# Patient Record
Sex: MALE | Race: BLACK OR AFRICAN AMERICAN | NOT HISPANIC OR LATINO | Employment: FULL TIME | URBAN - METROPOLITAN AREA
[De-identification: names, ages, dates, MRNs, and addresses within clinical notes are randomized per-mention and may not be internally consistent; named-entity substitution may affect disease eponyms.]

---

## 2019-07-27 ENCOUNTER — HOSPITAL ENCOUNTER (EMERGENCY)
Facility: HOSPITAL | Age: 44
Discharge: HOME | End: 2019-07-28
Attending: FAMILY MEDICINE
Payer: COMMERCIAL

## 2019-07-27 DIAGNOSIS — R11.2 NON-INTRACTABLE VOMITING WITH NAUSEA, UNSPECIFIED VOMITING TYPE: Primary | ICD-10-CM

## 2019-07-27 LAB
BASOPHILS # BLD AUTO: 0.12 K/UL (ref 0–0.2)
BASOPHILS NFR BLD: 1.3 % (ref 0–1.9)
DIFFERENTIAL METHOD: ABNORMAL
EOSINOPHIL # BLD AUTO: 0.2 K/UL (ref 0–0.5)
EOSINOPHIL NFR BLD: 2 % (ref 0–8)
ERYTHROCYTE [DISTWIDTH] IN BLOOD BY AUTOMATED COUNT: 12 % (ref 11.5–14.5)
HCT VFR BLD AUTO: 41.3 % (ref 40–54)
HGB BLD-MCNC: 13.5 G/DL (ref 14–18)
IMM GRANULOCYTES # BLD AUTO: 0.02 K/UL (ref 0–0.04)
IMM GRANULOCYTES NFR BLD AUTO: 0.2 % (ref 0–0.5)
LYMPHOCYTES # BLD AUTO: 3.7 K/UL (ref 1–4.8)
LYMPHOCYTES NFR BLD: 39 % (ref 18–48)
MCH RBC QN AUTO: 29.7 PG (ref 27–31)
MCHC RBC AUTO-ENTMCNC: 32.7 G/DL (ref 32–36)
MCV RBC AUTO: 91 FL (ref 82–98)
MONOCYTES # BLD AUTO: 0.9 K/UL (ref 0.3–1)
MONOCYTES NFR BLD: 9.3 % (ref 4–15)
NEUTROPHILS # BLD AUTO: 4.5 K/UL (ref 1.8–7.7)
NEUTROPHILS NFR BLD: 48.2 % (ref 38–73)
NRBC BLD-RTO: 0 /100 WBC
PLATELET # BLD AUTO: 338 K/UL (ref 150–350)
PMV BLD AUTO: 10.3 FL (ref 9.2–12.9)
RBC # BLD AUTO: 4.54 M/UL (ref 4.6–6.2)
WBC # BLD AUTO: 9.42 K/UL (ref 3.9–12.7)

## 2019-07-27 PROCEDURE — 83690 ASSAY OF LIPASE: CPT

## 2019-07-27 PROCEDURE — 85025 COMPLETE CBC W/AUTO DIFF WBC: CPT

## 2019-07-27 PROCEDURE — 83735 ASSAY OF MAGNESIUM: CPT

## 2019-07-27 PROCEDURE — 96374 THER/PROPH/DIAG INJ IV PUSH: CPT

## 2019-07-27 PROCEDURE — 96375 TX/PRO/DX INJ NEW DRUG ADDON: CPT

## 2019-07-27 PROCEDURE — 99284 EMERGENCY DEPT VISIT MOD MDM: CPT | Mod: 25

## 2019-07-27 PROCEDURE — 96361 HYDRATE IV INFUSION ADD-ON: CPT

## 2019-07-27 PROCEDURE — 63600175 PHARM REV CODE 636 W HCPCS: Performed by: FAMILY MEDICINE

## 2019-07-27 PROCEDURE — 80053 COMPREHEN METABOLIC PANEL: CPT

## 2019-07-27 RX ORDER — MORPHINE SULFATE 4 MG/ML
4 INJECTION, SOLUTION INTRAMUSCULAR; INTRAVENOUS
Status: COMPLETED | OUTPATIENT
Start: 2019-07-27 | End: 2019-07-27

## 2019-07-27 RX ORDER — ONDANSETRON 2 MG/ML
4 INJECTION INTRAMUSCULAR; INTRAVENOUS
Status: COMPLETED | OUTPATIENT
Start: 2019-07-27 | End: 2019-07-27

## 2019-07-27 RX ADMIN — ONDANSETRON 4 MG: 2 INJECTION INTRAMUSCULAR; INTRAVENOUS at 11:07

## 2019-07-27 RX ADMIN — MORPHINE SULFATE 4 MG: 4 INJECTION INTRAVENOUS at 11:07

## 2019-07-27 RX ADMIN — SODIUM CHLORIDE 1000 ML: 0.9 INJECTION, SOLUTION INTRAVENOUS at 11:07

## 2019-07-28 VITALS
HEART RATE: 70 BPM | SYSTOLIC BLOOD PRESSURE: 105 MMHG | BODY MASS INDEX: 31.71 KG/M2 | HEIGHT: 75 IN | WEIGHT: 255 LBS | OXYGEN SATURATION: 100 % | TEMPERATURE: 99 F | RESPIRATION RATE: 16 BRPM | DIASTOLIC BLOOD PRESSURE: 73 MMHG

## 2019-07-28 LAB
ALBUMIN SERPL BCP-MCNC: 3.8 G/DL (ref 3.5–5.2)
ALP SERPL-CCNC: 53 U/L (ref 55–135)
ALT SERPL W/O P-5'-P-CCNC: 19 U/L (ref 10–44)
ANION GAP SERPL CALC-SCNC: 8 MMOL/L (ref 8–16)
AST SERPL-CCNC: 20 U/L (ref 10–40)
BILIRUB SERPL-MCNC: 1 MG/DL (ref 0.1–1)
BUN SERPL-MCNC: 14 MG/DL (ref 6–20)
CALCIUM SERPL-MCNC: 8.4 MG/DL (ref 8.7–10.5)
CHLORIDE SERPL-SCNC: 99 MMOL/L (ref 95–110)
CO2 SERPL-SCNC: 28 MMOL/L (ref 23–29)
CREAT SERPL-MCNC: 1.2 MG/DL (ref 0.5–1.4)
EST. GFR  (AFRICAN AMERICAN): >60 ML/MIN/1.73 M^2
EST. GFR  (NON AFRICAN AMERICAN): >60 ML/MIN/1.73 M^2
GLUCOSE SERPL-MCNC: 94 MG/DL (ref 70–110)
LIPASE SERPL-CCNC: 45 U/L (ref 4–60)
MAGNESIUM SERPL-MCNC: 1.8 MG/DL (ref 1.6–2.6)
POTASSIUM SERPL-SCNC: 3.6 MMOL/L (ref 3.5–5.1)
PROT SERPL-MCNC: 7 G/DL (ref 6–8.4)
SODIUM SERPL-SCNC: 135 MMOL/L (ref 136–145)

## 2019-07-28 PROCEDURE — 96376 TX/PRO/DX INJ SAME DRUG ADON: CPT

## 2019-07-28 PROCEDURE — 63600175 PHARM REV CODE 636 W HCPCS: Performed by: FAMILY MEDICINE

## 2019-07-28 RX ORDER — ONDANSETRON 4 MG/1
4 TABLET, ORALLY DISINTEGRATING ORAL EVERY 8 HOURS PRN
Qty: 12 TABLET | Refills: 0 | Status: SHIPPED | OUTPATIENT
Start: 2019-07-28 | End: 2019-09-04

## 2019-07-28 RX ORDER — ONDANSETRON 2 MG/ML
4 INJECTION INTRAMUSCULAR; INTRAVENOUS
Status: COMPLETED | OUTPATIENT
Start: 2019-07-28 | End: 2019-07-28

## 2019-07-28 RX ADMIN — ONDANSETRON 4 MG: 2 INJECTION INTRAMUSCULAR; INTRAVENOUS at 12:07

## 2019-07-28 NOTE — ED PROVIDER NOTES
Encounter Date: 7/27/2019       History     Chief Complaint   Patient presents with    Abdominal Pain     x 2 days    Vomiting     Patient presents to the ED with nausea, vomiting, abdominal cramping for the past 48 hr.  Patient unable to tolerate any oral intake for the past 12 hr.  Denies any diarrhea.  Denies fever or chills.  Symptoms consistent with condition currently present in the community.        Review of patient's allergies indicates:  No Known Allergies  History reviewed. No pertinent past medical history.  Past Surgical History:   Procedure Laterality Date    BACK SURGERY      EYE SURGERY       History reviewed. No pertinent family history.  Social History     Tobacco Use    Smoking status: Current Every Day Smoker     Packs/day: 0.50     Types: Cigarettes   Substance Use Topics    Alcohol use: Never     Frequency: Never    Drug use: Not on file     Review of Systems   Constitutional: Negative for chills and fever.   Gastrointestinal: Positive for abdominal pain, nausea and vomiting.   All other systems reviewed and are negative.      Physical Exam     Initial Vitals [07/27/19 2211]   BP Pulse Resp Temp SpO2   128/81 85 20 98.5 °F (36.9 °C) 97 %      MAP       --         Physical Exam    Nursing note and vitals reviewed.  Constitutional: He appears well-developed and well-nourished. He is not diaphoretic. No distress.   HENT:   Head: Normocephalic and atraumatic.   Eyes: Pupils are equal, round, and reactive to light.   Neck: Normal range of motion. Neck supple.   Cardiovascular: Normal rate, regular rhythm, normal heart sounds and intact distal pulses. Exam reveals no friction rub.    No murmur heard.  Pulmonary/Chest: Breath sounds normal. No respiratory distress. He has no wheezes. He has no rhonchi. He has no rales. He exhibits no tenderness.   Abdominal: Soft. Bowel sounds are normal. He exhibits no distension. There is tenderness. There is no rebound and no guarding.   Patient mildly  tender over mid abdomen, no rebound or guarding noted. No mass   Musculoskeletal: Normal range of motion. He exhibits no edema.   Neurological: He is alert and oriented to person, place, and time. He has normal strength.   Skin: Skin is warm and dry. Capillary refill takes less than 2 seconds.   Psychiatric: He has a normal mood and affect. His behavior is normal. Judgment and thought content normal.         ED Course   Procedures  Labs Reviewed   CBC W/ AUTO DIFFERENTIAL - Abnormal; Notable for the following components:       Result Value    RBC 4.54 (*)     Hemoglobin 13.5 (*)     All other components within normal limits   COMPREHENSIVE METABOLIC PANEL - Abnormal; Notable for the following components:    Sodium 135 (*)     Calcium 8.4 (*)     Alkaline Phosphatase 53 (*)     All other components within normal limits   LIPASE   MAGNESIUM          Imaging Results    None          Medical Decision Making:   Differential Diagnosis:   Gastroenteritis  Pancreatitis    ED Management:  Patient much improved after IV fluids and medication.  Able to tolerate oral intake prior to discharge.                      Clinical Impression:       ICD-10-CM ICD-9-CM   1. Non-intractable vomiting with nausea, unspecified vomiting type R11.2 787.01                                Anjelica Palm MD  07/28/19 0522

## 2019-07-28 NOTE — ED NOTES
Pt c/o  upepr abd pain and n/v x2-3days. Pt reports vomiting 3 times today. Pt states that he has been able to tolerate fluids. Pt denies diarrhea, fever and dysuria. Pt states that he drinks approx 8 energy drinks per day.

## 2019-07-28 NOTE — ED NOTES
Pt states that his pain has increased after admin of pain medication per md order. Dr. Palm notified. V/u. No new orders received

## 2019-09-04 ENCOUNTER — HOSPITAL ENCOUNTER (EMERGENCY)
Facility: HOSPITAL | Age: 44
Discharge: HOME | End: 2019-09-04
Attending: FAMILY MEDICINE
Payer: COMMERCIAL

## 2019-09-04 VITALS
HEART RATE: 70 BPM | BODY MASS INDEX: 27.85 KG/M2 | RESPIRATION RATE: 20 BRPM | SYSTOLIC BLOOD PRESSURE: 110 MMHG | DIASTOLIC BLOOD PRESSURE: 82 MMHG | OXYGEN SATURATION: 97 % | HEIGHT: 75 IN | TEMPERATURE: 99 F | WEIGHT: 224 LBS

## 2019-09-04 DIAGNOSIS — R10.13 EPIGASTRIC PAIN: Primary | ICD-10-CM

## 2019-09-04 DIAGNOSIS — K29.70 GASTRITIS, PRESENCE OF BLEEDING UNSPECIFIED, UNSPECIFIED CHRONICITY, UNSPECIFIED GASTRITIS TYPE: ICD-10-CM

## 2019-09-04 DIAGNOSIS — R52 PAIN: ICD-10-CM

## 2019-09-04 LAB
ALBUMIN SERPL BCP-MCNC: 4.3 G/DL (ref 3.5–5.2)
ALP SERPL-CCNC: 57 U/L (ref 55–135)
ALT SERPL W/O P-5'-P-CCNC: 18 U/L (ref 10–44)
ANION GAP SERPL CALC-SCNC: 10 MMOL/L (ref 8–16)
AST SERPL-CCNC: 21 U/L (ref 10–40)
BASOPHILS # BLD AUTO: 0.07 K/UL (ref 0–0.2)
BASOPHILS NFR BLD: 0.7 % (ref 0–1.9)
BILIRUB SERPL-MCNC: 1.4 MG/DL (ref 0.1–1)
BUN SERPL-MCNC: 12 MG/DL (ref 6–20)
CALCIUM SERPL-MCNC: 9.1 MG/DL (ref 8.7–10.5)
CHLORIDE SERPL-SCNC: 98 MMOL/L (ref 95–110)
CO2 SERPL-SCNC: 28 MMOL/L (ref 23–29)
CREAT SERPL-MCNC: 1.1 MG/DL (ref 0.5–1.4)
DIFFERENTIAL METHOD: ABNORMAL
EOSINOPHIL # BLD AUTO: 0.1 K/UL (ref 0–0.5)
EOSINOPHIL NFR BLD: 1.4 % (ref 0–8)
ERYTHROCYTE [DISTWIDTH] IN BLOOD BY AUTOMATED COUNT: 12.1 % (ref 11.5–14.5)
EST. GFR  (AFRICAN AMERICAN): >60 ML/MIN/1.73 M^2
EST. GFR  (NON AFRICAN AMERICAN): >60 ML/MIN/1.73 M^2
GLUCOSE SERPL-MCNC: 94 MG/DL (ref 70–110)
HCT VFR BLD AUTO: 43.9 % (ref 40–54)
HGB BLD-MCNC: 14.2 G/DL (ref 14–18)
IMM GRANULOCYTES # BLD AUTO: 0.03 K/UL (ref 0–0.04)
IMM GRANULOCYTES NFR BLD AUTO: 0.3 % (ref 0–0.5)
LIPASE SERPL-CCNC: 35 U/L (ref 4–60)
LYMPHOCYTES # BLD AUTO: 3.1 K/UL (ref 1–4.8)
LYMPHOCYTES NFR BLD: 31.3 % (ref 18–48)
MCH RBC QN AUTO: 29.5 PG (ref 27–31)
MCHC RBC AUTO-ENTMCNC: 32.3 G/DL (ref 32–36)
MCV RBC AUTO: 91 FL (ref 82–98)
MONOCYTES # BLD AUTO: 0.9 K/UL (ref 0.3–1)
MONOCYTES NFR BLD: 8.8 % (ref 4–15)
NEUTROPHILS # BLD AUTO: 5.6 K/UL (ref 1.8–7.7)
NEUTROPHILS NFR BLD: 57.5 % (ref 38–73)
NRBC BLD-RTO: 0 /100 WBC
PLATELET # BLD AUTO: 386 K/UL (ref 150–350)
PMV BLD AUTO: 10.3 FL (ref 9.2–12.9)
POTASSIUM SERPL-SCNC: 4 MMOL/L (ref 3.5–5.1)
PROT SERPL-MCNC: 8 G/DL (ref 6–8.4)
RBC # BLD AUTO: 4.82 M/UL (ref 4.6–6.2)
SODIUM SERPL-SCNC: 136 MMOL/L (ref 136–145)
WBC # BLD AUTO: 9.74 K/UL (ref 3.9–12.7)

## 2019-09-04 PROCEDURE — 83690 ASSAY OF LIPASE: CPT

## 2019-09-04 PROCEDURE — 74019 XR ABDOMEN FLAT AND ERECT: ICD-10-PCS | Mod: 26,,, | Performed by: RADIOLOGY

## 2019-09-04 PROCEDURE — 74019 RADEX ABDOMEN 2 VIEWS: CPT | Mod: TC,FY

## 2019-09-04 PROCEDURE — 25000003 PHARM REV CODE 250: Performed by: FAMILY MEDICINE

## 2019-09-04 PROCEDURE — 36415 COLL VENOUS BLD VENIPUNCTURE: CPT

## 2019-09-04 PROCEDURE — 99284 EMERGENCY DEPT VISIT MOD MDM: CPT | Mod: 25

## 2019-09-04 PROCEDURE — 74019 RADEX ABDOMEN 2 VIEWS: CPT | Mod: 26,,, | Performed by: RADIOLOGY

## 2019-09-04 PROCEDURE — 80053 COMPREHEN METABOLIC PANEL: CPT

## 2019-09-04 PROCEDURE — 85025 COMPLETE CBC W/AUTO DIFF WBC: CPT

## 2019-09-04 RX ADMIN — LIDOCAINE HYDROCHLORIDE: 20 SOLUTION ORAL; TOPICAL at 02:09

## 2019-09-04 NOTE — ED PROVIDER NOTES
Encounter Date: 9/4/2019       History     Chief Complaint   Patient presents with    Abdominal Pain     Abdominal pain, N&V x3 days.    Nausea    Vomiting     43-year-old male presents complaining crampy of abdominal pain for the past 3 days is so should with nausea vomiting and some diarrhea it started after a [employer] hamburger no history fever or chills no sore throat or sneezing there are no family members with similar symptoms        Review of patient's allergies indicates:  No Known Allergies  History reviewed. No pertinent past medical history.  Past Surgical History:   Procedure Laterality Date    BACK SURGERY      EYE SURGERY       History reviewed. No pertinent family history.  Social History     Tobacco Use    Smoking status: Current Every Day Smoker     Packs/day: 0.50     Types: Cigarettes   Substance Use Topics    Alcohol use: Never     Frequency: Never    Drug use: Never     Review of Systems   Constitutional: Negative for fever.   HENT: Negative for sore throat.    Respiratory: Negative for shortness of breath.    Cardiovascular: Negative for chest pain.   Gastrointestinal: Negative for nausea.   Genitourinary: Negative for dysuria.   Musculoskeletal: Negative for back pain.   Skin: Negative for rash.   Neurological: Negative for weakness.   Hematological: Does not bruise/bleed easily.       Physical Exam     Initial Vitals [09/04/19 1207]   BP Pulse Resp Temp SpO2   129/82 71 20 98.6 °F (37 °C) 97 %      MAP       --         Physical Exam    Nursing note and vitals reviewed.  Constitutional: He appears well-developed and well-nourished. He is not diaphoretic. No distress.   HENT:   Head: Normocephalic and atraumatic.   Right Ear: External ear normal.   Left Ear: External ear normal.   Nose: Nose normal.   Mouth/Throat: Oropharynx is clear and moist. No oropharyngeal exudate.   Eyes: EOM are normal.   Neck: Normal range of motion. Neck supple. No tracheal deviation present.    Cardiovascular: Normal rate and regular rhythm.   No murmur heard.  Pulmonary/Chest: Breath sounds normal. No stridor. No respiratory distress. He has no rales.   Abdominal: Soft. He exhibits no distension and no mass. There is no tenderness. There is no rebound.   Musculoskeletal: Normal range of motion. He exhibits no edema.   Lymphadenopathy:     He has no cervical adenopathy.   Neurological: He is alert and oriented to person, place, and time. He has normal strength.   Skin: Skin is warm and dry. Capillary refill takes less than 2 seconds. No pallor.   Psychiatric: He has a normal mood and affect.         ED Course   Procedures  Labs Reviewed   CBC W/ AUTO DIFFERENTIAL - Abnormal; Notable for the following components:       Result Value    Platelets 386 (*)     All other components within normal limits   COMPREHENSIVE METABOLIC PANEL - Abnormal; Notable for the following components:    Total Bilirubin 1.4 (*)     All other components within normal limits   LIPASE          Imaging Results          X-Ray Abdomen Flat And Erect (Final result)  Result time 09/04/19 13:16:49    Final result by Edilberto Garcia Jr., MD (09/04/19 13:16:49)                 Impression:      Four calcifications in a line in the right pelvis most likely represent phleboliths but distal ureteral stones are not ruled out on this study.  Otherwise negative plain x-ray of the abdomen.      Electronically signed by: Edilberto Garcia MD  Date:    09/04/2019  Time:    13:16             Narrative:    EXAMINATION:  XR ABDOMEN FLAT AND ERECT    CLINICAL HISTORY:  Pain, unspecified    TECHNIQUE:  Flat and erect AP views of the abdomen were performed.    COMPARISON:  None    FINDINGS:  The bowel gas pattern is within normal limits.  No free air is seen.  The properitoneal fat planes are sharp.  No masses identified.  There are 4 small calcifications identified in the right bony pelvis most likely representing phleboliths but because of their  position distal right ureteral stone is not ruled out.                                                      Clinical Impression:       ICD-10-CM ICD-9-CM   1. Epigastric pain R10.13 789.06   2. Pain R52 780.96   3. Gastritis, presence of bleeding unspecified, unspecified chronicity, unspecified gastritis type K29.70 535.50                                Denis Daniel MD  09/04/19 1546       Denis Daniel MD  09/04/19 155

## 2019-09-06 ENCOUNTER — HOSPITAL ENCOUNTER (EMERGENCY)
Facility: HOSPITAL | Age: 44
Discharge: HOME | End: 2019-09-06
Attending: FAMILY MEDICINE
Payer: COMMERCIAL

## 2019-09-06 VITALS
SYSTOLIC BLOOD PRESSURE: 106 MMHG | OXYGEN SATURATION: 99 % | RESPIRATION RATE: 24 BRPM | HEART RATE: 54 BPM | TEMPERATURE: 98 F | WEIGHT: 245 LBS | HEIGHT: 75 IN | BODY MASS INDEX: 30.46 KG/M2 | DIASTOLIC BLOOD PRESSURE: 76 MMHG

## 2019-09-06 DIAGNOSIS — R07.9 CHEST PAIN: ICD-10-CM

## 2019-09-06 DIAGNOSIS — K80.50 BILIARY COLIC: Primary | ICD-10-CM

## 2019-09-06 LAB — TROPONIN I SERPL DL<=0.01 NG/ML-MCNC: <0.01 NG/ML (ref 0.02–0.5)

## 2019-09-06 PROCEDURE — 76705 ECHO EXAM OF ABDOMEN: CPT | Mod: TC

## 2019-09-06 PROCEDURE — 76705 US ABDOMEN LIMITED: ICD-10-PCS | Mod: 26,,, | Performed by: RADIOLOGY

## 2019-09-06 PROCEDURE — 25000003 PHARM REV CODE 250: Performed by: FAMILY MEDICINE

## 2019-09-06 PROCEDURE — 96374 THER/PROPH/DIAG INJ IV PUSH: CPT

## 2019-09-06 PROCEDURE — 63600175 PHARM REV CODE 636 W HCPCS: Performed by: FAMILY MEDICINE

## 2019-09-06 PROCEDURE — 99285 EMERGENCY DEPT VISIT HI MDM: CPT | Mod: 25

## 2019-09-06 PROCEDURE — 93010 EKG 12-LEAD: ICD-10-PCS | Mod: ,,, | Performed by: INTERNAL MEDICINE

## 2019-09-06 PROCEDURE — 76705 ECHO EXAM OF ABDOMEN: CPT | Mod: 26,,, | Performed by: RADIOLOGY

## 2019-09-06 PROCEDURE — S0028 INJECTION, FAMOTIDINE, 20 MG: HCPCS | Performed by: FAMILY MEDICINE

## 2019-09-06 PROCEDURE — 84484 ASSAY OF TROPONIN QUANT: CPT

## 2019-09-06 PROCEDURE — 96375 TX/PRO/DX INJ NEW DRUG ADDON: CPT

## 2019-09-06 PROCEDURE — 93005 ELECTROCARDIOGRAM TRACING: CPT

## 2019-09-06 PROCEDURE — 93010 ELECTROCARDIOGRAM REPORT: CPT | Mod: ,,, | Performed by: INTERNAL MEDICINE

## 2019-09-06 RX ORDER — FAMOTIDINE 10 MG/ML
40 INJECTION INTRAVENOUS
Status: COMPLETED | OUTPATIENT
Start: 2019-09-06 | End: 2019-09-06

## 2019-09-06 RX ORDER — MORPHINE SULFATE 4 MG/ML
4 INJECTION, SOLUTION INTRAMUSCULAR; INTRAVENOUS
Status: COMPLETED | OUTPATIENT
Start: 2019-09-06 | End: 2019-09-06

## 2019-09-06 RX ORDER — ONDANSETRON 4 MG/1
4 TABLET, ORALLY DISINTEGRATING ORAL
Status: COMPLETED | OUTPATIENT
Start: 2019-09-06 | End: 2019-09-06

## 2019-09-06 RX ADMIN — MORPHINE SULFATE 4 MG: 4 INJECTION INTRAVENOUS at 07:09

## 2019-09-06 RX ADMIN — FAMOTIDINE 40 MG: 10 INJECTION INTRAVENOUS at 07:09

## 2019-09-06 RX ADMIN — LIDOCAINE HYDROCHLORIDE: 20 SOLUTION ORAL; TOPICAL at 06:09

## 2019-09-06 RX ADMIN — ONDANSETRON 4 MG: 4 TABLET, ORALLY DISINTEGRATING ORAL at 05:09

## 2019-09-06 NOTE — ED PROVIDER NOTES
Encounter Date: 9/6/2019       History     Chief Complaint   Patient presents with    Shortness of Breath     Pt states he awoke at 0200 with chest pain, epigastric pain and shortness of breath. Pt seen on 9/4/19 and diagnosed with gastritis. States pain tonight is the same as on visit from 9/4. Denies any radiation of pain, denies diaphoresis. Pt states he has had nausea as well, vomited once this am around 0340. Also has continued to have diarrhea several times daily since last visit.         Review of patient's allergies indicates:  No Known Allergies  No past medical history on file.  Past Surgical History:   Procedure Laterality Date    BACK SURGERY      EYE SURGERY       No family history on file.  Social History     Tobacco Use    Smoking status: Current Every Day Smoker     Packs/day: 0.50     Types: Cigarettes   Substance Use Topics    Alcohol use: Never     Frequency: Never    Drug use: Never     Review of Systems   Constitutional: Positive for chills. Negative for fever.   Respiratory: Positive for shortness of breath.    Cardiovascular: Positive for chest pain. Negative for palpitations.   Gastrointestinal: Positive for abdominal pain, diarrhea, nausea and vomiting.   All other systems reviewed and are negative.      Physical Exam     Initial Vitals [09/06/19 0540]   BP Pulse Resp Temp SpO2   112/69 97 (!) 24 98.2 °F (36.8 °C) 100 %      MAP       --         Physical Exam    Nursing note and vitals reviewed.  Constitutional: He appears well-developed and well-nourished. He is not diaphoretic. No distress.   HENT:   Head: Normocephalic and atraumatic.   Eyes: Pupils are equal, round, and reactive to light.   Neck: Normal range of motion. Neck supple.   Cardiovascular: Normal rate, regular rhythm, normal heart sounds and intact distal pulses. Exam reveals no gallop and no friction rub.    No murmur heard.  Pulmonary/Chest: Breath sounds normal. No respiratory distress. He has no wheezes. He has no  rhonchi. He has no rales. He exhibits no tenderness.   Abdominal: Soft. Bowel sounds are normal. He exhibits no distension. There is tenderness. There is no rebound and no guarding.   TTP over epigastric region. No rebound or guarding.    Musculoskeletal: Normal range of motion. He exhibits no edema.   Neurological: He is alert and oriented to person, place, and time. He has normal strength.   Skin: Skin is warm and dry. Capillary refill takes less than 2 seconds.   Psychiatric: He has a normal mood and affect. His behavior is normal. Judgment and thought content normal.         ED Course   Procedures  Labs Reviewed   TROPONIN I     EKG Readings: (Independently Interpreted)   Rhythm: Normal Sinus Rhythm. Heart Rate: 79. Ectopy: No Ectopy. Conduction: Normal. ST Segments: Normal ST Segments. T Waves: Normal. Clinical Impression: Normal Sinus Rhythm       Imaging Results    None                               Clinical Impression:       ICD-10-CM ICD-9-CM   1. Biliary colic K80.50 574.20   2. Chest pain R07.9 786.50                                Denis Daniel MD  09/06/19 0986

## 2019-09-06 NOTE — ED NOTES
Pt d/c instructions reviewed and he verbalized understanding.  Pt ambulatory at d/c and escorted to registration.

## 2019-09-06 NOTE — ED NOTES
Assumed care of Pt.  Pt resting quietly in bed with family at bedside. Pt c/o 10/10 epigastric pain that radiates up the middle of his chest. Denies nausea or vomiting at this time.

## 2019-10-19 ENCOUNTER — HOSPITAL ENCOUNTER (EMERGENCY)
Facility: HOSPITAL | Age: 44
Discharge: HOME | End: 2019-10-19
Attending: EMERGENCY MEDICINE
Payer: COMMERCIAL

## 2019-10-19 VITALS
OXYGEN SATURATION: 96 % | RESPIRATION RATE: 16 BRPM | HEART RATE: 68 BPM | TEMPERATURE: 98 F | BODY MASS INDEX: 31.58 KG/M2 | WEIGHT: 254 LBS | DIASTOLIC BLOOD PRESSURE: 77 MMHG | HEIGHT: 75 IN | SYSTOLIC BLOOD PRESSURE: 133 MMHG
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
## 2019-10-19 DIAGNOSIS — S63.501A WRIST SPRAIN, RIGHT, INITIAL ENCOUNTER: ICD-10-CM

## 2019-10-19 DIAGNOSIS — S43.402A SPRAIN OF LEFT SHOULDER, UNSPECIFIED SHOULDER SPRAIN TYPE, INITIAL ENCOUNTER: ICD-10-CM

## 2019-10-19 DIAGNOSIS — S63.502A WRIST SPRAIN, LEFT, INITIAL ENCOUNTER: Primary | ICD-10-CM

## 2019-10-19 PROCEDURE — 96372 THER/PROPH/DIAG INJ SC/IM: CPT | Mod: 59

## 2019-10-19 PROCEDURE — 99284 EMERGENCY DEPT VISIT MOD MDM: CPT | Mod: 25

## 2019-10-19 PROCEDURE — 29125 APPL SHORT ARM SPLINT STATIC: CPT | Mod: LT

## 2019-10-19 PROCEDURE — 63600175 PHARM REV CODE 636 W HCPCS: Performed by: EMERGENCY MEDICINE

## 2019-10-19 RX ORDER — METHOCARBAMOL 500 MG/1
1000 TABLET, FILM COATED ORAL 3 TIMES DAILY
Qty: 30 TABLET | Refills: 0 | Status: SHIPPED | OUTPATIENT
Start: 2019-10-19 | End: 2019-10-24

## 2019-10-19 RX ORDER — NAPROXEN 500 MG/1
500 TABLET ORAL 2 TIMES DAILY WITH MEALS
Qty: 30 TABLET | Refills: 0 | Status: SHIPPED | OUTPATIENT
Start: 2019-10-19 | End: 2019-11-03

## 2019-10-19 RX ORDER — KETOROLAC TROMETHAMINE 30 MG/ML
30 INJECTION, SOLUTION INTRAMUSCULAR; INTRAVENOUS
Status: COMPLETED | OUTPATIENT
Start: 2019-10-19 | End: 2019-10-19

## 2019-10-19 RX ADMIN — KETOROLAC TROMETHAMINE 30 MG: 30 INJECTION, SOLUTION INTRAMUSCULAR at 07:10
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
## 2020-04-06 ENCOUNTER — HOSPITAL ENCOUNTER (EMERGENCY)
Facility: HOSPITAL | Age: 45
Discharge: HOME | End: 2020-04-06
Attending: EMERGENCY MEDICINE
Payer: COMMERCIAL

## 2020-04-06 VITALS
HEIGHT: 75 IN | DIASTOLIC BLOOD PRESSURE: 91 MMHG | TEMPERATURE: 98 F | SYSTOLIC BLOOD PRESSURE: 136 MMHG | BODY MASS INDEX: 32.95 KG/M2 | WEIGHT: 265 LBS | OXYGEN SATURATION: 97 % | HEART RATE: 82 BPM | RESPIRATION RATE: 20 BRPM

## 2020-04-06 DIAGNOSIS — M54.50 CHRONIC BILATERAL LOW BACK PAIN, UNSPECIFIED WHETHER SCIATICA PRESENT: ICD-10-CM

## 2020-04-06 DIAGNOSIS — G89.29 CHRONIC BILATERAL LOW BACK PAIN, UNSPECIFIED WHETHER SCIATICA PRESENT: ICD-10-CM

## 2020-04-06 DIAGNOSIS — M54.9 BACK PAIN, UNSPECIFIED BACK LOCATION, UNSPECIFIED BACK PAIN LATERALITY, UNSPECIFIED CHRONICITY: Primary | ICD-10-CM

## 2020-04-06 PROCEDURE — 99284 EMERGENCY DEPT VISIT MOD MDM: CPT

## 2020-04-06 NOTE — ED PROVIDER NOTES
Encounter Date: 4/6/2020       History     Chief Complaint   Patient presents with    Back Pain     with numbness to both legs off and on for 7 months, gait steady, moves all extremities well     44 year old male presenting to er with complaint of lower pack worsening for past 7 months states now worsening this past week with anterior thigh numbness and tingling and weakness.  Denies any urinary or fecal incontinence states can walk with no issues pain comes with laying down.  States by his ortho had MRI cervical spine  March 2020 which showed severe stenosis C4-C6, denies any numbness or tingling in upper extremities but has left shoulder pain.  Pt states hx of back pain prior as well and Surgery years ago prior to accident in Oct 2019.          Review of patient's allergies indicates:  No Known Allergies  History reviewed. No pertinent past medical history.  Past Surgical History:   Procedure Laterality Date    BACK SURGERY      EYE SURGERY       History reviewed. No pertinent family history.  Social History     Tobacco Use    Smoking status: Current Every Day Smoker     Packs/day: 0.50     Types: Cigarettes    Smokeless tobacco: Never Used   Substance Use Topics    Alcohol use: Never     Frequency: Never    Drug use: Never     Review of Systems   Musculoskeletal: Positive for arthralgias and back pain. Negative for gait problem, joint swelling, myalgias and neck pain.   Skin: Negative.    Neurological: Positive for numbness. Negative for tremors, seizures, syncope, facial asymmetry, speech difficulty, weakness, light-headedness and headaches.   All other systems reviewed and are negative.      Physical Exam     Initial Vitals [04/06/20 1518]   BP Pulse Resp Temp SpO2   (!) 145/80 83 16 97.8 °F (36.6 °C) 97 %      MAP       --         Physical Exam    Vitals reviewed.  Constitutional: He appears well-developed and well-nourished.   HENT:   Head: Normocephalic and atraumatic.   Right Ear: External ear  normal.   Left Ear: External ear normal.   Mouth/Throat: Oropharynx is clear and moist.   Eyes: Conjunctivae and EOM are normal.   Neck: Normal range of motion. Neck supple.   Cardiovascular: Normal rate and regular rhythm.   Pulmonary/Chest: Breath sounds normal. No respiratory distress. He has no wheezes.   Abdominal: Soft. Bowel sounds are normal.   Musculoskeletal: Normal range of motion. He exhibits no tenderness.   Straight leg raise negative,    Neurological: He is alert and oriented to person, place, and time. He has normal strength. No cranial nerve deficit or sensory deficit.   Patellar tendon reflex 1+   Skin: Skin is warm.   Psychiatric: He has a normal mood and affect. Thought content normal.         ED Course   Procedures  Labs Reviewed - No data to display       Imaging Results    None          Medical Decision Making:   ED Management:  Patient sent by to ER by Dr. Cook for further evaluation of lumbar back pain myelopathy.  Discussed case with ER attedniMRI noncontrast ordered patient.  MRI tech came to  patient for evaluation states does not want to have MRI currently as taking long and has children in the car and would like to go home and states will return tomorrow in morning for further evaluation.  Patient denies having severe pain currently denies any weakness negative leg raise ambulating without difficulty.  Patient advised by me to call Dr. Cook to set up MRI as out patient as well.  Patient advised may return to the ER for another visit.   Patient advised to return to the ER for any worsening symptoms numbness tingling or concerning symptoms.                Attending Attestation:     Physician Attestation Statement for NP/PA:   I discussed this assessment and plan of this patient with the NP/PA, but I did not personally examine the patient. The face to face encounter was performed by the NP/PA.                                Clinical Impression:       ICD-10-CM ICD-9-CM   1. Back  pain, unspecified back location, unspecified back pain laterality, unspecified chronicity M54.9 724.5   2. Chronic bilateral low back pain, unspecified whether sciatica present M54.5 724.2    G89.29 338.29         Disposition:   Disposition: Discharged  Condition: Stable                        Diana Maradiaga PA-C  04/06/20 1701       David Girard MD  04/09/20 0682

## 2020-04-06 NOTE — ED TRIAGE NOTES
"" my back and numbness in my leg" symptoms since " October of 19" reports symptoms since oct 2019, pt states " I have two disks in my back that are messed up or something" reports numbness to bilat legs, rates pain 10/10  "

## 2020-04-06 NOTE — DISCHARGE INSTRUCTIONS
Patient advised return to the ER for any worsening symptoms patient advised return, follow-up with your orthopedic for further evaluation and order MRI as outpatient.  Return to the ER for any numbness tingling weakness urinary incontinence.

## 2020-05-21 ENCOUNTER — HOSPITAL ENCOUNTER (EMERGENCY)
Facility: HOSPITAL | Age: 45
Discharge: HOME | End: 2020-05-22
Attending: FAMILY MEDICINE
Payer: COMMERCIAL

## 2020-05-21 DIAGNOSIS — R73.9 HYPERGLYCEMIA: Primary | ICD-10-CM

## 2020-05-21 DIAGNOSIS — E86.0 DEHYDRATION: ICD-10-CM

## 2020-05-21 LAB
ALBUMIN SERPL BCP-MCNC: 4 G/DL (ref 3.5–5.2)
ALP SERPL-CCNC: 88 U/L (ref 55–135)
ALT SERPL W/O P-5'-P-CCNC: 35 U/L (ref 10–44)
ANION GAP SERPL CALC-SCNC: 11 MMOL/L (ref 8–16)
AST SERPL-CCNC: 25 U/L (ref 10–40)
BASOPHILS # BLD AUTO: 0.15 K/UL (ref 0–0.2)
BASOPHILS NFR BLD: 1.1 % (ref 0–1.9)
BILIRUB SERPL-MCNC: 0.8 MG/DL (ref 0.1–1)
BUN SERPL-MCNC: 13 MG/DL (ref 6–20)
CALCIUM SERPL-MCNC: 9 MG/DL (ref 8.7–10.5)
CHLORIDE SERPL-SCNC: 92 MMOL/L (ref 95–110)
CO2 SERPL-SCNC: 25 MMOL/L (ref 23–29)
CREAT SERPL-MCNC: 1.3 MG/DL (ref 0.5–1.4)
DIFFERENTIAL METHOD: ABNORMAL
EOSINOPHIL # BLD AUTO: 0.2 K/UL (ref 0–0.5)
EOSINOPHIL NFR BLD: 1.2 % (ref 0–8)
ERYTHROCYTE [DISTWIDTH] IN BLOOD BY AUTOMATED COUNT: 11.9 % (ref 11.5–14.5)
EST. GFR  (AFRICAN AMERICAN): >60 ML/MIN/1.73 M^2
EST. GFR  (NON AFRICAN AMERICAN): >60 ML/MIN/1.73 M^2
GLUCOSE SERPL-MCNC: 536 MG/DL (ref 70–110)
HCT VFR BLD AUTO: 40.7 % (ref 40–54)
HGB BLD-MCNC: 13.9 G/DL (ref 14–18)
IMM GRANULOCYTES # BLD AUTO: 0.05 K/UL (ref 0–0.04)
IMM GRANULOCYTES NFR BLD AUTO: 0.4 % (ref 0–0.5)
LYMPHOCYTES # BLD AUTO: 4.5 K/UL (ref 1–4.8)
LYMPHOCYTES NFR BLD: 34 % (ref 18–48)
MCH RBC QN AUTO: 30.2 PG (ref 27–31)
MCHC RBC AUTO-ENTMCNC: 34.2 G/DL (ref 32–36)
MCV RBC AUTO: 89 FL (ref 82–98)
MONOCYTES # BLD AUTO: 1 K/UL (ref 0.3–1)
MONOCYTES NFR BLD: 7.2 % (ref 4–15)
NEUTROPHILS # BLD AUTO: 7.4 K/UL (ref 1.8–7.7)
NEUTROPHILS NFR BLD: 56.1 % (ref 38–73)
NRBC BLD-RTO: 0 /100 WBC
PLATELET # BLD AUTO: 371 K/UL (ref 150–350)
PMV BLD AUTO: 11.4 FL (ref 9.2–12.9)
POTASSIUM SERPL-SCNC: 3.5 MMOL/L (ref 3.5–5.1)
PROT SERPL-MCNC: 7.9 G/DL (ref 6–8.4)
RBC # BLD AUTO: 4.6 M/UL (ref 4.6–6.2)
SARS-COV-2 RDRP RESP QL NAA+PROBE: NEGATIVE
SODIUM SERPL-SCNC: 128 MMOL/L (ref 136–145)
WBC # BLD AUTO: 13.25 K/UL (ref 3.9–12.7)

## 2020-05-21 PROCEDURE — 63600175 PHARM REV CODE 636 W HCPCS: Performed by: FAMILY MEDICINE

## 2020-05-21 PROCEDURE — 82962 GLUCOSE BLOOD TEST: CPT

## 2020-05-21 PROCEDURE — 25000003 PHARM REV CODE 250: Performed by: FAMILY MEDICINE

## 2020-05-21 PROCEDURE — U0002 COVID-19 LAB TEST NON-CDC: HCPCS

## 2020-05-21 PROCEDURE — 80053 COMPREHEN METABOLIC PANEL: CPT

## 2020-05-21 PROCEDURE — 96374 THER/PROPH/DIAG INJ IV PUSH: CPT

## 2020-05-21 PROCEDURE — 99284 EMERGENCY DEPT VISIT MOD MDM: CPT | Mod: 25

## 2020-05-21 PROCEDURE — 85025 COMPLETE CBC W/AUTO DIFF WBC: CPT

## 2020-05-21 PROCEDURE — 96361 HYDRATE IV INFUSION ADD-ON: CPT

## 2020-05-21 RX ADMIN — SODIUM CHLORIDE 1000 ML: 0.9 INJECTION, SOLUTION INTRAVENOUS at 11:05

## 2020-05-21 RX ADMIN — INSULIN HUMAN 15 UNITS: 100 INJECTION, SOLUTION PARENTERAL at 11:05

## 2020-05-22 VITALS
RESPIRATION RATE: 18 BRPM | SYSTOLIC BLOOD PRESSURE: 134 MMHG | OXYGEN SATURATION: 96 % | WEIGHT: 285 LBS | HEIGHT: 75 IN | DIASTOLIC BLOOD PRESSURE: 83 MMHG | TEMPERATURE: 99 F | BODY MASS INDEX: 35.43 KG/M2 | HEART RATE: 93 BPM

## 2020-05-22 VITALS
RESPIRATION RATE: 18 BRPM | HEART RATE: 100 BPM | DIASTOLIC BLOOD PRESSURE: 75 MMHG | TEMPERATURE: 98 F | SYSTOLIC BLOOD PRESSURE: 130 MMHG | HEIGHT: 75 IN | WEIGHT: 265 LBS | BODY MASS INDEX: 32.95 KG/M2 | OXYGEN SATURATION: 95 %

## 2020-05-22 DIAGNOSIS — R73.9 HYPERGLYCEMIA: Primary | ICD-10-CM

## 2020-05-22 LAB
POCT GLUCOSE: 100 MG/DL (ref 70–110)
POCT GLUCOSE: 136 MG/DL (ref 70–110)
POCT GLUCOSE: 157 MG/DL (ref 70–110)
POCT GLUCOSE: 270 MG/DL (ref 70–110)

## 2020-05-22 PROCEDURE — 25000003 PHARM REV CODE 250: Performed by: FAMILY MEDICINE

## 2020-05-22 PROCEDURE — 82962 GLUCOSE BLOOD TEST: CPT

## 2020-05-22 PROCEDURE — 99283 EMERGENCY DEPT VISIT LOW MDM: CPT

## 2020-05-22 PROCEDURE — S5010 5% DEXTROSE AND 0.45% SALINE: HCPCS | Performed by: FAMILY MEDICINE

## 2020-05-22 RX ORDER — DEXTROSE MONOHYDRATE AND SODIUM CHLORIDE 5; .9 G/100ML; G/100ML
1000 INJECTION, SOLUTION INTRAVENOUS
Status: DISCONTINUED | OUTPATIENT
Start: 2020-05-22 | End: 2020-05-22

## 2020-05-22 RX ORDER — DEXTROSE MONOHYDRATE AND SODIUM CHLORIDE 5; .45 G/100ML; G/100ML
1000 INJECTION, SOLUTION INTRAVENOUS
Status: DISCONTINUED | OUTPATIENT
Start: 2020-05-22 | End: 2020-05-22

## 2020-05-22 RX ORDER — METFORMIN HYDROCHLORIDE 500 MG/1
500 TABLET ORAL 2 TIMES DAILY WITH MEALS
Qty: 60 TABLET | Refills: 0 | Status: SHIPPED | OUTPATIENT
Start: 2020-05-22 | End: 2020-06-02

## 2020-05-22 RX ORDER — DEXTROSE MONOHYDRATE AND SODIUM CHLORIDE 5; .45 G/100ML; G/100ML
1000 INJECTION, SOLUTION INTRAVENOUS
Status: COMPLETED | OUTPATIENT
Start: 2020-05-22 | End: 2020-05-22

## 2020-05-22 RX ADMIN — DEXTROSE AND SODIUM CHLORIDE 1000 ML: 5; .45 INJECTION, SOLUTION INTRAVENOUS at 12:05

## 2020-05-22 NOTE — DISCHARGE INSTRUCTIONS
Take all medications as prescribed.  Follow-up with your primary care provider as discussed.  Please remember that you had a visit to the emergency room today and this does not substitute as primary care services for ongoing management because emergency services is a snap shot in time.  Should you have any worsening condition that requires emergency services do not hesitate to return to the ER.    COVID-19 TESTING  IF YOU DESIRE TO BE TESTED, CALL TO SCHEDULE AN APPOINTMENT:  [facility] [phone]  [address] [place] [address], [place] [address]  \[facility]\"" Outpatient Rehab Services  Hours 8am-5pm Monday Through Friday
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
## 2020-05-22 NOTE — ED PROVIDER NOTES
Encounter Date: 5/22/2020       History     Chief Complaint   Patient presents with    Hyperglycemia     states that his sugar is high     45yo BM w/ c/o elevated blood glucose; was seen late yesterday evening in [facility] ER for same w/ dc early this AM, was not dc home w/ diabetic meds, awaiting apt w/ Family Medicine    Denies abdominal pain, nausea/vomiting/diarrhea    Past medical/surgical history, allergies & current medications reviewed with patient    Known SARS-CoV2 exposure:  No      The history is provided by the patient. No  was used.     Review of patient's allergies indicates:  No Known Allergies  History reviewed. No pertinent past medical history.  Past Surgical History:   Procedure Laterality Date    BACK SURGERY      EYE SURGERY       History reviewed. No pertinent family history.  Social History     Tobacco Use    Smoking status: Current Every Day Smoker     Packs/day: 0.50     Years: 23.00     Pack years: 11.50     Types: Cigarettes    Smokeless tobacco: Never Used   Substance Use Topics    Alcohol use: Yes     Alcohol/week: 48.0 standard drinks     Types: 48 Cans of beer per week     Frequency: Never    Drug use: Never     Review of Systems   Constitutional: Negative for fever.   HENT: Negative for sore throat.    Respiratory: Negative for shortness of breath.    Cardiovascular: Negative for chest pain.   Gastrointestinal: Negative for nausea.   Genitourinary: Negative for dysuria.   Musculoskeletal: Negative for back pain.   Skin: Negative for rash.   Neurological: Negative for weakness.   Hematological: Does not bruise/bleed easily.   All other systems reviewed and are negative.      Physical Exam     Initial Vitals [05/22/20 1641]   BP Pulse Resp Temp SpO2   134/83 93 18 98.5 °F (36.9 °C) 96 %      MAP       --         Physical Exam    Nursing note and vitals reviewed.  Constitutional: He appears well-developed. He does not appear ill. No distress.   AF, VSS   HENT:    Head: Normocephalic.   Right Ear: External ear normal.   Left Ear: External ear normal.   Nose: Nose normal.   Eyes: Lids are normal.   Neck: Neck supple.   Cardiovascular: Normal rate.   Pulmonary/Chest: Effort normal. No respiratory distress.   Abdominal: He exhibits no distension.   Neurological: He is alert.   Skin: No rash noted.   Psychiatric: He has a normal mood and affect.         ED Course   Procedures  Labs Reviewed   POCT GLUCOSE - Abnormal; Notable for the following components:       Result Value    POCT Glucose 270 (*)     All other components within normal limits          Imaging Results    None          Medical Decision Making:   ED Management:  Accu-check 270    Findings and plan of care discussed with patient:  Hyperglycemia; will Rx metformin, care instructions given -- further instructions given to follow-up with Family Medicine when scheduled    All questions answered, strict return precautions given, patient verbalized understanding to all instructions, pleasant visit -- vital signs stable, patient is in no distress at discharge    Disclaimer:  This note was prepared with [employer] voice recognition transcription software. Garbled syntax, mangled pronouns, and other bizarre constructions may be attributed to that software system.                     ED Course as of May 22 1650   Fri May 22, 2020   1639 C/o elevated blood glucose -- seen late yesterday evening with dc early this AM from the ER, was not Rx diabetic meds    I have performed the rapid medical exam (RME) portion of the medical screening exam (MSE) & have determined that this patient requires further evaluation and/or testing to determine if an emergent medical condition exists     [DH]      ED Course User Index  [DH] Edilberto Frazier NP                Clinical Impression:       ICD-10-CM ICD-9-CM   1. Hyperglycemia R73.9 790.29             ED Disposition Condition    Discharge Stable        ED Prescriptions      Medication Sig Dispense Start Date End Date Auth. Provider    metFORMIN (GLUCOPHAGE) 500 MG tablet Take 1 tablet (500 mg total) by mouth 2 (two) times daily with meals. 60 tablet 5/22/2020 6/21/2020 Edilberto Frazier NP        Follow-up Information     Follow up With Specialties Details Why Contact Info    Family Medicine  Go to  When scheduled                                      Edilberto Frazier NP  05/22/20 7893       Edilberto Frazier NP  05/22/20 9705

## 2020-05-22 NOTE — ED PROVIDER NOTES
Encounter Date: 5/21/2020       History     Chief Complaint   Patient presents with    Loss of Consciousness    Hyperglycemia     44-year-old male presents pain feeling weak having an elevated blood sugar and feeling faint he denies any chest pain no nausea vomiting states that his sugar at home was over 700 has been taking his medications as prescribed but is currently drinking a sugary soft drink        Review of patient's allergies indicates:  No Known Allergies  History reviewed. No pertinent past medical history.  Past Surgical History:   Procedure Laterality Date    BACK SURGERY      EYE SURGERY       History reviewed. No pertinent family history.  Social History     Tobacco Use    Smoking status: Current Every Day Smoker     Packs/day: 0.50     Years: 23.00     Pack years: 11.50     Types: Cigarettes    Smokeless tobacco: Never Used   Substance Use Topics    Alcohol use: Yes     Alcohol/week: 48.0 standard drinks     Types: 48 Cans of beer per week     Frequency: Never    Drug use: Never     Review of Systems   Constitutional: Negative for fever.   HENT: Negative for sore throat.    Respiratory: Negative for shortness of breath.    Cardiovascular: Negative for chest pain.   Gastrointestinal: Negative for nausea.   Genitourinary: Negative for dysuria.   Musculoskeletal: Negative for back pain.   Skin: Negative for rash.   Neurological: Negative for weakness.   Hematological: Does not bruise/bleed easily.       Physical Exam     Initial Vitals [05/21/20 2249]   BP Pulse Resp Temp SpO2   124/76 104 18 97.8 °F (36.6 °C) 96 %      MAP       --         Physical Exam    Nursing note and vitals reviewed.  Constitutional: He appears well-developed and well-nourished. He is not diaphoretic. No distress.   HENT:   Head: Normocephalic and atraumatic.   Right Ear: External ear normal.   Left Ear: External ear normal.   Nose: Nose normal.   Mouth/Throat: Oropharynx is clear and moist. No oropharyngeal exudate.    Eyes: EOM are normal.   Neck: Normal range of motion. Neck supple. No tracheal deviation present.   Cardiovascular: Normal rate and regular rhythm.   No murmur heard.  Pulmonary/Chest: Breath sounds normal. No stridor. No respiratory distress. He has no rales.   Abdominal: Soft. He exhibits no distension and no mass. There is no tenderness. There is no rebound.   Musculoskeletal: Normal range of motion. He exhibits no edema.   Lymphadenopathy:     He has no cervical adenopathy.   Neurological: He is alert and oriented to person, place, and time. He has normal strength.   Skin: Skin is warm and dry. Capillary refill takes less than 2 seconds. No pallor.   Psychiatric: He has a normal mood and affect.         ED Course   Procedures  Labs Reviewed   CBC W/ AUTO DIFFERENTIAL - Abnormal; Notable for the following components:       Result Value    WBC 13.25 (*)     Hemoglobin 13.9 (*)     Platelets 371 (*)     Immature Grans (Abs) 0.05 (*)     All other components within normal limits   COMPREHENSIVE METABOLIC PANEL - Abnormal; Notable for the following components:    Sodium 128 (*)     Chloride 92 (*)     Glucose 536 (*)     All other components within normal limits    Narrative:     Glucose critical result(s) called and verbal readback obtained from   Dave Hermosillo RN.  by TH3 05/21/2020 23:50   POCT GLUCOSE - Abnormal; Notable for the following components:    POCT Glucose 136 (*)     All other components within normal limits   POCT GLUCOSE - Abnormal; Notable for the following components:    POCT Glucose 157 (*)     All other components within normal limits   SARS-COV-2 RNA AMPLIFICATION, QUAL    Narrative:     What symptom criteria does the patient meet?->Headache  What symptom criteria does the patient meet?->Sore throat   POCT GLUCOSE   POCT GLUCOSE MONITORING CONTINUOUS          Imaging Results    None                                          Clinical Impression:       ICD-10-CM ICD-9-CM   1. Hyperglycemia R73.9  790.29   2. Dehydration E86.0 276.51             ED Disposition Condition    Discharge Stable        ED Prescriptions     None        Follow-up Information    None                                    Denis Daniel MD  05/22/20 0131

## 2020-06-02 ENCOUNTER — OFFICE VISIT (OUTPATIENT)
Dept: FAMILY MEDICINE | Facility: CLINIC | Age: 45
End: 2020-06-02
Payer: COMMERCIAL

## 2020-06-02 VITALS
HEART RATE: 80 BPM | BODY MASS INDEX: 31.58 KG/M2 | OXYGEN SATURATION: 96 % | SYSTOLIC BLOOD PRESSURE: 122 MMHG | WEIGHT: 254 LBS | TEMPERATURE: 98 F | HEIGHT: 75 IN | RESPIRATION RATE: 20 BRPM | DIASTOLIC BLOOD PRESSURE: 73 MMHG

## 2020-06-02 DIAGNOSIS — Z23 NEED FOR TD VACCINE: ICD-10-CM

## 2020-06-02 DIAGNOSIS — E11.9 TYPE 2 DIABETES MELLITUS WITHOUT COMPLICATION, WITHOUT LONG-TERM CURRENT USE OF INSULIN: ICD-10-CM

## 2020-06-02 DIAGNOSIS — Z76.89 ENCOUNTER TO ESTABLISH CARE: Primary | ICD-10-CM

## 2020-06-02 DIAGNOSIS — Z23 NEED FOR VACCINATION AGAINST STREPTOCOCCUS PNEUMONIAE: ICD-10-CM

## 2020-06-02 DIAGNOSIS — Z13.220 ENCOUNTER FOR LIPID SCREENING FOR CARDIOVASCULAR DISEASE: ICD-10-CM

## 2020-06-02 DIAGNOSIS — Z13.6 ENCOUNTER FOR LIPID SCREENING FOR CARDIOVASCULAR DISEASE: ICD-10-CM

## 2020-06-02 PROCEDURE — 90732 PNEUMOCOCCAL POLYSACCHARIDE VACCINE 23-VALENT =>2YO SQ IM: ICD-10-PCS | Mod: S$GLB,,, | Performed by: NURSE PRACTITIONER

## 2020-06-02 PROCEDURE — 90472 IMMUNIZATION ADMIN EACH ADD: CPT | Mod: S$GLB,,, | Performed by: NURSE PRACTITIONER

## 2020-06-02 PROCEDURE — 90714 TD VACCINE GREATER THAN OR EQUAL TO 7YO WITH PRESERVATIVE IM: ICD-10-PCS | Mod: S$GLB,,, | Performed by: NURSE PRACTITIONER

## 2020-06-02 PROCEDURE — 90472 PNEUMOCOCCAL POLYSACCHARIDE VACCINE 23-VALENT =>2YO SQ IM: ICD-10-PCS | Mod: S$GLB,,, | Performed by: NURSE PRACTITIONER

## 2020-06-02 PROCEDURE — 99204 PR OFFICE/OUTPT VISIT, NEW, LEVL IV, 45-59 MIN: ICD-10-PCS | Mod: 25,S$GLB,, | Performed by: NURSE PRACTITIONER

## 2020-06-02 PROCEDURE — 90471 IMMUNIZATION ADMIN: CPT | Mod: S$GLB,,, | Performed by: NURSE PRACTITIONER

## 2020-06-02 PROCEDURE — 90732 PPSV23 VACC 2 YRS+ SUBQ/IM: CPT | Mod: S$GLB,,, | Performed by: NURSE PRACTITIONER

## 2020-06-02 PROCEDURE — 90471 TD VACCINE GREATER THAN OR EQUAL TO 7YO WITH PRESERVATIVE IM: ICD-10-PCS | Mod: S$GLB,,, | Performed by: NURSE PRACTITIONER

## 2020-06-02 PROCEDURE — 3008F PR BODY MASS INDEX (BMI) DOCUMENTED: ICD-10-PCS | Mod: CPTII,S$GLB,, | Performed by: NURSE PRACTITIONER

## 2020-06-02 PROCEDURE — 99204 OFFICE O/P NEW MOD 45 MIN: CPT | Mod: 25,S$GLB,, | Performed by: NURSE PRACTITIONER

## 2020-06-02 PROCEDURE — 3008F BODY MASS INDEX DOCD: CPT | Mod: CPTII,S$GLB,, | Performed by: NURSE PRACTITIONER

## 2020-06-02 PROCEDURE — 90714 TD VACC NO PRESV 7 YRS+ IM: CPT | Mod: S$GLB,,, | Performed by: NURSE PRACTITIONER

## 2020-06-02 RX ORDER — CYCLOBENZAPRINE HCL 10 MG
10 TABLET ORAL 3 TIMES DAILY PRN
COMMUNITY
Start: 2020-04-09 | End: 2023-05-24

## 2020-06-02 RX ORDER — LISINOPRIL 2.5 MG/1
2.5 TABLET ORAL DAILY
Qty: 90 TABLET | Refills: 3 | Status: ON HOLD | OUTPATIENT
Start: 2020-06-02 | End: 2023-05-28

## 2020-06-02 RX ORDER — ATORVASTATIN CALCIUM 10 MG/1
10 TABLET, FILM COATED ORAL DAILY
Qty: 90 TABLET | Refills: 3 | Status: ON HOLD | OUTPATIENT
Start: 2020-06-02 | End: 2023-05-28

## 2020-06-02 RX ORDER — METFORMIN HYDROCHLORIDE 1000 MG/1
1000 TABLET ORAL 2 TIMES DAILY WITH MEALS
Qty: 180 TABLET | Refills: 1 | Status: SHIPPED | OUTPATIENT
Start: 2020-06-02 | End: 2021-01-06 | Stop reason: SDUPTHER

## 2020-06-02 NOTE — PROGRESS NOTES
Subjective:       Patient ID: Julio Caldwell is a 44 y.o. male.    Chief Complaint: Diabetes    The patient is a 44-year-old male  new to this provider that presents as an ER follow-up recently diagnosed as a diabetic the glucose noted of 500 in the ER.  He admits to polyphagia, polydipsia and polyuria.  He was started on metformin 500 mg b.i.d. and was referred to a PCP.  He is due for fasting blood work and health maintenance discussed in addition to the recommendations of an ACE-inhibitor and a statin for all diabetics.  He denies neuropathy.    Past Medical History:   Diagnosis Date    Diabetes mellitus, type 2 05/2020       Past Surgical History:   Procedure Laterality Date    EYE SURGERY Right     LUMBAR DISC SURGERY  2005        Social History     Socioeconomic History    Marital status: Single     Spouse name: Not on file    Number of children: 3    Years of education: Not on file    Highest education level: Associate degree: academic program   Occupational History    Occupation: [employer]-   Social Needs    Financial resource strain: Not on file    Food insecurity:     Worry: Not on file     Inability: Not on file    Transportation needs:     Medical: Not on file     Non-medical: Not on file   Tobacco Use    Smoking status: Current Every Day Smoker     Packs/day: 0.50     Years: 23.00     Pack years: 11.50     Types: Cigarettes    Smokeless tobacco: Never Used   Substance and Sexual Activity    Alcohol use: Not Currently     Alcohol/week: 48.0 standard drinks     Types: 48 Cans of beer per week     Frequency: Never    Drug use: Never    Sexual activity: Yes   Lifestyle    Physical activity:     Days per week: Not on file     Minutes per session: Not on file    Stress: Not on file   Relationships    Social connections:     Talks on phone: Not on file     Gets together: Not on file     Attends Mandae[employer] service: Not on file     Active member of club or organization: Not on  file     Attends meetings of clubs or organizations: Not on file     Relationship status: Not on file   Other Topics Concern    Not on file   Social History Narrative    Not on file       Family History   Problem Relation Age of Onset    Diabetes Mother     Diabetes Maternal Grandfather     No Known Problems Father        Review of patient's allergies indicates:  No Known Allergies       Current Outpatient Medications:     atorvastatin (LIPITOR) 10 MG tablet, Take 1 tablet (10 mg total) by mouth once daily., Disp: 90 tablet, Rfl: 3    cyclobenzaprine (FLEXERIL) 10 MG tablet, Take 10 mg by mouth 3 (three) times daily as needed., Disp: , Rfl:     lisinopriL (PRINIVIL,ZESTRIL) 2.5 MG tablet, Take 1 tablet (2.5 mg total) by mouth once daily., Disp: 90 tablet, Rfl: 3    metFORMIN (GLUCOPHAGE) 1000 MG tablet, Take 1 tablet (1,000 mg total) by mouth 2 (two) times daily with meals., Disp: 180 tablet, Rfl: 1    HPI  Review of Systems   Constitutional: Negative.    HENT: Negative.    Eyes: Negative.    Respiratory: Negative.    Cardiovascular: Negative.    Gastrointestinal: Negative.    Endocrine: Positive for polydipsia, polyphagia and polyuria.   Genitourinary: Negative.    Musculoskeletal: Negative.    Skin: Negative.    Allergic/Immunologic: Negative.    Neurological: Negative.    Hematological: Negative.    Psychiatric/Behavioral: Negative.        Objective:      Physical Exam   Constitutional: He is oriented to person, place, and time. He appears well-developed and well-nourished.   HENT:   Head: Normocephalic and atraumatic.   Mouth/Throat: Oropharynx is clear and moist.   Eyes: Pupils are equal, round, and reactive to light. Conjunctivae are normal. No scleral icterus.   Neck: Normal range of motion.   Cardiovascular: Normal rate, regular rhythm and normal heart sounds.   No murmur heard.  Pulmonary/Chest: Effort normal. No respiratory distress.   Abdominal: Soft. Bowel sounds are normal. He exhibits no  distension.   Musculoskeletal: Normal range of motion. He exhibits no edema.   Neurological: He is alert and oriented to person, place, and time. No sensory deficit. He exhibits normal muscle tone.   Skin: Skin is warm. Capillary refill takes less than 2 seconds. No rash noted.   Psychiatric: He has a normal mood and affect. His behavior is normal. Judgment and thought content normal.   Nursing note and vitals reviewed.      Assessment:       1. Encounter to establish care    2. Type 2 diabetes mellitus without complication, without long-term current use of insulin    3. Encounter for lipid screening for cardiovascular disease    4. Need for vaccination against Streptococcus pneumoniae    5. Need for Td vaccine        Plan:       1.  Increase metformin to 1000 mg twice a day  2.  Start lisinopril and Lipitor  3.  Fasting labs   4.  Tetanus and pneumonia vaccine given today  5.  Large amounts of diabetic information was given to the patient  6.  Return to the clinic in 1 month to discuss blood work and a1c    Encounter to establish care    Type 2 diabetes mellitus without complication, without long-term current use of insulin  -     metFORMIN (GLUCOPHAGE) 1000 MG tablet; Take 1 tablet (1,000 mg total) by mouth 2 (two) times daily with meals.  Dispense: 180 tablet; Refill: 1  -     lisinopriL (PRINIVIL,ZESTRIL) 2.5 MG tablet; Take 1 tablet (2.5 mg total) by mouth once daily.  Dispense: 90 tablet; Refill: 3  -     atorvastatin (LIPITOR) 10 MG tablet; Take 1 tablet (10 mg total) by mouth once daily.  Dispense: 90 tablet; Refill: 3  -     HIV 1/2 Ag/Ab (4th Gen); Future; Expected date: 06/02/2020  -     Hemoglobin A1C; Future; Expected date: 06/02/2020  -     Lipid Panel; Future; Expected date: 06/02/2020  -     TSH; Future; Expected date: 06/02/2020  -     T4, free; Future; Expected date: 06/02/2020  -     Vitamin D; Future; Expected date: 06/02/2020    Encounter for lipid screening for cardiovascular disease  -      atorvastatin (LIPITOR) 10 MG tablet; Take 1 tablet (10 mg total) by mouth once daily.  Dispense: 90 tablet; Refill: 3    Need for vaccination against Streptococcus pneumoniae  -     (In Office Administered) Pneumococcal Polysaccharide Vaccine (23 Valent) (SQ/IM)    Need for Td vaccine  -     (In Office Administered) Td Vaccine        Risks, benefits, and side effects were discussed with the patient. All questions were answered to the fullest satisfaction of the patient, and pt verbalized understanding and agreement to treatment plan. Pt was to call with any new or worsening symptoms, or present to the ER.

## 2020-10-02 ENCOUNTER — HOSPITAL ENCOUNTER (EMERGENCY)
Facility: HOSPITAL | Age: 45
Discharge: HOME | End: 2020-10-02
Payer: COMMERCIAL

## 2020-10-02 VITALS
BODY MASS INDEX: 30.93 KG/M2 | DIASTOLIC BLOOD PRESSURE: 92 MMHG | HEART RATE: 80 BPM | OXYGEN SATURATION: 99 % | HEIGHT: 76 IN | RESPIRATION RATE: 20 BRPM | SYSTOLIC BLOOD PRESSURE: 127 MMHG | WEIGHT: 254 LBS | TEMPERATURE: 99 F

## 2020-10-02 DIAGNOSIS — M25.521 BILATERAL ELBOW JOINT PAIN: ICD-10-CM
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
## 2020-10-02 DIAGNOSIS — S50.319A ABRASION OF ELBOW, UNSPECIFIED LATERALITY, INITIAL ENCOUNTER: Primary | ICD-10-CM

## 2020-10-02 DIAGNOSIS — M25.562 ACUTE PAIN OF LEFT KNEE: ICD-10-CM

## 2020-10-02 DIAGNOSIS — M25.522 BILATERAL ELBOW JOINT PAIN: ICD-10-CM

## 2020-10-02 PROCEDURE — 99284 EMERGENCY DEPT VISIT MOD MDM: CPT | Mod: 25

## 2020-10-02 PROCEDURE — 73562 XR KNEE 3 VIEW LEFT: ICD-10-PCS | Mod: 26,LT,, | Performed by: RADIOLOGY

## 2020-10-02 PROCEDURE — 73080 XR ELBOW COMPLETE 3 VIEW BILATERAL: ICD-10-PCS | Mod: 26,50,, | Performed by: RADIOLOGY

## 2020-10-02 PROCEDURE — 70450 CT HEAD WITHOUT CONTRAST: ICD-10-PCS | Mod: 26,,, | Performed by: RADIOLOGY

## 2020-10-02 PROCEDURE — 70450 CT HEAD/BRAIN W/O DYE: CPT | Mod: TC

## 2020-10-02 PROCEDURE — 73562 X-RAY EXAM OF KNEE 3: CPT | Mod: TC,FY,LT

## 2020-10-02 PROCEDURE — 70450 CT HEAD/BRAIN W/O DYE: CPT | Mod: 26,,, | Performed by: RADIOLOGY

## 2020-10-02 PROCEDURE — 73080 X-RAY EXAM OF ELBOW: CPT | Mod: 26,50,, | Performed by: RADIOLOGY

## 2020-10-02 PROCEDURE — 73562 X-RAY EXAM OF KNEE 3: CPT | Mod: 26,LT,, | Performed by: RADIOLOGY

## 2020-10-02 PROCEDURE — 73080 X-RAY EXAM OF ELBOW: CPT | Mod: TC,50,FY

## 2020-10-02 RX ORDER — IBUPROFEN 600 MG/1
600 TABLET ORAL 3 TIMES DAILY
Qty: 21 TABLET | Refills: 0 | Status: SHIPPED | OUTPATIENT
Start: 2020-10-02 | End: 2020-10-09
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
## 2021-01-06 DIAGNOSIS — E11.9 TYPE 2 DIABETES MELLITUS WITHOUT COMPLICATION, WITHOUT LONG-TERM CURRENT USE OF INSULIN: ICD-10-CM

## 2021-01-06 DIAGNOSIS — Z11.59 NEED FOR HEPATITIS C SCREENING TEST: Primary | ICD-10-CM

## 2021-01-08 ENCOUNTER — TELEPHONE (OUTPATIENT)
Dept: FAMILY MEDICINE | Facility: CLINIC | Age: 46
End: 2021-01-08

## 2021-01-08 RX ORDER — METFORMIN HYDROCHLORIDE 1000 MG/1
1000 TABLET ORAL 2 TIMES DAILY WITH MEALS
Qty: 60 TABLET | Refills: 0 | Status: SHIPPED | OUTPATIENT
Start: 2021-01-08 | End: 2021-09-14 | Stop reason: SDUPTHER

## 2021-03-15 ENCOUNTER — HOSPITAL ENCOUNTER (EMERGENCY)
Facility: HOSPITAL | Age: 46
Discharge: HOME | End: 2021-03-15
Attending: EMERGENCY MEDICINE
Payer: COMMERCIAL

## 2021-03-15 VITALS
SYSTOLIC BLOOD PRESSURE: 110 MMHG | HEART RATE: 80 BPM | DIASTOLIC BLOOD PRESSURE: 84 MMHG | OXYGEN SATURATION: 98 % | BODY MASS INDEX: 29.84 KG/M2 | RESPIRATION RATE: 14 BRPM | HEIGHT: 75 IN | WEIGHT: 240 LBS | TEMPERATURE: 99 F

## 2021-03-15 DIAGNOSIS — E86.0 DEHYDRATION: ICD-10-CM

## 2021-03-15 DIAGNOSIS — K29.70 GASTRITIS WITHOUT BLEEDING, UNSPECIFIED CHRONICITY, UNSPECIFIED GASTRITIS TYPE: ICD-10-CM

## 2021-03-15 DIAGNOSIS — R11.2 NON-INTRACTABLE VOMITING WITH NAUSEA, UNSPECIFIED VOMITING TYPE: Primary | ICD-10-CM

## 2021-03-15 DIAGNOSIS — R10.84 GENERALIZED ABDOMINAL PAIN: ICD-10-CM

## 2021-03-15 DIAGNOSIS — K59.00 CONSTIPATION, UNSPECIFIED CONSTIPATION TYPE: ICD-10-CM

## 2021-03-15 LAB
ALBUMIN SERPL BCP-MCNC: 4.2 G/DL (ref 3.5–5.2)
ALP SERPL-CCNC: 65 U/L (ref 55–135)
ALT SERPL W/O P-5'-P-CCNC: 33 U/L (ref 10–44)
ANION GAP SERPL CALC-SCNC: 14 MMOL/L (ref 8–16)
AST SERPL-CCNC: 49 U/L (ref 10–40)
BACTERIA #/AREA URNS HPF: NORMAL /HPF
BASOPHILS # BLD AUTO: 0.06 K/UL (ref 0–0.2)
BASOPHILS NFR BLD: 0.8 % (ref 0–1.9)
BILIRUB SERPL-MCNC: 2.6 MG/DL (ref 0.1–1)
BILIRUB UR QL STRIP: ABNORMAL
BUN SERPL-MCNC: 16 MG/DL (ref 6–20)
CALCIUM SERPL-MCNC: 9.6 MG/DL (ref 8.7–10.5)
CHLORIDE SERPL-SCNC: 101 MMOL/L (ref 95–110)
CLARITY UR: CLEAR
CO2 SERPL-SCNC: 21 MMOL/L (ref 23–29)
COLOR UR: YELLOW
CREAT SERPL-MCNC: 1.5 MG/DL (ref 0.5–1.4)
DIFFERENTIAL METHOD: ABNORMAL
EOSINOPHIL # BLD AUTO: 0 K/UL (ref 0–0.5)
EOSINOPHIL NFR BLD: 0.3 % (ref 0–8)
ERYTHROCYTE [DISTWIDTH] IN BLOOD BY AUTOMATED COUNT: 12.4 % (ref 11.5–14.5)
EST. GFR  (AFRICAN AMERICAN): >60 ML/MIN/1.73 M^2
EST. GFR  (NON AFRICAN AMERICAN): 56.2 ML/MIN/1.73 M^2
GLUCOSE SERPL-MCNC: 103 MG/DL (ref 70–110)
GLUCOSE UR QL STRIP: NEGATIVE
HCT VFR BLD AUTO: 46.6 % (ref 40–54)
HGB BLD-MCNC: 15.3 G/DL (ref 14–18)
HGB UR QL STRIP: NEGATIVE
HYALINE CASTS #/AREA URNS LPF: NORMAL /LPF
IMM GRANULOCYTES # BLD AUTO: 0.02 K/UL (ref 0–0.04)
IMM GRANULOCYTES NFR BLD AUTO: 0.3 % (ref 0–0.5)
KETONES UR QL STRIP: ABNORMAL
LEUKOCYTE ESTERASE UR QL STRIP: NEGATIVE
LIPASE SERPL-CCNC: 23 U/L (ref 4–60)
LYMPHOCYTES # BLD AUTO: 2 K/UL (ref 1–4.8)
LYMPHOCYTES NFR BLD: 26 % (ref 18–48)
MCH RBC QN AUTO: 28.5 PG (ref 27–31)
MCHC RBC AUTO-ENTMCNC: 32.8 G/DL (ref 32–36)
MCV RBC AUTO: 87 FL (ref 82–98)
MICROSCOPIC COMMENT: NORMAL
MONOCYTES # BLD AUTO: 0.8 K/UL (ref 0.3–1)
MONOCYTES NFR BLD: 9.9 % (ref 4–15)
NEUTROPHILS # BLD AUTO: 4.9 K/UL (ref 1.8–7.7)
NEUTROPHILS NFR BLD: 62.7 % (ref 38–73)
NITRITE UR QL STRIP: NEGATIVE
NRBC BLD-RTO: 0 /100 WBC
PH UR STRIP: 7 [PH] (ref 5–8)
PLATELET # BLD AUTO: 405 K/UL (ref 150–350)
PMV BLD AUTO: 10 FL (ref 9.2–12.9)
POCT GLUCOSE: 115 MG/DL (ref 70–110)
POTASSIUM SERPL-SCNC: 4.1 MMOL/L (ref 3.5–5.1)
PROT SERPL-MCNC: 7.7 G/DL (ref 6–8.4)
PROT UR QL STRIP: ABNORMAL
RBC # BLD AUTO: 5.37 M/UL (ref 4.6–6.2)
RBC #/AREA URNS HPF: 2 /HPF (ref 0–4)
SARS-COV-2 RDRP RESP QL NAA+PROBE: NEGATIVE
SODIUM SERPL-SCNC: 136 MMOL/L (ref 136–145)
SP GR UR STRIP: 1.02 (ref 1–1.03)
SQUAMOUS #/AREA URNS HPF: 2 /HPF
URN SPEC COLLECT METH UR: ABNORMAL
UROBILINOGEN UR STRIP-ACNC: 4 EU/DL
WBC # BLD AUTO: 7.85 K/UL (ref 3.9–12.7)
WBC #/AREA URNS HPF: 1 /HPF (ref 0–5)

## 2021-03-15 PROCEDURE — 83690 ASSAY OF LIPASE: CPT | Performed by: PHYSICIAN ASSISTANT

## 2021-03-15 PROCEDURE — 63600175 PHARM REV CODE 636 W HCPCS: Performed by: PHYSICIAN ASSISTANT

## 2021-03-15 PROCEDURE — 81000 URINALYSIS NONAUTO W/SCOPE: CPT | Performed by: PHYSICIAN ASSISTANT

## 2021-03-15 PROCEDURE — 99285 EMERGENCY DEPT VISIT HI MDM: CPT | Mod: 25

## 2021-03-15 PROCEDURE — 96361 HYDRATE IV INFUSION ADD-ON: CPT

## 2021-03-15 PROCEDURE — 74177 CT ABD & PELVIS W/CONTRAST: CPT | Mod: 26,,, | Performed by: RADIOLOGY

## 2021-03-15 PROCEDURE — 25000003 PHARM REV CODE 250: Performed by: PHYSICIAN ASSISTANT

## 2021-03-15 PROCEDURE — 85025 COMPLETE CBC W/AUTO DIFF WBC: CPT | Performed by: PHYSICIAN ASSISTANT

## 2021-03-15 PROCEDURE — 25500020 PHARM REV CODE 255: Performed by: EMERGENCY MEDICINE

## 2021-03-15 PROCEDURE — 36415 COLL VENOUS BLD VENIPUNCTURE: CPT | Performed by: PHYSICIAN ASSISTANT

## 2021-03-15 PROCEDURE — 74177 CT ABDOMEN PELVIS WITH CONTRAST: ICD-10-PCS | Mod: 26,,, | Performed by: RADIOLOGY

## 2021-03-15 PROCEDURE — 80053 COMPREHEN METABOLIC PANEL: CPT | Performed by: PHYSICIAN ASSISTANT

## 2021-03-15 PROCEDURE — 96374 THER/PROPH/DIAG INJ IV PUSH: CPT | Mod: 59

## 2021-03-15 PROCEDURE — 82962 GLUCOSE BLOOD TEST: CPT

## 2021-03-15 PROCEDURE — U0002 COVID-19 LAB TEST NON-CDC: HCPCS | Performed by: PHYSICIAN ASSISTANT

## 2021-03-15 PROCEDURE — 74177 CT ABD & PELVIS W/CONTRAST: CPT | Mod: TC

## 2021-03-15 PROCEDURE — 96375 TX/PRO/DX INJ NEW DRUG ADDON: CPT

## 2021-03-15 RX ORDER — FAMOTIDINE 10 MG/ML
20 INJECTION INTRAVENOUS
Status: COMPLETED | OUTPATIENT
Start: 2021-03-15 | End: 2021-03-15

## 2021-03-15 RX ORDER — POLYETHYLENE GLYCOL 3350 17 G/17G
17 POWDER, FOR SOLUTION ORAL DAILY
Qty: 5 EACH | Refills: 0 | Status: SHIPPED | OUTPATIENT
Start: 2021-03-15 | End: 2021-03-20

## 2021-03-15 RX ORDER — ONDANSETRON 4 MG/1
4 TABLET, ORALLY DISINTEGRATING ORAL EVERY 6 HOURS PRN
Qty: 8 TABLET | Refills: 0 | Status: SHIPPED | OUTPATIENT
Start: 2021-03-15 | End: 2021-03-17

## 2021-03-15 RX ORDER — ONDANSETRON 2 MG/ML
4 INJECTION INTRAMUSCULAR; INTRAVENOUS
Status: COMPLETED | OUTPATIENT
Start: 2021-03-15 | End: 2021-03-15

## 2021-03-15 RX ORDER — MORPHINE SULFATE 4 MG/ML
4 INJECTION, SOLUTION INTRAMUSCULAR; INTRAVENOUS
Status: COMPLETED | OUTPATIENT
Start: 2021-03-15 | End: 2021-03-15

## 2021-03-15 RX ADMIN — SODIUM CHLORIDE 1000 ML: 0.9 INJECTION, SOLUTION INTRAVENOUS at 02:03

## 2021-03-15 RX ADMIN — FAMOTIDINE 20 MG: 10 INJECTION INTRAVENOUS at 02:03

## 2021-03-15 RX ADMIN — SODIUM CHLORIDE 1000 ML: 0.9 INJECTION, SOLUTION INTRAVENOUS at 03:03

## 2021-03-15 RX ADMIN — ONDANSETRON HYDROCHLORIDE 4 MG: 2 SOLUTION INTRAMUSCULAR; INTRAVENOUS at 02:03

## 2021-03-15 RX ADMIN — MORPHINE SULFATE 4 MG: 4 INJECTION INTRAVENOUS at 02:03

## 2021-03-15 RX ADMIN — IOHEXOL 75 ML: 350 INJECTION, SOLUTION INTRAVENOUS at 04:03

## 2021-05-05 ENCOUNTER — HOSPITAL ENCOUNTER (EMERGENCY)
Facility: HOSPITAL | Age: 46
Discharge: HOME | End: 2021-05-05
Attending: EMERGENCY MEDICINE

## 2021-05-05 VITALS
HEIGHT: 75 IN | SYSTOLIC BLOOD PRESSURE: 130 MMHG | RESPIRATION RATE: 18 BRPM | OXYGEN SATURATION: 98 % | BODY MASS INDEX: 29.84 KG/M2 | DIASTOLIC BLOOD PRESSURE: 96 MMHG | HEART RATE: 79 BPM | WEIGHT: 240 LBS | TEMPERATURE: 97 F

## 2021-05-05 DIAGNOSIS — Z48.00 ABSCESS PACKING REMOVAL: ICD-10-CM

## 2021-05-05 DIAGNOSIS — Z51.89 WOUND CHECK, ABSCESS: Primary | ICD-10-CM

## 2021-05-05 DIAGNOSIS — Z09 ENCOUNTER FOR RECHECK OF ABSCESS FOLLOWING INCISION AND DRAINAGE: ICD-10-CM

## 2021-05-05 PROCEDURE — 99283 EMERGENCY DEPT VISIT LOW MDM: CPT

## 2021-05-05 RX ORDER — SULFAMETHOXAZOLE AND TRIMETHOPRIM 800; 160 MG/1; MG/1
1 TABLET ORAL 2 TIMES DAILY
Qty: 14 TABLET | Refills: 0 | Status: SHIPPED | OUTPATIENT
Start: 2021-05-05 | End: 2021-05-12

## 2021-05-05 RX ORDER — DOXYCYCLINE HYCLATE 100 MG
100 TABLET ORAL 2 TIMES DAILY
COMMUNITY
End: 2023-05-24

## 2021-05-05 RX ORDER — SULFAMETHOXAZOLE AND TRIMETHOPRIM 400; 80 MG/1; MG/1
1 TABLET ORAL
Status: DISCONTINUED | OUTPATIENT
Start: 2021-05-05 | End: 2021-05-05

## 2021-05-05 RX ORDER — SULFAMETHOXAZOLE AND TRIMETHOPRIM 200; 40 MG/5ML; MG/5ML
10 SUSPENSION ORAL
Status: DISCONTINUED | OUTPATIENT
Start: 2021-05-05 | End: 2021-05-05 | Stop reason: HOSPADM

## 2021-05-05 RX ORDER — METRONIDAZOLE 500 MG/1
500 TABLET ORAL
COMMUNITY
End: 2023-05-24 | Stop reason: SDUPTHER

## 2021-05-05 RX ORDER — CIPROFLOXACIN 250 MG/1
250 TABLET, FILM COATED ORAL
COMMUNITY
End: 2023-05-24

## 2021-07-05 ENCOUNTER — HOSPITAL ENCOUNTER (EMERGENCY)
Facility: HOSPITAL | Age: 46
Discharge: HOME | End: 2021-07-06
Attending: EMERGENCY MEDICINE

## 2021-07-05 DIAGNOSIS — R10.10 UPPER ABDOMINAL PAIN: ICD-10-CM

## 2021-07-05 DIAGNOSIS — R11.2 NON-INTRACTABLE VOMITING WITH NAUSEA, UNSPECIFIED VOMITING TYPE: ICD-10-CM

## 2021-07-05 DIAGNOSIS — R07.9 CHEST PAIN: ICD-10-CM

## 2021-07-05 DIAGNOSIS — R07.89 ATYPICAL CHEST PAIN: Primary | ICD-10-CM

## 2021-07-05 DIAGNOSIS — R51.9 NONINTRACTABLE HEADACHE, UNSPECIFIED CHRONICITY PATTERN, UNSPECIFIED HEADACHE TYPE: ICD-10-CM

## 2021-07-05 PROCEDURE — 99284 EMERGENCY DEPT VISIT MOD MDM: CPT | Mod: 25

## 2021-07-06 VITALS
HEART RATE: 59 BPM | SYSTOLIC BLOOD PRESSURE: 121 MMHG | DIASTOLIC BLOOD PRESSURE: 78 MMHG | TEMPERATURE: 98 F | WEIGHT: 250 LBS | RESPIRATION RATE: 15 BRPM | HEIGHT: 75 IN | OXYGEN SATURATION: 96 % | BODY MASS INDEX: 31.08 KG/M2

## 2021-07-06 LAB
ALBUMIN SERPL BCP-MCNC: 3.8 G/DL (ref 3.5–5.2)
ALP SERPL-CCNC: 68 U/L (ref 55–135)
ALT SERPL W/O P-5'-P-CCNC: 20 U/L (ref 10–44)
AMPHET+METHAMPHET UR QL: NEGATIVE
ANION GAP SERPL CALC-SCNC: 13 MMOL/L (ref 8–16)
AST SERPL-CCNC: 23 U/L (ref 10–40)
BARBITURATES UR QL SCN>200 NG/ML: NEGATIVE
BASOPHILS # BLD AUTO: 0.1 K/UL (ref 0–0.2)
BASOPHILS NFR BLD: 0.9 % (ref 0–1.9)
BENZODIAZ UR QL SCN>200 NG/ML: NEGATIVE
BILIRUB SERPL-MCNC: 1.1 MG/DL (ref 0.1–1)
BILIRUB UR QL STRIP: NEGATIVE
BNP SERPL-MCNC: 26 PG/ML (ref 0–99)
BUN SERPL-MCNC: 7 MG/DL (ref 6–20)
BZE UR QL SCN: NEGATIVE
CALCIUM SERPL-MCNC: 9.9 MG/DL (ref 8.7–10.5)
CANNABINOIDS UR QL SCN: ABNORMAL
CHLORIDE SERPL-SCNC: 105 MMOL/L (ref 95–110)
CLARITY UR: CLEAR
CO2 SERPL-SCNC: 25 MMOL/L (ref 23–29)
COLOR UR: YELLOW
CREAT SERPL-MCNC: 1.1 MG/DL (ref 0.5–1.4)
CREAT UR-MCNC: 171.1 MG/DL (ref 23–375)
DIFFERENTIAL METHOD: ABNORMAL
EOSINOPHIL # BLD AUTO: 0.2 K/UL (ref 0–0.5)
EOSINOPHIL NFR BLD: 1.8 % (ref 0–8)
ERYTHROCYTE [DISTWIDTH] IN BLOOD BY AUTOMATED COUNT: 13.5 % (ref 11.5–14.5)
EST. GFR  (AFRICAN AMERICAN): >60 ML/MIN/1.73 M^2
EST. GFR  (NON AFRICAN AMERICAN): >60 ML/MIN/1.73 M^2
GLUCOSE SERPL-MCNC: 105 MG/DL (ref 70–110)
GLUCOSE UR QL STRIP: NEGATIVE
HCT VFR BLD AUTO: 35 % (ref 40–54)
HGB BLD-MCNC: 11.5 G/DL (ref 14–18)
HGB UR QL STRIP: NEGATIVE
IMM GRANULOCYTES # BLD AUTO: 0.03 K/UL (ref 0–0.04)
IMM GRANULOCYTES NFR BLD AUTO: 0.3 % (ref 0–0.5)
INR PPP: 1 (ref 0.8–1.2)
KETONES UR QL STRIP: NEGATIVE
LEUKOCYTE ESTERASE UR QL STRIP: NEGATIVE
LYMPHOCYTES # BLD AUTO: 3.2 K/UL (ref 1–4.8)
LYMPHOCYTES NFR BLD: 29.6 % (ref 18–48)
MCH RBC QN AUTO: 28 PG (ref 27–31)
MCHC RBC AUTO-ENTMCNC: 32.9 G/DL (ref 32–36)
MCV RBC AUTO: 85 FL (ref 82–98)
METHADONE UR QL SCN>300 NG/ML: NEGATIVE
MONOCYTES # BLD AUTO: 0.8 K/UL (ref 0.3–1)
MONOCYTES NFR BLD: 7.5 % (ref 4–15)
NEUTROPHILS # BLD AUTO: 6.5 K/UL (ref 1.8–7.7)
NEUTROPHILS NFR BLD: 59.9 % (ref 38–73)
NITRITE UR QL STRIP: NEGATIVE
NRBC BLD-RTO: 0 /100 WBC
OPIATES UR QL SCN: NEGATIVE
PCP UR QL SCN>25 NG/ML: NEGATIVE
PH UR STRIP: 7 [PH] (ref 5–8)
PLATELET # BLD AUTO: 398 K/UL (ref 150–450)
PMV BLD AUTO: 10.1 FL (ref 9.2–12.9)
POTASSIUM SERPL-SCNC: 3.2 MMOL/L (ref 3.5–5.1)
PROT SERPL-MCNC: 7.3 G/DL (ref 6–8.4)
PROT UR QL STRIP: NEGATIVE
PROTHROMBIN TIME: 10.9 SEC (ref 9–12.5)
RBC # BLD AUTO: 4.11 M/UL (ref 4.6–6.2)
SODIUM SERPL-SCNC: 143 MMOL/L (ref 136–145)
SP GR UR STRIP: 1.02 (ref 1–1.03)
TOXICOLOGY INFORMATION: ABNORMAL
TROPONIN I SERPL DL<=0.01 NG/ML-MCNC: 0.01 NG/ML (ref 0–0.03)
URN SPEC COLLECT METH UR: NORMAL
UROBILINOGEN UR STRIP-ACNC: 1 EU/DL
WBC # BLD AUTO: 10.84 K/UL (ref 3.9–12.7)

## 2021-07-06 PROCEDURE — 84484 ASSAY OF TROPONIN QUANT: CPT | Performed by: EMERGENCY MEDICINE

## 2021-07-06 PROCEDURE — 80307 DRUG TEST PRSMV CHEM ANLYZR: CPT | Performed by: EMERGENCY MEDICINE

## 2021-07-06 PROCEDURE — 94761 N-INVAS EAR/PLS OXIMETRY MLT: CPT

## 2021-07-06 PROCEDURE — 81003 URINALYSIS AUTO W/O SCOPE: CPT | Mod: 59 | Performed by: EMERGENCY MEDICINE

## 2021-07-06 PROCEDURE — 85610 PROTHROMBIN TIME: CPT | Performed by: EMERGENCY MEDICINE

## 2021-07-06 PROCEDURE — 80053 COMPREHEN METABOLIC PANEL: CPT | Performed by: EMERGENCY MEDICINE

## 2021-07-06 PROCEDURE — 85025 COMPLETE CBC W/AUTO DIFF WBC: CPT | Performed by: EMERGENCY MEDICINE

## 2021-07-06 PROCEDURE — 93005 ELECTROCARDIOGRAM TRACING: CPT

## 2021-07-06 PROCEDURE — 83880 ASSAY OF NATRIURETIC PEPTIDE: CPT | Performed by: EMERGENCY MEDICINE

## 2021-07-06 RX ORDER — POTASSIUM CHLORIDE 750 MG/1
10 TABLET, EXTENDED RELEASE ORAL
Status: CANCELLED | OUTPATIENT
Start: 2021-07-06 | End: 2021-07-06

## 2021-07-06 RX ORDER — ONDANSETRON 4 MG/1
4 TABLET, ORALLY DISINTEGRATING ORAL EVERY 6 HOURS PRN
Qty: 12 TABLET | Refills: 0 | Status: ON HOLD | OUTPATIENT
Start: 2021-07-06 | End: 2023-05-28

## 2021-07-06 RX ORDER — BUTALBITAL, ACETAMINOPHEN AND CAFFEINE 50; 325; 40 MG/1; MG/1; MG/1
1 TABLET ORAL EVERY 4 HOURS PRN
Qty: 10 TABLET | Refills: 0 | Status: SHIPPED | OUTPATIENT
Start: 2021-07-06 | End: 2021-08-05

## 2021-09-14 ENCOUNTER — HOSPITAL ENCOUNTER (EMERGENCY)
Facility: HOSPITAL | Age: 46
Discharge: HOME | End: 2021-09-14
Attending: FAMILY MEDICINE

## 2021-09-14 VITALS
RESPIRATION RATE: 18 BRPM | DIASTOLIC BLOOD PRESSURE: 70 MMHG | OXYGEN SATURATION: 98 % | HEART RATE: 68 BPM | SYSTOLIC BLOOD PRESSURE: 100 MMHG | TEMPERATURE: 98 F

## 2021-09-14 DIAGNOSIS — R07.89 CHEST WALL PAIN: Primary | ICD-10-CM

## 2021-09-14 DIAGNOSIS — E11.9 TYPE 2 DIABETES MELLITUS WITHOUT COMPLICATION, WITHOUT LONG-TERM CURRENT USE OF INSULIN: ICD-10-CM

## 2021-09-14 DIAGNOSIS — R06.02 SHORTNESS OF BREATH: ICD-10-CM

## 2021-09-14 DIAGNOSIS — R07.9 CHEST PAIN: ICD-10-CM

## 2021-09-14 LAB
ALBUMIN SERPL BCP-MCNC: 3.3 G/DL (ref 3.5–5.2)
ALP SERPL-CCNC: 64 U/L (ref 55–135)
ALT SERPL W/O P-5'-P-CCNC: 14 U/L (ref 10–44)
ANION GAP SERPL CALC-SCNC: 12 MMOL/L (ref 8–16)
AST SERPL-CCNC: 15 U/L (ref 10–40)
BASOPHILS # BLD AUTO: 0.05 K/UL (ref 0–0.2)
BASOPHILS NFR BLD: 0.4 % (ref 0–1.9)
BILIRUB SERPL-MCNC: 1.8 MG/DL (ref 0.1–1)
BUN SERPL-MCNC: 10 MG/DL (ref 6–20)
CALCIUM SERPL-MCNC: 9.4 MG/DL (ref 8.7–10.5)
CHLORIDE SERPL-SCNC: 100 MMOL/L (ref 95–110)
CK MB SERPL-MCNC: 1.5 NG/ML (ref 0.1–6.5)
CK MB SERPL-RTO: 0.4 % (ref 0–5)
CK SERPL-CCNC: 369 U/L (ref 20–200)
CK SERPL-CCNC: 369 U/L (ref 20–200)
CO2 SERPL-SCNC: 26 MMOL/L (ref 23–29)
CREAT SERPL-MCNC: 1.2 MG/DL (ref 0.5–1.4)
DIFFERENTIAL METHOD: ABNORMAL
EOSINOPHIL # BLD AUTO: 0.1 K/UL (ref 0–0.5)
EOSINOPHIL NFR BLD: 0.4 % (ref 0–8)
ERYTHROCYTE [DISTWIDTH] IN BLOOD BY AUTOMATED COUNT: 14.9 % (ref 11.5–14.5)
EST. GFR  (AFRICAN AMERICAN): >60 ML/MIN/1.73 M^2
EST. GFR  (NON AFRICAN AMERICAN): >60 ML/MIN/1.73 M^2
ESTIMATED AVG GLUCOSE: 91 MG/DL (ref 68–131)
GLUCOSE SERPL-MCNC: 130 MG/DL (ref 70–110)
HBA1C MFR BLD: 4.8 % (ref 4–5.6)
HCT VFR BLD AUTO: 32.5 % (ref 40–54)
HGB BLD-MCNC: 10.6 G/DL (ref 14–18)
IMM GRANULOCYTES # BLD AUTO: 0.02 K/UL (ref 0–0.04)
IMM GRANULOCYTES NFR BLD AUTO: 0.2 % (ref 0–0.5)
LYMPHOCYTES # BLD AUTO: 2.1 K/UL (ref 1–4.8)
LYMPHOCYTES NFR BLD: 16.7 % (ref 18–48)
MAGNESIUM SERPL-MCNC: 1.8 MG/DL (ref 1.6–2.6)
MCH RBC QN AUTO: 26.6 PG (ref 27–31)
MCHC RBC AUTO-ENTMCNC: 32.6 G/DL (ref 32–36)
MCV RBC AUTO: 82 FL (ref 82–98)
MONOCYTES # BLD AUTO: 1.4 K/UL (ref 0.3–1)
MONOCYTES NFR BLD: 10.7 % (ref 4–15)
NEUTROPHILS # BLD AUTO: 9.1 K/UL (ref 1.8–7.7)
NEUTROPHILS NFR BLD: 71.6 % (ref 38–73)
NRBC BLD-RTO: 0 /100 WBC
PLATELET # BLD AUTO: 385 K/UL (ref 150–450)
PMV BLD AUTO: 10.7 FL (ref 9.2–12.9)
POTASSIUM SERPL-SCNC: 3.4 MMOL/L (ref 3.5–5.1)
PROT SERPL-MCNC: 7.5 G/DL (ref 6–8.4)
RBC # BLD AUTO: 3.99 M/UL (ref 4.6–6.2)
SODIUM SERPL-SCNC: 138 MMOL/L (ref 136–145)
TROPONIN I SERPL DL<=0.01 NG/ML-MCNC: <0.006 NG/ML (ref 0–0.03)
WBC # BLD AUTO: 12.73 K/UL (ref 3.9–12.7)

## 2021-09-14 PROCEDURE — 82553 CREATINE MB FRACTION: CPT | Performed by: FAMILY MEDICINE

## 2021-09-14 PROCEDURE — 63600175 PHARM REV CODE 636 W HCPCS: Performed by: FAMILY MEDICINE

## 2021-09-14 PROCEDURE — 25000003 PHARM REV CODE 250: Performed by: FAMILY MEDICINE

## 2021-09-14 PROCEDURE — 96374 THER/PROPH/DIAG INJ IV PUSH: CPT

## 2021-09-14 PROCEDURE — 84484 ASSAY OF TROPONIN QUANT: CPT | Performed by: FAMILY MEDICINE

## 2021-09-14 PROCEDURE — 71045 X-RAY EXAM CHEST 1 VIEW: CPT | Mod: 26,,, | Performed by: RADIOLOGY

## 2021-09-14 PROCEDURE — 80053 COMPREHEN METABOLIC PANEL: CPT | Performed by: FAMILY MEDICINE

## 2021-09-14 PROCEDURE — 71045 XR CHEST AP PORTABLE: ICD-10-PCS | Mod: 26,,, | Performed by: RADIOLOGY

## 2021-09-14 PROCEDURE — 83036 HEMOGLOBIN GLYCOSYLATED A1C: CPT | Performed by: FAMILY MEDICINE

## 2021-09-14 PROCEDURE — 96375 TX/PRO/DX INJ NEW DRUG ADDON: CPT

## 2021-09-14 PROCEDURE — 83735 ASSAY OF MAGNESIUM: CPT | Performed by: FAMILY MEDICINE

## 2021-09-14 PROCEDURE — 71045 X-RAY EXAM CHEST 1 VIEW: CPT | Mod: TC,FY

## 2021-09-14 PROCEDURE — 93005 ELECTROCARDIOGRAM TRACING: CPT

## 2021-09-14 PROCEDURE — 99285 EMERGENCY DEPT VISIT HI MDM: CPT | Mod: 25

## 2021-09-14 PROCEDURE — 85025 COMPLETE CBC W/AUTO DIFF WBC: CPT | Performed by: FAMILY MEDICINE

## 2021-09-14 RX ORDER — POTASSIUM CHLORIDE 20 MEQ/1
40 TABLET, EXTENDED RELEASE ORAL
Status: COMPLETED | OUTPATIENT
Start: 2021-09-14 | End: 2021-09-14

## 2021-09-14 RX ORDER — KETOROLAC TROMETHAMINE 10 MG/1
10 TABLET, FILM COATED ORAL EVERY 6 HOURS PRN
Qty: 30 TABLET | Refills: 0 | Status: ON HOLD | OUTPATIENT
Start: 2021-09-14 | End: 2023-05-28

## 2021-09-14 RX ORDER — POTASSIUM CHLORIDE 20 MEQ/1
40 TABLET, EXTENDED RELEASE ORAL
Status: DISCONTINUED | OUTPATIENT
Start: 2021-09-14 | End: 2021-09-14 | Stop reason: HOSPADM

## 2021-09-14 RX ORDER — HYDROMORPHONE HYDROCHLORIDE 2 MG/ML
1 INJECTION, SOLUTION INTRAMUSCULAR; INTRAVENOUS; SUBCUTANEOUS
Status: COMPLETED | OUTPATIENT
Start: 2021-09-14 | End: 2021-09-14

## 2021-09-14 RX ORDER — DEXAMETHASONE SODIUM PHOSPHATE 10 MG/ML
10 INJECTION INTRAMUSCULAR; INTRAVENOUS
Status: COMPLETED | OUTPATIENT
Start: 2021-09-14 | End: 2021-09-14

## 2021-09-14 RX ORDER — KETOROLAC TROMETHAMINE 30 MG/ML
15 INJECTION, SOLUTION INTRAMUSCULAR; INTRAVENOUS
Status: COMPLETED | OUTPATIENT
Start: 2021-09-14 | End: 2021-09-14

## 2021-09-14 RX ORDER — HYDROCODONE BITARTRATE AND ACETAMINOPHEN 10; 325 MG/1; MG/1
1 TABLET ORAL EVERY 6 HOURS PRN
Qty: 16 TABLET | Refills: 0 | Status: SHIPPED | OUTPATIENT
Start: 2021-09-14 | End: 2023-05-24

## 2021-09-14 RX ORDER — METFORMIN HYDROCHLORIDE 1000 MG/1
1000 TABLET ORAL 2 TIMES DAILY WITH MEALS
Qty: 60 TABLET | Refills: 3 | Status: ON HOLD | OUTPATIENT
Start: 2021-09-14 | End: 2023-05-28

## 2021-09-14 RX ADMIN — HYDROMORPHONE HYDROCHLORIDE 1 MG: 2 INJECTION, SOLUTION INTRAMUSCULAR; INTRAVENOUS; SUBCUTANEOUS at 05:09

## 2021-09-14 RX ADMIN — DEXAMETHASONE SODIUM PHOSPHATE 10 MG: 10 INJECTION INTRAMUSCULAR; INTRAVENOUS at 05:09

## 2021-09-14 RX ADMIN — KETOROLAC TROMETHAMINE 15 MG: 30 INJECTION, SOLUTION INTRAMUSCULAR; INTRAVENOUS at 04:09

## 2021-09-14 RX ADMIN — POTASSIUM CHLORIDE 40 MEQ: 1500 TABLET, EXTENDED RELEASE ORAL at 05:09

## 2021-09-17 ENCOUNTER — HOSPITAL ENCOUNTER (OUTPATIENT)
Facility: HOSPITAL | Age: 46
Discharge: LEFT BEFORE DISCHARGE | End: 2021-09-18
Attending: FAMILY MEDICINE | Admitting: STUDENT IN AN ORGANIZED HEALTH CARE EDUCATION/TRAINING PROGRAM

## 2021-09-17 DIAGNOSIS — R52 PAIN: ICD-10-CM

## 2021-09-17 DIAGNOSIS — J18.9 PNEUMONIA OF RIGHT LOWER LOBE DUE TO INFECTIOUS ORGANISM: Primary | ICD-10-CM

## 2021-09-17 DIAGNOSIS — R07.9 CHEST PAIN: ICD-10-CM

## 2021-09-17 PROBLEM — J96.02 ACUTE RESPIRATORY FAILURE WITH HYPOXIA AND HYPERCARBIA: Status: RESOLVED | Noted: 2021-09-17 | Resolved: 2021-09-17

## 2021-09-17 PROBLEM — J96.01 ACUTE RESPIRATORY FAILURE WITH HYPOXIA AND HYPERCARBIA: Status: RESOLVED | Noted: 2021-09-17 | Resolved: 2021-09-17

## 2021-09-17 PROBLEM — J96.02 ACUTE RESPIRATORY FAILURE WITH HYPOXIA AND HYPERCARBIA: Status: ACTIVE | Noted: 2021-09-17

## 2021-09-17 PROBLEM — J96.01 ACUTE RESPIRATORY FAILURE WITH HYPOXIA AND HYPERCARBIA: Status: ACTIVE | Noted: 2021-09-17

## 2021-09-17 LAB
ALBUMIN SERPL BCP-MCNC: 3 G/DL (ref 3.5–5.2)
ALP SERPL-CCNC: 86 U/L (ref 55–135)
ALT SERPL W/O P-5'-P-CCNC: 27 U/L (ref 10–44)
ANION GAP SERPL CALC-SCNC: 15 MMOL/L (ref 8–16)
ANISOCYTOSIS BLD QL SMEAR: SLIGHT
AST SERPL-CCNC: 25 U/L (ref 10–40)
BASOPHILS NFR BLD: 0 % (ref 0–1.9)
BILIRUB SERPL-MCNC: 2.5 MG/DL (ref 0.1–1)
BUN SERPL-MCNC: 12 MG/DL (ref 6–20)
CALCIUM SERPL-MCNC: 9.9 MG/DL (ref 8.7–10.5)
CHLORIDE SERPL-SCNC: 97 MMOL/L (ref 95–110)
CO2 SERPL-SCNC: 24 MMOL/L (ref 23–29)
CREAT SERPL-MCNC: 0.9 MG/DL (ref 0.5–1.4)
DIFFERENTIAL METHOD: ABNORMAL
EOSINOPHIL NFR BLD: 0 % (ref 0–8)
ERYTHROCYTE [DISTWIDTH] IN BLOOD BY AUTOMATED COUNT: 15.1 % (ref 11.5–14.5)
EST. GFR  (AFRICAN AMERICAN): >60 ML/MIN/1.73 M^2
EST. GFR  (NON AFRICAN AMERICAN): >60 ML/MIN/1.73 M^2
GLUCOSE SERPL-MCNC: 118 MG/DL (ref 70–110)
HCT VFR BLD AUTO: 34.2 % (ref 40–54)
HGB BLD-MCNC: 11.2 G/DL (ref 14–18)
IMM GRANULOCYTES # BLD AUTO: ABNORMAL K/UL (ref 0–0.04)
IMM GRANULOCYTES NFR BLD AUTO: ABNORMAL % (ref 0–0.5)
LACTATE SERPL-SCNC: 0.7 MMOL/L (ref 0.5–2.2)
LYMPHOCYTES NFR BLD: 13 % (ref 18–48)
MCH RBC QN AUTO: 25.6 PG (ref 27–31)
MCHC RBC AUTO-ENTMCNC: 32.7 G/DL (ref 32–36)
MCV RBC AUTO: 78 FL (ref 82–98)
MONOCYTES NFR BLD: 8 % (ref 4–15)
NEUTROPHILS NFR BLD: 79 % (ref 38–73)
NRBC BLD-RTO: 0 /100 WBC
PLATELET # BLD AUTO: 531 K/UL (ref 150–450)
PMV BLD AUTO: 10.3 FL (ref 9.2–12.9)
POCT GLUCOSE: 116 MG/DL (ref 70–110)
POIKILOCYTOSIS BLD QL SMEAR: SLIGHT
POTASSIUM SERPL-SCNC: 3.8 MMOL/L (ref 3.5–5.1)
PROT SERPL-MCNC: 8.4 G/DL (ref 6–8.4)
RBC # BLD AUTO: 4.38 M/UL (ref 4.6–6.2)
SARS-COV-2 RDRP RESP QL NAA+PROBE: NEGATIVE
SODIUM SERPL-SCNC: 136 MMOL/L (ref 136–145)
TROPONIN I SERPL DL<=0.01 NG/ML-MCNC: <0.006 NG/ML (ref 0–0.03)
WBC # BLD AUTO: 24.67 K/UL (ref 3.9–12.7)

## 2021-09-17 PROCEDURE — 63600175 PHARM REV CODE 636 W HCPCS: Performed by: STUDENT IN AN ORGANIZED HEALTH CARE EDUCATION/TRAINING PROGRAM

## 2021-09-17 PROCEDURE — 84145 PROCALCITONIN (PCT): CPT | Performed by: STUDENT IN AN ORGANIZED HEALTH CARE EDUCATION/TRAINING PROGRAM

## 2021-09-17 PROCEDURE — 25000003 PHARM REV CODE 250: Performed by: FAMILY MEDICINE

## 2021-09-17 PROCEDURE — 85007 BL SMEAR W/DIFF WBC COUNT: CPT | Mod: NCS | Performed by: FAMILY MEDICINE

## 2021-09-17 PROCEDURE — 99285 EMERGENCY DEPT VISIT HI MDM: CPT | Mod: 25

## 2021-09-17 PROCEDURE — G0378 HOSPITAL OBSERVATION PER HR: HCPCS

## 2021-09-17 PROCEDURE — 96365 THER/PROPH/DIAG IV INF INIT: CPT

## 2021-09-17 PROCEDURE — 93005 ELECTROCARDIOGRAM TRACING: CPT

## 2021-09-17 PROCEDURE — 36415 COLL VENOUS BLD VENIPUNCTURE: CPT | Performed by: STUDENT IN AN ORGANIZED HEALTH CARE EDUCATION/TRAINING PROGRAM

## 2021-09-17 PROCEDURE — 71045 X-RAY EXAM CHEST 1 VIEW: CPT | Mod: 26,,, | Performed by: RADIOLOGY

## 2021-09-17 PROCEDURE — 96361 HYDRATE IV INFUSION ADD-ON: CPT

## 2021-09-17 PROCEDURE — 85027 COMPLETE CBC AUTOMATED: CPT | Performed by: FAMILY MEDICINE

## 2021-09-17 PROCEDURE — 96375 TX/PRO/DX INJ NEW DRUG ADDON: CPT

## 2021-09-17 PROCEDURE — 63600175 PHARM REV CODE 636 W HCPCS: Performed by: FAMILY MEDICINE

## 2021-09-17 PROCEDURE — 96372 THER/PROPH/DIAG INJ SC/IM: CPT

## 2021-09-17 PROCEDURE — 71045 X-RAY EXAM CHEST 1 VIEW: CPT | Mod: TC,FY

## 2021-09-17 PROCEDURE — 27000221 HC OXYGEN, UP TO 24 HOURS

## 2021-09-17 PROCEDURE — 25000003 PHARM REV CODE 250: Performed by: STUDENT IN AN ORGANIZED HEALTH CARE EDUCATION/TRAINING PROGRAM

## 2021-09-17 PROCEDURE — U0002 COVID-19 LAB TEST NON-CDC: HCPCS | Performed by: FAMILY MEDICINE

## 2021-09-17 PROCEDURE — 71045 XR CHEST AP PORTABLE: ICD-10-PCS | Mod: 26,,, | Performed by: RADIOLOGY

## 2021-09-17 PROCEDURE — 94640 AIRWAY INHALATION TREATMENT: CPT

## 2021-09-17 PROCEDURE — 84484 ASSAY OF TROPONIN QUANT: CPT | Performed by: FAMILY MEDICINE

## 2021-09-17 PROCEDURE — 80053 COMPREHEN METABOLIC PANEL: CPT | Performed by: FAMILY MEDICINE

## 2021-09-17 PROCEDURE — 83605 ASSAY OF LACTIC ACID: CPT | Performed by: STUDENT IN AN ORGANIZED HEALTH CARE EDUCATION/TRAINING PROGRAM

## 2021-09-17 RX ORDER — POLYETHYLENE GLYCOL 3350 17 G/17G
17 POWDER, FOR SOLUTION ORAL DAILY
Status: DISCONTINUED | OUTPATIENT
Start: 2021-09-18 | End: 2021-09-18 | Stop reason: HOSPADM

## 2021-09-17 RX ORDER — ENOXAPARIN SODIUM 100 MG/ML
40 INJECTION SUBCUTANEOUS EVERY 24 HOURS
Status: DISCONTINUED | OUTPATIENT
Start: 2021-09-17 | End: 2021-09-18 | Stop reason: HOSPADM

## 2021-09-17 RX ORDER — CYCLOBENZAPRINE HCL 5 MG
10 TABLET ORAL 3 TIMES DAILY PRN
Status: DISCONTINUED | OUTPATIENT
Start: 2021-09-17 | End: 2021-09-18

## 2021-09-17 RX ORDER — SODIUM CHLORIDE 0.9 % (FLUSH) 0.9 %
10 SYRINGE (ML) INJECTION EVERY 12 HOURS PRN
Status: DISCONTINUED | OUTPATIENT
Start: 2021-09-17 | End: 2021-09-18 | Stop reason: HOSPADM

## 2021-09-17 RX ORDER — AMOXICILLIN 250 MG
1 CAPSULE ORAL 2 TIMES DAILY
Status: DISCONTINUED | OUTPATIENT
Start: 2021-09-17 | End: 2021-09-18 | Stop reason: HOSPADM

## 2021-09-17 RX ORDER — TALC
6 POWDER (GRAM) TOPICAL NIGHTLY PRN
Status: DISCONTINUED | OUTPATIENT
Start: 2021-09-17 | End: 2021-09-18 | Stop reason: HOSPADM

## 2021-09-17 RX ORDER — GLUCAGON 1 MG
1 KIT INJECTION
Status: DISCONTINUED | OUTPATIENT
Start: 2021-09-17 | End: 2021-09-18 | Stop reason: HOSPADM

## 2021-09-17 RX ORDER — INSULIN ASPART 100 [IU]/ML
0-5 INJECTION, SOLUTION INTRAVENOUS; SUBCUTANEOUS
Status: DISCONTINUED | OUTPATIENT
Start: 2021-09-17 | End: 2021-09-18 | Stop reason: HOSPADM

## 2021-09-17 RX ORDER — HYDROCODONE BITARTRATE AND ACETAMINOPHEN 5; 325 MG/1; MG/1
1 TABLET ORAL EVERY 6 HOURS PRN
Status: DISCONTINUED | OUTPATIENT
Start: 2021-09-17 | End: 2021-09-18 | Stop reason: HOSPADM

## 2021-09-17 RX ORDER — KETOROLAC TROMETHAMINE 30 MG/ML
30 INJECTION, SOLUTION INTRAMUSCULAR; INTRAVENOUS
Status: COMPLETED | OUTPATIENT
Start: 2021-09-17 | End: 2021-09-17

## 2021-09-17 RX ORDER — ATORVASTATIN CALCIUM 10 MG/1
10 TABLET, FILM COATED ORAL DAILY
Status: DISCONTINUED | OUTPATIENT
Start: 2021-09-18 | End: 2021-09-18 | Stop reason: HOSPADM

## 2021-09-17 RX ORDER — ACETAMINOPHEN 325 MG/1
650 TABLET ORAL EVERY 4 HOURS PRN
Status: DISCONTINUED | OUTPATIENT
Start: 2021-09-17 | End: 2021-09-18 | Stop reason: HOSPADM

## 2021-09-17 RX ORDER — LISINOPRIL 2.5 MG/1
2.5 TABLET ORAL DAILY
Status: DISCONTINUED | OUTPATIENT
Start: 2021-09-18 | End: 2021-09-18 | Stop reason: HOSPADM

## 2021-09-17 RX ORDER — SODIUM CHLORIDE 9 MG/ML
INJECTION, SOLUTION INTRAVENOUS CONTINUOUS
Status: DISCONTINUED | OUTPATIENT
Start: 2021-09-17 | End: 2021-09-18 | Stop reason: HOSPADM

## 2021-09-17 RX ORDER — IPRATROPIUM BROMIDE AND ALBUTEROL SULFATE 2.5; .5 MG/3ML; MG/3ML
3 SOLUTION RESPIRATORY (INHALATION) EVERY 8 HOURS
Status: DISCONTINUED | OUTPATIENT
Start: 2021-09-18 | End: 2021-09-18 | Stop reason: HOSPADM

## 2021-09-17 RX ORDER — MORPHINE SULFATE 4 MG/ML
4 INJECTION, SOLUTION INTRAMUSCULAR; INTRAVENOUS
Status: COMPLETED | OUTPATIENT
Start: 2021-09-17 | End: 2021-09-17

## 2021-09-17 RX ORDER — ONDANSETRON 2 MG/ML
4 INJECTION INTRAMUSCULAR; INTRAVENOUS EVERY 8 HOURS PRN
Status: DISCONTINUED | OUTPATIENT
Start: 2021-09-17 | End: 2021-09-18 | Stop reason: HOSPADM

## 2021-09-17 RX ADMIN — MORPHINE SULFATE 4 MG: 4 INJECTION INTRAVENOUS at 04:09

## 2021-09-17 RX ADMIN — SODIUM CHLORIDE 500 ML: 0.9 INJECTION, SOLUTION INTRAVENOUS at 03:09

## 2021-09-17 RX ADMIN — CEFTRIAXONE 1 G: 1 INJECTION, SOLUTION INTRAVENOUS at 04:09

## 2021-09-17 RX ADMIN — AZITHROMYCIN DIHYDRATE 500 MG: 500 INJECTION, POWDER, LYOPHILIZED, FOR SOLUTION INTRAVENOUS at 06:09

## 2021-09-17 RX ADMIN — KETOROLAC TROMETHAMINE 30 MG: 30 INJECTION, SOLUTION INTRAMUSCULAR; INTRAVENOUS at 03:09

## 2021-09-17 RX ADMIN — DOCUSATE SODIUM AND SENNOSIDES 1 TABLET: 8.6; 5 TABLET, FILM COATED ORAL at 08:09

## 2021-09-17 RX ADMIN — HYDROCODONE BITARTRATE AND ACETAMINOPHEN 1 TABLET: 5; 325 TABLET ORAL at 09:09

## 2021-09-17 RX ADMIN — SODIUM CHLORIDE: 0.9 INJECTION, SOLUTION INTRAVENOUS at 07:09

## 2021-09-17 RX ADMIN — ENOXAPARIN SODIUM 40 MG: 40 INJECTION SUBCUTANEOUS at 07:09

## 2021-09-17 NOTE — ED PROVIDER NOTES
Encounter Date: 4/6/2020       History     Chief Complaint   Patient presents with    Back Pain     with numbness to both legs off and on for 7 months, gait steady, moves all extremities well     HPI  Review of patient's allergies indicates:  No Known Allergies  Past Medical History:   Diagnosis Date    Diabetes mellitus, type 2 05/2020    Hypertension      Past Surgical History:   Procedure Laterality Date    EYE SURGERY Right     LUMBAR DISC SURGERY  2005     Family History   Problem Relation Age of Onset    Diabetes Mother     Diabetes Maternal Grandfather     No Known Problems Father      Social History     Tobacco Use    Smoking status: Current Every Day Smoker     Packs/day: 0.50     Years: 23.00     Pack years: 11.50     Types: Cigarettes    Smokeless tobacco: Never Used   Substance Use Topics    Alcohol use: Not Currently     Alcohol/week: 48.0 standard drinks     Types: 48 Cans of beer per week    Drug use: Never     Review of Systems    Physical Exam     Initial Vitals [04/06/20 1518]   BP Pulse Resp Temp SpO2   (!) 145/80 83 16 97.8 °F (36.6 °C) 97 %      MAP       --         Physical Exam    ED Course   Procedures  Labs Reviewed - No data to display  EKG Readings: (Independently Interpreted)   Initial Reading: No STEMI. Rhythm: Normal Sinus Rhythm. Heart Rate: 86. Ectopy: No Ectopy. Conduction: Normal. ST Segments: Normal ST Segments. T Waves: Normal.       Imaging Results    None          Medications - No data to display                    Clinical Impression:   Final diagnoses:  [M54.9] Back pain, unspecified back location, unspecified back pain laterality, unspecified chronicity (Primary)  [M54.5, G89.29] Chronic bilateral low back pain, unspecified whether sciatica present          ED Disposition Condition    Discharge Stable        ED Prescriptions     None        Follow-up Information     Follow up With Specialties Details Why Contact Info    Jerzy Cook MD Orthopedic Surgery   [id number]  [place] [address]  [phone]             Denis Daniel MD  09/17/21 2339

## 2021-09-18 VITALS
SYSTOLIC BLOOD PRESSURE: 130 MMHG | DIASTOLIC BLOOD PRESSURE: 83 MMHG | RESPIRATION RATE: 18 BRPM | HEART RATE: 86 BPM | HEIGHT: 72 IN | OXYGEN SATURATION: 97 % | BODY MASS INDEX: 29.05 KG/M2 | TEMPERATURE: 99 F | WEIGHT: 214.5 LBS

## 2021-09-18 LAB
ANION GAP SERPL CALC-SCNC: 11 MMOL/L (ref 8–16)
BASOPHILS # BLD AUTO: 0.08 K/UL (ref 0–0.2)
BASOPHILS NFR BLD: 0.4 % (ref 0–1.9)
BUN SERPL-MCNC: 12 MG/DL (ref 6–20)
CALCIUM SERPL-MCNC: 9.4 MG/DL (ref 8.7–10.5)
CHLORIDE SERPL-SCNC: 99 MMOL/L (ref 95–110)
CO2 SERPL-SCNC: 25 MMOL/L (ref 23–29)
CREAT SERPL-MCNC: 0.8 MG/DL (ref 0.5–1.4)
CRP SERPL-MCNC: 280 MG/L (ref 0–8.2)
D DIMER PPP IA.FEU-MCNC: 8.94 MG/L FEU
DIFFERENTIAL METHOD: ABNORMAL
EOSINOPHIL # BLD AUTO: 0 K/UL (ref 0–0.5)
EOSINOPHIL NFR BLD: 0.2 % (ref 0–8)
ERYTHROCYTE [DISTWIDTH] IN BLOOD BY AUTOMATED COUNT: 15.1 % (ref 11.5–14.5)
EST. GFR  (AFRICAN AMERICAN): >60 ML/MIN/1.73 M^2
EST. GFR  (NON AFRICAN AMERICAN): >60 ML/MIN/1.73 M^2
FERRITIN SERPL-MCNC: 260 NG/ML (ref 20–300)
GLUCOSE SERPL-MCNC: 106 MG/DL (ref 70–110)
HCT VFR BLD AUTO: 30.9 % (ref 40–54)
HGB BLD-MCNC: 10.1 G/DL (ref 14–18)
IMM GRANULOCYTES # BLD AUTO: 0.1 K/UL (ref 0–0.04)
IMM GRANULOCYTES NFR BLD AUTO: 0.5 % (ref 0–0.5)
LYMPHOCYTES # BLD AUTO: 2.3 K/UL (ref 1–4.8)
LYMPHOCYTES NFR BLD: 11.3 % (ref 18–48)
MAGNESIUM SERPL-MCNC: 1.8 MG/DL (ref 1.6–2.6)
MCH RBC QN AUTO: 25.8 PG (ref 27–31)
MCHC RBC AUTO-ENTMCNC: 32.7 G/DL (ref 32–36)
MCV RBC AUTO: 79 FL (ref 82–98)
MONOCYTES # BLD AUTO: 2 K/UL (ref 0.3–1)
MONOCYTES NFR BLD: 9.8 % (ref 4–15)
NEUTROPHILS # BLD AUTO: 16 K/UL (ref 1.8–7.7)
NEUTROPHILS NFR BLD: 77.8 % (ref 38–73)
NRBC BLD-RTO: 0 /100 WBC
PHOSPHATE SERPL-MCNC: 3 MG/DL (ref 2.7–4.5)
PLATELET # BLD AUTO: 533 K/UL (ref 150–450)
PMV BLD AUTO: 10.4 FL (ref 9.2–12.9)
POCT GLUCOSE: 102 MG/DL (ref 70–110)
POCT GLUCOSE: 111 MG/DL (ref 70–110)
POTASSIUM SERPL-SCNC: 3.8 MMOL/L (ref 3.5–5.1)
PROCALCITONIN SERPL IA-MCNC: 0.13 NG/ML
RBC # BLD AUTO: 3.92 M/UL (ref 4.6–6.2)
SODIUM SERPL-SCNC: 135 MMOL/L (ref 136–145)
TROPONIN I SERPL DL<=0.01 NG/ML-MCNC: 0.01 NG/ML (ref 0–0.03)
TROPONIN I SERPL DL<=0.01 NG/ML-MCNC: <0.006 NG/ML (ref 0–0.03)
WBC # BLD AUTO: 20.53 K/UL (ref 3.9–12.7)

## 2021-09-18 PROCEDURE — 36415 COLL VENOUS BLD VENIPUNCTURE: CPT | Performed by: STUDENT IN AN ORGANIZED HEALTH CARE EDUCATION/TRAINING PROGRAM

## 2021-09-18 PROCEDURE — G0378 HOSPITAL OBSERVATION PER HR: HCPCS

## 2021-09-18 PROCEDURE — 94640 AIRWAY INHALATION TREATMENT: CPT

## 2021-09-18 PROCEDURE — 25000242 PHARM REV CODE 250 ALT 637 W/ HCPCS: Performed by: STUDENT IN AN ORGANIZED HEALTH CARE EDUCATION/TRAINING PROGRAM

## 2021-09-18 PROCEDURE — 86140 C-REACTIVE PROTEIN: CPT | Performed by: STUDENT IN AN ORGANIZED HEALTH CARE EDUCATION/TRAINING PROGRAM

## 2021-09-18 PROCEDURE — 84484 ASSAY OF TROPONIN QUANT: CPT | Mod: 91 | Performed by: HOSPITALIST

## 2021-09-18 PROCEDURE — 94761 N-INVAS EAR/PLS OXIMETRY MLT: CPT

## 2021-09-18 PROCEDURE — 85025 COMPLETE CBC W/AUTO DIFF WBC: CPT | Performed by: STUDENT IN AN ORGANIZED HEALTH CARE EDUCATION/TRAINING PROGRAM

## 2021-09-18 PROCEDURE — 83735 ASSAY OF MAGNESIUM: CPT | Performed by: STUDENT IN AN ORGANIZED HEALTH CARE EDUCATION/TRAINING PROGRAM

## 2021-09-18 PROCEDURE — 84100 ASSAY OF PHOSPHORUS: CPT | Performed by: STUDENT IN AN ORGANIZED HEALTH CARE EDUCATION/TRAINING PROGRAM

## 2021-09-18 PROCEDURE — 85379 FIBRIN DEGRADATION QUANT: CPT | Performed by: STUDENT IN AN ORGANIZED HEALTH CARE EDUCATION/TRAINING PROGRAM

## 2021-09-18 PROCEDURE — S4991 NICOTINE PATCH NONLEGEND: HCPCS | Performed by: HOSPITALIST

## 2021-09-18 PROCEDURE — 25000003 PHARM REV CODE 250: Performed by: HOSPITALIST

## 2021-09-18 PROCEDURE — 82728 ASSAY OF FERRITIN: CPT | Performed by: STUDENT IN AN ORGANIZED HEALTH CARE EDUCATION/TRAINING PROGRAM

## 2021-09-18 PROCEDURE — 93005 ELECTROCARDIOGRAM TRACING: CPT

## 2021-09-18 PROCEDURE — 25000003 PHARM REV CODE 250: Performed by: STUDENT IN AN ORGANIZED HEALTH CARE EDUCATION/TRAINING PROGRAM

## 2021-09-18 PROCEDURE — 80048 BASIC METABOLIC PNL TOTAL CA: CPT | Performed by: STUDENT IN AN ORGANIZED HEALTH CARE EDUCATION/TRAINING PROGRAM

## 2021-09-18 PROCEDURE — 27000221 HC OXYGEN, UP TO 24 HOURS

## 2021-09-18 PROCEDURE — 96361 HYDRATE IV INFUSION ADD-ON: CPT

## 2021-09-18 RX ORDER — HYDROXYZINE PAMOATE 25 MG/1
25 CAPSULE ORAL NIGHTLY PRN
Status: DISCONTINUED | OUTPATIENT
Start: 2021-09-18 | End: 2021-09-18 | Stop reason: HOSPADM

## 2021-09-18 RX ORDER — MORPHINE SULFATE 4 MG/ML
2 INJECTION, SOLUTION INTRAMUSCULAR; INTRAVENOUS EVERY 4 HOURS PRN
Status: DISCONTINUED | OUTPATIENT
Start: 2021-09-18 | End: 2021-09-18 | Stop reason: HOSPADM

## 2021-09-18 RX ORDER — METHOCARBAMOL 500 MG/1
500 TABLET, FILM COATED ORAL 4 TIMES DAILY
Status: DISCONTINUED | OUTPATIENT
Start: 2021-09-18 | End: 2021-09-18 | Stop reason: HOSPADM

## 2021-09-18 RX ORDER — QUETIAPINE FUMARATE 25 MG/1
50 TABLET, FILM COATED ORAL DAILY
Status: DISCONTINUED | OUTPATIENT
Start: 2021-09-18 | End: 2021-09-18 | Stop reason: HOSPADM

## 2021-09-18 RX ORDER — IBUPROFEN 400 MG/1
400 TABLET ORAL EVERY 6 HOURS PRN
Status: DISCONTINUED | OUTPATIENT
Start: 2021-09-18 | End: 2021-09-18 | Stop reason: HOSPADM

## 2021-09-18 RX ORDER — DEXAMETHASONE SODIUM PHOSPHATE 4 MG/ML
6 INJECTION, SOLUTION INTRA-ARTICULAR; INTRALESIONAL; INTRAMUSCULAR; INTRAVENOUS; SOFT TISSUE DAILY
Status: DISCONTINUED | OUTPATIENT
Start: 2021-09-18 | End: 2021-09-18 | Stop reason: HOSPADM

## 2021-09-18 RX ORDER — IBUPROFEN 200 MG
1 TABLET ORAL DAILY
Status: DISCONTINUED | OUTPATIENT
Start: 2021-09-18 | End: 2021-09-18 | Stop reason: HOSPADM

## 2021-09-18 RX ADMIN — SODIUM CHLORIDE: 0.9 INJECTION, SOLUTION INTRAVENOUS at 05:09

## 2021-09-18 RX ADMIN — CYCLOBENZAPRINE HYDROCHLORIDE 10 MG: 5 TABLET, FILM COATED ORAL at 12:09

## 2021-09-18 RX ADMIN — IPRATROPIUM BROMIDE AND ALBUTEROL SULFATE 3 ML: .5; 3 SOLUTION RESPIRATORY (INHALATION) at 07:09

## 2021-09-18 RX ADMIN — Medication 1 PATCH: at 09:09

## 2022-01-11 ENCOUNTER — PATIENT MESSAGE (OUTPATIENT)
Dept: ADMINISTRATIVE | Facility: HOSPITAL | Age: 47
End: 2022-01-11

## 2022-03-09 DIAGNOSIS — Z11.59 NEED FOR HEPATITIS C SCREENING TEST: ICD-10-CM

## 2022-05-31 ENCOUNTER — PATIENT MESSAGE (OUTPATIENT)
Dept: ADMINISTRATIVE | Facility: HOSPITAL | Age: 47
End: 2022-05-31

## 2023-03-01 ENCOUNTER — HOSPITAL ENCOUNTER (EMERGENCY)
Facility: HOSPITAL | Age: 48
Discharge: HOME | End: 2023-03-01
Attending: EMERGENCY MEDICINE

## 2023-03-01 VITALS
RESPIRATION RATE: 18 BRPM | OXYGEN SATURATION: 100 % | BODY MASS INDEX: 30.46 KG/M2 | WEIGHT: 245 LBS | SYSTOLIC BLOOD PRESSURE: 120 MMHG | DIASTOLIC BLOOD PRESSURE: 72 MMHG | TEMPERATURE: 98 F | HEART RATE: 75 BPM | HEIGHT: 75 IN

## 2023-03-01 DIAGNOSIS — R11.2 NAUSEA AND VOMITING, UNSPECIFIED VOMITING TYPE: Primary | ICD-10-CM

## 2023-03-01 LAB
ALBUMIN SERPL BCP-MCNC: 3.4 G/DL (ref 3.5–5.2)
ALP SERPL-CCNC: 74 U/L (ref 55–135)
ALT SERPL W/O P-5'-P-CCNC: 14 U/L (ref 10–44)
ANION GAP SERPL CALC-SCNC: 11 MMOL/L (ref 8–16)
AST SERPL-CCNC: 18 U/L (ref 10–40)
BASOPHILS # BLD AUTO: 0.13 K/UL (ref 0–0.2)
BASOPHILS NFR BLD: 1.2 % (ref 0–1.9)
BILIRUB SERPL-MCNC: 1.1 MG/DL (ref 0.1–1)
BUN SERPL-MCNC: 11 MG/DL (ref 6–20)
CALCIUM SERPL-MCNC: 9 MG/DL (ref 8.7–10.5)
CHLORIDE SERPL-SCNC: 102 MMOL/L (ref 95–110)
CO2 SERPL-SCNC: 26 MMOL/L (ref 23–29)
CREAT SERPL-MCNC: 1.1 MG/DL (ref 0.5–1.4)
DIFFERENTIAL METHOD: ABNORMAL
EOSINOPHIL # BLD AUTO: 0.2 K/UL (ref 0–0.5)
EOSINOPHIL NFR BLD: 2.2 % (ref 0–8)
ERYTHROCYTE [DISTWIDTH] IN BLOOD BY AUTOMATED COUNT: 17.6 % (ref 11.5–14.5)
EST. GFR  (NO RACE VARIABLE): >60 ML/MIN/1.73 M^2
GLUCOSE SERPL-MCNC: 119 MG/DL (ref 70–110)
HCT VFR BLD AUTO: 31.5 % (ref 40–54)
HGB BLD-MCNC: 10.4 G/DL (ref 14–18)
IMM GRANULOCYTES # BLD AUTO: 0.03 K/UL (ref 0–0.04)
IMM GRANULOCYTES NFR BLD AUTO: 0.3 % (ref 0–0.5)
INFLUENZA A, MOLECULAR: NEGATIVE
INFLUENZA B, MOLECULAR: NEGATIVE
LYMPHOCYTES # BLD AUTO: 2.4 K/UL (ref 1–4.8)
LYMPHOCYTES NFR BLD: 22.1 % (ref 18–48)
MAGNESIUM SERPL-MCNC: 1.8 MG/DL (ref 1.6–2.6)
MCH RBC QN AUTO: 23.6 PG (ref 27–31)
MCHC RBC AUTO-ENTMCNC: 33 G/DL (ref 32–36)
MCV RBC AUTO: 72 FL (ref 82–98)
MONOCYTES # BLD AUTO: 1 K/UL (ref 0.3–1)
MONOCYTES NFR BLD: 8.8 % (ref 4–15)
NEUTROPHILS # BLD AUTO: 7.2 K/UL (ref 1.8–7.7)
NEUTROPHILS NFR BLD: 65.4 % (ref 38–73)
NRBC BLD-RTO: 0 /100 WBC
PLATELET # BLD AUTO: 574 K/UL (ref 150–450)
PMV BLD AUTO: 9.3 FL (ref 9.2–12.9)
POTASSIUM SERPL-SCNC: 3.5 MMOL/L (ref 3.5–5.1)
PROT SERPL-MCNC: 7.5 G/DL (ref 6–8.4)
RBC # BLD AUTO: 4.4 M/UL (ref 4.6–6.2)
SARS-COV-2 RDRP RESP QL NAA+PROBE: NEGATIVE
SODIUM SERPL-SCNC: 139 MMOL/L (ref 136–145)
SPECIMEN SOURCE: NORMAL
WBC # BLD AUTO: 10.95 K/UL (ref 3.9–12.7)

## 2023-03-01 PROCEDURE — 96374 THER/PROPH/DIAG INJ IV PUSH: CPT

## 2023-03-01 PROCEDURE — 83735 ASSAY OF MAGNESIUM: CPT | Performed by: EMERGENCY MEDICINE

## 2023-03-01 PROCEDURE — 36415 COLL VENOUS BLD VENIPUNCTURE: CPT | Performed by: EMERGENCY MEDICINE

## 2023-03-01 PROCEDURE — 25000003 PHARM REV CODE 250: Performed by: EMERGENCY MEDICINE

## 2023-03-01 PROCEDURE — 63600175 PHARM REV CODE 636 W HCPCS: Performed by: EMERGENCY MEDICINE

## 2023-03-01 PROCEDURE — 80053 COMPREHEN METABOLIC PANEL: CPT | Performed by: EMERGENCY MEDICINE

## 2023-03-01 PROCEDURE — 87502 INFLUENZA DNA AMP PROBE: CPT | Performed by: EMERGENCY MEDICINE

## 2023-03-01 PROCEDURE — U0002 COVID-19 LAB TEST NON-CDC: HCPCS | Performed by: EMERGENCY MEDICINE

## 2023-03-01 PROCEDURE — 99284 EMERGENCY DEPT VISIT MOD MDM: CPT | Mod: 25

## 2023-03-01 PROCEDURE — 96361 HYDRATE IV INFUSION ADD-ON: CPT

## 2023-03-01 PROCEDURE — 85025 COMPLETE CBC W/AUTO DIFF WBC: CPT | Performed by: EMERGENCY MEDICINE

## 2023-03-01 RX ORDER — ONDANSETRON 2 MG/ML
4 INJECTION INTRAMUSCULAR; INTRAVENOUS
Status: COMPLETED | OUTPATIENT
Start: 2023-03-01 | End: 2023-03-01

## 2023-03-01 RX ORDER — ONDANSETRON 4 MG/1
4 TABLET, ORALLY DISINTEGRATING ORAL EVERY 6 HOURS PRN
Qty: 12 TABLET | Refills: 0 | Status: ON HOLD | OUTPATIENT
Start: 2023-03-01 | End: 2023-05-28

## 2023-03-01 RX ADMIN — SODIUM CHLORIDE 1000 ML: 9 INJECTION, SOLUTION INTRAVENOUS at 01:03

## 2023-03-01 RX ADMIN — ONDANSETRON 4 MG: 2 INJECTION INTRAMUSCULAR; INTRAVENOUS at 01:03

## 2023-03-01 NOTE — DISCHARGE INSTRUCTIONS
Take Zofran for nausea.  Continue using over-the-counter medications as we discussed.  Follow-up with family practice when scheduled.  You should receive a phone call within the next 5-7 days.  Return here as needed or if worse in any way.

## 2023-03-01 NOTE — ED PROVIDER NOTES
Encounter Date: 3/1/2023       History     Chief Complaint   Patient presents with    Abdominal Pain     Pt. C/o diffuse abd. Pain and vomiting x 3 days. Denies diarrhea. Last episode of vomiting approx. 20 min. PTA.     47-year-old male, here from home via private vehicle complaining of a 3 day history of nausea, vomiting, and generalized abdominal soreness.  Denies loose stools or constipation.  No dysuria.  No fever.  No sick contacts, no suspicious food intake.  Nothing he does makes his symptoms any better or worse.  He has been taking Tums at home without significant relief.  No dark or bloody vomitus, no dark or bloody stool.    Review of patient's allergies indicates:  No Known Allergies  Past Medical History:   Diagnosis Date    Diabetes mellitus, type 2 05/2020    Hypertension      Past Surgical History:   Procedure Laterality Date    EYE SURGERY Right     LUMBAR DISC SURGERY  2005     Family History   Problem Relation Age of Onset    Diabetes Mother     Diabetes Maternal Grandfather     No Known Problems Father      Social History     Tobacco Use    Smoking status: Every Day     Packs/day: 0.50     Years: 23.00     Pack years: 11.50     Types: Cigarettes    Smokeless tobacco: Never   Substance Use Topics    Alcohol use: Yes     Alcohol/week: 48.0 standard drinks     Types: 48 Cans of beer per week    Drug use: Yes     Types: Marijuana     Review of Systems   Constitutional: Negative.    HENT: Negative.     Eyes: Negative.    Respiratory: Negative.     Cardiovascular: Negative.    Gastrointestinal:  Positive for abdominal pain, nausea and vomiting. Negative for diarrhea.   Endocrine: Negative.    Genitourinary: Negative.    Musculoskeletal: Negative.    Skin: Negative.    Allergic/Immunologic: Negative.    Hematological: Negative.    Psychiatric/Behavioral: Negative.       Physical Exam     Initial Vitals [03/01/23 0102]   BP Pulse Resp Temp SpO2   123/76 77 18 98.2 °F (36.8 °C) 98 %      MAP       --          Physical Exam    Nursing note and vitals reviewed.  Constitutional: He appears well-developed and well-nourished. No distress.   HENT:   Head: Normocephalic and atraumatic.   Nose: Nose normal.   Mouth/Throat: Oropharynx is clear and moist. No oropharyngeal exudate.   Eyes: Conjunctivae and EOM are normal. No scleral icterus.   Right cornea opaque   Neck: Neck supple. No JVD present.   Normal range of motion.  Cardiovascular:  Normal rate, regular rhythm, normal heart sounds and intact distal pulses.           No murmur heard.  Pulmonary/Chest: Breath sounds normal. No stridor. No respiratory distress. He has no wheezes. He has no rhonchi. He has no rales.   Abdominal: Abdomen is soft. Bowel sounds are normal. He exhibits no distension. There is no abdominal tenderness.   Musculoskeletal:         General: No tenderness or edema. Normal range of motion.      Cervical back: Normal range of motion and neck supple.     Neurological: He is alert and oriented to person, place, and time. He has normal strength. No cranial nerve deficit or sensory deficit. GCS score is 15. GCS eye subscore is 4. GCS verbal subscore is 5. GCS motor subscore is 6.   Skin: Skin is warm and dry. Capillary refill takes less than 2 seconds. No rash noted. No erythema.   Psychiatric: He has a normal mood and affect. His behavior is normal.       ED Course   Procedures  Labs Reviewed   CBC W/ AUTO DIFFERENTIAL - Abnormal; Notable for the following components:       Result Value    RBC 4.40 (*)     Hemoglobin 10.4 (*)     Hematocrit 31.5 (*)     MCV 72 (*)     MCH 23.6 (*)     RDW 17.6 (*)     Platelets 574 (*)     All other components within normal limits   COMPREHENSIVE METABOLIC PANEL - Abnormal; Notable for the following components:    Glucose 119 (*)     Albumin 3.4 (*)     Total Bilirubin 1.1 (*)     All other components within normal limits   INFLUENZA A & B BY MOLECULAR   MAGNESIUM   SARS-COV-2 RNA AMPLIFICATION, QUAL    Narrative:      Is the patient symptomatic?->Yes          Imaging Results    None          Medications   ondansetron injection 4 mg (4 mg Intravenous Given 3/1/23 0155)   sodium chloride 0.9% bolus 1,000 mL 1,000 mL (1,000 mLs Intravenous New Bag 3/1/23 0155)     Medical Decision Making:   Differential Diagnosis:   Viral illness, gastritis, gastroenteritis, ulcer, bowel obstruction, colitis, pancreatitis, etc.  ED Management:  Physical exam is unremarkable.  Abdomen is only very mildly tender, and the tenderness is generalized.  No tenderness over the gallbladder, pancreas, or stomach.  Suspicion for gastric ulcer as low.  Patient is likely suffering from a viral illness.  He is feeling better after being given Zofran and fluids here.  Will discharge home with Zofran, patient will continue with over-the-counter antacid medications, and he will follow-up with family practice.  Referral has been given.                        Clinical Impression:   Final diagnoses:  [R11.2] Nausea and vomiting, unspecified vomiting type (Primary)               Anthony Burgess MD  03/01/23 8119

## 2023-03-01 NOTE — ED NOTES
Pt here for diffuse abdominal pain with associated N/V/D for three days.  NAD noted.  Pt reported last episode of vomiting approximately 1hr ago now. Will continue to monitor.

## 2023-05-23 ENCOUNTER — HOSPITAL ENCOUNTER (OUTPATIENT)
Facility: HOSPITAL | Age: 48
Discharge: HOME | End: 2023-05-24
Attending: EMERGENCY MEDICINE | Admitting: INTERNAL MEDICINE
Payer: MEDICAID

## 2023-05-23 DIAGNOSIS — K50.00 TERMINAL ILEITIS WITHOUT COMPLICATION: ICD-10-CM

## 2023-05-23 DIAGNOSIS — K37 APPENDICITIS: ICD-10-CM

## 2023-05-23 DIAGNOSIS — R07.9 CHEST PAIN: ICD-10-CM

## 2023-05-23 DIAGNOSIS — R00.1 BRADYCARDIA: ICD-10-CM

## 2023-05-23 DIAGNOSIS — R10.31 RIGHT LOWER QUADRANT PAIN: Primary | ICD-10-CM

## 2023-05-23 LAB
ALBUMIN SERPL BCP-MCNC: 3.6 G/DL (ref 3.5–5.2)
ALP SERPL-CCNC: 72 U/L (ref 55–135)
ALT SERPL W/O P-5'-P-CCNC: 16 U/L (ref 10–44)
ANION GAP SERPL CALC-SCNC: 7 MMOL/L (ref 8–16)
AST SERPL-CCNC: 23 U/L (ref 10–40)
BASOPHILS # BLD AUTO: 0.13 K/UL (ref 0–0.2)
BASOPHILS NFR BLD: 1.2 % (ref 0–1.9)
BILIRUB SERPL-MCNC: 0.6 MG/DL (ref 0.1–1)
BILIRUB UR QL STRIP: NEGATIVE
BUN SERPL-MCNC: 10 MG/DL (ref 6–20)
CALCIUM SERPL-MCNC: 9.5 MG/DL (ref 8.7–10.5)
CHLORIDE SERPL-SCNC: 105 MMOL/L (ref 95–110)
CLARITY UR: CLEAR
CO2 SERPL-SCNC: 27 MMOL/L (ref 23–29)
COLOR UR: YELLOW
CREAT SERPL-MCNC: 1.1 MG/DL (ref 0.5–1.4)
DIFFERENTIAL METHOD: ABNORMAL
EOSINOPHIL # BLD AUTO: 0.3 K/UL (ref 0–0.5)
EOSINOPHIL NFR BLD: 2.5 % (ref 0–8)
ERYTHROCYTE [DISTWIDTH] IN BLOOD BY AUTOMATED COUNT: 17.8 % (ref 11.5–14.5)
EST. GFR  (NO RACE VARIABLE): >60 ML/MIN/1.73 M^2
GLUCOSE SERPL-MCNC: 89 MG/DL (ref 70–110)
GLUCOSE UR QL STRIP: NEGATIVE
HCT VFR BLD AUTO: 26.3 % (ref 40–54)
HGB BLD-MCNC: 8.2 G/DL (ref 14–18)
HGB UR QL STRIP: NEGATIVE
IMM GRANULOCYTES # BLD AUTO: 0.03 K/UL (ref 0–0.04)
IMM GRANULOCYTES NFR BLD AUTO: 0.3 % (ref 0–0.5)
KETONES UR QL STRIP: NEGATIVE
LEUKOCYTE ESTERASE UR QL STRIP: NEGATIVE
LIPASE SERPL-CCNC: 30 U/L (ref 4–60)
LYMPHOCYTES # BLD AUTO: 3.2 K/UL (ref 1–4.8)
LYMPHOCYTES NFR BLD: 29.3 % (ref 18–48)
MCH RBC QN AUTO: 21.4 PG (ref 27–31)
MCHC RBC AUTO-ENTMCNC: 31.2 G/DL (ref 32–36)
MCV RBC AUTO: 69 FL (ref 82–98)
MONOCYTES # BLD AUTO: 0.8 K/UL (ref 0.3–1)
MONOCYTES NFR BLD: 7.6 % (ref 4–15)
NEUTROPHILS # BLD AUTO: 6.4 K/UL (ref 1.8–7.7)
NEUTROPHILS NFR BLD: 59.1 % (ref 38–73)
NITRITE UR QL STRIP: NEGATIVE
NRBC BLD-RTO: 0 /100 WBC
PH UR STRIP: 6 [PH] (ref 5–8)
PLATELET # BLD AUTO: 699 K/UL (ref 150–450)
PMV BLD AUTO: 9 FL (ref 9.2–12.9)
POTASSIUM SERPL-SCNC: 3.6 MMOL/L (ref 3.5–5.1)
PROT SERPL-MCNC: 7.6 G/DL (ref 6–8.4)
PROT UR QL STRIP: NEGATIVE
RBC # BLD AUTO: 3.84 M/UL (ref 4.6–6.2)
SODIUM SERPL-SCNC: 139 MMOL/L (ref 136–145)
SP GR UR STRIP: 1.02 (ref 1–1.03)
URN SPEC COLLECT METH UR: NORMAL
UROBILINOGEN UR STRIP-ACNC: 1 EU/DL
WBC # BLD AUTO: 10.8 K/UL (ref 3.9–12.7)

## 2023-05-23 PROCEDURE — 74176 CT ABDOMEN PELVIS WITHOUT CONTRAST: ICD-10-PCS | Mod: 26,,, | Performed by: RADIOLOGY

## 2023-05-23 PROCEDURE — 25000003 PHARM REV CODE 250: Performed by: EMERGENCY MEDICINE

## 2023-05-23 PROCEDURE — 87389 HIV-1 AG W/HIV-1&-2 AB AG IA: CPT | Performed by: EMERGENCY MEDICINE

## 2023-05-23 PROCEDURE — 85025 COMPLETE CBC W/AUTO DIFF WBC: CPT | Performed by: EMERGENCY MEDICINE

## 2023-05-23 PROCEDURE — 96375 TX/PRO/DX INJ NEW DRUG ADDON: CPT

## 2023-05-23 PROCEDURE — 63600175 PHARM REV CODE 636 W HCPCS: Performed by: EMERGENCY MEDICINE

## 2023-05-23 PROCEDURE — 81003 URINALYSIS AUTO W/O SCOPE: CPT | Performed by: EMERGENCY MEDICINE

## 2023-05-23 PROCEDURE — 83690 ASSAY OF LIPASE: CPT | Performed by: EMERGENCY MEDICINE

## 2023-05-23 PROCEDURE — G0378 HOSPITAL OBSERVATION PER HR: HCPCS

## 2023-05-23 PROCEDURE — 86803 HEPATITIS C AB TEST: CPT | Performed by: EMERGENCY MEDICINE

## 2023-05-23 PROCEDURE — 74176 CT ABD & PELVIS W/O CONTRAST: CPT | Mod: TC

## 2023-05-23 PROCEDURE — 99285 EMERGENCY DEPT VISIT HI MDM: CPT | Mod: 25

## 2023-05-23 PROCEDURE — 80053 COMPREHEN METABOLIC PANEL: CPT | Performed by: EMERGENCY MEDICINE

## 2023-05-23 PROCEDURE — 74176 CT ABD & PELVIS W/O CONTRAST: CPT | Mod: 26,,, | Performed by: RADIOLOGY

## 2023-05-23 PROCEDURE — 96376 TX/PRO/DX INJ SAME DRUG ADON: CPT

## 2023-05-23 PROCEDURE — 96361 HYDRATE IV INFUSION ADD-ON: CPT

## 2023-05-23 PROCEDURE — 96365 THER/PROPH/DIAG IV INF INIT: CPT

## 2023-05-23 RX ORDER — HYDROMORPHONE HYDROCHLORIDE 1 MG/ML
1 INJECTION, SOLUTION INTRAMUSCULAR; INTRAVENOUS; SUBCUTANEOUS
Status: COMPLETED | OUTPATIENT
Start: 2023-05-23 | End: 2023-05-23

## 2023-05-23 RX ORDER — PIPERACILLIN SODIUM, TAZOBACTAM SODIUM 3; .375 G/15ML; G/15ML
INJECTION, POWDER, LYOPHILIZED, FOR SOLUTION INTRAVENOUS
Status: DISPENSED
Start: 2023-05-23 | End: 2023-05-24

## 2023-05-23 RX ORDER — KETOROLAC TROMETHAMINE 30 MG/ML
30 INJECTION, SOLUTION INTRAMUSCULAR; INTRAVENOUS
Status: COMPLETED | OUTPATIENT
Start: 2023-05-23 | End: 2023-05-23

## 2023-05-23 RX ORDER — HYDROMORPHONE HYDROCHLORIDE 1 MG/ML
INJECTION, SOLUTION INTRAMUSCULAR; INTRAVENOUS; SUBCUTANEOUS
Status: DISPENSED
Start: 2023-05-23 | End: 2023-05-24

## 2023-05-23 RX ORDER — ONDANSETRON 2 MG/ML
INJECTION INTRAMUSCULAR; INTRAVENOUS
Status: DISPENSED
Start: 2023-05-23 | End: 2023-05-24

## 2023-05-23 RX ORDER — HYDROCODONE BITARTRATE AND ACETAMINOPHEN 10; 325 MG/1; MG/1
1 TABLET ORAL
Status: COMPLETED | OUTPATIENT
Start: 2023-05-23 | End: 2023-05-23

## 2023-05-23 RX ORDER — HYDROCODONE BITARTRATE AND ACETAMINOPHEN 10; 325 MG/1; MG/1
TABLET ORAL
Status: DISPENSED
Start: 2023-05-23 | End: 2023-05-24

## 2023-05-23 RX ORDER — ONDANSETRON 2 MG/ML
4 INJECTION INTRAMUSCULAR; INTRAVENOUS
Status: COMPLETED | OUTPATIENT
Start: 2023-05-23 | End: 2023-05-23

## 2023-05-23 RX ORDER — KETOROLAC TROMETHAMINE 30 MG/ML
INJECTION, SOLUTION INTRAMUSCULAR; INTRAVENOUS
Status: DISPENSED
Start: 2023-05-23 | End: 2023-05-24

## 2023-05-23 RX ADMIN — ONDANSETRON 4 MG: 2 INJECTION INTRAMUSCULAR; INTRAVENOUS at 09:05

## 2023-05-23 RX ADMIN — HYDROCODONE BITARTRATE AND ACETAMINOPHEN 1 TABLET: 10; 325 TABLET ORAL at 08:05

## 2023-05-23 RX ADMIN — KETOROLAC TROMETHAMINE 30 MG: 30 INJECTION, SOLUTION INTRAMUSCULAR; INTRAVENOUS at 08:05

## 2023-05-23 RX ADMIN — HYDROMORPHONE HYDROCHLORIDE 1 MG: 1 INJECTION, SOLUTION INTRAMUSCULAR; INTRAVENOUS; SUBCUTANEOUS at 09:05

## 2023-05-23 RX ADMIN — ONDANSETRON 4 MG: 2 INJECTION INTRAMUSCULAR; INTRAVENOUS at 08:05

## 2023-05-23 RX ADMIN — SODIUM CHLORIDE 1000 ML: 9 INJECTION, SOLUTION INTRAVENOUS at 08:05

## 2023-05-23 RX ADMIN — PIPERACILLIN AND TAZOBACTAM 3.38 G: 3; .375 INJECTION, POWDER, LYOPHILIZED, FOR SOLUTION INTRAVENOUS; PARENTERAL at 09:05

## 2023-05-23 NOTE — Clinical Note
Diagnosis: Appendicitis [340141]   Future Attending Provider: JENS LOPEZ [[id number]]   Admitting Provider:: JENS LOPEZ [[id number]]

## 2023-05-24 VITALS
HEART RATE: 48 BPM | OXYGEN SATURATION: 97 % | RESPIRATION RATE: 14 BRPM | HEIGHT: 75 IN | WEIGHT: 265 LBS | DIASTOLIC BLOOD PRESSURE: 81 MMHG | TEMPERATURE: 97 F | BODY MASS INDEX: 32.95 KG/M2 | SYSTOLIC BLOOD PRESSURE: 116 MMHG

## 2023-05-24 DIAGNOSIS — E11.9 TYPE 2 DIABETES MELLITUS WITHOUT COMPLICATION: ICD-10-CM

## 2023-05-24 PROBLEM — K35.80 ACUTE APPENDICITIS: Status: ACTIVE | Noted: 2023-05-24

## 2023-05-24 PROBLEM — K50.00 TERMINAL ILEITIS WITHOUT COMPLICATION: Status: ACTIVE | Noted: 2023-05-24

## 2023-05-24 PROBLEM — I10 ESSENTIAL HYPERTENSION: Status: ACTIVE | Noted: 2023-05-24

## 2023-05-24 PROBLEM — J18.9 PNEUMONIA: Status: RESOLVED | Noted: 2021-09-17 | Resolved: 2023-05-24

## 2023-05-24 PROBLEM — K37 APPENDICITIS: Status: ACTIVE | Noted: 2023-05-24

## 2023-05-24 PROBLEM — D50.9 MICROCYTIC ANEMIA: Status: ACTIVE | Noted: 2023-05-24

## 2023-05-24 LAB
ABO + RH BLD: NORMAL
ANION GAP SERPL CALC-SCNC: 6 MMOL/L (ref 8–16)
APTT PPP: 30 SEC (ref 21–32)
BASOPHILS # BLD AUTO: 0.13 K/UL (ref 0–0.2)
BASOPHILS NFR BLD: 1.3 % (ref 0–1.9)
BLD GP AB SCN CELLS X3 SERPL QL: NORMAL
BUN SERPL-MCNC: 10 MG/DL (ref 6–20)
CALCIUM SERPL-MCNC: 8.4 MG/DL (ref 8.7–10.5)
CHLORIDE SERPL-SCNC: 106 MMOL/L (ref 95–110)
CO2 SERPL-SCNC: 26 MMOL/L (ref 23–29)
CREAT SERPL-MCNC: 1 MG/DL (ref 0.5–1.4)
DIFFERENTIAL METHOD: ABNORMAL
EOSINOPHIL # BLD AUTO: 0.4 K/UL (ref 0–0.5)
EOSINOPHIL NFR BLD: 3.9 % (ref 0–8)
ERYTHROCYTE [DISTWIDTH] IN BLOOD BY AUTOMATED COUNT: 17.7 % (ref 11.5–14.5)
EST. GFR  (NO RACE VARIABLE): >60 ML/MIN/1.73 M^2
FOLATE SERPL-MCNC: 4.1 NG/ML (ref 4–24)
GLUCOSE SERPL-MCNC: 86 MG/DL (ref 70–110)
HCT VFR BLD AUTO: 24.5 % (ref 40–54)
HCV AB SERPL QL IA: NORMAL
HGB BLD-MCNC: 7.5 G/DL (ref 14–18)
HIV 1+2 AB+HIV1 P24 AG SERPL QL IA: NORMAL
IMM GRANULOCYTES # BLD AUTO: 0.01 K/UL (ref 0–0.04)
IMM GRANULOCYTES NFR BLD AUTO: 0.1 % (ref 0–0.5)
INR PPP: 1.1 (ref 0.8–1.2)
IRON SERPL-MCNC: 16 UG/DL (ref 45–160)
LYMPHOCYTES # BLD AUTO: 4.2 K/UL (ref 1–4.8)
LYMPHOCYTES NFR BLD: 41 % (ref 18–48)
MAGNESIUM SERPL-MCNC: 1.7 MG/DL (ref 1.6–2.6)
MCH RBC QN AUTO: 21 PG (ref 27–31)
MCHC RBC AUTO-ENTMCNC: 30.6 G/DL (ref 32–36)
MCV RBC AUTO: 69 FL (ref 82–98)
MONOCYTES # BLD AUTO: 0.9 K/UL (ref 0.3–1)
MONOCYTES NFR BLD: 9.2 % (ref 4–15)
NEUTROPHILS # BLD AUTO: 4.5 K/UL (ref 1.8–7.7)
NEUTROPHILS NFR BLD: 44.5 % (ref 38–73)
NRBC BLD-RTO: 0 /100 WBC
PHOSPHATE SERPL-MCNC: 3.7 MG/DL (ref 2.7–4.5)
PLATELET # BLD AUTO: 644 K/UL (ref 150–450)
PMV BLD AUTO: 8.9 FL (ref 9.2–12.9)
POTASSIUM SERPL-SCNC: 3.5 MMOL/L (ref 3.5–5.1)
PROTHROMBIN TIME: 11.4 SEC (ref 9–12.5)
RBC # BLD AUTO: 3.57 M/UL (ref 4.6–6.2)
SATURATED IRON: 5 % (ref 20–50)
SODIUM SERPL-SCNC: 138 MMOL/L (ref 136–145)
SPECIMEN OUTDATE: NORMAL
TOTAL IRON BINDING CAPACITY: 309 UG/DL (ref 250–450)
TRANSFERRIN SERPL-MCNC: 209 MG/DL (ref 200–375)
VIT B12 SERPL-MCNC: 1045 PG/ML (ref 210–950)
WBC # BLD AUTO: 10.15 K/UL (ref 3.9–12.7)

## 2023-05-24 PROCEDURE — 83735 ASSAY OF MAGNESIUM: CPT | Performed by: INTERNAL MEDICINE

## 2023-05-24 PROCEDURE — 25500020 PHARM REV CODE 255: Performed by: STUDENT IN AN ORGANIZED HEALTH CARE EDUCATION/TRAINING PROGRAM

## 2023-05-24 PROCEDURE — 85610 PROTHROMBIN TIME: CPT | Performed by: INTERNAL MEDICINE

## 2023-05-24 PROCEDURE — 85730 THROMBOPLASTIN TIME PARTIAL: CPT | Performed by: INTERNAL MEDICINE

## 2023-05-24 PROCEDURE — G0378 HOSPITAL OBSERVATION PER HR: HCPCS

## 2023-05-24 PROCEDURE — 99223 1ST HOSP IP/OBS HIGH 75: CPT | Mod: 95,,, | Performed by: INTERNAL MEDICINE

## 2023-05-24 PROCEDURE — 93005 ELECTROCARDIOGRAM TRACING: CPT

## 2023-05-24 PROCEDURE — 99223 PR INITIAL HOSPITAL CARE,LEVL III: ICD-10-PCS | Mod: 95,,, | Performed by: INTERNAL MEDICINE

## 2023-05-24 PROCEDURE — 36415 COLL VENOUS BLD VENIPUNCTURE: CPT | Performed by: EMERGENCY MEDICINE

## 2023-05-24 PROCEDURE — A9698 NON-RAD CONTRAST MATERIALNOC: HCPCS | Performed by: STUDENT IN AN ORGANIZED HEALTH CARE EDUCATION/TRAINING PROGRAM

## 2023-05-24 PROCEDURE — 63600175 PHARM REV CODE 636 W HCPCS: Performed by: STUDENT IN AN ORGANIZED HEALTH CARE EDUCATION/TRAINING PROGRAM

## 2023-05-24 PROCEDURE — 82746 ASSAY OF FOLIC ACID SERUM: CPT | Performed by: INTERNAL MEDICINE

## 2023-05-24 PROCEDURE — 93010 ELECTROCARDIOGRAM REPORT: CPT | Mod: ,,, | Performed by: INTERNAL MEDICINE

## 2023-05-24 PROCEDURE — 63600175 PHARM REV CODE 636 W HCPCS: Performed by: INTERNAL MEDICINE

## 2023-05-24 PROCEDURE — 86900 BLOOD TYPING SEROLOGIC ABO: CPT | Performed by: INTERNAL MEDICINE

## 2023-05-24 PROCEDURE — 96365 THER/PROPH/DIAG IV INF INIT: CPT | Mod: 59

## 2023-05-24 PROCEDURE — 84466 ASSAY OF TRANSFERRIN: CPT | Performed by: INTERNAL MEDICINE

## 2023-05-24 PROCEDURE — 25000003 PHARM REV CODE 250: Performed by: INTERNAL MEDICINE

## 2023-05-24 PROCEDURE — 99204 PR OFFICE/OUTPT VISIT, NEW, LEVL IV, 45-59 MIN: ICD-10-PCS | Mod: ,,, | Performed by: STUDENT IN AN ORGANIZED HEALTH CARE EDUCATION/TRAINING PROGRAM

## 2023-05-24 PROCEDURE — 82607 VITAMIN B-12: CPT | Performed by: INTERNAL MEDICINE

## 2023-05-24 PROCEDURE — 96367 TX/PROPH/DG ADDL SEQ IV INF: CPT

## 2023-05-24 PROCEDURE — 93010 EKG 12-LEAD: ICD-10-PCS | Mod: ,,, | Performed by: INTERNAL MEDICINE

## 2023-05-24 PROCEDURE — 85025 COMPLETE CBC W/AUTO DIFF WBC: CPT | Performed by: INTERNAL MEDICINE

## 2023-05-24 PROCEDURE — 36415 COLL VENOUS BLD VENIPUNCTURE: CPT | Performed by: INTERNAL MEDICINE

## 2023-05-24 PROCEDURE — 80048 BASIC METABOLIC PNL TOTAL CA: CPT | Performed by: INTERNAL MEDICINE

## 2023-05-24 PROCEDURE — 84100 ASSAY OF PHOSPHORUS: CPT | Performed by: INTERNAL MEDICINE

## 2023-05-24 PROCEDURE — 63600175 PHARM REV CODE 636 W HCPCS: Performed by: EMERGENCY MEDICINE

## 2023-05-24 PROCEDURE — 96376 TX/PRO/DX INJ SAME DRUG ADON: CPT

## 2023-05-24 PROCEDURE — 99204 OFFICE O/P NEW MOD 45 MIN: CPT | Mod: ,,, | Performed by: STUDENT IN AN ORGANIZED HEALTH CARE EDUCATION/TRAINING PROGRAM

## 2023-05-24 PROCEDURE — 96361 HYDRATE IV INFUSION ADD-ON: CPT

## 2023-05-24 PROCEDURE — 96366 THER/PROPH/DIAG IV INF ADDON: CPT

## 2023-05-24 PROCEDURE — 25000003 PHARM REV CODE 250: Performed by: STUDENT IN AN ORGANIZED HEALTH CARE EDUCATION/TRAINING PROGRAM

## 2023-05-24 RX ORDER — LISINOPRIL 2.5 MG/1
2.5 TABLET ORAL DAILY
Status: DISCONTINUED | OUTPATIENT
Start: 2023-05-24 | End: 2023-05-24

## 2023-05-24 RX ORDER — PROCHLORPERAZINE EDISYLATE 5 MG/ML
5 INJECTION INTRAMUSCULAR; INTRAVENOUS EVERY 6 HOURS PRN
Status: DISCONTINUED | OUTPATIENT
Start: 2023-05-24 | End: 2023-05-24 | Stop reason: HOSPADM

## 2023-05-24 RX ORDER — METRONIDAZOLE 500 MG/1
500 TABLET ORAL EVERY 8 HOURS
Qty: 30 TABLET | Refills: 0 | Status: ON HOLD | OUTPATIENT
Start: 2023-05-24 | End: 2023-05-28

## 2023-05-24 RX ORDER — HYDROMORPHONE HYDROCHLORIDE 1 MG/ML
1 INJECTION, SOLUTION INTRAMUSCULAR; INTRAVENOUS; SUBCUTANEOUS
Status: COMPLETED | OUTPATIENT
Start: 2023-05-24 | End: 2023-05-24

## 2023-05-24 RX ORDER — CIPROFLOXACIN 500 MG/1
500 TABLET ORAL EVERY 12 HOURS
Qty: 20 TABLET | Refills: 0 | Status: ON HOLD | OUTPATIENT
Start: 2023-05-24 | End: 2023-05-28

## 2023-05-24 RX ORDER — NALOXONE HCL 0.4 MG/ML
0.02 VIAL (ML) INJECTION
Status: DISCONTINUED | OUTPATIENT
Start: 2023-05-24 | End: 2023-05-24 | Stop reason: HOSPADM

## 2023-05-24 RX ORDER — OXYCODONE AND ACETAMINOPHEN 10; 325 MG/1; MG/1
1 TABLET ORAL EVERY 6 HOURS PRN
Qty: 20 TABLET | Refills: 0 | Status: ON HOLD | OUTPATIENT
Start: 2023-05-24 | End: 2023-05-28

## 2023-05-24 RX ORDER — SIMETHICONE 80 MG
1 TABLET,CHEWABLE ORAL 4 TIMES DAILY PRN
Status: DISCONTINUED | OUTPATIENT
Start: 2023-05-24 | End: 2023-05-24 | Stop reason: HOSPADM

## 2023-05-24 RX ORDER — SODIUM CHLORIDE, SODIUM LACTATE, POTASSIUM CHLORIDE, CALCIUM CHLORIDE 600; 310; 30; 20 MG/100ML; MG/100ML; MG/100ML; MG/100ML
INJECTION, SOLUTION INTRAVENOUS CONTINUOUS
Status: DISCONTINUED | OUTPATIENT
Start: 2023-05-24 | End: 2023-05-24 | Stop reason: HOSPADM

## 2023-05-24 RX ORDER — IPRATROPIUM BROMIDE AND ALBUTEROL SULFATE 2.5; .5 MG/3ML; MG/3ML
3 SOLUTION RESPIRATORY (INHALATION) EVERY 4 HOURS PRN
Status: DISCONTINUED | OUTPATIENT
Start: 2023-05-24 | End: 2023-05-24 | Stop reason: HOSPADM

## 2023-05-24 RX ORDER — ATORVASTATIN CALCIUM 10 MG/1
10 TABLET, FILM COATED ORAL NIGHTLY
Status: DISCONTINUED | OUTPATIENT
Start: 2023-05-25 | End: 2023-05-24 | Stop reason: HOSPADM

## 2023-05-24 RX ORDER — HYDROMORPHONE HYDROCHLORIDE 1 MG/ML
0.5 INJECTION, SOLUTION INTRAMUSCULAR; INTRAVENOUS; SUBCUTANEOUS EVERY 4 HOURS PRN
Status: DISCONTINUED | OUTPATIENT
Start: 2023-05-24 | End: 2023-05-24 | Stop reason: HOSPADM

## 2023-05-24 RX ORDER — SODIUM CHLORIDE AND POTASSIUM CHLORIDE 150; 900 MG/100ML; MG/100ML
INJECTION, SOLUTION INTRAVENOUS CONTINUOUS
Status: DISCONTINUED | OUTPATIENT
Start: 2023-05-24 | End: 2023-05-24

## 2023-05-24 RX ORDER — ONDANSETRON 2 MG/ML
4 INJECTION INTRAMUSCULAR; INTRAVENOUS EVERY 8 HOURS PRN
Status: DISCONTINUED | OUTPATIENT
Start: 2023-05-24 | End: 2023-05-24 | Stop reason: HOSPADM

## 2023-05-24 RX ORDER — HEPARIN SODIUM 5000 [USP'U]/ML
5000 INJECTION, SOLUTION INTRAVENOUS; SUBCUTANEOUS EVERY 8 HOURS
Status: DISCONTINUED | OUTPATIENT
Start: 2023-05-24 | End: 2023-05-24 | Stop reason: HOSPADM

## 2023-05-24 RX ORDER — HYDROMORPHONE HYDROCHLORIDE 1 MG/ML
1 INJECTION, SOLUTION INTRAMUSCULAR; INTRAVENOUS; SUBCUTANEOUS EVERY 4 HOURS PRN
Status: DISCONTINUED | OUTPATIENT
Start: 2023-05-24 | End: 2023-05-24 | Stop reason: HOSPADM

## 2023-05-24 RX ORDER — MAG HYDROX/ALUMINUM HYD/SIMETH 200-200-20
30 SUSPENSION, ORAL (FINAL DOSE FORM) ORAL 4 TIMES DAILY PRN
Status: DISCONTINUED | OUTPATIENT
Start: 2023-05-24 | End: 2023-05-24 | Stop reason: HOSPADM

## 2023-05-24 RX ORDER — ACETAMINOPHEN 325 MG/1
650 TABLET ORAL EVERY 4 HOURS PRN
Status: DISCONTINUED | OUTPATIENT
Start: 2023-05-24 | End: 2023-05-24 | Stop reason: HOSPADM

## 2023-05-24 RX ORDER — PREDNISONE 10 MG/1
TABLET ORAL
Qty: 70 TABLET | Refills: 0 | Status: ON HOLD | OUTPATIENT
Start: 2023-05-24 | End: 2023-05-28

## 2023-05-24 RX ORDER — POLYETHYLENE GLYCOL 3350 17 G/17G
17 POWDER, FOR SOLUTION ORAL DAILY
Status: DISCONTINUED | OUTPATIENT
Start: 2023-05-24 | End: 2023-05-24 | Stop reason: HOSPADM

## 2023-05-24 RX ORDER — AMOXICILLIN 250 MG
1 CAPSULE ORAL 2 TIMES DAILY PRN
Status: DISCONTINUED | OUTPATIENT
Start: 2023-05-24 | End: 2023-05-24 | Stop reason: HOSPADM

## 2023-05-24 RX ORDER — TALC
6 POWDER (GRAM) TOPICAL NIGHTLY PRN
Status: DISCONTINUED | OUTPATIENT
Start: 2023-05-24 | End: 2023-05-24 | Stop reason: HOSPADM

## 2023-05-24 RX ORDER — SODIUM CHLORIDE 0.9 % (FLUSH) 0.9 %
10 SYRINGE (ML) INJECTION EVERY 12 HOURS PRN
Status: DISCONTINUED | OUTPATIENT
Start: 2023-05-24 | End: 2023-05-24 | Stop reason: HOSPADM

## 2023-05-24 RX ADMIN — HYDROMORPHONE HYDROCHLORIDE 1 MG: 1 INJECTION, SOLUTION INTRAMUSCULAR; INTRAVENOUS; SUBCUTANEOUS at 12:05

## 2023-05-24 RX ADMIN — IOHEXOL 100 ML: 350 INJECTION, SOLUTION INTRAVENOUS at 09:05

## 2023-05-24 RX ADMIN — SODIUM CHLORIDE AND POTASSIUM CHLORIDE: .9; .15 SOLUTION INTRAVENOUS at 04:05

## 2023-05-24 RX ADMIN — Medication 900 ML: at 09:05

## 2023-05-24 RX ADMIN — SODIUM CHLORIDE, POTASSIUM CHLORIDE, SODIUM LACTATE AND CALCIUM CHLORIDE: 600; 310; 30; 20 INJECTION, SOLUTION INTRAVENOUS at 09:05

## 2023-05-24 RX ADMIN — HYDROMORPHONE HYDROCHLORIDE 1 MG: 1 INJECTION, SOLUTION INTRAMUSCULAR; INTRAVENOUS; SUBCUTANEOUS at 08:05

## 2023-05-24 RX ADMIN — HYDROMORPHONE HYDROCHLORIDE 0.5 MG: 1 INJECTION, SOLUTION INTRAMUSCULAR; INTRAVENOUS; SUBCUTANEOUS at 11:05

## 2023-05-24 RX ADMIN — ERTAPENEM SODIUM 1 G: 1 INJECTION, POWDER, LYOPHILIZED, FOR SOLUTION INTRAMUSCULAR; INTRAVENOUS at 02:05

## 2023-05-24 RX ADMIN — PIPERACILLIN AND TAZOBACTAM 4.5 G: 4; .5 INJECTION, POWDER, LYOPHILIZED, FOR SOLUTION INTRAVENOUS; PARENTERAL at 06:05

## 2023-05-24 RX ADMIN — HYDROMORPHONE HYDROCHLORIDE 1 MG: 1 INJECTION, SOLUTION INTRAMUSCULAR; INTRAVENOUS; SUBCUTANEOUS at 03:05

## 2023-05-24 NOTE — H&P
"[facility]  History & Physical    Patient Name: Julio Caldwell  MRN: [id number]  Admission Date: 5/23/2023  Attending Physician: Miguel Hong MD   Primary Care Provider: Amira Knutson NP         Patient information was obtained from patient, past medical records and ER records.       Subjective:     Principal Problem:Acute appendicitis    Chief Complaint:   Chief Complaint   Patient presents with    Abdominal Pain     RLQ and epigastric abdominal pain x 4-5 months.  +constipation.        HPI:   Mr. Caldwell is a 46yo man with a past medical history of DM2 and HTN, though he states he has not been taking any of his medications.    He states that about 2 days ago he began to develop some RLQ stabbing abdominal pain, "like a burst of pain with needles" to his RLQ.  This was not associated with fever, chills, diarrhea or N/V.  He has had some constipation, last BM being 5 days ago.   He has not seen anyone for the pain thus far.  He went to work yesterday and his boss noted him to be uncomfortable and asked about his problems. He realized something was not right and send him home with instructions to go to the ED.    In the ED his VS were /64   Pulse 63   Temp 98.5 °F (36.9 °C) (Oral)   Resp 16   Ht 6' 3" (1.905 m)   Wt 120.2 kg (265 lb)   SpO2 100%   BMI 33.12 kg/m².  Labs showed WBC 11, Hg 8.2, , Cr 1.1, UA negative.    CT abd and pelvis without contrast showed 1. No evidence of obstructive uropathy.  2. Right lower quadrant inflammatory changes are concerning for acute appendicitis.     In the ED he was treated with:  Medications   piperacillin-tazobactam (ZOSYN) 3.375 gram injection (has no administration in time range)   sodium chloride 0.9% bolus 1,000 mL 1,000 mL (0 mLs Intravenous Stopped 5/23/23 2127)   ketorolac injection 30 mg (30 mg Intravenous Given 5/23/23 2002)   ondansetron injection 4 mg (4 mg Intravenous Given 5/23/23 2002) "   HYDROcodone-acetaminophen  mg per tablet 1 tablet (1 tablet Oral Given 5/23/23 2002)   piperacillin-tazobactam (ZOSYN) 3.375 g in dextrose 5 % in water (D5W) 5 % 50 mL IVPB (MB+) (0 g Intravenous Stopped 5/23/23 2220)   HYDROmorphone injection 1 mg (1 mg Intravenous Given 5/23/23 2127)   ondansetron injection 4 mg (4 mg Intravenous Given 5/23/23 2127)   HYDROmorphone injection 1 mg (1 mg Intravenous Given 5/24/23 0006)               Past Medical History:   Diagnosis Date    Diabetes mellitus, type 2 05/2020    Hypertension        Past Surgical History:   Procedure Laterality Date    EYE SURGERY Right     LUMBAR DISC SURGERY  2005       Review of patient's allergies indicates:  No Known Allergies    No current facility-administered medications on file prior to encounter.     Current Outpatient Medications on File Prior to Encounter   Medication Sig    atorvastatin (LIPITOR) 10 MG tablet Take 1 tablet (10 mg total) by mouth once daily.    ciprofloxacin HCl (CIPRO) 250 MG tablet Take 250 mg by mouth every 12 (twelve) hours.    cyclobenzaprine (FLEXERIL) 10 MG tablet Take 10 mg by mouth 3 (three) times daily as needed.    doxycycline (VIBRA-TABS) 100 MG tablet Take 100 mg by mouth 2 (two) times daily.    HYDROcodone-acetaminophen (NORCO)  mg per tablet Take 1 tablet by mouth every 6 (six) hours as needed for Pain (breathrough pain).    ketorolac (TORADOL) 10 mg tablet Take 1 tablet (10 mg total) by mouth every 6 (six) hours as needed for Pain.    lisinopriL (PRINIVIL,ZESTRIL) 2.5 MG tablet Take 1 tablet (2.5 mg total) by mouth once daily.    metFORMIN (GLUCOPHAGE) 1000 MG tablet Take 1 tablet (1,000 mg total) by mouth 2 (two) times daily with meals.    metroNIDAZOLE (FLAGYL) 500 MG tablet Take 500 mg by mouth every 8 (eight) hours.    ondansetron (ZOFRAN-ODT) 4 MG TbDL Take 1 tablet (4 mg total) by mouth every 6 (six) hours as needed (Nausea).    ondansetron (ZOFRAN-ODT) 4 MG TbDL Take 1  tablet (4 mg total) by mouth every 6 (six) hours as needed.     Family History       Problem Relation (Age of Onset)    Diabetes Mother, Maternal Grandfather    No Known Problems Father          Tobacco Use    Smoking status: Every Day     Packs/day: 0.50     Years: 23.00     Pack years: 11.50     Types: Cigarettes    Smokeless tobacco: Never   Substance and Sexual Activity    Alcohol use: Yes     Alcohol/week: 48.0 standard drinks     Types: 48 Cans of beer per week    Drug use: Yes     Types: Marijuana    Sexual activity: Yes     Review of Systems   Constitutional:  Positive for appetite change and diaphoresis. Negative for chills, fatigue and fever.   Respiratory:  Negative for shortness of breath.    Cardiovascular:  Negative for chest pain.   Gastrointestinal:  Positive for abdominal distention, abdominal pain and constipation. Negative for diarrhea, nausea and vomiting.   Genitourinary:  Negative for dysuria.   Musculoskeletal:  Negative for myalgias.   Neurological:  Negative for headaches.   Hematological:  Does not bruise/bleed easily.   Objective:     Vital Signs (Most Recent):  Temp: 98.5 °F (36.9 °C) (05/23/23 1938)  Pulse: 63 (05/23/23 1938)  Resp: 16 (05/24/23 0006)  BP: 130/64 (05/23/23 2126)  SpO2: 100 % (05/23/23 2126) Vital Signs (24h Range):  Temp:  [98.5 °F (36.9 °C)] 98.5 °F (36.9 °C)  Pulse:  [63] 63  Resp:  [16-20] 16  SpO2:  [100 %] 100 %  BP: (128-130)/(64-85) 130/64     Weight: 120.2 kg (265 lb)  Body mass index is 33.12 kg/m².     Physical Exam  Constitutional:       General: He is awake. He is not in acute distress.     Appearance: He is not ill-appearing, toxic-appearing or diaphoretic.   HENT:      Head: Normocephalic and atraumatic.   Pulmonary:      Effort: No tachypnea or bradypnea.   Abdominal:      General: Abdomen is protuberant.      Comments: Patient lifted up his gown to press on his belly on camera.  He noted continued RLQ pain to self-palpation   Genitourinary:      Comments: No rodriguez in place  Neurological:      General: No focal deficit present.      Mental Status: He is alert and oriented to person, place, and time.      GCS: GCS eye subscore is 4. GCS verbal subscore is 5. GCS motor subscore is 6.   Psychiatric:         Attention and Perception: Attention and perception normal.         Mood and Affect: Mood and affect normal.         Cognition and Memory: Cognition and memory normal.              Significant Labs: All pertinent labs within the past 24 hours have been reviewed.  Recent Results (from the past 24 hour(s))   Complete Blood Count (CBC)    Collection Time: 05/23/23  8:00 PM   Result Value Ref Range    WBC 10.80 3.90 - 12.70 K/uL    RBC 3.84 (L) 4.60 - 6.20 M/uL    Hemoglobin 8.2 (L) 14.0 - 18.0 g/dL    Hematocrit 26.3 (L) 40.0 - 54.0 %    MCV 69 (L) 82 - 98 fL    MCH 21.4 (L) 27.0 - 31.0 pg    MCHC 31.2 (L) 32.0 - 36.0 g/dL    RDW 17.8 (H) 11.5 - 14.5 %    Platelets 699 (H) 150 - 450 K/uL    MPV 9.0 (L) 9.2 - 12.9 fL    Immature Granulocytes 0.3 0.0 - 0.5 %    Gran # (ANC) 6.4 1.8 - 7.7 K/uL    Immature Grans (Abs) 0.03 0.00 - 0.04 K/uL    Lymph # 3.2 1.0 - 4.8 K/uL    Mono # 0.8 0.3 - 1.0 K/uL    Eos # 0.3 0.0 - 0.5 K/uL    Baso # 0.13 0.00 - 0.20 K/uL    nRBC 0 0 /100 WBC    Gran % 59.1 38.0 - 73.0 %    Lymph % 29.3 18.0 - 48.0 %    Mono % 7.6 4.0 - 15.0 %    Eosinophil % 2.5 0.0 - 8.0 %    Basophil % 1.2 0.0 - 1.9 %    Differential Method Automated    Comprehensive Metabolic Panel (CMP)    Collection Time: 05/23/23  8:00 PM   Result Value Ref Range    Sodium 139 136 - 145 mmol/L    Potassium 3.6 3.5 - 5.1 mmol/L    Chloride 105 95 - 110 mmol/L    CO2 27 23 - 29 mmol/L    Glucose 89 70 - 110 mg/dL    BUN 10 6 - 20 mg/dL    Creatinine 1.1 0.5 - 1.4 mg/dL    Calcium 9.5 8.7 - 10.5 mg/dL    Total Protein 7.6 6.0 - 8.4 g/dL    Albumin 3.6 3.5 - 5.2 g/dL    Total Bilirubin 0.6 0.1 - 1.0 mg/dL    Alkaline Phosphatase 72 55 - 135 U/L    AST 23 10 - 40 U/L    ALT 16 10  - 44 U/L    Anion Gap 7 (L) 8 - 16 mmol/L    eGFR >60.0 >60 mL/min/1.73 m^2   Lipase    Collection Time: 05/23/23  8:00 PM   Result Value Ref Range    Lipase 30 4 - 60 U/L   Urinalysis, Reflex to Urine Culture Urine, Clean Catch    Collection Time: 05/23/23 10:40 PM    Specimen: Urine, Clean Catch   Result Value Ref Range    Specimen UA Urine, Clean Catch     Color, UA Yellow Yellow, Straw, Veena    Appearance, UA Clear Clear    pH, UA 6.0 5.0 - 8.0    Specific Gravity, UA 1.025 1.005 - 1.030    Protein, UA Negative Negative    Glucose, UA Negative Negative    Ketones, UA Negative Negative    Bilirubin (UA) Negative Negative    Occult Blood UA Negative Negative    Nitrite, UA Negative Negative    Urobilinogen, UA 1.0 Negative EU/dL    Leukocytes, UA Negative Negative   Type & Screen    Collection Time: 05/24/23  1:30 AM   Result Value Ref Range    Group & Rh B POS     Indirect Leoncio NEG     Specimen Outdate 05/27/2023 23:59          Significant Imaging: I have reviewed all pertinent imaging results/findings within the past 24 hours.    Assessment/Plan:     * Acute appendicitis  Dr. Marquez with Gen Surg was notified by ED Dr. Padron  CT abdomen showed right lower quadrant inflammatory changes are concerning for acute appendicitis.   NPO with IV fluids  Pain control  Zosyn 4.5g iv q8 hours      Type 2 diabetes mellitus without complication, without long-term current use of insulin  NPO for surgery  Started low dose SSI protocol  He is not on home meds (not taking)      Essential hypertension  Restart home meds:    lisinopriL tablet 2.5 mg, 2.5 mg, Oral, Daily     Microcytic anemia  Ordered B12, folate, Fe studies  If Fe deficiency, outpatient follow up with GI        VTE Risk Mitigation (From admission, onward)         Ordered     heparin (porcine) injection 5,000 Units  Every 8 hours         05/24/23 0119     IP VTE HIGH RISK PATIENT  Once         05/24/23 0119     Place sequential compression device  Until  discontinued         05/24/23 0119     Place DINA hose  Until discontinued         05/24/23 0119                     The attending portion of this evaluation, treatment, and documentation was performed per SEBASTIAN Meyer MD via Telemedicine AudioVisual using the secure [employer] software platform with 2 way audio/video. The provider was located off-site and the patient is located in the hospital. The aforementioned video software was utilized to document the relevant history and physical exam    On 05/24/2023, patient should be placed in hospital observation services under my care.        SEBASTIAN Meyer MD  Department of Hospital Medicine   [place] - Emergency Dept

## 2023-05-24 NOTE — ED PROVIDER NOTES
Encounter Date: 5/23/2023       History     Chief Complaint   Patient presents with    Abdominal Pain     RLQ and epigastric abdominal pain x 4-5 months.  +constipation.     Pt here with RLQ pain the past 2days. Also with epigastric pain off and on for 4-5mos and hurting now. No back pain. No vomiting or diarrhea. He does report constipation and poor appetite. No dysuria or hematuria. No testicular pain. RLQ pain agg by movement. Pain 9/10. Pt appears 1/10.    The history is provided by the patient.   Review of patient's allergies indicates:  No Known Allergies  Past Medical History:   Diagnosis Date    Diabetes mellitus, type 2 05/2020    Hypertension      Past Surgical History:   Procedure Laterality Date    EYE SURGERY Right     LUMBAR DISC SURGERY  2005     Family History   Problem Relation Age of Onset    Diabetes Mother     Diabetes Maternal Grandfather     No Known Problems Father      Social History     Tobacco Use    Smoking status: Every Day     Packs/day: 0.50     Years: 23.00     Pack years: 11.50     Types: Cigarettes    Smokeless tobacco: Never   Substance Use Topics    Alcohol use: Yes     Alcohol/week: 48.0 standard drinks     Types: 48 Cans of beer per week    Drug use: Yes     Types: Marijuana     Review of Systems   Constitutional:  Positive for appetite change.   Gastrointestinal:  Positive for abdominal pain and constipation. Negative for anal bleeding and blood in stool.        No melena   All other systems reviewed and are negative.    Physical Exam     Initial Vitals   BP Pulse Resp Temp SpO2   05/23/23 1941 05/23/23 1938 05/23/23 1938 05/23/23 1938 05/23/23 1938   128/85 63 20 98.5 °F (36.9 °C) 100 %      MAP       --                Physical Exam    Nursing note and vitals reviewed.  Constitutional: He appears well-developed and well-nourished. He is not diaphoretic. No distress.   HENT:   Mouth/Throat: Oropharynx is clear and moist.   Eyes: No scleral icterus.   Neck: Neck supple.   Normal  range of motion.  Cardiovascular:  Normal rate, regular rhythm, normal heart sounds and intact distal pulses.           Pulmonary/Chest: Breath sounds normal. He exhibits no tenderness.   Abdominal: Abdomen is soft. Bowel sounds are normal. He exhibits no distension and no mass. There is abdominal tenderness.   Mod ttp RLQ and epigastric area. Nonsurgical. No CVAT There is guarding. There is no rebound.   Musculoskeletal:         General: No tenderness or edema. Normal range of motion.      Cervical back: Normal range of motion and neck supple.     Neurological: He is alert and oriented to person, place, and time. GCS score is 15. GCS eye subscore is 4. GCS verbal subscore is 5. GCS motor subscore is 6.   Skin: Skin is warm and dry. Capillary refill takes less than 2 seconds. No rash noted. No erythema. No pallor.   Psychiatric: He has a normal mood and affect.       ED Course   Procedures  Labs Reviewed   CBC W/ AUTO DIFFERENTIAL - Abnormal; Notable for the following components:       Result Value    RBC 3.84 (*)     Hemoglobin 8.2 (*)     Hematocrit 26.3 (*)     MCV 69 (*)     MCH 21.4 (*)     MCHC 31.2 (*)     RDW 17.8 (*)     Platelets 699 (*)     MPV 9.0 (*)     All other components within normal limits    Narrative:     Release to patient->Immediate   COMPREHENSIVE METABOLIC PANEL - Abnormal; Notable for the following components:    Anion Gap 7 (*)     All other components within normal limits    Narrative:     Release to patient->Immediate   LIPASE    Narrative:     Release to patient->Immediate   HIV 1 / 2 ANTIBODY   HEPATITIS C ANTIBODY   URINALYSIS, REFLEX TO URINE CULTURE          Imaging Results              CT Abdomen Pelvis  Without Contrast (In process)                      Medications   piperacillin-tazobactam (ZOSYN) 3.375 g in dextrose 5 % in water (D5W) 5 % 50 mL IVPB (MB+) (has no administration in time range)   HYDROmorphone injection 1 mg (has no administration in time range)   ondansetron  injection 4 mg (has no administration in time range)   sodium chloride 0.9% bolus 1,000 mL 1,000 mL (1,000 mLs Intravenous New Bag 5/23/23 2001)   ketorolac injection 30 mg (30 mg Intravenous Given 5/23/23 2002)   ondansetron injection 4 mg (4 mg Intravenous Given 5/23/23 2002)   HYDROcodone-acetaminophen  mg per tablet 1 tablet (1 tablet Oral Given 5/23/23 2002)     Medical Decision Making:   Differential Diagnosis:   Appendicitis, pancreatitis, UTI, renal colic  Clinical Tests:   Lab Tests: Ordered and Reviewed  Radiological Study: Ordered and Reviewed  ED Management:  Pt presented with acute RLQ pain and chronic epigastric pain. Pt with ttp at [place] with guarding. Concerning for appendicitis. CBC, CMP, lipase and UA unremarkable. CT abd showed acute appendicitis without perf. D/w Dr Marquez and she will take to OR tomorrow.                         Clinical Impression:   Final diagnoses:  [K37] Appendicitis        ED Disposition Condition    Observation Stable                Edilberto Padron Jr., MD  05/23/23 4489

## 2023-05-24 NOTE — PLAN OF CARE
[facility] Emergency Dept  Initial Discharge Assessment       Primary Care Provider: Amira Knutson NP    Admission Diagnosis: Appendicitis [K37]    Admission Date: 5/23/2023  Expected Discharge Date:   Assessment completed with patient who was alert and oriented. Patient's sister, Jan Caldwell [phone] was also at bedside. He does not use equipment at home, does not have any specialty doctors and attends no outside therapies. Patient does not have home health services. His sister, Jan Caldwell [phone] is available to assist upon discharge and any care at home. Patient is able to drive self to errands and appointments. Patient's PCP is Aviva Roth NP and his pharmacy of choice is [facility] in [place]. Patient denies any additional needs at this time. Case management will continue to follow.  Transition of Care Barriers: Unisured    Payor: /     Extended Emergency Contact Information  Primary Emergency Contact: PauletteJan  Mobile Phone: [phone]  Relation: Sister  Preferred language: English   needed? No    Discharge Plan A: Home with family  Discharge Plan B: Home with family      [facility] - [place], [place] - [address]  [address]  [place] [address]  Phone: [phone] Fax: [phone]      Initial Assessment (most recent)       Adult Discharge Assessment - 05/24/23 1230          Discharge Assessment    Assessment Type Discharge Planning Assessment     Confirmed/corrected address, phone number and insurance Yes     Source of Information patient;family   sisterJan [phone]    Does patient/caregiver understand observation status Yes     Communicated GABRIEL with patient/caregiver Date not available/Unable to determine     Reason For Admission appendicitis     People in Home sibling(s)   Jan Caldwell [phone]    Do you expect to return to your current living situation? Yes     Do you have help at home or someone to help you manage your care at home? Yes     Who are  your caregiver(s) and their phone number(s)? [employer] [phone]     Prior to hospitilization cognitive status: Alert/Oriented     Current cognitive status: Alert/Oriented     Equipment Currently Used at Home none     Readmission within 30 days? No     Patient currently being followed by outpatient case management? No     Do you currently have service(s) that help you manage your care at home? No     Do you have any problems affording any of your prescribed medications? No     Who is going to help you get home at discharge? Machinima [phone]     How do you get to doctors appointments? car, drives self     Are you on dialysis? No     Do you take coumadin? No     Discharge Plan A Home with family     Discharge Plan B Home with family     DME Needed Upon Discharge  none     Discharge Plan discussed with: Sibling;Patient     Name(s) and Number(s) Jan Keys [phone]     Transition of Care Barriers Unisured        Physical Activity    On average, how many days per week do you engage in moderate to strenuous exercise (like a brisk walk)? 5 days     On average, how many minutes do you engage in exercise at this level? 120 min        Financial Resource Strain    How hard is it for you to pay for the very basics like food, housing, medical care, and heating? Not hard at all        Housing Stability    In the last 12 months, was there a time when you were not able to pay the mortgage or rent on time? No     In the last 12 months, was there a time when you did not have a steady place to sleep or slept in a shelter (including now)? No        Transportation Needs    In the past 12 months, has lack of transportation kept you from medical appointments or from getting medications? No     In the past 12 months, has lack of transportation kept you from meetings, work, or from getting things needed for daily living? No        Food Insecurity    Within the past 12 months, you worried that your food would run out before you got  the money to buy more. Never true     Within the past 12 months, the food you bought just didn't last and you didn't have money to get more. Never true        Stress    Do you feel stress - tense, restless, nervous, or anxious, or unable to sleep at night because your mind is troubled all the time - these days? Not at all        Social Connections    In a typical week, how many times do you talk on the phone with family, friends, or neighbors? More than three times a week     How often do you get together with friends or relatives? More than three times a week     How often do you attend Episcopal or Anabaptism services? 1 to 4 times per year     Do you belong to any clubs or organizations such as Episcopal groups, unions, fraternal or athletic groups, or school groups? Yes     How often do you attend meetings of the clubs or organizations you belong to? More than 4 times per year     Are you , , , , never , or living with a partner?         Alcohol Use    Q1: How often do you have a drink containing alcohol? 2-3 times a week     Q2: How many drinks containing alcohol do you have on a typical day when you are drinking? 3 or 4     Q3: How often do you have six or more drinks on one occasion? Never        OTHER    Name(s) of People in Home Jan Oneonta [phone]

## 2023-05-24 NOTE — PLAN OF CARE
[facility] - Emergency Dept  Discharge Final Note    Primary Care Provider: Amira Knutson NP    Expected Discharge Date: 5/24/2023  Patient being discharged home. His sister, Jan Caldwell [phone] is available to help at discharge and with care at home. Patient has  PCP follow up appointment with Aviva Roth NP on Wednesday, June 7 at 1:00 pm and gastroenterology follow up with Lon Rodriguez NP on Thursday, June 1 at 11:00 am. No additional needs at this time.  Final Discharge Note (most recent)       Final Note - 05/24/23 1503          Final Note    Assessment Type Final Discharge Note     Anticipated Discharge Disposition Home or Self Care     What phone number can be called within the next 1-3 days to see how you are doing after discharge? [phone]     Hospital Resources/Appts/Education Provided Appointments scheduled and added to AVS        Post-Acute Status    Discharge Delays None known at this time                     Important Message from Medicare             Contact Info       Amira Knutson NP   Specialty: Family Medicine   Relationship: PCP - General    [address] [place] [address]  [address]ND FLOOR  [facility] [place] [address]   Phone: [phone]       Next Steps: Go on 6/7/2023    Instructions: follow up on Wednesday, June 7 at 1:00 pm    Clarita Marquez MD   Specialty: General Surgery    [address] [place] [address]   Phone: [phone]       Next Steps: Follow up in 2 week(s)    Lon Rodriguez NP- Gastroenterology    [address]  [facility], [place]   [phone]       Next Steps: Go on 6/1/2023    Instructions: follow up on Thursday, June 1 at 11:00am

## 2023-05-24 NOTE — ED NOTES
Patient returned from CT via wheelchair accompanied by HARSH Jones.    Pt transferred to hospital bed; SR up x 2, call light within reach. Pt/fam updated on POC. Verb understanding. Pt denies c/o at this time. No wants, needs verb. Eating at this time. Verb understanding of NPO status after MN. NAD. Will CTM.

## 2023-05-24 NOTE — HPI
"Mr. Caldwell is a 46yo man with a past medical history of DM2 and HTN, though he states he has not been taking any of his medications.    He states that about 2 days ago he began to develop some RLQ stabbing abdominal pain, "like a burst of pain with needles" to his RLQ.  This was not associated with fever, chills, diarrhea or N/V.  He has had some constipation, last BM being 5 days ago.   He has not seen anyone for the pain thus far.  He went to work yesterday and his boss noted him to be uncomfortable and asked about his problems. He realized something was not right and send him home with instructions to go to the ED.    In the ED his VS were /64   Pulse 63   Temp 98.5 °F (36.9 °C) (Oral)   Resp 16   Ht 6' 3" (1.905 m)   Wt 120.2 kg (265 lb)   SpO2 100%   BMI 33.12 kg/m².  Labs showed WBC 11, Hg 8.2, , Cr 1.1, UA negative.    CT abd and pelvis without contrast showed 1. No evidence of obstructive uropathy.  2. Right lower quadrant inflammatory changes are concerning for acute appendicitis.     In the ED he was treated with:  Medications   piperacillin-tazobactam (ZOSYN) 3.375 gram injection (has no administration in time range)   sodium chloride 0.9% bolus 1,000 mL 1,000 mL (0 mLs Intravenous Stopped 5/23/23 2127)   ketorolac injection 30 mg (30 mg Intravenous Given 5/23/23 2002)   ondansetron injection 4 mg (4 mg Intravenous Given 5/23/23 2002)   HYDROcodone-acetaminophen  mg per tablet 1 tablet (1 tablet Oral Given 5/23/23 2002)   piperacillin-tazobactam (ZOSYN) 3.375 g in dextrose 5 % in water (D5W) 5 % 50 mL IVPB (MB+) (0 g Intravenous Stopped 5/23/23 2220)   HYDROmorphone injection 1 mg (1 mg Intravenous Given 5/23/23 2127)   ondansetron injection 4 mg (4 mg Intravenous Given 5/23/23 2127)   HYDROmorphone injection 1 mg (1 mg Intravenous Given 5/24/23 0006)           "

## 2023-05-24 NOTE — CONSULTS
[facility] Emergency Dept  General Surgery  Consult Note    Patient Name: Julio Caldwell  MRN: [id number]  Admission Date: 5/23/2023  Attending Physician: Miguel Hong MD   Consult Physician: Clarita Marquez MD  Primary Care Provider: Amira Knutson NP    Patient information was obtained from patient and ER records.     Subjective:     Reason for consultation: acute appendicitis    History of Present Illness:  Julio Caldwell is a 47 y.o. male with a history of diabetes and hypertension presents with concern for acute appendicitis.  Patient presented to the emergency department overnight with complaints of right lower quadrant abdominal pain that began 2 days ago.  Pain had been worsening since that time.  There was no associated nausea, vomiting, or diarrhea.  On arrival to emergency department vital signs are within normal limits.  He had no leukocytosis but he was noted to be anemic with a hemoglobin of 8.  CT scan showed some inflammation in the right lower quadrant concerning for possible acute appendicitis.  He was admitted to the hospitalist service and started on antibiotics.  Surgery consulted for further recommendations.  This morning we got a CT scan with contrast which revealed continued inflammation in the right lower quadrant specifically of the cecum in the terminal ileum.  The appendix is not well visualized.  Patient denies any prior history of inflammatory bowel disease.    Review of patient's allergies indicates:  No Known Allergies    Past Medical History:   Diagnosis Date    Diabetes mellitus, type 2 05/2020    Hypertension      Past Surgical History:   Procedure Laterality Date    EYE SURGERY Right     LUMBAR DISC SURGERY  2005     Family History       Problem Relation (Age of Onset)    Diabetes Mother, Maternal Grandfather    No Known Problems Father          Tobacco Use    Smoking status: Every Day     Packs/day: 0.50     Years: 23.00     Pack years: 11.50     Types:  Cigarettes    Smokeless tobacco: Never   Substance and Sexual Activity    Alcohol use: Yes     Alcohol/week: 48.0 standard drinks     Types: 48 Cans of beer per week    Drug use: Yes     Types: Marijuana    Sexual activity: Yes     Review of Systems   Constitutional:  Negative for appetite change, chills and fever.   HENT:  Negative for congestion, dental problem and drooling.    Eyes:  Negative for photophobia, discharge and itching.   Respiratory:  Negative for apnea and chest tightness.    Cardiovascular:  Negative for chest pain, palpitations and leg swelling.   Gastrointestinal:  Positive for abdominal pain. Negative for abdominal distention.   Endocrine: Negative for cold intolerance and heat intolerance.   Genitourinary:  Negative for difficulty urinating and dysuria.   Musculoskeletal:  Negative for arthralgias and back pain.   Skin:  Negative for color change and pallor.   Neurological:  Negative for dizziness, facial asymmetry and headaches.   Hematological:  Negative for adenopathy. Does not bruise/bleed easily.   Psychiatric/Behavioral:  Negative for agitation, behavioral problems and confusion.    Objective:     Vital Signs (Most Recent):  Temp: 97.4 °F (36.3 °C) (05/24/23 0748)  Pulse: (!) 45 (05/24/23 0748)  Resp: 14 (05/24/23 1147)  BP: 125/64 (05/24/23 0748)  SpO2: 100 % (05/23/23 2126) Vital Signs (24h Range):  Temp:  [97.4 °F (36.3 °C)-98.5 °F (36.9 °C)] 97.4 °F (36.3 °C)  Pulse:  [45-63] 45  Resp:  [14-20] 14  SpO2:  [100 %] 100 %  BP: (125-130)/(64-85) 125/64     Weight: 120.2 kg (265 lb)  Body mass index is 33.12 kg/m².    Physical Exam  Constitutional:       Appearance: He is well-developed.   HENT:      Head: Normocephalic and atraumatic.   Eyes:      Pupils: Pupils are equal, round, and reactive to light.   Cardiovascular:      Rate and Rhythm: Normal rate and regular rhythm.   Pulmonary:      Effort: Pulmonary effort is normal.      Breath sounds: Normal breath sounds.   Abdominal:       General: Bowel sounds are normal. There is no distension.      Palpations: Abdomen is soft.      Tenderness: There is abdominal tenderness in the right lower quadrant. There is no guarding or rebound.   Musculoskeletal:         General: Normal range of motion.      Cervical back: Normal range of motion and neck supple.   Skin:     General: Skin is warm.      Findings: No erythema.   Neurological:      Mental Status: He is alert and oriented to person, place, and time.   Psychiatric:         Behavior: Behavior normal.       Significant Labs:  CBC:   Recent Labs   Lab 05/24/23 0446   WBC 10.15   RBC 3.57*   HGB 7.5*   HCT 24.5*   *   MCV 69*   MCH 21.0*   MCHC 30.6*     BMP:   Recent Labs   Lab 05/24/23 0446   GLU 86      K 3.5      CO2 26   BUN 10   CREATININE 1.0   CALCIUM 8.4*   MG 1.7     CMP:   Recent Labs   Lab 05/23/23 2000 05/24/23 0446   GLU 89 86   CALCIUM 9.5 8.4*   ALBUMIN 3.6  --    PROT 7.6  --     138   K 3.6 3.5   CO2 27 26    106   BUN 10 10   CREATININE 1.1 1.0   ALKPHOS 72  --    ALT 16  --    AST 23  --    BILITOT 0.6  --      LFTs:   Recent Labs   Lab 05/23/23 2000   ALT 16   AST 23   ALKPHOS 72   BILITOT 0.6   PROT 7.6   ALBUMIN 3.6     Coagulation:   Recent Labs   Lab 05/24/23 0446   LABPROT 11.4   INR 1.1   APTT 30.0     Specimen (24h ago, onward)      None          Recent Labs   Lab 05/23/23  2240   COLORU Yellow   SPECGRAV 1.025   PHUR 6.0   PROTEINUA Negative   NITRITE Negative   LEUKOCYTESUR Negative   UROBILINOGEN 1.0       Significant Diagnostics:  CT: I have reviewed all pertinent results/findings within the past 24 hours and my personal findings are:  Thickening of the cecum and terminal ileum which could be sign of inflammatory bowel disease, the appendix is not able to be visualized but there are no signs of abscess or perforation    Assessment:   Julio Caldwell is a 47 y.o. male who presents with right lower quadrant abdominal pain, concern for  appendicitis versus inflammatory bowel disease.    Active Diagnoses:    Diagnosis Date Noted POA    PRINCIPAL PROBLEM:  Acute appendicitis [K35.80] 05/24/2023 Yes    Essential hypertension [I10] 05/24/2023 Yes    Microcytic anemia [D50.9] 05/24/2023 Yes    Type 2 diabetes mellitus without complication, without long-term current use of insulin [E11.9] 05/01/2020 Yes      Problems Resolved During this Admission:     VTE Risk Mitigation (From admission, onward)           Ordered     heparin (porcine) injection 5,000 Units  Every 8 hours         05/24/23 0119     IP VTE HIGH RISK PATIENT  Once         05/24/23 0119     Place sequential compression device  Until discontinued         05/24/23 0119     Place DINA hose  Until discontinued         05/24/23 0119                    Medical Decision Making/Plan:  Overall picture of the cecal and terminal ileum thickening, significant anemia, and lack of leukocytosis or fever leans more toward inflammatory bowel disease picture than acute appendicitis.  Crohn's typically presents as terminal ileitis.  Recommend treating as inflammatory bowel disease flare with antibiotics and steroids.  It is also reasonable to treat acute appendicitis without evidence of fecalith or perforation with antibiotics, so either way antibiotics would be an appropriate treatment.  Surgery to continue to follow closely with you.  Depending on clinical course would recommend follow up with GI upon discharge for workup of IBD.    Clarita Marquez MD  General Surgery  [facility] - Emergency Dept

## 2023-05-24 NOTE — NURSING
New orders received for discharge, patient is agreeable with discharge. PIV removed, catheter tip intact. Dressing applied. Discharge teaching done at bedside, verbalized understanding. Presciptions e-scripted to pharmacy of choice. Medications reviewed, appointments given. Will d/c home with all belongings per w/c.

## 2023-05-24 NOTE — ASSESSMENT & PLAN NOTE
Dr. Marquez with Gen Surg was notified by ED Dr. Padron  CT abdomen showed right lower quadrant inflammatory changes are concerning for acute appendicitis.   NPO with IV fluids  Pain control  Zosyn 4.5g iv q8 hours

## 2023-05-24 NOTE — SUBJECTIVE & OBJECTIVE
Past Medical History:   Diagnosis Date    Diabetes mellitus, type 2 05/2020    Hypertension        Past Surgical History:   Procedure Laterality Date    EYE SURGERY Right     LUMBAR DISC SURGERY  2005       Review of patient's allergies indicates:  No Known Allergies    No current facility-administered medications on file prior to encounter.     Current Outpatient Medications on File Prior to Encounter   Medication Sig    atorvastatin (LIPITOR) 10 MG tablet Take 1 tablet (10 mg total) by mouth once daily.    ciprofloxacin HCl (CIPRO) 250 MG tablet Take 250 mg by mouth every 12 (twelve) hours.    cyclobenzaprine (FLEXERIL) 10 MG tablet Take 10 mg by mouth 3 (three) times daily as needed.    doxycycline (VIBRA-TABS) 100 MG tablet Take 100 mg by mouth 2 (two) times daily.    HYDROcodone-acetaminophen (NORCO)  mg per tablet Take 1 tablet by mouth every 6 (six) hours as needed for Pain (breathrough pain).    ketorolac (TORADOL) 10 mg tablet Take 1 tablet (10 mg total) by mouth every 6 (six) hours as needed for Pain.    lisinopriL (PRINIVIL,ZESTRIL) 2.5 MG tablet Take 1 tablet (2.5 mg total) by mouth once daily.    metFORMIN (GLUCOPHAGE) 1000 MG tablet Take 1 tablet (1,000 mg total) by mouth 2 (two) times daily with meals.    metroNIDAZOLE (FLAGYL) 500 MG tablet Take 500 mg by mouth every 8 (eight) hours.    ondansetron (ZOFRAN-ODT) 4 MG TbDL Take 1 tablet (4 mg total) by mouth every 6 (six) hours as needed (Nausea).    ondansetron (ZOFRAN-ODT) 4 MG TbDL Take 1 tablet (4 mg total) by mouth every 6 (six) hours as needed.     Family History       Problem Relation (Age of Onset)    Diabetes Mother, Maternal Grandfather    No Known Problems Father          Tobacco Use    Smoking status: Every Day     Packs/day: 0.50     Years: 23.00     Pack years: 11.50     Types: Cigarettes    Smokeless tobacco: Never   Substance and Sexual Activity    Alcohol use: Yes     Alcohol/week: 48.0 standard drinks     Types: 48 Cans of beer  per week    Drug use: Yes     Types: Marijuana    Sexual activity: Yes     Review of Systems   Constitutional:  Positive for appetite change and diaphoresis. Negative for chills, fatigue and fever.   Respiratory:  Negative for shortness of breath.    Cardiovascular:  Negative for chest pain.   Gastrointestinal:  Positive for abdominal distention, abdominal pain and constipation. Negative for diarrhea, nausea and vomiting.   Genitourinary:  Negative for dysuria.   Musculoskeletal:  Negative for myalgias.   Neurological:  Negative for headaches.   Hematological:  Does not bruise/bleed easily.   Objective:     Vital Signs (Most Recent):  Temp: 98.5 °F (36.9 °C) (05/23/23 1938)  Pulse: 63 (05/23/23 1938)  Resp: 16 (05/24/23 0006)  BP: 130/64 (05/23/23 2126)  SpO2: 100 % (05/23/23 2126) Vital Signs (24h Range):  Temp:  [98.5 °F (36.9 °C)] 98.5 °F (36.9 °C)  Pulse:  [63] 63  Resp:  [16-20] 16  SpO2:  [100 %] 100 %  BP: (128-130)/(64-85) 130/64     Weight: 120.2 kg (265 lb)  Body mass index is 33.12 kg/m².     Physical Exam  Constitutional:       General: He is awake. He is not in acute distress.     Appearance: He is not ill-appearing, toxic-appearing or diaphoretic.   HENT:      Head: Normocephalic and atraumatic.   Pulmonary:      Effort: No tachypnea or bradypnea.   Abdominal:      General: Abdomen is protuberant.      Comments: Patient lifted up his gown to press on his belly on camera.  He noted continued RLQ pain to self-palpation   Genitourinary:     Comments: No rodriguez in place  Neurological:      General: No focal deficit present.      Mental Status: He is alert and oriented to person, place, and time.      GCS: GCS eye subscore is 4. GCS verbal subscore is 5. GCS motor subscore is 6.   Psychiatric:         Attention and Perception: Attention and perception normal.         Mood and Affect: Mood and affect normal.         Cognition and Memory: Cognition and memory normal.              Significant Labs: All  pertinent labs within the past 24 hours have been reviewed.  Recent Results (from the past 24 hour(s))   Complete Blood Count (CBC)    Collection Time: 05/23/23  8:00 PM   Result Value Ref Range    WBC 10.80 3.90 - 12.70 K/uL    RBC 3.84 (L) 4.60 - 6.20 M/uL    Hemoglobin 8.2 (L) 14.0 - 18.0 g/dL    Hematocrit 26.3 (L) 40.0 - 54.0 %    MCV 69 (L) 82 - 98 fL    MCH 21.4 (L) 27.0 - 31.0 pg    MCHC 31.2 (L) 32.0 - 36.0 g/dL    RDW 17.8 (H) 11.5 - 14.5 %    Platelets 699 (H) 150 - 450 K/uL    MPV 9.0 (L) 9.2 - 12.9 fL    Immature Granulocytes 0.3 0.0 - 0.5 %    Gran # (ANC) 6.4 1.8 - 7.7 K/uL    Immature Grans (Abs) 0.03 0.00 - 0.04 K/uL    Lymph # 3.2 1.0 - 4.8 K/uL    Mono # 0.8 0.3 - 1.0 K/uL    Eos # 0.3 0.0 - 0.5 K/uL    Baso # 0.13 0.00 - 0.20 K/uL    nRBC 0 0 /100 WBC    Gran % 59.1 38.0 - 73.0 %    Lymph % 29.3 18.0 - 48.0 %    Mono % 7.6 4.0 - 15.0 %    Eosinophil % 2.5 0.0 - 8.0 %    Basophil % 1.2 0.0 - 1.9 %    Differential Method Automated    Comprehensive Metabolic Panel (CMP)    Collection Time: 05/23/23  8:00 PM   Result Value Ref Range    Sodium 139 136 - 145 mmol/L    Potassium 3.6 3.5 - 5.1 mmol/L    Chloride 105 95 - 110 mmol/L    CO2 27 23 - 29 mmol/L    Glucose 89 70 - 110 mg/dL    BUN 10 6 - 20 mg/dL    Creatinine 1.1 0.5 - 1.4 mg/dL    Calcium 9.5 8.7 - 10.5 mg/dL    Total Protein 7.6 6.0 - 8.4 g/dL    Albumin 3.6 3.5 - 5.2 g/dL    Total Bilirubin 0.6 0.1 - 1.0 mg/dL    Alkaline Phosphatase 72 55 - 135 U/L    AST 23 10 - 40 U/L    ALT 16 10 - 44 U/L    Anion Gap 7 (L) 8 - 16 mmol/L    eGFR >60.0 >60 mL/min/1.73 m^2   Lipase    Collection Time: 05/23/23  8:00 PM   Result Value Ref Range    Lipase 30 4 - 60 U/L   Urinalysis, Reflex to Urine Culture Urine, Clean Catch    Collection Time: 05/23/23 10:40 PM    Specimen: Urine, Clean Catch   Result Value Ref Range    Specimen UA Urine, Clean Catch     Color, UA Yellow Yellow, Straw, Veena    Appearance, UA Clear Clear    pH, UA 6.0 5.0 - 8.0     Specific Gravity, UA 1.025 1.005 - 1.030    Protein, UA Negative Negative    Glucose, UA Negative Negative    Ketones, UA Negative Negative    Bilirubin (UA) Negative Negative    Occult Blood UA Negative Negative    Nitrite, UA Negative Negative    Urobilinogen, UA 1.0 Negative EU/dL    Leukocytes, UA Negative Negative   Type & Screen    Collection Time: 05/24/23  1:30 AM   Result Value Ref Range    Group & Rh B POS     Indirect Leoncio NEG     Specimen Outdate 05/27/2023 23:59          Significant Imaging: I have reviewed all pertinent imaging results/findings within the past 24 hours.

## 2023-05-25 ENCOUNTER — HOSPITAL ENCOUNTER (EMERGENCY)
Facility: HOSPITAL | Age: 48
Discharge: HOME | End: 2023-05-25
Attending: EMERGENCY MEDICINE
Payer: MEDICAID

## 2023-05-25 VITALS
HEART RATE: 57 BPM | TEMPERATURE: 98 F | WEIGHT: 265 LBS | RESPIRATION RATE: 20 BRPM | BODY MASS INDEX: 32.95 KG/M2 | DIASTOLIC BLOOD PRESSURE: 82 MMHG | OXYGEN SATURATION: 99 % | SYSTOLIC BLOOD PRESSURE: 132 MMHG | HEIGHT: 75 IN

## 2023-05-25 DIAGNOSIS — R10.31 RIGHT LOWER QUADRANT ABDOMINAL PAIN: Primary | ICD-10-CM

## 2023-05-25 DIAGNOSIS — K50.00 TERMINAL ILEITIS WITHOUT COMPLICATION: ICD-10-CM

## 2023-05-25 LAB
ALBUMIN SERPL BCP-MCNC: 3.5 G/DL (ref 3.5–5.2)
ALP SERPL-CCNC: 69 U/L (ref 55–135)
ALT SERPL W/O P-5'-P-CCNC: 16 U/L (ref 10–44)
ANION GAP SERPL CALC-SCNC: 9 MMOL/L (ref 8–16)
AST SERPL-CCNC: 21 U/L (ref 10–40)
BASOPHILS # BLD AUTO: 0.1 K/UL (ref 0–0.2)
BASOPHILS NFR BLD: 1.1 % (ref 0–1.9)
BILIRUB SERPL-MCNC: 0.5 MG/DL (ref 0.1–1)
BUN SERPL-MCNC: 10 MG/DL (ref 6–20)
CALCIUM SERPL-MCNC: 8.9 MG/DL (ref 8.7–10.5)
CHLORIDE SERPL-SCNC: 103 MMOL/L (ref 95–110)
CO2 SERPL-SCNC: 27 MMOL/L (ref 23–29)
CREAT SERPL-MCNC: 1.1 MG/DL (ref 0.5–1.4)
DIFFERENTIAL METHOD: ABNORMAL
EOSINOPHIL # BLD AUTO: 0.3 K/UL (ref 0–0.5)
EOSINOPHIL NFR BLD: 3 % (ref 0–8)
ERYTHROCYTE [DISTWIDTH] IN BLOOD BY AUTOMATED COUNT: 17.8 % (ref 11.5–14.5)
EST. GFR  (NO RACE VARIABLE): >60 ML/MIN/1.73 M^2
GLUCOSE SERPL-MCNC: 85 MG/DL (ref 70–110)
HCT VFR BLD AUTO: 27.1 % (ref 40–54)
HGB BLD-MCNC: 8.5 G/DL (ref 14–18)
IMM GRANULOCYTES # BLD AUTO: 0.02 K/UL (ref 0–0.04)
IMM GRANULOCYTES NFR BLD AUTO: 0.2 % (ref 0–0.5)
LYMPHOCYTES # BLD AUTO: 2.9 K/UL (ref 1–4.8)
LYMPHOCYTES NFR BLD: 30.9 % (ref 18–48)
MCH RBC QN AUTO: 21.1 PG (ref 27–31)
MCHC RBC AUTO-ENTMCNC: 31.4 G/DL (ref 32–36)
MCV RBC AUTO: 67 FL (ref 82–98)
MONOCYTES # BLD AUTO: 0.7 K/UL (ref 0.3–1)
MONOCYTES NFR BLD: 7.7 % (ref 4–15)
NEUTROPHILS # BLD AUTO: 5.4 K/UL (ref 1.8–7.7)
NEUTROPHILS NFR BLD: 57.1 % (ref 38–73)
NRBC BLD-RTO: 0 /100 WBC
PLATELET # BLD AUTO: 687 K/UL (ref 150–450)
PMV BLD AUTO: 9 FL (ref 9.2–12.9)
POTASSIUM SERPL-SCNC: 3.5 MMOL/L (ref 3.5–5.1)
PROT SERPL-MCNC: 7.5 G/DL (ref 6–8.4)
RBC # BLD AUTO: 4.03 M/UL (ref 4.6–6.2)
SODIUM SERPL-SCNC: 139 MMOL/L (ref 136–145)
WBC # BLD AUTO: 9.43 K/UL (ref 3.9–12.7)

## 2023-05-25 PROCEDURE — 63600175 PHARM REV CODE 636 W HCPCS: Performed by: EMERGENCY MEDICINE

## 2023-05-25 PROCEDURE — 96376 TX/PRO/DX INJ SAME DRUG ADON: CPT

## 2023-05-25 PROCEDURE — 74177 CT ABDOMEN PELVIS WITH CONTRAST: ICD-10-PCS | Mod: 26,,, | Performed by: RADIOLOGY

## 2023-05-25 PROCEDURE — 74177 CT ABD & PELVIS W/CONTRAST: CPT | Mod: 26,,, | Performed by: RADIOLOGY

## 2023-05-25 PROCEDURE — 80053 COMPREHEN METABOLIC PANEL: CPT | Performed by: EMERGENCY MEDICINE

## 2023-05-25 PROCEDURE — 96374 THER/PROPH/DIAG INJ IV PUSH: CPT

## 2023-05-25 PROCEDURE — 99285 EMERGENCY DEPT VISIT HI MDM: CPT | Mod: 25

## 2023-05-25 PROCEDURE — 96375 TX/PRO/DX INJ NEW DRUG ADDON: CPT

## 2023-05-25 PROCEDURE — 74177 CT ABD & PELVIS W/CONTRAST: CPT | Mod: TC

## 2023-05-25 PROCEDURE — 85025 COMPLETE CBC W/AUTO DIFF WBC: CPT | Performed by: EMERGENCY MEDICINE

## 2023-05-25 PROCEDURE — 25500020 PHARM REV CODE 255: Performed by: EMERGENCY MEDICINE

## 2023-05-25 RX ORDER — METHYLPREDNISOLONE SOD SUCC 125 MG
125 VIAL (EA) INJECTION
Status: COMPLETED | OUTPATIENT
Start: 2023-05-25 | End: 2023-05-25

## 2023-05-25 RX ORDER — KETOROLAC TROMETHAMINE 30 MG/ML
30 INJECTION, SOLUTION INTRAMUSCULAR; INTRAVENOUS
Status: COMPLETED | OUTPATIENT
Start: 2023-05-25 | End: 2023-05-25

## 2023-05-25 RX ORDER — HYDROMORPHONE HYDROCHLORIDE 1 MG/ML
1 INJECTION, SOLUTION INTRAMUSCULAR; INTRAVENOUS; SUBCUTANEOUS
Status: COMPLETED | OUTPATIENT
Start: 2023-05-25 | End: 2023-05-25

## 2023-05-25 RX ORDER — ONDANSETRON 2 MG/ML
4 INJECTION INTRAMUSCULAR; INTRAVENOUS
Status: COMPLETED | OUTPATIENT
Start: 2023-05-25 | End: 2023-05-25

## 2023-05-25 RX ADMIN — HYDROMORPHONE HYDROCHLORIDE 1 MG: 1 INJECTION, SOLUTION INTRAMUSCULAR; INTRAVENOUS; SUBCUTANEOUS at 09:05

## 2023-05-25 RX ADMIN — ONDANSETRON 4 MG: 2 INJECTION INTRAMUSCULAR; INTRAVENOUS at 07:05

## 2023-05-25 RX ADMIN — IOHEXOL 100 ML: 350 INJECTION, SOLUTION INTRAVENOUS at 09:05

## 2023-05-25 RX ADMIN — HYDROMORPHONE HYDROCHLORIDE 1 MG: 1 INJECTION, SOLUTION INTRAMUSCULAR; INTRAVENOUS; SUBCUTANEOUS at 07:05

## 2023-05-25 RX ADMIN — METHYLPREDNISOLONE SODIUM SUCCINATE 125 MG: 125 INJECTION, POWDER, FOR SOLUTION INTRAMUSCULAR; INTRAVENOUS at 10:05

## 2023-05-25 RX ADMIN — KETOROLAC TROMETHAMINE 30 MG: 30 INJECTION, SOLUTION INTRAMUSCULAR; INTRAVENOUS at 07:05

## 2023-05-25 NOTE — ED PROVIDER NOTES
Encounter Date: 5/25/2023       History     Chief Complaint   Patient presents with    Abdominal Pain     Rlq abdominal pain x week. Pt was discharged from our facility on weds.      Pt here again with RLQ pain. I saw him for same yesterday and CT showed possible appendicitis. He was admitted and seen by Dr Marquez who felt that this was more likely IBD and set him up to see GI on 6/1. Pt here today bc pain has gotten severe. No fever or vomiting.     The history is provided by the patient.   Review of patient's allergies indicates:  No Known Allergies  Past Medical History:   Diagnosis Date    Diabetes mellitus, type 2 05/2020    Hypertension      Past Surgical History:   Procedure Laterality Date    EYE SURGERY Right     LUMBAR DISC SURGERY  2005     Family History   Problem Relation Age of Onset    Diabetes Mother     Diabetes Maternal Grandfather     No Known Problems Father      Social History     Tobacco Use    Smoking status: Every Day     Packs/day: 0.50     Years: 23.00     Pack years: 11.50     Types: Cigarettes    Smokeless tobacco: Never   Substance Use Topics    Alcohol use: Yes     Alcohol/week: 48.0 standard drinks     Types: 48 Cans of beer per week    Drug use: Yes     Types: Marijuana     Review of Systems   Gastrointestinal:  Positive for abdominal pain and nausea.   Genitourinary:  Negative for testicular pain.   All other systems reviewed and are negative.    Physical Exam     Initial Vitals [05/25/23 1835]   BP Pulse Resp Temp SpO2   135/82 73 18 98.1 °F (36.7 °C) 100 %      MAP       --         Physical Exam    Nursing note and vitals reviewed.  Constitutional: He appears well-developed and well-nourished. He is not diaphoretic. No distress.   HENT:   Mouth/Throat: Oropharynx is clear and moist.   Eyes: No scleral icterus.   Cardiovascular:  Normal rate, regular rhythm, normal heart sounds and intact distal pulses.           Pulmonary/Chest: Breath sounds normal.   Abdominal: Abdomen is soft.  Bowel sounds are normal. He exhibits no distension and no mass. There is abdominal tenderness. There is rebound and guarding.   Musculoskeletal:         General: No tenderness or edema. Normal range of motion.     Neurological: He is alert and oriented to person, place, and time. GCS score is 15. GCS eye subscore is 4. GCS verbal subscore is 5. GCS motor subscore is 6.   Skin: Skin is warm and dry. Capillary refill takes less than 2 seconds. No rash noted. No erythema. No pallor.   Psychiatric: He has a normal mood and affect.       ED Course   Procedures  Labs Reviewed   CBC W/ AUTO DIFFERENTIAL - Abnormal; Notable for the following components:       Result Value    RBC 4.03 (*)     Hemoglobin 8.5 (*)     Hematocrit 27.1 (*)     MCV 67 (*)     MCH 21.1 (*)     MCHC 31.4 (*)     RDW 17.8 (*)     Platelets 687 (*)     MPV 9.0 (*)     All other components within normal limits   COMPREHENSIVE METABOLIC PANEL          Imaging Results              CT Abdomen Pelvis With Contrast (In process)                      Medications   HYDROmorphone injection 1 mg (1 mg Intravenous Given 5/25/23 1913)   ondansetron injection 4 mg (4 mg Intravenous Given 5/25/23 1911)   ketorolac injection 30 mg (30 mg Intravenous Given 5/25/23 1912)   iohexoL (OMNIPAQUE 350) injection 100 mL (100 mLs Intravenous Given 5/25/23 2109)   HYDROmorphone injection 1 mg (1 mg Intravenous Given 5/25/23 2158)   methylPREDNISolone sodium succinate injection 125 mg (125 mg Intravenous Given 5/25/23 2225)     Medical Decision Making:   Differential Diagnosis:   IBD, appendicitis, peritonitis  Clinical Tests:   Lab Tests: Ordered and Reviewed  Radiological Study: Ordered and Reviewed  ED Management:  Pt presented again with RLQ pain not relieved with the percocet he was given yesterday. Pt was admitted here by me yesterday for appendicitis. Pt evaluated by Dr Marquez and she believed that it was IBD and he was given appt with GI on 6/1. Pain acutely worsened  today so he came in. CBC without leukocytosis. CMP normal. CT unchanged from yesterday. Appendix not visualized. Pt has prednisone Rx and percocet 10mg already. [facility].            ED Course as of 05/25/23 2240   Thu May 25, 2023   2207 CT abd:  IMPRESSION:  Since most recent prior study and also since May 23 stable inflammatory changes right lower  quadrant centered upon the ileocecal junction with wall thickening of the terminal ileum and adjacent portion of the cecum as discussed. The appendix is not identified. Back in March of 2021 the appendix was long and present along the right portion of the pelvis extending into the lower pelvis and certainly at this location is not visible on the current exam. Some prominent presumably reactive lymph nodes. Previously discussed differential could be expanded to include a mass/malignancy and Crohn's disease. Oral contrast has arrived within the colon and since the appendix is not filling of course acute appendicitis with surrounding phlegmon which can be seen with contained perforation is certainly possible as well.   [DC]      ED Course User Index  [DC] Edilberto Padron Jr., MD                 Clinical Impression:   Final diagnoses:  [R10.31] Right lower quadrant abdominal pain (Primary)  [K50.00] Terminal ileitis without complication        ED Disposition Condition    Discharge Stable          ED Prescriptions    None       Follow-up Information       Follow up With Specialties Details Why Contact Info    GI clinic   as scheduled              Edilberto Padron Jr., MD  05/25/23 2240

## 2023-05-27 ENCOUNTER — HOSPITAL ENCOUNTER (INPATIENT)
Facility: HOSPITAL | Age: 48
LOS: 5 days | Discharge: HOME | DRG: 330 | End: 2023-06-01
Attending: EMERGENCY MEDICINE | Admitting: INTERNAL MEDICINE
Payer: MEDICAID

## 2023-05-27 DIAGNOSIS — R93.3 ABNORMAL FINDING ON GI TRACT IMAGING: ICD-10-CM

## 2023-05-27 DIAGNOSIS — D50.9 MICROCYTIC ANEMIA: ICD-10-CM

## 2023-05-27 DIAGNOSIS — R10.31 RLQ ABDOMINAL PAIN: ICD-10-CM

## 2023-05-27 DIAGNOSIS — D72.829 LEUKOCYTOSIS, UNSPECIFIED TYPE: ICD-10-CM

## 2023-05-27 DIAGNOSIS — R07.9 CHEST PAIN: ICD-10-CM

## 2023-05-27 DIAGNOSIS — K63.89 COLONIC MASS: Primary | ICD-10-CM

## 2023-05-27 LAB
ALBUMIN SERPL BCP-MCNC: 3.6 G/DL (ref 3.5–5.2)
ALP SERPL-CCNC: 61 U/L (ref 55–135)
ALT SERPL W/O P-5'-P-CCNC: 20 U/L (ref 10–44)
ANION GAP SERPL CALC-SCNC: 10 MMOL/L (ref 8–16)
AST SERPL-CCNC: 21 U/L (ref 10–40)
BASOPHILS # BLD AUTO: 0.05 K/UL (ref 0–0.2)
BASOPHILS NFR BLD: 0.3 % (ref 0–1.9)
BILIRUB SERPL-MCNC: 0.7 MG/DL (ref 0.1–1)
BUN SERPL-MCNC: 14 MG/DL (ref 6–20)
CALCIUM SERPL-MCNC: 9.3 MG/DL (ref 8.7–10.5)
CHLORIDE SERPL-SCNC: 105 MMOL/L (ref 95–110)
CO2 SERPL-SCNC: 25 MMOL/L (ref 23–29)
CREAT SERPL-MCNC: 1 MG/DL (ref 0.5–1.4)
DIFFERENTIAL METHOD: ABNORMAL
EOSINOPHIL # BLD AUTO: 0 K/UL (ref 0–0.5)
EOSINOPHIL NFR BLD: 0.1 % (ref 0–8)
ERYTHROCYTE [DISTWIDTH] IN BLOOD BY AUTOMATED COUNT: 17.9 % (ref 11.5–14.5)
EST. GFR  (NO RACE VARIABLE): >60 ML/MIN/1.73 M^2
GLUCOSE SERPL-MCNC: 119 MG/DL (ref 70–110)
HCT VFR BLD AUTO: 26.8 % (ref 40–54)
HGB BLD-MCNC: 8.2 G/DL (ref 14–18)
IMM GRANULOCYTES # BLD AUTO: 0.09 K/UL (ref 0–0.04)
IMM GRANULOCYTES NFR BLD AUTO: 0.5 % (ref 0–0.5)
LIPASE SERPL-CCNC: 29 U/L (ref 4–60)
LYMPHOCYTES # BLD AUTO: 3.7 K/UL (ref 1–4.8)
LYMPHOCYTES NFR BLD: 18.8 % (ref 18–48)
MCH RBC QN AUTO: 20.8 PG (ref 27–31)
MCHC RBC AUTO-ENTMCNC: 30.6 G/DL (ref 32–36)
MCV RBC AUTO: 68 FL (ref 82–98)
MONOCYTES # BLD AUTO: 1.2 K/UL (ref 0.3–1)
MONOCYTES NFR BLD: 6 % (ref 4–15)
NEUTROPHILS # BLD AUTO: 14.8 K/UL (ref 1.8–7.7)
NEUTROPHILS NFR BLD: 74.3 % (ref 38–73)
NRBC BLD-RTO: 0 /100 WBC
PLATELET # BLD AUTO: 681 K/UL (ref 150–450)
PMV BLD AUTO: 9.3 FL (ref 9.2–12.9)
POTASSIUM SERPL-SCNC: 3.9 MMOL/L (ref 3.5–5.1)
PROT SERPL-MCNC: 7.5 G/DL (ref 6–8.4)
RBC # BLD AUTO: 3.94 M/UL (ref 4.6–6.2)
SODIUM SERPL-SCNC: 140 MMOL/L (ref 136–145)
WBC # BLD AUTO: 19.84 K/UL (ref 3.9–12.7)

## 2023-05-27 PROCEDURE — 36415 COLL VENOUS BLD VENIPUNCTURE: CPT | Performed by: EMERGENCY MEDICINE

## 2023-05-27 PROCEDURE — 83690 ASSAY OF LIPASE: CPT | Performed by: EMERGENCY MEDICINE

## 2023-05-27 PROCEDURE — 12000002 HC ACUTE/MED SURGE SEMI-PRIVATE ROOM

## 2023-05-27 PROCEDURE — 80053 COMPREHEN METABOLIC PANEL: CPT | Performed by: EMERGENCY MEDICINE

## 2023-05-27 PROCEDURE — 85025 COMPLETE CBC W/AUTO DIFF WBC: CPT | Performed by: EMERGENCY MEDICINE

## 2023-05-27 PROCEDURE — 99285 EMERGENCY DEPT VISIT HI MDM: CPT

## 2023-05-28 PROBLEM — R10.31 RLQ ABDOMINAL PAIN: Status: ACTIVE | Noted: 2023-05-28

## 2023-05-28 PROBLEM — D75.839 THROMBOCYTOSIS: Status: ACTIVE | Noted: 2023-05-28

## 2023-05-28 LAB
ALBUMIN SERPL BCP-MCNC: 3.4 G/DL (ref 3.5–5.2)
ALP SERPL-CCNC: 63 U/L (ref 55–135)
ALT SERPL W/O P-5'-P-CCNC: 20 U/L (ref 10–44)
ANION GAP SERPL CALC-SCNC: 8 MMOL/L (ref 8–16)
AST SERPL-CCNC: 19 U/L (ref 10–40)
BACTERIA #/AREA URNS HPF: NORMAL /HPF
BASOPHILS # BLD AUTO: 0.09 K/UL (ref 0–0.2)
BASOPHILS NFR BLD: 0.6 % (ref 0–1.9)
BILIRUB SERPL-MCNC: 0.7 MG/DL (ref 0.1–1)
BILIRUB UR QL STRIP: NEGATIVE
BUN SERPL-MCNC: 12 MG/DL (ref 6–20)
CALCIUM SERPL-MCNC: 9.2 MG/DL (ref 8.7–10.5)
CAOX CRY URNS QL MICRO: NORMAL
CHLORIDE SERPL-SCNC: 103 MMOL/L (ref 95–110)
CLARITY UR: CLEAR
CO2 SERPL-SCNC: 29 MMOL/L (ref 23–29)
COLOR UR: YELLOW
CREAT SERPL-MCNC: 1 MG/DL (ref 0.5–1.4)
DIFFERENTIAL METHOD: ABNORMAL
EOSINOPHIL # BLD AUTO: 0 K/UL (ref 0–0.5)
EOSINOPHIL NFR BLD: 0.2 % (ref 0–8)
ERYTHROCYTE [DISTWIDTH] IN BLOOD BY AUTOMATED COUNT: 17.9 % (ref 11.5–14.5)
EST. GFR  (NO RACE VARIABLE): >60 ML/MIN/1.73 M^2
FERRITIN SERPL-MCNC: 5 NG/ML (ref 20–300)
GLUCOSE SERPL-MCNC: 107 MG/DL (ref 70–110)
GLUCOSE UR QL STRIP: NEGATIVE
HCT VFR BLD AUTO: 25.9 % (ref 40–54)
HGB BLD-MCNC: 8 G/DL (ref 14–18)
HGB UR QL STRIP: NEGATIVE
HYALINE CASTS #/AREA URNS LPF: 0 /LPF
IMM GRANULOCYTES # BLD AUTO: 0.04 K/UL (ref 0–0.04)
IMM GRANULOCYTES NFR BLD AUTO: 0.2 % (ref 0–0.5)
KETONES UR QL STRIP: NEGATIVE
LEUKOCYTE ESTERASE UR QL STRIP: ABNORMAL
LYMPHOCYTES # BLD AUTO: 4.5 K/UL (ref 1–4.8)
LYMPHOCYTES NFR BLD: 27.8 % (ref 18–48)
MCH RBC QN AUTO: 21.2 PG (ref 27–31)
MCHC RBC AUTO-ENTMCNC: 30.9 G/DL (ref 32–36)
MCV RBC AUTO: 69 FL (ref 82–98)
MICROSCOPIC COMMENT: NORMAL
MONOCYTES # BLD AUTO: 1 K/UL (ref 0.3–1)
MONOCYTES NFR BLD: 6.4 % (ref 4–15)
NEUTROPHILS # BLD AUTO: 10.5 K/UL (ref 1.8–7.7)
NEUTROPHILS NFR BLD: 64.8 % (ref 38–73)
NITRITE UR QL STRIP: NEGATIVE
NRBC BLD-RTO: 0 /100 WBC
PH UR STRIP: 7 [PH] (ref 5–8)
PLATELET # BLD AUTO: 687 K/UL (ref 150–450)
PMV BLD AUTO: 9.4 FL (ref 9.2–12.9)
POTASSIUM SERPL-SCNC: 3.5 MMOL/L (ref 3.5–5.1)
PROT SERPL-MCNC: 7.2 G/DL (ref 6–8.4)
PROT UR QL STRIP: ABNORMAL
RBC # BLD AUTO: 3.77 M/UL (ref 4.6–6.2)
RBC #/AREA URNS HPF: 0 /HPF (ref 0–4)
SODIUM SERPL-SCNC: 140 MMOL/L (ref 136–145)
SP GR UR STRIP: 1.02 (ref 1–1.03)
SQUAMOUS #/AREA URNS HPF: 1 /HPF
URN SPEC COLLECT METH UR: ABNORMAL
UROBILINOGEN UR STRIP-ACNC: NEGATIVE EU/DL
WBC # BLD AUTO: 16.14 K/UL (ref 3.9–12.7)
WBC #/AREA URNS HPF: 1 /HPF (ref 0–5)

## 2023-05-28 PROCEDURE — 25000003 PHARM REV CODE 250: Performed by: NURSE PRACTITIONER

## 2023-05-28 PROCEDURE — 25000003 PHARM REV CODE 250: Performed by: EMERGENCY MEDICINE

## 2023-05-28 PROCEDURE — 63600175 PHARM REV CODE 636 W HCPCS: Performed by: EMERGENCY MEDICINE

## 2023-05-28 PROCEDURE — 87427 SHIGA-LIKE TOXIN AG IA: CPT | Performed by: NURSE PRACTITIONER

## 2023-05-28 PROCEDURE — G0378 HOSPITAL OBSERVATION PER HR: HCPCS

## 2023-05-28 PROCEDURE — 96376 TX/PRO/DX INJ SAME DRUG ADON: CPT

## 2023-05-28 PROCEDURE — 25000003 PHARM REV CODE 250: Performed by: INTERNAL MEDICINE

## 2023-05-28 PROCEDURE — 25000003 PHARM REV CODE 250: Performed by: STUDENT IN AN ORGANIZED HEALTH CARE EDUCATION/TRAINING PROGRAM

## 2023-05-28 PROCEDURE — 96365 THER/PROPH/DIAG IV INF INIT: CPT

## 2023-05-28 PROCEDURE — 82728 ASSAY OF FERRITIN: CPT | Performed by: INTERNAL MEDICINE

## 2023-05-28 PROCEDURE — S4991 NICOTINE PATCH NONLEGEND: HCPCS | Performed by: STUDENT IN AN ORGANIZED HEALTH CARE EDUCATION/TRAINING PROGRAM

## 2023-05-28 PROCEDURE — 99223 1ST HOSP IP/OBS HIGH 75: CPT | Mod: ,,, | Performed by: INTERNAL MEDICINE

## 2023-05-28 PROCEDURE — 12000002 HC ACUTE/MED SURGE SEMI-PRIVATE ROOM

## 2023-05-28 PROCEDURE — 87045 FECES CULTURE AEROBIC BACT: CPT | Performed by: NURSE PRACTITIONER

## 2023-05-28 PROCEDURE — 87449 NOS EACH ORGANISM AG IA: CPT | Performed by: NURSE PRACTITIONER

## 2023-05-28 PROCEDURE — 94760 N-INVAS EAR/PLS OXIMETRY 1: CPT

## 2023-05-28 PROCEDURE — 99223 PR INITIAL HOSPITAL CARE,LEVL III: ICD-10-PCS | Mod: 57,,, | Performed by: SURGERY

## 2023-05-28 PROCEDURE — S4991 NICOTINE PATCH NONLEGEND: HCPCS | Performed by: SURGERY

## 2023-05-28 PROCEDURE — 96361 HYDRATE IV INFUSION ADD-ON: CPT

## 2023-05-28 PROCEDURE — 99223 1ST HOSP IP/OBS HIGH 75: CPT | Mod: 57,,, | Performed by: SURGERY

## 2023-05-28 PROCEDURE — 63600175 PHARM REV CODE 636 W HCPCS: Performed by: NURSE PRACTITIONER

## 2023-05-28 PROCEDURE — 81000 URINALYSIS NONAUTO W/SCOPE: CPT | Performed by: EMERGENCY MEDICINE

## 2023-05-28 PROCEDURE — 80053 COMPREHEN METABOLIC PANEL: CPT | Performed by: NURSE PRACTITIONER

## 2023-05-28 PROCEDURE — 87046 STOOL CULTR AEROBIC BACT EA: CPT | Performed by: NURSE PRACTITIONER

## 2023-05-28 PROCEDURE — 85025 COMPLETE CBC W/AUTO DIFF WBC: CPT | Performed by: NURSE PRACTITIONER

## 2023-05-28 PROCEDURE — 25000003 PHARM REV CODE 250: Performed by: SURGERY

## 2023-05-28 PROCEDURE — 99223 PR INITIAL HOSPITAL CARE,LEVL III: ICD-10-PCS | Mod: ,,, | Performed by: INTERNAL MEDICINE

## 2023-05-28 PROCEDURE — 96366 THER/PROPH/DIAG IV INF ADDON: CPT

## 2023-05-28 PROCEDURE — 36415 COLL VENOUS BLD VENIPUNCTURE: CPT | Performed by: NURSE PRACTITIONER

## 2023-05-28 PROCEDURE — 63600175 PHARM REV CODE 636 W HCPCS: Performed by: SURGERY

## 2023-05-28 PROCEDURE — 63600175 PHARM REV CODE 636 W HCPCS: Performed by: STUDENT IN AN ORGANIZED HEALTH CARE EDUCATION/TRAINING PROGRAM

## 2023-05-28 PROCEDURE — 96375 TX/PRO/DX INJ NEW DRUG ADDON: CPT

## 2023-05-28 PROCEDURE — 36415 COLL VENOUS BLD VENIPUNCTURE: CPT | Performed by: INTERNAL MEDICINE

## 2023-05-28 PROCEDURE — 94761 N-INVAS EAR/PLS OXIMETRY MLT: CPT

## 2023-05-28 RX ORDER — NALOXONE HCL 0.4 MG/ML
0.02 VIAL (ML) INJECTION
Status: DISCONTINUED | OUTPATIENT
Start: 2023-05-28 | End: 2023-06-01 | Stop reason: HOSPADM

## 2023-05-28 RX ORDER — MAG HYDROX/ALUMINUM HYD/SIMETH 200-200-20
30 SUSPENSION, ORAL (FINAL DOSE FORM) ORAL 4 TIMES DAILY PRN
Status: DISCONTINUED | OUTPATIENT
Start: 2023-05-28 | End: 2023-06-01 | Stop reason: HOSPADM

## 2023-05-28 RX ORDER — DEXTROSE 40 %
15 GEL (GRAM) ORAL
Status: DISCONTINUED | OUTPATIENT
Start: 2023-05-28 | End: 2023-06-01 | Stop reason: HOSPADM

## 2023-05-28 RX ORDER — TALC
6 POWDER (GRAM) TOPICAL NIGHTLY PRN
Status: DISCONTINUED | OUTPATIENT
Start: 2023-05-28 | End: 2023-06-01 | Stop reason: HOSPADM

## 2023-05-28 RX ORDER — GLUCAGON 1 MG
1 KIT INJECTION
Status: DISCONTINUED | OUTPATIENT
Start: 2023-05-28 | End: 2023-06-01 | Stop reason: HOSPADM

## 2023-05-28 RX ORDER — SODIUM CHLORIDE 9 MG/ML
INJECTION, SOLUTION INTRAVENOUS CONTINUOUS
Status: DISCONTINUED | OUTPATIENT
Start: 2023-05-28 | End: 2023-05-31

## 2023-05-28 RX ORDER — POLYETHYLENE GLYCOL 3350, SODIUM CHLORIDE, SODIUM BICARBONATE, POTASSIUM CHLORIDE 420; 11.2; 5.72; 1.48 G/4L; G/4L; G/4L; G/4L
4000 POWDER, FOR SOLUTION ORAL ONCE
Status: COMPLETED | OUTPATIENT
Start: 2023-05-28 | End: 2023-05-28

## 2023-05-28 RX ORDER — ONDANSETRON 2 MG/ML
4 INJECTION INTRAMUSCULAR; INTRAVENOUS EVERY 6 HOURS PRN
Status: DISCONTINUED | OUTPATIENT
Start: 2023-05-28 | End: 2023-06-01 | Stop reason: HOSPADM

## 2023-05-28 RX ORDER — DEXTROSE 40 %
30 GEL (GRAM) ORAL
Status: DISCONTINUED | OUTPATIENT
Start: 2023-05-28 | End: 2023-06-01 | Stop reason: HOSPADM

## 2023-05-28 RX ORDER — MORPHINE SULFATE 10 MG/ML
10 INJECTION INTRAMUSCULAR; INTRAVENOUS; SUBCUTANEOUS
Status: COMPLETED | OUTPATIENT
Start: 2023-05-28 | End: 2023-05-28

## 2023-05-28 RX ORDER — MORPHINE SULFATE 2 MG/ML
6 INJECTION, SOLUTION INTRAMUSCULAR; INTRAVENOUS EVERY 4 HOURS PRN
Status: DISCONTINUED | OUTPATIENT
Start: 2023-05-28 | End: 2023-05-30

## 2023-05-28 RX ORDER — ACETAMINOPHEN 325 MG/1
650 TABLET ORAL EVERY 8 HOURS PRN
Status: DISCONTINUED | OUTPATIENT
Start: 2023-05-28 | End: 2023-06-01 | Stop reason: HOSPADM

## 2023-05-28 RX ORDER — MORPHINE SULFATE 2 MG/ML
2 INJECTION, SOLUTION INTRAMUSCULAR; INTRAVENOUS ONCE
Status: COMPLETED | OUTPATIENT
Start: 2023-05-28 | End: 2023-05-28

## 2023-05-28 RX ORDER — SODIUM CHLORIDE 0.9 % (FLUSH) 0.9 %
10 SYRINGE (ML) INJECTION EVERY 8 HOURS PRN
Status: DISCONTINUED | OUTPATIENT
Start: 2023-05-28 | End: 2023-06-01 | Stop reason: HOSPADM

## 2023-05-28 RX ORDER — SODIUM CHLORIDE 9 MG/ML
INJECTION, SOLUTION INTRAVENOUS ONCE
Status: COMPLETED | OUTPATIENT
Start: 2023-05-28 | End: 2023-05-28

## 2023-05-28 RX ORDER — PROCHLORPERAZINE EDISYLATE 5 MG/ML
5 INJECTION INTRAMUSCULAR; INTRAVENOUS EVERY 6 HOURS PRN
Status: DISCONTINUED | OUTPATIENT
Start: 2023-05-28 | End: 2023-06-01 | Stop reason: HOSPADM

## 2023-05-28 RX ORDER — SIMETHICONE 80 MG
1 TABLET,CHEWABLE ORAL 4 TIMES DAILY PRN
Status: DISCONTINUED | OUTPATIENT
Start: 2023-05-28 | End: 2023-06-01 | Stop reason: HOSPADM

## 2023-05-28 RX ORDER — MORPHINE SULFATE 4 MG/ML
4 INJECTION, SOLUTION INTRAMUSCULAR; INTRAVENOUS EVERY 4 HOURS PRN
Status: DISCONTINUED | OUTPATIENT
Start: 2023-05-28 | End: 2023-05-30

## 2023-05-28 RX ORDER — IBUPROFEN 200 MG
1 TABLET ORAL DAILY
Status: DISCONTINUED | OUTPATIENT
Start: 2023-05-28 | End: 2023-06-01 | Stop reason: HOSPADM

## 2023-05-28 RX ADMIN — NICOTINE 1 PATCH: 14 PATCH TRANSDERMAL at 06:05

## 2023-05-28 RX ADMIN — MORPHINE SULFATE 6 MG: 2 INJECTION, SOLUTION INTRAMUSCULAR; INTRAVENOUS at 09:05

## 2023-05-28 RX ADMIN — MORPHINE SULFATE 6 MG: 2 INJECTION, SOLUTION INTRAMUSCULAR; INTRAVENOUS at 07:05

## 2023-05-28 RX ADMIN — PIPERACILLIN AND TAZOBACTAM 4.5 G: 4; .5 INJECTION, POWDER, LYOPHILIZED, FOR SOLUTION INTRAVENOUS; PARENTERAL at 01:05

## 2023-05-28 RX ADMIN — PIPERACILLIN AND TAZOBACTAM 4.5 G: 4; .5 INJECTION, POWDER, LYOPHILIZED, FOR SOLUTION INTRAVENOUS; PARENTERAL at 02:05

## 2023-05-28 RX ADMIN — SODIUM CHLORIDE: 0.9 INJECTION, SOLUTION INTRAVENOUS at 04:05

## 2023-05-28 RX ADMIN — SODIUM CHLORIDE: 9 INJECTION, SOLUTION INTRAVENOUS at 04:05

## 2023-05-28 RX ADMIN — MORPHINE SULFATE 4 MG: 4 INJECTION INTRAVENOUS at 04:05

## 2023-05-28 RX ADMIN — MORPHINE SULFATE 10 MG: 10 INJECTION INTRAVENOUS at 01:05

## 2023-05-28 RX ADMIN — MORPHINE SULFATE 2 MG: 2 INJECTION, SOLUTION INTRAMUSCULAR; INTRAVENOUS at 05:05

## 2023-05-28 RX ADMIN — POLYETHYLENE GLYCOL 3350, SODIUM CHLORIDE, SODIUM BICARBONATE, POTASSIUM CHLORIDE 4000 ML: 420; 11.2; 5.72; 1.48 POWDER, FOR SOLUTION ORAL at 06:05

## 2023-05-28 RX ADMIN — ONDANSETRON 4 MG: 2 INJECTION INTRAMUSCULAR; INTRAVENOUS at 09:05

## 2023-05-28 RX ADMIN — MORPHINE SULFATE 6 MG: 2 INJECTION, SOLUTION INTRAMUSCULAR; INTRAVENOUS at 02:05

## 2023-05-28 RX ADMIN — PIPERACILLIN AND TAZOBACTAM 4.5 G: 4; .5 INJECTION, POWDER, LYOPHILIZED, FOR SOLUTION INTRAVENOUS; PARENTERAL at 09:05

## 2023-05-28 NOTE — PLAN OF CARE
[facility]  Initial Discharge Assessment       Primary Care Provider:  No provider    Admission Diagnosis: RLQ abdominal pain [R10.31]  Chest pain [R07.9]  Leukocytosis, unspecified type [D72.829]    Admission Date: 5/27/2023  Expected Discharge Date:  to be determined    Met with pt at bedside to complete discharge assessment, verified pharmacy and information on facesheet.  Pt has no PCP.  No HH, DME or dialysis. Jan Roa will drive pt home.  Pt is uninsured and needs identified at this time.    Transition of Care Barriers: Unisured    Payor: /  NONE    Extended Emergency Contact Information  Primary Emergency Contact: PauletteJan  Mobile Phone: [phone]  Relation: Sister  Preferred language: English   needed? No    Discharge Plan A: Home  Discharge Plan B: Home      [id number] - [place], [place] - [address]  [address]  [place] [address]  Phone: [phone] Fax: [phone]      Initial Assessment (most recent)       Adult Discharge Assessment - 05/28/23 0851          Discharge Assessment    Assessment Type Discharge Planning Assessment     Confirmed/corrected address, phone number and insurance Yes     Confirmed Demographics Correct on Facesheet     Source of Information patient     Communicated GABRIEL with patient/caregiver No     People in Home sibling(s)     Do you expect to return to your current living situation? Yes     Do you have help at home or someone to help you manage your care at home? Yes     Who are your caregiver(s) and their phone number(s)? Jan roa     Prior to hospitilization cognitive status: Alert/Oriented     Current cognitive status: Alert/Oriented     Home Accessibility wheelchair accessible     Home Layout Able to live on 1st floor     Equipment Currently Used at Home none     Readmission within 30 days? No     Patient currently being followed by outpatient case management? No     Do you currently have service(s) that help you  manage your care at home? No     Do you take prescription medications? No     Do you have prescription coverage? No     Do you have any problems affording any of your prescribed medications? No     Who is going to help you get home at discharge? sister, Jan     How do you get to doctors appointments? car, drives self     Are you on dialysis? No     Do you take coumadin? No     Discharge Plan A Home     Discharge Plan B Home     DME Needed Upon Discharge  none     Discharge Plan discussed with: Patient     Transition of Care Barriers Unisured        Physical Activity    On average, how many days per week do you engage in moderate to strenuous exercise (like a brisk walk)? 5 days     On average, how many minutes do you engage in exercise at this level? 90 min        Financial Resource Strain    How hard is it for you to pay for the very basics like food, housing, medical care, and heating? Not hard at all        Housing Stability    In the last 12 months, was there a time when you were not able to pay the mortgage or rent on time? No     In the last 12 months, was there a time when you did not have a steady place to sleep or slept in a shelter (including now)? No        Transportation Needs    In the past 12 months, has lack of transportation kept you from medical appointments or from getting medications? No     In the past 12 months, has lack of transportation kept you from meetings, work, or from getting things needed for daily living? No        Food Insecurity    Within the past 12 months, you worried that your food would run out before you got the money to buy more. Never true     Within the past 12 months, the food you bought just didn't last and you didn't have money to get more. Never true        Social Connections    In a typical week, how many times do you talk on the phone with family, friends, or neighbors? Never     How often do you get together with friends or relatives? More than three times a week      How often do you attend Restorationism or Zoroastrian services? Never     Do you belong to any clubs or organizations such as Restorationism groups, unions, fraternal or athletic groups, or school groups? No     How often do you attend meetings of the clubs or organizations you belong to? Never     Are you , , , , never , or living with a partner?         Alcohol Use    Q1: How often do you have a drink containing alcohol? Never     Q2: How many drinks containing alcohol do you have on a typical day when you are drinking? Patient does not drink     Q3: How often do you have six or more drinks on one occasion? Never

## 2023-05-28 NOTE — SUBJECTIVE & OBJECTIVE
Past Medical History:   Diagnosis Date    Diabetes mellitus, type 2 05/2020    Hypertension        Past Surgical History:   Procedure Laterality Date    EYE SURGERY Right     LUMBAR DISC SURGERY  2005       Review of patient's allergies indicates:  No Known Allergies    No current facility-administered medications on file prior to encounter.     Current Outpatient Medications on File Prior to Encounter   Medication Sig    atorvastatin (LIPITOR) 10 MG tablet Take 1 tablet (10 mg total) by mouth once daily.    ciprofloxacin HCl (CIPRO) 500 MG tablet Take 1 tablet (500 mg total) by mouth every 12 (twelve) hours. for 10 days    ketorolac (TORADOL) 10 mg tablet Take 1 tablet (10 mg total) by mouth every 6 (six) hours as needed for Pain.    lisinopriL (PRINIVIL,ZESTRIL) 2.5 MG tablet Take 1 tablet (2.5 mg total) by mouth once daily.    metFORMIN (GLUCOPHAGE) 1000 MG tablet Take 1 tablet (1,000 mg total) by mouth 2 (two) times daily with meals.    metroNIDAZOLE (FLAGYL) 500 MG tablet Take 1 tablet (500 mg total) by mouth every 8 (eight) hours. for 10 days    ondansetron (ZOFRAN-ODT) 4 MG TbDL Take 1 tablet (4 mg total) by mouth every 6 (six) hours as needed (Nausea).    ondansetron (ZOFRAN-ODT) 4 MG TbDL Take 1 tablet (4 mg total) by mouth every 6 (six) hours as needed.    oxyCODONE-acetaminophen (PERCOCET)  mg per tablet Take 1 tablet by mouth every 6 (six) hours as needed for Pain.    predniSONE (DELTASONE) 10 MG tablet Take 4 tablets (40 mg total) by mouth once daily for 7 days, THEN 3 tablets (30 mg total) once daily for 7 days, THEN 2 tablets (20 mg total) once daily for 7 days, THEN 1 tablet (10 mg total) once daily for 7 days.     Family History       Problem Relation (Age of Onset)    Diabetes Mother, Maternal Grandfather    No Known Problems Father          Tobacco Use    Smoking status: Every Day     Packs/day: 0.50     Years: 23.00     Pack years: 11.50     Types: Cigarettes    Smokeless tobacco: Never    Substance and Sexual Activity    Alcohol use: Yes     Alcohol/week: 48.0 standard drinks     Types: 48 Cans of beer per week    Drug use: Yes     Types: Marijuana    Sexual activity: Yes     Review of Systems   Constitutional:  Negative for activity change, appetite change, chills and fever.   HENT:  Negative for congestion, sore throat and trouble swallowing.    Eyes:  Negative for photophobia and visual disturbance.   Respiratory:  Negative for cough, chest tightness and shortness of breath.    Cardiovascular:  Negative for chest pain, palpitations and leg swelling.   Gastrointestinal:  Positive for abdominal pain, diarrhea, nausea and vomiting.   Genitourinary:  Negative for dysuria, flank pain and hematuria.   Musculoskeletal:  Negative for back pain.   Neurological:  Negative for dizziness, weakness and headaches.   Psychiatric/Behavioral:  Negative for confusion.    Objective:     Vital Signs (Most Recent):  Temp: 97.5 °F (36.4 °C) (05/27/23 2250)  Pulse: (!) 57 (05/28/23 0252)  Resp: 20 (05/28/23 0108)  BP: 113/71 (05/28/23 0230)  SpO2: 99 % (05/28/23 0252) Vital Signs (24h Range):  Temp:  [97.5 °F (36.4 °C)] 97.5 °F (36.4 °C)  Pulse:  [50-60] 57  Resp:  [18-20] 20  SpO2:  [97 %-99 %] 99 %  BP: (113-152)/(66-74) 113/71     Weight: 120.2 kg (265 lb)  Body mass index is 33.12 kg/m².     Physical Exam  Vitals reviewed.   Constitutional:       Appearance: Normal appearance. He is normal weight.   HENT:      Head: Normocephalic.      Mouth/Throat:      Mouth: Mucous membranes are dry.      Pharynx: Oropharynx is clear.   Eyes:      Pupils: Pupils are equal, round, and reactive to light.   Cardiovascular:      Rate and Rhythm: Normal rate and regular rhythm.      Pulses: Normal pulses.      Heart sounds: Normal heart sounds.   Pulmonary:      Effort: Pulmonary effort is normal.      Breath sounds: Normal breath sounds.   Abdominal:      General: Bowel sounds are normal.      Palpations: Abdomen is soft.       Tenderness: There is abdominal tenderness in the right upper quadrant, right lower quadrant and epigastric area.   Musculoskeletal:         General: Normal range of motion.      Cervical back: Normal range of motion.   Skin:     General: Skin is warm and dry.   Neurological:      Mental Status: He is alert and oriented to person, place, and time. Mental status is at baseline.   Psychiatric:         Mood and Affect: Mood normal.         Speech: Speech normal.         Behavior: Behavior normal.            CRANIAL NERVES     CN III, IV, VI   Pupils are equal, round, and reactive to light.     Significant Labs: All pertinent labs within the past 24 hours have been reviewed.  CBC:   Recent Labs   Lab 05/27/23  2309   WBC 19.84*   HGB 8.2*   HCT 26.8*   *     CMP:   Recent Labs   Lab 05/27/23  2309      K 3.9      CO2 25   *   BUN 14   CREATININE 1.0   CALCIUM 9.3   PROT 7.5   ALBUMIN 3.6   BILITOT 0.7   ALKPHOS 61   AST 21   ALT 20   ANIONGAP 10       Significant Imaging: I have reviewed all pertinent imaging results/findings within the past 24 hours.

## 2023-05-28 NOTE — ASSESSMENT & PLAN NOTE
Patient's FSGs are controlled with diet  Last A1c reviewed-   Lab Results   Component Value Date    HGBA1C 4.8 09/14/2021

## 2023-05-28 NOTE — H&P
[facility]  [facility]  History & Physical    Patient Name: Ruslan Caldwell  MRN: [id number]  Patient Class: OP- Observation  Admission Date: 5/27/2023  Attending Physician: Edil Steele MD   Primary Care Provider: Amira Knutson NP         Patient information was obtained from patient, past medical records and ER records.     Subjective:     Principal Problem:RLQ abdominal pain    Chief Complaint:   Chief Complaint   Patient presents with    Abdominal Pain     Pt c/o abdominal pain Rt sided x2 week         HPI: Ruslan Caldwell is a 47 year old male with a past medical history of hypertension and DM type 2 who presents to the ED complaining of right-sided abdominal pain for the past 4-5 months.  He reports associated nausea and diarrhea.  Patient states he was seen at [facility] ED 3 times this week  (5/23, 5/24, & 5/25) with same symptoms and still has no relief.  Per chart review, patient was referred to GI outpatient and has an appointment on 06/01/2023.  Patient had 3 CT abdomen during these visits with most recent showing decreased inflammatory fat stranding in the right lower quadrant with persistent marked wall thickening in the cecum and terminal ileum, centered at the level of the ileocecal valve with findings concerning for a neoplasm centered at the ileocecal junction, although acute appendicitis, Crohn's disease are differential possibilities.  ED workup revealed elevated WBC 19.8, hemoglobin 8.2 hematocrit 26.8 and thrombocytosis 681.  Patient was started on IV Zosyn and received IV pain medication.  ED provider spoke to Dr. Michael, General surgery, agreed consult and patient was referred to Hospital Medicine.  Patient seen in the ED with sister at bedside.  Patient states pain is currently controlled with IV morphine and he denies current nausea, vomiting and recent diarrhea.  Patient states he had 1 episode of hematemesis on Wednesday and denies  blood in stool.  Patient denies chest pain, shortness of breath, fever and chills.  Patient will be admitted to Hospital Medicine for further evaluation management.          Past Medical History:   Diagnosis Date    Diabetes mellitus, type 2 05/2020    Hypertension        Past Surgical History:   Procedure Laterality Date    EYE SURGERY Right     LUMBAR DISC SURGERY  2005       Review of patient's allergies indicates:  No Known Allergies    No current facility-administered medications on file prior to encounter.     Current Outpatient Medications on File Prior to Encounter   Medication Sig    atorvastatin (LIPITOR) 10 MG tablet Take 1 tablet (10 mg total) by mouth once daily.    ciprofloxacin HCl (CIPRO) 500 MG tablet Take 1 tablet (500 mg total) by mouth every 12 (twelve) hours. for 10 days    ketorolac (TORADOL) 10 mg tablet Take 1 tablet (10 mg total) by mouth every 6 (six) hours as needed for Pain.    lisinopriL (PRINIVIL,ZESTRIL) 2.5 MG tablet Take 1 tablet (2.5 mg total) by mouth once daily.    metFORMIN (GLUCOPHAGE) 1000 MG tablet Take 1 tablet (1,000 mg total) by mouth 2 (two) times daily with meals.    metroNIDAZOLE (FLAGYL) 500 MG tablet Take 1 tablet (500 mg total) by mouth every 8 (eight) hours. for 10 days    ondansetron (ZOFRAN-ODT) 4 MG TbDL Take 1 tablet (4 mg total) by mouth every 6 (six) hours as needed (Nausea).    ondansetron (ZOFRAN-ODT) 4 MG TbDL Take 1 tablet (4 mg total) by mouth every 6 (six) hours as needed.    oxyCODONE-acetaminophen (PERCOCET)  mg per tablet Take 1 tablet by mouth every 6 (six) hours as needed for Pain.    predniSONE (DELTASONE) 10 MG tablet Take 4 tablets (40 mg total) by mouth once daily for 7 days, THEN 3 tablets (30 mg total) once daily for 7 days, THEN 2 tablets (20 mg total) once daily for 7 days, THEN 1 tablet (10 mg total) once daily for 7 days.     Family History       Problem Relation (Age of Onset)    Diabetes Mother, Maternal Grandfather     No Known Problems Father          Tobacco Use    Smoking status: Every Day     Packs/day: 0.50     Years: 23.00     Pack years: 11.50     Types: Cigarettes    Smokeless tobacco: Never   Substance and Sexual Activity    Alcohol use: Yes     Alcohol/week: 48.0 standard drinks     Types: 48 Cans of beer per week    Drug use: Yes     Types: Marijuana    Sexual activity: Yes     Review of Systems   Constitutional:  Negative for activity change, appetite change, chills and fever.   HENT:  Negative for congestion, sore throat and trouble swallowing.    Eyes:  Negative for photophobia and visual disturbance.   Respiratory:  Negative for cough, chest tightness and shortness of breath.    Cardiovascular:  Negative for chest pain, palpitations and leg swelling.   Gastrointestinal:  Positive for abdominal pain, diarrhea, nausea and vomiting.   Genitourinary:  Negative for dysuria, flank pain and hematuria.   Musculoskeletal:  Negative for back pain.   Neurological:  Negative for dizziness, weakness and headaches.   Psychiatric/Behavioral:  Negative for confusion.    Objective:     Vital Signs (Most Recent):  Temp: 97.5 °F (36.4 °C) (05/27/23 2250)  Pulse: (!) 57 (05/28/23 0252)  Resp: 20 (05/28/23 0108)  BP: 113/71 (05/28/23 0230)  SpO2: 99 % (05/28/23 0252) Vital Signs (24h Range):  Temp:  [97.5 °F (36.4 °C)] 97.5 °F (36.4 °C)  Pulse:  [50-60] 57  Resp:  [18-20] 20  SpO2:  [97 %-99 %] 99 %  BP: (113-152)/(66-74) 113/71     Weight: 120.2 kg (265 lb)  Body mass index is 33.12 kg/m².     Physical Exam  Vitals reviewed.   Constitutional:       Appearance: Normal appearance. He is normal weight.   HENT:      Head: Normocephalic.      Mouth/Throat:      Mouth: Mucous membranes are dry.      Pharynx: Oropharynx is clear.   Eyes:      Pupils: Pupils are equal, round, and reactive to light.   Cardiovascular:      Rate and Rhythm: Normal rate and regular rhythm.      Pulses: Normal pulses.      Heart sounds: Normal heart sounds.    Pulmonary:      Effort: Pulmonary effort is normal.      Breath sounds: Normal breath sounds.   Abdominal:      General: Bowel sounds are normal.      Palpations: Abdomen is soft.      Tenderness: There is abdominal tenderness in the right upper quadrant, right lower quadrant and epigastric area.   Musculoskeletal:         General: Normal range of motion.      Cervical back: Normal range of motion.   Skin:     General: Skin is warm and dry.   Neurological:      Mental Status: He is alert and oriented to person, place, and time. Mental status is at baseline.   Psychiatric:         Mood and Affect: Mood normal.         Speech: Speech normal.         Behavior: Behavior normal.            CRANIAL NERVES     CN III, IV, VI   Pupils are equal, round, and reactive to light.     Significant Labs: All pertinent labs within the past 24 hours have been reviewed.  CBC:   Recent Labs   Lab 05/27/23  2309   WBC 19.84*   HGB 8.2*   HCT 26.8*   *     CMP:   Recent Labs   Lab 05/27/23  2309      K 3.9      CO2 25   *   BUN 14   CREATININE 1.0   CALCIUM 9.3   PROT 7.5   ALBUMIN 3.6   BILITOT 0.7   ALKPHOS 61   AST 21   ALT 20   ANIONGAP 10       Significant Imaging: I have reviewed all pertinent imaging results/findings within the past 24 hours.    Assessment/Plan:     * RLQ abdominal pain  -General Surgery consult, recommendations appreciated  -GI consult, recommendations appreciated  -IV Zosyn  -IV PRN pain medication antiemetic  -IV NS      Thrombocytosis  History noted      -Trend platelets      Microcytic anemia  Patient's anemia is currently controlled. Has not received any PRBCs to date..  Etiology due to microcytic anemia  Current CBC reviewed-   Lab Results   Component Value Date    HGB 8.2 (L) 05/27/2023    HCT 26.8 (L) 05/27/2023     Monitor serial CBC and transfuse if patient becomes hemodynamically unstable, symptomatic or H/H drops below 7/21.         Essential hypertension  Chronic,  controlled with diet.  Latest blood pressure and vitals reviewed-     Temp:  [97.5 °F (36.4 °C)]   Pulse:  [50-60]   Resp:  [18-20]   BP: (113-152)/(66-74)   SpO2:  [97 %-99 %] .   Home meds for hypertension were reviewed and noted below.   Hypertension Medications             lisinopriL (PRINIVIL,ZESTRIL) 2.5 MG tablet Take 1 tablet (2.5 mg total) by mouth once daily.          While in the hospital, will manage blood pressure as follows; Continue home antihypertensive regimen    Will utilize p.r.n. blood pressure medication only if patient's blood pressure greater than 180/110 and he develops symptoms such as worsening chest pain or shortness of breath.        Type 2 diabetes mellitus without complication, without long-term current use of insulin  Patient's FSGs are controlled with diet  Last A1c reviewed-   Lab Results   Component Value Date    HGBA1C 4.8 09/14/2021         VTE Risk Mitigation (From admission, onward)         Ordered     Reason for No Pharmacological VTE Prophylaxis  Once        Question:  Reasons:  Answer:  Risk of Bleeding    05/28/23 0239     IP VTE HIGH RISK PATIENT  Once         05/28/23 0239     Place sequential compression device  Until discontinued         05/28/23 0239                 .      Cindy Abad NP  Department of Hospital Medicine  [facility] - Emergency Dept

## 2023-05-28 NOTE — ASSESSMENT & PLAN NOTE
-General Surgery consult, recommendations appreciated  -GI consult, recommendations appreciated  -IV Zosyn  -IV PRN pain medication antiemetic  -IV NS

## 2023-05-28 NOTE — CONSULTS
CC: abdominal pain    HPI 47 y.o. male with hypertension and DMII who is here with complaints of ongoing right-sided abdominal pain for the past 4-5 months associated with nausea and watery diarrhea. He has been seen at [facility] ED 3 times this week  (5/23, 5/24, & 5/25) with these symptoms. He has had 3 CT abdomens during these visits with inflammatory stranding, persistent wall thickening in the cecum and terminal ileum, near level of the ileocecal valve with findings concerning for a neoplasm centered at the ileocecal junction.    States he continues to have right lower quadrant abdominal pain. No blood reported in stool. Never had colonoscopy. Has had multiple family members with colon cancer in the family. H/H on admission 8.2/26.8. MCV 68. Plt 681. Last normal H/H noted in 2021.     Past Medical History:   Diagnosis Date    Diabetes mellitus, type 2 05/2020    Hypertension      Past Surgical History:   Procedure Laterality Date    EYE SURGERY Right     LUMBAR DISC SURGERY  2005     Social History  Social History     Tobacco Use    Smoking status: Every Day     Packs/day: 0.50     Years: 23.00     Pack years: 11.50     Types: Cigarettes    Smokeless tobacco: Never   Substance Use Topics    Alcohol use: Yes     Alcohol/week: 48.0 standard drinks     Types: 48 Cans of beer per week    Drug use: Yes     Types: Marijuana     Family History   Problem Relation Age of Onset    Diabetes Mother     Diabetes Maternal Grandfather     No Known Problems Father      Review of Systems  General ROS: negative for chills, fever or weight loss  Psychological ROS: negative for hallucination, depression or suicidal ideation  Ophthalmic ROS: negative for blurry vision, photophobia or eye pain  ENT ROS: negative for epistaxis, sore throat or rhinorrhea  Respiratory ROS: no cough, shortness of breath, or wheezing  Cardiovascular ROS: no chest pain or dyspnea on exertion  Gastrointestinal ROS:+ abdominal pain, +change in bowel  "habits, no bloody stools  Genito-Urinary ROS: no dysuria, trouble voiding, or hematuria  Musculoskeletal ROS: negative for gait disturbance or muscular weakness  Neurological ROS: no syncope or seizures; no ataxia  Dermatological ROS: negative for pruritis, rash and jaundice    Physical Examination  BP (!) 168/84 (BP Location: Right arm, Patient Position: Sitting)   Pulse (!) 58   Temp 97.6 °F (36.4 °C) (Axillary)   Resp 18   Ht 6' 3" (1.905 m)   Wt 102.3 kg (225 lb 8 oz)   SpO2 100%   BMI 28.19 kg/m²   General appearance: alert, cooperative, no distress  HENT: Normocephalic, atraumatic, neck symmetrical, no nasal discharge   Eyes: conjunctivae/corneas clear, PERRL, EOM's intact  Lungs: clear to auscultation bilaterally, symmetric chest wall expansion bilaterally  Heart: regular rate and rhythm without rub; no displacement of the PMI   Abdomen: soft, tender to palpation in RLQ, bowel sounds normoactive; no organomegaly  Extremities: extremities symmetric; no clubbing, cyanosis, or edema  Integument: Skin color, texture, turgor normal; no rashes; hair distrubution normal  Neurologic: Alert and oriented X 3, did not test strength  Psychiatric: no pressured speech; normal affect; no evidence of impaired cognition     Labs:  Lab Results   Component Value Date    WBC 16.14 (H) 05/28/2023    HGB 8.0 (L) 05/28/2023    HCT 25.9 (L) 05/28/2023    MCV 69 (L) 05/28/2023     (H) 05/28/2023       CMP  Sodium   Date Value Ref Range Status   05/28/2023 140 136 - 145 mmol/L Final     Potassium   Date Value Ref Range Status   05/28/2023 3.5 3.5 - 5.1 mmol/L Final     Chloride   Date Value Ref Range Status   05/28/2023 103 95 - 110 mmol/L Final     CO2   Date Value Ref Range Status   05/28/2023 29 23 - 29 mmol/L Final     Glucose   Date Value Ref Range Status   05/28/2023 107 70 - 110 mg/dL Final     BUN   Date Value Ref Range Status   05/28/2023 12 6 - 20 mg/dL Final     Creatinine   Date Value Ref Range Status "   05/28/2023 1.0 0.5 - 1.4 mg/dL Final     Calcium   Date Value Ref Range Status   05/28/2023 9.2 8.7 - 10.5 mg/dL Final     Total Protein   Date Value Ref Range Status   05/28/2023 7.2 6.0 - 8.4 g/dL Final     Albumin   Date Value Ref Range Status   05/28/2023 3.4 (L) 3.5 - 5.2 g/dL Final     Total Bilirubin   Date Value Ref Range Status   05/28/2023 0.7 0.1 - 1.0 mg/dL Final     Comment:     For infants and newborns, interpretation of results should be based  on gestational age, weight and in agreement with clinical  observations.    Premature Infant recommended reference ranges:  Up to 24 hours.............<8.0 mg/dL  Up to 48 hours............<12.0 mg/dL  3-5 days..................<15.0 mg/dL  6-29 days.................<15.0 mg/dL       Alkaline Phosphatase   Date Value Ref Range Status   05/28/2023 63 55 - 135 U/L Final     AST   Date Value Ref Range Status   05/28/2023 19 10 - 40 U/L Final     ALT   Date Value Ref Range Status   05/28/2023 20 10 - 44 U/L Final     Anion Gap   Date Value Ref Range Status   05/28/2023 8 8 - 16 mmol/L Final     eGFR if    Date Value Ref Range Status   09/18/2021 >60.0 >60 mL/min/1.73 m^2 Final     eGFR if non    Date Value Ref Range Status   09/18/2021 >60.0 >60 mL/min/1.73 m^2 Final     Comment:     Calculation used to obtain the estimated glomerular filtration  rate (eGFR) is the CKD-EPI equation.          Imaging:  CT A/P:  Impression:     Decreased inflammatory fat stranding in the right lower quadrant with persistent marked wall thickening in the cecum and terminal ileum, centered at the level of the ileocecal valve.  On the current study findings are most concerning for a neoplasm centered at the ileocecal junction, although acute appendicitis, Crohn's disease are differential possibilities.     Independently reviewed    Assessment:   RLQ abdominal pain  Abnormal imaging   Microcytic anemia, suspected to be from CHAD      Plan:   -Agree with  antibiotics  -Plan colonoscopy tomorrow  -Clear liquid diet  -Check ferritin  -Recommendations to follow endoscopy    Billy Uriostegui MD  [facility]  [address] [place] [address]  Office: [phone]  Fax: [phone]

## 2023-05-28 NOTE — NURSING
Notified Cindy Abad NP of patient and situation, ISBAR provided, explained to provider that patient just received 4mg of morphine at 0408 which is ordered q4hrs; however, patient has c/o 10/10 RLQ abdominal pain still.  Provider placed order. See orders/MAR.

## 2023-05-28 NOTE — SUBJECTIVE & OBJECTIVE
No current facility-administered medications on file prior to encounter.     Current Outpatient Medications on File Prior to Encounter   Medication Sig    [DISCONTINUED] atorvastatin (LIPITOR) 10 MG tablet Take 1 tablet (10 mg total) by mouth once daily.    [DISCONTINUED] ciprofloxacin HCl (CIPRO) 500 MG tablet Take 1 tablet (500 mg total) by mouth every 12 (twelve) hours. for 10 days    [DISCONTINUED] ketorolac (TORADOL) 10 mg tablet Take 1 tablet (10 mg total) by mouth every 6 (six) hours as needed for Pain.    [DISCONTINUED] lisinopriL (PRINIVIL,ZESTRIL) 2.5 MG tablet Take 1 tablet (2.5 mg total) by mouth once daily.    [DISCONTINUED] metFORMIN (GLUCOPHAGE) 1000 MG tablet Take 1 tablet (1,000 mg total) by mouth 2 (two) times daily with meals.    [DISCONTINUED] metroNIDAZOLE (FLAGYL) 500 MG tablet Take 1 tablet (500 mg total) by mouth every 8 (eight) hours. for 10 days    [DISCONTINUED] ondansetron (ZOFRAN-ODT) 4 MG TbDL Take 1 tablet (4 mg total) by mouth every 6 (six) hours as needed (Nausea).    [DISCONTINUED] ondansetron (ZOFRAN-ODT) 4 MG TbDL Take 1 tablet (4 mg total) by mouth every 6 (six) hours as needed.    [DISCONTINUED] oxyCODONE-acetaminophen (PERCOCET)  mg per tablet Take 1 tablet by mouth every 6 (six) hours as needed for Pain.    [DISCONTINUED] predniSONE (DELTASONE) 10 MG tablet Take 4 tablets (40 mg total) by mouth once daily for 7 days, THEN 3 tablets (30 mg total) once daily for 7 days, THEN 2 tablets (20 mg total) once daily for 7 days, THEN 1 tablet (10 mg total) once daily for 7 days.       Review of patient's allergies indicates:  No Known Allergies    Past Medical History:   Diagnosis Date    Diabetes mellitus, type 2 05/2020    Hypertension      Past Surgical History:   Procedure Laterality Date    EYE SURGERY Right     LUMBAR DISC SURGERY  2005     Family History       Problem Relation (Age of Onset)    Diabetes Mother, Maternal Grandfather    No Known Problems Father           Tobacco Use    Smoking status: Every Day     Packs/day: 0.50     Years: 23.00     Pack years: 11.50     Types: Cigarettes    Smokeless tobacco: Never   Substance and Sexual Activity    Alcohol use: Yes     Alcohol/week: 48.0 standard drinks     Types: 48 Cans of beer per week    Drug use: Yes     Types: Marijuana    Sexual activity: Yes     Review of Systems   Constitutional:  Negative for activity change, appetite change, chills and fever.   HENT:  Negative for congestion, sore throat and trouble swallowing.    Eyes:  Negative for photophobia and visual disturbance.   Respiratory:  Negative for cough, chest tightness and shortness of breath.    Cardiovascular:  Negative for chest pain, palpitations and leg swelling.   Gastrointestinal:  Positive for abdominal pain, diarrhea, nausea and vomiting.   Genitourinary:  Negative for dysuria, flank pain and hematuria.   Musculoskeletal:  Negative for back pain.   Neurological:  Negative for dizziness, weakness and headaches.   Psychiatric/Behavioral:  Negative for confusion.    Objective:     Vital Signs (Most Recent):  Temp: 97.2 °F (36.2 °C) (05/28/23 0817)  Pulse: (!) 49 (05/28/23 0817)  Resp: (!) 25 (05/28/23 0921)  BP: 127/77 (05/28/23 0817)  SpO2: 99 % (05/28/23 0817) Vital Signs (24h Range):  Temp:  [97.2 °F (36.2 °C)-97.7 °F (36.5 °C)] 97.2 °F (36.2 °C)  Pulse:  [47-60] 49  Resp:  [16-25] 25  SpO2:  [97 %-100 %] 99 %  BP: (113-152)/(66-85) 127/77     Weight: 102.3 kg (225 lb 8 oz)  Body mass index is 28.19 kg/m².     Physical Exam  Vitals reviewed.   Constitutional:       Appearance: Normal appearance. He is normal weight.   HENT:      Head: Normocephalic.      Mouth/Throat:      Mouth: Mucous membranes are dry.      Pharynx: Oropharynx is clear.   Eyes:      Pupils: Pupils are equal, round, and reactive to light.   Cardiovascular:      Rate and Rhythm: Normal rate and regular rhythm.      Pulses: Normal pulses.      Heart sounds: Normal heart sounds.    Pulmonary:      Effort: Pulmonary effort is normal.      Breath sounds: Normal breath sounds.   Abdominal:      General: Abdomen is flat. Bowel sounds are normal. There is no distension.      Palpations: Abdomen is soft. There is no mass.      Tenderness: There is abdominal tenderness in the right upper quadrant, right lower quadrant and epigastric area. There is guarding (localized to right lower quadrant). There is no rebound.      Hernia: No hernia is present.   Musculoskeletal:         General: Normal range of motion.      Cervical back: Normal range of motion.   Skin:     General: Skin is warm and dry.   Neurological:      Mental Status: He is alert and oriented to person, place, and time. Mental status is at baseline.   Psychiatric:         Mood and Affect: Mood normal.         Speech: Speech normal.         Behavior: Behavior normal.          I have reviewed all pertinent lab results within the past 24 hours.  CBC:   Recent Labs   Lab 05/28/23  0335   WBC 16.14*   RBC 3.77*   HGB 8.0*   HCT 25.9*   *   MCV 69*   MCH 21.2*   MCHC 30.9*     BMP:   Recent Labs   Lab 05/24/23  0446 05/25/23  1903 05/28/23  0334   GLU 86   < > 107      < > 140   K 3.5   < > 3.5      < > 103   CO2 26   < > 29   BUN 10   < > 12   CREATININE 1.0   < > 1.0   CALCIUM 8.4*   < > 9.2   MG 1.7  --   --     < > = values in this interval not displayed.       Significant Diagnostics:  I have reviewed all pertinent imaging results/findings within the past 24 hours.  CT: I have reviewed all pertinent results/findings within the past 24 hours and my personal findings are:  mainly cecal and small bowel wall thickening questionable surrounding inflammation

## 2023-05-28 NOTE — ED PROVIDER NOTES
Encounter Date: 5/27/2023    SCRIBE #1 NOTE: Cade SWEET, am scribing for, and in the presence of,  Juan Doshi MD.     History     Chief Complaint   Patient presents with    Abdominal Pain     Pt c/o abdominal pain Rt sided x2 week      Time seen by provider: 12:08 AM on 05/28/2023    Ruslan Caldwell is a 47 y.o. male who presents to the ED with right sided abdominal pain. The patient reports being seen at [facility] in [place] a few days ago and had 2 CTs done to rule out appendicitis. History obtained from independent historian: The patient's sister reports that the patient was only treated for pain and was then advised to come here to the ER if the pain persisted. She notes that the patient has an upcoming appointment with his gastroenterologist on 6/1/23. The patient also endorses some diarrhea and hematemesis. The patient denies fever or any other symptoms at this time. PMHx of HTN and DM type II. No pertinent PSHx.    The history is provided by the patient and a relative.   Review of patient's allergies indicates:  No Known Allergies  Past Medical History:   Diagnosis Date    Diabetes mellitus, type 2 05/2020    Hypertension      Past Surgical History:   Procedure Laterality Date    EYE SURGERY Right     LUMBAR DISC SURGERY  2005     Family History   Problem Relation Age of Onset    Diabetes Mother     Diabetes Maternal Grandfather     No Known Problems Father      Social History     Tobacco Use    Smoking status: Every Day     Packs/day: 0.50     Years: 23.00     Pack years: 11.50     Types: Cigarettes    Smokeless tobacco: Never   Substance Use Topics    Alcohol use: Yes     Alcohol/week: 48.0 standard drinks     Types: 48 Cans of beer per week    Drug use: Yes     Types: Marijuana     Review of Systems   Constitutional:  Negative for fever.   HENT:  Negative for sore throat.    Respiratory:  Negative for shortness of breath.    Cardiovascular:  Negative for chest pain.   Gastrointestinal:   Positive for abdominal pain, diarrhea, nausea and vomiting.   Genitourinary:  Negative for dysuria.   Musculoskeletal:  Negative for back pain.   Skin:  Negative for rash.   Neurological:  Negative for weakness.   Hematological:  Does not bruise/bleed easily.     Physical Exam     Initial Vitals [05/27/23 2250]   BP Pulse Resp Temp SpO2   137/66 (!) 57 18 97.5 °F (36.4 °C) 98 %      MAP       --         Physical Exam    Nursing note and vitals reviewed.  Constitutional: Vital signs are normal. He appears well-developed and well-nourished.  Non-toxic appearance. No distress.   HENT:   Head: Normocephalic and atraumatic.   Eyes: EOM are normal. Pupils are equal, round, and reactive to light.   Neck: Neck supple. No JVD present.   Normal range of motion.  Cardiovascular:  Normal rate, regular rhythm, normal heart sounds and intact distal pulses.     Exam reveals no gallop and no friction rub.       No murmur heard.  Pulmonary/Chest: Breath sounds normal. He has no wheezes. He has no rhonchi. He has no rales.   Abdominal: Abdomen is soft. Bowel sounds are normal. There is abdominal tenderness in the right upper quadrant and right lower quadrant. There is guarding. There is no rebound.   Musculoskeletal:         General: Normal range of motion.      Cervical back: Normal range of motion and neck supple. No rigidity.     Neurological: He is alert and oriented to person, place, and time. He has normal strength and normal reflexes. No cranial nerve deficit or sensory deficit. He exhibits normal muscle tone. Coordination normal. GCS eye subscore is 4. GCS verbal subscore is 5. GCS motor subscore is 6.   Skin: Skin is warm and dry.   Psychiatric: He has a normal mood and affect. His speech is normal and behavior is normal. He is not actively hallucinating.       ED Course   Procedures  Labs Reviewed   CBC W/ AUTO DIFFERENTIAL - Abnormal; Notable for the following components:       Result Value    WBC 19.84 (*)     RBC 3.94  (*)     Hemoglobin 8.2 (*)     Hematocrit 26.8 (*)     MCV 68 (*)     MCH 20.8 (*)     MCHC 30.6 (*)     RDW 17.9 (*)     Platelets 681 (*)     Gran # (ANC) 14.8 (*)     Immature Grans (Abs) 0.09 (*)     Mono # 1.2 (*)     Gran % 74.3 (*)     All other components within normal limits   COMPREHENSIVE METABOLIC PANEL - Abnormal; Notable for the following components:    Glucose 119 (*)     All other components within normal limits   URINALYSIS, REFLEX TO URINE CULTURE - Abnormal; Notable for the following components:    Protein, UA 1+ (*)     Leukocytes, UA Trace (*)     All other components within normal limits    Narrative:     Specimen Source->Urine   LIPASE   URINALYSIS MICROSCOPIC    Narrative:     Specimen Source->Urine   COMPREHENSIVE METABOLIC PANEL   CBC W/ AUTO DIFFERENTIAL          Imaging Results    None          Medications   sodium chloride 0.9% flush 10 mL (has no administration in time range)   melatonin tablet 6 mg (has no administration in time range)   ondansetron injection 4 mg (has no administration in time range)   prochlorperazine injection Soln 5 mg (has no administration in time range)   acetaminophen tablet 650 mg (has no administration in time range)   simethicone chewable tablet 80 mg (has no administration in time range)   aluminum-magnesium hydroxide-simethicone 200-200-20 mg/5 mL suspension 30 mL (has no administration in time range)   naloxone 0.4 mg/mL injection 0.02 mg (has no administration in time range)   glucagon (human recombinant) injection 1 mg (has no administration in time range)   dextrose 10% bolus 125 mL 125 mL (has no administration in time range)   dextrose 10% bolus 250 mL 250 mL (has no administration in time range)   dextrose 40 % gel 15,000 mg (has no administration in time range)   dextrose 40 % gel 30,000 mg (has no administration in time range)   0.9%  NaCl infusion (has no administration in time range)   morphine injection 4 mg (has no administration in time  range)   morphine injection 6 mg (has no administration in time range)   piperacillin-tazobactam (ZOSYN) 4.5 g in dextrose 5 % in water (D5W) 5 % 100 mL IVPB (MB+) (0 g Intravenous Stopped 5/28/23 0233)   morphine injection 10 mg (10 mg Intravenous Given 5/28/23 0108)     Medical Decision Making:   History:   Old Medical Records: I decided to obtain old medical records.  Initial Assessment:   Ruslan Caldwell is a 47 y.o. male who presents to the ED with right sided abdominal pain. The patient reports being seen at [facility] in [place] a few days ago and had 2 CTs done to rule out appendicitis. History obtained from independent historian: The patient's sister reports that the patient was only treated for pain and was then advised to come here to the ER if the pain persisted. She notes that the patient has an upcoming appointment with his gastroenterologist on 6/1/23. The patient also endorses some diarrhea and hematemesis.  Differential Diagnosis:   Inflammatory bowel disease, colitis, acute appendicitis  Clinical Tests:   Lab Tests: Ordered and Reviewed       <> Summary of Lab: WBC 19 K, hemoglobin 8.2, hematocrit 26.8, total bili 0.7, creatinine 1.0  ED Management:  Discussed with Dr. Michael who agrees with admission and general surgery/GI consultation as well as IV antibiotics to sort out what is causing his right lower quadrant pain.  Consider another CT abdomen pelvis however he has 3 CTs over the past week and do not feel another 1 is warranted at this time.  Discussed Hospital Medicine who agrees to admit the patient.        Scribe Attestation:   Scribe #1: I performed the above scribed service and the documentation accurately describes the services I performed. I attest to the accuracy of the note.                 I, Tico Dodd, personally performed the services described in this documentation. All medical record entries made by the scribe were at my direction and in my presence.  I have  reviewed the chart and agree that the record reflects my personal performance and is accurate and complete. Juan Doshi MD.  Clinical Impression:   Final diagnoses:  [R10.31] RLQ abdominal pain (Primary)  [D72.829] Leukocytosis, unspecified type        ED Disposition Condition    Observation Stable                Juan Doshi MD  05/28/23 0346       Juan Doshi MD  05/28/23 0346

## 2023-05-28 NOTE — ASSESSMENT & PLAN NOTE
Multiple etiologies.  Would like to get GI involved as there is concern for IBD given the location of the inflammation.  Would consider colonoscopy.  This does not fit picture of appendicitis although perforated appendicitis would have some of these findings.  Right sided colitis may also be possible from ischemia but previous CT with iv contrast did not show this.  For now cover with antibiotics and follow up gi recs    Spoke with er about case

## 2023-05-28 NOTE — ASSESSMENT & PLAN NOTE
Patient's anemia is currently controlled. Has not received any PRBCs to date..  Etiology due to microcytic anemia  Current CBC reviewed-   Lab Results   Component Value Date    HGB 8.2 (L) 05/27/2023    HCT 26.8 (L) 05/27/2023     Monitor serial CBC and transfuse if patient becomes hemodynamically unstable, symptomatic or H/H drops below 7/21.

## 2023-05-28 NOTE — PLAN OF CARE
Problem: Adult Inpatient Plan of Care  Goal: Plan of Care Review  Outcome: Ongoing, Progressing  Goal: Patient-Specific Goal (Individualized)  Outcome: Ongoing, Progressing  Goal: Absence of Hospital-Acquired Illness or Injury  Outcome: Ongoing, Progressing  Goal: Optimal Comfort and Wellbeing  Outcome: Ongoing, Progressing  Goal: Readiness for Transition of Care  Outcome: Ongoing, Progressing     Problem: Infection  Goal: Absence of Infection Signs and Symptoms  Outcome: Ongoing, Progressing     Problem: Diabetes Comorbidity  Goal: Blood Glucose Level Within Targeted Range  Outcome: Ongoing, Progressing     Problem: Fluid Imbalance (Pneumonia)  Goal: Fluid Balance  Outcome: Ongoing, Progressing     Problem: Infection (Pneumonia)  Goal: Resolution of Infection Signs and Symptoms  Outcome: Ongoing, Progressing     Problem: Respiratory Compromise (Pneumonia)  Goal: Effective Oxygenation and Ventilation  Outcome: Ongoing, Progressing     Problem: Pain Acute  Goal: Acceptable Pain Control and Functional Ability  Outcome: Ongoing, Progressing

## 2023-05-28 NOTE — ED NOTES
Pt stable for transport to the floor. NADN. AAOx4. Significant other remains with the Pt at this time. Will continue to monitor.

## 2023-05-28 NOTE — HPI
Ruslan Caldwell is a 47 year old male with a past medical history of hypertension and DM type 2 who presents to the ED complaining of right-sided abdominal pain for the past 4-5 months.  He reports associated nausea and diarrhea.  Patient states he was seen at [facility] ED 3 times this week  (5/23, 5/24, & 5/25) with same symptoms and still has no relief.  Per chart review, patient was referred to GI outpatient and has an appointment on 06/01/2023.  Patient had 3 CT abdomen during these visits with most recent showing decreased inflammatory fat stranding in the right lower quadrant with persistent marked wall thickening in the cecum and terminal ileum, centered at the level of the ileocecal valve with findings concerning for a neoplasm centered at the ileocecal junction, although acute appendicitis, Crohn's disease are differential possibilities.  ED workup revealed elevated WBC 19.8, hemoglobin 8.2 hematocrit 26.8 and thrombocytosis 681.  Patient was started on IV Zosyn and received IV pain medication.  ED provider spoke to Dr. Michael, General surgery, agreed consult and patient was referred to Hospital Medicine.  Patient seen in the ED with sister at bedside.  Patient states pain is currently controlled with IV morphine and he denies current nausea, vomiting and recent diarrhea.  Patient states he had 1 episode of hematemesis on Wednesday and denies blood in stool.  Patient denies chest pain, shortness of breath, fever and chills.  Patient will be admitted to Hospital Medicine for further evaluation management.

## 2023-05-28 NOTE — ASSESSMENT & PLAN NOTE
Chronic, controlled with diet.  Latest blood pressure and vitals reviewed-     Temp:  [97.5 °F (36.4 °C)]   Pulse:  [50-60]   Resp:  [18-20]   BP: (113-152)/(66-74)   SpO2:  [97 %-99 %] .   Home meds for hypertension were reviewed and noted below.   Hypertension Medications             lisinopriL (PRINIVIL,ZESTRIL) 2.5 MG tablet Take 1 tablet (2.5 mg total) by mouth once daily.          While in the hospital, will manage blood pressure as follows; Continue home antihypertensive regimen    Will utilize p.r.n. blood pressure medication only if patient's blood pressure greater than 180/110 and he develops symptoms such as worsening chest pain or shortness of breath.

## 2023-05-28 NOTE — HPI
48 y/o male with on going right lower quadrant pain.   Has been present at least a week maybe longer.  Sought help at [facility] er and treated for ibd flair using steroids.  He has no hx of bloody stools or ibd in the past.  He says the pain got worse and started having diarrhea so he came back to ER.  Pain mainly in right lower quadrant, no radiation, better with laying still.

## 2023-05-28 NOTE — NURSING
Nurses Note -- 4 Eyes      5/28/2023   5:48 AM      Skin assessed during: Admit      [x] No Altered Skin Integrity Present    [x]Prevention Measures Documented      [] Yes- Altered Skin Integrity Present or Discovered   [] LDA Added if Not in Epic (Describe Wound)   [] New Altered Skin Integrity was Present on Admit and Documented in LDA   [] Wound Image Taken    Wound Care Consulted? No    Attending Nurse:  Karen Mi RN     Second RN/Staff Member:  Elizabeth Ellington RN

## 2023-05-29 ENCOUNTER — ANESTHESIA (OUTPATIENT)
Dept: ENDOSCOPY | Facility: HOSPITAL | Age: 48
DRG: 330 | End: 2023-05-29
Payer: MEDICAID

## 2023-05-29 ENCOUNTER — ANESTHESIA EVENT (OUTPATIENT)
Dept: ENDOSCOPY | Facility: HOSPITAL | Age: 48
DRG: 330 | End: 2023-05-29
Payer: MEDICAID

## 2023-05-29 ENCOUNTER — ANESTHESIA (OUTPATIENT)
Dept: SURGERY | Facility: HOSPITAL | Age: 48
DRG: 330 | End: 2023-05-29
Payer: MEDICAID

## 2023-05-29 ENCOUNTER — PATIENT MESSAGE (OUTPATIENT)
Dept: ADMINISTRATIVE | Facility: HOSPITAL | Age: 48
End: 2023-05-29
Payer: MEDICAID

## 2023-05-29 ENCOUNTER — ANESTHESIA EVENT (OUTPATIENT)
Dept: SURGERY | Facility: HOSPITAL | Age: 48
DRG: 330 | End: 2023-05-29
Payer: MEDICAID

## 2023-05-29 PROBLEM — K35.80 ACUTE APPENDICITIS: Status: RESOLVED | Noted: 2023-05-24 | Resolved: 2023-05-29

## 2023-05-29 PROBLEM — K50.00 TERMINAL ILEITIS WITHOUT COMPLICATION: Status: RESOLVED | Noted: 2023-05-24 | Resolved: 2023-05-29

## 2023-05-29 PROBLEM — I10 ESSENTIAL HYPERTENSION: Chronic | Status: ACTIVE | Noted: 2023-05-24

## 2023-05-29 PROBLEM — J18.9 PNEUMONIA OF RIGHT LOWER LOBE DUE TO INFECTIOUS ORGANISM: Status: RESOLVED | Noted: 2021-09-17 | Resolved: 2023-05-29

## 2023-05-29 PROBLEM — E11.9 TYPE 2 DIABETES MELLITUS WITHOUT COMPLICATION, WITHOUT LONG-TERM CURRENT USE OF INSULIN: Chronic | Status: ACTIVE | Noted: 2020-05-01

## 2023-05-29 PROBLEM — D75.839 THROMBOCYTOSIS: Chronic | Status: ACTIVE | Noted: 2023-05-28

## 2023-05-29 PROBLEM — K63.89 COLONIC MASS: Status: ACTIVE | Noted: 2023-05-28

## 2023-05-29 PROBLEM — R10.31 RLQ ABDOMINAL PAIN: Status: ACTIVE | Noted: 2023-05-29

## 2023-05-29 PROBLEM — D50.9 MICROCYTIC ANEMIA: Chronic | Status: ACTIVE | Noted: 2023-05-24

## 2023-05-29 LAB
ALBUMIN SERPL BCP-MCNC: 3.5 G/DL (ref 3.5–5.2)
ALP SERPL-CCNC: 66 U/L (ref 55–135)
ALT SERPL W/O P-5'-P-CCNC: 20 U/L (ref 10–44)
ANION GAP SERPL CALC-SCNC: 9 MMOL/L (ref 8–16)
AST SERPL-CCNC: 18 U/L (ref 10–40)
BASOPHILS # BLD AUTO: 0.2 K/UL (ref 0–0.2)
BASOPHILS NFR BLD: 1.6 % (ref 0–1.9)
BILIRUB SERPL-MCNC: 0.8 MG/DL (ref 0.1–1)
BUN SERPL-MCNC: 10 MG/DL (ref 6–20)
CALCIUM SERPL-MCNC: 9.4 MG/DL (ref 8.7–10.5)
CEA SERPL-MCNC: 6 NG/ML (ref 0–5)
CHLORIDE SERPL-SCNC: 102 MMOL/L (ref 95–110)
CO2 SERPL-SCNC: 29 MMOL/L (ref 23–29)
CREAT SERPL-MCNC: 1.1 MG/DL (ref 0.5–1.4)
DIFFERENTIAL METHOD: ABNORMAL
EOSINOPHIL # BLD AUTO: 0.2 K/UL (ref 0–0.5)
EOSINOPHIL NFR BLD: 1.6 % (ref 0–8)
ERYTHROCYTE [DISTWIDTH] IN BLOOD BY AUTOMATED COUNT: 18.1 % (ref 11.5–14.5)
EST. GFR  (NO RACE VARIABLE): >60 ML/MIN/1.73 M^2
GLUCOSE SERPL-MCNC: 89 MG/DL (ref 70–110)
HCT VFR BLD AUTO: 29.8 % (ref 40–54)
HGB BLD-MCNC: 9 G/DL (ref 14–18)
IMM GRANULOCYTES # BLD AUTO: 0.03 K/UL (ref 0–0.04)
IMM GRANULOCYTES NFR BLD AUTO: 0.2 % (ref 0–0.5)
LYMPHOCYTES # BLD AUTO: 5.7 K/UL (ref 1–4.8)
LYMPHOCYTES NFR BLD: 44.5 % (ref 18–48)
MCH RBC QN AUTO: 20.7 PG (ref 27–31)
MCHC RBC AUTO-ENTMCNC: 30.2 G/DL (ref 32–36)
MCV RBC AUTO: 69 FL (ref 82–98)
MONOCYTES # BLD AUTO: 0.8 K/UL (ref 0.3–1)
MONOCYTES NFR BLD: 6.2 % (ref 4–15)
NEUTROPHILS # BLD AUTO: 5.8 K/UL (ref 1.8–7.7)
NEUTROPHILS NFR BLD: 45.9 % (ref 38–73)
NRBC BLD-RTO: 0 /100 WBC
PLATELET # BLD AUTO: 754 K/UL (ref 150–450)
PMV BLD AUTO: 9.1 FL (ref 9.2–12.9)
POTASSIUM SERPL-SCNC: 3.7 MMOL/L (ref 3.5–5.1)
PROT SERPL-MCNC: 7.5 G/DL (ref 6–8.4)
RBC # BLD AUTO: 4.34 M/UL (ref 4.6–6.2)
SODIUM SERPL-SCNC: 140 MMOL/L (ref 136–145)
WBC # BLD AUTO: 12.7 K/UL (ref 3.9–12.7)

## 2023-05-29 PROCEDURE — 25000003 PHARM REV CODE 250: Performed by: NURSE ANESTHETIST, CERTIFIED REGISTERED

## 2023-05-29 PROCEDURE — 37000009 HC ANESTHESIA EA ADD 15 MINS: Performed by: SURGERY

## 2023-05-29 PROCEDURE — 25000003 PHARM REV CODE 250: Performed by: INTERNAL MEDICINE

## 2023-05-29 PROCEDURE — 99900035 HC TECH TIME PER 15 MIN (STAT)

## 2023-05-29 PROCEDURE — 25000003 PHARM REV CODE 250: Performed by: STUDENT IN AN ORGANIZED HEALTH CARE EDUCATION/TRAINING PROGRAM

## 2023-05-29 PROCEDURE — 94799 UNLISTED PULMONARY SVC/PX: CPT

## 2023-05-29 PROCEDURE — 25000003 PHARM REV CODE 250: Performed by: SURGERY

## 2023-05-29 PROCEDURE — 63600175 PHARM REV CODE 636 W HCPCS: Performed by: STUDENT IN AN ORGANIZED HEALTH CARE EDUCATION/TRAINING PROGRAM

## 2023-05-29 PROCEDURE — D9220A PRA ANESTHESIA: Mod: ANES,,, | Performed by: ANESTHESIOLOGY

## 2023-05-29 PROCEDURE — 36415 COLL VENOUS BLD VENIPUNCTURE: CPT | Performed by: NURSE PRACTITIONER

## 2023-05-29 PROCEDURE — D9220A PRA ANESTHESIA: ICD-10-PCS | Mod: CRNA,,, | Performed by: NURSE ANESTHETIST, CERTIFIED REGISTERED

## 2023-05-29 PROCEDURE — 63600175 PHARM REV CODE 636 W HCPCS: Performed by: SURGERY

## 2023-05-29 PROCEDURE — 88342 IMHCHEM/IMCYTCHM 1ST ANTB: CPT | Mod: 26,,, | Performed by: PATHOLOGY

## 2023-05-29 PROCEDURE — 25000003 PHARM REV CODE 250: Performed by: ANESTHESIOLOGY

## 2023-05-29 PROCEDURE — 45385 COLONOSCOPY W/LESION REMOVAL: CPT | Performed by: INTERNAL MEDICINE

## 2023-05-29 PROCEDURE — 45380 COLONOSCOPY AND BIOPSY: CPT | Mod: 59 | Performed by: INTERNAL MEDICINE

## 2023-05-29 PROCEDURE — 45381 COLONOSCOPY SUBMUCOUS NJX: CPT | Mod: 22,,, | Performed by: INTERNAL MEDICINE

## 2023-05-29 PROCEDURE — 44140 PARTIAL REMOVAL OF COLON: CPT | Mod: ,,, | Performed by: SURGERY

## 2023-05-29 PROCEDURE — 82378 CARCINOEMBRYONIC ANTIGEN: CPT | Performed by: INTERNAL MEDICINE

## 2023-05-29 PROCEDURE — D9220A PRA ANESTHESIA: ICD-10-PCS | Mod: ANES,,, | Performed by: ANESTHESIOLOGY

## 2023-05-29 PROCEDURE — 12000002 HC ACUTE/MED SURGE SEMI-PRIVATE ROOM

## 2023-05-29 PROCEDURE — 45381 PR COLONOSCPY,FLEX,W/DIR SUBMUC INJECT: ICD-10-PCS | Mod: 22,,, | Performed by: INTERNAL MEDICINE

## 2023-05-29 PROCEDURE — 63600175 PHARM REV CODE 636 W HCPCS: Performed by: NURSE PRACTITIONER

## 2023-05-29 PROCEDURE — 37000008 HC ANESTHESIA 1ST 15 MINUTES: Performed by: SURGERY

## 2023-05-29 PROCEDURE — 71000033 HC RECOVERY, INTIAL HOUR: Performed by: SURGERY

## 2023-05-29 PROCEDURE — 94761 N-INVAS EAR/PLS OXIMETRY MLT: CPT

## 2023-05-29 PROCEDURE — 88305 TISSUE EXAM BY PATHOLOGIST: ICD-10-PCS | Mod: 26,,, | Performed by: PATHOLOGY

## 2023-05-29 PROCEDURE — 96361 HYDRATE IV INFUSION ADD-ON: CPT

## 2023-05-29 PROCEDURE — 27201028 HC NEEDLE, SCLERO: Performed by: INTERNAL MEDICINE

## 2023-05-29 PROCEDURE — 36000709 HC OR TIME LEV III EA ADD 15 MIN: Performed by: SURGERY

## 2023-05-29 PROCEDURE — 88309 TISSUE EXAM BY PATHOLOGIST: CPT | Performed by: PATHOLOGY

## 2023-05-29 PROCEDURE — 27201089 HC SNARE, DISP (ANY): Performed by: INTERNAL MEDICINE

## 2023-05-29 PROCEDURE — 36415 COLL VENOUS BLD VENIPUNCTURE: CPT | Performed by: INTERNAL MEDICINE

## 2023-05-29 PROCEDURE — 63600175 PHARM REV CODE 636 W HCPCS: Performed by: ANESTHESIOLOGY

## 2023-05-29 PROCEDURE — 88309 PR  SURG PATH,LEVEL VI: ICD-10-PCS | Mod: 26,,, | Performed by: PATHOLOGY

## 2023-05-29 PROCEDURE — 99900104 DSU ONLY-NO CHARGE-EA ADD'L HR (STAT): Performed by: SURGERY

## 2023-05-29 PROCEDURE — 45380 PR COLONOSCOPY,BIOPSY: ICD-10-PCS | Mod: 22,59,, | Performed by: INTERNAL MEDICINE

## 2023-05-29 PROCEDURE — 71000039 HC RECOVERY, EACH ADD'L HOUR: Performed by: SURGERY

## 2023-05-29 PROCEDURE — 63600175 PHARM REV CODE 636 W HCPCS: Performed by: NURSE ANESTHETIST, CERTIFIED REGISTERED

## 2023-05-29 PROCEDURE — 45380 COLONOSCOPY AND BIOPSY: CPT | Mod: 22,59,, | Performed by: INTERNAL MEDICINE

## 2023-05-29 PROCEDURE — 96366 THER/PROPH/DIAG IV INF ADDON: CPT

## 2023-05-29 PROCEDURE — 96376 TX/PRO/DX INJ SAME DRUG ADON: CPT

## 2023-05-29 PROCEDURE — 88305 TISSUE EXAM BY PATHOLOGIST: CPT | Performed by: PATHOLOGY

## 2023-05-29 PROCEDURE — 88309 TISSUE EXAM BY PATHOLOGIST: CPT | Mod: 26,,, | Performed by: PATHOLOGY

## 2023-05-29 PROCEDURE — 88305 TISSUE EXAM BY PATHOLOGIST: CPT | Mod: 26,,, | Performed by: PATHOLOGY

## 2023-05-29 PROCEDURE — 37000009 HC ANESTHESIA EA ADD 15 MINS: Performed by: INTERNAL MEDICINE

## 2023-05-29 PROCEDURE — 45381 COLONOSCOPY SUBMUCOUS NJX: CPT | Mod: 59 | Performed by: INTERNAL MEDICINE

## 2023-05-29 PROCEDURE — 88342 CHG IMMUNOCYTOCHEMISTRY: ICD-10-PCS | Mod: 26,,, | Performed by: PATHOLOGY

## 2023-05-29 PROCEDURE — 88341 PR IHC OR ICC EACH ADD'L SINGLE ANTIBODY  STAINPR: ICD-10-PCS | Mod: 26,,, | Performed by: PATHOLOGY

## 2023-05-29 PROCEDURE — 45385 PR COLONOSCOPY,REMV LESN,SNARE: ICD-10-PCS | Mod: 22,,, | Performed by: INTERNAL MEDICINE

## 2023-05-29 PROCEDURE — 25000003 PHARM REV CODE 250: Performed by: NURSE PRACTITIONER

## 2023-05-29 PROCEDURE — 85025 COMPLETE CBC W/AUTO DIFF WBC: CPT | Performed by: NURSE PRACTITIONER

## 2023-05-29 PROCEDURE — 88342 IMHCHEM/IMCYTCHM 1ST ANTB: CPT | Performed by: PATHOLOGY

## 2023-05-29 PROCEDURE — 27201423 OPTIME MED/SURG SUP & DEVICES STERILE SUPPLY: Performed by: SURGERY

## 2023-05-29 PROCEDURE — 45385 COLONOSCOPY W/LESION REMOVAL: CPT | Mod: 22,,, | Performed by: INTERNAL MEDICINE

## 2023-05-29 PROCEDURE — 88341 IMHCHEM/IMCYTCHM EA ADD ANTB: CPT | Mod: 59 | Performed by: PATHOLOGY

## 2023-05-29 PROCEDURE — 27201012 HC FORCEPS, HOT/COLD, DISP: Performed by: INTERNAL MEDICINE

## 2023-05-29 PROCEDURE — D9220A PRA ANESTHESIA: Mod: CRNA,,, | Performed by: NURSE ANESTHETIST, CERTIFIED REGISTERED

## 2023-05-29 PROCEDURE — 36000708 HC OR TIME LEV III 1ST 15 MIN: Performed by: SURGERY

## 2023-05-29 PROCEDURE — 88341 IMHCHEM/IMCYTCHM EA ADD ANTB: CPT | Mod: 26,,, | Performed by: PATHOLOGY

## 2023-05-29 PROCEDURE — 44140 PR PART REMOVAL COLON W ANASTOMOSIS: ICD-10-PCS | Mod: ,,, | Performed by: SURGERY

## 2023-05-29 PROCEDURE — 80053 COMPREHEN METABOLIC PANEL: CPT | Performed by: NURSE PRACTITIONER

## 2023-05-29 PROCEDURE — 99900103 DSU ONLY-NO CHARGE-INITIAL HR (STAT): Performed by: SURGERY

## 2023-05-29 PROCEDURE — 37000008 HC ANESTHESIA 1ST 15 MINUTES: Performed by: INTERNAL MEDICINE

## 2023-05-29 RX ORDER — FENTANYL CITRATE 50 UG/ML
INJECTION, SOLUTION INTRAMUSCULAR; INTRAVENOUS
Status: DISCONTINUED | OUTPATIENT
Start: 2023-05-29 | End: 2023-05-29

## 2023-05-29 RX ORDER — ONDANSETRON HYDROCHLORIDE 2 MG/ML
INJECTION, SOLUTION INTRAMUSCULAR; INTRAVENOUS
Status: DISCONTINUED | OUTPATIENT
Start: 2023-05-29 | End: 2023-05-29

## 2023-05-29 RX ORDER — LIDOCAINE HYDROCHLORIDE 20 MG/ML
INJECTION INTRAVENOUS
Status: DISCONTINUED | OUTPATIENT
Start: 2023-05-29 | End: 2023-05-29

## 2023-05-29 RX ORDER — FENTANYL CITRATE 50 UG/ML
25 INJECTION, SOLUTION INTRAMUSCULAR; INTRAVENOUS EVERY 5 MIN PRN
Status: COMPLETED | OUTPATIENT
Start: 2023-05-29 | End: 2023-05-29

## 2023-05-29 RX ORDER — PROCHLORPERAZINE EDISYLATE 5 MG/ML
5 INJECTION INTRAMUSCULAR; INTRAVENOUS EVERY 30 MIN PRN
Status: DISCONTINUED | OUTPATIENT
Start: 2023-05-29 | End: 2023-05-29

## 2023-05-29 RX ORDER — PROPOFOL 10 MG/ML
VIAL (ML) INTRAVENOUS
Status: DISCONTINUED | OUTPATIENT
Start: 2023-05-29 | End: 2023-05-29

## 2023-05-29 RX ORDER — HYDROMORPHONE HYDROCHLORIDE 2 MG/ML
0.2 INJECTION, SOLUTION INTRAMUSCULAR; INTRAVENOUS; SUBCUTANEOUS EVERY 5 MIN PRN
Status: DISCONTINUED | OUTPATIENT
Start: 2023-05-29 | End: 2023-05-29

## 2023-05-29 RX ORDER — CEFAZOLIN SODIUM 2 G/50ML
2 SOLUTION INTRAVENOUS ONCE
Status: COMPLETED | OUTPATIENT
Start: 2023-05-29 | End: 2023-05-29

## 2023-05-29 RX ORDER — DEXAMETHASONE SODIUM PHOSPHATE 4 MG/ML
INJECTION, SOLUTION INTRA-ARTICULAR; INTRALESIONAL; INTRAMUSCULAR; INTRAVENOUS; SOFT TISSUE
Status: DISCONTINUED | OUTPATIENT
Start: 2023-05-29 | End: 2023-05-29

## 2023-05-29 RX ORDER — OXYCODONE HYDROCHLORIDE 5 MG/1
5 TABLET ORAL
Status: DISCONTINUED | OUTPATIENT
Start: 2023-05-29 | End: 2023-05-29

## 2023-05-29 RX ORDER — MEPERIDINE HYDROCHLORIDE 50 MG/ML
12.5 INJECTION INTRAMUSCULAR; INTRAVENOUS; SUBCUTANEOUS EVERY 10 MIN PRN
Status: DISCONTINUED | OUTPATIENT
Start: 2023-05-29 | End: 2023-05-29

## 2023-05-29 RX ORDER — ONDANSETRON 2 MG/ML
4 INJECTION INTRAMUSCULAR; INTRAVENOUS DAILY PRN
Status: DISCONTINUED | OUTPATIENT
Start: 2023-05-29 | End: 2023-05-29

## 2023-05-29 RX ORDER — MIDAZOLAM HYDROCHLORIDE 1 MG/ML
INJECTION INTRAMUSCULAR; INTRAVENOUS
Status: DISCONTINUED | OUTPATIENT
Start: 2023-05-29 | End: 2023-05-29

## 2023-05-29 RX ORDER — DEXMEDETOMIDINE HYDROCHLORIDE 100 UG/ML
INJECTION, SOLUTION INTRAVENOUS
Status: DISCONTINUED | OUTPATIENT
Start: 2023-05-29 | End: 2023-05-29

## 2023-05-29 RX ORDER — HYDROMORPHONE HYDROCHLORIDE 1 MG/ML
1 INJECTION, SOLUTION INTRAMUSCULAR; INTRAVENOUS; SUBCUTANEOUS ONCE
Status: COMPLETED | OUTPATIENT
Start: 2023-05-29 | End: 2023-05-29

## 2023-05-29 RX ORDER — SODIUM CHLORIDE 9 MG/ML
INJECTION, SOLUTION INTRAVENOUS CONTINUOUS
Status: DISCONTINUED | OUTPATIENT
Start: 2023-05-29 | End: 2023-05-29

## 2023-05-29 RX ORDER — ROCURONIUM BROMIDE 10 MG/ML
INJECTION, SOLUTION INTRAVENOUS
Status: DISCONTINUED | OUTPATIENT
Start: 2023-05-29 | End: 2023-05-29

## 2023-05-29 RX ORDER — ACETAMINOPHEN 10 MG/ML
INJECTION, SOLUTION INTRAVENOUS
Status: DISCONTINUED | OUTPATIENT
Start: 2023-05-29 | End: 2023-05-29

## 2023-05-29 RX ORDER — HYDROMORPHONE HYDROCHLORIDE 2 MG/ML
2 INJECTION, SOLUTION INTRAMUSCULAR; INTRAVENOUS; SUBCUTANEOUS ONCE
Status: COMPLETED | OUTPATIENT
Start: 2023-05-29 | End: 2023-05-29

## 2023-05-29 RX ADMIN — DEXMEDETOMIDINE HYDROCHLORIDE 4 MCG: 100 INJECTION, SOLUTION INTRAVENOUS at 01:05

## 2023-05-29 RX ADMIN — DEXMEDETOMIDINE HYDROCHLORIDE 5 MCG: 100 INJECTION, SOLUTION INTRAVENOUS at 04:05

## 2023-05-29 RX ADMIN — PIPERACILLIN AND TAZOBACTAM 4.5 G: 4; .5 INJECTION, POWDER, LYOPHILIZED, FOR SOLUTION INTRAVENOUS; PARENTERAL at 08:05

## 2023-05-29 RX ADMIN — MIDAZOLAM HYDROCHLORIDE 2 MG: 1 INJECTION, SOLUTION INTRAMUSCULAR; INTRAVENOUS at 01:05

## 2023-05-29 RX ADMIN — MORPHINE SULFATE 4 MG: 4 INJECTION INTRAVENOUS at 07:05

## 2023-05-29 RX ADMIN — HYDROMORPHONE HYDROCHLORIDE 1 MG: 1 INJECTION, SOLUTION INTRAMUSCULAR; INTRAVENOUS; SUBCUTANEOUS at 11:05

## 2023-05-29 RX ADMIN — PROPOFOL 50 MG: 10 INJECTION, EMULSION INTRAVENOUS at 10:05

## 2023-05-29 RX ADMIN — SIMETHICONE 80 MG: 80 TABLET, CHEWABLE ORAL at 09:05

## 2023-05-29 RX ADMIN — MORPHINE SULFATE 6 MG: 2 INJECTION, SOLUTION INTRAMUSCULAR; INTRAVENOUS at 08:05

## 2023-05-29 RX ADMIN — FENTANYL CITRATE 50 MCG: 0.05 INJECTION, SOLUTION INTRAMUSCULAR; INTRAVENOUS at 03:05

## 2023-05-29 RX ADMIN — OXYCODONE HYDROCHLORIDE 5 MG: 5 TABLET ORAL at 05:05

## 2023-05-29 RX ADMIN — FENTANYL CITRATE 25 MCG: 50 INJECTION, SOLUTION INTRAMUSCULAR; INTRAVENOUS at 05:05

## 2023-05-29 RX ADMIN — SODIUM CHLORIDE: 9 INJECTION, SOLUTION INTRAVENOUS at 09:05

## 2023-05-29 RX ADMIN — ROCURONIUM BROMIDE 50 MG: 10 INJECTION, SOLUTION INTRAVENOUS at 01:05

## 2023-05-29 RX ADMIN — FENTANYL CITRATE 100 MCG: 0.05 INJECTION, SOLUTION INTRAMUSCULAR; INTRAVENOUS at 01:05

## 2023-05-29 RX ADMIN — GLYCOPYRROLATE 0.2 MG: 0.2 INJECTION, SOLUTION INTRAMUSCULAR; INTRAVITREAL at 10:05

## 2023-05-29 RX ADMIN — PROPOFOL 150 MG: 10 INJECTION, EMULSION INTRAVENOUS at 10:05

## 2023-05-29 RX ADMIN — SODIUM CHLORIDE, SODIUM GLUCONATE, SODIUM ACETATE, POTASSIUM CHLORIDE AND MAGNESIUM CHLORIDE: 526; 502; 368; 37; 30 INJECTION, SOLUTION INTRAVENOUS at 12:05

## 2023-05-29 RX ADMIN — DEXMEDETOMIDINE HYDROCHLORIDE 12 MCG: 100 INJECTION, SOLUTION INTRAVENOUS at 01:05

## 2023-05-29 RX ADMIN — HYDROMORPHONE HYDROCHLORIDE 2 MG: 2 INJECTION INTRAMUSCULAR; INTRAVENOUS; SUBCUTANEOUS at 04:05

## 2023-05-29 RX ADMIN — PROPOFOL 200 MG: 10 INJECTION, EMULSION INTRAVENOUS at 01:05

## 2023-05-29 RX ADMIN — DEXAMETHASONE SODIUM PHOSPHATE 8 MG: 4 INJECTION, SOLUTION INTRA-ARTICULAR; INTRALESIONAL; INTRAMUSCULAR; INTRAVENOUS; SOFT TISSUE at 01:05

## 2023-05-29 RX ADMIN — SODIUM CHLORIDE: 9 INJECTION, SOLUTION INTRAVENOUS at 06:05

## 2023-05-29 RX ADMIN — FENTANYL CITRATE 25 MCG: 50 INJECTION, SOLUTION INTRAMUSCULAR; INTRAVENOUS at 04:05

## 2023-05-29 RX ADMIN — SODIUM CHLORIDE: 9 INJECTION, SOLUTION INTRAVENOUS at 04:05

## 2023-05-29 RX ADMIN — PIPERACILLIN AND TAZOBACTAM 4.5 G: 4; .5 INJECTION, POWDER, LYOPHILIZED, FOR SOLUTION INTRAVENOUS; PARENTERAL at 05:05

## 2023-05-29 RX ADMIN — CEFAZOLIN SODIUM 2 G: 2 SOLUTION INTRAVENOUS at 01:05

## 2023-05-29 RX ADMIN — LIDOCAINE HYDROCHLORIDE 50 MG: 20 INJECTION, SOLUTION INTRAVENOUS at 10:05

## 2023-05-29 RX ADMIN — SUGAMMADEX 200 MG: 100 INJECTION, SOLUTION INTRAVENOUS at 03:05

## 2023-05-29 RX ADMIN — GLYCOPYRROLATE 0.1 MG: 0.2 INJECTION, SOLUTION INTRAMUSCULAR; INTRAVITREAL at 01:05

## 2023-05-29 RX ADMIN — SODIUM CHLORIDE, SODIUM GLUCONATE, SODIUM ACETATE, POTASSIUM CHLORIDE AND MAGNESIUM CHLORIDE: 526; 502; 368; 37; 30 INJECTION, SOLUTION INTRAVENOUS at 02:05

## 2023-05-29 RX ADMIN — MORPHINE SULFATE 6 MG: 2 INJECTION, SOLUTION INTRAMUSCULAR; INTRAVENOUS at 01:05

## 2023-05-29 RX ADMIN — ONDANSETRON 4 MG: 2 INJECTION INTRAMUSCULAR; INTRAVENOUS at 01:05

## 2023-05-29 RX ADMIN — ACETAMINOPHEN 1000 MG: 10 INJECTION, SOLUTION INTRAVENOUS at 01:05

## 2023-05-29 RX ADMIN — LIDOCAINE HYDROCHLORIDE 100 MG: 20 INJECTION, SOLUTION INTRAVENOUS at 01:05

## 2023-05-29 NOTE — TRANSFER OF CARE
"Anesthesia Transfer of Care Note    Patient: Ruslan Caldwell    Procedure(s) Performed: Procedure(s) (LRB):  COLECTOMY, PARTIAL (Right)    Patient location: PACU    Anesthesia Type: general    Transport from OR: Transported from OR on 2-3 L/min O2 by NC with adequate spontaneous ventilation    Post pain: adequate analgesia    Post assessment: no apparent anesthetic complications and tolerated procedure well    Post vital signs: stable    Level of consciousness: sedated    Nausea/Vomiting: no nausea/vomiting    Complications: none    Transfer of care protocol was followed      Last vitals:   Visit Vitals  BP (!) 142/85 (BP Location: Left arm, Patient Position: Lying)   Pulse (!) 48   Temp 36.6 °C (97.8 °F) (Oral)   Resp 16   Ht 6' 3" (1.905 m)   Wt 102.1 kg (225 lb)   SpO2 99%   BMI 28.12 kg/m²     "

## 2023-05-29 NOTE — PLAN OF CARE
1550  Woke up  in pain and on all 4 called for help and anesthesia and dr choi at bedside catarino apodaca rn at bedside dr araya giving precedex at bedside

## 2023-05-29 NOTE — ASSESSMENT & PLAN NOTE
CT reviewed  Colonoscopy reviewed  - IVF  - IV zosyn  - PRN pain meds and antiemetics  - GI and gen surg following: plan for colonic mass resection

## 2023-05-29 NOTE — ASSESSMENT & PLAN NOTE
Patient's anemia is currently controlled. Has not received any PRBCs to date..  Etiology likely due to colonic mass  Current CBC reviewed-   Lab Results   Component Value Date    HGB 9.0 (L) 05/29/2023    HCT 29.8 (L) 05/29/2023     Monitor serial CBC and transfuse if patient becomes hemodynamically unstable, symptomatic or H/H drops below 7/21.

## 2023-05-29 NOTE — PLAN OF CARE
Pt prepped for surgery. Consents at bedside. Sister set up with text alerts. Pt brought from endo with no belongings.

## 2023-05-29 NOTE — PLAN OF CARE
Released from Pacu when criteria met pain controlled skin w+d No nausea No emesis dsg intact  2 areas of shadowing noted on it and circled in pacu sleepy disoriented at times encouraged deep breaths  instr on IS too sleepy at this time  awakens at intervals followed commands and voided freely in urinal isntr not to get oob bed alarm on for safety bed low and locked awakens grabs back in pain then falls fast asleep again Pt has all belongings   with family in room 201

## 2023-05-29 NOTE — TRANSFER OF CARE
"Anesthesia Transfer of Care Note    Patient: Ruslan Caldwell    Procedure(s) Performed: Procedure(s) (LRB):  COLONOSCOPY (N/A)    Patient location: GI    Anesthesia Type: general    Transport from OR: Transported from OR on room air with adequate spontaneous ventilation    Post pain: adequate analgesia    Post assessment: no apparent anesthetic complications    Post vital signs: stable    Level of consciousness: awake    Nausea/Vomiting: no nausea/vomiting    Complications: none    Transfer of care protocol was followed      Last vitals:   Visit Vitals  /82 (BP Location: Right arm, Patient Position: Lying)   Pulse (!) 56   Temp 36.8 °C (98.2 °F) (Skin)   Resp 16   Ht 6' 3" (1.905 m)   Wt 102.1 kg (225 lb)   SpO2 100%   BMI 28.12 kg/m²     "

## 2023-05-29 NOTE — ASSESSMENT & PLAN NOTE
Patient's FSGs are controlled with diet  Last A1c reviewed-   Lab Results   Component Value Date    HGBA1C 4.8 09/14/2021   - monitor glucose on AM labs

## 2023-05-29 NOTE — PROVATION PATIENT INSTRUCTIONS
Discharge Summary/Instructions after an Endoscopic Procedure  Patient Name: Ruslan Caldwell  Patient MRN: [id number]  Patient YOB: 1975  Monday, May 29, 2023  Sam Clements MD  Dear patient,  As a result of recent federal legislation (The Federal Cures Act), you may   receive lab or pathology results from your procedure in your [facility]   account before your physician is able to contact you. Your physician or   their representative will relay the results to you with their   recommendations at their soonest availability.  Thank you,  RESTRICTIONS:  During your procedure today, you received medications for sedation.  These   medications may affect your judgment, balance and coordination.  Therefore,   for 24 hours, you have the following restrictions:   - DO NOT drive a car, operate machinery, make legal/financial decisions,   sign important papers or drink alcohol.    ACTIVITY:  Today: no heavy lifting, straining or running due to procedural   sedation/anesthesia.  The following day: return to full activity including work.  DIET:  Eat and drink normally unless instructed otherwise.     TREATMENT FOR COMMON SIDE EFFECTS:  - Mild abdominal pain, nausea, belching, bloating or excessive gas:  rest,   eat lightly and use a heating pad.  - Sore Throat: treat with throat lozenges and/or gargle with warm salt   water.  - Because air was used during the procedure, expelling large amounts of air   from your rectum or belching is normal.  - If a bowel prep was taken, you may not have a bowel movement for 1-3 days.    This is normal.  SYMPTOMS TO WATCH FOR AND REPORT TO YOUR PHYSICIAN:  1. Abdominal pain or bloating, other than gas cramps.  2. Chest pain.  3. Back pain.  4. Signs of infection such as: chills or fever occurring within 24 hours   after the procedure.  5. Rectal bleeding, which would show as bright red, maroon, or black stools.   (A tablespoon of blood from the rectum is not serious, especially if    hemorrhoids are present.)  6. Vomiting.  7. Weakness or dizziness.  GO DIRECTLY TO THE NEAREST EMERGENCY ROOM IF YOU HAVE ANY OF THE FOLLOWING:      Difficulty breathing              Chills and/or fever over 101 F   Persistent vomiting and/or vomiting blood   Severe abdominal pain   Severe chest pain   Black, tarry stools   Bleeding- more than one tablespoon   Any other symptom or condition that you feel may need urgent attention  Your doctor recommends these additional instructions:  If any biopsies were taken, your doctors clinic will contact you in 1 to 2   weeks with any results.  - Return patient to [facility] for ongoing care.   - Clear liquid diet.   - Continue present medications.   - Await pathology results.   - Repeat colonoscopy (date not yet determined) for surveillance.   - Refer to a surgeon today.  For questions, problems or results please call your physician - Sam Clements MD at Work:  [phone].  [facility], EMERGENCY ROOM PHONE NUMBER: [phone]  IF A COMPLICATION OR EMERGENCY SITUATION ARISES AND YOU ARE UNABLE TO REACH   YOUR PHYSICIAN - GO DIRECTLY TO THE EMERGENCY ROOM.  Sam Clements MD  5/29/2023 10:52:24 AM  This report has been verified and signed electronically.  Dear patient,  As a result of recent federal legislation (The Federal Cures Act), you may   receive lab or pathology results from your procedure in your [facility]   account before your physician is able to contact you. Your physician or   their representative will relay the results to you with their   recommendations at their soonest availability.  Thank you,  PROVATION

## 2023-05-29 NOTE — SUBJECTIVE & OBJECTIVE
Interval History: Patient seen and examined. MARIA ELENA. Colonoscopy today showed large colonic mass so general surgery plan resection.      Objective:     Vital Signs (Most Recent):  Temp: 97.8 °F (36.6 °C) (05/29/23 1220)  Pulse: (!) 48 (05/29/23 1220)  Resp: 16 (05/29/23 1220)  BP: (!) 142/85 (05/29/23 1220)  SpO2: 99 % (05/29/23 1220) Vital Signs (24h Range):  Temp:  [97.5 °F (36.4 °C)-98.2 °F (36.8 °C)] 97.8 °F (36.6 °C)  Pulse:  [48-63] 48  Resp:  [16-20] 16  SpO2:  [95 %-100 %] 99 %  BP: (100-142)/(55-85) 142/85     Weight: 102.1 kg (225 lb)  Body mass index is 28.12 kg/m².    Intake/Output Summary (Last 24 hours) at 5/29/2023 1454  Last data filed at 5/29/2023 1119  Gross per 24 hour   Intake 5227.24 ml   Output --   Net 5227.24 ml         Physical Exam  Vitals reviewed.   Constitutional:       Appearance: Normal appearance. He is normal weight.   Cardiovascular:      Rate and Rhythm: Normal rate and regular rhythm.   Pulmonary:      Effort: Pulmonary effort is normal. No respiratory distress.   Abdominal:      General: Bowel sounds are normal.      Palpations: Abdomen is soft.      Tenderness: There is abdominal tenderness in the right upper quadrant, right lower quadrant and epigastric area.   Musculoskeletal:         General: Normal range of motion.   Skin:     General: Skin is warm and dry.   Neurological:      General: No focal deficit present.      Mental Status: He is alert and oriented to person, place, and time. Mental status is at baseline.   Psychiatric:         Speech: Speech normal.         Behavior: Behavior normal.           Significant Labs: All pertinent labs within the past 24 hours have been reviewed.    Significant Imaging: I have reviewed all pertinent imaging results/findings within the past 24 hours.

## 2023-05-29 NOTE — PROGRESS NOTES
[facility] Hospital Medicine  Progress Note    Patient Name: Ruslan Caldwell  MRN: [id number]  Patient Class: IP- Inpatient   Admission Date: 5/27/2023  Length of Stay: 0 days  Attending Physician: Edil Steele MD  Primary Care Provider: No primary care provider on file.        Subjective:     Principal Problem:Colonic mass        HPI:  Ruslan Caldwell is a 47 year old male with a past medical history of hypertension and DM type 2 who presents to the ED complaining of right-sided abdominal pain for the past 4-5 months.  He reports associated nausea and diarrhea.  Patient states he was seen at [facility] ED 3 times this week  (5/23, 5/24, & 5/25) with same symptoms and still has no relief.  Per chart review, patient was referred to GI outpatient and has an appointment on 06/01/2023.  Patient had 3 CT abdomen during these visits with most recent showing decreased inflammatory fat stranding in the right lower quadrant with persistent marked wall thickening in the cecum and terminal ileum, centered at the level of the ileocecal valve with findings concerning for a neoplasm centered at the ileocecal junction, although acute appendicitis, Crohn's disease are differential possibilities.  ED workup revealed elevated WBC 19.8, hemoglobin 8.2 hematocrit 26.8 and thrombocytosis 681.  Patient was started on IV Zosyn and received IV pain medication.  ED provider spoke to Dr. Michael, General surgery, agreed consult and patient was referred to Hospital Medicine.  Patient seen in the ED with sister at bedside.  Patient states pain is currently controlled with IV morphine and he denies current nausea, vomiting and recent diarrhea.  Patient states he had 1 episode of hematemesis on Wednesday and denies blood in stool.  Patient denies chest pain, shortness of breath, fever and chills.  Patient will be admitted to Hospital Medicine for further evaluation management.          Overview/Hospital  Course:  Admitted with now chronic RLQ abd pain (4-5mo duration) that has worsened found to have inflammatory fat stranding in the RLQ with persistent marked wall thickening in the cecum and terminal ileum, centered at the level of the ileocecal valve on CT imaging with unclear significance. Concern for possible neoplasm vs appendicitis vs other. Noted leukocytosis, anemia, and thrombocytosis. Given IVF, IV abx, and pain meds. Consults to GI and gen surg to determine best approach for diagnostic purposes. GI performed colonoscopy and discovered large colonic mass at area of concern. General surgery planned for resection of the mass.      Interval History: Patient seen and examined. MARIA ELENA. Colonoscopy today showed large colonic mass so general surgery plan resection.      Objective:     Vital Signs (Most Recent):  Temp: 97.8 °F (36.6 °C) (05/29/23 1220)  Pulse: (!) 48 (05/29/23 1220)  Resp: 16 (05/29/23 1220)  BP: (!) 142/85 (05/29/23 1220)  SpO2: 99 % (05/29/23 1220) Vital Signs (24h Range):  Temp:  [97.5 °F (36.4 °C)-98.2 °F (36.8 °C)] 97.8 °F (36.6 °C)  Pulse:  [48-63] 48  Resp:  [16-20] 16  SpO2:  [95 %-100 %] 99 %  BP: (100-142)/(55-85) 142/85     Weight: 102.1 kg (225 lb)  Body mass index is 28.12 kg/m².    Intake/Output Summary (Last 24 hours) at 5/29/2023 1454  Last data filed at 5/29/2023 1119  Gross per 24 hour   Intake 5227.24 ml   Output --   Net 5227.24 ml         Physical Exam  Vitals reviewed.   Constitutional:       Appearance: Normal appearance. He is normal weight.   Cardiovascular:      Rate and Rhythm: Normal rate and regular rhythm.   Pulmonary:      Effort: Pulmonary effort is normal. No respiratory distress.   Abdominal:      General: Bowel sounds are normal.      Palpations: Abdomen is soft.      Tenderness: There is abdominal tenderness in the right upper quadrant, right lower quadrant and epigastric area.   Musculoskeletal:         General: Normal range of motion.   Skin:     General: Skin  is warm and dry.   Neurological:      General: No focal deficit present.      Mental Status: He is alert and oriented to person, place, and time. Mental status is at baseline.   Psychiatric:         Speech: Speech normal.         Behavior: Behavior normal.           Significant Labs: All pertinent labs within the past 24 hours have been reviewed.    Significant Imaging: I have reviewed all pertinent imaging results/findings within the past 24 hours.      Assessment/Plan:      * Colonic mass  CT reviewed  Colonoscopy reviewed  - IVF  - IV zosyn  - PRN pain meds and antiemetics  - GI and gen surg following: plan for colonic mass resection    Thrombocytosis  History noted  -Trend platelets on cbc    Microcytic anemia  Patient's anemia is currently controlled. Has not received any PRBCs to date..  Etiology likely due to colonic mass  Current CBC reviewed-   Lab Results   Component Value Date    HGB 9.0 (L) 05/29/2023    HCT 29.8 (L) 05/29/2023     Monitor serial CBC and transfuse if patient becomes hemodynamically unstable, symptomatic or H/H drops below 7/21.     Essential hypertension  Chronic, controlled with diet.  Latest blood pressure and vitals reviewed-   Temp:  [97.5 °F (36.4 °C)-98.2 °F (36.8 °C)]   Pulse:  [48-63]   Resp:  [16-20]   BP: (100-142)/(55-85)   SpO2:  [95 %-100 %] .   Home meds for hypertension were reviewed and noted below.   Hypertension Medications             lisinopriL (PRINIVIL,ZESTRIL) 2.5 MG tablet Take 1 tablet (2.5 mg total) by mouth once daily.      While in the hospital, will manage blood pressure as follows; Continue home antihypertensive regimen    Will utilize p.r.n. blood pressure medication only if patient's blood pressure greater than 180/110 and he develops symptoms such as worsening chest pain or shortness of breath.    Type 2 diabetes mellitus without complication, without long-term current use of insulin  Patient's FSGs are controlled with diet  Last A1c reviewed-   Lab  Results   Component Value Date    HGBA1C 4.8 09/14/2021   - monitor glucose on AM labs      VTE Risk Mitigation (From admission, onward)         Ordered     Reason for No Pharmacological VTE Prophylaxis  Once        Question:  Reasons:  Answer:  Risk of Bleeding    05/28/23 0239     IP VTE HIGH RISK PATIENT  Once         05/28/23 0239     Place sequential compression device  Until discontinued         05/28/23 0239                Discharge Planning   GABRIEL: 5/31/2023 -6/2    Code Status: Full Code   Is the patient medically ready for discharge?:     Reason for patient still in hospital (select all that apply): Patient trending condition, Treatment and Consult recommendations  Discharge Plan A: Home                  Edil Steele MD  Department of Hospital Medicine   [facility]

## 2023-05-29 NOTE — HOSPITAL COURSE
Admitted with worsening chronic RLQ abd pain (4-5mo duration) that has worsened found to have inflammatory fat stranding in the RLQ with persistent marked wall thickening in the cecum and terminal ileum, centered at the level of the ileocecal valve on CT imaging with unclear significance. Concern for possible neoplasm vs appendicitis vs other. Noted leukocytosis, anemia, and thrombocytosis. Given IVF, IV abx, and pain meds. Consults to GI and gen surg to determine best approach for diagnostic purposes. GI performed colonoscopy and discovered large colonic mass at area of concern. General surgery performed right colectomy. Pain controlled. Abx discontinued. Diet advanced. Appropriate for discharge. To f/u with PCP and colonoscopy. Pathology of mass pending at time of discharge. By time of discharge the patient was tolerating a regular diet with resolving admission symptoms. Patient seen and examined on day of discharge.    Physical exam on day of discharge:  Constitutional:       Appearance: Normal appearance. He is normal weight.   Cardiovascular:      Rate and Rhythm: Normal rate and regular rhythm.   Pulmonary:      Effort: Pulmonary effort is normal. No respiratory distress.   Abdominal:      General: Bowel sounds are normal. There is no distension.      Palpations: Abdomen is soft.      Tenderness: There is generalized abdominal tenderness (mild, worse in RLQ). There is no guarding.      Comments: Lower midline dressing serosanguinous dried drainage   Musculoskeletal:         General: Normal range of motion.   Skin:     General: Skin is warm and dry.   Neurological:      General: No focal deficit present.      Mental Status: He is alert and oriented to person, place, and time. Mental status is at baseline.   Psychiatric:         Speech: Speech normal.         Behavior: Behavior normal.

## 2023-05-29 NOTE — PROGRESS NOTES
Colonoscopy done.  Large proximal colon mass noted.  No evidence of metastatic disease on CT.  CEA ordered.  Surgery following.

## 2023-05-29 NOTE — ANESTHESIA PROCEDURE NOTES
Intubation    Date/Time: 5/29/2023 1:42 PM  Performed by: Dougie Bass CRNA  Authorized by: Leonardo Castaneda MD     Intubation:     Induction:  Intravenous    Intubated:  Postinduction    Mask Ventilation:  Easy mask    Attempts:  1    Attempted By:  CRNA    Method of Intubation:  Video laryngoscopy    Blade:  [place] 3    Laryngeal View Grade: Grade I - full view of cords      Difficult Airway Encountered?: No      Complications:  None    Airway Device:  Oral endotracheal tube    Airway Device Size:  8.0    Style/Cuff Inflation:  Cuffed    Inflation Amount (mL):  4    Tube secured:  22    Secured at:  The lips    Placement Verified By:  Capnometry    Complicating Factors:  None    Findings Post-Intubation:  BS equal bilateral

## 2023-05-29 NOTE — ASSESSMENT & PLAN NOTE
Chronic, controlled with diet.  Latest blood pressure and vitals reviewed-   Temp:  [97.5 °F (36.4 °C)-98.2 °F (36.8 °C)]   Pulse:  [48-63]   Resp:  [16-20]   BP: (100-142)/(55-85)   SpO2:  [95 %-100 %] .   Home meds for hypertension were reviewed and noted below.   Hypertension Medications             lisinopriL (PRINIVIL,ZESTRIL) 2.5 MG tablet Take 1 tablet (2.5 mg total) by mouth once daily.      While in the hospital, will manage blood pressure as follows; Continue home antihypertensive regimen    Will utilize p.r.n. blood pressure medication only if patient's blood pressure greater than 180/110 and he develops symptoms such as worsening chest pain or shortness of breath.   Surgery

## 2023-05-29 NOTE — CARE UPDATE
05/28/23 1935   Patient Assessment/Suction   Level of Consciousness (AVPU) alert   Respiratory Effort Unlabored   PRE-TX-O2   Device (Oxygen Therapy) room air   SpO2 96 %   Pulse Oximetry Type Intermittent   $ Pulse Oximetry - Multiple Charge Pulse Oximetry - Multiple   Pulse (!) 52   Resp 18

## 2023-05-29 NOTE — CARE UPDATE
05/29/23 1235   Patient Assessment/Suction   Level of Consciousness (AVPU) alert   Respiratory Effort Normal;Unlabored   Expansion/Accessory Muscles/Retractions no use of accessory muscles   Rhythm/Pattern, Respiratory no shortness of breath reported;depth regular;pattern regular;unlabored   Incentive Spirometer   $ Incentive Spirometer Charges preop instruction   Incentive Spirometer Predicted Level (mL) 2640   Administration (IS) instruction provided, initial   Number of Repetitions (IS) 10   Level Incentive Spirometer (mL) 3000   Patient Tolerance (IS) good

## 2023-05-29 NOTE — PLAN OF CARE
Dr araya at bedside instr ok to give 2 mg dilaudid ivp and she was also at bedside giving presedex

## 2023-05-29 NOTE — OP NOTE
Right colectomy   Procedure Note    Date of procedure:   05/29/2023    Indications: 48 y/o presents with on going abdominal pain found to colon mass in cecum.      Pre-operative Diagnosis: cecal mass    Post-operative Diagnosis: Same    Surgeon: Edin Michael MD    Assistants: Dr. Antoine Resident MD    Anesthesia: General endotracheal anesthesia    ASA Class: 3    Procedure Details   The patient was seen in the Holding Room. The risks, benefits, complications, treatment options, and expected outcomes were discussed with the patient. The possibilities of reaction to medication, pulmonary aspiration, perforation of viscus, bleeding, recurrent infection, the need for additional procedures, failure to diagnose a condition, and creating a complication requiring transfusion or operation were discussed with the patient. The patient concurred with the proposed plan, giving informed consent. The site of surgery properly noted/marked. The patient was taken to Operating Room 7 identified as Ruslan Mayur Caldwell and the procedure verified as Ex lap colectomy. A Time Out was held and the above information confirmed.    Full general anesthesia was induced with orotracheal intubation. The patient was prepped and draped in a supine position. Appropriate antibiotics were given intravenously. Arms were out.    An incision was made in the midline using scalpel to expose the linea alba which was transected using the cautery. Incision was extended superiorly and inferiorly to widely expose the peritoneal cavity.  Immediately we noted a very large mass in the cecum.  It appeared to involve the terminal ileum and cecum.  The procedure was started by freeing the right colon.  The white line of Toldt was freed using the cautery and the colon was rotated medially.  The ureter was visualized and kept in the posterior area out of the dissection.  The hepatic flexure was taken down using the LigaSure and the colon was  from the  greater curve of the stomach.  This allowed access into the lesser sac.  Once the hepatic flexure was taken down, the colon was rotated anteromedially to expose the duodenum.  Using blunt dissection the duodenum pancreas was dissected free from the colon.  There were obviously palpable lymph nodes along the ileocolic artery.  There was also a tattoo ink from the patient's colonoscopy present in location of the mass.  The ileocolic artery was identified isolated and then tied.  The same was done with the ileocolic vein.  This was tied in close proximity to the base of the vessel.  The right branch of the middle colic was then taken using LigaSure and the mesentery was taken all the way up to the distal ileum.  Terminal ileum was then transected with a blue load JAMES stapler as well as the colon at the transverse colon.  The tumor appeared to be abutting the pelvic sidewall on the right.  A portion of the pelvis I was taken carefully without damaging the ureter surrounding organs.  Once this was done the entire specimen which included the right colon and part of the transverse colon along with a portion of the ileum were passed off as specimen.  An ileocolonic anastomosis was then created using a blue load JAMES stapler.  A side-to-side stapled anastomosis was created in TA staple was used to close the enterotomies.  The mesentery was then closed using Vicryl sutures.  The entire abdomen was reinspected for hemostasis.  All bleeding was visualized and stopped.  The omentum was allowed to drape was peritoneal cavity and the organs returns to appropriate positioning.  The linea alba was approximated using PDS sutures and the skin was closed using 4-0 Monocryl dressings were placed patient tolerated procedure well      Instrument, sponge, and needle counts were correct prior to wound closure and at the conclusion of the case.     Findings:  cecal mass with lymph nodes    Estimated Blood Loss: 50.0 cc    Drains:  none    Total IV Fluids: see anesthesia     Specimens: colon and small bowel    Implants: none    Complications:  None; patient tolerated the procedure well.    Disposition: PACU - hemodynamically stable.    Condition: stable    Attending Attestation: I was present and scrubbed for the entire procedure.

## 2023-05-29 NOTE — PLAN OF CARE
Pt awake, alert.  Vital signs stable, denies nausea, states pain is , abd soft. No adverse effects of anesthesia noted. Will be transferring to surgery. Report given to Nurse Padilla on PCU unit.

## 2023-05-29 NOTE — ANESTHESIA PREPROCEDURE EVALUATION
05/29/2023  Ruslan Caldwell is a 47 y.o., male.      Pre-op Assessment    I have reviewed the NPO Status.   I have reviewed the Medications.     Review of Systems  Anesthesia Hx:  No problems with previous Anesthesia    Social:  Smoker, Former Smoker    Cardiovascular:   Exercise tolerance: good Hypertension ECG has been reviewed.    Pulmonary:  Pulmonary Normal    Hepatic/GI:   Bowel Prep. Obstructing mass right colon   Neurological:  Neurology Normal    Endocrine:   Diabetes, type 2        Physical Exam  General: Well nourished    Airway:  Mallampati: II   Mouth Opening: Normal  TM Distance: Normal  Tongue: Normal  Neck ROM: Normal ROM    Dental:  Intact    Chest/Lungs:  Clear to auscultation, Normal Respiratory Rate        Anesthesia Plan  Type of Anesthesia, risks & benefits discussed:    Anesthesia Type: Gen ETT  Intra-op Monitoring Plan: Standard ASA Monitors  Post Op Pain Control Plan: multimodal analgesia and IV/PO Opioids PRN  Induction:  IV  Airway Plan: Direct, Post-Induction  Informed Consent: Informed consent signed with the Patient and all parties understand the risks and agree with anesthesia plan.  All questions answered. Patient consented to blood products? Yes  ASA Score: 3    Ready For Surgery From Anesthesia Perspective.     .

## 2023-05-30 ENCOUNTER — TELEPHONE (OUTPATIENT)
Dept: BARIATRICS | Facility: CLINIC | Age: 48
End: 2023-05-30
Payer: MEDICAID

## 2023-05-30 LAB
ALBUMIN SERPL BCP-MCNC: 3.2 G/DL (ref 3.5–5.2)
ALP SERPL-CCNC: 60 U/L (ref 55–135)
ALT SERPL W/O P-5'-P-CCNC: 18 U/L (ref 10–44)
ANION GAP SERPL CALC-SCNC: 10 MMOL/L (ref 8–16)
AST SERPL-CCNC: 18 U/L (ref 10–40)
BASOPHILS # BLD AUTO: 0.02 K/UL (ref 0–0.2)
BASOPHILS NFR BLD: 0.1 % (ref 0–1.9)
BILIRUB SERPL-MCNC: 1 MG/DL (ref 0.1–1)
BUN SERPL-MCNC: 10 MG/DL (ref 6–20)
CALCIUM SERPL-MCNC: 9.1 MG/DL (ref 8.7–10.5)
CHLORIDE SERPL-SCNC: 100 MMOL/L (ref 95–110)
CO2 SERPL-SCNC: 26 MMOL/L (ref 23–29)
CREAT SERPL-MCNC: 1.1 MG/DL (ref 0.5–1.4)
DIFFERENTIAL METHOD: ABNORMAL
E COLI SXT1 STL QL IA: NEGATIVE
E COLI SXT2 STL QL IA: NEGATIVE
EOSINOPHIL # BLD AUTO: 0 K/UL (ref 0–0.5)
EOSINOPHIL NFR BLD: 0 % (ref 0–8)
ERYTHROCYTE [DISTWIDTH] IN BLOOD BY AUTOMATED COUNT: 17.4 % (ref 11.5–14.5)
EST. GFR  (NO RACE VARIABLE): >60 ML/MIN/1.73 M^2
GLUCOSE SERPL-MCNC: 119 MG/DL (ref 70–110)
HCT VFR BLD AUTO: 28.4 % (ref 40–54)
HGB BLD-MCNC: 8.9 G/DL (ref 14–18)
IMM GRANULOCYTES # BLD AUTO: 0.07 K/UL (ref 0–0.04)
IMM GRANULOCYTES NFR BLD AUTO: 0.3 % (ref 0–0.5)
LYMPHOCYTES # BLD AUTO: 1.6 K/UL (ref 1–4.8)
LYMPHOCYTES NFR BLD: 7.7 % (ref 18–48)
MCH RBC QN AUTO: 20.8 PG (ref 27–31)
MCHC RBC AUTO-ENTMCNC: 31.3 G/DL (ref 32–36)
MCV RBC AUTO: 67 FL (ref 82–98)
MONOCYTES # BLD AUTO: 1.3 K/UL (ref 0.3–1)
MONOCYTES NFR BLD: 6.6 % (ref 4–15)
NEUTROPHILS # BLD AUTO: 17.3 K/UL (ref 1.8–7.7)
NEUTROPHILS NFR BLD: 85.3 % (ref 38–73)
NRBC BLD-RTO: 0 /100 WBC
PLATELET # BLD AUTO: 704 K/UL (ref 150–450)
PMV BLD AUTO: 8.7 FL (ref 9.2–12.9)
POTASSIUM SERPL-SCNC: 3.7 MMOL/L (ref 3.5–5.1)
PROT SERPL-MCNC: 6.9 G/DL (ref 6–8.4)
RBC # BLD AUTO: 4.27 M/UL (ref 4.6–6.2)
SODIUM SERPL-SCNC: 136 MMOL/L (ref 136–145)
WBC # BLD AUTO: 20.34 K/UL (ref 3.9–12.7)

## 2023-05-30 PROCEDURE — 25000003 PHARM REV CODE 250: Performed by: SURGERY

## 2023-05-30 PROCEDURE — 80053 COMPREHEN METABOLIC PANEL: CPT | Performed by: NURSE PRACTITIONER

## 2023-05-30 PROCEDURE — 99406 BEHAV CHNG SMOKING 3-10 MIN: CPT

## 2023-05-30 PROCEDURE — 99232 SBSQ HOSP IP/OBS MODERATE 35: CPT | Mod: ,,, | Performed by: INTERNAL MEDICINE

## 2023-05-30 PROCEDURE — 99900035 HC TECH TIME PER 15 MIN (STAT)

## 2023-05-30 PROCEDURE — 63600175 PHARM REV CODE 636 W HCPCS: Performed by: NURSE PRACTITIONER

## 2023-05-30 PROCEDURE — S4991 NICOTINE PATCH NONLEGEND: HCPCS | Performed by: SURGERY

## 2023-05-30 PROCEDURE — 63600175 PHARM REV CODE 636 W HCPCS: Performed by: SURGERY

## 2023-05-30 PROCEDURE — 25000003 PHARM REV CODE 250: Performed by: STUDENT IN AN ORGANIZED HEALTH CARE EDUCATION/TRAINING PROGRAM

## 2023-05-30 PROCEDURE — 85025 COMPLETE CBC W/AUTO DIFF WBC: CPT | Performed by: NURSE PRACTITIONER

## 2023-05-30 PROCEDURE — 94761 N-INVAS EAR/PLS OXIMETRY MLT: CPT

## 2023-05-30 PROCEDURE — 12000002 HC ACUTE/MED SURGE SEMI-PRIVATE ROOM

## 2023-05-30 PROCEDURE — 99232 PR SUBSEQUENT HOSPITAL CARE,LEVL II: ICD-10-PCS | Mod: ,,, | Performed by: INTERNAL MEDICINE

## 2023-05-30 PROCEDURE — 36415 COLL VENOUS BLD VENIPUNCTURE: CPT | Performed by: NURSE PRACTITIONER

## 2023-05-30 PROCEDURE — 25000003 PHARM REV CODE 250: Performed by: NURSE PRACTITIONER

## 2023-05-30 PROCEDURE — 63600175 PHARM REV CODE 636 W HCPCS: Performed by: STUDENT IN AN ORGANIZED HEALTH CARE EDUCATION/TRAINING PROGRAM

## 2023-05-30 PROCEDURE — 94799 UNLISTED PULMONARY SVC/PX: CPT

## 2023-05-30 RX ORDER — OXYCODONE HYDROCHLORIDE 5 MG/1
5 TABLET ORAL EVERY 4 HOURS PRN
Status: DISCONTINUED | OUTPATIENT
Start: 2023-05-30 | End: 2023-05-30

## 2023-05-30 RX ORDER — OXYCODONE HYDROCHLORIDE 10 MG/1
10 TABLET ORAL EVERY 4 HOURS PRN
Status: DISCONTINUED | OUTPATIENT
Start: 2023-05-30 | End: 2023-05-30

## 2023-05-30 RX ORDER — GABAPENTIN 300 MG/1
300 CAPSULE ORAL 3 TIMES DAILY
Status: DISCONTINUED | OUTPATIENT
Start: 2023-05-30 | End: 2023-06-01 | Stop reason: HOSPADM

## 2023-05-30 RX ORDER — ACETAMINOPHEN 10 MG/ML
1000 INJECTION, SOLUTION INTRAVENOUS EVERY 8 HOURS
Status: COMPLETED | OUTPATIENT
Start: 2023-05-30 | End: 2023-05-31

## 2023-05-30 RX ORDER — KETOROLAC TROMETHAMINE 30 MG/ML
30 INJECTION, SOLUTION INTRAMUSCULAR; INTRAVENOUS EVERY 6 HOURS
Status: DISCONTINUED | OUTPATIENT
Start: 2023-05-30 | End: 2023-05-31

## 2023-05-30 RX ORDER — HYDROMORPHONE HYDROCHLORIDE 1 MG/ML
1 INJECTION, SOLUTION INTRAMUSCULAR; INTRAVENOUS; SUBCUTANEOUS EVERY 4 HOURS PRN
Status: DISCONTINUED | OUTPATIENT
Start: 2023-05-30 | End: 2023-05-31

## 2023-05-30 RX ORDER — HYDROMORPHONE HYDROCHLORIDE 1 MG/ML
0.5 INJECTION, SOLUTION INTRAMUSCULAR; INTRAVENOUS; SUBCUTANEOUS EVERY 6 HOURS PRN
Status: DISCONTINUED | OUTPATIENT
Start: 2023-05-30 | End: 2023-05-30

## 2023-05-30 RX ORDER — METHOCARBAMOL 500 MG/1
500 TABLET, FILM COATED ORAL 4 TIMES DAILY
Status: DISCONTINUED | OUTPATIENT
Start: 2023-05-30 | End: 2023-06-01 | Stop reason: HOSPADM

## 2023-05-30 RX ADMIN — OXYCODONE HYDROCHLORIDE 10 MG: 10 TABLET ORAL at 09:05

## 2023-05-30 RX ADMIN — SIMETHICONE 80 MG: 80 TABLET, CHEWABLE ORAL at 08:05

## 2023-05-30 RX ADMIN — SODIUM CHLORIDE: 9 INJECTION, SOLUTION INTRAVENOUS at 03:05

## 2023-05-30 RX ADMIN — METHOCARBAMOL 500 MG: 500 TABLET ORAL at 05:05

## 2023-05-30 RX ADMIN — METHOCARBAMOL 500 MG: 500 TABLET ORAL at 01:05

## 2023-05-30 RX ADMIN — KETOROLAC TROMETHAMINE 30 MG: 30 INJECTION, SOLUTION INTRAMUSCULAR; INTRAVENOUS at 05:05

## 2023-05-30 RX ADMIN — NICOTINE 1 PATCH: 14 PATCH TRANSDERMAL at 09:05

## 2023-05-30 RX ADMIN — GABAPENTIN 300 MG: 300 CAPSULE ORAL at 02:05

## 2023-05-30 RX ADMIN — KETOROLAC TROMETHAMINE 30 MG: 30 INJECTION, SOLUTION INTRAMUSCULAR; INTRAVENOUS at 11:05

## 2023-05-30 RX ADMIN — GABAPENTIN 300 MG: 300 CAPSULE ORAL at 08:05

## 2023-05-30 RX ADMIN — MORPHINE SULFATE 6 MG: 2 INJECTION, SOLUTION INTRAMUSCULAR; INTRAVENOUS at 05:05

## 2023-05-30 RX ADMIN — METHOCARBAMOL 500 MG: 500 TABLET ORAL at 09:05

## 2023-05-30 RX ADMIN — PIPERACILLIN AND TAZOBACTAM 4.5 G: 4; .5 INJECTION, POWDER, LYOPHILIZED, FOR SOLUTION INTRAVENOUS; PARENTERAL at 05:05

## 2023-05-30 RX ADMIN — GABAPENTIN 300 MG: 300 CAPSULE ORAL at 09:05

## 2023-05-30 RX ADMIN — ACETAMINOPHEN 1000 MG: 10 INJECTION INTRAVENOUS at 02:05

## 2023-05-30 RX ADMIN — HYDROMORPHONE HYDROCHLORIDE 0.5 MG: 1 INJECTION, SOLUTION INTRAMUSCULAR; INTRAVENOUS; SUBCUTANEOUS at 11:05

## 2023-05-30 RX ADMIN — METHOCARBAMOL 500 MG: 500 TABLET ORAL at 08:05

## 2023-05-30 RX ADMIN — ACETAMINOPHEN 1000 MG: 10 INJECTION INTRAVENOUS at 08:05

## 2023-05-30 RX ADMIN — MORPHINE SULFATE 4 MG: 4 INJECTION INTRAVENOUS at 01:05

## 2023-05-30 NOTE — SUBJECTIVE & OBJECTIVE
Interval History: Patient seen and examined. MARIA ELENA. Got some sleep but had decent amount of pain. Reporting uncontrolled pain this morning before morning meds. Tolerated some juice.      Objective:     Vital Signs (Most Recent):  Temp: 96.9 °F (36.1 °C) (05/30/23 0723)  Pulse: 68 (05/30/23 0723)  Resp: 16 (05/30/23 0927)  BP: (!) 142/74 (05/30/23 0723)  SpO2: 97 % (05/30/23 0723) Vital Signs (24h Range):  Temp:  [96.9 °F (36.1 °C)-99 °F (37.2 °C)] 96.9 °F (36.1 °C)  Pulse:  [48-89] 68  Resp:  [12-18] 16  SpO2:  [92 %-100 %] 97 %  BP: (100-187)/(55-89) 142/74     Weight: 103.9 kg (229 lb 0.9 oz)  Body mass index is 28.63 kg/m².    Intake/Output Summary (Last 24 hours) at 5/30/2023 1029  Last data filed at 5/30/2023 0306  Gross per 24 hour   Intake 3525 ml   Output 1650 ml   Net 1875 ml         Physical Exam  Vitals reviewed.   Constitutional:       Appearance: Normal appearance. He is normal weight.   Cardiovascular:      Rate and Rhythm: Normal rate and regular rhythm.   Pulmonary:      Effort: Pulmonary effort is normal. No respiratory distress.   Abdominal:      General: Bowel sounds are normal. There is no distension.      Palpations: Abdomen is soft.      Tenderness: There is generalized abdominal tenderness (worse in RLQ). There is no guarding.      Comments: Lower midline dressing serosanguinous dried drainage   Musculoskeletal:         General: Normal range of motion.   Skin:     General: Skin is warm and dry.   Neurological:      General: No focal deficit present.      Mental Status: He is alert and oriented to person, place, and time. Mental status is at baseline.   Psychiatric:         Speech: Speech normal.         Behavior: Behavior normal.           Significant Labs: All pertinent labs within the past 24 hours have been reviewed.    Significant Imaging: I have reviewed all pertinent imaging results/findings within the past 24 hours.

## 2023-05-30 NOTE — TELEPHONE ENCOUNTER
----- Message from Rossi Stark RN sent at 5/30/2023  1:41 PM CDT -----  Hey! Can you please schedule a post op appt for this pt? Thanks!

## 2023-05-30 NOTE — ASSESSMENT & PLAN NOTE
CT reviewed  Colonoscopy reviewed  S/p R colectomy 5/29  - decrease IVF rate  - discontinue zosyn  - scheduled multimodal pain control  - PRN pain meds and antiemetics  - routine post-op care  - advance diet per gen surg  - GI and gen surg consulted  - f/u path

## 2023-05-30 NOTE — PROGRESS NOTES
[facility]  Progress Note    Subjective:     History of Present Illness:  48 y/o male with on going right lower quadrant pain.   Has been present at least a week maybe longer.  Sought help at [facility] er and treated for ibd flair using steroids.  He has no hx of bloody stools or ibd in the past.  He says the pain got worse and started having diarrhea so he came back to ER.  Pain mainly in right lower quadrant, no radiation, better with laying still.      Post-Op Info:  Procedure(s) (LRB):  COLECTOMY, PARTIAL (Right)   1 Day Post-Op     Interval History: Patient seen and examined this morning. POD1 s/p right extended hemicolectomy. Difficulties with pain control overnight. Optimized multi-modal regimen this morning. Vitals, labs within normal limits. Tolerating clears.     Medications:  Continuous Infusions:   sodium chloride 0.9% 75 mL/hr at 05/30/23 0306     Scheduled Meds:   acetaminophen  1,000 mg Intravenous Q8H    gabapentin  300 mg Oral TID    methocarbamoL  500 mg Oral QID    nicotine  1 patch Transdermal Daily    piperacillin-tazobactam (ZOSYN) IVPB  4.5 g Intravenous Q8H     PRN Meds:acetaminophen, aluminum-magnesium hydroxide-simethicone, dextrose 10%, dextrose 10%, dextrose, dextrose, glucagon (human recombinant), HYDROmorphone, melatonin, naloxone, ondansetron, oxyCODONE, oxyCODONE, prochlorperazine, simethicone, sodium chloride 0.9%     Review of patient's allergies indicates:  No Known Allergies  Objective:     Vital Signs (Most Recent):  Temp: 96.9 °F (36.1 °C) (05/30/23 0723)  Pulse: 68 (05/30/23 0723)  Resp: 16 (05/30/23 0723)  BP: (!) 142/74 (05/30/23 0723)  SpO2: 97 % (05/30/23 0723) Vital Signs (24h Range):  Temp:  [96.9 °F (36.1 °C)-99 °F (37.2 °C)] 96.9 °F (36.1 °C)  Pulse:  [48-89] 68  Resp:  [12-18] 16  SpO2:  [92 %-100 %] 97 %  BP: (100-187)/(55-89) 142/74     Weight: 103.9 kg (229 lb 0.9 oz)  Body mass index is 28.63 kg/m².    Intake/Output -  Last 3 Shifts         05/28 0700 05/29 0659 05/29 0700 05/30 0659 05/30 0700 05/31 0659    P.O. 3500 550     I.V. (mL/kg) 717.2 (7) 2975 (28.6)     IV Piggyback 364.5      Total Intake(mL/kg) 4581.7 (44.9) 3525 (33.9)     Urine (mL/kg/hr)  1650 (0.7)     Emesis/NG output  0     Stool  0     Total Output  1650     Net +4581.7 +1875            Urine Occurrence 3 x      Stool Occurrence 1 x               Physical Exam  Vitals and nursing note reviewed.   Constitutional:       General: He is not in acute distress.     Appearance: Normal appearance.   HENT:      Mouth/Throat:      Mouth: Mucous membranes are moist.   Cardiovascular:      Rate and Rhythm: Normal rate and regular rhythm.   Pulmonary:      Effort: Pulmonary effort is normal. No respiratory distress.   Abdominal:      Comments: Abdomen soft, non-distended  Diffusely tender to palpation throughout abdomen, although most prominent along the right abdomen, which is to be expected  Midline incision with island dressing in place; serosanguinous stain to inferior aspect of dressing   Neurological:      Mental Status: He is alert.        Significant Labs:  I have reviewed all pertinent lab results within the past 24 hours.  CBC:   Recent Labs   Lab 05/30/23  0315   WBC 20.34*   RBC 4.27*   HGB 8.9*   HCT 28.4*   *   MCV 67*   MCH 20.8*   MCHC 31.3*     CMP:   Recent Labs   Lab 05/30/23  0315   *   CALCIUM 9.1   ALBUMIN 3.2*   PROT 6.9      K 3.7   CO2 26      BUN 10   CREATININE 1.1   ALKPHOS 60   ALT 18   AST 18   BILITOT 1.0       Significant Diagnostics:  I have reviewed all pertinent imaging results/findings within the past 24 hours.    Assessment/Plan:     * Colonic mass  Ruslan Caldwell is a 47yoM with a cecal mass who underwent a right hemicolectomy on 5/29.     - patient seen and examined this morning  - vitals and labs reviewed-- largely unremarkable  - difficulties with pain control overnight   - optimized multi-modal regimen  with IV tylenol, PO robaxin and gabapentin, PO and IV narcotics  - advance diet to low residue  - cut maintenance fluids in half-- ok to discontinue if tolerating diet without difficulty  - encourage out of bed, ambulation  - awaiting return of bowel function  - please call with questions        Sunday Antoine MD  General Surgery  [facility]

## 2023-05-30 NOTE — ASSESSMENT & PLAN NOTE
Ruslan Caldwell is a 47yoM with a cecal mass who underwent a right hemicolectomy on 5/29.     1 Day Post-Op    Pain control  Diet as tolerated  Ambulat/IS/ DVT proph

## 2023-05-30 NOTE — SUBJECTIVE & OBJECTIVE
Interval History: Patient seen and examined this morning. POD1 s/p right extended hemicolectomy. Difficulties with pain control overnight. Optimized multi-modal regimen this morning. Vitals, labs within normal limits. Tolerating clears.     Medications:  Continuous Infusions:   sodium chloride 0.9% 75 mL/hr at 05/30/23 0306     Scheduled Meds:   acetaminophen  1,000 mg Intravenous Q8H    gabapentin  300 mg Oral TID    methocarbamoL  500 mg Oral QID    nicotine  1 patch Transdermal Daily    piperacillin-tazobactam (ZOSYN) IVPB  4.5 g Intravenous Q8H     PRN Meds:acetaminophen, aluminum-magnesium hydroxide-simethicone, dextrose 10%, dextrose 10%, dextrose, dextrose, glucagon (human recombinant), HYDROmorphone, melatonin, naloxone, ondansetron, oxyCODONE, oxyCODONE, prochlorperazine, simethicone, sodium chloride 0.9%     Review of patient's allergies indicates:  No Known Allergies  Objective:     Vital Signs (Most Recent):  Temp: 96.9 °F (36.1 °C) (05/30/23 0723)  Pulse: 68 (05/30/23 0723)  Resp: 16 (05/30/23 0723)  BP: (!) 142/74 (05/30/23 0723)  SpO2: 97 % (05/30/23 0723) Vital Signs (24h Range):  Temp:  [96.9 °F (36.1 °C)-99 °F (37.2 °C)] 96.9 °F (36.1 °C)  Pulse:  [48-89] 68  Resp:  [12-18] 16  SpO2:  [92 %-100 %] 97 %  BP: (100-187)/(55-89) 142/74     Weight: 103.9 kg (229 lb 0.9 oz)  Body mass index is 28.63 kg/m².    Intake/Output - Last 3 Shifts         05/28 0700 05/29 0659 05/29 0700 05/30 0659 05/30 0700 05/31 0659    P.O. 3500 550     I.V. (mL/kg) 717.2 (7) 2975 (28.6)     IV Piggyback 364.5      Total Intake(mL/kg) 4581.7 (44.9) 3525 (33.9)     Urine (mL/kg/hr)  1650 (0.7)     Emesis/NG output  0     Stool  0     Total Output  1650     Net +4581.7 +1875            Urine Occurrence 3 x      Stool Occurrence 1 x               Physical Exam  Vitals and nursing note reviewed.   Constitutional:       General: He is not in acute distress.     Appearance: Normal appearance.   HENT:      Mouth/Throat:       Mouth: Mucous membranes are moist.   Cardiovascular:      Rate and Rhythm: Normal rate and regular rhythm.   Pulmonary:      Effort: Pulmonary effort is normal. No respiratory distress.   Abdominal:      Comments: Abdomen soft, non-distended  Diffusely tender to palpation throughout abdomen, although most prominent along the right abdomen, which is to be expected  Midline incision with island dressing in place; serosanguinous stain to inferior aspect of dressing   Neurological:      Mental Status: He is alert.        Significant Labs:  I have reviewed all pertinent lab results within the past 24 hours.  CBC:   Recent Labs   Lab 05/30/23 0315   WBC 20.34*   RBC 4.27*   HGB 8.9*   HCT 28.4*   *   MCV 67*   MCH 20.8*   MCHC 31.3*     CMP:   Recent Labs   Lab 05/30/23 0315   *   CALCIUM 9.1   ALBUMIN 3.2*   PROT 6.9      K 3.7   CO2 26      BUN 10   CREATININE 1.1   ALKPHOS 60   ALT 18   AST 18   BILITOT 1.0       Significant Diagnostics:  I have reviewed all pertinent imaging results/findings within the past 24 hours.

## 2023-05-30 NOTE — ANESTHESIA POSTPROCEDURE EVALUATION
Anesthesia Post Evaluation    Patient: Ruslan Caldwell    Procedure(s) Performed: Procedure(s) (LRB):  COLONOSCOPY (N/A)    Final Anesthesia Type: general      Patient location during evaluation: PACU  Patient participation: Yes- Able to Participate  Level of consciousness: awake and alert and oriented  Post-procedure vital signs: reviewed and stable  Pain management: adequate  Airway patency: patent    PONV status at discharge: No PONV  Anesthetic complications: no      Cardiovascular status: blood pressure returned to baseline  Respiratory status: unassisted, spontaneous ventilation and room air  Hydration status: euvolemic  Follow-up not needed.          Vitals Value Taken Time   /73 05/30/23 1505   Temp 36 °C (96.8 °F) 05/30/23 1505   Pulse 69 05/30/23 1505   Resp 16 05/30/23 1505   SpO2 96 % 05/30/23 1505         Event Time   Out of Recovery 05/29/2023 12:14:20         Pain/Jose D Score: Pain Rating Prior to Med Admin: 9 (5/30/2023  2:01 PM)  Pain Rating Post Med Admin: 6 (5/30/2023  2:31 PM)  Jose D Score: 9 (5/29/2023  5:56 PM)

## 2023-05-30 NOTE — PROGRESS NOTES
[facility] Hospital Medicine  Progress Note    Patient Name: Ruslan Caldwell  MRN: [id number]  Patient Class: IP- Inpatient   Admission Date: 5/27/2023  Length of Stay: 1 days  Attending Physician: Edil Steele MD  Primary Care Provider: No primary care provider on file.        Subjective:     Principal Problem:Colonic mass        HPI:  Ruslan Caldwell is a 47 year old male with a past medical history of hypertension and DM type 2 who presents to the ED complaining of right-sided abdominal pain for the past 4-5 months.  He reports associated nausea and diarrhea.  Patient states he was seen at [facility] ED 3 times this week  (5/23, 5/24, & 5/25) with same symptoms and still has no relief.  Per chart review, patient was referred to GI outpatient and has an appointment on 06/01/2023.  Patient had 3 CT abdomen during these visits with most recent showing decreased inflammatory fat stranding in the right lower quadrant with persistent marked wall thickening in the cecum and terminal ileum, centered at the level of the ileocecal valve with findings concerning for a neoplasm centered at the ileocecal junction, although acute appendicitis, Crohn's disease are differential possibilities.  ED workup revealed elevated WBC 19.8, hemoglobin 8.2 hematocrit 26.8 and thrombocytosis 681.  Patient was started on IV Zosyn and received IV pain medication.  ED provider spoke to Dr. Michael, General surgery, agreed consult and patient was referred to Hospital Medicine.  Patient seen in the ED with sister at bedside.  Patient states pain is currently controlled with IV morphine and he denies current nausea, vomiting and recent diarrhea.  Patient states he had 1 episode of hematemesis on Wednesday and denies blood in stool.  Patient denies chest pain, shortness of breath, fever and chills.  Patient will be admitted to Hospital Medicine for further evaluation management.          Overview/Hospital  Course:  Admitted with now chronic RLQ abd pain (4-5mo duration) that has worsened found to have inflammatory fat stranding in the RLQ with persistent marked wall thickening in the cecum and terminal ileum, centered at the level of the ileocecal valve on CT imaging with unclear significance. Concern for possible neoplasm vs appendicitis vs other. Noted leukocytosis, anemia, and thrombocytosis. Given IVF, IV abx, and pain meds. Consults to GI and gen surg to determine best approach for diagnostic purposes. GI performed colonoscopy and discovered large colonic mass at area of concern. General surgery performed right colectomy. Post-op pain control was difficult. Abx discontinued. Diet advanced.      Interval History: Patient seen and examined. NAEON. Got some sleep but had decent amount of pain. Reporting uncontrolled pain this morning before morning meds. Tolerated some juice.      Objective:     Vital Signs (Most Recent):  Temp: 96.9 °F (36.1 °C) (05/30/23 0723)  Pulse: 68 (05/30/23 0723)  Resp: 16 (05/30/23 0927)  BP: (!) 142/74 (05/30/23 0723)  SpO2: 97 % (05/30/23 0723) Vital Signs (24h Range):  Temp:  [96.9 °F (36.1 °C)-99 °F (37.2 °C)] 96.9 °F (36.1 °C)  Pulse:  [48-89] 68  Resp:  [12-18] 16  SpO2:  [92 %-100 %] 97 %  BP: (100-187)/(55-89) 142/74     Weight: 103.9 kg (229 lb 0.9 oz)  Body mass index is 28.63 kg/m².    Intake/Output Summary (Last 24 hours) at 5/30/2023 1029  Last data filed at 5/30/2023 0306  Gross per 24 hour   Intake 3525 ml   Output 1650 ml   Net 1875 ml         Physical Exam  Vitals reviewed.   Constitutional:       Appearance: Normal appearance. He is normal weight.   Cardiovascular:      Rate and Rhythm: Normal rate and regular rhythm.   Pulmonary:      Effort: Pulmonary effort is normal. No respiratory distress.   Abdominal:      General: Bowel sounds are normal. There is no distension.      Palpations: Abdomen is soft.      Tenderness: There is generalized abdominal tenderness (worse in  RLQ). There is no guarding.      Comments: Lower midline dressing serosanguinous dried drainage   Musculoskeletal:         General: Normal range of motion.   Skin:     General: Skin is warm and dry.   Neurological:      General: No focal deficit present.      Mental Status: He is alert and oriented to person, place, and time. Mental status is at baseline.   Psychiatric:         Speech: Speech normal.         Behavior: Behavior normal.           Significant Labs: All pertinent labs within the past 24 hours have been reviewed.    Significant Imaging: I have reviewed all pertinent imaging results/findings within the past 24 hours.      Assessment/Plan:      * Colonic mass  CT reviewed  Colonoscopy reviewed  S/p R colectomy 5/29  - decrease IVF rate  - discontinue zosyn  - scheduled multimodal pain control  - PRN pain meds and antiemetics  - routine post-op care  - advance diet per gen surg  - GI and gen surg consulted  - f/u path    RLQ abdominal pain  - plan per 'colonic mass' section    Thrombocytosis  History noted  -Trend platelets on cbc    Microcytic anemia  Patient's anemia is currently controlled. Has not received any PRBCs to date..  Etiology likely due to colonic mass/cancer.  Current CBC reviewed-   Lab Results   Component Value Date    HGB 8.9 (L) 05/30/2023    HCT 28.4 (L) 05/30/2023     Monitor serial CBC and transfuse if patient becomes hemodynamically unstable, symptomatic or H/H drops below 7/21.     Essential hypertension  Chronic, controlled with diet.  Latest blood pressure and vitals reviewed-   Temp:  [96.9 °F (36.1 °C)-99 °F (37.2 °C)]   Pulse:  [48-89]   Resp:  [12-18]   BP: (100-187)/(55-89)   SpO2:  [92 %-100 %] .   Home meds for hypertension were reviewed and noted below.   Hypertension Medications             lisinopriL (PRINIVIL,ZESTRIL) 2.5 MG tablet Take 1 tablet (2.5 mg total) by mouth once daily.      While in the hospital, will manage blood pressure as follows; Continue home  antihypertensive regimen    Will utilize p.r.n. blood pressure medication only if patient's blood pressure greater than 180/110 and he develops symptoms such as worsening chest pain or shortness of breath.    Type 2 diabetes mellitus without complication, without long-term current use of insulin  Patient's FSGs are controlled with diet  Last A1c reviewed-   Lab Results   Component Value Date    HGBA1C 4.8 09/14/2021   - monitor glucose on AM labs      VTE Risk Mitigation (From admission, onward)         Ordered     Reason for No Pharmacological VTE Prophylaxis  Once        Question:  Reasons:  Answer:  Risk of Bleeding    05/28/23 0239     IP VTE HIGH RISK PATIENT  Once         05/28/23 0239     Place sequential compression device  Until discontinued         05/28/23 0239                Discharge Planning   GABRIEL: 5/31/2023 -6/2    Code Status: Full Code   Is the patient medically ready for discharge?:     Reason for patient still in hospital (select all that apply): Patient trending condition and Consult recommendations  Discharge Plan A: Home                  Edil Steele MD  Department of Hospital Medicine   [facility]

## 2023-05-30 NOTE — RESPIRATORY THERAPY
Patient refused am and pm IS.  02 saturation 97% on on room air.  Patient refused IS both times on AM shift.  Explained to patient the importance of deep breathing after surgery.

## 2023-05-30 NOTE — PROGRESS NOTES
"[facility] Note    CC: Colon mass    HPI 47 y.o. male with hypertension and DMII who is here with complaints of ongoing right-sided abdominal pain for the past 4-5 months associated with nausea and watery diarrhea. He has been seen at [facility] ED 3 times this week  (5/23, 5/24, & 5/25) with these symptoms. He has had 3 CT abdomens during these visits with inflammatory stranding, persistent wall thickening in the cecum and terminal ileum, near level of the ileocecal valve with findings concerning for a neoplasm centered at the ileocecal junction.     States he continues to have right lower quadrant abdominal pain. No blood reported in stool. Never had colonoscopy. Has had multiple family members with colon cancer in the family. H/H on admission 8.2/26.8. MCV 68. Plt 681. Last normal H/H noted in 2021.     INTERVAL HISTORY:  Patient found to have large colon mass on colonoscopy and was subsequently taken to OR by Dr. Michael.  He is now POD 1.  He had some significant postoperative pain yesterday but is feeling better today.  No new complaints.    Past Medical History:   Diagnosis Date    Diabetes mellitus, type 2 05/2020    Patient denies    Hypertension          Review of Systems  General ROS: negative for - chills, fever or weight loss  Cardiovascular ROS: no chest pain or dyspnea on exertion  Gastrointestinal ROS: as above    Physical Examination  /73 (BP Location: Left arm, Patient Position: Lying)   Pulse 69   Temp 96.8 °F (36 °C)   Resp 16   Ht 6' 3" (1.905 m)   Wt 103.9 kg (229 lb 0.9 oz)   SpO2 96%   BMI 28.63 kg/m²   General appearance: alert, cooperative, no distress  HENT: Normocephalic, atraumatic, neck symmetrical, no nasal discharge, sclera anicteric   Lungs: clear to auscultation bilaterally, symmetric chest wall expansion bilaterally  Heart: regular rate and rhythm without rub; no displacement of the PMI   Abdomen: soft, Mild TTP diffusely since receiving pain " meds.  Extremities: extremities symmetric; no clubbing, cyanosis, or edema  Neurologic: Alert and oriented X 3, no sensory or motor neurologic deficits      Labs:  Lab Results   Component Value Date    WBC 20.34 (H) 05/30/2023    HGB 8.9 (L) 05/30/2023    HCT 28.4 (L) 05/30/2023    MCV 67 (L) 05/30/2023     (H) 05/30/2023             Assessment:   Colon mass  Abdominal pain    Plan:  Diet per surgery  Continue current care  Follow up with GI for surveillance colonoscopy in one year.  Will sign off for now.  Call for questions.    Sam Clements MD  [facility]  [address], [address]  [place] [address]  Office: [phone]  Fax: [phone]

## 2023-05-30 NOTE — CARE UPDATE
05/30/23 0949   Tobacco Cessation Intervention   Do you use any type of tobacco product? Yes   Are you interested in quitting use of tobacco products? Not interested   Are you interested in Nicotine Replacement for symptom relief? Yes   $ Smoking Cessation Charges Smoking Cessation - Intermediate (CTTS)     Not interested in smoking cessation at this, Nicotine patch in use

## 2023-05-30 NOTE — ASSESSMENT & PLAN NOTE
Patient's anemia is currently controlled. Has not received any PRBCs to date..  Etiology likely due to colonic mass/cancer.  Current CBC reviewed-   Lab Results   Component Value Date    HGB 8.9 (L) 05/30/2023    HCT 28.4 (L) 05/30/2023     Monitor serial CBC and transfuse if patient becomes hemodynamically unstable, symptomatic or H/H drops below 7/21.

## 2023-05-30 NOTE — PLAN OF CARE
AAO x 4, VSS, PRN pain and gas medication given, midline dressing with old drainage, no new drainage noted, resting between care, will monitor.     Problem: Adult Inpatient Plan of Care  Goal: Plan of Care Review  Outcome: Ongoing, Progressing     Problem: Adult Inpatient Plan of Care  Goal: Optimal Comfort and Wellbeing  Outcome: Ongoing, Progressing     Problem: Pain Acute  Goal: Acceptable Pain Control and Functional Ability  Outcome: Ongoing, Progressing    Problem: Infection  Goal: Absence of Infection Signs and Symptoms  Outcome: Ongoing, Progressing     Problem: Fall Injury Risk  Goal: Absence of Fall and Fall-Related Injury  Outcome: Ongoing, Progressing

## 2023-05-30 NOTE — SUBJECTIVE & OBJECTIVE
Interval History: doing well other than pain    Medications:  Continuous Infusions:   sodium chloride 0.9% 50 mL/hr at 05/30/23 0726     Scheduled Meds:   acetaminophen  1,000 mg Intravenous Q8H    gabapentin  300 mg Oral TID    ketorolac  30 mg Intravenous Q6H    methocarbamoL  500 mg Oral QID    nicotine  1 patch Transdermal Daily     PRN Meds:acetaminophen, aluminum-magnesium hydroxide-simethicone, dextrose 10%, dextrose 10%, dextrose, dextrose, glucagon (human recombinant), HYDROmorphone, melatonin, naloxone, ondansetron, oxyCODONE, prochlorperazine, simethicone, sodium chloride 0.9%     Review of patient's allergies indicates:  No Known Allergies  Objective:     Vital Signs (Most Recent):  Temp: 98.5 °F (36.9 °C) (05/30/23 1114)  Pulse: 67 (05/30/23 1114)  Resp: 18 (05/30/23 1151)  BP: (!) 140/73 (05/30/23 1114)  SpO2: 97 % (05/30/23 1315) Vital Signs (24h Range):  Temp:  [96.9 °F (36.1 °C)-99 °F (37.2 °C)] 98.5 °F (36.9 °C)  Pulse:  [60-89] 67  Resp:  [12-18] 18  SpO2:  [92 %-100 %] 97 %  BP: (120-187)/(68-89) 140/73     Weight: 103.9 kg (229 lb 0.9 oz)  Body mass index is 28.63 kg/m².    Intake/Output - Last 3 Shifts         05/28 0700 05/29 0659 05/29 0700 05/30 0659 05/30 0700 05/31 0659    P.O. 3500 550 320    I.V. (mL/kg) 717.2 (7) 2975 (28.6)     IV Piggyback 364.5      Total Intake(mL/kg) 4581.7 (44.9) 3525 (33.9) 320 (3.1)    Urine (mL/kg/hr)  1650 (0.7) 750 (0.9)    Emesis/NG output  0     Stool  0     Total Output  1650 750    Net +4581.7 +1875 -430           Urine Occurrence 3 x      Stool Occurrence 1 x               Physical Exam  Abdominal:      General: Abdomen is flat. There is no distension.      Palpations: Abdomen is soft.      Tenderness: There is abdominal tenderness.        Significant Labs:  I have reviewed all pertinent lab results within the past 24 hours.  CBC:   Recent Labs   Lab 05/30/23  0315   WBC 20.34*   RBC 4.27*   HGB 8.9*   HCT 28.4*   *   MCV 67*   MCH 20.8*   MCHC  31.3*     BMP:   Recent Labs   Lab 05/24/23  0446 05/25/23  1903 05/30/23  0315   GLU 86   < > 119*      < > 136   K 3.5   < > 3.7      < > 100   CO2 26   < > 26   BUN 10   < > 10   CREATININE 1.0   < > 1.1   CALCIUM 8.4*   < > 9.1   MG 1.7  --   --     < > = values in this interval not displayed.       Significant Diagnostics:  I have reviewed all pertinent imaging results/findings within the past 24 hours.

## 2023-05-30 NOTE — ASSESSMENT & PLAN NOTE
Chronic, controlled with diet.  Latest blood pressure and vitals reviewed-   Temp:  [96.9 °F (36.1 °C)-99 °F (37.2 °C)]   Pulse:  [48-89]   Resp:  [12-18]   BP: (100-187)/(55-89)   SpO2:  [92 %-100 %] .   Home meds for hypertension were reviewed and noted below.   Hypertension Medications             lisinopriL (PRINIVIL,ZESTRIL) 2.5 MG tablet Take 1 tablet (2.5 mg total) by mouth once daily.      While in the hospital, will manage blood pressure as follows; Continue home antihypertensive regimen    Will utilize p.r.n. blood pressure medication only if patient's blood pressure greater than 180/110 and he develops symptoms such as worsening chest pain or shortness of breath.

## 2023-05-30 NOTE — ASSESSMENT & PLAN NOTE
Ruslan Caldwell is a 47yoM with a cecal mass who underwent a right hemicolectomy on 5/29.     - patient seen and examined this morning  - vitals and labs reviewed-- largely unremarkable  - difficulties with pain control overnight   - optimized multi-modal regimen with IV tylenol, PO robaxin and gabapentin, PO and IV narcotics  - advance diet to low residue  - cut maintenance fluids in half-- ok to discontinue if tolerating diet without difficulty  - encourage out of bed, ambulation  - awaiting return of bowel function  - please call with questions

## 2023-05-30 NOTE — CARE UPDATE
05/29/23 8827   Patient Assessment/Suction   Level of Consciousness (AVPU) responds to voice   Respiratory Effort Unlabored   PRE-TX-O2   Device (Oxygen Therapy) room air   SpO2 95 %   Pulse Oximetry Type Intermittent   $ Pulse Oximetry - Multiple Charge Pulse Oximetry - Multiple   Pulse 61   Resp 18   Incentive Spirometer   $ Incentive Spirometer Charges unable to perform  (pt sleepy from surgery still)

## 2023-05-30 NOTE — PROGRESS NOTES
[facility] Surgery  Progress Note    Subjective:     History of Present Illness:  46 y/o male with on going right lower quadrant pain.   Has been present at least a week maybe longer.  Sought help at [facility] er and treated for ibd flair using steroids.  He has no hx of bloody stools or ibd in the past.  He says the pain got worse and started having diarrhea so he came back to ER.  Pain mainly in right lower quadrant, no radiation, better with laying still.      Post-Op Info:  Procedure(s) (LRB):  COLECTOMY, PARTIAL (Right)   1 Day Post-Op     Interval History: doing well other than pain    Medications:  Continuous Infusions:   sodium chloride 0.9% 50 mL/hr at 05/30/23 0726     Scheduled Meds:   acetaminophen  1,000 mg Intravenous Q8H    gabapentin  300 mg Oral TID    ketorolac  30 mg Intravenous Q6H    methocarbamoL  500 mg Oral QID    nicotine  1 patch Transdermal Daily     PRN Meds:acetaminophen, aluminum-magnesium hydroxide-simethicone, dextrose 10%, dextrose 10%, dextrose, dextrose, glucagon (human recombinant), HYDROmorphone, melatonin, naloxone, ondansetron, oxyCODONE, prochlorperazine, simethicone, sodium chloride 0.9%     Review of patient's allergies indicates:  No Known Allergies  Objective:     Vital Signs (Most Recent):  Temp: 98.5 °F (36.9 °C) (05/30/23 1114)  Pulse: 67 (05/30/23 1114)  Resp: 18 (05/30/23 1151)  BP: (!) 140/73 (05/30/23 1114)  SpO2: 97 % (05/30/23 1315) Vital Signs (24h Range):  Temp:  [96.9 °F (36.1 °C)-99 °F (37.2 °C)] 98.5 °F (36.9 °C)  Pulse:  [60-89] 67  Resp:  [12-18] 18  SpO2:  [92 %-100 %] 97 %  BP: (120-187)/(68-89) 140/73     Weight: 103.9 kg (229 lb 0.9 oz)  Body mass index is 28.63 kg/m².    Intake/Output - Last 3 Shifts         05/28 0700  05/29 0659 05/29 0700 05/30 0659 05/30 0700 05/31 0659    P.O. 3500 550 320    I.V. (mL/kg) 717.2 (7) 2975 (28.6)     IV Piggyback 364.5      Total Intake(mL/kg) 4581.7 (44.9) 3525 (33.9) 320  (3.1)    Urine (mL/kg/hr)  1650 (0.7) 750 (0.9)    Emesis/NG output  0     Stool  0     Total Output  1650 750    Net +4581.7 +1875 -430           Urine Occurrence 3 x      Stool Occurrence 1 x               Physical Exam  Abdominal:      General: Abdomen is flat. There is no distension.      Palpations: Abdomen is soft.      Tenderness: There is abdominal tenderness.        Significant Labs:  I have reviewed all pertinent lab results within the past 24 hours.  CBC:   Recent Labs   Lab 05/30/23  0315   WBC 20.34*   RBC 4.27*   HGB 8.9*   HCT 28.4*   *   MCV 67*   MCH 20.8*   MCHC 31.3*     BMP:   Recent Labs   Lab 05/24/23  0446 05/25/23  1903 05/30/23  0315   GLU 86   < > 119*      < > 136   K 3.5   < > 3.7      < > 100   CO2 26   < > 26   BUN 10   < > 10   CREATININE 1.0   < > 1.1   CALCIUM 8.4*   < > 9.1   MG 1.7  --   --     < > = values in this interval not displayed.       Significant Diagnostics:  I have reviewed all pertinent imaging results/findings within the past 24 hours.    Assessment/Plan:     * Colonic mass  Ruslan Caldwell is a 47yoM with a cecal mass who underwent a right hemicolectomy on 5/29.     1 Day Post-Op    Pain control  Diet as tolerated  Ambulat/IS/ DVT proph        Edin Michael MD  General Surgery  [facility]

## 2023-05-30 NOTE — ANESTHESIA PREPROCEDURE EVALUATION
05/30/2023  Ruslan Caldwell is a 47 y.o., male.      Pre-op Assessment          Review of Systems         Anesthesia Plan  Type of Anesthesia, risks & benefits discussed:    Anesthesia Type: Gen Natural Airway  Intra-op Monitoring Plan: Standard ASA Monitors  Induction:  IV  Informed Consent: Informed consent signed with the Patient and all parties understand the risks and agree with anesthesia plan.  All questions answered.   ASA Score: 3    Ready For Surgery From Anesthesia Perspective.     .

## 2023-05-31 LAB
ANION GAP SERPL CALC-SCNC: 7 MMOL/L (ref 8–16)
BACTERIA STL CULT: NORMAL
BUN SERPL-MCNC: 9 MG/DL (ref 6–20)
CALCIUM SERPL-MCNC: 9 MG/DL (ref 8.7–10.5)
CHLORIDE SERPL-SCNC: 103 MMOL/L (ref 95–110)
CO2 SERPL-SCNC: 29 MMOL/L (ref 23–29)
CREAT SERPL-MCNC: 0.8 MG/DL (ref 0.5–1.4)
ERYTHROCYTE [DISTWIDTH] IN BLOOD BY AUTOMATED COUNT: 17.6 % (ref 11.5–14.5)
EST. GFR  (NO RACE VARIABLE): >60 ML/MIN/1.73 M^2
GLUCOSE SERPL-MCNC: 113 MG/DL (ref 70–110)
HCT VFR BLD AUTO: 27.4 % (ref 40–54)
HGB BLD-MCNC: 8.5 G/DL (ref 14–18)
MAGNESIUM SERPL-MCNC: 1.9 MG/DL (ref 1.6–2.6)
MCH RBC QN AUTO: 20.7 PG (ref 27–31)
MCHC RBC AUTO-ENTMCNC: 31 G/DL (ref 32–36)
MCV RBC AUTO: 67 FL (ref 82–98)
PLATELET # BLD AUTO: 649 K/UL (ref 150–450)
PMV BLD AUTO: 9.1 FL (ref 9.2–12.9)
POTASSIUM SERPL-SCNC: 3.4 MMOL/L (ref 3.5–5.1)
RBC # BLD AUTO: 4.1 M/UL (ref 4.6–6.2)
SODIUM SERPL-SCNC: 139 MMOL/L (ref 136–145)
WBC # BLD AUTO: 15.87 K/UL (ref 3.9–12.7)

## 2023-05-31 PROCEDURE — 12000002 HC ACUTE/MED SURGE SEMI-PRIVATE ROOM

## 2023-05-31 PROCEDURE — 83735 ASSAY OF MAGNESIUM: CPT | Performed by: STUDENT IN AN ORGANIZED HEALTH CARE EDUCATION/TRAINING PROGRAM

## 2023-05-31 PROCEDURE — 25000003 PHARM REV CODE 250: Performed by: STUDENT IN AN ORGANIZED HEALTH CARE EDUCATION/TRAINING PROGRAM

## 2023-05-31 PROCEDURE — 63600175 PHARM REV CODE 636 W HCPCS: Performed by: SURGERY

## 2023-05-31 PROCEDURE — 94761 N-INVAS EAR/PLS OXIMETRY MLT: CPT

## 2023-05-31 PROCEDURE — 94799 UNLISTED PULMONARY SVC/PX: CPT

## 2023-05-31 PROCEDURE — 80048 BASIC METABOLIC PNL TOTAL CA: CPT | Performed by: STUDENT IN AN ORGANIZED HEALTH CARE EDUCATION/TRAINING PROGRAM

## 2023-05-31 PROCEDURE — 36415 COLL VENOUS BLD VENIPUNCTURE: CPT | Performed by: STUDENT IN AN ORGANIZED HEALTH CARE EDUCATION/TRAINING PROGRAM

## 2023-05-31 PROCEDURE — S4991 NICOTINE PATCH NONLEGEND: HCPCS | Performed by: SURGERY

## 2023-05-31 PROCEDURE — 25000003 PHARM REV CODE 250: Performed by: SURGERY

## 2023-05-31 PROCEDURE — 63600175 PHARM REV CODE 636 W HCPCS: Performed by: STUDENT IN AN ORGANIZED HEALTH CARE EDUCATION/TRAINING PROGRAM

## 2023-05-31 PROCEDURE — 85027 COMPLETE CBC AUTOMATED: CPT | Performed by: STUDENT IN AN ORGANIZED HEALTH CARE EDUCATION/TRAINING PROGRAM

## 2023-05-31 RX ORDER — OXYCODONE AND ACETAMINOPHEN 5; 325 MG/1; MG/1
1 TABLET ORAL EVERY 4 HOURS PRN
Status: DISCONTINUED | OUTPATIENT
Start: 2023-05-31 | End: 2023-06-01 | Stop reason: HOSPADM

## 2023-05-31 RX ORDER — HYDROMORPHONE HYDROCHLORIDE 1 MG/ML
1 INJECTION, SOLUTION INTRAMUSCULAR; INTRAVENOUS; SUBCUTANEOUS EVERY 4 HOURS PRN
Status: DISCONTINUED | OUTPATIENT
Start: 2023-05-31 | End: 2023-06-01 | Stop reason: HOSPADM

## 2023-05-31 RX ORDER — POTASSIUM CHLORIDE 20 MEQ/1
40 TABLET, EXTENDED RELEASE ORAL ONCE
Status: COMPLETED | OUTPATIENT
Start: 2023-05-31 | End: 2023-05-31

## 2023-05-31 RX ORDER — IBUPROFEN 400 MG/1
400 TABLET ORAL EVERY 6 HOURS
Status: DISCONTINUED | OUTPATIENT
Start: 2023-05-31 | End: 2023-06-01 | Stop reason: HOSPADM

## 2023-05-31 RX ADMIN — METHOCARBAMOL 500 MG: 500 TABLET ORAL at 01:05

## 2023-05-31 RX ADMIN — HYDROMORPHONE HYDROCHLORIDE 1 MG: 1 INJECTION, SOLUTION INTRAMUSCULAR; INTRAVENOUS; SUBCUTANEOUS at 09:05

## 2023-05-31 RX ADMIN — GABAPENTIN 300 MG: 300 CAPSULE ORAL at 02:05

## 2023-05-31 RX ADMIN — GABAPENTIN 300 MG: 300 CAPSULE ORAL at 08:05

## 2023-05-31 RX ADMIN — IBUPROFEN 400 MG: 400 TABLET ORAL at 11:05

## 2023-05-31 RX ADMIN — OXYCODONE HYDROCHLORIDE AND ACETAMINOPHEN 1 TABLET: 5; 325 TABLET ORAL at 02:05

## 2023-05-31 RX ADMIN — METHOCARBAMOL 500 MG: 500 TABLET ORAL at 08:05

## 2023-05-31 RX ADMIN — KETOROLAC TROMETHAMINE 30 MG: 30 INJECTION, SOLUTION INTRAMUSCULAR; INTRAVENOUS at 05:05

## 2023-05-31 RX ADMIN — METHOCARBAMOL 500 MG: 500 TABLET ORAL at 05:05

## 2023-05-31 RX ADMIN — ACETAMINOPHEN 1000 MG: 10 INJECTION INTRAVENOUS at 05:05

## 2023-05-31 RX ADMIN — IBUPROFEN 400 MG: 400 TABLET ORAL at 05:05

## 2023-05-31 RX ADMIN — OXYCODONE HYDROCHLORIDE AND ACETAMINOPHEN 1 TABLET: 5; 325 TABLET ORAL at 08:05

## 2023-05-31 RX ADMIN — HYDROMORPHONE HYDROCHLORIDE 1 MG: 1 INJECTION, SOLUTION INTRAMUSCULAR; INTRAVENOUS; SUBCUTANEOUS at 02:05

## 2023-05-31 RX ADMIN — NICOTINE 1 PATCH: 14 PATCH TRANSDERMAL at 08:05

## 2023-05-31 RX ADMIN — POTASSIUM CHLORIDE 40 MEQ: 1500 TABLET, EXTENDED RELEASE ORAL at 08:05

## 2023-05-31 NOTE — PLAN OF CARE
Problem: Adult Inpatient Plan of Care  Goal: Plan of Care Review  Outcome: Ongoing, Progressing     Problem: Infection  Goal: Absence of Infection Signs and Symptoms  Outcome: Ongoing, Progressing     Problem: Pain Acute  Goal: Acceptable Pain Control and Functional Ability  Outcome: Ongoing, Progressing     Problem: Fall Injury Risk  Goal: Absence of Fall and Fall-Related Injury  Outcome: Ongoing, Progressing       POC reviewed with patient and family at bedside. Patient verbalizes understanding. VSS, AAOX4. Safety maintained throughout shift. Pain controlled with PRN meds & scheduled meds as ordered. Frequent checks and hourly rounding. Call light within reach. Will continue to monitor.

## 2023-05-31 NOTE — PROGRESS NOTES
[facility] Surgery  Progress Note    Subjective:     History of Present Illness:  46 y/o male with on going right lower quadrant pain.   Has been present at least a week maybe longer.  Sought help at [facility] er and treated for ibd flair using steroids.  He has no hx of bloody stools or ibd in the past.  He says the pain got worse and started having diarrhea so he came back to ER.  Pain mainly in right lower quadrant, no radiation, better with laying still.      Post-Op Info:  Procedure(s) (LRB):  COLECTOMY, PARTIAL (Right)   2 Days Post-Op     Interval History: pain better controlled  No flatus yet  No n/v    Medications:  Continuous Infusions:  Scheduled Meds:   gabapentin  300 mg Oral TID    ibuprofen  400 mg Oral Q6H    methocarbamoL  500 mg Oral QID    nicotine  1 patch Transdermal Daily     PRN Meds:acetaminophen, aluminum-magnesium hydroxide-simethicone, dextrose 10%, dextrose 10%, dextrose, dextrose, glucagon (human recombinant), HYDROmorphone, melatonin, naloxone, ondansetron, oxyCODONE, oxyCODONE-acetaminophen, prochlorperazine, simethicone, sodium chloride 0.9%     Review of patient's allergies indicates:  No Known Allergies  Objective:     Vital Signs (Most Recent):  Temp: 98.5 °F (36.9 °C) (05/31/23 1242)  Pulse: 83 (05/31/23 1242)  Resp: 16 (05/31/23 1445)  BP: 115/68 (05/31/23 1242)  SpO2: 96 % (05/31/23 1242) Vital Signs (24h Range):  Temp:  [96.9 °F (36.1 °C)-98.5 °F (36.9 °C)] 98.5 °F (36.9 °C)  Pulse:  [70-83] 83  Resp:  [16-18] 16  SpO2:  [96 %-98 %] 96 %  BP: (115-139)/(68-77) 115/68     Weight: 102.9 kg (226 lb 13.7 oz)  Body mass index is 28.35 kg/m².    Intake/Output - Last 3 Shifts         05/29 0700 05/30 0659 05/30 0700 05/31 0659 05/31 0700 06/01 0659    P.O. 550 740     I.V. (mL/kg) 2975 (28.6)      IV Piggyback       Total Intake(mL/kg) 3525 (33.9) 740 (7.2)     Urine (mL/kg/hr) 1650 (0.7) 3950 (1.6)     Emesis/NG output 0      Stool 0      Total  Output 4102 5489     Net +0830 -2916                     Physical Exam  Abdominal:      General: Abdomen is flat. There is no distension.      Palpations: Abdomen is soft.      Tenderness: There is no abdominal tenderness.        Significant Labs:  I have reviewed all pertinent lab results within the past 24 hours.  CBC:   Recent Labs   Lab 05/31/23  0443   WBC 15.87*   RBC 4.10*   HGB 8.5*   HCT 27.4*   *   MCV 67*   MCH 20.7*   MCHC 31.0*     BMP:   Recent Labs   Lab 05/31/23  0443   *      K 3.4*      CO2 29   BUN 9   CREATININE 0.8   CALCIUM 9.0   MG 1.9       Significant Diagnostics:  I have reviewed all pertinent imaging results/findings within the past 24 hours.    Assessment/Plan:     * Colonic mass  Ruslan Caldwell is a 47yoM with a cecal mass who underwent a right hemicolectomy on 5/29.     2 Days Post-Op    Pain control  Diet as tolerated  Ambulat/IS/ DVT proph        Edin Michael MD  General Surgery  [facility]

## 2023-05-31 NOTE — SUBJECTIVE & OBJECTIVE
Interval History: Patient seen and examined. NAEON. Pain a little better. Tolerating some normal foods. Up in chair.       Objective:     Vital Signs (Most Recent):  Temp: 98.5 °F (36.9 °C) (05/31/23 0747)  Pulse: 80 (05/31/23 0747)  Resp: 16 (05/31/23 0747)  BP: 138/75 (05/31/23 0747)  SpO2: 97 % (05/31/23 0747) Vital Signs (24h Range):  Temp:  [96.8 °F (36 °C)-98.5 °F (36.9 °C)] 98.5 °F (36.9 °C)  Pulse:  [67-82] 80  Resp:  [16-18] 16  SpO2:  [96 %-98 %] 97 %  BP: (130-140)/(73-77) 138/75     Weight: 102.9 kg (226 lb 13.7 oz)  Body mass index is 28.35 kg/m².    Intake/Output Summary (Last 24 hours) at 5/31/2023 1050  Last data filed at 5/31/2023 0545  Gross per 24 hour   Intake 740 ml   Output 3250 ml   Net -2510 ml         Physical Exam  Vitals reviewed.   Constitutional:       Appearance: Normal appearance. He is normal weight.   Cardiovascular:      Rate and Rhythm: Normal rate and regular rhythm.   Pulmonary:      Effort: Pulmonary effort is normal. No respiratory distress.   Abdominal:      General: Bowel sounds are normal. There is no distension.      Palpations: Abdomen is soft.      Tenderness: There is generalized abdominal tenderness (worse in RLQ). There is no guarding.      Comments: Lower midline dressing serosanguinous dried drainage   Musculoskeletal:         General: Normal range of motion.   Skin:     General: Skin is warm and dry.   Neurological:      General: No focal deficit present.      Mental Status: He is alert and oriented to person, place, and time. Mental status is at baseline.   Psychiatric:         Speech: Speech normal.         Behavior: Behavior normal.           Significant Labs: All pertinent labs within the past 24 hours have been reviewed.    Significant Imaging: I have reviewed all pertinent imaging results/findings within the past 24 hours.

## 2023-05-31 NOTE — ASSESSMENT & PLAN NOTE
CT reviewed  Colonoscopy reviewed  S/p R colectomy 5/29  S/p zosyn  - discontinue IVF  - scheduled multimodal pain control & PRN pain meds and antiemetics  - try to focus on PO pain med options today  - routine post-op care  - tolerating diet  - Gen surg following. GI signed off.  - f/u path

## 2023-05-31 NOTE — ASSESSMENT & PLAN NOTE
Patient's anemia is currently controlled. Has not received any PRBCs to date..  Etiology likely due to colonic mass/cancer.  Current CBC reviewed-   Lab Results   Component Value Date    HGB 8.5 (L) 05/31/2023    HCT 27.4 (L) 05/31/2023     Monitor serial CBC and transfuse if patient becomes hemodynamically unstable, symptomatic or H/H drops below 7/21.

## 2023-05-31 NOTE — SUBJECTIVE & OBJECTIVE
Interval History: pain better controlled  No flatus yet  No n/v    Medications:  Continuous Infusions:  Scheduled Meds:   gabapentin  300 mg Oral TID    ibuprofen  400 mg Oral Q6H    methocarbamoL  500 mg Oral QID    nicotine  1 patch Transdermal Daily     PRN Meds:acetaminophen, aluminum-magnesium hydroxide-simethicone, dextrose 10%, dextrose 10%, dextrose, dextrose, glucagon (human recombinant), HYDROmorphone, melatonin, naloxone, ondansetron, oxyCODONE, oxyCODONE-acetaminophen, prochlorperazine, simethicone, sodium chloride 0.9%     Review of patient's allergies indicates:  No Known Allergies  Objective:     Vital Signs (Most Recent):  Temp: 98.5 °F (36.9 °C) (05/31/23 1242)  Pulse: 83 (05/31/23 1242)  Resp: 16 (05/31/23 1445)  BP: 115/68 (05/31/23 1242)  SpO2: 96 % (05/31/23 1242) Vital Signs (24h Range):  Temp:  [96.9 °F (36.1 °C)-98.5 °F (36.9 °C)] 98.5 °F (36.9 °C)  Pulse:  [70-83] 83  Resp:  [16-18] 16  SpO2:  [96 %-98 %] 96 %  BP: (115-139)/(68-77) 115/68     Weight: 102.9 kg (226 lb 13.7 oz)  Body mass index is 28.35 kg/m².    Intake/Output - Last 3 Shifts         05/29 0700 05/30 0659 05/30 0700 05/31 0659 05/31 0700  06/01 0659    P.O. 550 740     I.V. (mL/kg) 2975 (28.6)      IV Piggyback       Total Intake(mL/kg) 3525 (33.9) 740 (7.2)     Urine (mL/kg/hr) 1650 (0.7) 3950 (1.6)     Emesis/NG output 0      Stool 0      Total Output 1650 3950     Net +1875 -3210                     Physical Exam  Abdominal:      General: Abdomen is flat. There is no distension.      Palpations: Abdomen is soft.      Tenderness: There is no abdominal tenderness.        Significant Labs:  I have reviewed all pertinent lab results within the past 24 hours.  CBC:   Recent Labs   Lab 05/31/23 0443   WBC 15.87*   RBC 4.10*   HGB 8.5*   HCT 27.4*   *   MCV 67*   MCH 20.7*   MCHC 31.0*     BMP:   Recent Labs   Lab 05/31/23 0443   *      K 3.4*      CO2 29   BUN 9   CREATININE 0.8   CALCIUM 9.0   MG  1.9       Significant Diagnostics:  I have reviewed all pertinent imaging results/findings within the past 24 hours.   Head atraumatic, normal cephalic shape.

## 2023-05-31 NOTE — PROGRESS NOTES
[facility] Hospital Medicine  Progress Note    Patient Name: Ruslan Caldwell  MRN: [id number]  Patient Class: IP- Inpatient   Admission Date: 5/27/2023  Length of Stay: 2 days  Attending Physician: Edil Steele MD  Primary Care Provider: No primary care provider on file.        Subjective:     Principal Problem:Colonic mass        HPI:  Ruslan Caldwell is a 47 year old male with a past medical history of hypertension and DM type 2 who presents to the ED complaining of right-sided abdominal pain for the past 4-5 months.  He reports associated nausea and diarrhea.  Patient states he was seen at [facility] ED 3 times this week  (5/23, 5/24, & 5/25) with same symptoms and still has no relief.  Per chart review, patient was referred to GI outpatient and has an appointment on 06/01/2023.  Patient had 3 CT abdomen during these visits with most recent showing decreased inflammatory fat stranding in the right lower quadrant with persistent marked wall thickening in the cecum and terminal ileum, centered at the level of the ileocecal valve with findings concerning for a neoplasm centered at the ileocecal junction, although acute appendicitis, Crohn's disease are differential possibilities.  ED workup revealed elevated WBC 19.8, hemoglobin 8.2 hematocrit 26.8 and thrombocytosis 681.  Patient was started on IV Zosyn and received IV pain medication.  ED provider spoke to Dr. Michael, General surgery, agreed consult and patient was referred to Hospital Medicine.  Patient seen in the ED with sister at bedside.  Patient states pain is currently controlled with IV morphine and he denies current nausea, vomiting and recent diarrhea.  Patient states he had 1 episode of hematemesis on Wednesday and denies blood in stool.  Patient denies chest pain, shortness of breath, fever and chills.  Patient will be admitted to Hospital Medicine for further evaluation management.          Overview/Hospital  Course:  Admitted with now chronic RLQ abd pain (4-5mo duration) that has worsened found to have inflammatory fat stranding in the RLQ with persistent marked wall thickening in the cecum and terminal ileum, centered at the level of the ileocecal valve on CT imaging with unclear significance. Concern for possible neoplasm vs appendicitis vs other. Noted leukocytosis, anemia, and thrombocytosis. Given IVF, IV abx, and pain meds. Consults to GI and gen surg to determine best approach for diagnostic purposes. GI performed colonoscopy and discovered large colonic mass at area of concern. General surgery performed right colectomy. Pain controlled. Abx discontinued. Diet advanced.      Interval History: Patient seen and examined. NAEON. Pain a little better. Tolerating some normal foods. Up in chair.       Objective:     Vital Signs (Most Recent):  Temp: 98.5 °F (36.9 °C) (05/31/23 0747)  Pulse: 80 (05/31/23 0747)  Resp: 16 (05/31/23 0747)  BP: 138/75 (05/31/23 0747)  SpO2: 97 % (05/31/23 0747) Vital Signs (24h Range):  Temp:  [96.8 °F (36 °C)-98.5 °F (36.9 °C)] 98.5 °F (36.9 °C)  Pulse:  [67-82] 80  Resp:  [16-18] 16  SpO2:  [96 %-98 %] 97 %  BP: (130-140)/(73-77) 138/75     Weight: 102.9 kg (226 lb 13.7 oz)  Body mass index is 28.35 kg/m².    Intake/Output Summary (Last 24 hours) at 5/31/2023 1050  Last data filed at 5/31/2023 0545  Gross per 24 hour   Intake 740 ml   Output 3250 ml   Net -2510 ml         Physical Exam  Vitals reviewed.   Constitutional:       Appearance: Normal appearance. He is normal weight.   Cardiovascular:      Rate and Rhythm: Normal rate and regular rhythm.   Pulmonary:      Effort: Pulmonary effort is normal. No respiratory distress.   Abdominal:      General: Bowel sounds are normal. There is no distension.      Palpations: Abdomen is soft.      Tenderness: There is generalized abdominal tenderness (worse in RLQ). There is no guarding.      Comments: Lower midline dressing serosanguinous dried  drainage   Musculoskeletal:         General: Normal range of motion.   Skin:     General: Skin is warm and dry.   Neurological:      General: No focal deficit present.      Mental Status: He is alert and oriented to person, place, and time. Mental status is at baseline.   Psychiatric:         Speech: Speech normal.         Behavior: Behavior normal.           Significant Labs: All pertinent labs within the past 24 hours have been reviewed.    Significant Imaging: I have reviewed all pertinent imaging results/findings within the past 24 hours.      Assessment/Plan:      * Colonic mass  CT reviewed  Colonoscopy reviewed  S/p R colectomy 5/29  S/p zosyn  - discontinue IVF  - scheduled multimodal pain control & PRN pain meds and antiemetics  - try to focus on PO pain med options today  - routine post-op care  - tolerating diet  - Gen surg following. GI signed off.  - f/u path    RLQ abdominal pain  - plan per 'colonic mass' section    Thrombocytosis  History noted  -Trend platelets on cbc    Microcytic anemia  Patient's anemia is currently controlled. Has not received any PRBCs to date..  Etiology likely due to colonic mass/cancer.  Current CBC reviewed-   Lab Results   Component Value Date    HGB 8.5 (L) 05/31/2023    HCT 27.4 (L) 05/31/2023     Monitor serial CBC and transfuse if patient becomes hemodynamically unstable, symptomatic or H/H drops below 7/21.     Essential hypertension  Chronic, controlled with diet.  Latest blood pressure and vitals reviewed-   Temp:  [96.8 °F (36 °C)-98.5 °F (36.9 °C)]   Pulse:  [67-82]   Resp:  [16-18]   BP: (130-140)/(73-77)   SpO2:  [96 %-98 %] .   Home meds for hypertension were reviewed and noted below.   Hypertension Medications             lisinopriL (PRINIVIL,ZESTRIL) 2.5 MG tablet Take 1 tablet (2.5 mg total) by mouth once daily.      While in the hospital, will manage blood pressure as follows; Continue home antihypertensive regimen    Will utilize p.r.n. blood pressure  medication only if patient's blood pressure greater than 180/110 and he develops symptoms such as worsening chest pain or shortness of breath.    Type 2 diabetes mellitus without complication, without long-term current use of insulin  Patient's FSGs are controlled with diet  Last A1c reviewed-   Lab Results   Component Value Date    HGBA1C 4.8 09/14/2021   - monitor glucose on AM labs    VTE Risk Mitigation (From admission, onward)         Ordered     Reason for No Pharmacological VTE Prophylaxis  Once        Question:  Reasons:  Answer:  Risk of Bleeding    05/28/23 0239     IP VTE HIGH RISK PATIENT  Once         05/28/23 0239     Place sequential compression device  Until discontinued         05/28/23 0239                Discharge Planning   GABRIEL: 6/1/2023     Code Status: Full Code   Is the patient medically ready for discharge?:     Reason for patient still in hospital (select all that apply): Patient trending condition  Discharge Plan A: Home                  Edil Steele MD  Department of Hospital Medicine   [facility]

## 2023-05-31 NOTE — ASSESSMENT & PLAN NOTE
Ruslan Caldwell is a 47yoM with a cecal mass who underwent a right hemicolectomy on 5/29.     2 Days Post-Op    Pain control  Diet as tolerated  Ambulat/IS/ DVT proph

## 2023-05-31 NOTE — ASSESSMENT & PLAN NOTE
Chronic, controlled with diet.  Latest blood pressure and vitals reviewed-   Temp:  [96.8 °F (36 °C)-98.5 °F (36.9 °C)]   Pulse:  [67-82]   Resp:  [16-18]   BP: (130-140)/(73-77)   SpO2:  [96 %-98 %] .   Home meds for hypertension were reviewed and noted below.   Hypertension Medications             lisinopriL (PRINIVIL,ZESTRIL) 2.5 MG tablet Take 1 tablet (2.5 mg total) by mouth once daily.      While in the hospital, will manage blood pressure as follows; Continue home antihypertensive regimen    Will utilize p.r.n. blood pressure medication only if patient's blood pressure greater than 180/110 and he develops symptoms such as worsening chest pain or shortness of breath.

## 2023-05-31 NOTE — ANESTHESIA POSTPROCEDURE EVALUATION
Anesthesia Post Evaluation    Patient: Ruslan Caldwell    Procedure(s) Performed: Procedure(s) (LRB):  COLECTOMY, PARTIAL (Right)    Final Anesthesia Type: general      Patient location during evaluation: PACU  Patient participation: Yes- Able to Participate  Level of consciousness: awake and alert and oriented  Post-procedure vital signs: reviewed and stable  Pain management: adequate  Airway patency: patent    PONV status at discharge: No PONV  Anesthetic complications: no      Cardiovascular status: blood pressure returned to baseline  Respiratory status: unassisted, spontaneous ventilation and room air  Hydration status: euvolemic  Follow-up not needed.          Vitals Value Taken Time   /75 05/31/23 0747   Temp 36.9 °C (98.5 °F) 05/31/23 0747   Pulse 80 05/31/23 0747   Resp 16 05/31/23 0747   SpO2 97 % 05/31/23 0747         Event Time   Out of Recovery 05/29/2023 17:56:00         Pain/Jose D Score: Pain Rating Prior to Med Admin: 7 (5/31/2023 11:44 AM)  Pain Rating Post Med Admin: 4 (5/31/2023  6:12 AM)

## 2023-05-31 NOTE — CARE UPDATE
05/30/23 1902   Patient Assessment/Suction   Level of Consciousness (AVPU) alert   Respiratory Effort Unlabored   PRE-TX-O2   Device (Oxygen Therapy) room air   SpO2 96 %   Pulse Oximetry Type Intermittent   $ Pulse Oximetry - Multiple Charge Pulse Oximetry - Multiple   Pulse 76   Resp 17   Incentive Spirometer   $ Incentive Spirometer Charges done with encouragement;proper technique demonstrated   Administration (IS) proper technique demonstrated   Number of Repetitions (IS) 10   Level Incentive Spirometer (mL) 1500   Patient Tolerance (IS) no adverse signs/symptoms present;good

## 2023-06-01 VITALS
HEART RATE: 70 BPM | TEMPERATURE: 98 F | DIASTOLIC BLOOD PRESSURE: 68 MMHG | HEIGHT: 75 IN | OXYGEN SATURATION: 99 % | BODY MASS INDEX: 28.21 KG/M2 | WEIGHT: 226.88 LBS | RESPIRATION RATE: 16 BRPM | SYSTOLIC BLOOD PRESSURE: 130 MMHG

## 2023-06-01 LAB
ANION GAP SERPL CALC-SCNC: 7 MMOL/L (ref 8–16)
BUN SERPL-MCNC: 9 MG/DL (ref 6–20)
CALCIUM SERPL-MCNC: 9.4 MG/DL (ref 8.7–10.5)
CHLORIDE SERPL-SCNC: 103 MMOL/L (ref 95–110)
CO2 SERPL-SCNC: 28 MMOL/L (ref 23–29)
CREAT SERPL-MCNC: 0.8 MG/DL (ref 0.5–1.4)
ERYTHROCYTE [DISTWIDTH] IN BLOOD BY AUTOMATED COUNT: 17.4 % (ref 11.5–14.5)
EST. GFR  (NO RACE VARIABLE): >60 ML/MIN/1.73 M^2
FINAL PATHOLOGIC DIAGNOSIS: ABNORMAL
GLUCOSE SERPL-MCNC: 93 MG/DL (ref 70–110)
GROSS: ABNORMAL
HCT VFR BLD AUTO: 27.2 % (ref 40–54)
HGB BLD-MCNC: 8.4 G/DL (ref 14–18)
Lab: ABNORMAL
MAGNESIUM SERPL-MCNC: 1.7 MG/DL (ref 1.6–2.6)
MCH RBC QN AUTO: 20.6 PG (ref 27–31)
MCHC RBC AUTO-ENTMCNC: 30.9 G/DL (ref 32–36)
MCV RBC AUTO: 67 FL (ref 82–98)
MICROSCOPIC EXAM: ABNORMAL
PLATELET # BLD AUTO: 643 K/UL (ref 150–450)
PMV BLD AUTO: 9.6 FL (ref 9.2–12.9)
POTASSIUM SERPL-SCNC: 3.6 MMOL/L (ref 3.5–5.1)
RBC # BLD AUTO: 4.08 M/UL (ref 4.6–6.2)
SODIUM SERPL-SCNC: 138 MMOL/L (ref 136–145)
WBC # BLD AUTO: 16.53 K/UL (ref 3.9–12.7)

## 2023-06-01 PROCEDURE — 25000003 PHARM REV CODE 250: Performed by: SURGERY

## 2023-06-01 PROCEDURE — 94761 N-INVAS EAR/PLS OXIMETRY MLT: CPT

## 2023-06-01 PROCEDURE — 83735 ASSAY OF MAGNESIUM: CPT | Performed by: STUDENT IN AN ORGANIZED HEALTH CARE EDUCATION/TRAINING PROGRAM

## 2023-06-01 PROCEDURE — 25000003 PHARM REV CODE 250: Performed by: STUDENT IN AN ORGANIZED HEALTH CARE EDUCATION/TRAINING PROGRAM

## 2023-06-01 PROCEDURE — 94799 UNLISTED PULMONARY SVC/PX: CPT

## 2023-06-01 PROCEDURE — 85027 COMPLETE CBC AUTOMATED: CPT | Performed by: STUDENT IN AN ORGANIZED HEALTH CARE EDUCATION/TRAINING PROGRAM

## 2023-06-01 PROCEDURE — 36415 COLL VENOUS BLD VENIPUNCTURE: CPT | Performed by: STUDENT IN AN ORGANIZED HEALTH CARE EDUCATION/TRAINING PROGRAM

## 2023-06-01 PROCEDURE — 99900035 HC TECH TIME PER 15 MIN (STAT)

## 2023-06-01 PROCEDURE — 80048 BASIC METABOLIC PNL TOTAL CA: CPT | Performed by: STUDENT IN AN ORGANIZED HEALTH CARE EDUCATION/TRAINING PROGRAM

## 2023-06-01 PROCEDURE — S4991 NICOTINE PATCH NONLEGEND: HCPCS | Performed by: SURGERY

## 2023-06-01 RX ORDER — GABAPENTIN 300 MG/1
300 CAPSULE ORAL 2 TIMES DAILY
Qty: 28 CAPSULE | Refills: 0 | Status: ON HOLD | OUTPATIENT
Start: 2023-06-01 | End: 2023-06-24 | Stop reason: HOSPADM

## 2023-06-01 RX ORDER — OXYCODONE AND ACETAMINOPHEN 5; 325 MG/1; MG/1
1 TABLET ORAL EVERY 6 HOURS PRN
Qty: 12 TABLET | Refills: 0 | Status: ON HOLD | OUTPATIENT
Start: 2023-06-01 | End: 2023-06-09 | Stop reason: HOSPADM

## 2023-06-01 RX ADMIN — GABAPENTIN 300 MG: 300 CAPSULE ORAL at 09:06

## 2023-06-01 RX ADMIN — IBUPROFEN 400 MG: 400 TABLET ORAL at 05:06

## 2023-06-01 RX ADMIN — METHOCARBAMOL 500 MG: 500 TABLET ORAL at 09:06

## 2023-06-01 RX ADMIN — NICOTINE 1 PATCH: 14 PATCH TRANSDERMAL at 09:06

## 2023-06-01 RX ADMIN — IBUPROFEN 400 MG: 400 TABLET ORAL at 12:06

## 2023-06-01 RX ADMIN — OXYCODONE HYDROCHLORIDE 15 MG: 10 TABLET ORAL at 07:06

## 2023-06-01 NOTE — PLAN OF CARE
Problem: Adult Inpatient Plan of Care  Goal: Plan of Care Review  Outcome: Met  Goal: Patient-Specific Goal (Individualized)  Outcome: Met  Goal: Absence of Hospital-Acquired Illness or Injury  Outcome: Met  Goal: Optimal Comfort and Wellbeing  Outcome: Met  Goal: Readiness for Transition of Care  Outcome: Met     Problem: Infection  Goal: Absence of Infection Signs and Symptoms  Outcome: Met     Problem: Diabetes Comorbidity  Goal: Blood Glucose Level Within Targeted Range  Outcome: Met     Problem: Fluid Imbalance (Pneumonia)  Goal: Fluid Balance  Outcome: Met     Problem: Infection (Pneumonia)  Goal: Resolution of Infection Signs and Symptoms  Outcome: Met     Problem: Respiratory Compromise (Pneumonia)  Goal: Effective Oxygenation and Ventilation  Outcome: Met     Problem: Pain Acute  Goal: Acceptable Pain Control and Functional Ability  Outcome: Met     Problem: Fall Injury Risk  Goal: Absence of Fall and Fall-Related Injury  Outcome: Met   PT DISCHARGING HOME TODAY.

## 2023-06-01 NOTE — DISCHARGE SUMMARY
[facility] Medicine  Discharge Summary      Patient Name: Ruslan Caldwell  MRN: [id number]  SHELIA: [id number]  Patient Class: IP- Inpatient  Admission Date: 5/27/2023  Hospital Length of Stay: 3 days  Discharge Date and Time:  06/01/2023 12:31 PM  Attending Physician: Edil Steele MD   Discharging Provider: Edil Steele MD  Primary Care Provider: No primary care provider on file.    Primary Care Team: Networked reference to record PCT     HPI:   Ruslan Caldwell is a 47 year old male with a past medical history of hypertension and DM type 2 who presents to the ED complaining of right-sided abdominal pain for the past 4-5 months.  He reports associated nausea and diarrhea.  Patient states he was seen at [facility] ED 3 times this week  (5/23, 5/24, & 5/25) with same symptoms and still has no relief.  Per chart review, patient was referred to GI outpatient and has an appointment on 06/01/2023.  Patient had 3 CT abdomen during these visits with most recent showing decreased inflammatory fat stranding in the right lower quadrant with persistent marked wall thickening in the cecum and terminal ileum, centered at the level of the ileocecal valve with findings concerning for a neoplasm centered at the ileocecal junction, although acute appendicitis, Crohn's disease are differential possibilities.  ED workup revealed elevated WBC 19.8, hemoglobin 8.2 hematocrit 26.8 and thrombocytosis 681.  Patient was started on IV Zosyn and received IV pain medication.  ED provider spoke to Dr. Michael, General surgery, agreed consult and patient was referred to Hospital Medicine.  Patient seen in the ED with sister at bedside.  Patient states pain is currently controlled with IV morphine and he denies current nausea, vomiting and recent diarrhea.  Patient states he had 1 episode of hematemesis on Wednesday and denies blood in stool.  Patient denies chest pain, shortness of breath, fever and  chills.  Patient will be admitted to Hospital Medicine for further evaluation management.          Procedure(s) (LRB):  COLECTOMY, PARTIAL (Right)      Hospital Course:   Admitted with worsening chronic RLQ abd pain (4-5mo duration) that has worsened found to have inflammatory fat stranding in the RLQ with persistent marked wall thickening in the cecum and terminal ileum, centered at the level of the ileocecal valve on CT imaging with unclear significance. Concern for possible neoplasm vs appendicitis vs other. Noted leukocytosis, anemia, and thrombocytosis. Given IVF, IV abx, and pain meds. Consults to GI and gen surg to determine best approach for diagnostic purposes. GI performed colonoscopy and discovered large colonic mass at area of concern. General surgery performed right colectomy. Pain controlled. Abx discontinued. Diet advanced. Appropriate for discharge. To f/u with PCP and colonoscopy. Pathology of mass pending at time of discharge. By time of discharge the patient was tolerating a regular diet with resolving admission symptoms. Patient seen and examined on day of discharge.    Physical exam on day of discharge:  Constitutional:       Appearance: Normal appearance. He is normal weight.   Cardiovascular:      Rate and Rhythm: Normal rate and regular rhythm.   Pulmonary:      Effort: Pulmonary effort is normal. No respiratory distress.   Abdominal:      General: Bowel sounds are normal. There is no distension.      Palpations: Abdomen is soft.      Tenderness: There is generalized abdominal tenderness (mild, worse in RLQ). There is no guarding.      Comments: Lower midline dressing serosanguinous dried drainage   Musculoskeletal:         General: Normal range of motion.   Skin:     General: Skin is warm and dry.   Neurological:      General: No focal deficit present.      Mental Status: He is alert and oriented to person, place, and time. Mental status is at baseline.   Psychiatric:         Speech: Speech  normal.         Behavior: Behavior normal.          Goals of Care Treatment Preferences:  Code Status: Full Code      Consults:   Consults (From admission, onward)        Status Ordering Provider     Inpatient consult to General Surgery  Once        Provider:  Edin Michael MD    Completed YONI CHOE     Inpatient consult to Gastroenterology  Once        Provider:  Billy Uriostegui MD    Completed YONI CHOE          No new Assessment & Plan notes have been filed under this hospital service since the last note was generated.  Service: Hospital Medicine    Final Active Diagnoses:    Diagnosis Date Noted POA    PRINCIPAL PROBLEM:  Colonic mass [K63.89] 05/28/2023 Yes    RLQ abdominal pain [R10.31] 05/29/2023 Yes    Thrombocytosis [D75.839] 05/28/2023 Yes     Chronic    Essential hypertension [I10] 05/24/2023 Yes     Chronic    Microcytic anemia [D50.9] 05/24/2023 Yes     Chronic    Type 2 diabetes mellitus without complication, without long-term current use of insulin [E11.9] 05/01/2020 Yes     Chronic      Problems Resolved During this Admission:       Discharged Condition: good    Disposition: Home or Self Care    Follow Up:   Follow-up Information     Amira Knutson NP. Go on 6/7/2023.    Specialty: Family Medicine  Why: hospital follow up at 1:00 PM  Contact information:  [address] [address] [phone]             Edin Michael MD. Go on 6/8/2023.    Specialties: General Surgery, Bariatrics, Surgery  Why: post op appt at 9:15 AM  Contact information:  [address] [address]  [phone]                       Patient Instructions:      Diet Adult Regular     Lifting restrictions   Order Comments: No more than 20lb for 2 weeks or unless advised different at general surgery appointment follow up     Notify your health care provider if you experience any of the following:  temperature >100.4     Notify your health care  provider if you experience any of the following:  persistent nausea and vomiting or diarrhea     Notify your health care provider if you experience any of the following:  severe uncontrolled pain     Notify your health care provider if you experience any of the following:  redness, tenderness, or signs of infection (pain, swelling, redness, odor or green/yellow discharge around incision site)     Notify your health care provider if you experience any of the following:  difficulty breathing or increased cough     Notify your health care provider if you experience any of the following:  severe persistent headache     Notify your health care provider if you experience any of the following:  worsening rash     Notify your health care provider if you experience any of the following:  persistent dizziness, light-headedness, or visual disturbances     Notify your health care provider if you experience any of the following:  increased confusion or weakness     Reason for not Ordering Smoking Cessation Referral     Order Specific Question Answer Comments   Reason for not ordering: Patient refused      Reason for not Prescribing Nicotine Replacement     Order Specific Question Answer Comments   Reason for not Prescribing: Patient refused        Significant Diagnostic Studies:     Lab Results   Component Value Date    WBC 16.53 (H) 06/01/2023    HGB 8.4 (L) 06/01/2023    HCT 27.2 (L) 06/01/2023    MCV 67 (L) 06/01/2023     (H) 06/01/2023       BMP  Lab Results   Component Value Date     06/01/2023    K 3.6 06/01/2023     06/01/2023    CO2 28 06/01/2023    BUN 9 06/01/2023    CREATININE 0.8 06/01/2023    CALCIUM 9.4 06/01/2023    ANIONGAP 7 (L) 06/01/2023    EGFRNORACEVR >60 06/01/2023           CT ABDOMEN PELVIS WITH CONTRAST   CLINICAL HISTORY:   RLQ abdominal pain (Age >= 14y);   TECHNIQUE:   Low dose axial images, sagittal and coronal reformations were obtained from the lung bases to the pubic symphysis  following the IV administration of 100 mL of Omnipaque 350 . The patient had received oral contrast the preceding day for a CT of the abdomen and pelvis, and no additional oral contrast was administered for this study.   COMPARISON:   Previous studies of 05/23/2023 and 05/24/2023   FINDINGS:   Findings suggestive of rounded atelectasis again noted in right lower lobe abutting the fissure.   Periportal edema described on the previous study has resolved. There is again questionable trace fluid adjacent to the gallbladder versus gallbladder wall thickening.   The kidneys, adrenal glands, pancreas, spleen are normal.   The aorta is normal in caliber.   Urinary bladder unremarkable. Prostate mildly prominent.   There is contrast material throughout the length of the large bowel; there is no bowel obstruction. There is persistent marked wall thickening in the cecum and terminal ileum, with epicenter at the ileocecal valve. The fat stranding in the right lower quadrant has decreased. The appendix is again difficult to identify, but a structure which may correspond to a dilated, thick-walled appendix is noted on the coronal images, series 4, image 17. There is no abscess or perforation. There are mildly prominent mesenteric lymph nodes which may be reactive.   Impression:   Decreased inflammatory fat stranding in the right lower quadrant with persistent marked wall thickening in the cecum and terminal ileum, centered at the level of the ileocecal valve. On the current study findings are most concerning for a neoplasm centered at the ileocecal junction, although acute appendicitis, Crohn's disease are differential possibilities.           Pending Diagnostic Studies:     Procedure Component Value Units Date/Time    Specimen to Pathology, Surgery Gastrointestinal tract [[id number]] Collected: 05/29/23 1047    Order Status: Sent Lab Status: In process Updated: 05/29/23 1047    Specimen: Tissue     Specimen to Pathology, Surgery  General Surgery [[id number]] Collected: 05/29/23 1527    Order Status: Sent Lab Status: In process Updated: 05/30/23 0775    Specimen: Tissue          Medications:  Reconciled Home Medications:      Medication List      START taking these medications    gabapentin 300 MG capsule  Commonly known as: NEURONTIN  Take 1 capsule (300 mg total) by mouth 2 (two) times daily. for 14 days     oxyCODONE-acetaminophen 5-325 mg per tablet  Commonly known as: PERCOCET  Take 1 tablet by mouth every 6 (six) hours as needed for Pain.            Indwelling Lines/Drains at time of discharge:   Lines/Drains/Airways     None                 Time spent on the discharge of patient: 36 minutes         Edil Steele MD  Department of Hospital Medicine  [facility]

## 2023-06-01 NOTE — NURSING
Pt's midline surgical incision cleaned with wound  and new island dressing applied. Pictures taken for chart.Pt possibly D/C'ing home today.Pt denies pain at this time.

## 2023-06-01 NOTE — PLAN OF CARE
Pt clear for DC from CM standpoint. Discharging home.      06/01/23 1104   Final Note   Assessment Type Final Discharge Note   Anticipated Discharge Disposition Home

## 2023-06-01 NOTE — NURSING
Pt being discharged at this time. Family in room to accompany him home in pt's POV. All personal belongings in pt's possession.IV removed with catheter tip intact. No S/S of infection noted.Pt escorted from floor by hospital staff via wheelchair.

## 2023-06-01 NOTE — DISCHARGE SUMMARY
"[facility]  [facility] Medicine  Discharge Summary      Patient Name: Ruslan Caldwell  MRN: [id number]  SHELIA: [id number]  Patient Class: OP- Observation  Admission Date: 5/23/2023  Hospital Length of Stay: 0 days  Discharge Date and Time: 5/24/2023  3:34 PM  Attending Physician: No att. providers found   Discharging Provider: Miguel Hong MD  Primary Care Provider: No primary care provider on file.    Primary Care Team: Networked reference to record PCT     HPI:   Mr. Caldwell is a 48yo man with a past medical history of DM2 and HTN, though he states he has not been taking any of his medications.    He states that about 2 days ago he began to develop some RLQ stabbing abdominal pain, "like a burst of pain with needles" to his RLQ.  This was not associated with fever, chills, diarrhea or N/V.  He has had some constipation, last BM being 5 days ago.   He has not seen anyone for the pain thus far.  He went to work yesterday and his boss noted him to be uncomfortable and asked about his problems. He realized something was not right and send him home with instructions to go to the ED.    In the ED his VS were /64   Pulse 63   Temp 98.5 °F (36.9 °C) (Oral)   Resp 16   Ht 6' 3" (1.905 m)   Wt 120.2 kg (265 lb)   SpO2 100%   BMI 33.12 kg/m².  Labs showed WBC 11, Hg 8.2, , Cr 1.1, UA negative.    CT abd and pelvis without contrast showed 1. No evidence of obstructive uropathy.  2. Right lower quadrant inflammatory changes are concerning for acute appendicitis.     In the ED he was treated with:  Medications   piperacillin-tazobactam (ZOSYN) 3.375 gram injection (has no administration in time range)   sodium chloride 0.9% bolus 1,000 mL 1,000 mL (0 mLs Intravenous Stopped 5/23/23 2127)   ketorolac injection 30 mg (30 mg Intravenous Given 5/23/23 2002)   ondansetron injection 4 mg (4 mg Intravenous Given 5/23/23 2002)   HYDROcodone-acetaminophen  mg per tablet 1 tablet (1 tablet Oral " Given 5/23/23 2002)   piperacillin-tazobactam (ZOSYN) 3.375 g in dextrose 5 % in water (D5W) 5 % 50 mL IVPB (MB+) (0 g Intravenous Stopped 5/23/23 2220)   HYDROmorphone injection 1 mg (1 mg Intravenous Given 5/23/23 2127)   ondansetron injection 4 mg (4 mg Intravenous Given 5/23/23 2127)   HYDROmorphone injection 1 mg (1 mg Intravenous Given 5/24/23 0006)               * No surgery found *      Hospital Course:   Patient admitted due to concern for acute appendicitis. General surgery consulted. They did not feel that imaging was consistent with acute appendicitis. Recommended treating with IV abx for possible appendicitis and steroids for possible IBD flare. Patient advised that we should give IV abx and monitor for 24h. Patient requested to be discharged home and states he will come back if symptoms do not improve.. Patient was given 1 dose IV ertapenem and was discharged with PO abx and steroid taper. Referral placed to GI. Patient was provided discharge instructions and return precautions.       Goals of Care Treatment Preferences:  Code Status: Full Code      Consults:   Consults (From admission, onward)        Status Ordering Provider     Inpatient consult to General Surgery  Once        Provider:  (Not yet assigned)    Completed JENS LOPEZ          No new Assessment & Plan notes have been filed under this hospital service since the last note was generated.  Service: Hospital Medicine    Final Active Diagnoses:    Diagnosis Date Noted POA    Essential hypertension [I10] 05/24/2023 Yes     Chronic    Microcytic anemia [D50.9] 05/24/2023 Yes     Chronic    Type 2 diabetes mellitus without complication, without long-term current use of insulin [E11.9] 05/01/2020 Yes     Chronic      Problems Resolved During this Admission:    Diagnosis Date Noted Date Resolved POA    PRINCIPAL PROBLEM:  Acute appendicitis [K35.80] 05/24/2023 05/29/2023 Yes    Terminal ileitis without complication [K50.00] 05/24/2023  05/29/2023 Unknown       Discharged Condition: good    Disposition: Home or Self Care    Follow Up:   Follow-up Information     Amira Knutson NP. Go on 6/7/2023.    Specialty: Family Medicine  Why: follow up on Wednesday, June 7 at 1:00 pm  Contact information:  [address]   [address]  [facility] [place] [address]  [phone]             Clarita Marquez MD Follow up in 2 week(s).    Specialty: General Surgery  Contact information:  [address] [facility] [place] [address]  [phone]             Lon Rodriguez NP- Gastroenterology. Go on 6/1/2023.    Why: follow up on Thursday, June 1 at 11:00am  Contact information:  [address]  [facility], [place]   [phone]                     Patient Instructions:      Diet Adult Regular     Notify your health care provider if you experience any of the following:  temperature >100.4     Notify your health care provider if you experience any of the following:  persistent nausea and vomiting or diarrhea     Notify your health care provider if you experience any of the following:  severe uncontrolled pain     Activity as tolerated       Significant Diagnostic Studies: N/A    Pending Diagnostic Studies:     None         Medications:  Reconciled Home Medications:      Medication List      STOP taking these medications    ciprofloxacin HCl 250 MG tablet  Commonly known as: CIPRO     cyclobenzaprine 10 MG tablet  Commonly known as: FLEXERIL     doxycycline 100 MG tablet  Commonly known as: VIBRA-TABS     HYDROcodone-acetaminophen  mg per tablet  Commonly known as: NORCO     metroNIDAZOLE 500 MG tablet  Commonly known as: FLAGYL            Indwelling Lines/Drains at time of discharge:   Lines/Drains/Airways     None                 Time spent on the discharge of patient: 25 minutes         Miguel Hong MD  Department of Hospital Medicine  [facility]

## 2023-06-01 NOTE — PLAN OF CARE
Hospital follow up scheduled with PCP for 6/7/23 @ 1:00 PM, added to AVS.    Post op appt already scheduled with Dr. Michael for 6/8/23 @ 9:15 AM. Added to AVS.     06/01/23 1103   Post-Acute Status   Hospital Resources/Appts/Education Provided Appointments scheduled and added to AVS

## 2023-06-01 NOTE — HOSPITAL COURSE
Patient admitted due to concern for acute appendicitis. General surgery consulted. They did not feel that imaging was consistent with acute appendicitis. Recommended treating with IV abx for possible appendicitis and steroids for possible IBD flare. Patient advised that we should give IV abx and monitor for 24h. Patient requested to be discharged home and states he will come back if symptoms do not improve.. Patient was given 1 dose IV ertapenem and was discharged with PO abx and steroid taper. Referral placed to GI. Patient was provided discharge instructions and return precautions.

## 2023-06-02 ENCOUNTER — TELEPHONE (OUTPATIENT)
Dept: BARIATRICS | Facility: CLINIC | Age: 48
End: 2023-06-02
Payer: MEDICAID

## 2023-06-02 RX ORDER — HYDROCODONE BITARTRATE AND ACETAMINOPHEN 7.5; 325 MG/1; MG/1
2 TABLET ORAL EVERY 6 HOURS PRN
Qty: 20 TABLET | Refills: 0 | Status: SHIPPED | OUTPATIENT
Start: 2023-06-02 | End: 2023-06-06 | Stop reason: SDUPTHER

## 2023-06-02 NOTE — TELEPHONE ENCOUNTER
----- Message from Shanice Husain sent at 6/2/2023  3:57 PM CDT -----  Type: Needs Medical Advice  Who Called:  tawannna/ [facility]     Best Call Back Number: [phone]  Additional Information: requesting a call back , sts pt just got another pain rx from another doctor and wants to confirm that yall want them to filled rx HYDROcodone-acetaminophen (NORCO) 7.5-325 mg per tablet please advise thank you

## 2023-06-06 RX ORDER — OXYCODONE AND ACETAMINOPHEN 5; 325 MG/1; MG/1
1 TABLET ORAL EVERY 6 HOURS PRN
Qty: 12 TABLET | Refills: 0 | OUTPATIENT
Start: 2023-06-06

## 2023-06-08 ENCOUNTER — HOSPITAL ENCOUNTER (INPATIENT)
Facility: HOSPITAL | Age: 48
LOS: 2 days | Discharge: HOME | DRG: 948 | End: 2023-06-10
Attending: INTERNAL MEDICINE | Admitting: INTERNAL MEDICINE
Payer: MEDICAID

## 2023-06-08 ENCOUNTER — HOSPITAL ENCOUNTER (EMERGENCY)
Facility: HOSPITAL | Age: 48
Discharge: ANOTHER HOSPITAL | End: 2023-06-08
Attending: EMERGENCY MEDICINE
Payer: MEDICAID

## 2023-06-08 VITALS
TEMPERATURE: 98 F | BODY MASS INDEX: 27.98 KG/M2 | SYSTOLIC BLOOD PRESSURE: 133 MMHG | DIASTOLIC BLOOD PRESSURE: 74 MMHG | WEIGHT: 225 LBS | OXYGEN SATURATION: 99 % | HEIGHT: 75 IN | HEART RATE: 90 BPM | RESPIRATION RATE: 14 BRPM

## 2023-06-08 DIAGNOSIS — K56.7 ILEUS: Primary | ICD-10-CM

## 2023-06-08 DIAGNOSIS — R10.9 ABDOMINAL PAIN: ICD-10-CM

## 2023-06-08 DIAGNOSIS — R52 PAIN: ICD-10-CM

## 2023-06-08 DIAGNOSIS — R07.9 CHEST PAIN: ICD-10-CM

## 2023-06-08 DIAGNOSIS — K56.609 SMALL BOWEL OBSTRUCTION: Primary | ICD-10-CM

## 2023-06-08 PROBLEM — K52.9 GASTROENTERITIS: Status: ACTIVE | Noted: 2023-06-08

## 2023-06-08 LAB
ALBUMIN SERPL BCP-MCNC: 3.6 G/DL (ref 3.5–5.2)
ALP SERPL-CCNC: 68 U/L (ref 55–135)
ALT SERPL W/O P-5'-P-CCNC: 35 U/L (ref 10–44)
ANION GAP SERPL CALC-SCNC: 14 MMOL/L (ref 8–16)
AST SERPL-CCNC: 23 U/L (ref 10–40)
BASOPHILS # BLD AUTO: 0.15 K/UL (ref 0–0.2)
BASOPHILS NFR BLD: 1 % (ref 0–1.9)
BILIRUB SERPL-MCNC: 0.6 MG/DL (ref 0.1–1)
BUN SERPL-MCNC: 13 MG/DL (ref 6–20)
CALCIUM SERPL-MCNC: 10.5 MG/DL (ref 8.7–10.5)
CHLORIDE SERPL-SCNC: 97 MMOL/L (ref 95–110)
CO2 SERPL-SCNC: 27 MMOL/L (ref 23–29)
CREAT SERPL-MCNC: 1 MG/DL (ref 0.5–1.4)
DIFFERENTIAL METHOD: ABNORMAL
EOSINOPHIL # BLD AUTO: 0.2 K/UL (ref 0–0.5)
EOSINOPHIL NFR BLD: 1.6 % (ref 0–8)
ERYTHROCYTE [DISTWIDTH] IN BLOOD BY AUTOMATED COUNT: 17.9 % (ref 11.5–14.5)
EST. GFR  (NO RACE VARIABLE): >60 ML/MIN/1.73 M^2
ESTIMATED AVG GLUCOSE: 97 MG/DL (ref 68–131)
GLUCOSE SERPL-MCNC: 123 MG/DL (ref 70–110)
HBA1C MFR BLD: 5 % (ref 4–5.6)
HCT VFR BLD AUTO: 30.6 % (ref 40–54)
HGB BLD-MCNC: 9.5 G/DL (ref 14–18)
IMM GRANULOCYTES # BLD AUTO: 0.05 K/UL (ref 0–0.04)
IMM GRANULOCYTES NFR BLD AUTO: 0.3 % (ref 0–0.5)
LACTATE SERPL-SCNC: 1.3 MMOL/L (ref 0.5–2.2)
LACTATE SERPL-SCNC: 2.7 MMOL/L (ref 0.5–2.2)
LIPASE SERPL-CCNC: 28 U/L (ref 4–60)
LYMPHOCYTES # BLD AUTO: 2.1 K/UL (ref 1–4.8)
LYMPHOCYTES NFR BLD: 14.5 % (ref 18–48)
MCH RBC QN AUTO: 20.5 PG (ref 27–31)
MCHC RBC AUTO-ENTMCNC: 31 G/DL (ref 32–36)
MCV RBC AUTO: 66 FL (ref 82–98)
MONOCYTES # BLD AUTO: 0.8 K/UL (ref 0.3–1)
MONOCYTES NFR BLD: 5.8 % (ref 4–15)
NEUTROPHILS # BLD AUTO: 11.1 K/UL (ref 1.8–7.7)
NEUTROPHILS NFR BLD: 76.8 % (ref 38–73)
NRBC BLD-RTO: 0 /100 WBC
PLATELET # BLD AUTO: 749 K/UL (ref 150–450)
PMV BLD AUTO: 8.9 FL (ref 9.2–12.9)
POTASSIUM SERPL-SCNC: 3.7 MMOL/L (ref 3.5–5.1)
PROT SERPL-MCNC: 8.1 G/DL (ref 6–8.4)
RBC # BLD AUTO: 4.64 M/UL (ref 4.6–6.2)
SODIUM SERPL-SCNC: 138 MMOL/L (ref 136–145)
WBC # BLD AUTO: 14.45 K/UL (ref 3.9–12.7)

## 2023-06-08 PROCEDURE — 83605 ASSAY OF LACTIC ACID: CPT | Performed by: EMERGENCY MEDICINE

## 2023-06-08 PROCEDURE — 83605 ASSAY OF LACTIC ACID: CPT | Mod: 91

## 2023-06-08 PROCEDURE — 74018 RADEX ABDOMEN 1 VIEW: CPT | Mod: TC

## 2023-06-08 PROCEDURE — 74177 CT ABD & PELVIS W/CONTRAST: CPT | Mod: 26,,, | Performed by: RADIOLOGY

## 2023-06-08 PROCEDURE — 83036 HEMOGLOBIN GLYCOSYLATED A1C: CPT

## 2023-06-08 PROCEDURE — 96361 HYDRATE IV INFUSION ADD-ON: CPT

## 2023-06-08 PROCEDURE — G0378 HOSPITAL OBSERVATION PER HR: HCPCS

## 2023-06-08 PROCEDURE — 36415 COLL VENOUS BLD VENIPUNCTURE: CPT

## 2023-06-08 PROCEDURE — 25000003 PHARM REV CODE 250: Performed by: EMERGENCY MEDICINE

## 2023-06-08 PROCEDURE — 63600175 PHARM REV CODE 636 W HCPCS

## 2023-06-08 PROCEDURE — G0379 DIRECT REFER HOSPITAL OBSERV: HCPCS

## 2023-06-08 PROCEDURE — 96365 THER/PROPH/DIAG IV INF INIT: CPT

## 2023-06-08 PROCEDURE — 12000002 HC ACUTE/MED SURGE SEMI-PRIVATE ROOM

## 2023-06-08 PROCEDURE — 96375 TX/PRO/DX INJ NEW DRUG ADDON: CPT

## 2023-06-08 PROCEDURE — 80053 COMPREHEN METABOLIC PANEL: CPT | Performed by: EMERGENCY MEDICINE

## 2023-06-08 PROCEDURE — 74177 CT ABDOMEN PELVIS WITH CONTRAST: ICD-10-PCS | Mod: 26,,, | Performed by: RADIOLOGY

## 2023-06-08 PROCEDURE — 96376 TX/PRO/DX INJ SAME DRUG ADON: CPT

## 2023-06-08 PROCEDURE — S4991 NICOTINE PATCH NONLEGEND: HCPCS

## 2023-06-08 PROCEDURE — 25000003 PHARM REV CODE 250

## 2023-06-08 PROCEDURE — 83690 ASSAY OF LIPASE: CPT | Performed by: EMERGENCY MEDICINE

## 2023-06-08 PROCEDURE — 74018 XR ABDOMEN AP 1 VIEW: ICD-10-PCS | Mod: 26,,, | Performed by: RADIOLOGY

## 2023-06-08 PROCEDURE — 74018 RADEX ABDOMEN 1 VIEW: CPT | Mod: 26,,, | Performed by: RADIOLOGY

## 2023-06-08 PROCEDURE — 85025 COMPLETE CBC W/AUTO DIFF WBC: CPT | Performed by: EMERGENCY MEDICINE

## 2023-06-08 PROCEDURE — 99285 EMERGENCY DEPT VISIT HI MDM: CPT | Mod: 25

## 2023-06-08 PROCEDURE — 74177 CT ABD & PELVIS W/CONTRAST: CPT | Mod: TC

## 2023-06-08 PROCEDURE — 25500020 PHARM REV CODE 255: Performed by: EMERGENCY MEDICINE

## 2023-06-08 PROCEDURE — 63600175 PHARM REV CODE 636 W HCPCS: Performed by: EMERGENCY MEDICINE

## 2023-06-08 RX ORDER — KETOROLAC TROMETHAMINE 30 MG/ML
30 INJECTION, SOLUTION INTRAMUSCULAR; INTRAVENOUS
Status: COMPLETED | OUTPATIENT
Start: 2023-06-08 | End: 2023-06-08

## 2023-06-08 RX ORDER — HYDROMORPHONE HYDROCHLORIDE 1 MG/ML
1 INJECTION, SOLUTION INTRAMUSCULAR; INTRAVENOUS; SUBCUTANEOUS EVERY 4 HOURS PRN
Status: DISCONTINUED | OUTPATIENT
Start: 2023-06-08 | End: 2023-06-10

## 2023-06-08 RX ORDER — LANOLIN ALCOHOL/MO/W.PET/CERES
800 CREAM (GRAM) TOPICAL
Status: DISCONTINUED | OUTPATIENT
Start: 2023-06-08 | End: 2023-06-10 | Stop reason: HOSPADM

## 2023-06-08 RX ORDER — SODIUM CHLORIDE 9 MG/ML
1000 INJECTION, SOLUTION INTRAVENOUS
Status: COMPLETED | OUTPATIENT
Start: 2023-06-08 | End: 2023-06-08

## 2023-06-08 RX ORDER — HYDROMORPHONE HYDROCHLORIDE 1 MG/ML
1 INJECTION, SOLUTION INTRAMUSCULAR; INTRAVENOUS; SUBCUTANEOUS EVERY 6 HOURS PRN
Status: CANCELLED | OUTPATIENT
Start: 2023-06-08

## 2023-06-08 RX ORDER — SODIUM CHLORIDE 0.9 % (FLUSH) 0.9 %
10 SYRINGE (ML) INJECTION EVERY 12 HOURS PRN
Status: DISCONTINUED | OUTPATIENT
Start: 2023-06-08 | End: 2023-06-10 | Stop reason: HOSPADM

## 2023-06-08 RX ORDER — IBUPROFEN 200 MG
16 TABLET ORAL
Status: DISCONTINUED | OUTPATIENT
Start: 2023-06-08 | End: 2023-06-10 | Stop reason: HOSPADM

## 2023-06-08 RX ORDER — NALOXONE HCL 0.4 MG/ML
0.02 VIAL (ML) INJECTION
Status: DISCONTINUED | OUTPATIENT
Start: 2023-06-08 | End: 2023-06-10 | Stop reason: HOSPADM

## 2023-06-08 RX ORDER — GLUCAGON 1 MG
1 KIT INJECTION
Status: DISCONTINUED | OUTPATIENT
Start: 2023-06-08 | End: 2023-06-10 | Stop reason: HOSPADM

## 2023-06-08 RX ORDER — ONDANSETRON 2 MG/ML
4 INJECTION INTRAMUSCULAR; INTRAVENOUS
Status: COMPLETED | OUTPATIENT
Start: 2023-06-08 | End: 2023-06-08

## 2023-06-08 RX ORDER — IBUPROFEN 200 MG
24 TABLET ORAL
Status: DISCONTINUED | OUTPATIENT
Start: 2023-06-08 | End: 2023-06-10 | Stop reason: HOSPADM

## 2023-06-08 RX ORDER — ACETAMINOPHEN 325 MG/1
650 TABLET ORAL EVERY 8 HOURS PRN
Status: DISCONTINUED | OUTPATIENT
Start: 2023-06-08 | End: 2023-06-10 | Stop reason: HOSPADM

## 2023-06-08 RX ORDER — PROCHLORPERAZINE EDISYLATE 5 MG/ML
5 INJECTION INTRAMUSCULAR; INTRAVENOUS EVERY 6 HOURS PRN
Status: DISCONTINUED | OUTPATIENT
Start: 2023-06-08 | End: 2023-06-10 | Stop reason: HOSPADM

## 2023-06-08 RX ORDER — HYDROMORPHONE HYDROCHLORIDE 1 MG/ML
1 INJECTION, SOLUTION INTRAMUSCULAR; INTRAVENOUS; SUBCUTANEOUS EVERY 6 HOURS PRN
Status: DISCONTINUED | OUTPATIENT
Start: 2023-06-08 | End: 2023-06-08

## 2023-06-08 RX ORDER — SODIUM CHLORIDE 9 MG/ML
INJECTION, SOLUTION INTRAVENOUS CONTINUOUS
Status: DISCONTINUED | OUTPATIENT
Start: 2023-06-08 | End: 2023-06-10 | Stop reason: HOSPADM

## 2023-06-08 RX ORDER — HYDROCODONE BITARTRATE AND ACETAMINOPHEN 10; 325 MG/1; MG/1
1 TABLET ORAL EVERY 6 HOURS PRN
Status: DISCONTINUED | OUTPATIENT
Start: 2023-06-08 | End: 2023-06-10

## 2023-06-08 RX ORDER — GABAPENTIN 300 MG/1
300 CAPSULE ORAL 2 TIMES DAILY
Status: DISCONTINUED | OUTPATIENT
Start: 2023-06-08 | End: 2023-06-10 | Stop reason: HOSPADM

## 2023-06-08 RX ORDER — HYDROMORPHONE HYDROCHLORIDE 1 MG/ML
1 INJECTION, SOLUTION INTRAMUSCULAR; INTRAVENOUS; SUBCUTANEOUS
Status: COMPLETED | OUTPATIENT
Start: 2023-06-08 | End: 2023-06-08

## 2023-06-08 RX ORDER — INSULIN ASPART 100 [IU]/ML
0-5 INJECTION, SOLUTION INTRAVENOUS; SUBCUTANEOUS
Status: DISCONTINUED | OUTPATIENT
Start: 2023-06-08 | End: 2023-06-10 | Stop reason: HOSPADM

## 2023-06-08 RX ORDER — IBUPROFEN 200 MG
1 TABLET ORAL DAILY
Status: DISCONTINUED | OUTPATIENT
Start: 2023-06-08 | End: 2023-06-10 | Stop reason: HOSPADM

## 2023-06-08 RX ADMIN — SODIUM CHLORIDE 1000 ML: 9 INJECTION, SOLUTION INTRAVENOUS at 09:06

## 2023-06-08 RX ADMIN — PIPERACILLIN AND TAZOBACTAM 3.38 G: 3; .375 INJECTION, POWDER, LYOPHILIZED, FOR SOLUTION INTRAVENOUS; PARENTERAL at 07:06

## 2023-06-08 RX ADMIN — IOHEXOL 100 ML: 350 INJECTION, SOLUTION INTRAVENOUS at 07:06

## 2023-06-08 RX ADMIN — ONDANSETRON 4 MG: 2 INJECTION INTRAMUSCULAR; INTRAVENOUS at 06:06

## 2023-06-08 RX ADMIN — SODIUM CHLORIDE 1000 ML: 9 INJECTION, SOLUTION INTRAVENOUS at 07:06

## 2023-06-08 RX ADMIN — GABAPENTIN 300 MG: 300 CAPSULE ORAL at 08:06

## 2023-06-08 RX ADMIN — SODIUM CHLORIDE 1000 ML: 9 INJECTION, SOLUTION INTRAVENOUS at 06:06

## 2023-06-08 RX ADMIN — HYDROMORPHONE HYDROCHLORIDE 1 MG: 1 INJECTION, SOLUTION INTRAMUSCULAR; INTRAVENOUS; SUBCUTANEOUS at 09:06

## 2023-06-08 RX ADMIN — PIPERACILLIN AND TAZOBACTAM 4.5 G: 4; .5 INJECTION, POWDER, LYOPHILIZED, FOR SOLUTION INTRAVENOUS; PARENTERAL at 03:06

## 2023-06-08 RX ADMIN — SODIUM CHLORIDE: 9 INJECTION, SOLUTION INTRAVENOUS at 01:06

## 2023-06-08 RX ADMIN — HYDROMORPHONE HYDROCHLORIDE 1 MG: 1 INJECTION, SOLUTION INTRAMUSCULAR; INTRAVENOUS; SUBCUTANEOUS at 01:06

## 2023-06-08 RX ADMIN — PROCHLORPERAZINE EDISYLATE 5 MG: 5 INJECTION INTRAMUSCULAR; INTRAVENOUS at 08:06

## 2023-06-08 RX ADMIN — HYDROCODONE BITARTRATE AND ACETAMINOPHEN 1 TABLET: 10; 325 TABLET ORAL at 03:06

## 2023-06-08 RX ADMIN — PROCHLORPERAZINE EDISYLATE 5 MG: 5 INJECTION INTRAMUSCULAR; INTRAVENOUS at 01:06

## 2023-06-08 RX ADMIN — SODIUM CHLORIDE: 9 INJECTION, SOLUTION INTRAVENOUS at 10:06

## 2023-06-08 RX ADMIN — HYDROMORPHONE HYDROCHLORIDE 1 MG: 1 INJECTION, SOLUTION INTRAMUSCULAR; INTRAVENOUS; SUBCUTANEOUS at 07:06

## 2023-06-08 RX ADMIN — NICOTINE 1 PATCH: 14 PATCH TRANSDERMAL at 01:06

## 2023-06-08 RX ADMIN — KETOROLAC TROMETHAMINE 30 MG: 30 INJECTION, SOLUTION INTRAMUSCULAR; INTRAVENOUS at 07:06

## 2023-06-08 RX ADMIN — PIPERACILLIN AND TAZOBACTAM 4.5 G: 4; .5 INJECTION, POWDER, LYOPHILIZED, FOR SOLUTION INTRAVENOUS; PARENTERAL at 11:06

## 2023-06-08 RX ADMIN — HYDROMORPHONE HYDROCHLORIDE 1 MG: 1 INJECTION, SOLUTION INTRAMUSCULAR; INTRAVENOUS; SUBCUTANEOUS at 06:06

## 2023-06-08 RX ADMIN — HYDROCODONE BITARTRATE AND ACETAMINOPHEN 1 TABLET: 10; 325 TABLET ORAL at 10:06

## 2023-06-08 NOTE — ASSESSMENT & PLAN NOTE
Chronic, controlled.  Latest blood pressure and vitals reviewed-   Temp:  [98 °F (36.7 °C)]   Pulse:  [73-93]   Resp:  [14-22]   BP: (113-133)/(64-90)   SpO2:  [98 %-100 %] .   Home meds for hypertension were reviewed and noted below.       While in the hospital, will manage blood pressure as follows; Continue home antihypertensive regimen    Will utilize p.r.n. blood pressure medication only if patient's blood pressure greater than  180/110 and he develops symptoms such as worsening chest pain or shortness of breath.

## 2023-06-08 NOTE — PLAN OF CARE
Problem: Adult Inpatient Plan of Care  Goal: Plan of Care Review  Outcome: Ongoing, Progressing  Goal: Patient-Specific Goal (Individualized)  Outcome: Ongoing, Progressing  Goal: Absence of Hospital-Acquired Illness or Injury  Outcome: Ongoing, Progressing  Goal: Optimal Comfort and Wellbeing  Outcome: Ongoing, Progressing     Plan of care reviewed with pt and sister. Both voiced understanding. Pt AAOx4. Pt c/o of abdominal pain and nausea during shift. PRN medication given x2. No apparent distress noted. Pt continent x2. Fall precautions maintained. Bed in lowest position and locked. Call light within reach and advised to call for assistance. Slip resistant socks on at this time. Care continues

## 2023-06-08 NOTE — SUBJECTIVE & OBJECTIVE
Past Medical History:   Diagnosis Date    Diabetes mellitus, type 2 05/2020    Patient denies    Hypertension        Past Surgical History:   Procedure Laterality Date    COLONOSCOPY N/A 5/29/2023    Procedure: COLONOSCOPY;  Surgeon: Sam Solano MD;  Location: [facility];  Service: Endoscopy;  Laterality: N/A;    EYE SURGERY Right     LUMBAR DISC SURGERY  2005    SUBTOTAL COLECTOMY Right 5/29/2023    Procedure: COLECTOMY, PARTIAL;  Surgeon: Edin Michael MD;  Location: [facility] OR;  Service: General;  Laterality: Right;       Review of patient's allergies indicates:  No Known Allergies    Current Facility-Administered Medications on File Prior to Encounter   Medication    [COMPLETED] 0.9%  NaCl infusion    [COMPLETED] HYDROmorphone injection 1 mg    [COMPLETED] HYDROmorphone injection 1 mg    [COMPLETED] iohexoL (OMNIPAQUE 350) injection 100 mL    [COMPLETED] ketorolac injection 30 mg    [COMPLETED] ondansetron injection 4 mg    [COMPLETED] sodium chloride 0.9% bolus 1,000 mL 1,000 mL    [COMPLETED] sodium chloride 0.9% bolus 1,000 mL 1,000 mL    [DISCONTINUED] piperacillin-tazobactam (ZOSYN) 3.375 g in dextrose 5 % in water (D5W) 5 % 100 mL IVPB (MB+)     Current Outpatient Medications on File Prior to Encounter   Medication Sig    gabapentin (NEURONTIN) 300 MG capsule Take 1 capsule (300 mg total) by mouth 2 (two) times daily. for 14 days    HYDROcodone-acetaminophen (NORCO) 7.5-325 mg per tablet Take 2 tablets by mouth every 6 (six) hours as needed for Pain.    oxyCODONE-acetaminophen (PERCOCET) 5-325 mg per tablet Take 1 tablet by mouth every 6 (six) hours as needed for Pain.     Family History       Problem Relation (Age of Onset)    Diabetes Mother, Maternal Grandfather    No Known Problems Father          Tobacco Use    Smoking status: Every Day     Packs/day: 0.50     Years: 23.00     Pack years: 11.50     Types: Cigarettes    Smokeless tobacco: Never   Substance and Sexual Activity    Alcohol use: Not  Currently     Comment: Quit drinking 2 yrs ago    Drug use: Yes     Types: Marijuana     Comment: heavy marijuana use    Sexual activity: Yes     Review of Systems   Constitutional:  Negative for fatigue and fever.   Respiratory:  Negative for shortness of breath.    Cardiovascular:  Negative for chest pain and palpitations.   Gastrointestinal:  Positive for abdominal pain, nausea and vomiting.   Genitourinary:  Negative for difficulty urinating, dysuria and frequency.   Neurological:  Negative for dizziness and weakness.   Psychiatric/Behavioral:  Negative for behavioral problems and confusion.    All other systems reviewed and are negative.  Objective:     Vital Signs (Most Recent):  Temp: 98 °F (36.7 °C) (06/08/23 1227)  Pulse: 73 (06/08/23 1227)  Resp: 16 (06/08/23 1227)  BP: 133/67 (06/08/23 1227)  SpO2: 98 % (06/08/23 1227) Vital Signs (24h Range):  Temp:  [98 °F (36.7 °C)] 98 °F (36.7 °C)  Pulse:  [73-93] 73  Resp:  [14-22] 16  SpO2:  [98 %-100 %] 98 %  BP: (113-133)/(64-90) 133/67     Weight: 102 kg (224 lb 13.9 oz)  Body mass index is 28.11 kg/m².     Physical Exam  Vitals and nursing note reviewed.   Constitutional:       General: He is not in acute distress.  HENT:      Head: Normocephalic and atraumatic.   Cardiovascular:      Rate and Rhythm: Normal rate and regular rhythm.      Pulses: Normal pulses.      Heart sounds: Normal heart sounds. No murmur heard.    No gallop.   Pulmonary:      Effort: Pulmonary effort is normal. No respiratory distress.      Breath sounds: No wheezing.   Abdominal:      General: Abdomen is flat.      Palpations: Abdomen is soft.      Tenderness: There is abdominal tenderness.      Comments: Hypoactive bowel sounds, midline incision dressing CDI   Musculoskeletal:         General: No swelling or tenderness. Normal range of motion.   Skin:     General: Skin is warm.      Coloration: Skin is not jaundiced.      Findings: No bruising.   Neurological:      Mental Status: He is  alert.              Significant Labs: All pertinent labs within the past 24 hours have been reviewed.  CBC:   Recent Labs   Lab 06/08/23  0607   WBC 14.45*   HGB 9.5*   HCT 30.6*   *     CMP:   Recent Labs   Lab 06/08/23  0607      K 3.7   CL 97   CO2 27   *   BUN 13   CREATININE 1.0   CALCIUM 10.5   PROT 8.1   ALBUMIN 3.6   BILITOT 0.6   ALKPHOS 68   AST 23   ALT 35   ANIONGAP 14       Significant Imaging: I have reviewed all pertinent imaging results/findings within the past 24 hours.

## 2023-06-08 NOTE — H&P
[facility] Hospital Medicine  History & Physical    Patient Name: Ruslan Caldwell  MRN: [id number]  Patient Class: OP- Observation  Admission Date: 6/8/2023  Attending Physician: Silke Lundberg MD   Primary Care Provider: Amira Knutson NP         Patient information was obtained from patient, past medical records and ER records.     Subjective:     Principal Problem:Gastroenteritis    Chief Complaint: abdominal pain     HPI: Ruslan Caldwell is a 47 year old black male with a PMHx significant for hypertension, right eye blindness, diabetes and adenocarcinoma S/P right colectomy on 5/29 presenting from [place] ED for severe abdominal pain with associated N/V. He reports experiencing abdominal pain since his colectomy. He states he was only sent home with 12 pills of pain medication. He states last night his pain was excruciating prompting visit to ED. Wife at bedside states she was attempting to manage his pain with OTC tylenol. He states he had multiple episodes of N/V since last night. He reports his last bowel movement was last night. He denies any SOB, constipation, diarrhea, fever/chills, or urinary symptoms. He was started on prn pain medication and IV zosyn at [place]. Ct abdomen demonstrated fat attenuation mass suspicious for fat necrosis and fluid distended, nondilated loops of small bowel. General surgery consulted on admission and patient held NPO. Patient to be monitored by hospital medicine services for further treatment.             Past Medical History:   Diagnosis Date    Diabetes mellitus, type 2 05/2020    Patient denies    Hypertension        Past Surgical History:   Procedure Laterality Date    COLONOSCOPY N/A 5/29/2023    Procedure: COLONOSCOPY;  Surgeon: Sam Solano MD;  Location: [facility];  Service: Endoscopy;  Laterality: N/A;    EYE SURGERY Right     LUMBAR DISC SURGERY  2005    SUBTOTAL COLECTOMY Right 5/29/2023    Procedure: COLECTOMY, PARTIAL;   Surgeon: Edin Michael MD;  Location: [facility];  Service: General;  Laterality: Right;       Review of patient's allergies indicates:  No Known Allergies    Current Facility-Administered Medications on File Prior to Encounter   Medication    [COMPLETED] 0.9%  NaCl infusion    [COMPLETED] HYDROmorphone injection 1 mg    [COMPLETED] HYDROmorphone injection 1 mg    [COMPLETED] iohexoL (OMNIPAQUE 350) injection 100 mL    [COMPLETED] ketorolac injection 30 mg    [COMPLETED] ondansetron injection 4 mg    [COMPLETED] sodium chloride 0.9% bolus 1,000 mL 1,000 mL    [COMPLETED] sodium chloride 0.9% bolus 1,000 mL 1,000 mL    [DISCONTINUED] piperacillin-tazobactam (ZOSYN) 3.375 g in dextrose 5 % in water (D5W) 5 % 100 mL IVPB (MB+)     Current Outpatient Medications on File Prior to Encounter   Medication Sig    gabapentin (NEURONTIN) 300 MG capsule Take 1 capsule (300 mg total) by mouth 2 (two) times daily. for 14 days    HYDROcodone-acetaminophen (NORCO) 7.5-325 mg per tablet Take 2 tablets by mouth every 6 (six) hours as needed for Pain.    oxyCODONE-acetaminophen (PERCOCET) 5-325 mg per tablet Take 1 tablet by mouth every 6 (six) hours as needed for Pain.     Family History       Problem Relation (Age of Onset)    Diabetes Mother, Maternal Grandfather    No Known Problems Father          Tobacco Use    Smoking status: Every Day     Packs/day: 0.50     Years: 23.00     Pack years: 11.50     Types: Cigarettes    Smokeless tobacco: Never   Substance and Sexual Activity    Alcohol use: Not Currently     Comment: Quit drinking 2 yrs ago    Drug use: Yes     Types: Marijuana     Comment: heavy marijuana use    Sexual activity: Yes     Review of Systems   Constitutional:  Negative for fatigue and fever.   Respiratory:  Negative for shortness of breath.    Cardiovascular:  Negative for chest pain and palpitations.   Gastrointestinal:  Positive for abdominal pain, nausea and vomiting.   Genitourinary:   Negative for difficulty urinating, dysuria and frequency.   Neurological:  Negative for dizziness and weakness.   Psychiatric/Behavioral:  Negative for behavioral problems and confusion.    All other systems reviewed and are negative.  Objective:     Vital Signs (Most Recent):  Temp: 98 °F (36.7 °C) (06/08/23 1227)  Pulse: 73 (06/08/23 1227)  Resp: 16 (06/08/23 1227)  BP: 133/67 (06/08/23 1227)  SpO2: 98 % (06/08/23 1227) Vital Signs (24h Range):  Temp:  [98 °F (36.7 °C)] 98 °F (36.7 °C)  Pulse:  [73-93] 73  Resp:  [14-22] 16  SpO2:  [98 %-100 %] 98 %  BP: (113-133)/(64-90) 133/67     Weight: 102 kg (224 lb 13.9 oz)  Body mass index is 28.11 kg/m².     Physical Exam  Vitals and nursing note reviewed.   Constitutional:       General: He is not in acute distress.  HENT:      Head: Normocephalic and atraumatic.   Cardiovascular:      Rate and Rhythm: Normal rate and regular rhythm.      Pulses: Normal pulses.      Heart sounds: Normal heart sounds. No murmur heard.    No gallop.   Pulmonary:      Effort: Pulmonary effort is normal. No respiratory distress.      Breath sounds: No wheezing.   Abdominal:      General: Abdomen is flat.      Palpations: Abdomen is soft.      Tenderness: There is abdominal tenderness.      Comments: Hypoactive bowel sounds, midline incision dressing CDI   Musculoskeletal:         General: No swelling or tenderness. Normal range of motion.   Skin:     General: Skin is warm.      Coloration: Skin is not jaundiced.      Findings: No bruising.   Neurological:      Mental Status: He is alert.              Significant Labs: All pertinent labs within the past 24 hours have been reviewed.  CBC:   Recent Labs   Lab 06/08/23  0607   WBC 14.45*   HGB 9.5*   HCT 30.6*   *     CMP:   Recent Labs   Lab 06/08/23  0607      K 3.7   CL 97   CO2 27   *   BUN 13   CREATININE 1.0   CALCIUM 10.5   PROT 8.1   ALBUMIN 3.6   BILITOT 0.6   ALKPHOS 68   AST 23   ALT 35   ANIONGAP 14        Significant Imaging: I have reviewed all pertinent imaging results/findings within the past 24 hours.    Assessment/Plan:     * Gastroenteritis  Acute  S/P right colectomy on 5/29 w/ Dr. Michael  General surgery consulted  Held NPO  Prn pain medications  Prn antiemetics  CT abdomen showing fat attenuation mass suspicious for fat necrosis and fluid distended, nondilated loops of small bowel    Thrombocytosis  Acute  Likely reactive to surgery      Microcytic anemia  Patient's anemia is currently controlled. Has not received any PRBCs to date.. Etiology likely d/t etiology unknown   Current CBC reviewed-   Lab Results   Component Value Date    HGB 9.5 (L) 06/08/2023    HCT 30.6 (L) 06/08/2023     Monitor serial CBC and transfuse if patient becomes hemodynamically unstable, symptomatic or H/H drops below 7/21.         Essential hypertension  Chronic, controlled.  Latest blood pressure and vitals reviewed-   Temp:  [98 °F (36.7 °C)]   Pulse:  [73-93]   Resp:  [14-22]   BP: (113-133)/(64-90)   SpO2:  [98 %-100 %] .   Home meds for hypertension were reviewed and noted below.       While in the hospital, will manage blood pressure as follows; Continue home antihypertensive regimen    Will utilize p.r.n. blood pressure medication only if patient's blood pressure greater than  180/110 and he develops symptoms such as worsening chest pain or shortness of breath.        Type 2 diabetes mellitus without complication, without long-term current use of insulin  Patient's FSGs are controlled on current medication regimen.  Last A1c reviewed-   Lab Results   Component Value Date    HGBA1C 4.8 09/14/2021     Most recent fingerstick glucose reviewed- No results for input(s): POCTGLUCOSE in the last 24 hours.  Current correctional scale  Low  Maintain anti-hyperglycemic dose as follows-   Antihyperglycemics (From admission, onward)    None        Hold Oral hypoglycemics while patient is in the hospital.      VTE Risk Mitigation  (From admission, onward)         Ordered     IP VTE HIGH RISK PATIENT  Once         06/08/23 1255     Place sequential compression device  Until discontinued         06/08/23 1255                     On 06/08/2023, patient should be placed in hospital observation services under my care in collaboration with Dr. Lundberg.      Chris Pollard PA-C  Department of Hospital Medicine  [facility]

## 2023-06-08 NOTE — ED PROVIDER NOTES
Encounter Date: 6/8/2023       History     Chief Complaint   Patient presents with    Abdominal Pain     Pt. C/o abd. Pain with N/V since approx. 0230.      47-year-old male here with complaints of severe constant abdominal pain and vomiting that began around 2:00 a.m. today.  Patient had partial colectomy by Dr. Michael May 29th with final path diagnosis being invasive adenocarcinoma.    The history is provided by the patient and medical records.   Review of patient's allergies indicates:  No Known Allergies  Past Medical History:   Diagnosis Date    Diabetes mellitus, type 2 05/2020    Patient denies    Hypertension      Past Surgical History:   Procedure Laterality Date    COLONOSCOPY N/A 5/29/2023    Procedure: COLONOSCOPY;  Surgeon: Sam Solano MD;  Location: [facility] ENDO;  Service: Endoscopy;  Laterality: N/A;    EYE SURGERY Right     LUMBAR DISC SURGERY  2005    SUBTOTAL COLECTOMY Right 5/29/2023    Procedure: COLECTOMY, PARTIAL;  Surgeon: Edin Michael MD;  Location: [facility] OR;  Service: General;  Laterality: Right;     Family History   Problem Relation Age of Onset    Diabetes Mother     Diabetes Maternal Grandfather     No Known Problems Father      Social History     Tobacco Use    Smoking status: Every Day     Packs/day: 0.50     Years: 23.00     Pack years: 11.50     Types: Cigarettes    Smokeless tobacco: Never   Substance Use Topics    Alcohol use: Not Currently     Comment: Quit drinking 2 yrs ago    Drug use: Yes     Types: Marijuana     Comment: heavy marijuana use     Review of Systems   Gastrointestinal:  Positive for abdominal pain and vomiting.   All other systems reviewed and are negative.    Physical Exam     Initial Vitals [06/08/23 0557]   BP Pulse Resp Temp SpO2   121/82 93 20 98 °F (36.7 °C) 100 %      MAP       --         Physical Exam    Nursing note and vitals reviewed.  Constitutional: He appears well-developed and well-nourished.   Patient appears uncomfortable.   HENT:   Head:  Atraumatic.   Eyes: EOM are normal.   Right eye with chronic opacification and scarring.   Neck: Neck supple.   Cardiovascular:  Normal rate.           Pulmonary/Chest: Breath sounds normal. No respiratory distress.   Abdominal: Abdomen is soft. There is abdominal tenderness.   Diffuse tenderness.  Low midline vertical incision with bandage in place.   Musculoskeletal:         General: No edema.      Cervical back: Neck supple.     Neurological: He is oriented to person, place, and time.   Grossly afocal except for blindness right eye, GCS 15.   Skin: Skin is warm and dry.   Psychiatric: He has a normal mood and affect.       ED Course   Procedures  Labs Reviewed   CBC W/ AUTO DIFFERENTIAL - Abnormal; Notable for the following components:       Result Value    WBC 14.45 (*)     Hemoglobin 9.5 (*)     Hematocrit 30.6 (*)     MCV 66 (*)     MCH 20.5 (*)     MCHC 31.0 (*)     RDW 17.9 (*)     Platelets 749 (*)     MPV 8.9 (*)     Gran # (ANC) 11.1 (*)     Immature Grans (Abs) 0.05 (*)     Gran % 76.8 (*)     Lymph % 14.5 (*)     All other components within normal limits   COMPREHENSIVE METABOLIC PANEL - Abnormal; Notable for the following components:    Glucose 123 (*)     All other components within normal limits   LACTIC ACID, PLASMA - Abnormal; Notable for the following components:    Lactate (Lactic Acid) 2.7 (*)     All other components within normal limits   LIPASE          Imaging Results               CT Abdomen Pelvis With Contrast (Final result)  Result time 06/08/23 09:06:51      Final result by Cristian Cook MD (06/08/23 09:06:51)                   Impression:      1. Small hiatal hernia.  2. Status post recent partial proximal colectomy with fat attenuation mass adjacent to the anastomotic clips suspicious for fat necrosis.  3. Fluid distended but nondilated loops of small bowel.  This can be seen with gastroenteritis.  Close interval follow-up recommended to exclude the later development of a  postsurgical small-bowel obstruction.  4. Scattered ascites.  5. Prostatic hypertrophy.  This report was flagged in Epic as abnormal.      Electronically signed by: Cristian Cook  Date:    06/08/2023  Time:    09:06               Narrative:    EXAMINATION:  CT ABDOMEN PELVIS WITH CONTRAST    CLINICAL HISTORY:  Abdominal pain, post-op;Abdominal pain, acute, nonlocalized;    TECHNIQUE:  Low dose axial images, sagittal and coronal reformations were obtained from the lung bases to the pubic symphysis following the IV administration of 100 mL of Omnipaque 350 .  Oral contrast was not given.    COMPARISON:  CT 05/25/2023.    FINDINGS:  Suspected persistent discoid atelectasis versus parenchymal scarring at the periphery of the right lower lobe.  The left lung base is clear.  No pleural or pericardial effusions.  There is a small hiatal hernia.    The liver and spleen are normal in size and attenuation.  The gallbladder, pancreas and adrenal glands are unremarkable.    Kidneys are normal in size and enhance homogeneously.  No renal calculi.  No changes of hydronephrosis.  No perinephric inflammatory change.    Surgical clips within the right abdomen from recent partial proximal colectomy.  No CT evidence for bowel obstruction.  No free intraperitoneal air or localized fluid collection to suggest anastomotic leak.  There is a fat attenuation mass just posterior and lateral to the anastomotic clips measuring 4.5 x 4.2 x 5.8 cm.  This has the appearance of fat necrosis.  The loops of small bowel are fluid distended but nondilated.  Small amount of fluid within the right paracolic gutter.    The bladder is distended.  The prostate is enlarged.  There is dependent fluid within the pelvis.    No significant mesenteric or retroperitoneal lymphadenopathy.  Postsurgical change within the midline anterior abdominal wall.                                       X-Ray Abdomen AP 1 View (Final result)  Result time 06/08/23 08:43:55       Final result by Cristian Cook MD (06/08/23 08:43:55)                   Impression:      No plain film evidence for bowel obstruction.      Electronically signed by: Cristian Cook  Date:    06/08/2023  Time:    08:43               Narrative:    EXAMINATION:  XR ABDOMEN AP 1 VIEW    CLINICAL HISTORY:  Pain, unspecified    TECHNIQUE:  Supine views of the abdomen.    COMPARISON:  09/04/2019.    FINDINGS:  Scattered air and stool within the colon.  No plain film evidence for bowel obstruction.  No dilated loops of small bowel.    No significant abdominal calcifications.    Bony pelvis is intact.                                       Medications   sodium chloride 0.9% bolus 1,000 mL 1,000 mL (0 mLs Intravenous Stopped 6/8/23 0833)   ondansetron injection 4 mg (4 mg Intravenous Given 6/8/23 0608)   HYDROmorphone injection 1 mg (1 mg Intravenous Given 6/8/23 0608)   sodium chloride 0.9% bolus 1,000 mL 1,000 mL (0 mLs Intravenous Stopped 6/8/23 0905)   ketorolac injection 30 mg (30 mg Intravenous Given 6/8/23 0735)   iohexoL (OMNIPAQUE 350) injection 100 mL (100 mLs Intravenous Given 6/8/23 0717)   0.9%  NaCl infusion (1,000 mLs Intravenous New Bag 6/8/23 0922)   HYDROmorphone injection 1 mg (1 mg Intravenous Given 6/8/23 0943)     Medical Decision Making:   Differential Diagnosis:   Small-bowel obstruction, intra-abdominal abscess, constipation  ED Management:  47-year-old male with recent diagnosis of colon cancer status post partial colectomy May 29th here with diffuse abdominal pain and vomiting.  We will control pain, image, most likely admit for SBO.           ED Course as of 06/16/23 0541   Thu Jun 08, 2023   0816 Discussed with Dr. Michael who accepts transfer at [facility].  We will discuss with medicine. [RJ]   0833 Discussed with Dr. Tinajero the hospitalist. [RJ]   0850 We will hold on NG tube relative to radiology's read. [RJ]      ED Course User Index  [RJ] Cristian Suarez MD                 Clinical  Impression:   Final diagnoses:  [R52] Pain  [K56.609] Small bowel obstruction (Primary)        ED Disposition Condition    Transfer to Another Facility Stable                Cristian Suarez MD  06/08/23 0828       Cristian Suarez MD  06/08/23 0834       Cristian Suarez MD  06/16/23 0541

## 2023-06-08 NOTE — ASSESSMENT & PLAN NOTE
Patient's FSGs are controlled on current medication regimen.  Last A1c reviewed-   Lab Results   Component Value Date    HGBA1C 4.8 09/14/2021     Most recent fingerstick glucose reviewed- No results for input(s): POCTGLUCOSE in the last 24 hours.  Current correctional scale  Low  Maintain anti-hyperglycemic dose as follows-   Antihyperglycemics (From admission, onward)    None        Hold Oral hypoglycemics while patient is in the hospital.

## 2023-06-08 NOTE — ASSESSMENT & PLAN NOTE
Acute  S/P right colectomy on 5/29 w/ Dr. Michael  General surgery consulted  Held NPO  Prn pain medications  Prn antiemetics  CT abdomen showing fat attenuation mass suspicious for fat necrosis and fluid distended, nondilated loops of small bowel

## 2023-06-08 NOTE — ASSESSMENT & PLAN NOTE
Patient's anemia is currently controlled. Has not received any PRBCs to date.. Etiology likely d/t etiology unknown   Current CBC reviewed-   Lab Results   Component Value Date    HGB 9.5 (L) 06/08/2023    HCT 30.6 (L) 06/08/2023     Monitor serial CBC and transfuse if patient becomes hemodynamically unstable, symptomatic or H/H drops below 7/21.

## 2023-06-08 NOTE — HPI
Ruslan Caldwell is a 47 year old black male with a PMHx significant for hypertension, right eye blindness, diabetes and adenocarcinoma S/P right colectomy on 5/29 presenting from [place] ED for severe abdominal pain with associated N/V. He reports experiencing abdominal pain since his colectomy. He states he was only sent home with 12 pills of pain medication. He states last night his pain was excruciating prompting visit to ED. Wife at bedside states she was attempting to manage his pain with OTC tylenol. He states he had multiple episodes of N/V since last night. He reports his last bowel movement was last night. He denies any SOB, constipation, diarrhea, fever/chills, or urinary symptoms. He was started on prn pain medication and IV zosyn at [place]. Ct abdomen demonstrated fat attenuation mass suspicious for fat necrosis and fluid distended, nondilated loops of small bowel. General surgery consulted on admission and patient held NPO. Patient to be monitored by hospital medicine services for further treatment.

## 2023-06-08 NOTE — PROVIDER TRANSFER
(Physician in Lead of Transfers)  Outside Transfer Acceptance Note / Regional Referral Center    Upon patient arrival, please contact Hospital Medicine on call.    Referring facility: [facility]   Referring provider: IVAN JENNINGS  Accepting facility: [facility]  Accepting provider: SHIRLEY PULIDO  Admitting provider: PAULINA LINCOLN  Reason for transfer:  Need General Surgery  Transfer diagnosis: Abdominal pain  Transfer specialty requested: General Surgery  Transfer specialty notified: Yes  Transfer level: NUMBER 1-5: 2  Bed type requested: med-surg  Isolation status: No active isolations   Admission class or status:Obs      Narrative     47-year-old male with a history of hypertension, right eye blindness, and diabetes previously admitted to [facility] from May 27 through June 1 with several months of abdominal pain.  He was noted to have leukocytosis, anemia, thrombocytosis, and abnormal inflammatory stranding in the cecum/terminal ileum.  He was treated with IV antibiotics.  Colonoscopy showed large colonic mass in the area of concern.  General Surgery performed a right partial colectomy on May 29.  By the time of discharge, he was tolerating a regular diet with resolving admission symptoms.  He was subsequently discharged home.  Pathology of the mass was consistent with invasive adenocarcinoma, moderately differentiated.  He presented to the [facility] Emergency Department on June 8 with severe, constant abdominal pain and vomiting that began around 2:00 a.m..  ED spoke with General Surgery at [facility].  Requesting transfer to Hospital Medicine at [facility] for further treatment of abdominal pain.  ED provider felt patient did not have evidence of sepsis at this time.  CT of the abdomen and pelvis showed fat attenuation mass suspicious for fat necrosis and fluid distended, nondilated loops of small bowel. In the emergency department he received  boluses of normal saline along with pain and nausea medication.  Zosyn is ordered.  NG tube is ordered.    Sodium 138, potassium 3.7, chloride 97, CO2 27, BUN 13, creatinine 1, glucose 123, AST 23, ALT 35, lipase 28, lactic acid 2.7, white blood cells 14.45, hemoglobin 9.5, hematocrit 30.6, platelets 749    Abdominal x-ray had no evidence of bowel obstruction.    CT abdomen and pelvis noted small hiatal hernia.  Status post recent partial proximal colectomy with fat attenuation mass adjacent to the anastomotic clips suspicious for fat necrosis.  Fluid distended nondilated loops of small bowel.  Scattered ascites.  Prostatic hypertrophy.    Objective     Vitals: Temp: 98 °F (36.7 °C) (06/08/23 0557)  Pulse: 93 (06/08/23 0557)  Resp: 14 (06/08/23 0827)  BP: (!) 132/90 (06/08/23 0827)  SpO2: 99 % (06/08/23 0827)  Recent Labs: CBC:   Recent Labs   Lab 06/08/23  0607   WBC 14.45*   HGB 9.5*   HCT 30.6*   *     CMP:   Recent Labs   Lab 06/08/23  0607      K 3.7   CL 97   CO2 27   *   BUN 13   CREATININE 1.0   CALCIUM 10.5   PROT 8.1   ALBUMIN 3.6   BILITOT 0.6   ALKPHOS 68   AST 23   ALT 35   ANIONGAP 14     Lactic Acid:   Recent Labs   Lab 06/08/23  0607   LACTATE 2.7*     Lipase:   Recent Labs   Lab 06/08/23  0607   LIPASE 28         Instructions    Admit to Hospital Medicine  Consult General Surgery      MOMO Tinajero MD  Hospital Medicine Staff  Cell: [phone]

## 2023-06-09 ENCOUNTER — TELEPHONE (OUTPATIENT)
Dept: BARIATRICS | Facility: CLINIC | Age: 48
End: 2023-06-09
Payer: MEDICAID

## 2023-06-09 PROBLEM — E44.0 MODERATE MALNUTRITION: Status: ACTIVE | Noted: 2023-06-09

## 2023-06-09 PROBLEM — K56.7 ILEUS: Status: ACTIVE | Noted: 2023-06-09

## 2023-06-09 LAB
ALBUMIN SERPL BCP-MCNC: 3.1 G/DL (ref 3.5–5.2)
ALP SERPL-CCNC: 62 U/L (ref 55–135)
ALT SERPL W/O P-5'-P-CCNC: 20 U/L (ref 10–44)
ANION GAP SERPL CALC-SCNC: 11 MMOL/L (ref 8–16)
AST SERPL-CCNC: 14 U/L (ref 10–40)
BASOPHILS # BLD AUTO: 0.15 K/UL (ref 0–0.2)
BASOPHILS NFR BLD: 1 % (ref 0–1.9)
BILIRUB SERPL-MCNC: 0.6 MG/DL (ref 0.1–1)
BUN SERPL-MCNC: 10 MG/DL (ref 6–20)
CALCIUM SERPL-MCNC: 9.1 MG/DL (ref 8.7–10.5)
CHLORIDE SERPL-SCNC: 101 MMOL/L (ref 95–110)
CO2 SERPL-SCNC: 25 MMOL/L (ref 23–29)
CREAT SERPL-MCNC: 0.9 MG/DL (ref 0.5–1.4)
DIFFERENTIAL METHOD: ABNORMAL
EOSINOPHIL # BLD AUTO: 0.3 K/UL (ref 0–0.5)
EOSINOPHIL NFR BLD: 1.8 % (ref 0–8)
ERYTHROCYTE [DISTWIDTH] IN BLOOD BY AUTOMATED COUNT: 17.8 % (ref 11.5–14.5)
EST. GFR  (NO RACE VARIABLE): >60 ML/MIN/1.73 M^2
GLUCOSE SERPL-MCNC: 116 MG/DL (ref 70–110)
HCT VFR BLD AUTO: 26.7 % (ref 40–54)
HGB BLD-MCNC: 8.2 G/DL (ref 14–18)
IMM GRANULOCYTES # BLD AUTO: 0.06 K/UL (ref 0–0.04)
IMM GRANULOCYTES NFR BLD AUTO: 0.4 % (ref 0–0.5)
LYMPHOCYTES # BLD AUTO: 2.1 K/UL (ref 1–4.8)
LYMPHOCYTES NFR BLD: 14.1 % (ref 18–48)
MAGNESIUM SERPL-MCNC: 1.6 MG/DL (ref 1.6–2.6)
MCH RBC QN AUTO: 20.5 PG (ref 27–31)
MCHC RBC AUTO-ENTMCNC: 30.7 G/DL (ref 32–36)
MCV RBC AUTO: 67 FL (ref 82–98)
MONOCYTES # BLD AUTO: 0.6 K/UL (ref 0.3–1)
MONOCYTES NFR BLD: 4.2 % (ref 4–15)
NEUTROPHILS # BLD AUTO: 11.6 K/UL (ref 1.8–7.7)
NEUTROPHILS NFR BLD: 78.5 % (ref 38–73)
NRBC BLD-RTO: 0 /100 WBC
PLATELET # BLD AUTO: 649 K/UL (ref 150–450)
PMV BLD AUTO: 9.1 FL (ref 9.2–12.9)
POTASSIUM SERPL-SCNC: 3.8 MMOL/L (ref 3.5–5.1)
PROT SERPL-MCNC: 7 G/DL (ref 6–8.4)
RBC # BLD AUTO: 4 M/UL (ref 4.6–6.2)
SODIUM SERPL-SCNC: 137 MMOL/L (ref 136–145)
WBC # BLD AUTO: 14.8 K/UL (ref 3.9–12.7)

## 2023-06-09 PROCEDURE — 25000003 PHARM REV CODE 250

## 2023-06-09 PROCEDURE — 63600175 PHARM REV CODE 636 W HCPCS

## 2023-06-09 PROCEDURE — 85025 COMPLETE CBC W/AUTO DIFF WBC: CPT

## 2023-06-09 PROCEDURE — 12000002 HC ACUTE/MED SURGE SEMI-PRIVATE ROOM

## 2023-06-09 PROCEDURE — 36415 COLL VENOUS BLD VENIPUNCTURE: CPT

## 2023-06-09 PROCEDURE — S4991 NICOTINE PATCH NONLEGEND: HCPCS

## 2023-06-09 PROCEDURE — 63600175 PHARM REV CODE 636 W HCPCS: Performed by: NURSE PRACTITIONER

## 2023-06-09 PROCEDURE — 80053 COMPREHEN METABOLIC PANEL: CPT

## 2023-06-09 PROCEDURE — 83735 ASSAY OF MAGNESIUM: CPT

## 2023-06-09 RX ORDER — KETOROLAC TROMETHAMINE 30 MG/ML
15 INJECTION, SOLUTION INTRAMUSCULAR; INTRAVENOUS EVERY 6 HOURS
Status: DISCONTINUED | OUTPATIENT
Start: 2023-06-09 | End: 2023-06-10 | Stop reason: HOSPADM

## 2023-06-09 RX ORDER — ONDANSETRON 4 MG/1
4 TABLET, ORALLY DISINTEGRATING ORAL EVERY 6 HOURS
Status: DISCONTINUED | OUTPATIENT
Start: 2023-06-09 | End: 2023-06-09

## 2023-06-09 RX ORDER — ONDANSETRON 4 MG/1
4 TABLET, FILM COATED ORAL EVERY 6 HOURS
Qty: 16 TABLET | Refills: 0 | Status: ON HOLD | OUTPATIENT
Start: 2023-06-09 | End: 2023-06-24 | Stop reason: HOSPADM

## 2023-06-09 RX ORDER — HYDROMORPHONE HYDROCHLORIDE 1 MG/ML
1 INJECTION, SOLUTION INTRAMUSCULAR; INTRAVENOUS; SUBCUTANEOUS ONCE
Status: COMPLETED | OUTPATIENT
Start: 2023-06-09 | End: 2023-06-09

## 2023-06-09 RX ORDER — KETOROLAC TROMETHAMINE 30 MG/ML
15 INJECTION, SOLUTION INTRAMUSCULAR; INTRAVENOUS EVERY 6 HOURS
Status: DISCONTINUED | OUTPATIENT
Start: 2023-06-09 | End: 2023-06-09

## 2023-06-09 RX ORDER — ONDANSETRON 2 MG/ML
4 INJECTION INTRAMUSCULAR; INTRAVENOUS EVERY 6 HOURS
Status: DISCONTINUED | OUTPATIENT
Start: 2023-06-09 | End: 2023-06-10

## 2023-06-09 RX ORDER — HYDROCODONE BITARTRATE AND ACETAMINOPHEN 10; 325 MG/1; MG/1
1 TABLET ORAL EVERY 6 HOURS PRN
Qty: 16 TABLET | Refills: 0 | Status: ON HOLD | OUTPATIENT
Start: 2023-06-09 | End: 2023-06-24

## 2023-06-09 RX ADMIN — GABAPENTIN 300 MG: 300 CAPSULE ORAL at 08:06

## 2023-06-09 RX ADMIN — HYDROMORPHONE HYDROCHLORIDE 1 MG: 1 INJECTION, SOLUTION INTRAMUSCULAR; INTRAVENOUS; SUBCUTANEOUS at 11:06

## 2023-06-09 RX ADMIN — HYDROCODONE BITARTRATE AND ACETAMINOPHEN 1 TABLET: 10; 325 TABLET ORAL at 01:06

## 2023-06-09 RX ADMIN — HYDROCODONE BITARTRATE AND ACETAMINOPHEN 1 TABLET: 10; 325 TABLET ORAL at 07:06

## 2023-06-09 RX ADMIN — HYDROMORPHONE HYDROCHLORIDE 1 MG: 1 INJECTION, SOLUTION INTRAMUSCULAR; INTRAVENOUS; SUBCUTANEOUS at 12:06

## 2023-06-09 RX ADMIN — HYDROMORPHONE HYDROCHLORIDE 1 MG: 1 INJECTION, SOLUTION INTRAMUSCULAR; INTRAVENOUS; SUBCUTANEOUS at 10:06

## 2023-06-09 RX ADMIN — ONDANSETRON 4 MG: 2 INJECTION INTRAMUSCULAR; INTRAVENOUS at 06:06

## 2023-06-09 RX ADMIN — PIPERACILLIN AND TAZOBACTAM 4.5 G: 4; .5 INJECTION, POWDER, LYOPHILIZED, FOR SOLUTION INTRAVENOUS; PARENTERAL at 07:06

## 2023-06-09 RX ADMIN — KETOROLAC TROMETHAMINE 15 MG: 30 INJECTION, SOLUTION INTRAMUSCULAR; INTRAVENOUS at 02:06

## 2023-06-09 RX ADMIN — SODIUM CHLORIDE: 9 INJECTION, SOLUTION INTRAVENOUS at 04:06

## 2023-06-09 RX ADMIN — ONDANSETRON 4 MG: 2 INJECTION INTRAMUSCULAR; INTRAVENOUS at 01:06

## 2023-06-09 RX ADMIN — HYDROCODONE BITARTRATE AND ACETAMINOPHEN 1 TABLET: 10; 325 TABLET ORAL at 09:06

## 2023-06-09 RX ADMIN — HYDROMORPHONE HYDROCHLORIDE 1 MG: 1 INJECTION, SOLUTION INTRAMUSCULAR; INTRAVENOUS; SUBCUTANEOUS at 05:06

## 2023-06-09 RX ADMIN — NICOTINE 1 PATCH: 14 PATCH TRANSDERMAL at 08:06

## 2023-06-09 RX ADMIN — PROCHLORPERAZINE EDISYLATE 5 MG: 5 INJECTION INTRAMUSCULAR; INTRAVENOUS at 07:06

## 2023-06-09 RX ADMIN — HYDROMORPHONE HYDROCHLORIDE 1 MG: 1 INJECTION, SOLUTION INTRAMUSCULAR; INTRAVENOUS; SUBCUTANEOUS at 07:06

## 2023-06-09 RX ADMIN — HYDROMORPHONE HYDROCHLORIDE 1 MG: 1 INJECTION, SOLUTION INTRAMUSCULAR; INTRAVENOUS; SUBCUTANEOUS at 02:06

## 2023-06-09 RX ADMIN — HYDROMORPHONE HYDROCHLORIDE 1 MG: 1 INJECTION, SOLUTION INTRAMUSCULAR; INTRAVENOUS; SUBCUTANEOUS at 01:06

## 2023-06-09 NOTE — PLAN OF CARE
Recommendations   1) Advance PO diet to regular as pt tolerates  2) Weigh weekly   3) If advanced to full liquids and slow to advance add Boost plus BID  4) If ileus persits and unable to advance to full liquids in < 5-6 days consider PPN D 5 AA 4.25 @ 90 ml/hr + standard lipids   ( 91 g protein, 1234 kcal)    Goals: 1) PO Intakes > 50% of meals on full liquid diet at f/u  Nutrition Goal Status: new  Communication of RD Recs:  (POC, sticky note)

## 2023-06-09 NOTE — CONSULTS
Consult received  Patient sent from [place] for sbo and abdominal pain recent post op right colectomy for cancer  He has been struggling with pain since surgery, no sign of leak or abscess on CT, vitals stable.  Will plan to see tomorrow    Pain control for now

## 2023-06-09 NOTE — ASSESSMENT & PLAN NOTE
Patient's anemia is currently controlled. Has not received any PRBCs to date.. Etiology likely d/t etiology unknown   Current CBC reviewed-   Lab Results   Component Value Date    HGB 8.2 (L) 06/09/2023    HCT 26.7 (L) 06/09/2023     Monitor serial CBC and transfuse if patient becomes hemodynamically unstable, symptomatic or H/H drops below 7/21.

## 2023-06-09 NOTE — SUBJECTIVE & OBJECTIVE
Current Facility-Administered Medications on File Prior to Encounter   Medication    [COMPLETED] 0.9%  NaCl infusion    [COMPLETED] HYDROmorphone injection 1 mg    [COMPLETED] sodium chloride 0.9% bolus 1,000 mL 1,000 mL    [DISCONTINUED] piperacillin-tazobactam (ZOSYN) 3.375 g in dextrose 5 % in water (D5W) 5 % 100 mL IVPB (MB+)     Current Outpatient Medications on File Prior to Encounter   Medication Sig    gabapentin (NEURONTIN) 300 MG capsule Take 1 capsule (300 mg total) by mouth 2 (two) times daily. for 14 days    HYDROcodone-acetaminophen (NORCO) 7.5-325 mg per tablet Take 2 tablets by mouth every 6 (six) hours as needed for Pain.    oxyCODONE-acetaminophen (PERCOCET) 5-325 mg per tablet Take 1 tablet by mouth every 6 (six) hours as needed for Pain.       Review of patient's allergies indicates:  No Known Allergies    Past Medical History:   Diagnosis Date    Diabetes mellitus, type 2 05/2020    Patient denies    Hypertension      Past Surgical History:   Procedure Laterality Date    COLONOSCOPY N/A 5/29/2023    Procedure: COLONOSCOPY;  Surgeon: Sam Solano MD;  Location: [facility] ENDO;  Service: Endoscopy;  Laterality: N/A;    EYE SURGERY Right     LUMBAR DISC SURGERY  2005    SUBTOTAL COLECTOMY Right 5/29/2023    Procedure: COLECTOMY, PARTIAL;  Surgeon: Edin Michael MD;  Location: [facility] OR;  Service: General;  Laterality: Right;     Family History       Problem Relation (Age of Onset)    Diabetes Mother, Maternal Grandfather    No Known Problems Father          Tobacco Use    Smoking status: Every Day     Packs/day: 0.50     Years: 23.00     Pack years: 11.50     Types: Cigarettes    Smokeless tobacco: Never   Substance and Sexual Activity    Alcohol use: Not Currently     Comment: Quit drinking 2 yrs ago    Drug use: Yes     Types: Marijuana     Comment: heavy marijuana use    Sexual activity: Yes     Review of Systems   Constitutional:  Negative for fatigue and fever.   Respiratory:  Negative for  shortness of breath.    Cardiovascular:  Negative for chest pain and palpitations.   Gastrointestinal:  Positive for abdominal pain, nausea and vomiting.   Genitourinary:  Negative for difficulty urinating, dysuria and frequency.   Neurological:  Negative for dizziness and weakness.   Psychiatric/Behavioral:  Negative for behavioral problems and confusion.    All other systems reviewed and are negative.  Objective:     Vital Signs (Most Recent):  Temp: 98.2 °F (36.8 °C) (06/08/23 2327)  Pulse: 77 (06/08/23 2327)  Resp: (!) 6 (06/09/23 0749)  BP: 130/74 (06/08/23 2327)  SpO2: 97 % (06/08/23 2327) Vital Signs (24h Range):  Temp:  [98 °F (36.7 °C)-98.3 °F (36.8 °C)] 98.2 °F (36.8 °C)  Pulse:  [73-90] 77  Resp:  [6-20] 6  SpO2:  [97 %-99 %] 97 %  BP: (113-156)/(64-83) 130/74     Weight: 102 kg (224 lb 13.9 oz)  Body mass index is 28.11 kg/m².     Physical Exam  Vitals and nursing note reviewed.   Constitutional:       General: He is not in acute distress.  HENT:      Head: Normocephalic and atraumatic.   Cardiovascular:      Rate and Rhythm: Normal rate and regular rhythm.      Pulses: Normal pulses.      Heart sounds: Normal heart sounds. No murmur heard.    No gallop.   Pulmonary:      Effort: Pulmonary effort is normal. No respiratory distress.      Breath sounds: No wheezing.   Abdominal:      General: Abdomen is flat. There is no distension.      Palpations: Abdomen is soft.      Tenderness: There is no abdominal tenderness. There is no guarding.      Hernia: No hernia is present.      Comments: Wound healing   Musculoskeletal:         General: No swelling or tenderness. Normal range of motion.   Skin:     General: Skin is warm.      Coloration: Skin is not jaundiced.      Findings: No bruising.   Neurological:      Mental Status: He is alert.          I have reviewed all pertinent lab results within the past 24 hours.  CBC:   Recent Labs   Lab 06/09/23  0306   WBC 14.80*   RBC 4.00*   HGB 8.2*   HCT 26.7*   PLT  649*   MCV 67*   MCH 20.5*   MCHC 30.7*     BMP:   Recent Labs   Lab 06/09/23  0306   *      K 3.8      CO2 25   BUN 10   CREATININE 0.9   CALCIUM 9.1   MG 1.6       Significant Diagnostics:  I have reviewed all pertinent imaging results/findings within the past 24 hours.    CT with some fluid filled bowel and stomach, some inflammation in previous surgical bed, no obvious free air or abscess

## 2023-06-09 NOTE — ASSESSMENT & PLAN NOTE
Seems to have resolved already as no clinical signs of ileus today  Diet as tolerated  If able to tolerate diet can be dc'd

## 2023-06-09 NOTE — ASSESSMENT & PLAN NOTE
Chronic, controlled.  Latest blood pressure and vitals reviewed-   Temp:  [98.2 °F (36.8 °C)-98.3 °F (36.8 °C)]   Pulse:  [76-88]   Resp:  [6-20]   BP: (129-156)/(69-83)   SpO2:  [97 %-99 %] .   Home meds for hypertension were reviewed and noted below.       While in the hospital, will manage blood pressure as follows; Continue home antihypertensive regimen    Will utilize p.r.n. blood pressure medication only if patient's blood pressure greater than  180/110 and he develops symptoms such as worsening chest pain or shortness of breath.

## 2023-06-09 NOTE — HPI
48 y/o with recent right colectomy for cancer.  Re admitted for pain and vomiting. Having flatus currently at my interview.  No ngt.  No more n/v

## 2023-06-09 NOTE — ASSESSMENT & PLAN NOTE
Acute  S/P right colectomy on 5/29 w/ Dr. Michael  General surgery consulted  Clear liquids as toleraetd  Prn pain medications  Prn antiemetics  CT abdomen showing fat attenuation mass suspicious for fat necrosis and fluid distended, nondilated loops of small bowel

## 2023-06-09 NOTE — TELEPHONE ENCOUNTER
Called pt to schedule post-hospital appointment, his sister answered and reports that the pt is not being discharged today as planned. Scheduled an appointment for next week and will reschedule as needed based on discharge date. Pt's sister agreed to this plan.     ----- Message from Amira Uriarte sent at 6/9/2023  9:58 AM CDT -----  Contact: [place] w. [facility]  Type:  Sooner Appointment Request    Caller is requesting a sooner appointment.  Caller declined first available appointment listed below.  Caller will not accept being placed on the waitlist and is requesting a message be sent to doctor.    Name of Caller:  Ashanti w. [facility]  When is the first available appointment?  07/13  Symptoms:  Needs 2 wk Hosp F/U per Dr Alec Tristan Call Back Number:  [phone]  Additional Information:  Thank You

## 2023-06-09 NOTE — ASSESSMENT & PLAN NOTE
Post operative resection  - suspect he is struggling with pain as he has been having this since surgery

## 2023-06-09 NOTE — PLAN OF CARE
Patient has reported pain at beginning of shift to which he was medicated and he continued to have breakthrough pain Compazine given as ordered for nausea and vomiting, 1 episode of vomiting of clear fluid.Compazine was effective with no further c/o nausea and no further vomiting. Pain rated pain 10/10, notified Cristian Gan NP who ordered a 1x dose of Dilaudid, administered to patient and he had good relief, patient resting comfortably upon rounds will continue to monitor.      Problem: Diabetes Comorbidity  Goal: Blood Glucose Level Within Targeted Range  Outcome: Ongoing, Progressing     Problem: Adult Inpatient Plan of Care  Goal: Plan of Care Review  Outcome: Ongoing, Progressing  Goal: Patient-Specific Goal (Individualized)  Outcome: Ongoing, Progressing  Goal: Absence of Hospital-Acquired Illness or Injury  Outcome: Ongoing, Progressing  Goal: Optimal Comfort and Wellbeing  Outcome: Ongoing, Progressing  Goal: Readiness for Transition of Care  Outcome: Ongoing, Progressing     Problem: Fall Injury Risk  Goal: Absence of Fall and Fall-Related Injury  Outcome: Ongoing, Progressing

## 2023-06-09 NOTE — PLAN OF CARE
Plan of care reviewed with patient. Patient verbalized complete understanding.  Patient unable to tolerate PO diet. IVF infusing as ordered. PRN pain and nausea medicine administered. All fall precautions maintained. Bed in lowest position, locked, call light within reach. Side rails up x's 2. Slip resistant socks maintained.

## 2023-06-09 NOTE — PLAN OF CARE
Patient cleared for discharge from case management standpoint.    Dr. Michael office to contact patient to schedule 2 weeks hospital follow up.       06/09/23 0959   Final Note   Assessment Type Final Discharge Note   Anticipated Discharge Disposition Home   What phone number can be called within the next 1-3 days to see how you are doing after discharge? [phone]   Hospital Resources/Appts/Education Provided Appointments scheduled and added to AVS;Community resources provided;Provided patient/caregiver with written discharge plan information

## 2023-06-09 NOTE — ASSESSMENT & PLAN NOTE
Malnutrition Type:  Context: social/environmental circumstances  Level: moderate    Related to (etiology):   Pain, nausea, altered GI function    Signs and Symptoms (as evidenced by):     Malnutrition Characteristic Summary:  Weight Loss (Malnutrition): greater than 7.5% in 3 months  Energy Intake (Malnutrition): less than 75% for greater than or equal to 3 months      Interventions/Recommendations (treatment strategy):  Collaboration with other providers, clear liquid diet  1) Advance PO diet as pt tolerates to goal of DM 2000 kcal diet 2) Weigh weekly 3) If advanced to full liquids and slow to advance add Boost glucose control BID 4) If ileus persits and unable to advance to full liquids in < 5-6 days consider PPN D 5 AA 4.25 @ 90 ml/hr + standard lipids ( 91 g protein, 1234 kcal)    Nutrition Diagnosis Status:   New

## 2023-06-09 NOTE — ASSESSMENT & PLAN NOTE
Patient's FSGs are controlled on current medication regimen.  Last A1c reviewed-   Lab Results   Component Value Date    HGBA1C 5.0 06/08/2023     Most recent fingerstick glucose reviewed- No results for input(s): POCTGLUCOSE in the last 24 hours.  Current correctional scale  Low  Maintain anti-hyperglycemic dose as follows-   Antihyperglycemics (From admission, onward)    Start     Stop Route Frequency Ordered    06/08/23 1438  insulin aspart U-100 pen 0-5 Units         -- SubQ Before meals & nightly PRN 06/08/23 1339        Hold Oral hypoglycemics while patient is in the hospital.

## 2023-06-09 NOTE — PROGRESS NOTES
[facility]  Adult Nutrition  Progress Note    SUMMARY       Recommendations   1) Advance PO diet to regular as pt tolerates  2) Weigh weekly   3) If advanced to full liquids and slow to advance add Boost plus BID  4) If ileus persits and unable to advance to full liquids in < 5-6 days consider PPN D 5 AA 4.25 @ 90 ml/hr + standard lipids   ( 91 g protein, 1234 kcal)    Goals: 1) PO Intakes > 50% of meals on full liquid diet at f/u  Nutrition Goal Status: new  Communication of RD Recs:  (POC, sticky note)    Assessment and Plan      Moderate malnutrition  Malnutrition Type:  Context: social/environmental circumstances  Level: moderate    Related to (etiology):   Pain, nausea, altered GI function    Signs and Symptoms (as evidenced by):     Malnutrition Characteristic Summary:  Weight Loss (Malnutrition): greater than 7.5% in 3 months  Energy Intake (Malnutrition): less than 75% for greater than or equal to 3 months      Interventions/Recommendations (treatment strategy):  Collaboration with other providers, clear liquid diet  1) Advance PO diet as pt tolerates to goal of DM 2000 kcal diet 2) Weigh weekly 3) If advanced to full liquids and slow to advance add Boost glucose control BID 4) If ileus persits and unable to advance to full liquids in < 5-6 days consider PPN D 5 AA 4.25 @ 90 ml/hr + standard lipids ( 91 g protein, 1234 kcal)    Nutrition Diagnosis Status:   New         Malnutrition Assessment  Malnutrition Context: social/environmental circumstances  Malnutrition Level: moderate  Skin (Micronutrient):  (Mitul = 21)  Teeth (Micronutrient): none   Micronutrient Evaluation Summary: no deficiencies   Weight Loss (Malnutrition): greater than 7.5% in 3 months ( per chart review in < 1 month)  Energy Intake (Malnutrition): less than 75% for greater than or equal to 3 months      Reason for Assessment    Reason For Assessment: identified at risk by screening criteria  Diagnosis:   "(ileus)  Relevant Medical History: CA s/p R. colectomy, HTN, DM2- pt denies ( A1C 5), R. eye blindness  Interdisciplinary Rounds: attended    General Information Comments: 48 y/o male admitted with ileus s/p pain and N/V. Per MD resolving and diet advanced to clear liquids, pt tolerated water and Sprite this morning. PTA pt says that he has had decreased appetite r/t pain and nausea for the past couple months along with ~41 lb wt loss. NFPE WDL 23. Significant wt loss per chart review.    Nutrition Discharge Planning: To be determined- DM 1800 kcal, low sodium diet    Nutrition Risk Screen    Nutrition Risk Screen: reduced oral intake over the last month, unintentional loss of 10 lbs or more in the past 2 months    Nutrition/Diet History    Patient Reported Diet/Restrictions/Preferences: general  Spiritual, Cultural Beliefs, Restorationist Practices, Values that Affect Care: no  Food Allergies: NKFA  Factors Affecting Nutritional Intake: altered gastrointestinal function, clear liquid diet    Anthropometrics    Temp: 98.2 °F (36.8 °C)  Height Method: Stated  Height: 6' 3"  Height (inches): 75 in  Weight Method: Bed Scale  Weight: 102 kg (224 lb 13.9 oz)  Weight (lb): 224.87 lb  Ideal Body Weight (IBW), Male: 196 lb  % Ideal Body Weight, Male (lb): 114.73 %  BMI (Calculated): 28.1  BMI Grade: 25 - 29.9 - overweight  Weight Loss: unintentional  Usual Body Weight (UBW), k.4 kg (~ 3 months ago per pt)  % Usual Body Weight: 84.89  % Weight Change From Usual Weight: -15.28 %       Lab/Procedures/Meds    Pertinent Labs Reviewed: reviewed  BMP  Lab Results   Component Value Date     2023    K 3.8 2023     2023    CO2 25 2023    BUN 10 2023    CREATININE 0.9 2023    CALCIUM 9.1 2023    ANIONGAP 11 2023    EGFRNORACEVR >60 2023     Lab Results   Component Value Date    ALBUMIN 3.1 (L) 2023     No results for input(s): POCTGLUCOSE in the last 24 " hours.  Lab Results   Component Value Date    HGBA1C 5.0 06/08/2023       Pertinent Medications Reviewed: reviewed  Pertinent Medications Comments: NS @ 125 ml/hr, insulin, Kbicarb, prochlorperazine    Estimated/Assessed Needs    Weight Used For Calorie Calculations: 102 kg (224 lb 13.9 oz)  Energy Calorie Requirements (kcal): MSj ( x 1.2) = 2390 kcal  Energy Need Method: Lugoff-St Jeor  Protein Requirements: 0.8-1 g protein/kg (  g)  Weight Used For Protein Calculations: 102 kg (224 lb 13.9 oz)  Fluid Requirements (mL): 2400 ml or per MD  Estimated Fluid Requirement Method: RDA Method  CHO Requirement: 268-328 g      Nutrition Prescription Ordered    Current Diet Order: Clear liquids    Evaluation of Received Nutrient/Fluid Intake    Energy Calories Required: not meeting needs  Protein Required: not meeting needs  Fluid Required: not meeting needs  Tolerance: tolerating     Intake/Output Summary (Last 24 hours) at 6/9/2023 1351  Last data filed at 6/8/2023 1815  Gross per 24 hour   Intake 0 ml   Output --   Net 0 ml   per pt he has been drinking    % Intake of Estimated Energy Needs: 10-15%  % Meal Intake: 25-50% clears    Nutrition Risk    Level of Risk/Frequency of Follow-up:  (2 x weekly)     Monitor and Evaluation    Food and Nutrient Intake: energy intake  Food and Nutrient Adminstration: diet order, enteral and parenteral nutrition administration  Anthropometric Measurements: weight  Biochemical Data, Medical Tests and Procedures: electrolyte and renal panel, gastrointestinal profile, glucose/endocrine profile  Nutrition-Focused Physical Findings: overall appearance     Nutrition Follow-Up    RD Follow-up?: Yes

## 2023-06-09 NOTE — PROGRESS NOTES
[facility] Medicine  Progress Note    Patient Name: Ruslan Caldwell  MRN: [id number]  Patient Class: IP- Inpatient   Admission Date: 6/8/2023  Length of Stay: 0 days  Attending Physician: Silke Lundberg MD  Primary Care Provider: Amira Knutson NP        Subjective:     Principal Problem:Ileus        HPI:  Ruslan Caldwell is a 47 year old black male with a PMHx significant for hypertension, right eye blindness, diabetes and adenocarcinoma S/P right colectomy on 5/29 presenting from [facility] ED for severe abdominal pain with associated N/V. He reports experiencing abdominal pain since his colectomy. He states he was only sent home with 12 pills of pain medication. He states last night his pain was excruciating prompting visit to ED. Wife at bedside states she was attempting to manage his pain with OTC tylenol. He states he had multiple episodes of N/V since last night. He reports his last bowel movement was last night. He denies any SOB, constipation, diarrhea, fever/chills, or urinary symptoms. He was started on prn pain medication and IV zosyn at [facility]. Ct abdomen demonstrated fat attenuation mass suspicious for fat necrosis and fluid distended, nondilated loops of small bowel. General surgery consulted on admission and patient held NPO. Patient to be monitored by hospital medicine services for further treatment.             Overview/Hospital Course:  No notes on file    Interval History: Notes reviewed, no acute events overnight. Patient still with abdominal pain today. Experiencing N/V with liquid diet this morning. Scheduled his zofran. General surgery following. Last BM was yesterday. Remain on clear liquids for now. Continue heating pad as patient getting some abdominal relief.       Objective:     Vital Signs (Most Recent):  Temp: 98.2 °F (36.8 °C) (06/09/23 1205)  Pulse: 76 (06/09/23 1205)  Resp: 16 (06/09/23 1205)  BP: 129/69 (06/09/23 1205)  SpO2: 97 % (06/09/23  1205) Vital Signs (24h Range):  Temp:  [98.2 °F (36.8 °C)-98.3 °F (36.8 °C)] 98.2 °F (36.8 °C)  Pulse:  [76-88] 76  Resp:  [6-20] 16  SpO2:  [97 %-99 %] 97 %  BP: (129-156)/(69-83) 129/69     Weight: 102 kg (224 lb 13.9 oz)  Body mass index is 28.11 kg/m².    Intake/Output Summary (Last 24 hours) at 6/9/2023 1314  Last data filed at 6/8/2023 1815  Gross per 24 hour   Intake 0 ml   Output --   Net 0 ml         Physical Exam  Vitals and nursing note reviewed.   Constitutional:       General: He is not in acute distress.  HENT:      Head: Normocephalic and atraumatic.   Eyes:      Comments: Blind in right eye   Cardiovascular:      Rate and Rhythm: Normal rate and regular rhythm.      Pulses: Normal pulses.      Heart sounds: No murmur heard.    No gallop.   Pulmonary:      Effort: Pulmonary effort is normal. No respiratory distress.      Breath sounds: No wheezing.   Abdominal:      General: Abdomen is flat. There is no distension.      Palpations: Abdomen is soft.      Tenderness: There is abdominal tenderness.      Comments: Surgical midline incision dressing CDI    Musculoskeletal:         General: No swelling or tenderness. Normal range of motion.   Neurological:      General: No focal deficit present.      Mental Status: He is alert. Mental status is at baseline.      Cranial Nerves: No cranial nerve deficit.   Psychiatric:         Mood and Affect: Mood normal.           Significant Labs: All pertinent labs within the past 24 hours have been reviewed.  CBC:   Recent Labs   Lab 06/08/23  0607 06/09/23  0306   WBC 14.45* 14.80*   HGB 9.5* 8.2*   HCT 30.6* 26.7*   * 649*     CMP:   Recent Labs   Lab 06/08/23  0607 06/09/23  0306    137   K 3.7 3.8   CL 97 101   CO2 27 25   * 116*   BUN 13 10   CREATININE 1.0 0.9   CALCIUM 10.5 9.1   PROT 8.1 7.0   ALBUMIN 3.6 3.1*   BILITOT 0.6 0.6   ALKPHOS 68 62   AST 23 14   ALT 35 20   ANIONGAP 14 11       Significant Imaging: I have reviewed all pertinent  imaging results/findings within the past 24 hours.      Assessment/Plan:      * Ileus  See above      Gastroenteritis  Acute  S/P right colectomy on 5/29 w/ Dr. Michael  General surgery consulted  Clear liquids as toleraetd  Prn pain medications  Prn antiemetics  CT abdomen showing fat attenuation mass suspicious for fat necrosis and fluid distended, nondilated loops of small bowel    Thrombocytosis  Acute  Likely reactive to surgery  Trending downwards      Colonic mass  S/P colectomy  General surgery following       Microcytic anemia  Patient's anemia is currently controlled. Has not received any PRBCs to date.. Etiology likely d/t etiology unknown   Current CBC reviewed-   Lab Results   Component Value Date    HGB 8.2 (L) 06/09/2023    HCT 26.7 (L) 06/09/2023     Monitor serial CBC and transfuse if patient becomes hemodynamically unstable, symptomatic or H/H drops below 7/21.         Essential hypertension  Chronic, controlled.  Latest blood pressure and vitals reviewed-   Temp:  [98.2 °F (36.8 °C)-98.3 °F (36.8 °C)]   Pulse:  [76-88]   Resp:  [6-20]   BP: (129-156)/(69-83)   SpO2:  [97 %-99 %] .   Home meds for hypertension were reviewed and noted below.       While in the hospital, will manage blood pressure as follows; Continue home antihypertensive regimen    Will utilize p.r.n. blood pressure medication only if patient's blood pressure greater than  180/110 and he develops symptoms such as worsening chest pain or shortness of breath.        Type 2 diabetes mellitus without complication, without long-term current use of insulin  Patient's FSGs are controlled on current medication regimen.  Last A1c reviewed-   Lab Results   Component Value Date    HGBA1C 5.0 06/08/2023     Most recent fingerstick glucose reviewed- No results for input(s): POCTGLUCOSE in the last 24 hours.  Current correctional scale  Low  Maintain anti-hyperglycemic dose as follows-   Antihyperglycemics (From admission, onward)    Start      Stop Route Frequency Ordered    06/08/23 1438  insulin aspart U-100 pen 0-5 Units         -- SubQ Before meals & nightly PRN 06/08/23 1339        Hold Oral hypoglycemics while patient is in the hospital.      VTE Risk Mitigation (From admission, onward)         Ordered     IP VTE HIGH RISK PATIENT  Once         06/08/23 1255     Place sequential compression device  Until discontinued         06/08/23 1255                Discharge Planning   GABRIEL: 6/9/2023     Code Status: Full Code   Is the patient medically ready for discharge?:     Reason for patient still in hospital (select all that apply): Patient trending condition and Treatment  Discharge Plan A: Home                  Chris Pollard PA-C  Department of Hospital Medicine   [facility]

## 2023-06-09 NOTE — PLAN OF CARE
[facility]  Initial Discharge Assessment       Primary Care Provider: Amira Knutson NP    Admission Diagnosis: Abdominal pain [R10.9]    Admission Date: 6/8/2023  Expected Discharge Date: 6/9/2023    Transition of Care Barriers: None    Payor: /     Extended Emergency Contact Information  Primary Emergency Contact: PauletteJan  Mobile Phone: [phone]  Relation: Sister  Preferred language: English   needed? No  Secondary Emergency Contact: Lita Bhardwaj  Mobile Phone: [phone]  Relation: Significant other  Preferred language: English   needed? No    Discharge Plan A: Home  Discharge Plan B: Home with family      [facility] [place] [facility] [address]  [address]  [place] [address]  Phone: [phone] Fax: [phone]    SW met with patient, patient's sister Jan Caldwell and patient's significant other Ayo Bhardwaj at bedside to complete discharge planning assessment.  Patient alert and oriented xs 4.  Patient verified all demographic information on facesheet is correct.  Patient verified PCP is RICHA Knutson.  Patient verified NO primary health insurance.  SW gave patient's sister phone number to [facility].  Patient with NO home health or DME.  Patient with NO POA or Living Will.  Patient not on dialysis or medication coumadin.  Patient with no 30 day admission.  Patient with no financial issues at this time.  Patient family will provide transportation upon discharge from facility.  Patient independent with ADLs, live with sister, drives self.        Initial Assessment (most recent)       Adult Discharge Assessment - 06/08/23 1600          Discharge Assessment    Assessment Type Discharge Planning Assessment     Confirmed/corrected address, phone number and insurance Yes     Confirmed Demographics Correct on Facesheet     Source of Information patient;family     Does patient/caregiver understand observation status Yes      Communicated GABRIEL with patient/caregiver Yes     People in Home sibling(s)     Facility Arrived From: home     Do you expect to return to your current living situation? No     Do you have help at home or someone to help you manage your care at home? Yes     Who are your caregiver(s) and their phone number(s)? self     Prior to hospitilization cognitive status: Alert/Oriented     Current cognitive status: Alert/Oriented     Readmission within 30 days? No     Patient currently being followed by outpatient case management? No     Do you currently have service(s) that help you manage your care at home? No     Do you take prescription medications? Yes     Do you have prescription coverage? Yes     Do you have any problems affording any of your prescribed medications? No     Is the patient taking medications as prescribed? yes     Who is going to help you get home at discharge? family     How do you get to doctors appointments? car, drives self     Are you on dialysis? No     Do you take coumadin? No     Discharge Plan A Home     Discharge Plan B Home with family     DME Needed Upon Discharge  none     Discharge Plan discussed with: Patient;Spouse/sig other;Sibling     Transition of Care Barriers None

## 2023-06-09 NOTE — CONSULTS
[facility]  General Surgery  Consult Note    Patient Name: Ruslan Caldwell  MRN: [id number]  Code Status: Full Code  Admission Date: 6/8/2023  Hospital Length of Stay: 0 days  Attending Physician: Silke Lundberg MD  Primary Care Provider: Amira Knutson NP    Patient information was obtained from patient and ER records.     Consults  Subjective:     Principal Problem: Ileus    History of Present Illness: 48 y/o with recent right colectomy for cancer.  Re admitted for pain and vomiting. Having flatus currently at my interview.  No ngt.  No more n/v      Current Facility-Administered Medications on File Prior to Encounter   Medication    [COMPLETED] 0.9%  NaCl infusion    [COMPLETED] HYDROmorphone injection 1 mg    [COMPLETED] sodium chloride 0.9% bolus 1,000 mL 1,000 mL    [DISCONTINUED] piperacillin-tazobactam (ZOSYN) 3.375 g in dextrose 5 % in water (D5W) 5 % 100 mL IVPB (MB+)     Current Outpatient Medications on File Prior to Encounter   Medication Sig    gabapentin (NEURONTIN) 300 MG capsule Take 1 capsule (300 mg total) by mouth 2 (two) times daily. for 14 days    HYDROcodone-acetaminophen (NORCO) 7.5-325 mg per tablet Take 2 tablets by mouth every 6 (six) hours as needed for Pain.    oxyCODONE-acetaminophen (PERCOCET) 5-325 mg per tablet Take 1 tablet by mouth every 6 (six) hours as needed for Pain.       Review of patient's allergies indicates:  No Known Allergies    Past Medical History:   Diagnosis Date    Diabetes mellitus, type 2 05/2020    Patient denies    Hypertension      Past Surgical History:   Procedure Laterality Date    COLONOSCOPY N/A 5/29/2023    Procedure: COLONOSCOPY;  Surgeon: Sam Solano MD;  Location: [facility];  Service: Endoscopy;  Laterality: N/A;    EYE SURGERY Right     LUMBAR DISC SURGERY  2005    SUBTOTAL COLECTOMY Right 5/29/2023    Procedure: COLECTOMY, PARTIAL;  Surgeon: Edin Michael MD;  Location: [facility] OR;  Service:  General;  Laterality: Right;     Family History       Problem Relation (Age of Onset)    Diabetes Mother, Maternal Grandfather    No Known Problems Father          Tobacco Use    Smoking status: Every Day     Packs/day: 0.50     Years: 23.00     Pack years: 11.50     Types: Cigarettes    Smokeless tobacco: Never   Substance and Sexual Activity    Alcohol use: Not Currently     Comment: Quit drinking 2 yrs ago    Drug use: Yes     Types: Marijuana     Comment: heavy marijuana use    Sexual activity: Yes     Review of Systems   Constitutional:  Negative for fatigue and fever.   Respiratory:  Negative for shortness of breath.    Cardiovascular:  Negative for chest pain and palpitations.   Gastrointestinal:  Positive for abdominal pain, nausea and vomiting.   Genitourinary:  Negative for difficulty urinating, dysuria and frequency.   Neurological:  Negative for dizziness and weakness.   Psychiatric/Behavioral:  Negative for behavioral problems and confusion.    All other systems reviewed and are negative.  Objective:     Vital Signs (Most Recent):  Temp: 98.2 °F (36.8 °C) (06/08/23 2327)  Pulse: 77 (06/08/23 2327)  Resp: (!) 6 (06/09/23 0749)  BP: 130/74 (06/08/23 2327)  SpO2: 97 % (06/08/23 2327) Vital Signs (24h Range):  Temp:  [98 °F (36.7 °C)-98.3 °F (36.8 °C)] 98.2 °F (36.8 °C)  Pulse:  [73-90] 77  Resp:  [6-20] 6  SpO2:  [97 %-99 %] 97 %  BP: (113-156)/(64-83) 130/74     Weight: 102 kg (224 lb 13.9 oz)  Body mass index is 28.11 kg/m².     Physical Exam  Vitals and nursing note reviewed.   Constitutional:       General: He is not in acute distress.  HENT:      Head: Normocephalic and atraumatic.   Cardiovascular:      Rate and Rhythm: Normal rate and regular rhythm.      Pulses: Normal pulses.      Heart sounds: Normal heart sounds. No murmur heard.    No gallop.   Pulmonary:      Effort: Pulmonary effort is normal. No respiratory distress.      Breath sounds: No wheezing.   Abdominal:      General: Abdomen is  flat. There is no distension.      Palpations: Abdomen is soft.      Tenderness: There is no abdominal tenderness. There is no guarding.      Hernia: No hernia is present.      Comments: Wound healing   Musculoskeletal:         General: No swelling or tenderness. Normal range of motion.   Skin:     General: Skin is warm.      Coloration: Skin is not jaundiced.      Findings: No bruising.   Neurological:      Mental Status: He is alert.          I have reviewed all pertinent lab results within the past 24 hours.  CBC:   Recent Labs   Lab 06/09/23  0306   WBC 14.80*   RBC 4.00*   HGB 8.2*   HCT 26.7*   *   MCV 67*   MCH 20.5*   MCHC 30.7*     BMP:   Recent Labs   Lab 06/09/23  0306   *      K 3.8      CO2 25   BUN 10   CREATININE 0.9   CALCIUM 9.1   MG 1.6       Significant Diagnostics:  I have reviewed all pertinent imaging results/findings within the past 24 hours.    CT with some fluid filled bowel and stomach, some inflammation in previous surgical bed, no obvious free air or abscess      Assessment/Plan:     * Ileus  Seems to have resolved already as no clinical signs of ileus today  Diet as tolerated  If able to tolerate diet can be dc'd     Colonic mass  Post operative resection  - suspect he is struggling with pain as he has been having this since surgery          VTE Risk Mitigation (From admission, onward)         Ordered     IP VTE HIGH RISK PATIENT  Once         06/08/23 1255     Place sequential compression device  Until discontinued         06/08/23 1255                Thank you for your consult. I will follow-up with patient. Please contact us if you have any additional questions.    Edin Michael MD  General Surgery  [facility]

## 2023-06-09 NOTE — SUBJECTIVE & OBJECTIVE
Interval History: Notes reviewed, no acute events overnight. Patient still with abdominal pain today. Experiencing N/V with liquid diet this morning. Scheduled his zofran. General surgery following. Last BM was yesterday. Remain on clear liquids for now. Continue heating pad as patient getting some abdominal relief.       Objective:     Vital Signs (Most Recent):  Temp: 98.2 °F (36.8 °C) (06/09/23 1205)  Pulse: 76 (06/09/23 1205)  Resp: 16 (06/09/23 1205)  BP: 129/69 (06/09/23 1205)  SpO2: 97 % (06/09/23 1205) Vital Signs (24h Range):  Temp:  [98.2 °F (36.8 °C)-98.3 °F (36.8 °C)] 98.2 °F (36.8 °C)  Pulse:  [76-88] 76  Resp:  [6-20] 16  SpO2:  [97 %-99 %] 97 %  BP: (129-156)/(69-83) 129/69     Weight: 102 kg (224 lb 13.9 oz)  Body mass index is 28.11 kg/m².    Intake/Output Summary (Last 24 hours) at 6/9/2023 1314  Last data filed at 6/8/2023 1815  Gross per 24 hour   Intake 0 ml   Output --   Net 0 ml         Physical Exam  Vitals and nursing note reviewed.   Constitutional:       General: He is not in acute distress.  HENT:      Head: Normocephalic and atraumatic.   Eyes:      Comments: Blind in right eye   Cardiovascular:      Rate and Rhythm: Normal rate and regular rhythm.      Pulses: Normal pulses.      Heart sounds: No murmur heard.    No gallop.   Pulmonary:      Effort: Pulmonary effort is normal. No respiratory distress.      Breath sounds: No wheezing.   Abdominal:      General: Abdomen is flat. There is no distension.      Palpations: Abdomen is soft.      Tenderness: There is abdominal tenderness.      Comments: Surgical midline incision dressing CDI    Musculoskeletal:         General: No swelling or tenderness. Normal range of motion.   Neurological:      General: No focal deficit present.      Mental Status: He is alert. Mental status is at baseline.      Cranial Nerves: No cranial nerve deficit.   Psychiatric:         Mood and Affect: Mood normal.           Significant Labs: All pertinent labs  within the past 24 hours have been reviewed.  CBC:   Recent Labs   Lab 06/08/23  0607 06/09/23  0306   WBC 14.45* 14.80*   HGB 9.5* 8.2*   HCT 30.6* 26.7*   * 649*     CMP:   Recent Labs   Lab 06/08/23  0607 06/09/23  0306    137   K 3.7 3.8   CL 97 101   CO2 27 25   * 116*   BUN 13 10   CREATININE 1.0 0.9   CALCIUM 10.5 9.1   PROT 8.1 7.0   ALBUMIN 3.6 3.1*   BILITOT 0.6 0.6   ALKPHOS 68 62   AST 23 14   ALT 35 20   ANIONGAP 14 11       Significant Imaging: I have reviewed all pertinent imaging results/findings within the past 24 hours.

## 2023-06-09 NOTE — DISCHARGE INSTRUCTIONS
Discharge Instructions, [facility] Medicine    Follow up with general surgery in 2 weeks.     Thank you for choosing [facility] for your medical care.     You were admitted to the hospital with Ileus.     Please note your discharge instructions, including diet/activity restrictions, follow-up appointments, and medication changes.  If you have any questions about your medical issues, prescriptions, or any other questions, please feel free to contact the [facility] Medicine Dept at [phone] and we will help.    If you are previously with Home health, outpatient PT/OT or under a therapy program, you are cleared to return to those programs.    Please direct all long term medication refills and follow up to your primary care provider, Amira Knutson NP. Thank you again for letting us take care of your health care needs.    Please note the following discharge instructions per your discharging physician-  Chris Pollard PA-C

## 2023-06-10 VITALS
DIASTOLIC BLOOD PRESSURE: 91 MMHG | HEART RATE: 82 BPM | RESPIRATION RATE: 18 BRPM | SYSTOLIC BLOOD PRESSURE: 135 MMHG | WEIGHT: 224.88 LBS | OXYGEN SATURATION: 97 % | BODY MASS INDEX: 27.96 KG/M2 | TEMPERATURE: 98 F | HEIGHT: 75 IN

## 2023-06-10 LAB
ALBUMIN SERPL BCP-MCNC: 3.1 G/DL (ref 3.5–5.2)
ALP SERPL-CCNC: 61 U/L (ref 55–135)
ALT SERPL W/O P-5'-P-CCNC: 16 U/L (ref 10–44)
ANION GAP SERPL CALC-SCNC: 12 MMOL/L (ref 8–16)
AST SERPL-CCNC: 21 U/L (ref 10–40)
BASOPHILS # BLD AUTO: 0.04 K/UL (ref 0–0.2)
BASOPHILS NFR BLD: 0.3 % (ref 0–1.9)
BILIRUB SERPL-MCNC: 0.8 MG/DL (ref 0.1–1)
BUN SERPL-MCNC: 12 MG/DL (ref 6–20)
CALCIUM SERPL-MCNC: 9.5 MG/DL (ref 8.7–10.5)
CHLORIDE SERPL-SCNC: 98 MMOL/L (ref 95–110)
CO2 SERPL-SCNC: 26 MMOL/L (ref 23–29)
CREAT SERPL-MCNC: 0.9 MG/DL (ref 0.5–1.4)
DIFFERENTIAL METHOD: ABNORMAL
EOSINOPHIL # BLD AUTO: 0.5 K/UL (ref 0–0.5)
EOSINOPHIL NFR BLD: 3.4 % (ref 0–8)
ERYTHROCYTE [DISTWIDTH] IN BLOOD BY AUTOMATED COUNT: 17.6 % (ref 11.5–14.5)
EST. GFR  (NO RACE VARIABLE): >60 ML/MIN/1.73 M^2
GLUCOSE SERPL-MCNC: 112 MG/DL (ref 70–110)
HCT VFR BLD AUTO: 26.9 % (ref 40–54)
HGB BLD-MCNC: 8.3 G/DL (ref 14–18)
IMM GRANULOCYTES # BLD AUTO: 0.03 K/UL (ref 0–0.04)
IMM GRANULOCYTES NFR BLD AUTO: 0.2 % (ref 0–0.5)
LYMPHOCYTES # BLD AUTO: 2.1 K/UL (ref 1–4.8)
LYMPHOCYTES NFR BLD: 15.6 % (ref 18–48)
MAGNESIUM SERPL-MCNC: 1.6 MG/DL (ref 1.6–2.6)
MCH RBC QN AUTO: 20.6 PG (ref 27–31)
MCHC RBC AUTO-ENTMCNC: 30.9 G/DL (ref 32–36)
MCV RBC AUTO: 67 FL (ref 82–98)
MONOCYTES # BLD AUTO: 1 K/UL (ref 0.3–1)
MONOCYTES NFR BLD: 7.3 % (ref 4–15)
NEUTROPHILS # BLD AUTO: 9.7 K/UL (ref 1.8–7.7)
NEUTROPHILS NFR BLD: 73.2 % (ref 38–73)
NRBC BLD-RTO: 0 /100 WBC
PLATELET # BLD AUTO: 694 K/UL (ref 150–450)
PMV BLD AUTO: 9.3 FL (ref 9.2–12.9)
POTASSIUM SERPL-SCNC: 3.7 MMOL/L (ref 3.5–5.1)
PROT SERPL-MCNC: 7.1 G/DL (ref 6–8.4)
RBC # BLD AUTO: 4.03 M/UL (ref 4.6–6.2)
SODIUM SERPL-SCNC: 136 MMOL/L (ref 136–145)
WBC # BLD AUTO: 13.2 K/UL (ref 3.9–12.7)

## 2023-06-10 PROCEDURE — 25000003 PHARM REV CODE 250

## 2023-06-10 PROCEDURE — 63600175 PHARM REV CODE 636 W HCPCS

## 2023-06-10 PROCEDURE — 63600175 PHARM REV CODE 636 W HCPCS: Performed by: NURSE PRACTITIONER

## 2023-06-10 PROCEDURE — 83735 ASSAY OF MAGNESIUM: CPT

## 2023-06-10 PROCEDURE — S4991 NICOTINE PATCH NONLEGEND: HCPCS

## 2023-06-10 PROCEDURE — 36415 COLL VENOUS BLD VENIPUNCTURE: CPT

## 2023-06-10 PROCEDURE — 80053 COMPREHEN METABOLIC PANEL: CPT

## 2023-06-10 PROCEDURE — 25000003 PHARM REV CODE 250: Performed by: NURSE PRACTITIONER

## 2023-06-10 PROCEDURE — 85025 COMPLETE CBC W/AUTO DIFF WBC: CPT

## 2023-06-10 RX ORDER — HYDROCODONE BITARTRATE AND ACETAMINOPHEN 10; 325 MG/1; MG/1
1 TABLET ORAL EVERY 6 HOURS PRN
Status: DISCONTINUED | OUTPATIENT
Start: 2023-06-10 | End: 2023-06-10 | Stop reason: HOSPADM

## 2023-06-10 RX ORDER — HYDROCODONE BITARTRATE AND ACETAMINOPHEN 5; 325 MG/1; MG/1
1 TABLET ORAL EVERY 6 HOURS PRN
Status: DISCONTINUED | OUTPATIENT
Start: 2023-06-10 | End: 2023-06-10 | Stop reason: HOSPADM

## 2023-06-10 RX ORDER — ONDANSETRON HYDROCHLORIDE 4 MG/5ML
8 SOLUTION ORAL EVERY 6 HOURS
Status: DISCONTINUED | OUTPATIENT
Start: 2023-06-10 | End: 2023-06-10

## 2023-06-10 RX ORDER — ONDANSETRON 2 MG/ML
8 INJECTION INTRAMUSCULAR; INTRAVENOUS EVERY 6 HOURS
Status: DISCONTINUED | OUTPATIENT
Start: 2023-06-10 | End: 2023-06-10 | Stop reason: HOSPADM

## 2023-06-10 RX ADMIN — SODIUM CHLORIDE: 9 INJECTION, SOLUTION INTRAVENOUS at 12:06

## 2023-06-10 RX ADMIN — KETOROLAC TROMETHAMINE 15 MG: 30 INJECTION, SOLUTION INTRAMUSCULAR; INTRAVENOUS at 06:06

## 2023-06-10 RX ADMIN — HYDROMORPHONE HYDROCHLORIDE 1 MG: 1 INJECTION, SOLUTION INTRAMUSCULAR; INTRAVENOUS; SUBCUTANEOUS at 04:06

## 2023-06-10 RX ADMIN — KETOROLAC TROMETHAMINE 15 MG: 30 INJECTION, SOLUTION INTRAMUSCULAR; INTRAVENOUS at 11:06

## 2023-06-10 RX ADMIN — HYDROMORPHONE HYDROCHLORIDE 1 MG: 1 INJECTION, SOLUTION INTRAMUSCULAR; INTRAVENOUS; SUBCUTANEOUS at 09:06

## 2023-06-10 RX ADMIN — GABAPENTIN 300 MG: 300 CAPSULE ORAL at 09:06

## 2023-06-10 RX ADMIN — ONDANSETRON 4 MG: 2 INJECTION INTRAMUSCULAR; INTRAVENOUS at 12:06

## 2023-06-10 RX ADMIN — HYDROCODONE BITARTRATE AND ACETAMINOPHEN 1 TABLET: 10; 325 TABLET ORAL at 03:06

## 2023-06-10 RX ADMIN — ONDANSETRON 8 MG: 2 INJECTION INTRAMUSCULAR; INTRAVENOUS at 11:06

## 2023-06-10 RX ADMIN — NICOTINE 1 PATCH: 14 PATCH TRANSDERMAL at 09:06

## 2023-06-10 RX ADMIN — HYDROCODONE BITARTRATE AND ACETAMINOPHEN 1 TABLET: 10; 325 TABLET ORAL at 10:06

## 2023-06-10 RX ADMIN — ONDANSETRON 4 MG: 2 INJECTION INTRAMUSCULAR; INTRAVENOUS at 06:06

## 2023-06-10 RX ADMIN — KETOROLAC TROMETHAMINE 15 MG: 30 INJECTION, SOLUTION INTRAMUSCULAR; INTRAVENOUS at 12:06

## 2023-06-10 NOTE — PLAN OF CARE
Pt is clear for discharge from CM standpoint once seen by hospital medicine and if tolerates ordered diet         06/10/23 0959   Final Note   Assessment Type Final Discharge Note   Anticipated Discharge Disposition Home

## 2023-06-10 NOTE — HOSPITAL COURSE
Patient admitted for abdominal pain and N/V. Patient was monitored closely throughout course of hospital stay by hospital medicine team. General surgery consulted on admission for further evaluation and management. Placed on continuous IV fluids and held NPO on arrival. Started on prn oral and IV pain medication. Antiemetics ordered. Per general surgery, started on clear liquids. Patient unable to tolerate diet secondary to N/V. Scheduled IV zofran and adjusted prn pain medication for adequate pain management. Able to have small bowel movement. Pain likely secondary to recent colectomy.  Overnight, patient did well.  He had no further episodes of nausea or vomiting.  He was able to tolerate his clear liquid diet.  He was cleared from general surgery perspective for discharge if able to tolerate diet.  He was seen and evaluated on day of discharge and deemed appropriate.  Discharge instructions as well as return precautions were discussed with patient and wife at bedside with good understanding.

## 2023-06-10 NOTE — DISCHARGE SUMMARY
[facility] Medicine  Discharge Summary      Patient Name: Ruslan Caldwell  MRN: [id number]  SHELIA: [id number]  Patient Class: IP- Inpatient  Admission Date: 6/8/2023  Hospital Length of Stay: 1 days  Discharge Date and Time: 6/10/2023 11:17 AM  Attending Physician: No att. providers found   Discharging Provider: Haily Pal NP  Primary Care Provider: Amira Knutson NP    Primary Care Team: Networked reference to record PCT     HPI:   Ruslan Caldwell is a 47 year old black male with a PMHx significant for hypertension, right eye blindness, diabetes and adenocarcinoma S/P right colectomy on 5/29 presenting from [facility] ED for severe abdominal pain with associated N/V. He reports experiencing abdominal pain since his colectomy. He states he was only sent home with 12 pills of pain medication. He states last night his pain was excruciating prompting visit to ED. Wife at bedside states she was attempting to manage his pain with OTC tylenol. He states he had multiple episodes of N/V since last night. He reports his last bowel movement was last night. He denies any SOB, constipation, diarrhea, fever/chills, or urinary symptoms. He was started on prn pain medication and IV zosyn at [facility]. Ct abdomen demonstrated fat attenuation mass suspicious for fat necrosis and fluid distended, nondilated loops of small bowel. General surgery consulted on admission and patient held NPO. Patient to be monitored by hospital medicine services for further treatment.             * No surgery found *      Hospital Course:   Patient admitted for abdominal pain and N/V. Patient was monitored closely throughout course of hospital stay by hospital medicine team. General surgery consulted on admission for further evaluation and management. Placed on continuous IV fluids and held NPO on arrival. Started on prn oral and IV pain medication. Antiemetics ordered. Per general surgery, started on clear  liquids. Patient unable to tolerate diet secondary to N/V. Scheduled IV zofran and adjusted prn pain medication for adequate pain management. Able to have small bowel movement. Pain likely secondary to recent colectomy.  Overnight, patient did well.  He had no further episodes of nausea or vomiting.  He was able to tolerate his clear liquid diet.  He was cleared from general surgery perspective for discharge if able to tolerate diet.  He was seen and evaluated on day of discharge and deemed appropriate.  Discharge instructions as well as return precautions were discussed with patient and wife at bedside with good understanding.       Goals of Care Treatment Preferences:  Code Status: Full Code      Consults:   Consults (From admission, onward)        Status Ordering Provider     Inpatient consult to General Surgery  Once        Provider:  Edin Michael MD    Completed DEBBIE PUCKETT          No new Assessment & Plan notes have been filed under this hospital service since the last note was generated.  Service: Hospital Medicine    Final Active Diagnoses:    Diagnosis Date Noted POA    PRINCIPAL PROBLEM:  Ileus [K56.7] 06/09/2023 Yes    Moderate malnutrition [E44.0] 06/09/2023 Yes    Gastroenteritis [K52.9] 06/08/2023 Yes    Thrombocytosis [D75.839] 05/28/2023 Yes     Chronic    Colonic mass [K63.89] 05/28/2023 Yes    Microcytic anemia [D50.9] 05/24/2023 Yes     Chronic    Essential hypertension [I10] 05/24/2023 Yes     Chronic    Type 2 diabetes mellitus without complication, without long-term current use of insulin [E11.9] 05/01/2020 Yes     Chronic      Problems Resolved During this Admission:       Discharged Condition: good    Disposition: Home or Self Care    Follow Up:   Follow-up Information     Edin Michael MD Follow up on 6/23/2023.    Specialties: General Surgery, Bariatrics, Surgery  Why: office to contact patient to schedule hospital follow up  Contact information:  [address]  [facility] [address]  [phone]                       Patient Instructions:      Diet full liquid     Diet Adult Regular     Notify your health care provider if you experience any of the following:  temperature >100.4     Notify your health care provider if you experience any of the following:  persistent nausea and vomiting or diarrhea     Notify your health care provider if you experience any of the following:  severe uncontrolled pain     Notify your health care provider if you experience any of the following:  redness, tenderness, or signs of infection (pain, swelling, redness, odor or green/yellow discharge around incision site)     Notify your health care provider if you experience any of the following:  difficulty breathing or increased cough     Notify your health care provider if you experience any of the following:  increased confusion or weakness     Notify your health care provider if you experience any of the following:  persistent dizziness, light-headedness, or visual disturbances     Notify your health care provider if you experience any of the following:  persistent nausea and vomiting or diarrhea     Notify your health care provider if you experience any of the following:  severe uncontrolled pain     Notify your health care provider if you experience any of the following:  temperature >100.4     Notify your health care provider if you experience any of the following:  redness, tenderness, or signs of infection (pain, swelling, redness, odor or green/yellow discharge around incision site)     Activity as tolerated       Significant Diagnostic Studies: Labs:   CMP   Recent Labs   Lab 06/09/23  0306 06/10/23  0420    136   K 3.8 3.7    98   CO2 25 26   * 112*   BUN 10 12   CREATININE 0.9 0.9   CALCIUM 9.1 9.5   PROT 7.0 7.1   ALBUMIN 3.1* 3.1*   BILITOT 0.6 0.8   ALKPHOS 62 61   AST 14 21   ALT 20 16   ANIONGAP 11 12   , CBC   Recent Labs   Lab 06/09/23  0306 06/10/23  0420    WBC 14.80* 13.20*   HGB 8.2* 8.3*   HCT 26.7* 26.9*   * 694*    and All labs within the past 24 hours have been reviewed  Radiology: CT scan:   CT ABDOMEN PELVIS WITH CONTRAST     CLINICAL HISTORY:  Abdominal pain, post-op;Abdominal pain, acute, nonlocalized;     TECHNIQUE:  Low dose axial images, sagittal and coronal reformations were obtained from the lung bases to the pubic symphysis following the IV administration of 100 mL of Omnipaque 350 .  Oral contrast was not given.     COMPARISON:  CT 05/25/2023.     FINDINGS:  Suspected persistent discoid atelectasis versus parenchymal scarring at the periphery of the right lower lobe.  The left lung base is clear.  No pleural or pericardial effusions.  There is a small hiatal hernia.     The liver and spleen are normal in size and attenuation.  The gallbladder, pancreas and adrenal glands are unremarkable.     Kidneys are normal in size and enhance homogeneously.  No renal calculi.  No changes of hydronephrosis.  No perinephric inflammatory change.     Surgical clips within the right abdomen from recent partial proximal colectomy.  No CT evidence for bowel obstruction.  No free intraperitoneal air or localized fluid collection to suggest anastomotic leak.  There is a fat attenuation mass just posterior and lateral to the anastomotic clips measuring 4.5 x 4.2 x 5.8 cm.  This has the appearance of fat necrosis.  The loops of small bowel are fluid distended but nondilated.  Small amount of fluid within the right paracolic gutter.     The bladder is distended.  The prostate is enlarged.  There is dependent fluid within the pelvis.     No significant mesenteric or retroperitoneal lymphadenopathy.  Postsurgical change within the midline anterior abdominal wall.     Impression:     1. Small hiatal hernia.  2. Status post recent partial proximal colectomy with fat attenuation mass adjacent to the anastomotic clips suspicious for fat necrosis.  3. Fluid distended but  nondilated loops of small bowel.  This can be seen with gastroenteritis.  Close interval follow-up recommended to exclude the later development of a postsurgical small-bowel obstruction.  4. Scattered ascites.  5. Prostatic hypertrophy.  This report was flagged in Epic as abnormal.        Electronically signed by: Cristian Cook  Date:                                            06/08/2023  Time:                                           09:06           Exam Ended: 06/08/23 07:24 Last Resulted: 06/08/23 09:06             Pending Diagnostic Studies:     None         Medications:  Reconciled Home Medications:      Medication List      START taking these medications    HYDROcodone-acetaminophen  mg per tablet  Commonly known as: NORCO  Take 1 tablet by mouth every 6 (six) hours as needed.  Replaces: HYDROcodone-acetaminophen 7.5-325 mg per tablet     ondansetron 4 MG tablet  Commonly known as: ZOFRAN  Take 1 tablet (4 mg total) by mouth every 6 (six) hours. for 4 days        CONTINUE taking these medications    gabapentin 300 MG capsule  Commonly known as: NEURONTIN  Take 1 capsule (300 mg total) by mouth 2 (two) times daily. for 14 days        STOP taking these medications    HYDROcodone-acetaminophen 7.5-325 mg per tablet  Commonly known as: NORCO  Replaced by: HYDROcodone-acetaminophen  mg per tablet     oxyCODONE-acetaminophen 5-325 mg per tablet  Commonly known as: PERCOCET            Indwelling Lines/Drains at time of discharge:   Lines/Drains/Airways     None                 Time spent on the discharge of patient: 32 minutes         Haily Pal NP  Department of Hospital Medicine  [facility]

## 2023-06-11 ENCOUNTER — HOSPITAL ENCOUNTER (INPATIENT)
Facility: HOSPITAL | Age: 48
LOS: 13 days | Discharge: LEFT BEFORE DISCHARGE | DRG: 329 | End: 2023-06-24
Attending: STUDENT IN AN ORGANIZED HEALTH CARE EDUCATION/TRAINING PROGRAM | Admitting: STUDENT IN AN ORGANIZED HEALTH CARE EDUCATION/TRAINING PROGRAM
Payer: MEDICAID

## 2023-06-11 ENCOUNTER — HOSPITAL ENCOUNTER (EMERGENCY)
Facility: HOSPITAL | Age: 48
Discharge: OTHER INSTITUTION | End: 2023-06-11
Attending: INTERNAL MEDICINE
Payer: MEDICAID

## 2023-06-11 VITALS
OXYGEN SATURATION: 98 % | RESPIRATION RATE: 18 BRPM | BODY MASS INDEX: 27.98 KG/M2 | HEART RATE: 112 BPM | TEMPERATURE: 98 F | DIASTOLIC BLOOD PRESSURE: 78 MMHG | HEIGHT: 75 IN | SYSTOLIC BLOOD PRESSURE: 124 MMHG | WEIGHT: 225 LBS

## 2023-06-11 DIAGNOSIS — K66.8 FREE INTRAPERITONEAL AIR: ICD-10-CM

## 2023-06-11 DIAGNOSIS — K56.609 SBO (SMALL BOWEL OBSTRUCTION): Primary | ICD-10-CM

## 2023-06-11 DIAGNOSIS — R10.9 ABDOMINAL PAIN: ICD-10-CM

## 2023-06-11 DIAGNOSIS — K56.609 SMALL BOWEL OBSTRUCTION: Primary | ICD-10-CM

## 2023-06-11 DIAGNOSIS — Z90.49 STATUS POST COLECTOMY: ICD-10-CM

## 2023-06-11 DIAGNOSIS — R00.0 TACHYCARDIA: ICD-10-CM

## 2023-06-11 DIAGNOSIS — R11.2 NAUSEA & VOMITING: ICD-10-CM

## 2023-06-11 LAB
ALBUMIN SERPL BCP-MCNC: 4.1 G/DL (ref 3.5–5.2)
ALP SERPL-CCNC: 74 U/L (ref 55–135)
ALT SERPL W/O P-5'-P-CCNC: 17 U/L (ref 10–44)
ANION GAP SERPL CALC-SCNC: 18 MMOL/L (ref 8–16)
AST SERPL-CCNC: 15 U/L (ref 10–40)
BACTERIA #/AREA URNS HPF: ABNORMAL /HPF
BASOPHILS # BLD AUTO: 0.07 K/UL (ref 0–0.2)
BASOPHILS NFR BLD: 0.4 % (ref 0–1.9)
BILIRUB SERPL-MCNC: 1.2 MG/DL (ref 0.1–1)
BILIRUB UR QL STRIP: ABNORMAL
BUN SERPL-MCNC: 18 MG/DL (ref 6–20)
CALCIUM SERPL-MCNC: 11 MG/DL (ref 8.7–10.5)
CHLORIDE SERPL-SCNC: 91 MMOL/L (ref 95–110)
CLARITY UR: CLEAR
CO2 SERPL-SCNC: 27 MMOL/L (ref 23–29)
COLOR UR: YELLOW
CREAT SERPL-MCNC: 1.4 MG/DL (ref 0.5–1.4)
DIFFERENTIAL METHOD: ABNORMAL
EOSINOPHIL # BLD AUTO: 0.6 K/UL (ref 0–0.5)
EOSINOPHIL NFR BLD: 3.7 % (ref 0–8)
ERYTHROCYTE [DISTWIDTH] IN BLOOD BY AUTOMATED COUNT: 18.6 % (ref 11.5–14.5)
EST. GFR  (NO RACE VARIABLE): >60 ML/MIN/1.73 M^2
GLUCOSE SERPL-MCNC: 118 MG/DL (ref 70–110)
GLUCOSE UR QL STRIP: NEGATIVE
HCT VFR BLD AUTO: 32.8 % (ref 40–54)
HGB BLD-MCNC: 10.4 G/DL (ref 14–18)
HGB UR QL STRIP: NEGATIVE
HYALINE CASTS #/AREA URNS LPF: 2 /LPF
IMM GRANULOCYTES # BLD AUTO: 0.04 K/UL (ref 0–0.04)
IMM GRANULOCYTES NFR BLD AUTO: 0.3 % (ref 0–0.5)
KETONES UR QL STRIP: ABNORMAL
LEUKOCYTE ESTERASE UR QL STRIP: NEGATIVE
LIPASE SERPL-CCNC: 19 U/L (ref 4–60)
LYMPHOCYTES # BLD AUTO: 2.3 K/UL (ref 1–4.8)
LYMPHOCYTES NFR BLD: 14.4 % (ref 18–48)
MCH RBC QN AUTO: 20.5 PG (ref 27–31)
MCHC RBC AUTO-ENTMCNC: 31.7 G/DL (ref 32–36)
MCV RBC AUTO: 65 FL (ref 82–98)
MICROSCOPIC COMMENT: ABNORMAL
MONOCYTES # BLD AUTO: 1.3 K/UL (ref 0.3–1)
MONOCYTES NFR BLD: 7.9 % (ref 4–15)
NEUTROPHILS # BLD AUTO: 11.6 K/UL (ref 1.8–7.7)
NEUTROPHILS NFR BLD: 73.3 % (ref 38–73)
NITRITE UR QL STRIP: NEGATIVE
NRBC BLD-RTO: 0 /100 WBC
PH UR STRIP: 6 [PH] (ref 5–8)
PLATELET # BLD AUTO: 843 K/UL (ref 150–450)
PMV BLD AUTO: 8.9 FL (ref 9.2–12.9)
POTASSIUM SERPL-SCNC: 3.3 MMOL/L (ref 3.5–5.1)
PROT SERPL-MCNC: 9.3 G/DL (ref 6–8.4)
PROT UR QL STRIP: ABNORMAL
RBC # BLD AUTO: 5.07 M/UL (ref 4.6–6.2)
RBC #/AREA URNS HPF: 2 /HPF (ref 0–4)
SODIUM SERPL-SCNC: 136 MMOL/L (ref 136–145)
SP GR UR STRIP: 1.02 (ref 1–1.03)
TROPONIN I SERPL DL<=0.01 NG/ML-MCNC: 0.01 NG/ML (ref 0–0.03)
URN SPEC COLLECT METH UR: ABNORMAL
UROBILINOGEN UR STRIP-ACNC: NEGATIVE EU/DL
WBC # BLD AUTO: 15.87 K/UL (ref 3.9–12.7)
WBC #/AREA URNS HPF: 10 /HPF (ref 0–5)

## 2023-06-11 PROCEDURE — 99900035 HC TECH TIME PER 15 MIN (STAT)

## 2023-06-11 PROCEDURE — 93005 ELECTROCARDIOGRAM TRACING: CPT

## 2023-06-11 PROCEDURE — 74176 CT ABD & PELVIS W/O CONTRAST: CPT | Mod: 26,,, | Performed by: RADIOLOGY

## 2023-06-11 PROCEDURE — 71045 XR CHEST AP PORTABLE: ICD-10-PCS | Mod: 26,,, | Performed by: RADIOLOGY

## 2023-06-11 PROCEDURE — 84484 ASSAY OF TROPONIN QUANT: CPT | Performed by: INTERNAL MEDICINE

## 2023-06-11 PROCEDURE — G0378 HOSPITAL OBSERVATION PER HR: HCPCS

## 2023-06-11 PROCEDURE — 63600175 PHARM REV CODE 636 W HCPCS: Performed by: INTERNAL MEDICINE

## 2023-06-11 PROCEDURE — 74176 CT ABDOMEN PELVIS WITHOUT CONTRAST: ICD-10-PCS | Mod: 26,,, | Performed by: RADIOLOGY

## 2023-06-11 PROCEDURE — 83690 ASSAY OF LIPASE: CPT | Performed by: INTERNAL MEDICINE

## 2023-06-11 PROCEDURE — 93010 ELECTROCARDIOGRAM REPORT: CPT | Mod: ,,, | Performed by: INTERNAL MEDICINE

## 2023-06-11 PROCEDURE — 93010 EKG 12-LEAD: ICD-10-PCS | Mod: ,,, | Performed by: INTERNAL MEDICINE

## 2023-06-11 PROCEDURE — 74176 CT ABD & PELVIS W/O CONTRAST: CPT | Mod: TC

## 2023-06-11 PROCEDURE — 25000003 PHARM REV CODE 250: Performed by: INTERNAL MEDICINE

## 2023-06-11 PROCEDURE — 71045 X-RAY EXAM CHEST 1 VIEW: CPT | Mod: TC

## 2023-06-11 PROCEDURE — 99285 EMERGENCY DEPT VISIT HI MDM: CPT | Mod: 25

## 2023-06-11 PROCEDURE — 71045 X-RAY EXAM CHEST 1 VIEW: CPT | Mod: 26,,, | Performed by: RADIOLOGY

## 2023-06-11 PROCEDURE — 96361 HYDRATE IV INFUSION ADD-ON: CPT

## 2023-06-11 PROCEDURE — 96374 THER/PROPH/DIAG INJ IV PUSH: CPT

## 2023-06-11 PROCEDURE — G0379 DIRECT REFER HOSPITAL OBSERV: HCPCS

## 2023-06-11 PROCEDURE — 85025 COMPLETE CBC W/AUTO DIFF WBC: CPT | Performed by: INTERNAL MEDICINE

## 2023-06-11 PROCEDURE — 94761 N-INVAS EAR/PLS OXIMETRY MLT: CPT

## 2023-06-11 PROCEDURE — 96376 TX/PRO/DX INJ SAME DRUG ADON: CPT

## 2023-06-11 PROCEDURE — 81000 URINALYSIS NONAUTO W/SCOPE: CPT | Performed by: INTERNAL MEDICINE

## 2023-06-11 PROCEDURE — 63600175 PHARM REV CODE 636 W HCPCS: Performed by: NURSE PRACTITIONER

## 2023-06-11 PROCEDURE — 12000002 HC ACUTE/MED SURGE SEMI-PRIVATE ROOM

## 2023-06-11 PROCEDURE — 80053 COMPREHEN METABOLIC PANEL: CPT | Performed by: INTERNAL MEDICINE

## 2023-06-11 PROCEDURE — 25000003 PHARM REV CODE 250: Performed by: NURSE PRACTITIONER

## 2023-06-11 PROCEDURE — 96375 TX/PRO/DX INJ NEW DRUG ADDON: CPT

## 2023-06-11 RX ORDER — IBUPROFEN 200 MG
16 TABLET ORAL
Status: DISCONTINUED | OUTPATIENT
Start: 2023-06-11 | End: 2023-06-12

## 2023-06-11 RX ORDER — GLUCAGON 1 MG
1 KIT INJECTION
Status: DISCONTINUED | OUTPATIENT
Start: 2023-06-11 | End: 2023-06-12

## 2023-06-11 RX ORDER — LANOLIN ALCOHOL/MO/W.PET/CERES
800 CREAM (GRAM) TOPICAL
Status: DISCONTINUED | OUTPATIENT
Start: 2023-06-11 | End: 2023-06-12

## 2023-06-11 RX ORDER — NALOXONE HCL 0.4 MG/ML
0.02 VIAL (ML) INJECTION
Status: DISCONTINUED | OUTPATIENT
Start: 2023-06-11 | End: 2023-06-24 | Stop reason: HOSPADM

## 2023-06-11 RX ORDER — MORPHINE SULFATE 2 MG/ML
2 INJECTION, SOLUTION INTRAMUSCULAR; INTRAVENOUS
Status: COMPLETED | OUTPATIENT
Start: 2023-06-11 | End: 2023-06-11

## 2023-06-11 RX ORDER — SODIUM,POTASSIUM PHOSPHATES 280-250MG
2 POWDER IN PACKET (EA) ORAL
Status: DISCONTINUED | OUTPATIENT
Start: 2023-06-11 | End: 2023-06-12

## 2023-06-11 RX ORDER — KETOROLAC TROMETHAMINE 30 MG/ML
15 INJECTION, SOLUTION INTRAMUSCULAR; INTRAVENOUS EVERY 6 HOURS PRN
Status: DISCONTINUED | OUTPATIENT
Start: 2023-06-11 | End: 2023-06-13

## 2023-06-11 RX ORDER — ACETAMINOPHEN 325 MG/1
650 TABLET ORAL EVERY 6 HOURS PRN
Status: DISCONTINUED | OUTPATIENT
Start: 2023-06-11 | End: 2023-06-12

## 2023-06-11 RX ORDER — IBUPROFEN 200 MG
24 TABLET ORAL
Status: DISCONTINUED | OUTPATIENT
Start: 2023-06-11 | End: 2023-06-12

## 2023-06-11 RX ORDER — INSULIN ASPART 100 [IU]/ML
1-10 INJECTION, SOLUTION INTRAVENOUS; SUBCUTANEOUS
Status: DISCONTINUED | OUTPATIENT
Start: 2023-06-11 | End: 2023-06-12

## 2023-06-11 RX ORDER — IPRATROPIUM BROMIDE AND ALBUTEROL SULFATE 2.5; .5 MG/3ML; MG/3ML
3 SOLUTION RESPIRATORY (INHALATION) EVERY 4 HOURS PRN
Status: DISCONTINUED | OUTPATIENT
Start: 2023-06-11 | End: 2023-06-12

## 2023-06-11 RX ORDER — SODIUM CHLORIDE 9 MG/ML
INJECTION, SOLUTION INTRAVENOUS ONCE
Status: COMPLETED | OUTPATIENT
Start: 2023-06-11 | End: 2023-06-11

## 2023-06-11 RX ORDER — TALC
9 POWDER (GRAM) TOPICAL NIGHTLY PRN
Status: DISCONTINUED | OUTPATIENT
Start: 2023-06-11 | End: 2023-06-12

## 2023-06-11 RX ORDER — MAG HYDROX/ALUMINUM HYD/SIMETH 200-200-20
30 SUSPENSION, ORAL (FINAL DOSE FORM) ORAL 4 TIMES DAILY PRN
Status: DISCONTINUED | OUTPATIENT
Start: 2023-06-11 | End: 2023-06-12

## 2023-06-11 RX ORDER — SODIUM CHLORIDE 9 MG/ML
1000 INJECTION, SOLUTION INTRAVENOUS
Status: COMPLETED | OUTPATIENT
Start: 2023-06-11 | End: 2023-06-11

## 2023-06-11 RX ORDER — SODIUM CHLORIDE 0.9 % (FLUSH) 0.9 %
10 SYRINGE (ML) INJECTION EVERY 8 HOURS PRN
Status: DISCONTINUED | OUTPATIENT
Start: 2023-06-11 | End: 2023-06-24 | Stop reason: HOSPADM

## 2023-06-11 RX ORDER — SIMETHICONE 80 MG
1 TABLET,CHEWABLE ORAL 4 TIMES DAILY PRN
Status: DISCONTINUED | OUTPATIENT
Start: 2023-06-11 | End: 2023-06-12

## 2023-06-11 RX ORDER — ONDANSETRON 2 MG/ML
4 INJECTION INTRAMUSCULAR; INTRAVENOUS EVERY 8 HOURS PRN
Status: DISCONTINUED | OUTPATIENT
Start: 2023-06-11 | End: 2023-06-24 | Stop reason: HOSPADM

## 2023-06-11 RX ORDER — ACETAMINOPHEN 325 MG/1
650 TABLET ORAL EVERY 4 HOURS PRN
Status: DISCONTINUED | OUTPATIENT
Start: 2023-06-11 | End: 2023-06-12

## 2023-06-11 RX ORDER — HYDROMORPHONE HYDROCHLORIDE 1 MG/ML
1 INJECTION, SOLUTION INTRAMUSCULAR; INTRAVENOUS; SUBCUTANEOUS EVERY 6 HOURS PRN
Status: DISCONTINUED | OUTPATIENT
Start: 2023-06-11 | End: 2023-06-12

## 2023-06-11 RX ADMIN — SODIUM CHLORIDE: 9 INJECTION, SOLUTION INTRAVENOUS at 08:06

## 2023-06-11 RX ADMIN — SODIUM CHLORIDE 1000 ML: 9 INJECTION, SOLUTION INTRAVENOUS at 01:06

## 2023-06-11 RX ADMIN — PIPERACILLIN AND TAZOBACTAM 4.5 G: 4; .5 INJECTION, POWDER, LYOPHILIZED, FOR SOLUTION INTRAVENOUS; PARENTERAL at 04:06

## 2023-06-11 RX ADMIN — POTASSIUM BICARBONATE 35 MEQ: 391 TABLET, EFFERVESCENT ORAL at 10:06

## 2023-06-11 RX ADMIN — MORPHINE SULFATE 2 MG: 2 INJECTION, SOLUTION INTRAMUSCULAR; INTRAVENOUS at 04:06

## 2023-06-11 RX ADMIN — HYDROMORPHONE HYDROCHLORIDE 1 MG: 1 INJECTION, SOLUTION INTRAMUSCULAR; INTRAVENOUS; SUBCUTANEOUS at 10:06

## 2023-06-11 RX ADMIN — SODIUM CHLORIDE 1000 ML: 9 INJECTION, SOLUTION INTRAVENOUS at 02:06

## 2023-06-11 RX ADMIN — KETOROLAC TROMETHAMINE 15 MG: 30 INJECTION, SOLUTION INTRAMUSCULAR; INTRAVENOUS at 09:06

## 2023-06-11 RX ADMIN — MORPHINE SULFATE 2 MG: 2 INJECTION, SOLUTION INTRAMUSCULAR; INTRAVENOUS at 02:06

## 2023-06-11 NOTE — PROVIDER TRANSFER
(Physician in Lead of Transfers)  Outside Transfer Acceptance Note / Regional Referral Center    Upon patient arrival, please contact Hospital Medicine on call.    Referring facility: [facility]  Referring provider: PHILIP MADRIGAL  Accepting facility: [facility]  Accepting provider: SHIRLEY PULIDO  Admitting provider: DEVIKA RUIZ  Reason for transfer:  Need General Surgery  Transfer diagnosis: abdominal pain  Transfer specialty requested: General Surgery  Hospital Medicine  Transfer specialty notified: Yes  Transfer level: NUMBER 1-5: 2  Bed type requested: med-surg  Isolation status: No active isolations   Admission class or status: OP- Observation      Narrative     47-year-old male with a history of hypertension, right eye blindness, and diabetes previously admitted to [facility] from May 27 through June 1 with several months of abdominal pain.  He was noted to have leukocytosis, anemia, thrombocytosis, and abnormal inflammatory stranding in the cecum/terminal ileum.  He was treated with IV antibiotics.  Colonoscopy showed large colonic mass in the area of concern.  General Surgery performed a right partial colectomy on May 29.  By the time of discharge, he was tolerating a regular diet with resolving admission symptoms.  He was subsequently discharged home.  Pathology of the mass was consistent with invasive adenocarcinoma, moderately differentiated.      He presented to the [facility] on June 8 with severe, constant abdominal pain and vomiting that began around 2:00 a.m.. ED provider felt patient did not have evidence of sepsis at this time.  CT of the abdomen and pelvis showed fat attenuation mass suspicious for fat necrosis and fluid distended, nondilated loops of small bowel.  He was transferred back to [facility] where he was seen by General Surgery.  Assessment was ileus.  He was able to tolerate a clear liquid diet after treatment  with nausea medication. Subsequently discharged home.    He presented back to [facility] on June 11 with severe abdominal pain and intractable nausea/vomiting.  Abdomen was distended, and he had generalized abdominal tenderness.  CT of the abdomen showed mildly dilated fluid-filled loops of small bowel possibly representing developing obstruction.  He declined NG tube placement.  In the emergency department he received IV fluids and morphine.  Transfer center spoke with General Surgery at [facility].  Will plan transfer to Hospital Medicine at [facility] for further treatment of recurrent abdominal pain with nausea and vomiting.  Referring provider noted patient was more comfortable after pain and nausea medication.  Zosyn has been ordered.    White blood cells 15.87, hemoglobin 10.4, hematocrit 32.8, platelets a 143, sodium 136, potassium 3.3, chloride 91, CO2 27, BUN 18, creatinine 1.4, glucose 118, calcium 11, bilirubin 1.2, AST 15, ALT 17, lipase 19, troponin 0.009    Chest x-ray had no acute abnormality.    CT abdomen and pelvis without contrast showed mildly dilated, fluid-filled loops of small bowel seen within the pelvis possibly representing developing obstruction.    EKG showed sinus tachycardia with nonspecific T-wave abnormality.  Ventricular rate 105.     Objective     Vitals: Temp: 98.4 °F (36.9 °C) (06/11/23 1318)  Pulse: (!) 112 (06/11/23 1318)  Resp: 18 (06/11/23 1636)  BP: 124/78 (06/11/23 1800)  SpO2: 98 % (06/11/23 1800)  Recent Labs: CBC:   Recent Labs   Lab 06/10/23  0420 06/11/23  1332   WBC 13.20* 15.87*   HGB 8.3* 10.4*   HCT 26.9* 32.8*   * 843*     CMP:   Recent Labs   Lab 06/10/23  0420 06/11/23  1332    136   K 3.7 3.3*   CL 98 91*   CO2 26 27   * 118*   BUN 12 18   CREATININE 0.9 1.4   CALCIUM 9.5 11.0*   PROT 7.1 9.3*   ALBUMIN 3.1* 4.1   BILITOT 0.8 1.2*   ALKPHOS 61 74   AST 21 15   ALT 16 17   ANIONGAP 12 18*         Instructions    Admit  to Hospital Medicine  Consult General Surgery      MOMO Tinajero MD  Hospital Medicine Staff  Cell: [phone]

## 2023-06-11 NOTE — ED NOTES
Patient refused NG tube insertion at this time, educated on the need of NG tube. Notified Dr. Rodriguez.

## 2023-06-11 NOTE — ED PROVIDER NOTES
Encounter Date: 6/11/2023       History     Chief Complaint   Patient presents with    Abdominal Pain     EMS states Abdominal pain and vomiting that started this morning.  Patient had recent abdominal surgery at [facility].       Patient comes in by ambulance with intractable nausea vomiting severe abdominal pain that started today.  Patient states looks like bowel.  The patient has been seen in his emergency room several times in May and found to have a right lower quadrant mass.  He was transferred to [facility] where he underwent surgery by Dr. Michael with a right colectomy on by 05/29/2023.    The patient was readmitted for nausea vomiting bowel and abdominal pain on June 8 and discharged yesterday for small-bowel obstruction postop right colectomy.  The patient states last bowel movements on Friday.  According to the hospital discharge note, the patient was able to tolerate clear liquids and diet yesterday, had a bowel movement yesterday.  Patient brought in by EMS and states he had nausea vomiting and was given Zofran in route.      Review of patient's allergies indicates:  No Known Allergies  Past Medical History:   Diagnosis Date    Diabetes mellitus, type 2 05/2020    Patient denies    Hypertension      Past Surgical History:   Procedure Laterality Date    COLONOSCOPY N/A 5/29/2023    Procedure: COLONOSCOPY;  Surgeon: Sam Solano MD;  Location: [facility] ENDO;  Service: Endoscopy;  Laterality: N/A;    EYE SURGERY Right     LUMBAR DISC SURGERY  2005    SUBTOTAL COLECTOMY Right 5/29/2023    Procedure: COLECTOMY, PARTIAL;  Surgeon: Edin Michael MD;  Location: [facility] OR;  Service: General;  Laterality: Right;     Family History   Problem Relation Age of Onset    Diabetes Mother     Diabetes Maternal Grandfather     No Known Problems Father      Social History     Tobacco Use    Smoking status: Every Day     Packs/day: 0.50     Years: 23.00     Pack years: 11.50     Types: Cigarettes     Smokeless tobacco: Never   Substance Use Topics    Alcohol use: Not Currently     Comment: Quit drinking 2 yrs ago    Drug use: Yes     Types: Marijuana     Comment: heavy marijuana use     Review of Systems   Constitutional:  Negative for fever.   HENT:  Negative for sore throat.    Respiratory:  Negative for shortness of breath.    Cardiovascular:  Negative for chest pain.   Gastrointestinal:  Negative for nausea.   Genitourinary:  Negative for dysuria.   Musculoskeletal:  Negative for back pain.   Skin:  Negative for rash.   Neurological:  Negative for weakness.   Hematological:  Does not bruise/bleed easily.     Physical Exam     Initial Vitals [06/11/23 1318]   BP Pulse Resp Temp SpO2   110/88 (!) 112 (!) 24 98.4 °F (36.9 °C) 100 %      MAP       --         Physical Exam    Nursing note and vitals reviewed.  Constitutional: He appears well-developed. No distress.   HENT:   Head: Normocephalic.   Eyes: Pupils are equal, round, and reactive to light.   Neck: Trachea normal. Neck supple.   Normal range of motion.   Full passive range of motion without pain.     Cardiovascular:  Normal rate, regular rhythm, normal heart sounds and normal pulses.           Pulmonary/Chest: Effort normal and breath sounds normal.   Abdominal: Abdomen is soft. He exhibits distension. Bowel sounds are decreased. There is generalized abdominal tenderness. There is rebound and guarding.   Musculoskeletal:         General: Normal range of motion.      Cervical back: Full passive range of motion without pain, normal range of motion and neck supple.     Neurological: He is alert. He has normal strength and normal reflexes. No cranial nerve deficit or sensory deficit. GCS eye subscore is 4. GCS verbal subscore is 5. GCS motor subscore is 6.   Skin: Skin is warm.       ED Course   Procedures  Labs Reviewed   CBC W/ AUTO DIFFERENTIAL - Abnormal; Notable for the following components:       Result Value    WBC 15.87 (*)     Hemoglobin 10.4 (*)      Hematocrit 32.8 (*)     MCV 65 (*)     MCH 20.5 (*)     MCHC 31.7 (*)     RDW 18.6 (*)     Platelets 843 (*)     MPV 8.9 (*)     Gran # (ANC) 11.6 (*)     Mono # 1.3 (*)     Eos # 0.6 (*)     Gran % 73.3 (*)     Lymph % 14.4 (*)     All other components within normal limits   COMPREHENSIVE METABOLIC PANEL - Abnormal; Notable for the following components:    Potassium 3.3 (*)     Chloride 91 (*)     Glucose 118 (*)     Calcium 11.0 (*)     Total Protein 9.3 (*)     Total Bilirubin 1.2 (*)     Anion Gap 18 (*)     All other components within normal limits   URINALYSIS, REFLEX TO URINE CULTURE - Abnormal; Notable for the following components:    Protein, UA 2+ (*)     Ketones, UA 2+ (*)     Bilirubin (UA) 2+ (*)     All other components within normal limits    Narrative:     Preferred Collection Type->Urine, Clean Catch  Specimen Source->Urine   URINALYSIS MICROSCOPIC - Abnormal; Notable for the following components:    WBC, UA 10 (*)     Hyaline Casts, UA 2 (*)     All other components within normal limits    Narrative:     Preferred Collection Type->Urine, Clean Catch  Specimen Source->Urine   LIPASE   TROPONIN I     EKG Readings: (Independently Interpreted)   Previous EKG: Compared with most recent EKG Previous EKG Date: 5/24/2023, 9/18/2021. Rhythm: Sinus Tachycardia. Heart Rate: 105. Ectopy: No Ectopy. Conduction: Normal. ST Segments: Normal ST Segments. T Waves: Normal. Axis: Normal. Clinical Impression: Sinus Tachycardia   Sinus tachycardia rate of 105 and no acute ischemic changes, no significant change from previous EKGs     Imaging Results              CT Abdomen Pelvis  Without Contrast (Final result)  Result time 06/11/23 14:08:51      Final result by Bere Butler MD (06/11/23 14:08:51)                   Impression:      There are mildly dilated, fluid-filled loops of small bowel seen within the pelvis possibly representing developing obstruction.      Electronically signed by: Bere Butler  MD  Date:    06/11/2023  Time:    14:08               Narrative:    EXAMINATION:  CT ABDOMEN PELVIS WITHOUT CONTRAST    CLINICAL HISTORY:  Nausea/vomiting;Postop;    TECHNIQUE:  Low dose axial images, sagittal and coronal reformations were obtained from the lung bases to the pubic symphysis.  30 mL of oral Omnipaque 350 was administered.    COMPARISON:  06/08/2023    FINDINGS:  The lung bases show atelectasis.    The liver, spleen, adrenal glands, kidneys and pancreas are unremarkable.  There is no evidence hydronephrosis.  Due to the lack of intra-abdominal body fat ureters are difficult to follow over the entirety.    The gallbladder is unremarkable.  There are fluid-filled loops of small bowel within the pelvis there are mildly dilated.  There is no evidence of abdominal nor pelvic lymphadenopathy.  Chain suture material seen in the region of the cecum.  The osseous structures are unremarkable.                                       X-Ray Chest AP Portable (Final result)  Result time 06/11/23 14:20:47      Final result by Bere Butler MD (06/11/23 14:20:47)                   Impression:      No acute abnormality.      Electronically signed by: Bere Butler MD  Date:    06/11/2023  Time:    14:20               Narrative:    EXAMINATION:  XR CHEST AP PORTABLE    CLINICAL HISTORY:  Nausea vomiting;    TECHNIQUE:  Single frontal view of the chest was performed.    COMPARISON:  02/17/2021    FINDINGS:  The lungs are clear, with normal appearance of pulmonary vasculature and no pleural effusion or pneumothorax.    The cardiac silhouette is normal in size. The hilar and mediastinal contours are unremarkable.    Bones are intact.                                    X-Rays:   Independently Interpreted Readings:   Other Readings:  Chest x-ray shows no acute findings consolidations or pneumothorax  Medications   sodium chloride 0.9% bolus 1,000 mL 1,000 mL (0 mLs Intravenous Stopped 6/11/23 6781)   0.9%  NaCl infusion  (1,000 mLs Intravenous New Bag 6/11/23 1450)   morphine injection 2 mg (2 mg Intravenous Given 6/11/23 1432)   morphine injection 2 mg (2 mg Intravenous Given 6/11/23 1636)     Medical Decision Making:   History:   Old Medical Records: I decided to obtain old medical records.  Old Records Summarized: records from clinic visits and records from previous admission(s).       <> Summary of Records: Patient postop right colectomy for adenocarcinoma of the colon approximally 2 weeks ago, was readmitted this past week for ileus and discharged yesterday.  Initial Assessment:   Patient presents with nausea vomiting abdominal pain postop, and readmitted and discharged yesterday for ileus.  Differential Diagnosis:   Bowel obstruction either small or large postop, electrolyte imbalance, colitis, infection,  Clinical Tests:   Lab Tests: Ordered  Radiological Study: Ordered  Medical Tests: Ordered  ED Management:  Patient is better, transfers in progress, will sign off to Dr. Padron.           ED Course as of 06/12/23 0624   Sun Jun 11, 2023   1413 Lipase: 19 [PW]   1414 Troponin I: 0.009 [PW]   1414 Comp. Metabolic Panel(!) [PW]   1415 CT Abdomen Pelvis  Without Contrast [PW]   1442 CBC W/ AUTO DIFFERENTIAL(!) [PW]   1526 PATIENT REFUSED NG-TUBE PLACEMENT, AWAITING TRANSFER REQUEST. [PW]   1542 Patient is sitting up in bed feeling much better now, no nausea vomiting, awaiting transfer request. [PW]   1612 Spoke with the transfer line they will accept patient back to [facility] [PW]   1632 Urinalysis Microscopic(!) [PW]      ED Course User Index  [PW] Pradip Rodriguez MD                 Clinical Impression:   Final diagnoses:  [R11.2] Nausea & vomiting  [Z90.49] Status post colectomy  [K56.609] SBO (small bowel obstruction) (Primary)        ED Disposition Condition    Transfer to Another Facility Stable                Pradip Rodriguez MD  06/12/23 0624

## 2023-06-12 PROBLEM — C18.9 COLON ADENOCARCINOMA: Status: ACTIVE | Noted: 2023-05-28

## 2023-06-12 PROBLEM — Z72.0 TOBACCO USE: Status: ACTIVE | Noted: 2023-06-12

## 2023-06-12 PROBLEM — K52.9 GASTROENTERITIS: Status: RESOLVED | Noted: 2023-06-08 | Resolved: 2023-06-12

## 2023-06-12 LAB
ALBUMIN SERPL BCP-MCNC: 3.7 G/DL (ref 3.5–5.2)
ALP SERPL-CCNC: 64 U/L (ref 55–135)
ALT SERPL W/O P-5'-P-CCNC: 13 U/L (ref 10–44)
ANION GAP SERPL CALC-SCNC: 15 MMOL/L (ref 8–16)
AST SERPL-CCNC: 12 U/L (ref 10–40)
BASOPHILS # BLD AUTO: 0.06 K/UL (ref 0–0.2)
BASOPHILS NFR BLD: 0.3 % (ref 0–1.9)
BILIRUB SERPL-MCNC: 1 MG/DL (ref 0.1–1)
BUN SERPL-MCNC: 19 MG/DL (ref 6–20)
CALCIUM SERPL-MCNC: 10 MG/DL (ref 8.7–10.5)
CHLORIDE SERPL-SCNC: 92 MMOL/L (ref 95–110)
CO2 SERPL-SCNC: 29 MMOL/L (ref 23–29)
CREAT SERPL-MCNC: 1.2 MG/DL (ref 0.5–1.4)
CRP SERPL-MCNC: 103.6 MG/L (ref 0–8.2)
DIFFERENTIAL METHOD: ABNORMAL
EOSINOPHIL # BLD AUTO: 0.7 K/UL (ref 0–0.5)
EOSINOPHIL NFR BLD: 3.7 % (ref 0–8)
ERYTHROCYTE [DISTWIDTH] IN BLOOD BY AUTOMATED COUNT: 17.8 % (ref 11.5–14.5)
EST. GFR  (NO RACE VARIABLE): >60 ML/MIN/1.73 M^2
FINAL PATHOLOGIC DIAGNOSIS: NORMAL
GLUCOSE SERPL-MCNC: 117 MG/DL (ref 70–110)
GROSS: NORMAL
HCT VFR BLD AUTO: 31.5 % (ref 40–54)
HGB BLD-MCNC: 9.5 G/DL (ref 14–18)
IMM GRANULOCYTES # BLD AUTO: 0.06 K/UL (ref 0–0.04)
IMM GRANULOCYTES NFR BLD AUTO: 0.3 % (ref 0–0.5)
LYMPHOCYTES # BLD AUTO: 2.2 K/UL (ref 1–4.8)
LYMPHOCYTES NFR BLD: 12.6 % (ref 18–48)
Lab: NORMAL
MAGNESIUM SERPL-MCNC: 1.8 MG/DL (ref 1.6–2.6)
MCH RBC QN AUTO: 20 PG (ref 27–31)
MCHC RBC AUTO-ENTMCNC: 30.2 G/DL (ref 32–36)
MCV RBC AUTO: 67 FL (ref 82–98)
MONOCYTES # BLD AUTO: 1.3 K/UL (ref 0.3–1)
MONOCYTES NFR BLD: 7.4 % (ref 4–15)
NEUTROPHILS # BLD AUTO: 13.4 K/UL (ref 1.8–7.7)
NEUTROPHILS NFR BLD: 75.7 % (ref 38–73)
NRBC BLD-RTO: 0 /100 WBC
PLATELET # BLD AUTO: 817 K/UL (ref 150–450)
PMV BLD AUTO: 9.7 FL (ref 9.2–12.9)
POCT GLUCOSE: 110 MG/DL (ref 70–110)
POTASSIUM SERPL-SCNC: 3.1 MMOL/L (ref 3.5–5.1)
PROT SERPL-MCNC: 8.4 G/DL (ref 6–8.4)
RBC # BLD AUTO: 4.74 M/UL (ref 4.6–6.2)
SODIUM SERPL-SCNC: 136 MMOL/L (ref 136–145)
WBC # BLD AUTO: 17.65 K/UL (ref 3.9–12.7)

## 2023-06-12 PROCEDURE — 80053 COMPREHEN METABOLIC PANEL: CPT | Performed by: NURSE PRACTITIONER

## 2023-06-12 PROCEDURE — 25000003 PHARM REV CODE 250: Performed by: NURSE PRACTITIONER

## 2023-06-12 PROCEDURE — 97165 OT EVAL LOW COMPLEX 30 MIN: CPT

## 2023-06-12 PROCEDURE — 25500020 PHARM REV CODE 255

## 2023-06-12 PROCEDURE — 97161 PT EVAL LOW COMPLEX 20 MIN: CPT

## 2023-06-12 PROCEDURE — 99900035 HC TECH TIME PER 15 MIN (STAT)

## 2023-06-12 PROCEDURE — 99223 1ST HOSP IP/OBS HIGH 75: CPT | Mod: ,,, | Performed by: INTERNAL MEDICINE

## 2023-06-12 PROCEDURE — 86140 C-REACTIVE PROTEIN: CPT | Performed by: SURGERY

## 2023-06-12 PROCEDURE — A9698 NON-RAD CONTRAST MATERIALNOC: HCPCS

## 2023-06-12 PROCEDURE — 63600175 PHARM REV CODE 636 W HCPCS: Performed by: NURSE PRACTITIONER

## 2023-06-12 PROCEDURE — 63600175 PHARM REV CODE 636 W HCPCS: Performed by: STUDENT IN AN ORGANIZED HEALTH CARE EDUCATION/TRAINING PROGRAM

## 2023-06-12 PROCEDURE — 36415 COLL VENOUS BLD VENIPUNCTURE: CPT | Performed by: SURGERY

## 2023-06-12 PROCEDURE — 99223 PR INITIAL HOSPITAL CARE,LEVL III: ICD-10-PCS | Mod: ,,, | Performed by: INTERNAL MEDICINE

## 2023-06-12 PROCEDURE — 85025 COMPLETE CBC W/AUTO DIFF WBC: CPT | Performed by: NURSE PRACTITIONER

## 2023-06-12 PROCEDURE — 83735 ASSAY OF MAGNESIUM: CPT | Performed by: NURSE PRACTITIONER

## 2023-06-12 PROCEDURE — 36415 COLL VENOUS BLD VENIPUNCTURE: CPT | Performed by: NURSE PRACTITIONER

## 2023-06-12 PROCEDURE — 63600175 PHARM REV CODE 636 W HCPCS: Performed by: SURGERY

## 2023-06-12 PROCEDURE — 12000002 HC ACUTE/MED SURGE SEMI-PRIVATE ROOM

## 2023-06-12 PROCEDURE — 94761 N-INVAS EAR/PLS OXIMETRY MLT: CPT

## 2023-06-12 PROCEDURE — 25000003 PHARM REV CODE 250: Performed by: STUDENT IN AN ORGANIZED HEALTH CARE EDUCATION/TRAINING PROGRAM

## 2023-06-12 RX ORDER — ENOXAPARIN SODIUM 100 MG/ML
40 INJECTION SUBCUTANEOUS EVERY 24 HOURS
Status: DISCONTINUED | OUTPATIENT
Start: 2023-06-12 | End: 2023-06-16

## 2023-06-12 RX ORDER — MORPHINE SULFATE 2 MG/ML
6 INJECTION, SOLUTION INTRAMUSCULAR; INTRAVENOUS EVERY 4 HOURS PRN
Status: DISCONTINUED | OUTPATIENT
Start: 2023-06-12 | End: 2023-06-15

## 2023-06-12 RX ORDER — SODIUM CHLORIDE, SODIUM LACTATE, POTASSIUM CHLORIDE, CALCIUM CHLORIDE 600; 310; 30; 20 MG/100ML; MG/100ML; MG/100ML; MG/100ML
INJECTION, SOLUTION INTRAVENOUS CONTINUOUS
Status: DISCONTINUED | OUTPATIENT
Start: 2023-06-12 | End: 2023-06-15

## 2023-06-12 RX ORDER — HYDROMORPHONE HYDROCHLORIDE 1 MG/ML
1 INJECTION, SOLUTION INTRAMUSCULAR; INTRAVENOUS; SUBCUTANEOUS EVERY 4 HOURS PRN
Status: DISCONTINUED | OUTPATIENT
Start: 2023-06-12 | End: 2023-06-20

## 2023-06-12 RX ORDER — PROCHLORPERAZINE EDISYLATE 5 MG/ML
10 INJECTION INTRAMUSCULAR; INTRAVENOUS ONCE
Status: COMPLETED | OUTPATIENT
Start: 2023-06-12 | End: 2023-06-12

## 2023-06-12 RX ADMIN — PROCHLORPERAZINE EDISYLATE 10 MG: 5 INJECTION INTRAMUSCULAR; INTRAVENOUS at 04:06

## 2023-06-12 RX ADMIN — MELATONIN TAB 3 MG 9 MG: 3 TAB at 12:06

## 2023-06-12 RX ADMIN — KETOROLAC TROMETHAMINE 15 MG: 30 INJECTION, SOLUTION INTRAMUSCULAR; INTRAVENOUS at 08:06

## 2023-06-12 RX ADMIN — PIPERACILLIN AND TAZOBACTAM 4.5 G: 4; .5 INJECTION, POWDER, LYOPHILIZED, FOR SOLUTION INTRAVENOUS; PARENTERAL at 11:06

## 2023-06-12 RX ADMIN — ONDANSETRON 4 MG: 2 INJECTION INTRAMUSCULAR; INTRAVENOUS at 04:06

## 2023-06-12 RX ADMIN — SODIUM CHLORIDE, POTASSIUM CHLORIDE, SODIUM LACTATE AND CALCIUM CHLORIDE: 600; 310; 30; 20 INJECTION, SOLUTION INTRAVENOUS at 11:06

## 2023-06-12 RX ADMIN — ENOXAPARIN SODIUM 40 MG: 40 INJECTION SUBCUTANEOUS at 06:06

## 2023-06-12 RX ADMIN — MORPHINE SULFATE 6 MG: 2 INJECTION, SOLUTION INTRAMUSCULAR; INTRAVENOUS at 12:06

## 2023-06-12 RX ADMIN — PIPERACILLIN AND TAZOBACTAM 4.5 G: 4; .5 INJECTION, POWDER, LYOPHILIZED, FOR SOLUTION INTRAVENOUS; PARENTERAL at 06:06

## 2023-06-12 RX ADMIN — HYDROMORPHONE HYDROCHLORIDE 1 MG: 1 INJECTION, SOLUTION INTRAMUSCULAR; INTRAVENOUS; SUBCUTANEOUS at 02:06

## 2023-06-12 RX ADMIN — HYDROMORPHONE HYDROCHLORIDE 1 MG: 1 INJECTION, SOLUTION INTRAMUSCULAR; INTRAVENOUS; SUBCUTANEOUS at 04:06

## 2023-06-12 RX ADMIN — MORPHINE SULFATE 6 MG: 2 INJECTION, SOLUTION INTRAMUSCULAR; INTRAVENOUS at 04:06

## 2023-06-12 RX ADMIN — ONDANSETRON 4 MG: 2 INJECTION INTRAMUSCULAR; INTRAVENOUS at 02:06

## 2023-06-12 RX ADMIN — KETOROLAC TROMETHAMINE 15 MG: 30 INJECTION, SOLUTION INTRAMUSCULAR; INTRAVENOUS at 09:06

## 2023-06-12 RX ADMIN — IOHEXOL 1000 ML: 9 SOLUTION ORAL at 05:06

## 2023-06-12 RX ADMIN — MORPHINE SULFATE 6 MG: 2 INJECTION, SOLUTION INTRAMUSCULAR; INTRAVENOUS at 08:06

## 2023-06-12 RX ADMIN — SODIUM CHLORIDE, POTASSIUM CHLORIDE, SODIUM LACTATE AND CALCIUM CHLORIDE: 600; 310; 30; 20 INJECTION, SOLUTION INTRAVENOUS at 09:06

## 2023-06-12 RX ADMIN — HYDROMORPHONE HYDROCHLORIDE 1 MG: 1 INJECTION, SOLUTION INTRAMUSCULAR; INTRAVENOUS; SUBCUTANEOUS at 10:06

## 2023-06-12 RX ADMIN — HYDROMORPHONE HYDROCHLORIDE 1 MG: 1 INJECTION, SOLUTION INTRAMUSCULAR; INTRAVENOUS; SUBCUTANEOUS at 06:06

## 2023-06-12 NOTE — ASSESSMENT & PLAN NOTE
Chronic  Chronic, controlled.  Latest blood pressure and vitals reviewed-     Temp:  [96.6 °F (35.9 °C)-98.4 °F (36.9 °C)]   Pulse:  []   Resp:  [16-24]   BP: (100-128)/(59-89)   SpO2:  [96 %-100 %] .   Home meds for hypertension were reviewed and noted below.       While in the hospital, will manage blood pressure as follows; Continue home antihypertensive regimen    Will utilize p.r.n. blood pressure medication only if patient's blood pressure greater than 180/110 and he develops symptoms such as worsening chest pain or shortness of breath.

## 2023-06-12 NOTE — SUBJECTIVE & OBJECTIVE
"Interval History: see "Hospital Course"    Review of Systems   Gastrointestinal:  Positive for abdominal pain, nausea and vomiting.   Allergic/Immunologic: Positive for immunocompromised state.   Objective:     Vital Signs (Most Recent):  Temp: 98.4 °F (36.9 °C) (06/12/23 0711)  Pulse: 86 (06/12/23 0711)  Resp: 17 (06/12/23 0711)  BP: (!) 142/70 (06/12/23 0711)  SpO2: 96 % (06/12/23 0711) Vital Signs (24h Range):  Temp:  [96.5 °F (35.8 °C)-99 °F (37.2 °C)] 98.4 °F (36.9 °C)  Pulse:  [] 86  Resp:  [16-24] 17  SpO2:  [96 %-100 %] 96 %  BP: (100-142)/(59-89) 142/70        There is no height or weight on file to calculate BMI.    Intake/Output Summary (Last 24 hours) at 6/12/2023 0921  Last data filed at 6/12/2023 0657  Gross per 24 hour   Intake 300.29 ml   Output 1350 ml   Net -1049.71 ml         Physical Exam  Vitals and nursing note reviewed.   Constitutional:       Appearance: He is ill-appearing.   HENT:      Head: Normocephalic and atraumatic.      Right Ear: External ear normal.      Left Ear: External ear normal.      Nose: Nose normal.      Mouth/Throat:      Mouth: Mucous membranes are moist.      Pharynx: Oropharynx is clear.   Eyes:      Extraocular Movements: Extraocular movements intact.   Cardiovascular:      Rate and Rhythm: Normal rate and regular rhythm.      Pulses: Normal pulses.      Heart sounds: Normal heart sounds.   Pulmonary:      Effort: Pulmonary effort is normal.      Breath sounds: Normal breath sounds.   Abdominal:      General: There is distension.      Tenderness: There is abdominal tenderness.      Comments: Surgical incision intact and without redness, drainage or erythema.   Musculoskeletal:         General: Normal range of motion.      Cervical back: Normal range of motion and neck supple.      Right lower leg: No edema.      Left lower leg: No edema.   Skin:     General: Skin is warm.   Neurological:      Mental Status: He is alert. Mental status is at baseline. "   Psychiatric:         Behavior: Behavior normal.           Significant Labs: All pertinent labs within the past 24 hours have been reviewed.    Significant Imaging: I have reviewed all pertinent imaging results/findings within the past 24 hours.

## 2023-06-12 NOTE — CONSULTS
HPI    47 years old male newly diagnosed colon cancer.  He was transferred from [facility] for evaluation possible developed a small-bowel obstruction.  He was complaining abdominal pain nausea and vomiting.  Workup shows leukocytosis, anemia and thrombocytosis.  CT scan from outside facility was concerning for developing small-bowel obstruction.  Patient was transferred admitted to [facility] underwent right partial colectomy on 05/29 with pathology consistent adenocarcinoma.  He was discharged then and return to hospital for another admission on 06/08/2023 where he was treated for ileus.  Medical history including type 2 diabetes anemia hypertension right eye blindness.    Past Medical History:   Diagnosis Date    Diabetes mellitus, type 2 05/2020    Patient denies    Hypertension      Past Surgical History:   Procedure Laterality Date    COLONOSCOPY N/A 5/29/2023    Procedure: COLONOSCOPY;  Surgeon: Sam Solano MD;  Location: [facility];  Service: Endoscopy;  Laterality: N/A;    EYE SURGERY Right     LUMBAR DISC SURGERY  2005    SUBTOTAL COLECTOMY Right 5/29/2023    Procedure: COLECTOMY, PARTIAL;  Surgeon: Edin Michael MD;  Location: [facility] OR;  Service: General;  Laterality: Right;     Social History     Socioeconomic History    Marital status:     Number of children: 3    Highest education level: Associate degree: academic program   Occupational History    Occupation: [employer]   Tobacco Use    Smoking status: Every Day     Packs/day: 0.50     Years: 23.00     Pack years: 11.50     Types: Cigarettes    Smokeless tobacco: Never   Substance and Sexual Activity    Alcohol use: Not Currently     Comment: Quit drinking 2 yrs ago    Drug use: Yes     Types: Marijuana     Comment: heavy marijuana use    Sexual activity: Yes     Social Determinants of Health     Financial Resource Strain: Low Risk     Difficulty of Paying Living Expenses: Not hard at all   Food  Insecurity: No Food Insecurity    Worried About Running Out of Food in the Last Year: Never true    Ran Out of Food in the Last Year: Never true   Transportation Needs: No Transportation Needs    Lack of Transportation (Medical): No    Lack of Transportation (Non-Medical): No   Physical Activity: Sufficiently Active    Days of Exercise per Week: 5 days    Minutes of Exercise per Session: 90 min   Stress: No Stress Concern Present    Feeling of Stress : Not at all   Social Connections: Socially Isolated    Frequency of Communication with Friends and Family: Never    Frequency of Social Gatherings with Friends and Family: More than three times a week    Attends Evangelical Services: Never    Active Member of Clubs or Organizations: No    Attends Club or Organization Meetings: Never    Marital Status:    Housing Stability: Unknown    Unable to Pay for Housing in the Last Year: No    Unstable Housing in the Last Year: No     Physical exam  Vitals:    06/12/23 1134   BP: 127/67   Pulse: 107   Resp: 18   Temp: 97.5 °F (36.4 °C)     Constitutional:       Appearance: He is ill-appearing.   HENT:      Head: Normocephalic and atraumatic.      Right Ear: External ear normal.      Left Ear: External ear normal.      Nose: Nose normal.      Mouth/Throat:      Mouth: Mucous membranes are moist.      Pharynx: Oropharynx is clear.   Eyes:      Extraocular Movements: Extraocular movements intact.   Cardiovascular:      Rate and Rhythm: Normal rate and regular rhythm.      Pulses: Normal pulses.      Heart sounds: Normal heart sounds.   Pulmonary:      Effort: Pulmonary effort is normal.      Breath sounds: Normal breath sounds.   Abdominal:      General: There is distension.      Tenderness: There is abdominal tenderness.      Comments: Surgical incision intact and without redness, drainage or erythema.   Musculoskeletal:         General: Normal range of motion.      Cervical back: Normal range of motion and neck supple.       Right lower leg: No edema.      Left lower leg: No edema.   Skin:     General: Skin is warm.   Neurological:      Mental Status: He is alert. Mental status is at baseline.   Psychiatric:         Behavior: Behavior normal    Lab Results   Component Value Date    WBC 17.65 (H) 06/12/2023    HGB 9.5 (L) 06/12/2023    HCT 31.5 (L) 06/12/2023    MCV 67 (L) 06/12/2023     (H) 06/12/2023       CMP  Sodium   Date Value Ref Range Status   06/12/2023 136 136 - 145 mmol/L Final     Potassium   Date Value Ref Range Status   06/12/2023 3.1 (L) 3.5 - 5.1 mmol/L Final     Chloride   Date Value Ref Range Status   06/12/2023 92 (L) 95 - 110 mmol/L Final     CO2   Date Value Ref Range Status   06/12/2023 29 23 - 29 mmol/L Final     Glucose   Date Value Ref Range Status   06/12/2023 117 (H) 70 - 110 mg/dL Final     BUN   Date Value Ref Range Status   06/12/2023 19 6 - 20 mg/dL Final     Creatinine   Date Value Ref Range Status   06/12/2023 1.2 0.5 - 1.4 mg/dL Final     Calcium   Date Value Ref Range Status   06/12/2023 10.0 8.7 - 10.5 mg/dL Final     Total Protein   Date Value Ref Range Status   06/12/2023 8.4 6.0 - 8.4 g/dL Final     Albumin   Date Value Ref Range Status   06/12/2023 3.7 3.5 - 5.2 g/dL Final     Total Bilirubin   Date Value Ref Range Status   06/12/2023 1.0 0.1 - 1.0 mg/dL Final     Comment:     For infants and newborns, interpretation of results should be based  on gestational age, weight and in agreement with clinical  observations.    Premature Infant recommended reference ranges:  Up to 24 hours.............<8.0 mg/dL  Up to 48 hours............<12.0 mg/dL  3-5 days..................<15.0 mg/dL  6-29 days.................<15.0 mg/dL       Alkaline Phosphatase   Date Value Ref Range Status   06/12/2023 64 55 - 135 U/L Final     AST   Date Value Ref Range Status   06/12/2023 12 10 - 40 U/L Final     ALT   Date Value Ref Range Status   06/12/2023 13 10 - 44 U/L Final     Anion Gap   Date Value Ref Range  Status   06/12/2023 15 8 - 16 mmol/L Final     eGFR   Date Value Ref Range Status   06/12/2023 >60 >60 mL/min/1.73 m^2 Final     Pathology biopsy  1. Colon mass, site not further specified (biopsy):   Invasive adenocarcinoma, moderately differentiated     Immunohistochemistry (IHC) Testing for Mismatch Repair (MMR) Proteins:   MLH1, MSH2, MSH6, PMS2 - Intact nuclear expression   Background nonneoplastic tissue/internal control with intact nuclear expression     There are exceptions to the above IHC interpretations. These results should not be considered in isolation, and clinical correlation with genetic counseling is recommended to assess the need for germline testing.     Interpretation: No loss of nuclear expression of MMR proteins: low probability of microsatellite instability       2. Colon, sigmoid polyps (polypectomy):   Tubular adenoma, 1 fragment     Assessment Plan    Invasive adenocarcinoma moderately differentiated.  MSI testing intact nuclear expression.  Status post surgery secondary to bowel obstruction 05/29/2023 pathology pending.  He is admitted to the hospital with ileus on this admission.    Surgery consultation appreciated.    CT scan on 06/11/2023 shows mildly dilated fluid-filled loop of small bowel within the pelvis possibly representing developing obstruction.    Thrombocytosis reactive    Anemia microcytic iron labs pending    > pain control  > IV fluid  > surgery to follow  > will wait for surgical pathology.  Oncology out patient follow up

## 2023-06-12 NOTE — PT/OT/SLP EVAL
Occupational Therapy   Evaluation and Discharge Note    Name: Ruslan Caldwell  MRN: [id number]  Admitting Diagnosis: Small bowel obstruction  Recent Surgery: * No surgery found *      Recommendations:     Discharge Recommendations: home  Discharge Equipment Recommendations: none  Barriers to discharge:  None    Assessment:     Ruslan Caldwell is a 47 y.o. male with a medical diagnosis of Small bowel obstruction. At this time, patient is independent with ADLs. Patient does not require further acute OT services.     Plan:     During this hospitalization, patient does not require further acute OT services.  Please re-consult if situation changes.    Plan of Care Reviewed with: patient, family    Subjective     Chief Complaint: none  Patient/Family Comments/goals: none    Occupational Profile:  Living Environment: Patient lives with sister.   Previous level of function: Patient is independent with ADLs and mobility.   Equipment Used at home: none  Assistance upon Discharge: Patient will receive assistance from sister if needed.     Pain/Comfort:  Pain Rating 1: 0/10  Pain Rating Post-Intervention 1: 0/10    Patients cultural, spiritual, Caodaism conflicts given the current situation: no    Objective:     Communicated with: nurse Johnson prior to session.  Patient found up in chair with telemetry upon OT entry to room.    General Precautions: Standard, fall  Orthopedic Precautions: N/A  Braces: N/A  Respiratory Status: Room air     Occupational Performance:    Bed Mobility:    Not assessed; patient seated in chair upon arrival.     Functional Mobility/Transfers:  Patient completed Sit <> Stand Transfer with independence  with  no assistive device   Patient completed Toilet Transfer Stand Pivot technique with independence with  no AD    Activities of Daily Living:  Grooming: independence with oral and facial hygiene while standing at sink  Lower Body Dressing: independence to don/doff socks while seated in  chair    Cognitive/Visual Perceptual:  Cognitive/Psychosocial Skills:     -       Oriented to: x4   -       Follows Commands/attention:Follows multistep  commands  -       Communication: clear/fluent  -       Safety awareness/insight to disability: intact   -       Mood/Affect/Coping skills/emotional control: Appropriate to situation and Cooperative  Visual/Perceptual:      -Intact     Physical Exam:  Postural examination/scapula alignment:    -       Rounded shoulders  -       Forward head  Upper Extremity Range of Motion:     -       Right Upper Extremity: WFL  -       Left Upper Extremity: WFL  Upper Extremity Strength:    -       Right Upper Extremity: WFL  -       Left Upper Extremity: WFL   Strength:    -       Right Upper Extremity: WFL  -       Left Upper Extremity: WFL  Fine Motor Coordination:    -       Intact  Gross motor coordination:   WFL    AMPAC 6 Click ADL:  AMPAC Total Score: 24    Treatment & Education:  OT ed pt on OT role & POC as well as discharge recommendations.      Patient left up in chair with  nurse notified and family present    GOALS:   Multidisciplinary Problems       Occupational Therapy Goals       Not on file                    History:     Past Medical History:   Diagnosis Date    Diabetes mellitus, type 2 05/2020    Patient denies    Hypertension          Past Surgical History:   Procedure Laterality Date    COLONOSCOPY N/A 5/29/2023    Procedure: COLONOSCOPY;  Surgeon: Sam Solano MD;  Location: [facility] ENDO;  Service: Endoscopy;  Laterality: N/A;    EYE SURGERY Right     LUMBAR DISC SURGERY  2005    SUBTOTAL COLECTOMY Right 5/29/2023    Procedure: COLECTOMY, PARTIAL;  Surgeon: Edin Michael MD;  Location: [facility] OR;  Service: General;  Laterality: Right;       Time Tracking:     OT Date of Treatment: 06/12/23  OT Start Time: 1118  OT Stop Time: 1128  OT Total Time (min): 10 min    Billable Minutes:Evaluation 10    6/12/2023

## 2023-06-12 NOTE — ASSESSMENT & PLAN NOTE
Acute problem.  -NPO  -IV fluids   -Dilaudid PRN  -Zofran PRN  -Unable to place NG tube  -Surgery consulted

## 2023-06-12 NOTE — CONSULTS
[facility]  Adult Nutrition  Consult Note    SUMMARY      Recommendations  1) Advance diet slowly  -full liquids + Boost plus TID when medically able  -when appropriate for solids start with low fiber ( unless not needed per GI/surgery)  2) Weigh weekly   3) Nutrition education and handout given    4) If SBO persits and unable to advance to full liquids in < 4 days consider PPN D 5 AA 4.25 @ 90 ml/hr + standard lipids   ( 91 g protein, 1234 kcal)     Goals: 1) PO Intakes > 50% of meals on full liquid diet at f/u  Nutrition Goal Status: new  Communication of RD Recs:  (POC, sticky note)     Assessment and Plan        Moderate malnutrition  Malnutrition Type:  Context: social/environmental circumstances  Level: moderate     Related to (etiology):   Pain, nausea, altered GI function     Signs and Symptoms (as evidenced by):      Malnutrition Characteristic Summary:  Weight Loss (Malnutrition): greater than 7.5% in 3 months  Energy Intake (Malnutrition): less than 75% for greater than or equal to 3 months        Interventions/Recommendations (treatment strategy):  Collaboration with other providers  1) Advance diet slowly  -full liquids + Boost plus TID when medically able  -when appropriate for solids start with low fiber ( unless not needed per GI/surgery)  2) Weigh weekly   3) Nutrition education and handout given  4) If SBO persits and unable to advance to full liquids in < 4 days consider PPN D 5 AA 4.25 @ 90 ml/hr + standard lipids   ( 91 g protein, 1234 kcal)       Nutrition Diagnosis Status:   New           Malnutrition Assessment  Malnutrition Context: social/environmental circumstances  Malnutrition Level: moderate  Skin (Micronutrient):  (Mitul = 20)  Teeth (Micronutrient): none   Micronutrient Evaluation Summary: no deficiencies   Weight Loss (Malnutrition): greater than 7.5% in 3 months ( per chart review in < 1 month)  Energy Intake (Malnutrition): less than 75% for greater than or  "equal to 3 months   Reason for Assessment     Reason For Assessment: Consult  Diagnosis:  (SBO)  Relevant Medical History: CA s/p R. Colectomy 23, HTN, DM2- pt denies ( A1C 5), R. eye blindness  Interdisciplinary Rounds: not attended     General Information Comments: 48 y/o male admitted with SBO. Was just admittied with ileus s/p recent colon surgery last month. Since surgery pt has had a hard time advancing diet, feels distended and has N/V. Surgery to see. PTA pt says that he has had decreased appetite r/t pain and nausea for the past couple months along with ~41 lb wt loss. NFPE WDL 23. Significant wt loss per chart review.     Nutrition Discharge Planning: To be determined- DM 1800 kcal, low sodium diet     Nutrition Risk Screen     Nutrition Risk Screen: reduced oral intake over the last month, unintentional loss of 10 lbs or more in the past 2 months     Nutrition/Diet History     Patient Reported Diet/Restrictions/Preferences: general  Spiritual, Cultural Beliefs, Baptism Practices, Values that Affect Care: no  Food Allergies: NKFA  Factors Affecting Nutritional Intake: altered gastrointestinal function, NPO     Anthropometrics    Height Method: Stated  Height: 6' 3"  Height (inches): 75 in  Weight Method: Bed Scale  Weight: 102.1 kg (23)  Weight (lb): 225 lb  Ideal Body Weight (IBW), Male: 196 lb  BMI (Calculated): 28.1 kg/m2  BMI Grade: 25 - 29.9 - overweight  Weight Loss: unintentional  Usual Body Weight (UBW), k.4 kg (~ 3 months ago per pt)  Wt Readings from Last 30 Encounters:   23 102.1 kg (225 lb)   23 102 kg (224 lb 13.9 oz)   23 102.1 kg (225 lb)   23 102.9 kg (226 lb 13.7 oz)   23 120.2 kg (265 lb)   23 120.2 kg (265 lb)        Lab/Procedures/Meds     Pertinent Labs Reviewed: reviewed  BMP  Lab Results   Component Value Date     2023    K 3.1 (L) 2023    CL 92 (L) 2023    CO2 29 2023    BUN 19 2023    " CREATININE 1.2 06/12/2023    CALCIUM 10.0 06/12/2023    ANIONGAP 15 06/12/2023    EGFRNORACEVR >60 06/12/2023     Lab Results   Component Value Date    ALBUMIN 3.7 06/12/2023     Recent Labs   Lab 06/12/23  0038   POCTGLUCOSE 110     Lab Results   Component Value Date    HGBA1C 5.0 06/08/2023       Pertinent Medications Reviewed: reviewed  Continuous Infusions:   lactated ringers 125 mL/hr at 06/12/23 1151   zofran     Estimated/Assessed Needs     Weight Used For Calorie Calculations: 102.1 kg (224 lb 13.9 oz)  Energy Calorie Requirements (kcal): MSJ ( x 1.2) = 2390 kcal  Energy Need Method: [place]  Protein Requirements: 0.8-1 g protein/kg (  g)  Weight Used For Protein Calculations: 102 kg (224 lb 13.9 oz)  Fluid Requirements (mL): 2400 ml or per MD  Estimated Fluid Requirement Method: RDA Method  CHO Requirement: 268-328 g        Nutrition Prescription Ordered     Current Diet Order: NPO x 1 day     Evaluation of Received Nutrient/Fluid Intake     Energy Calories Required: not meeting needs  Protein Required: not meeting needs  Fluid Required: exceeding needs  Tolerance: NPO       Intake/Output Summary (Last 24 hours) at 6/12/2023 1500  Last data filed at 6/12/2023 0657  Gross per 24 hour   Intake 300.29 ml   Output 1350 ml   Net -1049.71 ml   IVF @ 125 ml/hr       % Intake of Estimated Energy Needs: 0%  % Meal Intake: NPO     Nutrition Risk     Level of Risk/Frequency of Follow-up:  (2 x weekly)      Monitor and Evaluation     Food and Nutrient Intake: energy intake  Food and Nutrient Adminstration: diet order, enteral and parenteral nutrition administration  Nutrition knowledge  Anthropometric Measurements: weight  Biochemical Data, Medical Tests and Procedures: electrolyte and renal panel, gastrointestinal profile, glucose/endocrine profile  Nutrition-Focused Physical Findings: overall appearance      Nutrition Follow-Up     RD Follow-up?: Yes

## 2023-06-12 NOTE — SUBJECTIVE & OBJECTIVE
Past Medical History:   Diagnosis Date    Diabetes mellitus, type 2 05/2020    Patient denies    Hypertension        Past Surgical History:   Procedure Laterality Date    COLONOSCOPY N/A 5/29/2023    Procedure: COLONOSCOPY;  Surgeon: Sam Solano MD;  Location: [facility];  Service: Endoscopy;  Laterality: N/A;    EYE SURGERY Right     LUMBAR DISC SURGERY  2005    SUBTOTAL COLECTOMY Right 5/29/2023    Procedure: COLECTOMY, PARTIAL;  Surgeon: Edin Michael MD;  Location: [facility] OR;  Service: General;  Laterality: Right;       Review of patient's allergies indicates:  No Known Allergies    Current Facility-Administered Medications on File Prior to Encounter   Medication    [COMPLETED] 0.9%  NaCl infusion    [COMPLETED] morphine injection 2 mg    [COMPLETED] morphine injection 2 mg    [COMPLETED] sodium chloride 0.9% bolus 1,000 mL 1,000 mL    [DISCONTINUED] piperacillin-tazobactam (ZOSYN) 4.5 g in dextrose 5 % in water (D5W) 5 % 100 mL IVPB (MB+)     Current Outpatient Medications on File Prior to Encounter   Medication Sig    gabapentin (NEURONTIN) 300 MG capsule Take 1 capsule (300 mg total) by mouth 2 (two) times daily. for 14 days    HYDROcodone-acetaminophen (NORCO)  mg per tablet Take 1 tablet by mouth every 6 (six) hours as needed.    ondansetron (ZOFRAN) 4 MG tablet Take 1 tablet (4 mg total) by mouth every 6 (six) hours. for 4 days     Family History       Problem Relation (Age of Onset)    Diabetes Mother, Maternal Grandfather    No Known Problems Father          Tobacco Use    Smoking status: Every Day     Packs/day: 0.50     Years: 23.00     Pack years: 11.50     Types: Cigarettes    Smokeless tobacco: Never   Substance and Sexual Activity    Alcohol use: Not Currently     Comment: Quit drinking 2 yrs ago    Drug use: Yes     Types: Marijuana     Comment: heavy marijuana use    Sexual activity: Yes     Review of Systems   Constitutional:  Positive for activity change and appetite change.  Negative for chills, diaphoresis and fever.   HENT:  Negative for congestion, nosebleeds and tinnitus.    Eyes:  Negative for photophobia and visual disturbance.   Respiratory:  Negative for cough, chest tightness, shortness of breath and wheezing.    Cardiovascular:  Negative for chest pain, palpitations and leg swelling.   Gastrointestinal:  Positive for abdominal distention, abdominal pain and nausea. Negative for constipation, diarrhea and vomiting.   Endocrine: Negative for cold intolerance and heat intolerance.   Genitourinary:  Negative for difficulty urinating, dysuria, frequency, hematuria and urgency.   Musculoskeletal:  Negative for arthralgias, back pain and myalgias.   Skin:  Negative for pallor, rash and wound.   Allergic/Immunologic: Negative for immunocompromised state.   Neurological:  Negative for dizziness, tremors, facial asymmetry, speech difficulty and weakness.   Hematological:  Negative for adenopathy. Does not bruise/bleed easily.   Psychiatric/Behavioral:  Negative for confusion and sleep disturbance. The patient is not nervous/anxious.    Objective:     Vital Signs (Most Recent):  Temp: 96.6 °F (35.9 °C) (06/11/23 1922)  Pulse: 80 (06/11/23 1922)  Resp: 18 (06/11/23 1922)  BP: 128/60 (06/11/23 1922)  SpO2: 99 % (06/11/23 1922) Vital Signs (24h Range):  Temp:  [96.6 °F (35.9 °C)-98.4 °F (36.9 °C)] 96.6 °F (35.9 °C)  Pulse:  [] 80  Resp:  [18-24] 18  SpO2:  [96 %-100 %] 99 %  BP: (100-128)/(59-89) 128/60        There is no height or weight on file to calculate BMI.     Physical Exam  Vitals and nursing note reviewed.   Constitutional:       General: He is not in acute distress.     Appearance: He is well-developed. He is not diaphoretic.   HENT:      Head: Normocephalic.      Mouth/Throat:      Mouth: Mucous membranes are dry.   Eyes:      General: No scleral icterus.     Conjunctiva/sclera: Conjunctivae normal.      Pupils: Pupils are equal, round, and reactive to light.   Neck:       Vascular: No JVD.   Cardiovascular:      Rate and Rhythm: Normal rate and regular rhythm.      Heart sounds: Normal heart sounds. No murmur heard.    No friction rub. No gallop.   Pulmonary:      Effort: Pulmonary effort is normal. No respiratory distress.      Breath sounds: Normal breath sounds. No wheezing or rales.   Abdominal:      General: Bowel sounds are normal. There is distension.      Palpations: Abdomen is soft.      Tenderness: There is abdominal tenderness. There is no guarding or rebound.      Comments: Surgical incision intact and without redness, drainage or erythema   Musculoskeletal:         General: No tenderness. Normal range of motion.      Cervical back: Normal range of motion and neck supple.   Lymphadenopathy:      Cervical: No cervical adenopathy.   Skin:     General: Skin is warm and dry.      Capillary Refill: Capillary refill takes less than 2 seconds.      Coloration: Skin is not pale.      Findings: No erythema or rash.   Neurological:      Mental Status: He is alert and oriented to person, place, and time.      Cranial Nerves: No cranial nerve deficit.      Sensory: No sensory deficit.      Coordination: Coordination normal.      Deep Tendon Reflexes: Reflexes normal.   Psychiatric:         Behavior: Behavior normal.         Thought Content: Thought content normal.         Judgment: Judgment normal.            CRANIAL NERVES     CN III, IV, VI   Pupils are equal, round, and reactive to light.     Significant Labs: All pertinent labs within the past 24 hours have been reviewed.  CBC:   Recent Labs   Lab 06/10/23  0420 06/11/23  1332   WBC 13.20* 15.87*   HGB 8.3* 10.4*   HCT 26.9* 32.8*   * 843*     CMP:   Recent Labs   Lab 06/10/23  0420 06/11/23  1332    136   K 3.7 3.3*   CL 98 91*   CO2 26 27   * 118*   BUN 12 18   CREATININE 0.9 1.4   CALCIUM 9.5 11.0*   PROT 7.1 9.3*   ALBUMIN 3.1* 4.1   BILITOT 0.8 1.2*   ALKPHOS 61 74   AST 21 15   ALT 16 17   ANIONGAP 12  18*       Significant Imaging: I have reviewed all pertinent imaging results/findings within the past 24 hours.    CT Abd  FINDINGS:  The lung bases show atelectasis.     The liver, spleen, adrenal glands, kidneys and pancreas are unremarkable.  There is no evidence hydronephrosis.  Due to the lack of intra-abdominal body fat ureters are difficult to follow over the entirety.     The gallbladder is unremarkable.  There are fluid-filled loops of small bowel within the pelvis there are mildly dilated.  There is no evidence of abdominal nor pelvic lymphadenopathy.  Chain suture material seen in the region of the cecum.  The osseous structures are unremarkable.     Impression:     There are mildly dilated, fluid-filled loops of small bowel seen within the pelvis possibly representing developing obstruction.

## 2023-06-12 NOTE — PROGRESS NOTES
"[facility] Medicine  Progress Note    Patient Name: Ruslan Caldwell  MRN: [id number]  Patient Class: IP- Inpatient   Admission Date: 6/11/2023  Length of Stay: 0 days  Attending Physician: Moo Boone MD  Primary Care Provider: Amira Knutson NP        Subjective:     Principal Problem:Small bowel obstruction        HPI:  Ruslan Caldwell is a 47-year-old male who presents in transfer from [facility] for evaluation of possible developing small bowel obstruction.  Patient presents outside facility complaining of abdominal pain with nausea and vomiting.  Workup at outside facility demonstrated leukocytosis of 16,000, anemia, and thrombocytosis of 843.  CMP with hypokalemia 3.3 and total bilirubin of 1.2.  CT scan at outside facility was concerning for developing small-bowel obstruction.  Patient was admitted to [facility] where he underwent right partial colectomy on 05/29 with pathology consistent with adenocarcinoma.  He was discharged and then came back for another admission on 06/08 where he was treated for an ileus.  Previous medical history includes type 2 diabetes, anemia, hypertension, and right eye blindness.  Patient admitted to Hospital Medicine for treatment management.  Will maintain patient NPO, IV fluids, sliding scale insulin p.r.n., and general surgery consult in a.m..      Overview/Hospital Course:  Ruslan Caldwell is a 47 year old male with a past medical history of recently diagnosed colonic adenocarcinoma s/p partial colectomy, tobacco use, anemia and thrombocytosis who presented with persistent abdominal pain, nausea and abdominal pain concerning for SBO. An NG tube was unable to be placed. He is on IV fluids and Zosyn. PRN IV analgesics and antiemetics have been ordered and General Surgery has been consulted. Oncology has also been consulted.      Interval History: see "Hospital Course"    Review of Systems   Gastrointestinal:  Positive for " abdominal pain, nausea and vomiting.   Allergic/Immunologic: Positive for immunocompromised state.   Objective:     Vital Signs (Most Recent):  Temp: 98.4 °F (36.9 °C) (06/12/23 0711)  Pulse: 86 (06/12/23 0711)  Resp: 17 (06/12/23 0711)  BP: (!) 142/70 (06/12/23 0711)  SpO2: 96 % (06/12/23 0711) Vital Signs (24h Range):  Temp:  [96.5 °F (35.8 °C)-99 °F (37.2 °C)] 98.4 °F (36.9 °C)  Pulse:  [] 86  Resp:  [16-24] 17  SpO2:  [96 %-100 %] 96 %  BP: (100-142)/(59-89) 142/70        There is no height or weight on file to calculate BMI.    Intake/Output Summary (Last 24 hours) at 6/12/2023 0921  Last data filed at 6/12/2023 0657  Gross per 24 hour   Intake 300.29 ml   Output 1350 ml   Net -1049.71 ml         Physical Exam  Vitals and nursing note reviewed.   Constitutional:       Appearance: He is ill-appearing.   HENT:      Head: Normocephalic and atraumatic.      Right Ear: External ear normal.      Left Ear: External ear normal.      Nose: Nose normal.      Mouth/Throat:      Mouth: Mucous membranes are moist.      Pharynx: Oropharynx is clear.   Eyes:      Extraocular Movements: Extraocular movements intact.   Cardiovascular:      Rate and Rhythm: Normal rate and regular rhythm.      Pulses: Normal pulses.      Heart sounds: Normal heart sounds.   Pulmonary:      Effort: Pulmonary effort is normal.      Breath sounds: Normal breath sounds.   Abdominal:      General: There is distension.      Tenderness: There is abdominal tenderness.      Comments: Surgical incision intact and without redness, drainage or erythema.   Musculoskeletal:         General: Normal range of motion.      Cervical back: Normal range of motion and neck supple.      Right lower leg: No edema.      Left lower leg: No edema.   Skin:     General: Skin is warm.   Neurological:      Mental Status: He is alert. Mental status is at baseline.   Psychiatric:         Behavior: Behavior normal.           Significant Labs: All pertinent labs within the  past 24 hours have been reviewed.    Significant Imaging: I have reviewed all pertinent imaging results/findings within the past 24 hours.      Assessment/Plan:      * Small bowel obstruction  Acute problem.  -NPO  -IV fluids   -Dilaudid PRN  -Zofran PRN  -Unable to place NG tube  -Surgery consulted        Colon adenocarcinoma  Recent partial colectomy.  -Oncology consulted      Tobacco use  -Patient to be counseled on cessation      Thrombocytosis  Chronic. Stable. In setting of recently diagnosed adenocarcinoma and SBO.  -Trend platelets with CBC      Microcytic anemia  -Check iron panel  -Trend Hgb with CBC      Essential hypertension  Patient not taking any BP medications.  -Continue to monitor        VTE Risk Mitigation (From admission, onward)         Ordered     enoxaparin injection 40 mg  Every 24 hours         06/12/23 0915     Place DINA hose  Until discontinued         06/11/23 2043     IP VTE HIGH RISK PATIENT  Once         06/11/23 2043     Place sequential compression device  Until discontinued         06/11/23 2043                Discharge Planning   GABRIEL: 6/18/2023    Code Status: Full Code   Is the patient medically ready for discharge?:     Reason for patient still in hospital (select all that apply): Patient trending condition, Treatment and Consult recommendations                     Moo Boone MD  Department of Hospital Medicine   [facility]

## 2023-06-12 NOTE — ASSESSMENT & PLAN NOTE
Repeat CT scan with oral contrast.  I suspect this is more of a pain control problem for him although he does have a leukocytosis.  Oral contrast will help elucidate leak, obstruction.    I spoke with Dr. Bull about this case

## 2023-06-12 NOTE — NURSING
Nurses Note -- 4 Eyes      6/11/2023   9:24 PM      Skin assessed during: Admit      [] No Altered Skin Integrity Present    []Prevention Measures Documented      [x] Yes- Altered Skin Integrity Present or Discovered   [] LDA Added if Not in Epic (Describe Wound)   [x] New Altered Skin Integrity was Present on Admit and Documented in LDA   [] Wound Image Taken    Wound Care Consulted? No    Attending Nurse:  Kandace Mahan RN     Second RN/Staff Member:  HERMINIA Ellington rn

## 2023-06-12 NOTE — ASSESSMENT & PLAN NOTE
Patient's FSGs are controlled on current medication regimen.  Last A1c reviewed-   Lab Results   Component Value Date    HGBA1C 5.0 06/08/2023     Most recent fingerstick glucose reviewed- No results for input(s): POCTGLUCOSE in the last 24 hours.  Current correctional scale  Medium  Maintain anti-hyperglycemic dose as follows-   Antihyperglycemics (From admission, onward)    Start     Stop Route Frequency Ordered    06/11/23 2141  insulin aspart U-100 pen 1-10 Units         -- SubQ Before meals & nightly PRN 06/11/23 2043        Hold Oral hypoglycemics while patient is in the hospital.

## 2023-06-12 NOTE — ASSESSMENT & PLAN NOTE
Chronic. Stable. In setting of recently diagnosed adenocarcinoma and SBO.  -Trend platelets with CBC

## 2023-06-12 NOTE — ASSESSMENT & PLAN NOTE
Chronic  Trend     [Initial Evaluation] : an initial evaluation [Patient] : patient [Mother] : mother [[email or web address]] : Scoliosis evaluation

## 2023-06-12 NOTE — ASSESSMENT & PLAN NOTE
Moderate malnutrition  Malnutrition Type:  Context: social/environmental circumstances  Level: moderate     Related to (etiology):   Pain, nausea, altered GI function     Signs and Symptoms (as evidenced by):      Malnutrition Characteristic Summary:  Weight Loss (Malnutrition): greater than 7.5% in 3 months  Energy Intake (Malnutrition): less than 75% for greater than or equal to 3 months        Interventions/Recommendations (treatment strategy):  Collaboration with other providers  1) Advance diet slowly  -full liquids + Boost plus TID when medically able  -when appropriate for solids start with low fiber ( unless not needed per GI/surgery)  2) Weigh weekly   3) Nutrition education and handout given  4) If SBO persits and unable to advance to full liquids in < 4 days consider PPN D 5 AA 4.25 @ 90 ml/hr + standard lipids   ( 91 g protein, 1234 kcal)        Nutrition Diagnosis Status:   New

## 2023-06-12 NOTE — H&P
[facility] Hospital Medicine  History & Physical    Patient Name: Ruslan Caldwell  MRN: [id number]  Patient Class: OP- Observation  Admission Date: 6/11/2023  Attending Physician: Moo Boone MD   Primary Care Provider: Amira Knutson NP         Patient information was obtained from patient, spouse/SO, past medical records and ER records.     Subjective:     Principal Problem:Small bowel obstruction    Chief Complaint: No chief complaint on file.       HPI: Ruslan Caldwell is a 47-year-old male who presents in transfer from [facility] for evaluation of possible developing small bowel obstruction.  Patient presents outside facility complaining of abdominal pain with nausea and vomiting.  Workup at outside facility demonstrated leukocytosis of 16,000, anemia, and thrombocytosis of 843.  CMP with hypokalemia 3.3 and total bilirubin of 1.2.  CT scan at outside facility was concerning for developing small-bowel obstruction.  Patient was admitted to [facility] where he underwent right partial colectomy on 05/29 with pathology consistent with adenocarcinoma.  He was discharged and then came back for another admission on 06/08 where he was treated for an ileus.  Previous medical history includes type 2 diabetes, anemia, hypertension, and right eye blindness.  Patient admitted to Hospital Medicine for treatment management.  Will maintain patient NPO, IV fluids, sliding scale insulin p.r.n., and general surgery consult in a.m..      Past Medical History:   Diagnosis Date    Diabetes mellitus, type 2 05/2020    Patient denies    Hypertension        Past Surgical History:   Procedure Laterality Date    COLONOSCOPY N/A 5/29/2023    Procedure: COLONOSCOPY;  Surgeon: Sam Solano MD;  Location: [facility];  Service: Endoscopy;  Laterality: N/A;    EYE SURGERY Right     LUMBAR DISC SURGERY  2005    SUBTOTAL COLECTOMY Right 5/29/2023    Procedure: COLECTOMY, PARTIAL;  Surgeon:  Edin Michael MD;  Location: [facility];  Service: General;  Laterality: Right;       Review of patient's allergies indicates:  No Known Allergies    Current Facility-Administered Medications on File Prior to Encounter   Medication    [COMPLETED] 0.9%  NaCl infusion    [COMPLETED] morphine injection 2 mg    [COMPLETED] morphine injection 2 mg    [COMPLETED] sodium chloride 0.9% bolus 1,000 mL 1,000 mL    [DISCONTINUED] piperacillin-tazobactam (ZOSYN) 4.5 g in dextrose 5 % in water (D5W) 5 % 100 mL IVPB (MB+)     Current Outpatient Medications on File Prior to Encounter   Medication Sig    gabapentin (NEURONTIN) 300 MG capsule Take 1 capsule (300 mg total) by mouth 2 (two) times daily. for 14 days    HYDROcodone-acetaminophen (NORCO)  mg per tablet Take 1 tablet by mouth every 6 (six) hours as needed.    ondansetron (ZOFRAN) 4 MG tablet Take 1 tablet (4 mg total) by mouth every 6 (six) hours. for 4 days     Family History       Problem Relation (Age of Onset)    Diabetes Mother, Maternal Grandfather    No Known Problems Father          Tobacco Use    Smoking status: Every Day     Packs/day: 0.50     Years: 23.00     Pack years: 11.50     Types: Cigarettes    Smokeless tobacco: Never   Substance and Sexual Activity    Alcohol use: Not Currently     Comment: Quit drinking 2 yrs ago    Drug use: Yes     Types: Marijuana     Comment: heavy marijuana use    Sexual activity: Yes     Review of Systems   Constitutional:  Positive for activity change and appetite change. Negative for chills, diaphoresis and fever.   HENT:  Negative for congestion, nosebleeds and tinnitus.    Eyes:  Negative for photophobia and visual disturbance.   Respiratory:  Negative for cough, chest tightness, shortness of breath and wheezing.    Cardiovascular:  Negative for chest pain, palpitations and leg swelling.   Gastrointestinal:  Positive for abdominal distention, abdominal pain and nausea. Negative for constipation, diarrhea and  vomiting.   Endocrine: Negative for cold intolerance and heat intolerance.   Genitourinary:  Negative for difficulty urinating, dysuria, frequency, hematuria and urgency.   Musculoskeletal:  Negative for arthralgias, back pain and myalgias.   Skin:  Negative for pallor, rash and wound.   Allergic/Immunologic: Negative for immunocompromised state.   Neurological:  Negative for dizziness, tremors, facial asymmetry, speech difficulty and weakness.   Hematological:  Negative for adenopathy. Does not bruise/bleed easily.   Psychiatric/Behavioral:  Negative for confusion and sleep disturbance. The patient is not nervous/anxious.    Objective:     Vital Signs (Most Recent):  Temp: 96.6 °F (35.9 °C) (06/11/23 1922)  Pulse: 80 (06/11/23 1922)  Resp: 18 (06/11/23 1922)  BP: 128/60 (06/11/23 1922)  SpO2: 99 % (06/11/23 1922) Vital Signs (24h Range):  Temp:  [96.6 °F (35.9 °C)-98.4 °F (36.9 °C)] 96.6 °F (35.9 °C)  Pulse:  [] 80  Resp:  [18-24] 18  SpO2:  [96 %-100 %] 99 %  BP: (100-128)/(59-89) 128/60        There is no height or weight on file to calculate BMI.     Physical Exam  Vitals and nursing note reviewed.   Constitutional:       General: He is not in acute distress.     Appearance: He is well-developed. He is not diaphoretic.   HENT:      Head: Normocephalic.      Mouth/Throat:      Mouth: Mucous membranes are dry.   Eyes:      General: No scleral icterus.     Conjunctiva/sclera: Conjunctivae normal.      Pupils: Pupils are equal, round, and reactive to light.   Neck:      Vascular: No JVD.   Cardiovascular:      Rate and Rhythm: Normal rate and regular rhythm.      Heart sounds: Normal heart sounds. No murmur heard.    No friction rub. No gallop.   Pulmonary:      Effort: Pulmonary effort is normal. No respiratory distress.      Breath sounds: Normal breath sounds. No wheezing or rales.   Abdominal:      General: Bowel sounds are normal. There is distension.      Palpations: Abdomen is soft.      Tenderness:  There is abdominal tenderness. There is no guarding or rebound.      Comments: Surgical incision intact and without redness, drainage or erythema   Musculoskeletal:         General: No tenderness. Normal range of motion.      Cervical back: Normal range of motion and neck supple.   Lymphadenopathy:      Cervical: No cervical adenopathy.   Skin:     General: Skin is warm and dry.      Capillary Refill: Capillary refill takes less than 2 seconds.      Coloration: Skin is not pale.      Findings: No erythema or rash.   Neurological:      Mental Status: He is alert and oriented to person, place, and time.      Cranial Nerves: No cranial nerve deficit.      Sensory: No sensory deficit.      Coordination: Coordination normal.      Deep Tendon Reflexes: Reflexes normal.   Psychiatric:         Behavior: Behavior normal.         Thought Content: Thought content normal.         Judgment: Judgment normal.            CRANIAL NERVES     CN III, IV, VI   Pupils are equal, round, and reactive to light.     Significant Labs: All pertinent labs within the past 24 hours have been reviewed.  CBC:   Recent Labs   Lab 06/10/23  0420 06/11/23  1332   WBC 13.20* 15.87*   HGB 8.3* 10.4*   HCT 26.9* 32.8*   * 843*     CMP:   Recent Labs   Lab 06/10/23  0420 06/11/23  1332    136   K 3.7 3.3*   CL 98 91*   CO2 26 27   * 118*   BUN 12 18   CREATININE 0.9 1.4   CALCIUM 9.5 11.0*   PROT 7.1 9.3*   ALBUMIN 3.1* 4.1   BILITOT 0.8 1.2*   ALKPHOS 61 74   AST 21 15   ALT 16 17   ANIONGAP 12 18*       Significant Imaging: I have reviewed all pertinent imaging results/findings within the past 24 hours.    CT Abd  FINDINGS:  The lung bases show atelectasis.     The liver, spleen, adrenal glands, kidneys and pancreas are unremarkable.  There is no evidence hydronephrosis.  Due to the lack of intra-abdominal body fat ureters are difficult to follow over the entirety.     The gallbladder is unremarkable.  There are fluid-filled loops  of small bowel within the pelvis there are mildly dilated.  There is no evidence of abdominal nor pelvic lymphadenopathy.  Chain suture material seen in the region of the cecum.  The osseous structures are unremarkable.     Impression:     There are mildly dilated, fluid-filled loops of small bowel seen within the pelvis possibly representing developing obstruction.         Assessment/Plan:     * Small bowel obstruction  Acute problem   NPO  IV fluids   Dilaudid p.r.n.   Zofran p.r.n.   NG tube p.r.n. excessive vomiting   Appreciate recommendations from General surgery         Thrombocytosis  Chronic  Trend      Colonic mass  Acute   Follow surgery recommendations      Essential hypertension  Chronic  Chronic, controlled.  Latest blood pressure and vitals reviewed-     Temp:  [96.6 °F (35.9 °C)-98.4 °F (36.9 °C)]   Pulse:  []   Resp:  [16-24]   BP: (100-128)/(59-89)   SpO2:  [96 %-100 %] .   Home meds for hypertension were reviewed and noted below.       While in the hospital, will manage blood pressure as follows; Continue home antihypertensive regimen    Will utilize p.r.n. blood pressure medication only if patient's blood pressure greater than 180/110 and he develops symptoms such as worsening chest pain or shortness of breath.      Type 2 diabetes mellitus without complication, without long-term current use of insulin  Patient's FSGs are controlled on current medication regimen.  Last A1c reviewed-   Lab Results   Component Value Date    HGBA1C 5.0 06/08/2023     Most recent fingerstick glucose reviewed- No results for input(s): POCTGLUCOSE in the last 24 hours.  Current correctional scale  Medium  Maintain anti-hyperglycemic dose as follows-   Antihyperglycemics (From admission, onward)      Start     Stop Route Frequency Ordered    06/11/23 2141  insulin aspart U-100 pen 1-10 Units         -- SubQ Before meals & nightly PRN 06/11/23 2043          Hold Oral hypoglycemics while patient is in the  [facility].      VTE Risk Mitigation (From admission, onward)           Ordered     Place DINA hose  Until discontinued         06/11/23 2043     IP VTE HIGH RISK PATIENT  Once         06/11/23 2043     Place sequential compression device  Until discontinued         06/11/23 2043                         On 06/11/2023, patient should be placed in hospital observation services under my care in collaboration with Moo Boone MD.      Cristian Gan NP  Department of Hospital Medicine  [facility]

## 2023-06-12 NOTE — HPI
47-year-old male well known to me.  Presents with abdominal pain nausea and vomiting.  CT was suspicious for obstruction.  Currently he says back pain was much worse than his abdominal pain.  He says he has been having trouble controlling his pain at home.  He denies any recent flatus.  He does not have an NG tube and he is not vomiting currently.  He does not feel nauseated currently.

## 2023-06-12 NOTE — CARE UPDATE
06/11/23 2105   Patient Assessment/Suction   Level of Consciousness (AVPU) alert   Respiratory Effort Unlabored   Expansion/Accessory Muscles/Retractions no retractions   All Lung Fields Breath Sounds Anterior:;Lateral:;equal bilaterally   Rhythm/Pattern, Respiratory pattern regular   Cough Frequency no cough   Cough Type no productive sputum   PRE-TX-O2   Device (Oxygen Therapy) room air   SpO2 98 %   Pulse Oximetry Type Intermittent   $ Pulse Oximetry - Multiple Charge Pulse Oximetry - Multiple   Pulse 78   Resp 16   Aerosol Therapy   $ Aerosol Therapy Charges PRN treatment not required   Respiratory Treatment Status (SVN) PRN treatment not required

## 2023-06-12 NOTE — PROGRESS NOTES
[facility]  General Surgery  Progress Note    Subjective:     History of Present Illness:  47-year-old male well known to me.  Presents with abdominal pain nausea and vomiting.  CT was suspicious for obstruction.  Currently he says back pain was much worse than his abdominal pain.  He says he has been having trouble controlling his pain at home.  He denies any recent flatus.  He does not have an NG tube and he is not vomiting currently.  He does not feel nauseated currently.      Post-Op Info:  * No surgery found *         Current Facility-Administered Medications on File Prior to Encounter   Medication    [COMPLETED] morphine injection 2 mg    [DISCONTINUED] piperacillin-tazobactam (ZOSYN) 4.5 g in dextrose 5 % in water (D5W) 5 % 100 mL IVPB (MB+)     Current Outpatient Medications on File Prior to Encounter   Medication Sig    gabapentin (NEURONTIN) 300 MG capsule Take 1 capsule (300 mg total) by mouth 2 (two) times daily. for 14 days    HYDROcodone-acetaminophen (NORCO)  mg per tablet Take 1 tablet by mouth every 6 (six) hours as needed.    ondansetron (ZOFRAN) 4 MG tablet Take 1 tablet (4 mg total) by mouth every 6 (six) hours. for 4 days       Review of patient's allergies indicates:  No Known Allergies    Past Medical History:   Diagnosis Date    Diabetes mellitus, type 2 05/2020    Patient denies    Hypertension      Past Surgical History:   Procedure Laterality Date    COLONOSCOPY N/A 5/29/2023    Procedure: COLONOSCOPY;  Surgeon: Sam Solano MD;  Location: [facility] ENDO;  Service: Endoscopy;  Laterality: N/A;    EYE SURGERY Right     LUMBAR DISC SURGERY  2005    SUBTOTAL COLECTOMY Right 5/29/2023    Procedure: COLECTOMY, PARTIAL;  Surgeon: Edin Michael MD;  Location: [facility] OR;  Service: General;  Laterality: Right;     Family History       Problem Relation (Age of Onset)    Diabetes Mother, Maternal Grandfather    No Known Problems Father          Tobacco Use     Smoking status: Every Day     Packs/day: 0.50     Years: 23.00     Pack years: 11.50     Types: Cigarettes    Smokeless tobacco: Never   Substance and Sexual Activity    Alcohol use: Not Currently     Comment: Quit drinking 2 yrs ago    Drug use: Yes     Types: Marijuana     Comment: heavy marijuana use    Sexual activity: Yes     Review of Systems   Gastrointestinal:  Positive for abdominal pain, nausea and vomiting.   Allergic/Immunologic: Positive for immunocompromised state.   All other systems reviewed and are negative.  Objective:     Vital Signs (Most Recent):  Temp: 97.5 °F (36.4 °C) (06/12/23 1516)  Pulse: 83 (06/12/23 1516)  Resp: 17 (06/12/23 1516)  BP: 134/74 (06/12/23 1516)  SpO2: 96 % (06/12/23 1516) Vital Signs (24h Range):  Temp:  [96.5 °F (35.8 °C)-99 °F (37.2 °C)] 97.5 °F (36.4 °C)  Pulse:  [] 83  Resp:  [16-18] 17  SpO2:  [96 %-100 %] 96 %  BP: (121-142)/(60-81) 134/74        There is no height or weight on file to calculate BMI.     Physical Exam  Vitals and nursing note reviewed.   Constitutional:       Appearance: He is ill-appearing.   HENT:      Head: Normocephalic and atraumatic.      Right Ear: External ear normal.      Left Ear: External ear normal.      Nose: Nose normal.      Mouth/Throat:      Mouth: Mucous membranes are moist.      Pharynx: Oropharynx is clear.   Eyes:      Extraocular Movements: Extraocular movements intact.   Cardiovascular:      Rate and Rhythm: Normal rate and regular rhythm.      Pulses: Normal pulses.      Heart sounds: Normal heart sounds.   Pulmonary:      Effort: Pulmonary effort is normal.      Breath sounds: Normal breath sounds.   Abdominal:      General: There is distension.      Palpations: Abdomen is soft. There is no mass.      Tenderness: There is no abdominal tenderness.      Hernia: No hernia is present.      Comments: Surgical incision intact and without redness, drainage or erythema.   Musculoskeletal:         General: Normal range of  motion.      Cervical back: Normal range of motion and neck supple.      Right lower leg: No edema.      Left lower leg: No edema.   Skin:     General: Skin is warm.   Neurological:      Mental Status: He is alert. Mental status is at baseline.   Psychiatric:         Behavior: Behavior normal.          I have reviewed all pertinent lab results within the past 24 hours.  CBC:   Recent Labs   Lab 06/12/23  0430   WBC 17.65*   RBC 4.74   HGB 9.5*   HCT 31.5*   *   MCV 67*   MCH 20.0*   MCHC 30.2*     BMP:   Recent Labs   Lab 06/12/23  0430   *      K 3.1*   CL 92*   CO2 29   BUN 19   CREATININE 1.2   CALCIUM 10.0   MG 1.8       Significant Diagnostics:  I have reviewed all pertinent imaging results/findings within the past 24 hours.  CT: I have reviewed all pertinent results/findings within the past 24 hours and my personal findings are:  Mildly dilated stomach and small bowel with fluid.      Assessment/Plan:     * Small bowel obstruction  Repeat CT scan with oral contrast.  I suspect this is more of a pain control problem for him although he does have a leukocytosis.  Oral contrast will help elucidate leak, obstruction.    I spoke with Dr. Bull about this case        Edin Michael MD  General Surgery  [facility]

## 2023-06-12 NOTE — SUBJECTIVE & OBJECTIVE
Current Facility-Administered Medications on File Prior to Encounter   Medication    [COMPLETED] morphine injection 2 mg    [DISCONTINUED] piperacillin-tazobactam (ZOSYN) 4.5 g in dextrose 5 % in water (D5W) 5 % 100 mL IVPB (MB+)     Current Outpatient Medications on File Prior to Encounter   Medication Sig    gabapentin (NEURONTIN) 300 MG capsule Take 1 capsule (300 mg total) by mouth 2 (two) times daily. for 14 days    HYDROcodone-acetaminophen (NORCO)  mg per tablet Take 1 tablet by mouth every 6 (six) hours as needed.    ondansetron (ZOFRAN) 4 MG tablet Take 1 tablet (4 mg total) by mouth every 6 (six) hours. for 4 days       Review of patient's allergies indicates:  No Known Allergies    Past Medical History:   Diagnosis Date    Diabetes mellitus, type 2 05/2020    Patient denies    Hypertension      Past Surgical History:   Procedure Laterality Date    COLONOSCOPY N/A 5/29/2023    Procedure: COLONOSCOPY;  Surgeon: Sam Solano MD;  Location: [facility] ENDO;  Service: Endoscopy;  Laterality: N/A;    EYE SURGERY Right     LUMBAR DISC SURGERY  2005    SUBTOTAL COLECTOMY Right 5/29/2023    Procedure: COLECTOMY, PARTIAL;  Surgeon: Edin Michael MD;  Location: [facility] OR;  Service: General;  Laterality: Right;     Family History       Problem Relation (Age of Onset)    Diabetes Mother, Maternal Grandfather    No Known Problems Father          Tobacco Use    Smoking status: Every Day     Packs/day: 0.50     Years: 23.00     Pack years: 11.50     Types: Cigarettes    Smokeless tobacco: Never   Substance and Sexual Activity    Alcohol use: Not Currently     Comment: Quit drinking 2 yrs ago    Drug use: Yes     Types: Marijuana     Comment: heavy marijuana use    Sexual activity: Yes     Review of Systems   Gastrointestinal:  Positive for abdominal pain, nausea and vomiting.   Allergic/Immunologic: Positive for immunocompromised state.   All other systems reviewed and are negative.  Objective:     Vital Signs  (Most Recent):  Temp: 97.5 °F (36.4 °C) (06/12/23 1516)  Pulse: 83 (06/12/23 1516)  Resp: 17 (06/12/23 1516)  BP: 134/74 (06/12/23 1516)  SpO2: 96 % (06/12/23 1516) Vital Signs (24h Range):  Temp:  [96.5 °F (35.8 °C)-99 °F (37.2 °C)] 97.5 °F (36.4 °C)  Pulse:  [] 83  Resp:  [16-18] 17  SpO2:  [96 %-100 %] 96 %  BP: (121-142)/(60-81) 134/74        There is no height or weight on file to calculate BMI.     Physical Exam  Vitals and nursing note reviewed.   Constitutional:       Appearance: He is ill-appearing.   HENT:      Head: Normocephalic and atraumatic.      Right Ear: External ear normal.      Left Ear: External ear normal.      Nose: Nose normal.      Mouth/Throat:      Mouth: Mucous membranes are moist.      Pharynx: Oropharynx is clear.   Eyes:      Extraocular Movements: Extraocular movements intact.   Cardiovascular:      Rate and Rhythm: Normal rate and regular rhythm.      Pulses: Normal pulses.      Heart sounds: Normal heart sounds.   Pulmonary:      Effort: Pulmonary effort is normal.      Breath sounds: Normal breath sounds.   Abdominal:      General: There is distension.      Palpations: Abdomen is soft. There is no mass.      Tenderness: There is no abdominal tenderness.      Hernia: No hernia is present.      Comments: Surgical incision intact and without redness, drainage or erythema.   Musculoskeletal:         General: Normal range of motion.      Cervical back: Normal range of motion and neck supple.      Right lower leg: No edema.      Left lower leg: No edema.   Skin:     General: Skin is warm.   Neurological:      Mental Status: He is alert. Mental status is at baseline.   Psychiatric:         Behavior: Behavior normal.          I have reviewed all pertinent lab results within the past 24 hours.  CBC:   Recent Labs   Lab 06/12/23  0430   WBC 17.65*   RBC 4.74   HGB 9.5*   HCT 31.5*   *   MCV 67*   MCH 20.0*   MCHC 30.2*     BMP:   Recent Labs   Lab 06/12/23  0430   *   NA  136   K 3.1*   CL 92*   CO2 29   BUN 19   CREATININE 1.2   CALCIUM 10.0   MG 1.8       Significant Diagnostics:  I have reviewed all pertinent imaging results/findings within the past 24 hours.  CT: I have reviewed all pertinent results/findings within the past 24 hours and my personal findings are:  Mildly dilated stomach and small bowel with fluid.

## 2023-06-12 NOTE — NURSING
QING Abad, NP notified that patient started vomiting approx 45 min ago and has vomited 1200 ml despite being given zofran when he started vomiting. Also informed NP that pt was transferred here from [facility] where they tried multiple times to insert an NG tube and were unsuccessful.

## 2023-06-12 NOTE — HPI
Ruslan Caldwell is a 47-year-old male who presents in transfer from [facility] for evaluation of possible developing small bowel obstruction.  Patient presents outside facility complaining of abdominal pain with nausea and vomiting.  Workup at outside facility demonstrated leukocytosis of 16,000, anemia, and thrombocytosis of 843.  CMP with hypokalemia 3.3 and total bilirubin of 1.2.  CT scan at outside facility was concerning for developing small-bowel obstruction.  Patient was admitted to [facility] where he underwent right partial colectomy on 05/29 with pathology consistent with adenocarcinoma.  He was discharged and then came back for another admission on 06/08 where he was treated for an ileus.  Previous medical history includes type 2 diabetes, anemia, hypertension, and right eye blindness.  Patient admitted to Hospital Medicine for treatment management.  Will maintain patient NPO, IV fluids, sliding scale insulin p.r.n., and general surgery consult in a.m..

## 2023-06-12 NOTE — PLAN OF CARE
1) Advance diet slowly  -full liquids + Boost plus TID when medically able  -when appropriate for solids start with low fiber ( unless not needed per GI/surgery)  2) Weigh weekly   3) Nutrition education and handout given    4) If SBO persits and unable to advance to full liquids in < 4 days consider PPN D 5 AA 4.25 @ 90 ml/hr + standard lipids   ( 91 g protein, 1234 kcal)

## 2023-06-12 NOTE — HOSPITAL COURSE
Ruslan Caldwell is a 47 year old male with a past medical history of recently diagnosed colonic adenocarcinoma s/p partial colectomy with anastomosis, tobacco use, microcytic anemia and thrombocytosis who presented with persistent abdominal pain, nausea and abdominal pain concerning for SBO. An NG tube was unable to be placed. He was placed on IV fluids and Zosyn. He was made NPO. PRN IV analgesics and antiemetics were ordered and General Surgery was consulted. Repeat CT A/P shows SBO 6/13. A gastrografin enema 6/14 was inconclusive. Surgery took the patient to the OR 6/15 for exploratory laparotomy where an omental infarction was addressed; the patient also underwent SCOUT and partial small bowel resection with anastomosis. PPN was started 6/16 and later weaned off. His course has been complicated by JOYCE in setting of vomiting as well as Toradol use; IV fluids were continued and his kidney function has improved to baseline. Nephrology has been consulted. He was also discovered to have an iron deficiency for which IV iron (one dose) was ordered 6/13. Oncology has also been consulted and will arrange follow up in clinic. His post-operative course has been complicated by low grade fever, tachycardia and an increasing WBC count, despite Zosyn use. Antibiotics were broadened to vancomycin, cefepime and Flagyl. Blood cultures were ordered as well as a CXR and urine culture. CXR showed free air below diaphragm. He was taken back to the OR on 06/17 for exploratory laparotomy which was negative for obvious perforated viscus but an intra-abdominal abscess was seen and drained.  Patient had NG tube after surgery.  This was removed a few days after surgery and diet was advanced.  Patient had rising creatinine on 06/23 and was started on IV fluids.  Nephrology continued to follow. He desired to leave 6/24 despite elevating white count and creatinine. He left AMA. Pain meds and abx sent to his pharmacy and strongly recommended to f/u  outpatient.

## 2023-06-12 NOTE — PT/OT/SLP EVAL
Physical Therapy Evaluation and Discharge Note    Patient Name:  Ruslan Caldwell   MRN:  [id number]    Recommendations:     Discharge Recommendations: home  Discharge Equipment Recommendations: none   Barriers to discharge: None    Assessment:     Ruslan Caldwell is a 47 y.o. male admitted with a medical diagnosis of Small bowel obstruction. Patient sitting up in chair with family present and is agreeable to participation with PT evaluation. He lives with family and is independent at baseline. He performs sit to stand transfer independently and ambulated 250' with no AD and supervision. He is agreeable to remain sitting up in chair upon return to room with all needs met and dietician present. At this time, patient is functioning at their prior level of function and does not require further acute PT services.     Recent Surgery: * No surgery found *      Plan:     During this hospitalization, patient does not require further acute PT services.  Please re-consult if situation changes.      Subjective   Patient had partial colectomy on [facility] and was hospitalized again on 6/8 with ileus which he reports never resolved   Chief Complaint: a lot of vomiting   Patient/Family Comments/goals: none verbalized   Pain/Comfort:  Pain Rating 1: 0/10    Patients cultural, spiritual, Druze conflicts given the current situation:      Living Environment:  Patient lives with family in a [facility] with no JOSTIN  Prior to admission, patients level of function was independent. 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   Equipment used at home: none.  DME owned (not currently used): none.  Upon discharge, patient will have assistance from family.    Objective:     Communicated with VIRI Johnson prior to session.  Patient found up in chair with telemetry upon PT entry to room.    General Precautions: Standard, fall    Orthopedic Precautions:N/A   Braces: N/A  Respiratory Status: Room air    Exams:  RLE Strength: WNL  LLE  Strength: WNL    Functional Mobility:  Transfers:     Sit to Stand:  independence with no AD  Gait: 250' with no AD and supervision    AM-PAC 6 CLICK MOBILITY  Total Score:24       Treatment and Education:  Patient was educated on the importance of OOB activity and functional mobility to negate negative effects of prolonged bed rest during hospitalization, safe transfers and ambulation, up in chair and ambulation as tolerated to help with ileus, and D/C planning     AM-PAC 6 CLICK MOBILITY  Total Score:24     Patient left up in chair with  dietician and family present.    GOALS:   Multidisciplinary Problems       Physical Therapy Goals       Not on file                    History:     Past Medical History:   Diagnosis Date    Diabetes mellitus, type 2 05/2020    Patient denies    Hypertension        Past Surgical History:   Procedure Laterality Date    COLONOSCOPY N/A 5/29/2023    Procedure: COLONOSCOPY;  Surgeon: Sam Solano MD;  Location: [facility];  Service: Endoscopy;  Laterality: N/A;    EYE SURGERY Right     LUMBAR DISC SURGERY  2005    SUBTOTAL COLECTOMY Right 5/29/2023    Procedure: COLECTOMY, PARTIAL;  Surgeon: Edin Michael MD;  Location: [facility] OR;  Service: General;  Laterality: Right;       Time Tracking:     PT Received On: 06/12/23  PT Start Time: 1037     PT Stop Time: 1054  PT Total Time (min): 17 min     Billable Minutes: Evaluation 17      06/12/2023

## 2023-06-12 NOTE — PLAN OF CARE
[facility]  Initial Discharge Assessment       Primary Care Provider: Amira Knutson NP    Admission Diagnosis: Abdominal pain [R10.9]    Admission Date: 6/11/2023  Expected Discharge Date:     Transition of Care Barriers: None    Payor: /     Extended Emergency Contact Information  Primary Emergency Contact: PauletteJan  Address: [address] [place] [address] [place]  Mobile Phone: [phone]  Relation: Sister  Preferred language: English   needed? No  Secondary Emergency Contact: Lita Bhardwaj  Mobile Phone: [phone]  Relation: Significant other  Preferred language: English   needed? No    Discharge Plan A: Home  Discharge Plan B: Home with family      [facility] - [place], [place] - [address]  [address]  [place] [address]  Phone: [phone] Fax: [phone]    DC assessment completed at bedside with pt and sisterJan. Information on facesheet verified. Lives at listed address with sister who will drive him home. PCP verified. Pharm is [facility] in [place]. Denies coumadin, HH, HD, DME. Drives self. No insurance. Denies POA. Has recent admission. CM following for DC planning.     Initial Assessment (most recent)       Adult Discharge Assessment - 06/12/23 1216          Discharge Assessment    Assessment Type Discharge Planning Assessment     Confirmed/corrected address, phone number and insurance Yes     Confirmed Demographics Correct on Facesheet     Source of Information patient;family     Does patient/caregiver understand observation status Yes     Communicated GABRIEL with patient/caregiver Yes     People in Home sibling(s)     Facility Arrived From: home     Do you expect to return to your current living situation? Yes     Do you have help at home or someone to help you manage your care at home? Yes     Who are your caregiver(s) and their phone number(s)? sister     Prior to hospitilization  cognitive status: Alert/Oriented     Current cognitive status: Alert/Oriented     Equipment Currently Used at Home none     Readmission within 30 days? No     Patient currently being followed by outpatient case management? No     Do you currently have service(s) that help you manage your care at home? No     Do you take prescription medications? Yes     Do you have prescription coverage? No     Do you have any problems affording any of your prescribed medications? TBD     Is the patient taking medications as prescribed? yes     Who is going to help you get home at discharge? sister, Jan     How do you get to doctors appointments? car, drives self     Are you on dialysis? No     Do you take coumadin? No     Discharge Plan A Home     Discharge Plan B Home with family     DME Needed Upon Discharge  none     Discharge Plan discussed with: Patient;Sibling     Name(s) and Number(s) Jan @ bedside     Transition of Care Barriers None

## 2023-06-12 NOTE — ASSESSMENT & PLAN NOTE
Acute problem   NPO  IV fluids   Dilaudid p.r.n.   Zofran p.r.n.   NG tube p.r.n. excessive vomiting   Appreciate recommendations from General surgery

## 2023-06-13 PROBLEM — N17.9 AKI (ACUTE KIDNEY INJURY): Status: ACTIVE | Noted: 2023-06-13

## 2023-06-13 PROBLEM — E87.6 HYPOKALEMIA: Status: ACTIVE | Noted: 2023-06-13

## 2023-06-13 LAB
ALBUMIN SERPL BCP-MCNC: 3.8 G/DL (ref 3.5–5.2)
ALP SERPL-CCNC: 65 U/L (ref 55–135)
ALT SERPL W/O P-5'-P-CCNC: 13 U/L (ref 10–44)
ANION GAP SERPL CALC-SCNC: 18 MMOL/L (ref 8–16)
AST SERPL-CCNC: 14 U/L (ref 10–40)
BASOPHILS # BLD AUTO: 0.09 K/UL (ref 0–0.2)
BASOPHILS NFR BLD: 0.5 % (ref 0–1.9)
BILIRUB SERPL-MCNC: 1.1 MG/DL (ref 0.1–1)
BUN SERPL-MCNC: 24 MG/DL (ref 6–20)
CALCIUM SERPL-MCNC: 10.5 MG/DL (ref 8.7–10.5)
CHLORIDE SERPL-SCNC: 86 MMOL/L (ref 95–110)
CO2 SERPL-SCNC: 35 MMOL/L (ref 23–29)
CREAT SERPL-MCNC: 1.7 MG/DL (ref 0.5–1.4)
DIFFERENTIAL METHOD: ABNORMAL
EOSINOPHIL # BLD AUTO: 0.9 K/UL (ref 0–0.5)
EOSINOPHIL NFR BLD: 5.5 % (ref 0–8)
ERYTHROCYTE [DISTWIDTH] IN BLOOD BY AUTOMATED COUNT: 17.8 % (ref 11.5–14.5)
EST. GFR  (NO RACE VARIABLE): 50 ML/MIN/1.73 M^2
FERRITIN SERPL-MCNC: 31 NG/ML (ref 20–300)
GLUCOSE SERPL-MCNC: 103 MG/DL (ref 70–110)
HCT VFR BLD AUTO: 31.8 % (ref 40–54)
HGB BLD-MCNC: 9.7 G/DL (ref 14–18)
IMM GRANULOCYTES # BLD AUTO: 0.05 K/UL (ref 0–0.04)
IMM GRANULOCYTES NFR BLD AUTO: 0.3 % (ref 0–0.5)
IRON SERPL-MCNC: 11 UG/DL (ref 45–160)
LYMPHOCYTES # BLD AUTO: 2.5 K/UL (ref 1–4.8)
LYMPHOCYTES NFR BLD: 15.1 % (ref 18–48)
MAGNESIUM SERPL-MCNC: 2 MG/DL (ref 1.6–2.6)
MCH RBC QN AUTO: 20.3 PG (ref 27–31)
MCHC RBC AUTO-ENTMCNC: 30.5 G/DL (ref 32–36)
MCV RBC AUTO: 67 FL (ref 82–98)
MONOCYTES # BLD AUTO: 1.5 K/UL (ref 0.3–1)
MONOCYTES NFR BLD: 9.1 % (ref 4–15)
NEUTROPHILS # BLD AUTO: 11.4 K/UL (ref 1.8–7.7)
NEUTROPHILS NFR BLD: 69.5 % (ref 38–73)
NRBC BLD-RTO: 0 /100 WBC
PLATELET # BLD AUTO: 822 K/UL (ref 150–450)
PMV BLD AUTO: 9.7 FL (ref 9.2–12.9)
POTASSIUM SERPL-SCNC: 3.1 MMOL/L (ref 3.5–5.1)
PROT SERPL-MCNC: 8.8 G/DL (ref 6–8.4)
RBC # BLD AUTO: 4.78 M/UL (ref 4.6–6.2)
SATURATED IRON: 3 % (ref 20–50)
SODIUM SERPL-SCNC: 139 MMOL/L (ref 136–145)
TOTAL IRON BINDING CAPACITY: 394 UG/DL (ref 250–450)
TRANSFERRIN SERPL-MCNC: 266 MG/DL (ref 200–375)
WBC # BLD AUTO: 16.46 K/UL (ref 3.9–12.7)

## 2023-06-13 PROCEDURE — 36415 COLL VENOUS BLD VENIPUNCTURE: CPT | Performed by: STUDENT IN AN ORGANIZED HEALTH CARE EDUCATION/TRAINING PROGRAM

## 2023-06-13 PROCEDURE — 84466 ASSAY OF TRANSFERRIN: CPT | Performed by: STUDENT IN AN ORGANIZED HEALTH CARE EDUCATION/TRAINING PROGRAM

## 2023-06-13 PROCEDURE — 80053 COMPREHEN METABOLIC PANEL: CPT | Performed by: STUDENT IN AN ORGANIZED HEALTH CARE EDUCATION/TRAINING PROGRAM

## 2023-06-13 PROCEDURE — 63600175 PHARM REV CODE 636 W HCPCS: Performed by: NURSE PRACTITIONER

## 2023-06-13 PROCEDURE — 63600175 PHARM REV CODE 636 W HCPCS: Performed by: STUDENT IN AN ORGANIZED HEALTH CARE EDUCATION/TRAINING PROGRAM

## 2023-06-13 PROCEDURE — 12000002 HC ACUTE/MED SURGE SEMI-PRIVATE ROOM

## 2023-06-13 PROCEDURE — 83735 ASSAY OF MAGNESIUM: CPT | Performed by: STUDENT IN AN ORGANIZED HEALTH CARE EDUCATION/TRAINING PROGRAM

## 2023-06-13 PROCEDURE — 82728 ASSAY OF FERRITIN: CPT | Performed by: STUDENT IN AN ORGANIZED HEALTH CARE EDUCATION/TRAINING PROGRAM

## 2023-06-13 PROCEDURE — 94761 N-INVAS EAR/PLS OXIMETRY MLT: CPT

## 2023-06-13 PROCEDURE — 85025 COMPLETE CBC W/AUTO DIFF WBC: CPT | Performed by: STUDENT IN AN ORGANIZED HEALTH CARE EDUCATION/TRAINING PROGRAM

## 2023-06-13 PROCEDURE — 99900035 HC TECH TIME PER 15 MIN (STAT)

## 2023-06-13 PROCEDURE — 25000003 PHARM REV CODE 250: Performed by: STUDENT IN AN ORGANIZED HEALTH CARE EDUCATION/TRAINING PROGRAM

## 2023-06-13 RX ORDER — POTASSIUM CHLORIDE 7.45 MG/ML
10 INJECTION INTRAVENOUS ONCE
Status: COMPLETED | OUTPATIENT
Start: 2023-06-13 | End: 2023-06-13

## 2023-06-13 RX ADMIN — MORPHINE SULFATE 6 MG: 2 INJECTION, SOLUTION INTRAMUSCULAR; INTRAVENOUS at 06:06

## 2023-06-13 RX ADMIN — PIPERACILLIN AND TAZOBACTAM 4.5 G: 4; .5 INJECTION, POWDER, LYOPHILIZED, FOR SOLUTION INTRAVENOUS; PARENTERAL at 02:06

## 2023-06-13 RX ADMIN — MORPHINE SULFATE 6 MG: 2 INJECTION, SOLUTION INTRAMUSCULAR; INTRAVENOUS at 05:06

## 2023-06-13 RX ADMIN — PROMETHAZINE HYDROCHLORIDE 12.5 MG: 25 INJECTION INTRAMUSCULAR; INTRAVENOUS at 08:06

## 2023-06-13 RX ADMIN — KETOROLAC TROMETHAMINE 15 MG: 30 INJECTION, SOLUTION INTRAMUSCULAR; INTRAVENOUS at 04:06

## 2023-06-13 RX ADMIN — MORPHINE SULFATE 6 MG: 2 INJECTION, SOLUTION INTRAMUSCULAR; INTRAVENOUS at 02:06

## 2023-06-13 RX ADMIN — PIPERACILLIN AND TAZOBACTAM 4.5 G: 4; .5 INJECTION, POWDER, LYOPHILIZED, FOR SOLUTION INTRAVENOUS; PARENTERAL at 06:06

## 2023-06-13 RX ADMIN — PROMETHAZINE HYDROCHLORIDE 12.5 MG: 25 INJECTION INTRAMUSCULAR; INTRAVENOUS at 07:06

## 2023-06-13 RX ADMIN — POTASSIUM CHLORIDE 10 MEQ: 7.46 INJECTION, SOLUTION INTRAVENOUS at 02:06

## 2023-06-13 RX ADMIN — HYDROMORPHONE HYDROCHLORIDE 1 MG: 1 INJECTION, SOLUTION INTRAMUSCULAR; INTRAVENOUS; SUBCUTANEOUS at 04:06

## 2023-06-13 RX ADMIN — HYDROMORPHONE HYDROCHLORIDE 1 MG: 1 INJECTION, SOLUTION INTRAMUSCULAR; INTRAVENOUS; SUBCUTANEOUS at 02:06

## 2023-06-13 RX ADMIN — IRON SUCROSE 300 MG: 20 INJECTION, SOLUTION INTRAVENOUS at 12:06

## 2023-06-13 RX ADMIN — HYDROMORPHONE HYDROCHLORIDE 1 MG: 1 INJECTION, SOLUTION INTRAMUSCULAR; INTRAVENOUS; SUBCUTANEOUS at 12:06

## 2023-06-13 RX ADMIN — HYDROMORPHONE HYDROCHLORIDE 1 MG: 1 INJECTION, SOLUTION INTRAMUSCULAR; INTRAVENOUS; SUBCUTANEOUS at 08:06

## 2023-06-13 RX ADMIN — HYDROMORPHONE HYDROCHLORIDE 1 MG: 1 INJECTION, SOLUTION INTRAMUSCULAR; INTRAVENOUS; SUBCUTANEOUS at 07:06

## 2023-06-13 RX ADMIN — PIPERACILLIN AND TAZOBACTAM 4.5 G: 4; .5 INJECTION, POWDER, LYOPHILIZED, FOR SOLUTION INTRAVENOUS; PARENTERAL at 10:06

## 2023-06-13 RX ADMIN — ONDANSETRON 4 MG: 2 INJECTION INTRAMUSCULAR; INTRAVENOUS at 02:06

## 2023-06-13 RX ADMIN — ONDANSETRON 4 MG: 2 INJECTION INTRAMUSCULAR; INTRAVENOUS at 04:06

## 2023-06-13 RX ADMIN — MORPHINE SULFATE 6 MG: 2 INJECTION, SOLUTION INTRAMUSCULAR; INTRAVENOUS at 12:06

## 2023-06-13 RX ADMIN — ENOXAPARIN SODIUM 40 MG: 40 INJECTION SUBCUTANEOUS at 06:06

## 2023-06-13 RX ADMIN — MORPHINE SULFATE 6 MG: 2 INJECTION, SOLUTION INTRAMUSCULAR; INTRAVENOUS at 10:06

## 2023-06-13 NOTE — PLAN OF CARE
Plan of care reviewed with patient, verbalized understanding. Remained NPO. Ambulates frequently in room. IV fluids infusing. Reports abdominal and back pain, mild relief obtained with IV prn medication. CT scan this evening with oral contrast. Scan completed. Patient had 1 episode of emesis after test in radiology 3000 cc output reported per patient.

## 2023-06-13 NOTE — ASSESSMENT & PLAN NOTE
In setting of decreased PO intake and vomiting.  -IV fluids  -Renally dose all medications  -Avoid nephrotoxic agents  -Monitor UOP and electrolytes  -Trend creatinine

## 2023-06-13 NOTE — ASSESSMENT & PLAN NOTE
Nutrition consulted. Most recent weight and BMI monitored-     Measurements:  Wt Readings from Last 1 Encounters:   06/12/23 102 kg (224 lb 13.9 oz)   Body mass index is 28.11 kg/m².    Patient has been screened and assessed by RD.    Malnutrition Type:  Context:    Level:      Malnutrition Characteristic Summary:       Interventions/Recommendations (treatment strategy):

## 2023-06-13 NOTE — SUBJECTIVE & OBJECTIVE
"Interval History: see "Hospital Course"    Review of Systems   Gastrointestinal:  Positive for abdominal pain, nausea and vomiting.   Allergic/Immunologic: Positive for immunocompromised state.   Objective:     Vital Signs (Most Recent):  Temp: 96 °F (35.6 °C) (06/13/23 0743)  Pulse: 69 (06/13/23 0754)  Resp: 16 (06/13/23 1004)  BP: 135/75 (06/13/23 0743)  SpO2: 97 % (06/13/23 0754) Vital Signs (24h Range):  Temp:  [96 °F (35.6 °C)-98.3 °F (36.8 °C)] 96 °F (35.6 °C)  Pulse:  [] 69  Resp:  [16-20] 16  SpO2:  [91 %-97 %] 97 %  BP: (122-135)/(61-75) 135/75     Weight: 102 kg (224 lb 13.9 oz)  Body mass index is 28.11 kg/m².    Intake/Output Summary (Last 24 hours) at 6/13/2023 1131  Last data filed at 6/13/2023 1130  Gross per 24 hour   Intake 2401.88 ml   Output --   Net 2401.88 ml         Physical Exam  Vitals and nursing note reviewed.   Constitutional:       Appearance: He is ill-appearing.   HENT:      Head: Normocephalic and atraumatic.      Right Ear: External ear normal.      Left Ear: External ear normal.      Nose: Nose normal.      Mouth/Throat:      Mouth: Mucous membranes are moist.      Pharynx: Oropharynx is clear.   Eyes:      Extraocular Movements: Extraocular movements intact.   Cardiovascular:      Rate and Rhythm: Normal rate and regular rhythm.      Pulses: Normal pulses.      Heart sounds: Normal heart sounds.   Pulmonary:      Effort: Pulmonary effort is normal.      Breath sounds: Normal breath sounds.   Abdominal:      General: There is distension.      Tenderness: There is abdominal tenderness.      Comments: Surgical incision intact and without redness, drainage or erythema.   Musculoskeletal:         General: Normal range of motion.      Cervical back: Normal range of motion and neck supple.      Right lower leg: No edema.      Left lower leg: No edema.   Skin:     General: Skin is warm.   Neurological:      Mental Status: He is alert. Mental status is at baseline.   Psychiatric:       "   Behavior: Behavior normal.           Significant Labs: All pertinent labs within the past 24 hours have been reviewed.    Significant Imaging: I have reviewed all pertinent imaging results/findings within the past 24 hours.

## 2023-06-13 NOTE — PROGRESS NOTES
Vomited yesterday  Some pain  Vitals:    06/13/23 1625   BP:    Pulse:    Resp: 16   Temp:      Abdomen with mild distension    A/P  Colon cancer sp colectomy  No clear cause of obstruction, have been suspecting ileus but will check gge to check anastomosis, CT so far not obvious as to cause  Family express concern for recurrent cancer, ischemia, colostomy, perforation and sepsis, we talked about these possibilities together and agreed to start with gge

## 2023-06-13 NOTE — ASSESSMENT & PLAN NOTE
Acute problem.  -NPO  -IV fluids   -Dilaudid PRN  -Zofran PRN  -Unable to place NG tube  -Surgery consulted  -Zosyn

## 2023-06-13 NOTE — PROGRESS NOTES
[facility] Medicine  Progress Note    Patient Name: Ruslan Caldwell  MRN: [id number]  Patient Class: IP- Inpatient   Admission Date: 6/11/2023  Length of Stay: 1 days  Attending Physician: Moo Boone MD  Primary Care Provider: Amira Knutson NP        Subjective:     Principal Problem:Small bowel obstruction        HPI:  Ruslan Caldwell is a 47-year-old male who presents in transfer from [facility] for evaluation of possible developing small bowel obstruction.  Patient presents outside facility complaining of abdominal pain with nausea and vomiting.  Workup at outside facility demonstrated leukocytosis of 16,000, anemia, and thrombocytosis of 843.  CMP with hypokalemia 3.3 and total bilirubin of 1.2.  CT scan at outside facility was concerning for developing small-bowel obstruction.  Patient was admitted to [facility] where he underwent right partial colectomy on 05/29 with pathology consistent with adenocarcinoma.  He was discharged and then came back for another admission on 06/08 where he was treated for an ileus.  Previous medical history includes type 2 diabetes, anemia, hypertension, and right eye blindness.  Patient admitted to Hospital Medicine for treatment management.  Will maintain patient NPO, IV fluids, sliding scale insulin p.r.n., and general surgery consult in a.m..      Overview/Hospital Course:  Ruslan Caldwell is a 47 year old male with a past medical history of recently diagnosed colonic adenocarcinoma s/p partial colectomy, tobacco use, anemia and thrombocytosis who presented with persistent abdominal pain, nausea and abdominal pain concerning for SBO. An NG tube was unable to be placed. He is on IV fluids and Zosyn. PRN IV analgesics and antiemetics have been ordered and General Surgery has been consulted. Repeat CT A/P shows SBO 6/13. His course has been complicated by JOYCE in setting of vomiting; IV fluids are being continued. He was also  "discovered to have an iron deficiency for which IV iron has been ordered. Oncology has also been consulted and will arrange follow up in clinic.      Interval History: see "Hospital Course"    Review of Systems   Gastrointestinal:  Positive for abdominal pain, nausea and vomiting.   Allergic/Immunologic: Positive for immunocompromised state.   Objective:     Vital Signs (Most Recent):  Temp: 96 °F (35.6 °C) (06/13/23 0743)  Pulse: 69 (06/13/23 0754)  Resp: 16 (06/13/23 1004)  BP: 135/75 (06/13/23 0743)  SpO2: 97 % (06/13/23 0754) Vital Signs (24h Range):  Temp:  [96 °F (35.6 °C)-98.3 °F (36.8 °C)] 96 °F (35.6 °C)  Pulse:  [] 69  Resp:  [16-20] 16  SpO2:  [91 %-97 %] 97 %  BP: (122-135)/(61-75) 135/75     Weight: 102 kg (224 lb 13.9 oz)  Body mass index is 28.11 kg/m².    Intake/Output Summary (Last 24 hours) at 6/13/2023 1131  Last data filed at 6/13/2023 1130  Gross per 24 hour   Intake 2401.88 ml   Output --   Net 2401.88 ml         Physical Exam  Vitals and nursing note reviewed.   Constitutional:       Appearance: He is ill-appearing.   HENT:      Head: Normocephalic and atraumatic.      Right Ear: External ear normal.      Left Ear: External ear normal.      Nose: Nose normal.      Mouth/Throat:      Mouth: Mucous membranes are moist.      Pharynx: Oropharynx is clear.   Eyes:      Extraocular Movements: Extraocular movements intact.   Cardiovascular:      Rate and Rhythm: Normal rate and regular rhythm.      Pulses: Normal pulses.      Heart sounds: Normal heart sounds.   Pulmonary:      Effort: Pulmonary effort is normal.      Breath sounds: Normal breath sounds.   Abdominal:      General: There is distension.      Tenderness: There is abdominal tenderness.      Comments: Surgical incision intact and without redness, drainage or erythema.   Musculoskeletal:         General: Normal range of motion.      Cervical back: Normal range of motion and neck supple.      Right lower leg: No edema.      Left lower " leg: No edema.   Skin:     General: Skin is warm.   Neurological:      Mental Status: He is alert. Mental status is at baseline.   Psychiatric:         Behavior: Behavior normal.           Significant Labs: All pertinent labs within the past 24 hours have been reviewed.    Significant Imaging: I have reviewed all pertinent imaging results/findings within the past 24 hours.      Assessment/Plan:      * Small bowel obstruction  Acute problem.  -NPO  -IV fluids   -Dilaudid PRN  -Zofran PRN  -Unable to place NG tube  -Surgery consulted  -Zosyn        Colon adenocarcinoma  Recent partial colectomy.  -Oncology consulted; follow up in clinic      Hypokalemia  -Replete PRN; caution with JOYCE  -Trend K      JOYCE (acute kidney injury)  In setting of decreased PO intake and vomiting.  -IV fluids  -Renally dose all medications  -Avoid nephrotoxic agents  -Monitor UOP and electrolytes  -Trend creatinine      Tobacco use  -Patient to be counseled on cessation      Moderate malnutrition  Nutrition consulted. Most recent weight and BMI monitored-     Measurements:  Wt Readings from Last 1 Encounters:   06/12/23 102 kg (224 lb 13.9 oz)   Body mass index is 28.11 kg/m².    Patient has been screened and assessed by RD.    Malnutrition Type:  Context:    Level:      Malnutrition Characteristic Summary:       Interventions/Recommendations (treatment strategy):       Thrombocytosis  Chronic. Stable. In setting of recently diagnosed adenocarcinoma and SBO.  -Trend platelets with CBC      Iron deficiency anemia  -IV iron  -Trend Hgb with CBC      Essential hypertension  Patient not taking any BP medications.  -Continue to monitor        VTE Risk Mitigation (From admission, onward)         Ordered     enoxaparin injection 40 mg  Every 24 hours         06/12/23 0915     Place DINA hose  Until discontinued         06/11/23 2043     IP VTE HIGH RISK PATIENT  Once         06/11/23 2043     Place sequential compression device  Until discontinued          06/11/23 2043                Discharge Planning   GABRIEL: 6/15/2023     Code Status: Full Code   Is the patient medically ready for discharge?:     Reason for patient still in hospital (select all that apply): Patient trending condition, Laboratory test, Treatment and Consult recommendations  Discharge Plan A: Home                  Moo Boone MD  Department of Hospital Medicine   [facility]

## 2023-06-13 NOTE — CARE UPDATE
06/13/23 0754   Patient Assessment/Suction   Level of Consciousness (AVPU) alert   Respiratory Effort Normal;Unlabored   Expansion/Accessory Muscles/Retractions no use of accessory muscles   All Lung Fields Breath Sounds Anterior:;clear;equal bilaterally   Rhythm/Pattern, Respiratory pattern regular;unlabored   PRE-TX-O2   Device (Oxygen Therapy) room air   SpO2 97 %   Pulse Oximetry Type Intermittent   $ Pulse Oximetry - Multiple Charge Pulse Oximetry - Multiple   Pulse 69   Aerosol Therapy   $ Aerosol Therapy Charges PRN treatment not required   Respiratory Treatment Status (SVN) PRN treatment not required

## 2023-06-14 LAB
ALBUMIN SERPL BCP-MCNC: 3.6 G/DL (ref 3.5–5.2)
ALP SERPL-CCNC: 59 U/L (ref 55–135)
ALT SERPL W/O P-5'-P-CCNC: 11 U/L (ref 10–44)
ANION GAP SERPL CALC-SCNC: 18 MMOL/L (ref 8–16)
AST SERPL-CCNC: 13 U/L (ref 10–40)
BASOPHILS # BLD AUTO: 0.08 K/UL (ref 0–0.2)
BASOPHILS NFR BLD: 0.5 % (ref 0–1.9)
BILIRUB SERPL-MCNC: 1.1 MG/DL (ref 0.1–1)
BUN SERPL-MCNC: 24 MG/DL (ref 6–20)
CALCIUM SERPL-MCNC: 10.1 MG/DL (ref 8.7–10.5)
CHLORIDE SERPL-SCNC: 82 MMOL/L (ref 95–110)
CO2 SERPL-SCNC: 37 MMOL/L (ref 23–29)
CREAT SERPL-MCNC: 1.8 MG/DL (ref 0.5–1.4)
DIFFERENTIAL METHOD: ABNORMAL
EOSINOPHIL # BLD AUTO: 0.6 K/UL (ref 0–0.5)
EOSINOPHIL NFR BLD: 3.5 % (ref 0–8)
ERYTHROCYTE [DISTWIDTH] IN BLOOD BY AUTOMATED COUNT: 18.3 % (ref 11.5–14.5)
EST. GFR  (NO RACE VARIABLE): 47 ML/MIN/1.73 M^2
GLUCOSE SERPL-MCNC: 105 MG/DL (ref 70–110)
HCT VFR BLD AUTO: 33.2 % (ref 40–54)
HGB BLD-MCNC: 9.9 G/DL (ref 14–18)
IMM GRANULOCYTES # BLD AUTO: 0.06 K/UL (ref 0–0.04)
IMM GRANULOCYTES NFR BLD AUTO: 0.4 % (ref 0–0.5)
LACTATE SERPL-SCNC: 1.1 MMOL/L (ref 0.5–2.2)
LYMPHOCYTES # BLD AUTO: 1.6 K/UL (ref 1–4.8)
LYMPHOCYTES NFR BLD: 10.2 % (ref 18–48)
MAGNESIUM SERPL-MCNC: 2 MG/DL (ref 1.6–2.6)
MCH RBC QN AUTO: 19.8 PG (ref 27–31)
MCHC RBC AUTO-ENTMCNC: 29.8 G/DL (ref 32–36)
MCV RBC AUTO: 66 FL (ref 82–98)
MONOCYTES # BLD AUTO: 1.2 K/UL (ref 0.3–1)
MONOCYTES NFR BLD: 7.3 % (ref 4–15)
NEUTROPHILS # BLD AUTO: 12.4 K/UL (ref 1.8–7.7)
NEUTROPHILS NFR BLD: 78.1 % (ref 38–73)
NRBC BLD-RTO: 0 /100 WBC
PHOSPHATE SERPL-MCNC: 4.7 MG/DL (ref 2.7–4.5)
PLATELET # BLD AUTO: 815 K/UL (ref 150–450)
PMV BLD AUTO: 10.3 FL (ref 9.2–12.9)
POTASSIUM SERPL-SCNC: 3.1 MMOL/L (ref 3.5–5.1)
PROT SERPL-MCNC: 8.4 G/DL (ref 6–8.4)
RBC # BLD AUTO: 5 M/UL (ref 4.6–6.2)
SODIUM SERPL-SCNC: 137 MMOL/L (ref 136–145)
WBC # BLD AUTO: 15.86 K/UL (ref 3.9–12.7)

## 2023-06-14 PROCEDURE — 84100 ASSAY OF PHOSPHORUS: CPT | Performed by: STUDENT IN AN ORGANIZED HEALTH CARE EDUCATION/TRAINING PROGRAM

## 2023-06-14 PROCEDURE — 83735 ASSAY OF MAGNESIUM: CPT | Performed by: STUDENT IN AN ORGANIZED HEALTH CARE EDUCATION/TRAINING PROGRAM

## 2023-06-14 PROCEDURE — 99900035 HC TECH TIME PER 15 MIN (STAT)

## 2023-06-14 PROCEDURE — 63600175 PHARM REV CODE 636 W HCPCS: Performed by: NURSE PRACTITIONER

## 2023-06-14 PROCEDURE — 36415 COLL VENOUS BLD VENIPUNCTURE: CPT | Performed by: SURGERY

## 2023-06-14 PROCEDURE — 25000003 PHARM REV CODE 250: Performed by: STUDENT IN AN ORGANIZED HEALTH CARE EDUCATION/TRAINING PROGRAM

## 2023-06-14 PROCEDURE — 36415 COLL VENOUS BLD VENIPUNCTURE: CPT | Performed by: STUDENT IN AN ORGANIZED HEALTH CARE EDUCATION/TRAINING PROGRAM

## 2023-06-14 PROCEDURE — S4991 NICOTINE PATCH NONLEGEND: HCPCS | Performed by: STUDENT IN AN ORGANIZED HEALTH CARE EDUCATION/TRAINING PROGRAM

## 2023-06-14 PROCEDURE — 80053 COMPREHEN METABOLIC PANEL: CPT | Performed by: STUDENT IN AN ORGANIZED HEALTH CARE EDUCATION/TRAINING PROGRAM

## 2023-06-14 PROCEDURE — 94761 N-INVAS EAR/PLS OXIMETRY MLT: CPT

## 2023-06-14 PROCEDURE — 63600175 PHARM REV CODE 636 W HCPCS: Performed by: STUDENT IN AN ORGANIZED HEALTH CARE EDUCATION/TRAINING PROGRAM

## 2023-06-14 PROCEDURE — 12000002 HC ACUTE/MED SURGE SEMI-PRIVATE ROOM

## 2023-06-14 PROCEDURE — 25500020 PHARM REV CODE 255: Performed by: STUDENT IN AN ORGANIZED HEALTH CARE EDUCATION/TRAINING PROGRAM

## 2023-06-14 PROCEDURE — 83605 ASSAY OF LACTIC ACID: CPT | Performed by: SURGERY

## 2023-06-14 PROCEDURE — 85025 COMPLETE CBC W/AUTO DIFF WBC: CPT | Performed by: STUDENT IN AN ORGANIZED HEALTH CARE EDUCATION/TRAINING PROGRAM

## 2023-06-14 RX ORDER — POTASSIUM CHLORIDE 7.45 MG/ML
10 INJECTION INTRAVENOUS
Status: COMPLETED | OUTPATIENT
Start: 2023-06-14 | End: 2023-06-14

## 2023-06-14 RX ORDER — POTASSIUM CHLORIDE 7.45 MG/ML
10 INJECTION INTRAVENOUS
Status: DISCONTINUED | OUTPATIENT
Start: 2023-06-14 | End: 2023-06-14 | Stop reason: SDUPTHER

## 2023-06-14 RX ORDER — POTASSIUM CHLORIDE 7.45 MG/ML
10 INJECTION INTRAVENOUS
Status: DISCONTINUED | OUTPATIENT
Start: 2023-06-14 | End: 2023-06-14

## 2023-06-14 RX ORDER — IBUPROFEN 200 MG
1 TABLET ORAL DAILY
Status: DISCONTINUED | OUTPATIENT
Start: 2023-06-14 | End: 2023-06-24 | Stop reason: HOSPADM

## 2023-06-14 RX ORDER — IBUPROFEN 200 MG
1 TABLET ORAL DAILY
Status: DISCONTINUED | OUTPATIENT
Start: 2023-06-15 | End: 2023-06-14

## 2023-06-14 RX ADMIN — MORPHINE SULFATE 6 MG: 2 INJECTION, SOLUTION INTRAMUSCULAR; INTRAVENOUS at 09:06

## 2023-06-14 RX ADMIN — HYDROMORPHONE HYDROCHLORIDE 1 MG: 1 INJECTION, SOLUTION INTRAMUSCULAR; INTRAVENOUS; SUBCUTANEOUS at 03:06

## 2023-06-14 RX ADMIN — MORPHINE SULFATE 6 MG: 2 INJECTION, SOLUTION INTRAMUSCULAR; INTRAVENOUS at 05:06

## 2023-06-14 RX ADMIN — POTASSIUM CHLORIDE 10 MEQ: 7.46 INJECTION, SOLUTION INTRAVENOUS at 08:06

## 2023-06-14 RX ADMIN — MORPHINE SULFATE 6 MG: 2 INJECTION, SOLUTION INTRAMUSCULAR; INTRAVENOUS at 12:06

## 2023-06-14 RX ADMIN — ONDANSETRON 4 MG: 2 INJECTION INTRAMUSCULAR; INTRAVENOUS at 11:06

## 2023-06-14 RX ADMIN — MORPHINE SULFATE 6 MG: 2 INJECTION, SOLUTION INTRAMUSCULAR; INTRAVENOUS at 01:06

## 2023-06-14 RX ADMIN — DIATRIZOATE MEGLUMINE AND DIATRIZOATE SODIUM 240 ML: 660; 100 LIQUID ORAL; RECTAL at 10:06

## 2023-06-14 RX ADMIN — ENOXAPARIN SODIUM 40 MG: 40 INJECTION SUBCUTANEOUS at 05:06

## 2023-06-14 RX ADMIN — PROMETHAZINE HYDROCHLORIDE 12.5 MG: 25 INJECTION INTRAMUSCULAR; INTRAVENOUS at 03:06

## 2023-06-14 RX ADMIN — POTASSIUM CHLORIDE 10 MEQ: 7.46 INJECTION, SOLUTION INTRAVENOUS at 05:06

## 2023-06-14 RX ADMIN — PIPERACILLIN AND TAZOBACTAM 4.5 G: 4; .5 INJECTION, POWDER, LYOPHILIZED, FOR SOLUTION INTRAVENOUS; PARENTERAL at 11:06

## 2023-06-14 RX ADMIN — Medication 1 PATCH: at 05:06

## 2023-06-14 RX ADMIN — MORPHINE SULFATE 6 MG: 2 INJECTION, SOLUTION INTRAMUSCULAR; INTRAVENOUS at 10:06

## 2023-06-14 RX ADMIN — POTASSIUM CHLORIDE 10 MEQ: 7.46 INJECTION, SOLUTION INTRAVENOUS at 03:06

## 2023-06-14 RX ADMIN — PIPERACILLIN AND TAZOBACTAM 4.5 G: 4; .5 INJECTION, POWDER, LYOPHILIZED, FOR SOLUTION INTRAVENOUS; PARENTERAL at 02:06

## 2023-06-14 RX ADMIN — HYDROMORPHONE HYDROCHLORIDE 1 MG: 1 INJECTION, SOLUTION INTRAMUSCULAR; INTRAVENOUS; SUBCUTANEOUS at 02:06

## 2023-06-14 RX ADMIN — HYDROMORPHONE HYDROCHLORIDE 1 MG: 1 INJECTION, SOLUTION INTRAMUSCULAR; INTRAVENOUS; SUBCUTANEOUS at 11:06

## 2023-06-14 RX ADMIN — ONDANSETRON 4 MG: 2 INJECTION INTRAMUSCULAR; INTRAVENOUS at 12:06

## 2023-06-14 RX ADMIN — HYDROMORPHONE HYDROCHLORIDE 1 MG: 1 INJECTION, SOLUTION INTRAMUSCULAR; INTRAVENOUS; SUBCUTANEOUS at 07:06

## 2023-06-14 RX ADMIN — ONDANSETRON 4 MG: 2 INJECTION INTRAMUSCULAR; INTRAVENOUS at 10:06

## 2023-06-14 RX ADMIN — PIPERACILLIN AND TAZOBACTAM 4.5 G: 4; .5 INJECTION, POWDER, LYOPHILIZED, FOR SOLUTION INTRAVENOUS; PARENTERAL at 10:06

## 2023-06-14 RX ADMIN — HYDROMORPHONE HYDROCHLORIDE 1 MG: 1 INJECTION, SOLUTION INTRAMUSCULAR; INTRAVENOUS; SUBCUTANEOUS at 06:06

## 2023-06-14 NOTE — PLAN OF CARE
Recommendations  1) Advance diet slowly when medically able  -full liquids + Boost plus TID when medically able  -when appropriate for solids start with low fiber ( unless not needed per GI/surgery)  2) Weigh weekly   3) Nutrition education and handout given  4) If unable to advance to full liquids in < 48 hr consider PPN D 5 AA 4.25 @ 90 ml/hr + standard lipids   ( 91 g protein, 1234 kcal)     Goals: 1) PO Intakes > 50% of meals on full liquid diet at f/u  Nutrition Goal Status: not meeting-continues  Communication of RD Recs:  (POC, sticky note, reviewed with MD)

## 2023-06-14 NOTE — PROGRESS NOTES
[facility] Medicine  Progress Note    Patient Name: Ruslan Caldwell  MRN: [id number]  Patient Class: IP- Inpatient   Admission Date: 6/11/2023  Length of Stay: 2 days  Attending Physician: Moo Boone MD  Primary Care Provider: Amira Knutson NP        Subjective:     Principal Problem:Small bowel obstruction        HPI:  Ruslan Caldwell is a 47-year-old male who presents in transfer from [facility] for evaluation of possible developing small bowel obstruction.  Patient presents outside facility complaining of abdominal pain with nausea and vomiting.  Workup at outside facility demonstrated leukocytosis of 16,000, anemia, and thrombocytosis of 843.  CMP with hypokalemia 3.3 and total bilirubin of 1.2.  CT scan at outside facility was concerning for developing small-bowel obstruction.  Patient was admitted to [place] where he underwent right partial colectomy on 05/29 with pathology consistent with adenocarcinoma.  He was discharged and then came back for another admission on 06/08 where he was treated for an ileus.  Previous medical history includes type 2 diabetes, anemia, hypertension, and right eye blindness.  Patient admitted to Hospital Medicine for treatment management.  Will maintain patient NPO, IV fluids, sliding scale insulin p.r.n., and general surgery consult in a.m..      Overview/Hospital Course:  Ruslan Caldwell is a 47 year old male with a past medical history of recently diagnosed colonic adenocarcinoma s/p partial colectomy, tobacco use, anemia and thrombocytosis who presented with persistent abdominal pain, nausea and abdominal pain concerning for SBO. An NG tube was unable to be placed. He is on IV fluids and Zosyn. He is NPO PRN IV analgesics and antiemetics have been ordered and General Surgery has been consulted. Repeat CT A/P shows SBO 6/13. A gastrografin enema 6/14 was inconclusive. His course has been complicated by JOYCE in setting  "of vomiting as well as Toradol use; IV fluids are being continued with  cc/hr. Electrolytes are being closely monitored as the patient continued to have nausea and vomiting. Nephrology has been consulted. He was also discovered to have an iron deficiency for which IV iron was ordered. Oncology has also been consulted and will arrange follow up in clinic.      Interval History: see "Hospital Course"    Review of Systems   Gastrointestinal:  Positive for abdominal pain, nausea and vomiting.   Allergic/Immunologic: Positive for immunocompromised state.   Objective:     Vital Signs (Most Recent):  Temp: 98 °F (36.7 °C) (06/14/23 0708)  Pulse: 101 (06/14/23 0708)  Resp: 18 (06/14/23 0955)  BP: 122/76 (06/14/23 0708)  SpO2: (!) 91 % (06/14/23 0708) Vital Signs (24h Range):  Temp:  [96.9 °F (36.1 °C)-99.3 °F (37.4 °C)] 98 °F (36.7 °C)  Pulse:  [] 101  Resp:  [15-18] 18  SpO2:  [91 %-96 %] 91 %  BP: (100-134)/(61-81) 122/76     Weight: 102 kg (224 lb 13.9 oz)  Body mass index is 28.11 kg/m².    Intake/Output Summary (Last 24 hours) at 6/14/2023 1050  Last data filed at 6/14/2023 0703  Gross per 24 hour   Intake 3154.74 ml   Output 2650 ml   Net 504.74 ml         Physical Exam  Vitals and nursing note reviewed.   Constitutional:       Appearance: He is ill-appearing.   HENT:      Head: Normocephalic and atraumatic.      Right Ear: External ear normal.      Left Ear: External ear normal.      Nose: Nose normal.      Mouth/Throat:      Mouth: Mucous membranes are moist.      Pharynx: Oropharynx is clear.   Eyes:      Extraocular Movements: Extraocular movements intact.   Cardiovascular:      Rate and Rhythm: Regular rhythm. Tachycardia present.      Pulses: Normal pulses.      Heart sounds: Normal heart sounds.   Pulmonary:      Effort: Pulmonary effort is normal.      Breath sounds: Normal breath sounds.   Abdominal:      General: There is distension.      Tenderness: There is abdominal tenderness.      Comments: " Surgical incision intact and without redness, drainage or erythema.   Musculoskeletal:         General: Normal range of motion.      Cervical back: Normal range of motion and neck supple.      Right lower leg: No edema.      Left lower leg: No edema.   Skin:     General: Skin is warm.   Neurological:      Mental Status: He is alert. Mental status is at baseline.   Psychiatric:         Behavior: Behavior normal.           Significant Labs: All pertinent labs within the past 24 hours have been reviewed.    Significant Imaging: I have reviewed all pertinent imaging results/findings within the past 24 hours.      Assessment/Plan:      * Small bowel obstruction  Acute problem.  -NPO  -IV fluids  cc/hr  -Dilaudid PRN  -Zofran PRN  -Unable to place NG tube  -Surgery consulted  -Zosyn        Colon adenocarcinoma  Recent partial colectomy.  -Oncology consulted; follow up in clinic  -Follow up pathology      JOYCE (acute kidney injury)  In setting of Toradol use, decreased PO intake and vomiting.  -IV fluids  cc/hr  -Renally dose all medications  -Avoid nephrotoxic agents  -Monitor UOP and electrolytes  -Trend creatinine  -Nephrology consulted      Hypokalemia  -Replete PRN; caution with JOYCE  -Trend K      Tobacco use  -Patient to be counseled on cessation      Moderate malnutrition  Nutrition consulted. Most recent weight and BMI monitored-     Measurements:  Wt Readings from Last 1 Encounters:   06/12/23 102 kg (224 lb 13.9 oz)   Body mass index is 28.11 kg/m².    Patient has been screened and assessed by RD.    Malnutrition Type:  Context:    Level:      Malnutrition Characteristic Summary:       Interventions/Recommendations (treatment strategy):       Thrombocytosis  Chronic. Stable. In setting of recently diagnosed adenocarcinoma and SBO.  -Trend platelets with CBC      Iron deficiency anemia  -IV iron  -Trend Hgb with CBC      Essential hypertension  Patient not taking any BP medications.  -Continue to  monitor        VTE Risk Mitigation (From admission, onward)         Ordered     enoxaparin injection 40 mg  Every 24 hours         06/12/23 0915     Place DINA hose  Until discontinued         06/11/23 2043     IP VTE HIGH RISK PATIENT  Once         06/11/23 2043     Place sequential compression device  Until discontinued         06/11/23 2043                Discharge Planning   GABRIEL: 6/15/2023     Code Status: Full Code   Is the patient medically ready for discharge?:     Reason for patient still in hospital (select all that apply): Patient new problem, Patient trending condition, Laboratory test, Treatment and Consult recommendations  Discharge Plan A: Home                  Moo Boone MD  Department of Hospital Medicine   [facility]

## 2023-06-14 NOTE — ASSESSMENT & PLAN NOTE
Acute problem.  -NPO  -IV fluids  cc/hr  -Dilaudid PRN  -Zofran PRN  -Unable to place NG tube  -Surgery consulted  -Zosyn

## 2023-06-14 NOTE — PLAN OF CARE
Plan of care reviewed with patient, verbalized understanding. Remained NPO. Reports R abdominal pain, mild relief obtained with IV prn medication. 2 episodes of nausea with emesis today, controlled with prn medication. Potassium replaced. IV abx given. IV fluids infusing. Call light within reach. Family at bedside.

## 2023-06-14 NOTE — PROGRESS NOTES
[facility]  Adult Nutrition  Follow up Note    SUMMARY      Recommendations  1) Advance diet slowly when medically able  -full liquids + Boost plus TID when medically able  -when appropriate for solids start with low fiber ( unless not needed per GI/surgery)  2) Weigh weekly   3) Nutrition education and handout given  4) If unable to advance to full liquids in < 48 hr consider PPN D 5 AA 4.25 @ 90 ml/hr + standard lipids   ( 91 g protein, 1234 kcal)     Goals: 1) PO Intakes > 50% of meals on full liquid diet at f/u  Nutrition Goal Status: not meeting-continues  Communication of RD Recs:  (POC, sticky note, reviewed with MD)     Assessment and Plan        Moderate malnutrition  Malnutrition Type:  Context: social/environmental circumstances  Level: moderate     Related to (etiology):   Pain, nausea, altered GI function     Signs and Symptoms (as evidenced by):      Malnutrition Characteristic Summary:  Weight Loss (Malnutrition): greater than 7.5% in 3 months  Energy Intake (Malnutrition): less than 75% for greater than or equal to 3 months        Interventions/Recommendations (treatment strategy):  Collaboration with other providers  Nutrition Diagnosis Status:   continues           Malnutrition Assessment  Malnutrition Context: social/environmental circumstances  Malnutrition Level: moderate  Skin (Micronutrient):  (Mitul = 20)  Teeth (Micronutrient): none   Micronutrient Evaluation Summary: no deficiencies   Weight Loss (Malnutrition): greater than 7.5% in 3 months ( per chart review in < 1 month)  Energy Intake (Malnutrition): less than 75% for greater than or equal to 3 months   Reason for Assessment     Reason For Assessment: follow up  Diagnosis:  (SBO)  Relevant Medical History: CA s/p R. Colectomy 5/29/23, HTN, DM2- pt denies ( A1C 5), R. eye blindness  Interdisciplinary Rounds: not attended     General Information Comments: 48 y/o male admitted with SBO. Was just admittied with ileus  "s/p recent colon surgery last month. Since surgery pt has had a hard time advancing diet, feels distended and has N/V. Surgery to see. PTA pt says that he has had decreased appetite r/t pain and nausea for the past couple months along with ~41 lb wt loss. NFPE WDL 23. Significant wt loss per chart review.  23 NPO going on day 4 this hospital stay. Poor PO intakes/trouble with diet advancement x > 2 weeks now on top of decreased PO intakes x the last 3 months. 2 episodes of N/V noted x 24 hr. Discussed need for PPN if unable to tolerate diet advancement in < 48 hr. Surgery/GI eval pending, possible ileus.     Nutrition Discharge Planning: To be determined- DM 1800 kcal, low sodium diet     Nutrition Risk Screen     Nutrition Risk Screen: reduced oral intake over the last month, unintentional loss of 10 lbs or more in the past 2 months     Nutrition/Diet History     Patient Reported Diet/Restrictions/Preferences: general  Spiritual, Cultural Beliefs, Gnosticist Practices, Values that Affect Care: no  Food Allergies: NKFA  Factors Affecting Nutritional Intake: altered gastrointestinal function, NPO     Anthropometrics    Height Method: Stated  Height: 6' 3"  Height (inches): 75 in  Weight Method: Bed Scale  Weight: 102.1 kg (23)  Weight (lb): 225 lb  Ideal Body Weight (IBW), Male: 196 lb  BMI (Calculated): 28.1 kg/m2  BMI Grade: 25 - 29.9 - overweight  Weight Loss: unintentional  Usual Body Weight (UBW), k.4 kg (~ 3 months ago per pt)     Lab/Procedures/Meds     Pertinent Labs Reviewed: reviewed  BMP  Lab Results   Component Value Date     2023    K 3.1 (L) 2023    CL 82 (L) 2023    CO2 37 (H) 2023    BUN 24 (H) 2023    CREATININE 1.8 (H) 2023    CALCIUM 10.1 2023    ANIONGAP 18 (H) 2023    EGFRNORACEVR 47 (A) 2023     No results for input(s): POCTGLUCOSE in the last 24 hours.  Lab Results   Component Value Date    ALBUMIN 3.6 2023 "       Pertinent Medications Reviewed: reviewed    Continuous Infusions:   lactated ringers 150 mL/hr at 06/14/23 1058   Zofran, phenergan, KCl       Estimated/Assessed Needs     Weight Used For Calorie Calculations: 102.1 kg (224 lb 13.9 oz)  Energy Calorie Requirements (kcal): MSJ ( x 1.2) = 2390 kcal  Energy Need Method: [place] Jeor  Protein Requirements: 0.8-1 g protein/kg (  g)  Weight Used For Protein Calculations: 102 kg (224 lb 13.9 oz)  Fluid Requirements (mL): 2400 ml or per MD  Estimated Fluid Requirement Method: RDA Method  CHO Requirement: 268-328 g        Nutrition Prescription Ordered     Current Diet Order: NPO x 4 days     Evaluation of Received Nutrient/Fluid Intake     Energy Calories Required: not meeting needs  Protein Required: not meeting needs  Fluid Required: exceeding  Tolerance: NPO     Intake/Output Summary (Last 24 hours) at 6/14/2023 1324  Last data filed at 6/14/2023 0703  Gross per 24 hour   Intake 1677.62 ml   Output 2650 ml   Net -972.38 ml   IVF @ 150 ml/hr         % Intake of Estimated Energy Needs: 0%  % Meal Intake: NPO     Nutrition Risk     Level of Risk/Frequency of Follow-up:  (2 x weekly)      Monitor and Evaluation     Food and Nutrient Intake: energy intake  Food and Nutrient Adminstration: diet order, enteral and parenteral nutrition administration  Nutrition knowledge  Anthropometric Measurements: weight  Biochemical Data, Medical Tests and Procedures: electrolyte and renal panel, gastrointestinal profile, glucose/endocrine profile  Nutrition-Focused Physical Findings: overall appearance      Nutrition Follow-Up     RD Follow-up?: Yes

## 2023-06-14 NOTE — PROGRESS NOTES
[facility] Surgery  Progress Note    Subjective:     History of Present Illness:  47-year-old male well known to me.  Presents with abdominal pain nausea and vomiting.  CT was suspicious for obstruction.  Currently he says back pain was much worse than his abdominal pain.  He says he has been having trouble controlling his pain at home.  He denies any recent flatus.  He does not have an NG tube and he is not vomiting currently.  He does not feel nauseated currently.      Post-Op Info:  Procedure(s) (LRB):  LAPAROTOMY, EXPLORATORY (N/A)         Interval History: still no gas    Medications:  Continuous Infusions:   lactated ringers 150 mL/hr at 06/14/23 1737     Scheduled Meds:   enoxparin  40 mg Subcutaneous Q24H (prophylaxis, 1700)    nicotine  1 patch Transdermal Daily    piperacillin-tazobactam (Zosyn) IV (PEDS and ADULTS) (extended infusion is not appropriate)  4.5 g Intravenous Q8H    potassium chloride  10 mEq Intravenous Q1H     PRN Meds:HYDROmorphone, morphine, naloxone, ondansetron, promethazine (PHENERGAN) IVPB, sodium chloride 0.9%     Review of patient's allergies indicates:  No Known Allergies  Objective:     Vital Signs (Most Recent):  Temp: 98.1 °F (36.7 °C) (06/14/23 1623)  Pulse: 100 (06/14/23 1623)  Resp: 18 (06/14/23 1748)  BP: 114/84 (06/14/23 1623)  SpO2: 97 % (06/14/23 1623) Vital Signs (24h Range):  Temp:  [97.1 °F (36.2 °C)-99.3 °F (37.4 °C)] 98.1 °F (36.7 °C)  Pulse:  [] 100  Resp:  [15-18] 18  SpO2:  [91 %-98 %] 97 %  BP: (114-134)/(67-84) 114/84     Weight: 102 kg (224 lb 13.9 oz)  Body mass index is 28.11 kg/m².    Intake/Output - Last 3 Shifts         06/12 0700  06/13 0659 06/13 0700  06/14 0659 06/14 0700  06/15 0659    P.O.       I.V. (mL/kg) 724.8 (7.1) 2331.8 (22.9) 997.5 (9.8)    IV Piggyback 200 823 245.9    Total Intake(mL/kg) 924.8 (9.1) 3154.7 (30.9) 1243.4 (12.2)    Urine (mL/kg/hr)  0 (0) 1200 (1)    Emesis/NG output  2150 1150    Stool    0    Total Output  2150 2350    Net +924.8 +1004.7 -1106.6           Urine Occurrence 2 x 3 x     Stool Occurrence   1 x    Emesis Occurrence 1 x               Physical Exam  Abdominal:      General: Abdomen is flat. There is distension.      Palpations: Abdomen is soft.      Tenderness: There is no abdominal tenderness.        Significant Labs:    I have reviewed all pertinent lab results within the past 24 hours.  CBC:   Recent Labs   Lab 06/14/23  0305   WBC 15.86*   RBC 5.00   HGB 9.9*   HCT 33.2*   *   MCV 66*   MCH 19.8*   MCHC 29.8*     BMP:   Recent Labs   Lab 06/14/23  0305         K 3.1*   CL 82*   CO2 37*   BUN 24*   CREATININE 1.8*   CALCIUM 10.1   MG 2.0       Significant Diagnostics:  I have reviewed all pertinent imaging results/findings within the past 24 hours.    Assessment/Plan:     * Small bowel obstruction  gge not revealing  Still not opening  I have worry this is mechanical obstruction, worried about some swirling on ct scan of mesentery  Will give one more night to open up   Plan laparotomy tomorrow          Edin Michael MD  General Surgery  [facility]

## 2023-06-14 NOTE — ASSESSMENT & PLAN NOTE
In setting of Toradol use, decreased PO intake and vomiting.  -IV fluids  cc/hr  -Renally dose all medications  -Avoid nephrotoxic agents  -Monitor UOP and electrolytes  -Trend creatinine  -Nephrology consulted

## 2023-06-14 NOTE — SUBJECTIVE & OBJECTIVE
Interval History: still no gas    Medications:  Continuous Infusions:   lactated ringers 150 mL/hr at 06/14/23 1737     Scheduled Meds:   enoxparin  40 mg Subcutaneous Q24H (prophylaxis, 1700)    nicotine  1 patch Transdermal Daily    piperacillin-tazobactam (Zosyn) IV (PEDS and ADULTS) (extended infusion is not appropriate)  4.5 g Intravenous Q8H    potassium chloride  10 mEq Intravenous Q1H     PRN Meds:HYDROmorphone, morphine, naloxone, ondansetron, promethazine (PHENERGAN) IVPB, sodium chloride 0.9%     Review of patient's allergies indicates:  No Known Allergies  Objective:     Vital Signs (Most Recent):  Temp: 98.1 °F (36.7 °C) (06/14/23 1623)  Pulse: 100 (06/14/23 1623)  Resp: 18 (06/14/23 1748)  BP: 114/84 (06/14/23 1623)  SpO2: 97 % (06/14/23 1623) Vital Signs (24h Range):  Temp:  [97.1 °F (36.2 °C)-99.3 °F (37.4 °C)] 98.1 °F (36.7 °C)  Pulse:  [] 100  Resp:  [15-18] 18  SpO2:  [91 %-98 %] 97 %  BP: (114-134)/(67-84) 114/84     Weight: 102 kg (224 lb 13.9 oz)  Body mass index is 28.11 kg/m².    Intake/Output - Last 3 Shifts         06/12 0700  06/13 0659 06/13 0700  06/14 0659 06/14 0700  06/15 0659    P.O.       I.V. (mL/kg) 724.8 (7.1) 2331.8 (22.9) 997.5 (9.8)    IV Piggyback 200 823 245.9    Total Intake(mL/kg) 924.8 (9.1) 3154.7 (30.9) 1243.4 (12.2)    Urine (mL/kg/hr)  0 (0) 1200 (1)    Emesis/NG output  2150 1150    Stool   0    Total Output  2150 2350    Net +924.8 +1004.7 -1106.6           Urine Occurrence 2 x 3 x     Stool Occurrence   1 x    Emesis Occurrence 1 x               Physical Exam  Abdominal:      General: Abdomen is flat. There is distension.      Palpations: Abdomen is soft.      Tenderness: There is no abdominal tenderness.        Significant Labs:    I have reviewed all pertinent lab results within the past 24 hours.  CBC:   Recent Labs   Lab 06/14/23  0305   WBC 15.86*   RBC 5.00   HGB 9.9*   HCT 33.2*   *   MCV 66*   MCH 19.8*   MCHC 29.8*     BMP:   Recent Labs   Lab  06/14/23  0305         K 3.1*   CL 82*   CO2 37*   BUN 24*   CREATININE 1.8*   CALCIUM 10.1   MG 2.0       Significant Diagnostics:  I have reviewed all pertinent imaging results/findings within the past 24 hours.

## 2023-06-14 NOTE — CARE UPDATE
06/13/23 1930   Patient Assessment/Suction   Level of Consciousness (AVPU) alert   Respiratory Effort Normal;Unlabored   Expansion/Accessory Muscles/Retractions no use of accessory muscles   All Lung Fields Breath Sounds Anterior:;clear;equal bilaterally   Rhythm/Pattern, Respiratory unlabored   Cough Frequency no cough   Cough Type no productive sputum   PRE-TX-O2   Device (Oxygen Therapy) room air   SpO2 95 %   Pulse Oximetry Type Intermittent   $ Pulse Oximetry - Multiple Charge Pulse Oximetry - Multiple   Pulse 92   Resp 18

## 2023-06-14 NOTE — PLAN OF CARE
Problem: Adult Inpatient Plan of Care  Goal: Plan of Care Review  Outcome: Ongoing, Progressing   POC reviewed with patient. Pt verbalizes understanding.  Problem: Adult Inpatient Plan of Care  Goal: Optimal Comfort and Wellbeing  Outcome: Ongoing, Progressing   Q 2 hour rounds completed throughout shift. Pain assessed, toileting offered. Safety maintained with bed left in lowest position with wheels locked. Side rails up x2. Call light left within reach. Patient instructed to call for any assistance.    Problem: Fluid and Electrolyte Imbalance (Acute Kidney Injury/Impairment)  Goal: Fluid and Electrolyte Balance  Outcome: Ongoing, Progressing   IVF infusing as ordered. I & O monitored and recorded.

## 2023-06-14 NOTE — PLAN OF CARE
Plan of care reviewed with patient and family at bedside. Verbalized understanding. IV Intact and patent with fluids infusing. Patient aware of NPO status. Pain and nausea managed with prn medications. Safety maintained. Call light in reach and instructed to call for assistance. Will continue to monitor.

## 2023-06-15 ENCOUNTER — ANESTHESIA (OUTPATIENT)
Dept: SURGERY | Facility: HOSPITAL | Age: 48
DRG: 329 | End: 2023-06-15
Payer: MEDICAID

## 2023-06-15 ENCOUNTER — ANESTHESIA EVENT (OUTPATIENT)
Dept: SURGERY | Facility: HOSPITAL | Age: 48
DRG: 329 | End: 2023-06-15
Payer: MEDICAID

## 2023-06-15 ENCOUNTER — PATIENT OUTREACH (OUTPATIENT)
Dept: ADMINISTRATIVE | Facility: CLINIC | Age: 48
End: 2023-06-15
Payer: MEDICAID

## 2023-06-15 LAB
ABO + RH BLD: NORMAL
ALBUMIN SERPL BCP-MCNC: 3.7 G/DL (ref 3.5–5.2)
ALP SERPL-CCNC: 58 U/L (ref 55–135)
ALT SERPL W/O P-5'-P-CCNC: 11 U/L (ref 10–44)
ANION GAP SERPL CALC-SCNC: 17 MMOL/L (ref 8–16)
ANION GAP SERPL CALC-SCNC: 19 MMOL/L (ref 8–16)
AST SERPL-CCNC: 13 U/L (ref 10–40)
BASOPHILS # BLD AUTO: 0.1 K/UL (ref 0–0.2)
BASOPHILS NFR BLD: 0.6 % (ref 0–1.9)
BILIRUB SERPL-MCNC: 1.5 MG/DL (ref 0.1–1)
BLD GP AB SCN CELLS X3 SERPL QL: NORMAL
BUN SERPL-MCNC: 24 MG/DL (ref 6–20)
BUN SERPL-MCNC: 27 MG/DL (ref 6–20)
CALCIUM SERPL-MCNC: 10.4 MG/DL (ref 8.7–10.5)
CALCIUM SERPL-MCNC: 8.7 MG/DL (ref 8.7–10.5)
CHLORIDE SERPL-SCNC: 79 MMOL/L (ref 95–110)
CHLORIDE SERPL-SCNC: 87 MMOL/L (ref 95–110)
CO2 SERPL-SCNC: 32 MMOL/L (ref 23–29)
CO2 SERPL-SCNC: 39 MMOL/L (ref 23–29)
CREAT SERPL-MCNC: 1.7 MG/DL (ref 0.5–1.4)
CREAT SERPL-MCNC: 2 MG/DL (ref 0.5–1.4)
DIFFERENTIAL METHOD: ABNORMAL
EOSINOPHIL # BLD AUTO: 0.4 K/UL (ref 0–0.5)
EOSINOPHIL NFR BLD: 2.4 % (ref 0–8)
ERYTHROCYTE [DISTWIDTH] IN BLOOD BY AUTOMATED COUNT: 18.1 % (ref 11.5–14.5)
EST. GFR  (NO RACE VARIABLE): 41 ML/MIN/1.73 M^2
EST. GFR  (NO RACE VARIABLE): 50 ML/MIN/1.73 M^2
GLUCOSE SERPL-MCNC: 102 MG/DL (ref 70–110)
GLUCOSE SERPL-MCNC: 126 MG/DL (ref 70–110)
HCT VFR BLD AUTO: 32.2 % (ref 40–54)
HGB BLD-MCNC: 9.9 G/DL (ref 14–18)
IMM GRANULOCYTES # BLD AUTO: 0.05 K/UL (ref 0–0.04)
IMM GRANULOCYTES NFR BLD AUTO: 0.3 % (ref 0–0.5)
LYMPHOCYTES # BLD AUTO: 3.1 K/UL (ref 1–4.8)
LYMPHOCYTES NFR BLD: 18.3 % (ref 18–48)
MAGNESIUM SERPL-MCNC: 2.1 MG/DL (ref 1.6–2.6)
MCH RBC QN AUTO: 20.5 PG (ref 27–31)
MCHC RBC AUTO-ENTMCNC: 30.7 G/DL (ref 32–36)
MCV RBC AUTO: 67 FL (ref 82–98)
MONOCYTES # BLD AUTO: 1.5 K/UL (ref 0.3–1)
MONOCYTES NFR BLD: 8.5 % (ref 4–15)
NEUTROPHILS # BLD AUTO: 11.9 K/UL (ref 1.8–7.7)
NEUTROPHILS NFR BLD: 69.9 % (ref 38–73)
NRBC BLD-RTO: 0 /100 WBC
PHOSPHATE SERPL-MCNC: 3.9 MG/DL (ref 2.7–4.5)
PLATELET # BLD AUTO: 930 K/UL (ref 150–450)
PMV BLD AUTO: 9.5 FL (ref 9.2–12.9)
POTASSIUM SERPL-SCNC: 2.9 MMOL/L (ref 3.5–5.1)
POTASSIUM SERPL-SCNC: 4 MMOL/L (ref 3.5–5.1)
PROT SERPL-MCNC: 8.7 G/DL (ref 6–8.4)
RBC # BLD AUTO: 4.84 M/UL (ref 4.6–6.2)
SODIUM SERPL-SCNC: 136 MMOL/L (ref 136–145)
SODIUM SERPL-SCNC: 137 MMOL/L (ref 136–145)
SPECIMEN OUTDATE: NORMAL
WBC # BLD AUTO: 17.09 K/UL (ref 3.9–12.7)

## 2023-06-15 PROCEDURE — C9290 INJ, BUPIVACAINE LIPOSOME: HCPCS | Performed by: SURGERY

## 2023-06-15 PROCEDURE — 88305 TISSUE EXAM BY PATHOLOGIST: CPT | Performed by: PATHOLOGY

## 2023-06-15 PROCEDURE — 86920 COMPATIBILITY TEST SPIN: CPT | Performed by: SURGERY

## 2023-06-15 PROCEDURE — 44120 REMOVAL OF SMALL INTESTINE: CPT | Mod: 79,,, | Performed by: SURGERY

## 2023-06-15 PROCEDURE — 84100 ASSAY OF PHOSPHORUS: CPT | Performed by: STUDENT IN AN ORGANIZED HEALTH CARE EDUCATION/TRAINING PROGRAM

## 2023-06-15 PROCEDURE — S4991 NICOTINE PATCH NONLEGEND: HCPCS | Performed by: STUDENT IN AN ORGANIZED HEALTH CARE EDUCATION/TRAINING PROGRAM

## 2023-06-15 PROCEDURE — 25000003 PHARM REV CODE 250: Performed by: NURSE ANESTHETIST, CERTIFIED REGISTERED

## 2023-06-15 PROCEDURE — 36415 COLL VENOUS BLD VENIPUNCTURE: CPT | Performed by: NURSE PRACTITIONER

## 2023-06-15 PROCEDURE — 36000708 HC OR TIME LEV III 1ST 15 MIN: Performed by: SURGERY

## 2023-06-15 PROCEDURE — 63600175 PHARM REV CODE 636 W HCPCS: Performed by: NURSE ANESTHETIST, CERTIFIED REGISTERED

## 2023-06-15 PROCEDURE — D9220A PRA ANESTHESIA: Mod: ANES,,, | Performed by: ANESTHESIOLOGY

## 2023-06-15 PROCEDURE — 80048 BASIC METABOLIC PNL TOTAL CA: CPT | Mod: XB | Performed by: NURSE PRACTITIONER

## 2023-06-15 PROCEDURE — 25000003 PHARM REV CODE 250: Performed by: STUDENT IN AN ORGANIZED HEALTH CARE EDUCATION/TRAINING PROGRAM

## 2023-06-15 PROCEDURE — 99900103 DSU ONLY-NO CHARGE-INITIAL HR (STAT): Performed by: SURGERY

## 2023-06-15 PROCEDURE — D9220A PRA ANESTHESIA: ICD-10-PCS | Mod: CRNA,,, | Performed by: NURSE ANESTHETIST, CERTIFIED REGISTERED

## 2023-06-15 PROCEDURE — 94799 UNLISTED PULMONARY SVC/PX: CPT

## 2023-06-15 PROCEDURE — 99900104 DSU ONLY-NO CHARGE-EA ADD'L HR (STAT): Performed by: SURGERY

## 2023-06-15 PROCEDURE — C1765 ADHESION BARRIER: HCPCS | Performed by: SURGERY

## 2023-06-15 PROCEDURE — 25000003 PHARM REV CODE 250: Performed by: SURGERY

## 2023-06-15 PROCEDURE — 37000009 HC ANESTHESIA EA ADD 15 MINS: Performed by: SURGERY

## 2023-06-15 PROCEDURE — 36415 COLL VENOUS BLD VENIPUNCTURE: CPT | Performed by: ANESTHESIOLOGY

## 2023-06-15 PROCEDURE — 86900 BLOOD TYPING SEROLOGIC ABO: CPT | Performed by: ANESTHESIOLOGY

## 2023-06-15 PROCEDURE — 83735 ASSAY OF MAGNESIUM: CPT | Performed by: STUDENT IN AN ORGANIZED HEALTH CARE EDUCATION/TRAINING PROGRAM

## 2023-06-15 PROCEDURE — 27000221 HC OXYGEN, UP TO 24 HOURS

## 2023-06-15 PROCEDURE — 36000709 HC OR TIME LEV III EA ADD 15 MIN: Performed by: SURGERY

## 2023-06-15 PROCEDURE — 25000003 PHARM REV CODE 250: Performed by: ANESTHESIOLOGY

## 2023-06-15 PROCEDURE — 37000008 HC ANESTHESIA 1ST 15 MINUTES: Performed by: SURGERY

## 2023-06-15 PROCEDURE — 63600175 PHARM REV CODE 636 W HCPCS: Performed by: NURSE PRACTITIONER

## 2023-06-15 PROCEDURE — 44120 PR RESECT SMALL INTEST,SINGL RESEC/ANAS: ICD-10-PCS | Mod: 79,,, | Performed by: SURGERY

## 2023-06-15 PROCEDURE — D9220A PRA ANESTHESIA: Mod: CRNA,,, | Performed by: NURSE ANESTHETIST, CERTIFIED REGISTERED

## 2023-06-15 PROCEDURE — 88305 TISSUE EXAM BY PATHOLOGIST: ICD-10-PCS | Mod: 26,,, | Performed by: PATHOLOGY

## 2023-06-15 PROCEDURE — 63600175 PHARM REV CODE 636 W HCPCS: Performed by: SURGERY

## 2023-06-15 PROCEDURE — 94761 N-INVAS EAR/PLS OXIMETRY MLT: CPT

## 2023-06-15 PROCEDURE — 99233 SBSQ HOSP IP/OBS HIGH 50: CPT | Mod: ,,, | Performed by: INTERNAL MEDICINE

## 2023-06-15 PROCEDURE — 36415 COLL VENOUS BLD VENIPUNCTURE: CPT | Performed by: STUDENT IN AN ORGANIZED HEALTH CARE EDUCATION/TRAINING PROGRAM

## 2023-06-15 PROCEDURE — 12000002 HC ACUTE/MED SURGE SEMI-PRIVATE ROOM

## 2023-06-15 PROCEDURE — D9220A PRA ANESTHESIA: ICD-10-PCS | Mod: ANES,,, | Performed by: ANESTHESIOLOGY

## 2023-06-15 PROCEDURE — 88307 TISSUE EXAM BY PATHOLOGIST: CPT | Mod: 26,,, | Performed by: PATHOLOGY

## 2023-06-15 PROCEDURE — 71000039 HC RECOVERY, EACH ADD'L HOUR: Performed by: SURGERY

## 2023-06-15 PROCEDURE — 80053 COMPREHEN METABOLIC PANEL: CPT | Performed by: STUDENT IN AN ORGANIZED HEALTH CARE EDUCATION/TRAINING PROGRAM

## 2023-06-15 PROCEDURE — 27201423 OPTIME MED/SURG SUP & DEVICES STERILE SUPPLY: Performed by: SURGERY

## 2023-06-15 PROCEDURE — 63600175 PHARM REV CODE 636 W HCPCS: Performed by: ANESTHESIOLOGY

## 2023-06-15 PROCEDURE — 86920 COMPATIBILITY TEST SPIN: CPT | Performed by: ANESTHESIOLOGY

## 2023-06-15 PROCEDURE — 85025 COMPLETE CBC W/AUTO DIFF WBC: CPT | Performed by: STUDENT IN AN ORGANIZED HEALTH CARE EDUCATION/TRAINING PROGRAM

## 2023-06-15 PROCEDURE — 99233 PR SUBSEQUENT HOSPITAL CARE,LEVL III: ICD-10-PCS | Mod: ,,, | Performed by: INTERNAL MEDICINE

## 2023-06-15 PROCEDURE — 63600175 PHARM REV CODE 636 W HCPCS: Performed by: STUDENT IN AN ORGANIZED HEALTH CARE EDUCATION/TRAINING PROGRAM

## 2023-06-15 PROCEDURE — 88307 TISSUE EXAM BY PATHOLOGIST: CPT | Mod: 59 | Performed by: PATHOLOGY

## 2023-06-15 PROCEDURE — 71000033 HC RECOVERY, INTIAL HOUR: Performed by: SURGERY

## 2023-06-15 PROCEDURE — 88305 TISSUE EXAM BY PATHOLOGIST: CPT | Mod: 26,,, | Performed by: PATHOLOGY

## 2023-06-15 PROCEDURE — 99900035 HC TECH TIME PER 15 MIN (STAT)

## 2023-06-15 PROCEDURE — 88307 PR  SURG PATH,LEVEL V: ICD-10-PCS | Mod: 26,,, | Performed by: PATHOLOGY

## 2023-06-15 DEVICE — MEMBRANE SEPRAFILM 5 X 6: Type: IMPLANTABLE DEVICE | Site: ABDOMEN | Status: FUNCTIONAL

## 2023-06-15 RX ORDER — HYDROMORPHONE HCL IN 0.9% NACL 6 MG/30 ML
PATIENT CONTROLLED ANALGESIA SYRINGE INTRAVENOUS CONTINUOUS
Status: DISCONTINUED | OUTPATIENT
Start: 2023-06-15 | End: 2023-06-20

## 2023-06-15 RX ORDER — BUPIVACAINE HYDROCHLORIDE 2.5 MG/ML
INJECTION, SOLUTION EPIDURAL; INFILTRATION; INTRACAUDAL
Status: DISCONTINUED | OUTPATIENT
Start: 2023-06-15 | End: 2023-06-15 | Stop reason: HOSPADM

## 2023-06-15 RX ORDER — HYDROMORPHONE HYDROCHLORIDE 2 MG/ML
0.2 INJECTION, SOLUTION INTRAMUSCULAR; INTRAVENOUS; SUBCUTANEOUS EVERY 5 MIN PRN
Status: DISCONTINUED | OUTPATIENT
Start: 2023-06-15 | End: 2023-06-15 | Stop reason: HOSPADM

## 2023-06-15 RX ORDER — FENTANYL CITRATE 50 UG/ML
25 INJECTION, SOLUTION INTRAMUSCULAR; INTRAVENOUS EVERY 5 MIN PRN
Status: DISCONTINUED | OUTPATIENT
Start: 2023-06-15 | End: 2023-06-15 | Stop reason: HOSPADM

## 2023-06-15 RX ORDER — ROCURONIUM BROMIDE 10 MG/ML
INJECTION, SOLUTION INTRAVENOUS
Status: DISCONTINUED | OUTPATIENT
Start: 2023-06-15 | End: 2023-06-15

## 2023-06-15 RX ORDER — POTASSIUM CHLORIDE 7.45 MG/ML
40 INJECTION INTRAVENOUS
Status: DISCONTINUED | OUTPATIENT
Start: 2023-06-15 | End: 2023-06-16

## 2023-06-15 RX ORDER — HYDROMORPHONE HCL IN 0.9% NACL 6 MG/30 ML
PATIENT CONTROLLED ANALGESIA SYRINGE INTRAVENOUS CONTINUOUS
Status: DISCONTINUED | OUTPATIENT
Start: 2023-06-15 | End: 2023-06-15

## 2023-06-15 RX ORDER — MUPIROCIN 20 MG/G
OINTMENT TOPICAL 2 TIMES DAILY
Status: COMPLETED | OUTPATIENT
Start: 2023-06-15 | End: 2023-06-20

## 2023-06-15 RX ORDER — HYDROMORPHONE HYDROCHLORIDE 2 MG/ML
0.2 INJECTION, SOLUTION INTRAMUSCULAR; INTRAVENOUS; SUBCUTANEOUS EVERY 5 MIN PRN
Status: DISCONTINUED | OUTPATIENT
Start: 2023-06-15 | End: 2023-06-15

## 2023-06-15 RX ORDER — FENTANYL CITRATE 50 UG/ML
INJECTION, SOLUTION INTRAMUSCULAR; INTRAVENOUS
Status: DISCONTINUED | OUTPATIENT
Start: 2023-06-15 | End: 2023-06-15

## 2023-06-15 RX ORDER — LIDOCAINE HYDROCHLORIDE 20 MG/ML
INJECTION INTRAVENOUS
Status: DISCONTINUED | OUTPATIENT
Start: 2023-06-15 | End: 2023-06-15

## 2023-06-15 RX ORDER — ACETAMINOPHEN 10 MG/ML
INJECTION, SOLUTION INTRAVENOUS
Status: DISCONTINUED | OUTPATIENT
Start: 2023-06-15 | End: 2023-06-15

## 2023-06-15 RX ORDER — POTASSIUM CHLORIDE 7.45 MG/ML
60 INJECTION INTRAVENOUS
Status: DISCONTINUED | OUTPATIENT
Start: 2023-06-15 | End: 2023-06-16

## 2023-06-15 RX ORDER — MIDAZOLAM HYDROCHLORIDE 1 MG/ML
INJECTION INTRAMUSCULAR; INTRAVENOUS
Status: DISCONTINUED | OUTPATIENT
Start: 2023-06-15 | End: 2023-06-15

## 2023-06-15 RX ORDER — ESMOLOL HYDROCHLORIDE 10 MG/ML
INJECTION INTRAVENOUS
Status: DISCONTINUED | OUTPATIENT
Start: 2023-06-15 | End: 2023-06-15

## 2023-06-15 RX ORDER — POTASSIUM CHLORIDE 7.45 MG/ML
80 INJECTION INTRAVENOUS
Status: DISCONTINUED | OUTPATIENT
Start: 2023-06-15 | End: 2023-06-16

## 2023-06-15 RX ORDER — OXYCODONE HYDROCHLORIDE 5 MG/1
5 TABLET ORAL ONCE AS NEEDED
Status: COMPLETED | OUTPATIENT
Start: 2023-06-15 | End: 2023-06-15

## 2023-06-15 RX ORDER — ONDANSETRON 2 MG/ML
INJECTION INTRAMUSCULAR; INTRAVENOUS
Status: DISCONTINUED | OUTPATIENT
Start: 2023-06-15 | End: 2023-06-15

## 2023-06-15 RX ORDER — SUCCINYLCHOLINE CHLORIDE 20 MG/ML
INJECTION INTRAMUSCULAR; INTRAVENOUS
Status: DISCONTINUED | OUTPATIENT
Start: 2023-06-15 | End: 2023-06-15

## 2023-06-15 RX ORDER — PROPOFOL 10 MG/ML
VIAL (ML) INTRAVENOUS
Status: DISCONTINUED | OUTPATIENT
Start: 2023-06-15 | End: 2023-06-15

## 2023-06-15 RX ORDER — DEXAMETHASONE SODIUM PHOSPHATE 4 MG/ML
INJECTION, SOLUTION INTRA-ARTICULAR; INTRALESIONAL; INTRAMUSCULAR; INTRAVENOUS; SOFT TISSUE
Status: DISCONTINUED | OUTPATIENT
Start: 2023-06-15 | End: 2023-06-15

## 2023-06-15 RX ORDER — KETAMINE HYDROCHLORIDE 100 MG/ML
INJECTION, SOLUTION INTRAMUSCULAR; INTRAVENOUS
Status: DISCONTINUED | OUTPATIENT
Start: 2023-06-15 | End: 2023-06-15

## 2023-06-15 RX ORDER — NALOXONE HCL 0.4 MG/ML
0.02 VIAL (ML) INJECTION
Status: DISCONTINUED | OUTPATIENT
Start: 2023-06-15 | End: 2023-06-15

## 2023-06-15 RX ORDER — ONDANSETRON 2 MG/ML
4 INJECTION INTRAMUSCULAR; INTRAVENOUS ONCE AS NEEDED
Status: DISCONTINUED | OUTPATIENT
Start: 2023-06-15 | End: 2023-06-15 | Stop reason: HOSPADM

## 2023-06-15 RX ADMIN — SODIUM CHLORIDE, SODIUM GLUCONATE, SODIUM ACETATE, POTASSIUM CHLORIDE AND MAGNESIUM CHLORIDE: 526; 502; 368; 37; 30 INJECTION, SOLUTION INTRAVENOUS at 05:06

## 2023-06-15 RX ADMIN — HYDROMORPHONE HYDROCHLORIDE 0.2 MG: 2 INJECTION INTRAMUSCULAR; INTRAVENOUS; SUBCUTANEOUS at 05:06

## 2023-06-15 RX ADMIN — SODIUM CHLORIDE, SODIUM GLUCONATE, SODIUM ACETATE, POTASSIUM CHLORIDE AND MAGNESIUM CHLORIDE: 526; 502; 368; 37; 30 INJECTION, SOLUTION INTRAVENOUS at 02:06

## 2023-06-15 RX ADMIN — ROCURONIUM BROMIDE 20 MG: 10 INJECTION, SOLUTION INTRAVENOUS at 02:06

## 2023-06-15 RX ADMIN — GLYCOPYRROLATE 0.2 MG: 0.2 INJECTION, SOLUTION INTRAMUSCULAR; INTRAVITREAL at 01:06

## 2023-06-15 RX ADMIN — OXYCODONE HYDROCHLORIDE 5 MG: 5 TABLET ORAL at 05:06

## 2023-06-15 RX ADMIN — FENTANYL CITRATE 50 MCG: 50 INJECTION, SOLUTION INTRAMUSCULAR; INTRAVENOUS at 03:06

## 2023-06-15 RX ADMIN — MORPHINE SULFATE 6 MG: 2 INJECTION, SOLUTION INTRAMUSCULAR; INTRAVENOUS at 09:06

## 2023-06-15 RX ADMIN — FENTANYL CITRATE 25 MCG: 50 INJECTION, SOLUTION INTRAMUSCULAR; INTRAVENOUS at 05:06

## 2023-06-15 RX ADMIN — SUCCINYLCHOLINE CHLORIDE 200 MG: 20 INJECTION, SOLUTION INTRAMUSCULAR; INTRAVENOUS at 01:06

## 2023-06-15 RX ADMIN — SODIUM CHLORIDE, POTASSIUM CHLORIDE, SODIUM LACTATE AND CALCIUM CHLORIDE: 600; 310; 30; 20 INJECTION, SOLUTION INTRAVENOUS at 06:06

## 2023-06-15 RX ADMIN — POTASSIUM CHLORIDE 150 ML/HR: 149 INJECTION, SOLUTION, CONCENTRATE INTRAVENOUS at 05:06

## 2023-06-15 RX ADMIN — LIDOCAINE HYDROCHLORIDE 100 MG: 20 INJECTION, SOLUTION INTRAVENOUS at 01:06

## 2023-06-15 RX ADMIN — ROCURONIUM BROMIDE 40 MG: 10 INJECTION, SOLUTION INTRAVENOUS at 01:06

## 2023-06-15 RX ADMIN — SODIUM CHLORIDE, SODIUM GLUCONATE, SODIUM ACETATE, POTASSIUM CHLORIDE AND MAGNESIUM CHLORIDE: 526; 502; 368; 37; 30 INJECTION, SOLUTION INTRAVENOUS at 11:06

## 2023-06-15 RX ADMIN — FENTANYL CITRATE 25 MCG: 50 INJECTION, SOLUTION INTRAMUSCULAR; INTRAVENOUS at 04:06

## 2023-06-15 RX ADMIN — PROPOFOL 200 MG: 10 INJECTION, EMULSION INTRAVENOUS at 01:06

## 2023-06-15 RX ADMIN — MUPIROCIN: 20 OINTMENT TOPICAL at 08:06

## 2023-06-15 RX ADMIN — FENTANYL CITRATE 50 MCG: 50 INJECTION, SOLUTION INTRAMUSCULAR; INTRAVENOUS at 04:06

## 2023-06-15 RX ADMIN — Medication 1 PATCH: at 09:06

## 2023-06-15 RX ADMIN — MORPHINE SULFATE 6 MG: 2 INJECTION, SOLUTION INTRAMUSCULAR; INTRAVENOUS at 03:06

## 2023-06-15 RX ADMIN — FENTANYL CITRATE 100 MCG: 50 INJECTION, SOLUTION INTRAMUSCULAR; INTRAVENOUS at 01:06

## 2023-06-15 RX ADMIN — POTASSIUM CHLORIDE 10 MEQ: 7.46 INJECTION, SOLUTION INTRAVENOUS at 06:06

## 2023-06-15 RX ADMIN — SUGAMMADEX 200 MG: 100 INJECTION, SOLUTION INTRAVENOUS at 03:06

## 2023-06-15 RX ADMIN — ONDANSETRON 4 MG: 2 INJECTION INTRAMUSCULAR; INTRAVENOUS at 09:06

## 2023-06-15 RX ADMIN — Medication: at 10:06

## 2023-06-15 RX ADMIN — FENTANYL CITRATE 50 MCG: 50 INJECTION, SOLUTION INTRAMUSCULAR; INTRAVENOUS at 02:06

## 2023-06-15 RX ADMIN — DEXAMETHASONE SODIUM PHOSPHATE 8 MG: 4 INJECTION, SOLUTION INTRA-ARTICULAR; INTRALESIONAL; INTRAMUSCULAR; INTRAVENOUS; SOFT TISSUE at 01:06

## 2023-06-15 RX ADMIN — PIPERACILLIN AND TAZOBACTAM 4.5 G: 4; .5 INJECTION, POWDER, LYOPHILIZED, FOR SOLUTION INTRAVENOUS; PARENTERAL at 08:06

## 2023-06-15 RX ADMIN — Medication: at 06:06

## 2023-06-15 RX ADMIN — ACETAMINOPHEN 1000 MG: 10 INJECTION, SOLUTION INTRAVENOUS at 01:06

## 2023-06-15 RX ADMIN — ONDANSETRON 4 MG: 2 INJECTION INTRAMUSCULAR; INTRAVENOUS at 01:06

## 2023-06-15 RX ADMIN — ESMOLOL HYDROCHLORIDE 20 MG: 10 INJECTION, SOLUTION INTRAVENOUS at 01:06

## 2023-06-15 RX ADMIN — KETAMINE HYDROCHLORIDE 50 MG: 100 INJECTION, SOLUTION, CONCENTRATE INTRAMUSCULAR; INTRAVENOUS at 02:06

## 2023-06-15 RX ADMIN — PROMETHAZINE HYDROCHLORIDE 12.5 MG: 25 INJECTION INTRAMUSCULAR; INTRAVENOUS at 03:06

## 2023-06-15 RX ADMIN — MIDAZOLAM HYDROCHLORIDE 2 MG: 1 INJECTION, SOLUTION INTRAMUSCULAR; INTRAVENOUS at 01:06

## 2023-06-15 RX ADMIN — PIPERACILLIN AND TAZOBACTAM 4.5 G: 4; .5 INJECTION, POWDER, LYOPHILIZED, FOR SOLUTION INTRAVENOUS; PARENTERAL at 10:06

## 2023-06-15 RX ADMIN — ROCURONIUM BROMIDE 10 MG: 10 INJECTION, SOLUTION INTRAVENOUS at 01:06

## 2023-06-15 NOTE — NURSING
Leaving unit to pre op for am surgery at this time VSS afebrile no acute distress noted at this time.

## 2023-06-15 NOTE — SUBJECTIVE & OBJECTIVE
"Interval History: see "Hospital Course"    Review of Systems   Gastrointestinal:  Positive for abdominal pain, nausea and vomiting.   Allergic/Immunologic: Positive for immunocompromised state.   Objective:     Vital Signs (Most Recent):  Temp: 98.2 °F (36.8 °C) (06/15/23 0849)  Pulse: 103 (06/15/23 0849)  Resp: 18 (06/15/23 0905)  BP: 117/73 (06/15/23 0849)  SpO2: (!) 94 % (06/15/23 0849) Vital Signs (24h Range):  Temp:  [97.1 °F (36.2 °C)-98.2 °F (36.8 °C)] 98.2 °F (36.8 °C)  Pulse:  [] 103  Resp:  [16-18] 18  SpO2:  [94 %-98 %] 94 %  BP: (111-122)/(70-84) 117/73     Weight: 102 kg (224 lb 13.9 oz)  Body mass index is 28.11 kg/m².    Intake/Output Summary (Last 24 hours) at 6/15/2023 1020  Last data filed at 6/15/2023 0641  Gross per 24 hour   Intake 3543.83 ml   Output 2000 ml   Net 1543.83 ml         Physical Exam  Vitals and nursing note reviewed.   Constitutional:       General: He is not in acute distress.     Appearance: Normal appearance. He is ill-appearing.   HENT:      Head: Normocephalic and atraumatic.      Right Ear: External ear normal.      Left Ear: External ear normal.      Nose: Nose normal.      Mouth/Throat:      Mouth: Mucous membranes are moist.      Pharynx: Oropharynx is clear.   Eyes:      Extraocular Movements: Extraocular movements intact.   Cardiovascular:      Rate and Rhythm: Regular rhythm. Tachycardia present.      Pulses: Normal pulses.      Heart sounds: Normal heart sounds.   Pulmonary:      Effort: Pulmonary effort is normal. No respiratory distress.      Breath sounds: Normal breath sounds.   Abdominal:      General: There is distension.      Tenderness: There is abdominal tenderness.   Musculoskeletal:         General: Normal range of motion.      Cervical back: Normal range of motion and neck supple.      Right lower leg: No edema.      Left lower leg: No edema.   Skin:     General: Skin is warm and dry.   Neurological:      Mental Status: He is alert. Mental status is " at baseline.   Psychiatric:         Mood and Affect: Mood normal.         Behavior: Behavior normal.           Significant Labs: All pertinent labs within the past 24 hours have been reviewed.    Significant Imaging: I have reviewed all pertinent imaging results/findings within the past 24 hours.

## 2023-06-15 NOTE — OP NOTE
Exploratory laparotomy  Small bowel resection  Omental resection     Procedure Note    Date of procedure:   06/15/2023    Indications: 46 y/o hx of recent right colectomy for colon cancer presents on going small bowel obstruction    Pre-operative Diagnosis: small bowel obstruction    Post-operative Diagnosis: Same + omental infarction    Surgeon: Edin Michael MD    Assistants: jose Antoine Resident MD    Anesthesia: General endotracheal anesthesia    ASA Class: 2    Procedure Details   The patient was seen in the Holding Room. The risks, benefits, complications, treatment options, and expected outcomes were discussed with the patient. The possibilities of reaction to medication, pulmonary aspiration, perforation of viscus, bleeding, recurrent infection, the need for additional procedures, failure to diagnose a condition, and creating a complication requiring transfusion or operation were discussed with the patient. The patient concurred with the proposed plan, giving informed consent. The site of surgery properly noted/marked. The patient was taken to Operating Room 4 identified as [facility] and the procedure verified as ex lap. A Time Out was held and the above information confirmed.    Full general anesthesia was induced with orotracheal intubation. The patient was prepped and draped in a supine position. Appropriate antibiotics were given intravenously. Arms were out.    Incision was made through the previous incision.  Cautery was used to expose the linea alba and the previous closure was transected divided to allow entry of the peritoneal cavity.  There were dense adhesions to the midline right upper quadrant immediately upon entering the peritoneal cavity.  These were taken down sharply and bluntly where indicated.  The incision was extended superiorly to allow wide access to the peritoneal cavity.  An NG tube was placed in confirmed placement to palpation.      The procedure  was started by running the bowel proximally from the ligament of Treitz to distally.  Proximally the bowel looked healthy and appropriate but slightly distended.  As we came towards the ileum and the previous ileocolostomy there were dense matted adhesions to the right upper quadrant including to the liver and peritoneal lining.  The sigmoid colon was even involved within these adhesions.  These were taken down very carefully using scissors and blunt dissection.  Even a portion of the ureter appeared to be involved in the inflammatory reaction.  This was carefully removed from the adhesive area and brought back into the appropriate position as was the sigmoid colon.  The cause of all the adhesions appeared to be a necrotic portion of omentum.  This had dense surrounding adhesions and had pulled the rest of the bowel into the right upper quadrant.  This led to kinking twisting and complete obstruction of the small bowel.  All the adhesions were taken down to the small bowel.  This allowed straightening of the small bowel and resection of the involved necrotic omentum.  This portion of the omentum was passed off as specimen.  A small adhesion that appeared slightly thickened was also passed off as specimen.  The involved bowel appeared narrowed and had areas of stricturing.  This bowel was resected between blue load JAMES staplers and passed off as specimen.  The remaining bowel was a least 170 cm if not 200 cm in length and elected to perform a primary anastomosis to the distal end of the ileum.  This was done initially with a stapled anastomosis.  The small bowel was quite thickened and appeared to tear as we did the anastomosis.  This anastomosis was resected and then redone in a hand layer to sewn anastomosis using running then interrupted Vicryl sutures.  The anastomosis was at least 6 cm long appeared healthy and viable.  The mesenteric defect was then closed with Vicryl sutures.  The rest of the abdomen was then  reinspected and irrigated.  Hemostasis was achieved using Patito in the retroperitoneum where there was some oozing.  Once this had stopped the bowel was returned to its appropriate positioning and reinspected.  No other enterotomies or abnormalities were seen.  There was some thickening of the mesentery within the midportion of the mesentery which caused some shortening of the small bowel mesentery.  I felt at this point that this was likely reactionary to the omental infarction and not an obvious cancer.  I did not feel any other cancer or suspicious lesions in the peritoneal cavity.  We irrigated the peritoneal cavity reapproximated the linea alba after Seprafilm was placed over the small bowel as there was very little omentum.  The skin was then closed with staples dressings were placed.  Patient tolerated procedure well.      Instrument, sponge, and needle counts were correct prior to wound closure and at the conclusion of the case.     Findings:  omental infarction    Estimated Blood Loss: 100.0 cc    Drains: none    Total IV Fluids: see anesthesia    Specimens: omentum, small bowel, additional small bowel, adhesion    Implants: patito; seprafilm    Complications:  None; patient tolerated the procedure well.    Disposition: PACU - hemodynamically stable.    Condition: stable    Attending Attestation: I was present and scrubbed for the entire procedure.

## 2023-06-15 NOTE — SUBJECTIVE & OBJECTIVE
Interval History: Patient seen and examined this morning. Low grade tachycardia overnight. WBC and Cr bumped. Abdomen remains mildly tender throughout. No substantial bowel function. KUB this morning with contrast remaining in the descending, transverse colon. Plan for operative exploration today. Discussed with patient.     Medications:  Continuous Infusions:   lactated ringers 150 mL/hr at 06/15/23 0641     Scheduled Meds:   enoxparin  40 mg Subcutaneous Q24H (prophylaxis, 1700)    nicotine  1 patch Transdermal Daily    piperacillin-tazobactam (Zosyn) IV (PEDS and ADULTS) (extended infusion is not appropriate)  4.5 g Intravenous Q8H     PRN Meds:HYDROmorphone, morphine, naloxone, ondansetron, potassium chloride **AND** potassium chloride **AND** potassium chloride, promethazine (PHENERGAN) IVPB, sodium chloride 0.9%     Review of patient's allergies indicates:  No Known Allergies  Objective:     Vital Signs (Most Recent):  Temp: 97.9 °F (36.6 °C) (06/15/23 0415)  Pulse: 99 (06/15/23 0415)  Resp: 18 (06/15/23 0415)  BP: 122/73 (06/15/23 0415)  SpO2: 95 % (06/15/23 0415) Vital Signs (24h Range):  Temp:  [97.1 °F (36.2 °C)-98.2 °F (36.8 °C)] 97.9 °F (36.6 °C)  Pulse:  [] 99  Resp:  [16-18] 18  SpO2:  [94 %-98 %] 95 %  BP: (111-122)/(70-84) 122/73     Weight: 102 kg (224 lb 13.9 oz)  Body mass index is 28.11 kg/m².    Intake/Output - Last 3 Shifts         06/13 0700  06/14 0659 06/14 0700  06/15 0659 06/15 0700 06/16 0659    I.V. (mL/kg) 2331.8 (22.9) 2956.5 (29)     IV Piggyback 823 587.3     Total Intake(mL/kg) 3154.7 (30.9) 3543.8 (34.7)     Urine (mL/kg/hr) 0 (0) 1200 (0.5)     Emesis/NG output 2150 1800     Stool  0     Total Output 2150 3000     Net +1004.7 +543.8            Urine Occurrence 3 x 2 x     Stool Occurrence  2 x              Physical Exam  Vitals and nursing note reviewed.   Constitutional:       General: He is not in acute distress.     Appearance: Normal appearance.   HENT:      Nose: Nose  normal.      Mouth/Throat:      Mouth: Mucous membranes are moist.   Cardiovascular:      Rate and Rhythm: Regular rhythm. Tachycardia present.   Pulmonary:      Effort: Pulmonary effort is normal. No respiratory distress.   Abdominal:      Comments: Abdomen soft, mildly distended  Diffusely tender to palpation throughout abdomen  Non-peritonitic  Lower midline incision well healed   Musculoskeletal:         General: Normal range of motion.   Skin:     General: Skin is warm.   Neurological:      General: No focal deficit present.      Mental Status: He is alert.        Significant Labs:  I have reviewed all pertinent lab results within the past 24 hours.  CBC:   Recent Labs   Lab 06/15/23  0258   WBC 17.09*   RBC 4.84   HGB 9.9*   HCT 32.2*   *   MCV 67*   MCH 20.5*   MCHC 30.7*     CMP:   Recent Labs   Lab 06/15/23  0258      CALCIUM 10.4   ALBUMIN 3.7   PROT 8.7*      K 2.9*   CO2 39*   CL 79*   BUN 27*   CREATININE 2.0*   ALKPHOS 58   ALT 11   AST 13   BILITOT 1.5*       Significant Diagnostics:  I have reviewed all pertinent imaging results/findings within the past 24 hours.

## 2023-06-15 NOTE — PROGRESS NOTES
HPI    47 years old male newly diagnosed colon cancer.  He was transferred from [facility] for evaluation possible developed a small-bowel obstruction.  He was complaining abdominal pain nausea and vomiting.  Workup shows leukocytosis, anemia and thrombocytosis.  CT scan from outside facility was concerning for developing small-bowel obstruction.  Patient was transferred admitted to [facility] underwent right partial colectomy on 05/29 with pathology consistent adenocarcinoma.  He was discharged then and return to hospital for another admission on 06/08/2023 where he was treated for ileus.  Medical history including type 2 diabetes anemia hypertension right eye blindness.    Past Medical History:   Diagnosis Date    Diabetes mellitus, type 2 05/2020    Patient denies    Hypertension      Past Surgical History:   Procedure Laterality Date    COLONOSCOPY N/A 5/29/2023    Procedure: COLONOSCOPY;  Surgeon: Sam Solano MD;  Location: [facility];  Service: Endoscopy;  Laterality: N/A;    EYE SURGERY Right     LUMBAR DISC SURGERY  2005    SUBTOTAL COLECTOMY Right 5/29/2023    Procedure: COLECTOMY, PARTIAL;  Surgeon: Edin Michael MD;  Location: [facility] OR;  Service: General;  Laterality: Right;     Social History     Socioeconomic History    Marital status:     Number of children: 3    Highest education level: Associate degree: academic program   Occupational History    Occupation: [employer]   Tobacco Use    Smoking status: Every Day     Packs/day: 0.50     Years: 23.00     Pack years: 11.50     Types: Cigarettes    Smokeless tobacco: Never   Substance and Sexual Activity    Alcohol use: Not Currently     Comment: Quit drinking 2 yrs ago    Drug use: Yes     Types: Marijuana     Comment: heavy marijuana use    Sexual activity: Yes     Social Determinants of Health     Financial Resource Strain: Low Risk     Difficulty of Paying Living Expenses: Not hard at all   Food  Insecurity: No Food Insecurity    Worried About Running Out of Food in the Last Year: Never true    Ran Out of Food in the Last Year: Never true   Transportation Needs: No Transportation Needs    Lack of Transportation (Medical): No    Lack of Transportation (Non-Medical): No   Physical Activity: Sufficiently Active    Days of Exercise per Week: 5 days    Minutes of Exercise per Session: 90 min   Stress: No Stress Concern Present    Feeling of Stress : Not at all   Social Connections: Socially Isolated    Frequency of Communication with Friends and Family: Never    Frequency of Social Gatherings with Friends and Family: More than three times a week    Attends [employer] Services: Never    Active Member of Clubs or Organizations: No    Attends Club or Organization Meetings: Never    Marital Status:    Housing Stability: Unknown    Unable to Pay for Housing in the Last Year: No    Unstable Housing in the Last Year: No     Physical exam  Vitals:    06/15/23 0905   BP:    Pulse:    Resp: 18   Temp:      Constitutional:       Appearance: He is ill-appearing.   HENT:      Head: Normocephalic and atraumatic.      Right Ear: External ear normal.      Left Ear: External ear normal.      Nose: Nose normal.      Mouth/Throat:      Mouth: Mucous membranes are moist.      Pharynx: Oropharynx is clear.   Eyes:      Extraocular Movements: Extraocular movements intact.   Cardiovascular:      Rate and Rhythm: Normal rate and regular rhythm.      Pulses: Normal pulses.      Heart sounds: Normal heart sounds.   Pulmonary:      Effort: Pulmonary effort is normal.      Breath sounds: Normal breath sounds.   Abdominal:      General: There is distension.      Tenderness: There is abdominal tenderness.      Comments: Surgical incision intact and without redness, drainage or erythema.   Musculoskeletal:         General: Normal range of motion.      Cervical back: Normal range of motion and neck supple.      Right lower leg: No edema.       Left lower leg: No edema.   Skin:     General: Skin is warm.   Neurological:      Mental Status: He is alert. Mental status is at baseline.   Psychiatric:         Behavior: Behavior normal    Lab Results   Component Value Date    WBC 17.09 (H) 06/15/2023    HGB 9.9 (L) 06/15/2023    HCT 32.2 (L) 06/15/2023    MCV 67 (L) 06/15/2023     (H) 06/15/2023       CMP  Sodium   Date Value Ref Range Status   06/15/2023 137 136 - 145 mmol/L Final     Potassium   Date Value Ref Range Status   06/15/2023 2.9 (L) 3.5 - 5.1 mmol/L Final     Chloride   Date Value Ref Range Status   06/15/2023 79 (L) 95 - 110 mmol/L Final     CO2   Date Value Ref Range Status   06/15/2023 39 (H) 23 - 29 mmol/L Final     Glucose   Date Value Ref Range Status   06/15/2023 102 70 - 110 mg/dL Final     BUN   Date Value Ref Range Status   06/15/2023 27 (H) 6 - 20 mg/dL Final     Creatinine   Date Value Ref Range Status   06/15/2023 2.0 (H) 0.5 - 1.4 mg/dL Final     Calcium   Date Value Ref Range Status   06/15/2023 10.4 8.7 - 10.5 mg/dL Final     Total Protein   Date Value Ref Range Status   06/15/2023 8.7 (H) 6.0 - 8.4 g/dL Final     Albumin   Date Value Ref Range Status   06/15/2023 3.7 3.5 - 5.2 g/dL Final     Total Bilirubin   Date Value Ref Range Status   06/15/2023 1.5 (H) 0.1 - 1.0 mg/dL Final     Comment:     For infants and newborns, interpretation of results should be based  on gestational age, weight and in agreement with clinical  observations.    Premature Infant recommended reference ranges:  Up to 24 hours.............<8.0 mg/dL  Up to 48 hours............<12.0 mg/dL  3-5 days..................<15.0 mg/dL  6-29 days.................<15.0 mg/dL       Alkaline Phosphatase   Date Value Ref Range Status   06/15/2023 58 55 - 135 U/L Final     AST   Date Value Ref Range Status   06/15/2023 13 10 - 40 U/L Final     ALT   Date Value Ref Range Status   06/15/2023 11 10 - 44 U/L Final     Anion Gap   Date Value Ref Range Status    06/15/2023 19 (H) 8 - 16 mmol/L Final     eGFR   Date Value Ref Range Status   06/15/2023 41 (A) >60 mL/min/1.73 m^2 Final     Pathology biopsy  1. Colon mass, site not further specified (biopsy):   Invasive adenocarcinoma, moderately differentiated     Immunohistochemistry (IHC) Testing for Mismatch Repair (MMR) Proteins:   MLH1, MSH2, MSH6, PMS2 - Intact nuclear expression   Background nonneoplastic tissue/internal control with intact nuclear expression     There are exceptions to the above IHC interpretations. These results should not be considered in isolation, and clinical correlation with genetic counseling is recommended to assess the need for germline testing.     Interpretation: No loss of nuclear expression of MMR proteins: low probability of microsatellite instability       2. Colon, sigmoid polyps (polypectomy):   Tubular adenoma, 1 fragment     Assessment Plan    Invasive adenocarcinoma moderately differentiated.  MSI testing intact nuclear expression.  Status post surgery secondary to bowel obstruction 05/29/2023 pathology pending.  He is admitted to the hospital with ileus on this admission.    Surgery consultation appreciated.    CT scan on 06/11/2023 shows mildly dilated fluid-filled loop of small bowel within the pelvis possibly representing developing obstruction.    Thrombocytosis reactive    Anemia microcytic iron labs     Patient is scheduled for surgery today for small-bowel obstruction - surgeon Dr. Michael    > pain control  > IV fluid  > Oncology will follow

## 2023-06-15 NOTE — ASSESSMENT & PLAN NOTE
Ruslan Caldwell is a 47yoM who underwent open right hemicolectomy on 5/29/23 who represents with what appears to be a small bowel obstruction.     - patient seen and examined this morning  - vital signs, labs reviewed   - low grade tachycardia   - WBC, Cr rising  - no substantial bowel function  - KUB this morning with contrast throughout descending, transverse colon  - plan to proceed to OR today for exploration  - discussed with patient  - NPO

## 2023-06-15 NOTE — TRANSFER OF CARE
"Anesthesia Transfer of Care Note    Patient: Ruslan Caldwell    Procedure(s) Performed: Procedure(s) (LRB):  LAPAROTOMY, EXPLORATORY (N/A)  EXCISION, SMALL INTESTINE (N/A)  LYSIS, ADHESIONS (N/A)  OMENTECTOMY (N/A)    Patient location: PACU    Anesthesia Type: general    Transport from OR: Transported from OR on 6-10 L/min O2 by face mask with adequate spontaneous ventilation    Post pain: adequate analgesia    Post assessment: no apparent anesthetic complications    Post vital signs: stable    Level of consciousness: sedated    Nausea/Vomiting: no nausea/vomiting    Complications: none    Transfer of care protocol was followed      Last vitals:   Visit Vitals  /73 (BP Location: Left arm, Patient Position: Lying)   Pulse 98   Temp 36.6 °C (97.8 °F) (Oral)   Resp 16   Ht 6' 3" (1.905 m)   Wt 101.6 kg (224 lb)   SpO2 (!) 94%   BMI 28.00 kg/m²     "

## 2023-06-15 NOTE — PROGRESS NOTES
[facility] Surgery  Progress Note    Subjective:     History of Present Illness:  47-year-old male well known to me.  Presents with abdominal pain nausea and vomiting.  CT was suspicious for obstruction.  Currently he says back pain was much worse than his abdominal pain.  He says he has been having trouble controlling his pain at home.  He denies any recent flatus.  He does not have an NG tube and he is not vomiting currently.  He does not feel nauseated currently.      Post-Op Info:  Procedure(s) (LRB):  LAPAROTOMY, EXPLORATORY (N/A)         Interval History: Patient seen and examined this morning. Low grade tachycardia overnight. WBC and Cr bumped. Abdomen remains mildly tender throughout. No substantial bowel function. KUB this morning with contrast remaining in the descending, transverse colon. Plan for operative exploration today. Discussed with patient.     Medications:  Continuous Infusions:   lactated ringers 150 mL/hr at 06/15/23 0641     Scheduled Meds:   enoxparin  40 mg Subcutaneous Q24H (prophylaxis, 1700)    nicotine  1 patch Transdermal Daily    piperacillin-tazobactam (Zosyn) IV (PEDS and ADULTS) (extended infusion is not appropriate)  4.5 g Intravenous Q8H     PRN Meds:HYDROmorphone, morphine, naloxone, ondansetron, potassium chloride **AND** potassium chloride **AND** potassium chloride, promethazine (PHENERGAN) IVPB, sodium chloride 0.9%     Review of patient's allergies indicates:  No Known Allergies  Objective:     Vital Signs (Most Recent):  Temp: 97.9 °F (36.6 °C) (06/15/23 0415)  Pulse: 99 (06/15/23 0415)  Resp: 18 (06/15/23 0415)  BP: 122/73 (06/15/23 0415)  SpO2: 95 % (06/15/23 0415) Vital Signs (24h Range):  Temp:  [97.1 °F (36.2 °C)-98.2 °F (36.8 °C)] 97.9 °F (36.6 °C)  Pulse:  [] 99  Resp:  [16-18] 18  SpO2:  [94 %-98 %] 95 %  BP: (111-122)/(70-84) 122/73     Weight: 102 kg (224 lb 13.9 oz)  Body mass index is 28.11 kg/m².    Intake/Output - Last 3  Shifts         06/13 0700  06/14 0659 06/14 0700  06/15 0659 06/15 0700  06/16 0659    I.V. (mL/kg) 2331.8 (22.9) 2956.5 (29)     IV Piggyback 823 587.3     Total Intake(mL/kg) 3154.7 (30.9) 3543.8 (34.7)     Urine (mL/kg/hr) 0 (0) 1200 (0.5)     Emesis/NG output 2150 1800     Stool  0     Total Output 2150 3000     Net +1004.7 +543.8            Urine Occurrence 3 x 2 x     Stool Occurrence  2 x              Physical Exam  Vitals and nursing note reviewed.   Constitutional:       General: He is not in acute distress.     Appearance: Normal appearance.   HENT:      Nose: Nose normal.      Mouth/Throat:      Mouth: Mucous membranes are moist.   Cardiovascular:      Rate and Rhythm: Regular rhythm. Tachycardia present.   Pulmonary:      Effort: Pulmonary effort is normal. No respiratory distress.   Abdominal:      Comments: Abdomen soft, mildly distended  Diffusely tender to palpation throughout abdomen  Non-peritonitic  Lower midline incision well healed   Musculoskeletal:         General: Normal range of motion.   Skin:     General: Skin is warm.   Neurological:      General: No focal deficit present.      Mental Status: He is alert.        Significant Labs:  I have reviewed all pertinent lab results within the past 24 hours.  CBC:   Recent Labs   Lab 06/15/23  0258   WBC 17.09*   RBC 4.84   HGB 9.9*   HCT 32.2*   *   MCV 67*   MCH 20.5*   MCHC 30.7*     CMP:   Recent Labs   Lab 06/15/23  0258      CALCIUM 10.4   ALBUMIN 3.7   PROT 8.7*      K 2.9*   CO2 39*   CL 79*   BUN 27*   CREATININE 2.0*   ALKPHOS 58   ALT 11   AST 13   BILITOT 1.5*       Significant Diagnostics:  I have reviewed all pertinent imaging results/findings within the past 24 hours.    Assessment/Plan:     * Small bowel obstruction  Ruslan Caldwell is a 47yoM who underwent open right hemicolectomy on 5/29/23 who represents with what appears to be a small bowel obstruction.     - patient seen and examined this morning  - vital signs,  labs reviewed   - low grade tachycardia   - WBC, Cr rising  - no substantial bowel function  - KUB this morning with contrast throughout descending, transverse colon  - plan to proceed to OR today for exploration  - discussed with patient  - NPO          Sunday Antoine MD  General Surgery  [facility]

## 2023-06-15 NOTE — ANESTHESIA PREPROCEDURE EVALUATION
06/15/2023  Ruslan Caldwell is a 47 y.o., male.      Pre-op Assessment    I have reviewed the Patient Summary Reports.     I have reviewed the Nursing Notes. I have reviewed the NPO Status.   I have reviewed the Medications.     Review of Systems  Anesthesia Hx:  No problems with previous Anesthesia    Social:  Smoker, Former Smoker    Hematology/Oncology:         -- Cancer in past history: Other (see Oncology comments) surgery    Cardiovascular:   Exercise tolerance: good Hypertension ECG has been reviewed.    Pulmonary:  Pulmonary Normal    Renal/:   Chronic Renal Disease    Hepatic/GI:   Small bowel obstruction    Neurological:  Neurology Normal    Endocrine:   Diabetes, type 2        Physical Exam  General: Well nourished    Airway:  Mallampati: II   Mouth Opening: Normal  TM Distance: Normal  Tongue: Normal  Neck ROM: Normal ROM    Dental:  Intact    Chest/Lungs:  Clear to auscultation, Normal Respiratory Rate        Anesthesia Plan  Type of Anesthesia, risks & benefits discussed:    Anesthesia Type: Gen ETT  Intra-op Monitoring Plan: Standard ASA Monitors  Post Op Pain Control Plan: multimodal analgesia and IV/PO Opioids PRN  Induction:  IV  Airway Plan: Direct, Post-Induction  Informed Consent: Informed consent signed with the Patient and all parties understand the risks and agree with anesthesia plan.  All questions answered. Patient consented to blood products? Yes  ASA Score: 3    Ready For Surgery From Anesthesia Perspective.     .

## 2023-06-15 NOTE — PLAN OF CARE
Pt prepped for surgery. Consents at bedside. Incentive spirometry taught by respiratory. Pt belongings left in pt room. Cell phone given to pt's sister. Sister set up with text alerts. , anesthesia aware of nicotine patch. Ok to stay on.

## 2023-06-15 NOTE — PROGRESS NOTES
[facility] Medicine  Progress Note    Patient Name: Ruslan Caldwell  MRN: [id number]  Patient Class: IP- Inpatient   Admission Date: 6/11/2023  Length of Stay: 3 days  Attending Physician: Moo Boone MD  Primary Care Provider: Amira Knutson NP        Subjective:     Principal Problem:Small bowel obstruction        HPI:  Ruslan Caldwell is a 47-year-old male who presents in transfer from [facility] for evaluation of possible developing small bowel obstruction.  Patient presents outside facility complaining of abdominal pain with nausea and vomiting.  Workup at outside facility demonstrated leukocytosis of 16,000, anemia, and thrombocytosis of 843.  CMP with hypokalemia 3.3 and total bilirubin of 1.2.  CT scan at outside facility was concerning for developing small-bowel obstruction.  Patient was admitted to [facility] where he underwent right partial colectomy on 05/29 with pathology consistent with adenocarcinoma.  He was discharged and then came back for another admission on 06/08 where he was treated for an ileus.  Previous medical history includes type 2 diabetes, anemia, hypertension, and right eye blindness.  Patient admitted to Hospital Medicine for treatment management.  Will maintain patient NPO, IV fluids, sliding scale insulin p.r.n., and general surgery consult in a.m..      Overview/Hospital Course:  Ruslan Caldwell is a 47 year old male with a past medical history of recently diagnosed colonic adenocarcinoma s/p partial colectomy with anastomosis, tobacco use, microcytic anemia and thrombocytosis who presented with persistent abdominal pain, nausea and abdominal pain concerning for SBO. An NG tube was unable to be placed. He is on IV fluids and Zosyn. He is NPO. PRN IV analgesics and antiemetics have been ordered and General Surgery has been consulted. Repeat CT A/P shows SBO 6/13. A gastrografin enema 6/14 was inconclusive. Surgery plans to take  "the patient to the OR 6/15 for exploratory laparotomy. His course has been complicated by JOYCE in setting of vomiting as well as Toradol use; IV fluids are being continued with 1/2 NS and 40 mEq KCl 150 cc/hr. Electrolytes are being closely monitored as the patient continued to have nausea and vomiting. Nephrology has been consulted. He was also discovered to have an iron deficiency for which IV iron was ordered 6/13. Oncology has also been consulted and will arrange follow up in clinic.      Interval History: see "Hospital Course"    Review of Systems   Gastrointestinal:  Positive for abdominal pain, nausea and vomiting.   Allergic/Immunologic: Positive for immunocompromised state.   Objective:     Vital Signs (Most Recent):  Temp: 98.2 °F (36.8 °C) (06/15/23 0849)  Pulse: 103 (06/15/23 0849)  Resp: 18 (06/15/23 0905)  BP: 117/73 (06/15/23 0849)  SpO2: (!) 94 % (06/15/23 0849) Vital Signs (24h Range):  Temp:  [97.1 °F (36.2 °C)-98.2 °F (36.8 °C)] 98.2 °F (36.8 °C)  Pulse:  [] 103  Resp:  [16-18] 18  SpO2:  [94 %-98 %] 94 %  BP: (111-122)/(70-84) 117/73     Weight: 102 kg (224 lb 13.9 oz)  Body mass index is 28.11 kg/m².    Intake/Output Summary (Last 24 hours) at 6/15/2023 1020  Last data filed at 6/15/2023 0641  Gross per 24 hour   Intake 3543.83 ml   Output 2000 ml   Net 1543.83 ml         Physical Exam  Vitals and nursing note reviewed.   Constitutional:       General: He is not in acute distress.     Appearance: Normal appearance. He is ill-appearing.   HENT:      Head: Normocephalic and atraumatic.      Right Ear: External ear normal.      Left Ear: External ear normal.      Nose: Nose normal.      Mouth/Throat:      Mouth: Mucous membranes are moist.      Pharynx: Oropharynx is clear.   Eyes:      Extraocular Movements: Extraocular movements intact.   Cardiovascular:      Rate and Rhythm: Regular rhythm. Tachycardia present.      Pulses: Normal pulses.      Heart sounds: Normal heart sounds.   Pulmonary: "      Effort: Pulmonary effort is normal. No respiratory distress.      Breath sounds: Normal breath sounds.   Abdominal:      General: There is distension.      Tenderness: There is abdominal tenderness.   Musculoskeletal:         General: Normal range of motion.      Cervical back: Normal range of motion and neck supple.      Right lower leg: No edema.      Left lower leg: No edema.   Skin:     General: Skin is warm and dry.   Neurological:      Mental Status: He is alert. Mental status is at baseline.   Psychiatric:         Mood and Affect: Mood normal.         Behavior: Behavior normal.           Significant Labs: All pertinent labs within the past 24 hours have been reviewed.    Significant Imaging: I have reviewed all pertinent imaging results/findings within the past 24 hours.      Assessment/Plan:      * Small bowel obstruction  Acute problem.  -NPO  -IV fluids 1/2 NS with 40 mEq KCl 150 cc/hr  -Dilaudid PRN  -Zofran PRN  -Unable to place NG tube  -Surgery consulted; exploratory laparotomy 6/15  -Zosyn        Colon adenocarcinoma  Recent partial colectomy.  -Oncology consulted; follow up in clinic  -Follow up pathology      JOYCE (acute kidney injury)  In setting of Toradol use, decreased PO intake and vomiting.  -IV fluids 1/2 NS 40 mEq KCl 150 cc/hr  -Renally dose all medications  -Avoid nephrotoxic agents  -Monitor UOP and electrolytes  -Trend creatinine  -Nephrology consulted      Hypokalemia  -Replete PRN; caution with JOYCE  -Trend K  -IV fluids with 40 mEq KCl      Tobacco use  -Patient to be counseled on cessation  -Nicotine patch      Moderate malnutrition  -Diet when appropriate  -Nutrition consulted    Thrombocytosis  Chronic. Stable. In setting of recently diagnosed adenocarcinoma and SBO.  -Trend platelets with CBC      Iron deficiency anemia  -IV iron one dose 6/13  -Trend Hgb with CBC      Essential hypertension  Patient not taking any BP medications.  -Continue to monitor        VTE Risk  Mitigation (From admission, onward)         Ordered     enoxaparin injection 40 mg  Every 24 hours         06/12/23 0915     Place DINA hose  Until discontinued         06/11/23 2043     IP VTE HIGH RISK PATIENT  Once         06/11/23 2043     Place sequential compression device  Until discontinued         06/11/23 2043                Discharge Planning   GABRIEL: 6/15/2023     Code Status: Full Code   Is the patient medically ready for discharge?:     Reason for patient still in hospital (select all that apply): Patient trending condition, Laboratory test, Treatment and Consult recommendations  Discharge Plan A: Home                  Moo Boone MD  Department of Hospital Medicine   [facility]

## 2023-06-15 NOTE — PLAN OF CARE
Plan of care reviewed with patient. Verbalized understanding. IV intact and patent with fluids infusing. Patient alert and able to make needs known. Ambulatory to bathroom. Urinating without issues. Pain and nausea managed with prn medications. Patient NPO and aware of plan for surgery. Safety maintained. Call light in reach and instructed to call for assistance. Will continue to monitor.

## 2023-06-15 NOTE — ASSESSMENT & PLAN NOTE
Acute problem.  -NPO  -IV fluids 1/2 NS with 40 mEq KCl 150 cc/hr  -Dilaudid PRN  -Zofran PRN  -Unable to place NG tube  -Surgery consulted; exploratory laparotomy 6/15  -Zosyn

## 2023-06-15 NOTE — PROGRESS NOTES
Criteria met per anesthesia for transfer to room Tolerating po fluids Pain addressed IV analgesics PCA started on transfer Transferred per bed to 317 Report given to Steven Yousif at bedside

## 2023-06-15 NOTE — ASSESSMENT & PLAN NOTE
In setting of Toradol use, decreased PO intake and vomiting.  -IV fluids 1/2 NS 40 mEq KCl 150 cc/hr  -Renally dose all medications  -Avoid nephrotoxic agents  -Monitor UOP and electrolytes  -Trend creatinine  -Nephrology consulted

## 2023-06-15 NOTE — ANESTHESIA PROCEDURE NOTES
Intubation    Date/Time: 6/15/2023 1:19 PM  Performed by: Mauricio Frye CRNA  Authorized by: Xu Basurto MD     Intubation:     Induction:  Intravenous    Intubated:  Postinduction    Mask Ventilation:  N/a    Attempts:  1    Attempted By:  CRNA    Method of Intubation:  Video laryngoscopy    Blade:  [place] 4    Laryngeal View Grade: Grade I - full view of cords      Difficult Airway Encountered?: No      Complications:  None    Airway Device:  Oral endotracheal tube    Airway Device Size:  8.0    Style/Cuff Inflation:  Cuffed (inflated to minimal occlusive pressure)    Inflation Amount (mL):  6    Tube secured:  23    Secured at:  The lips    Placement Verified By:  Capnometry    Complicating Factors:  None    Findings Post-Intubation:  BS equal bilateral

## 2023-06-16 PROBLEM — E87.6 HYPOKALEMIA: Status: RESOLVED | Noted: 2023-06-13 | Resolved: 2023-06-16

## 2023-06-16 PROBLEM — K55.069 OMENTAL INFARCTION: Status: ACTIVE | Noted: 2023-06-16

## 2023-06-16 LAB
ALBUMIN SERPL BCP-MCNC: 3.2 G/DL (ref 3.5–5.2)
ALP SERPL-CCNC: 50 U/L (ref 55–135)
ALT SERPL W/O P-5'-P-CCNC: 18 U/L (ref 10–44)
ANION GAP SERPL CALC-SCNC: 18 MMOL/L (ref 8–16)
AST SERPL-CCNC: 22 U/L (ref 10–40)
BACTERIA #/AREA URNS HPF: ABNORMAL /HPF
BASOPHILS # BLD AUTO: 0.05 K/UL (ref 0–0.2)
BASOPHILS NFR BLD: 0.2 % (ref 0–1.9)
BILIRUB SERPL-MCNC: 1.4 MG/DL (ref 0.1–1)
BILIRUB UR QL STRIP: NEGATIVE
BUN SERPL-MCNC: 22 MG/DL (ref 6–20)
CALCIUM SERPL-MCNC: 8.7 MG/DL (ref 8.7–10.5)
CHLORIDE SERPL-SCNC: 87 MMOL/L (ref 95–110)
CLARITY UR: CLEAR
CO2 SERPL-SCNC: 28 MMOL/L (ref 23–29)
COLOR UR: YELLOW
CREAT SERPL-MCNC: 1.6 MG/DL (ref 0.5–1.4)
CREAT UR-MCNC: 147.1 MG/DL (ref 23–375)
DIFFERENTIAL METHOD: ABNORMAL
EOSINOPHIL # BLD AUTO: 0 K/UL (ref 0–0.5)
EOSINOPHIL NFR BLD: 0 % (ref 0–8)
ERYTHROCYTE [DISTWIDTH] IN BLOOD BY AUTOMATED COUNT: 18.1 % (ref 11.5–14.5)
EST. GFR  (NO RACE VARIABLE): 54 ML/MIN/1.73 M^2
GLUCOSE SERPL-MCNC: 131 MG/DL (ref 70–110)
GLUCOSE UR QL STRIP: NEGATIVE
HCT VFR BLD AUTO: 32.1 % (ref 40–54)
HGB BLD-MCNC: 9.7 G/DL (ref 14–18)
HGB UR QL STRIP: ABNORMAL
HYALINE CASTS #/AREA URNS LPF: 0 /LPF
IMM GRANULOCYTES # BLD AUTO: 0.08 K/UL (ref 0–0.04)
IMM GRANULOCYTES NFR BLD AUTO: 0.4 % (ref 0–0.5)
KETONES UR QL STRIP: NEGATIVE
LEUKOCYTE ESTERASE UR QL STRIP: NEGATIVE
LYMPHOCYTES # BLD AUTO: 1.8 K/UL (ref 1–4.8)
LYMPHOCYTES NFR BLD: 8.2 % (ref 18–48)
MAGNESIUM SERPL-MCNC: 1.8 MG/DL (ref 1.6–2.6)
MCH RBC QN AUTO: 20.3 PG (ref 27–31)
MCHC RBC AUTO-ENTMCNC: 30.2 G/DL (ref 32–36)
MCV RBC AUTO: 67 FL (ref 82–98)
MICROSCOPIC COMMENT: ABNORMAL
MONOCYTES # BLD AUTO: 1.9 K/UL (ref 0.3–1)
MONOCYTES NFR BLD: 8.9 % (ref 4–15)
NEUTROPHILS # BLD AUTO: 18 K/UL (ref 1.8–7.7)
NEUTROPHILS NFR BLD: 82.3 % (ref 38–73)
NITRITE UR QL STRIP: NEGATIVE
NRBC BLD-RTO: 0 /100 WBC
PH UR STRIP: 7 [PH] (ref 5–8)
PHOSPHATE SERPL-MCNC: 3.4 MG/DL (ref 2.7–4.5)
PLATELET # BLD AUTO: 1040 K/UL (ref 150–450)
PLATELET BLD QL SMEAR: ABNORMAL
PMV BLD AUTO: 9.9 FL (ref 9.2–12.9)
POTASSIUM SERPL-SCNC: 4.2 MMOL/L (ref 3.5–5.1)
PROT SERPL-MCNC: 7.3 G/DL (ref 6–8.4)
PROT UR QL STRIP: ABNORMAL
PROT UR-MCNC: 66 MG/DL (ref 0–15)
RBC # BLD AUTO: 4.79 M/UL (ref 4.6–6.2)
RBC #/AREA URNS HPF: 14 /HPF (ref 0–4)
SODIUM SERPL-SCNC: 133 MMOL/L (ref 136–145)
SODIUM UR-SCNC: 81 MMOL/L (ref 20–250)
SP GR UR STRIP: 1.01 (ref 1–1.03)
SQUAMOUS #/AREA URNS HPF: 1 /HPF
URN SPEC COLLECT METH UR: ABNORMAL
UROBILINOGEN UR STRIP-ACNC: NEGATIVE EU/DL
WBC # BLD AUTO: 21.84 K/UL (ref 3.9–12.7)
WBC #/AREA URNS HPF: 8 /HPF (ref 0–5)

## 2023-06-16 PROCEDURE — 63600175 PHARM REV CODE 636 W HCPCS: Performed by: NURSE PRACTITIONER

## 2023-06-16 PROCEDURE — 36415 COLL VENOUS BLD VENIPUNCTURE: CPT | Performed by: SURGERY

## 2023-06-16 PROCEDURE — 82570 ASSAY OF URINE CREATININE: CPT | Performed by: INTERNAL MEDICINE

## 2023-06-16 PROCEDURE — 63600175 PHARM REV CODE 636 W HCPCS: Performed by: STUDENT IN AN ORGANIZED HEALTH CARE EDUCATION/TRAINING PROGRAM

## 2023-06-16 PROCEDURE — 84100 ASSAY OF PHOSPHORUS: CPT | Performed by: SURGERY

## 2023-06-16 PROCEDURE — 99900035 HC TECH TIME PER 15 MIN (STAT)

## 2023-06-16 PROCEDURE — 84540 ASSAY OF URINE/UREA-N: CPT | Performed by: INTERNAL MEDICINE

## 2023-06-16 PROCEDURE — 25000003 PHARM REV CODE 250: Performed by: NURSE PRACTITIONER

## 2023-06-16 PROCEDURE — 25000003 PHARM REV CODE 250: Performed by: SURGERY

## 2023-06-16 PROCEDURE — 85025 COMPLETE CBC W/AUTO DIFF WBC: CPT | Performed by: SURGERY

## 2023-06-16 PROCEDURE — 81000 URINALYSIS NONAUTO W/SCOPE: CPT | Performed by: INTERNAL MEDICINE

## 2023-06-16 PROCEDURE — 83735 ASSAY OF MAGNESIUM: CPT | Performed by: SURGERY

## 2023-06-16 PROCEDURE — S4991 NICOTINE PATCH NONLEGEND: HCPCS | Performed by: SURGERY

## 2023-06-16 PROCEDURE — 80053 COMPREHEN METABOLIC PANEL: CPT | Performed by: SURGERY

## 2023-06-16 PROCEDURE — 12000002 HC ACUTE/MED SURGE SEMI-PRIVATE ROOM

## 2023-06-16 PROCEDURE — 63600175 PHARM REV CODE 636 W HCPCS: Performed by: SURGERY

## 2023-06-16 PROCEDURE — 83935 ASSAY OF URINE OSMOLALITY: CPT | Performed by: INTERNAL MEDICINE

## 2023-06-16 PROCEDURE — 84156 ASSAY OF PROTEIN URINE: CPT | Performed by: INTERNAL MEDICINE

## 2023-06-16 PROCEDURE — 94799 UNLISTED PULMONARY SVC/PX: CPT

## 2023-06-16 PROCEDURE — 25000003 PHARM REV CODE 250: Performed by: STUDENT IN AN ORGANIZED HEALTH CARE EDUCATION/TRAINING PROGRAM

## 2023-06-16 PROCEDURE — 84300 ASSAY OF URINE SODIUM: CPT | Performed by: INTERNAL MEDICINE

## 2023-06-16 PROCEDURE — 94761 N-INVAS EAR/PLS OXIMETRY MLT: CPT

## 2023-06-16 PROCEDURE — 27000221 HC OXYGEN, UP TO 24 HOURS

## 2023-06-16 RX ORDER — SIMETHICONE 80 MG
1 TABLET,CHEWABLE ORAL 3 TIMES DAILY PRN
Status: DISCONTINUED | OUTPATIENT
Start: 2023-06-16 | End: 2023-06-24 | Stop reason: HOSPADM

## 2023-06-16 RX ORDER — HEPARIN SODIUM 5000 [USP'U]/ML
5000 INJECTION, SOLUTION INTRAVENOUS; SUBCUTANEOUS EVERY 8 HOURS
Status: DISCONTINUED | OUTPATIENT
Start: 2023-06-16 | End: 2023-06-24 | Stop reason: HOSPADM

## 2023-06-16 RX ORDER — DIAZEPAM 2 MG/1
2 TABLET ORAL EVERY 6 HOURS PRN
Status: DISCONTINUED | OUTPATIENT
Start: 2023-06-16 | End: 2023-06-24 | Stop reason: HOSPADM

## 2023-06-16 RX ORDER — PROCHLORPERAZINE EDISYLATE 5 MG/ML
10 INJECTION INTRAMUSCULAR; INTRAVENOUS EVERY 6 HOURS PRN
Status: DISCONTINUED | OUTPATIENT
Start: 2023-06-16 | End: 2023-06-24 | Stop reason: HOSPADM

## 2023-06-16 RX ORDER — SODIUM CHLORIDE 9 MG/ML
INJECTION, SOLUTION INTRAVENOUS CONTINUOUS
Status: DISCONTINUED | OUTPATIENT
Start: 2023-06-16 | End: 2023-06-17

## 2023-06-16 RX ORDER — ACETAMINOPHEN 10 MG/ML
1000 INJECTION, SOLUTION INTRAVENOUS EVERY 8 HOURS
Status: COMPLETED | OUTPATIENT
Start: 2023-06-16 | End: 2023-06-17

## 2023-06-16 RX ADMIN — PIPERACILLIN AND TAZOBACTAM 4.5 G: 4; .5 INJECTION, POWDER, LYOPHILIZED, FOR SOLUTION INTRAVENOUS; PARENTERAL at 06:06

## 2023-06-16 RX ADMIN — MUPIROCIN: 20 OINTMENT TOPICAL at 09:06

## 2023-06-16 RX ADMIN — HEPARIN SODIUM 5000 UNITS: 5000 INJECTION INTRAVENOUS; SUBCUTANEOUS at 09:06

## 2023-06-16 RX ADMIN — HEPARIN SODIUM 5000 UNITS: 5000 INJECTION INTRAVENOUS; SUBCUTANEOUS at 02:06

## 2023-06-16 RX ADMIN — PROMETHAZINE HYDROCHLORIDE 12.5 MG: 25 INJECTION INTRAMUSCULAR; INTRAVENOUS at 09:06

## 2023-06-16 RX ADMIN — Medication 1 PATCH: at 09:06

## 2023-06-16 RX ADMIN — Medication: at 03:06

## 2023-06-16 RX ADMIN — PIPERACILLIN AND TAZOBACTAM 4.5 G: 4; .5 INJECTION, POWDER, LYOPHILIZED, FOR SOLUTION INTRAVENOUS; PARENTERAL at 11:06

## 2023-06-16 RX ADMIN — POTASSIUM CHLORIDE 150 ML/HR: 149 INJECTION, SOLUTION, CONCENTRATE INTRAVENOUS at 01:06

## 2023-06-16 RX ADMIN — ACETAMINOPHEN 1000 MG: 10 INJECTION INTRAVENOUS at 03:06

## 2023-06-16 RX ADMIN — LEUCINE, PHENYLALANINE, LYSINE, METHIONINE, ISOLEUCINE, VALINE, HISTIDINE, THREONINE, TRYPTOPHAN, ALANINE, GLYCINE, ARGININE, PROLINE, SERINE, TYROSINE, SODIUM ACETATE, DIBASIC POTASSIUM PHOSPHATE, MAGNESIUM CHLORIDE, SODIUM CHLORIDE, CALCIUM CHLORIDE, DEXTROSE
311; 238; 247; 170; 255; 247; 204; 179; 77; 880; 438; 489; 289; 213; 17; 297; 261; 51; 77; 33; 5 INJECTION INTRAVENOUS at 03:06

## 2023-06-16 RX ADMIN — PIPERACILLIN AND TAZOBACTAM 4.5 G: 4; .5 INJECTION, POWDER, LYOPHILIZED, FOR SOLUTION INTRAVENOUS; PARENTERAL at 03:06

## 2023-06-16 RX ADMIN — ACETAMINOPHEN 1000 MG: 10 INJECTION INTRAVENOUS at 10:06

## 2023-06-16 RX ADMIN — PROCHLORPERAZINE EDISYLATE 10 MG: 5 INJECTION INTRAMUSCULAR; INTRAVENOUS at 11:06

## 2023-06-16 RX ADMIN — SIMETHICONE 80 MG: 80 TABLET, CHEWABLE ORAL at 01:06

## 2023-06-16 RX ADMIN — SODIUM CHLORIDE: 9 INJECTION, SOLUTION INTRAVENOUS at 11:06

## 2023-06-16 RX ADMIN — POTASSIUM CHLORIDE 150 ML/HR: 149 INJECTION, SOLUTION, CONCENTRATE INTRAVENOUS at 08:06

## 2023-06-16 RX ADMIN — Medication: at 08:06

## 2023-06-16 RX ADMIN — ONDANSETRON 4 MG: 2 INJECTION INTRAMUSCULAR; INTRAVENOUS at 08:06

## 2023-06-16 NOTE — ASSESSMENT & PLAN NOTE
Acute problem. POD 1.  -NPO  -Await return of bowel function  -PPN  -IV fluids NS  -Dilaudid PCA and PRN  -Zofran PRN  -Surgery consulted; exploratory laparotomy 6/15 with small bowel removal with anastomosis and SCOUT  -Zosyn

## 2023-06-16 NOTE — CARE UPDATE
06/16/23 0720   Patient Assessment/Suction   Level of Consciousness (AVPU) alert   Respiratory Effort Unlabored   Expansion/Accessory Muscles/Retractions no use of accessory muscles   All Lung Fields Breath Sounds clear   Rhythm/Pattern, Respiratory pattern regular;unlabored   Cough Frequency no cough   PRE-TX-O2   Device (Oxygen Therapy) nasal cannula   $ Is the patient on Low Flow Oxygen? Yes   Flow (L/min) 2   SpO2 99 %   Pulse Oximetry Type Intermittent   $ Pulse Oximetry - Multiple Charge Pulse Oximetry - Multiple   Pulse 103   Resp 19   ETCO2   $ ETCO2 Usage Currently wearing   ETCO2 (mmHg) 43 mmHg   ETCO2 Device Type Bedside Monitor;Nasal Cannula   Incentive Spirometer   $ Incentive Spirometer Charges refused  (in pain)

## 2023-06-16 NOTE — PROGRESS NOTES
[facility] Hospital Medicine  Progress Note    Patient Name: Ruslan Caldwell  MRN: [id number]  Patient Class: IP- Inpatient   Admission Date: 6/11/2023  Length of Stay: 4 days  Attending Physician: Moo Boone MD  Primary Care Provider: Amira Knutson NP        Subjective:     Principal Problem:Small bowel obstruction        HPI:  Ruslan Caldwell is a 47-year-old male who presents in transfer from [facility] for evaluation of possible developing small bowel obstruction.  Patient presents outside facility complaining of abdominal pain with nausea and vomiting.  Workup at outside facility demonstrated leukocytosis of 16,000, anemia, and thrombocytosis of 843.  CMP with hypokalemia 3.3 and total bilirubin of 1.2.  CT scan at outside facility was concerning for developing small-bowel obstruction.  Patient was admitted to [facility] where he underwent right partial colectomy on 05/29 with pathology consistent with adenocarcinoma.  He was discharged and then came back for another admission on 06/08 where he was treated for an ileus.  Previous medical history includes type 2 diabetes, anemia, hypertension, and right eye blindness.  Patient admitted to Hospital Medicine for treatment management.  Will maintain patient NPO, IV fluids, sliding scale insulin p.r.n., and general surgery consult in a.m..      Overview/Hospital Course:  Ruslan Caldwell is a 47 year old male with a past medical history of recently diagnosed colonic adenocarcinoma s/p partial colectomy with anastomosis, tobacco use, microcytic anemia and thrombocytosis who presented with persistent abdominal pain, nausea and abdominal pain concerning for SBO. An NG tube was unable to be placed. He is on IV fluids and Zosyn. He is NPO. PRN IV analgesics and antiemetics have been ordered and General Surgery has been consulted. Repeat CT A/P shows SBO 6/13. A gastrografin enema 6/14 was inconclusive. Surgery plans took the  "patient to the OR 6/15 for exploratory laparotomy where an omental infarction was addressed; the patient also underwent SCOUT and partial small bowel resection with anastomosis. His course has been complicated by JOYCE in setting of vomiting as well as Toradol use; IV fluids are being continued and his kidney function has improved. PPN was started 6/16. Nephrology has been consulted. He was also discovered to have an iron deficiency for which IV iron was ordered 6/13. Oncology has also been consulted and will arrange follow up in clinic.      Interval History: see "Hospital Course"    Review of Systems   Gastrointestinal:  Positive for abdominal pain, nausea and vomiting.   Skin:  Positive for wound.   Allergic/Immunologic: Positive for immunocompromised state.   Objective:     Vital Signs (Most Recent):  Temp: 97.8 °F (36.6 °C) (06/16/23 0751)  Pulse: 106 (06/16/23 0751)  Resp: 16 (06/16/23 0824)  BP: (!) 153/95 (06/16/23 0751)  SpO2: 96 % (06/16/23 0751) Vital Signs (24h Range):  Temp:  [97.5 °F (36.4 °C)-99 °F (37.2 °C)] 97.8 °F (36.6 °C)  Pulse:  [] 106  Resp:  [12-25] 16  SpO2:  [94 %-100 %] 96 %  BP: (106-176)/() 153/95     Weight: 101.6 kg (224 lb)  Body mass index is 28 kg/m².    Intake/Output Summary (Last 24 hours) at 6/16/2023 1029  Last data filed at 6/16/2023 0618  Gross per 24 hour   Intake 4733.86 ml   Output 2230 ml   Net 2503.86 ml         Physical Exam  Vitals and nursing note reviewed.   Constitutional:       General: He is not in acute distress.     Appearance: Normal appearance. He is ill-appearing.   HENT:      Head: Normocephalic and atraumatic.      Right Ear: External ear normal.      Left Ear: External ear normal.      Nose: Nose normal.      Mouth/Throat:      Mouth: Mucous membranes are moist.      Pharynx: Oropharynx is clear.   Eyes:      Extraocular Movements: Extraocular movements intact.   Cardiovascular:      Rate and Rhythm: Regular rhythm. Tachycardia present.      Pulses: " Normal pulses.      Heart sounds: Normal heart sounds.   Pulmonary:      Effort: Pulmonary effort is normal. No respiratory distress.      Breath sounds: Normal breath sounds.   Abdominal:      General: There is distension.      Tenderness: There is abdominal tenderness.      Comments: Wound dressing clean, dry and intact.   Musculoskeletal:         General: Normal range of motion.      Cervical back: Normal range of motion and neck supple.      Right lower leg: No edema.      Left lower leg: No edema.   Skin:     General: Skin is warm and dry.   Neurological:      Mental Status: He is alert. Mental status is at baseline.   Psychiatric:         Mood and Affect: Mood normal.         Behavior: Behavior normal.           Significant Labs: All pertinent labs within the past 24 hours have been reviewed.    Significant Imaging: I have reviewed all pertinent imaging results/findings within the past 24 hours.      Assessment/Plan:      * Small bowel obstruction  Acute problem. POD 1.  -NPO  -Await return of bowel function  -PPN  -IV fluids NS  -Dilaudid PCA and PRN  -Zofran PRN  -Surgery consulted; exploratory laparotomy 6/15 with small bowel removal with anastomosis and SCOUT  -Zosyn        Omental infarction  -Area removed by Surgery   -Follow up pathology      Colon adenocarcinoma  Recent partial colectomy.  -Oncology consulted; follow up in clinic  -Follow up pathology      JOYCE (acute kidney injury)  In setting of Toradol use, decreased PO intake and vomiting. Improving.  -IV fluids with NS  -PPN   -Renally dose all medications  -Avoid nephrotoxic agents  -Monitor UOP and electrolytes  -Trend creatinine  -Nephrology consulted      Tobacco use  -Patient to be counseled on cessation  -Nicotine patch      Moderate malnutrition  -Diet when appropriate  -Nutrition consulted  -PPN    Thrombocytosis  Chronic. Stable. In setting of recently diagnosed adenocarcinoma and SBO.  -Trend platelets with CBC      Iron deficiency  anemia  -IV iron one dose 6/13  -Trend Hgb with CBC      Essential hypertension  Patient not taking any BP medications.  -Continue to monitor        VTE Risk Mitigation (From admission, onward)         Ordered     heparin (porcine) injection 5,000 Units  Every 8 hours         06/16/23 0738     Place DINA hose  Until discontinued         06/11/23 2043     IP VTE HIGH RISK PATIENT  Once         06/11/23 2043     Place sequential compression device  Until discontinued         06/11/23 2043                Discharge Planning   GABRIEL: 6/19/2023     Code Status: Full Code   Is the patient medically ready for discharge?:     Reason for patient still in hospital (select all that apply): Patient trending condition, Laboratory test, Treatment and Consult recommendations  Discharge Plan A: Home                  Moo Boone MD  Department of Hospital Medicine   [facility]

## 2023-06-16 NOTE — SUBJECTIVE & OBJECTIVE
Interval History: Patient seen and examined this morning. POD1 s/p ex lap with small bowel resection.     Medications:  Continuous Infusions:   0.45% NaCl with KCl 40 mEq infusion Stopped (06/16/23 0304)    hydromorphone in 0.9 % NaCl 6 mg/30 ml       Scheduled Meds:   acetaminophen  1,000 mg Intravenous Q8H    heparin (porcine)  5,000 Units Subcutaneous Q8H    mupirocin   Nasal BID    nicotine  1 patch Transdermal Daily    piperacillin-tazobactam (Zosyn) IV (PEDS and ADULTS) (extended infusion is not appropriate)  4.5 g Intravenous Q8H     PRN Meds:HYDROmorphone, naloxone, ondansetron, potassium chloride **AND** potassium chloride **AND** potassium chloride, promethazine (PHENERGAN) IVPB, simethicone, sodium chloride 0.9%     Review of patient's allergies indicates:  No Known Allergies  Objective:     Vital Signs (Most Recent):  Temp: 98.2 °F (36.8 °C) (06/16/23 0311)  Pulse: 103 (06/16/23 0720)  Resp: 19 (06/16/23 0720)  BP: (!) 152/84 (06/16/23 0311)  SpO2: 99 % (06/16/23 0720) Vital Signs (24h Range):  Temp:  [97.5 °F (36.4 °C)-99 °F (37.2 °C)] 98.2 °F (36.8 °C)  Pulse:  [] 103  Resp:  [12-24] 19  SpO2:  [94 %-100 %] 99 %  BP: (106-176)/() 152/84     Weight: 101.6 kg (224 lb)  Body mass index is 28 kg/m².    Intake/Output - Last 3 Shifts         06/14 0700  06/15 0659 06/15 0700  06/16 0659 06/16 0700 06/17 0659    I.V. (mL/kg) 2956.5 (29) 4278.4 (42.1)     IV Piggyback 587.3 455.5     Total Intake(mL/kg) 3543.8 (34.7) 4733.9 (46.6)     Urine (mL/kg/hr) 1200 (0.5) 1330 (0.5)     Emesis/NG output 1800      Other  700     Stool 0      Blood  200     Total Output 3000 2230     Net +543.8 +2503.9            Urine Occurrence 2 x 4 x     Stool Occurrence 2 x               Physical Exam  Abdominal:      General: There is distension.      Palpations: Abdomen is soft.      Tenderness: There is abdominal tenderness.        Significant Labs:  I have reviewed all pertinent lab results within the past 24  hours.  CBC:   Recent Labs   Lab 06/16/23  0335   WBC 21.84*   RBC 4.79   HGB 9.7*   HCT 32.1*   PLT 1,040*   MCV 67*   MCH 20.3*   MCHC 30.2*     BMP:   Recent Labs   Lab 06/16/23  0335   *   *   K 4.2   CL 87*   CO2 28   BUN 22*   CREATININE 1.6*   CALCIUM 8.7   MG 1.8       Significant Diagnostics:  I have reviewed all pertinent imaging results/findings within the past 24 hours.

## 2023-06-16 NOTE — PLAN OF CARE
Plan of care reviewed with patient. Verbalized understanding. IV intact and patent with fluids infusing. Patient alert and able to make needs known. Midline incision with dressing CDI. Urinating without issues. Pain managed with PCA pump. No nausea or vomiting noted.  Safety maintained. Call light in reach and instructed to call for assistance. Will continue to monitor.

## 2023-06-16 NOTE — ASSESSMENT & PLAN NOTE
In setting of Toradol use, decreased PO intake and vomiting. Improving.  -IV fluids with NS  -PPN   -Renally dose all medications  -Avoid nephrotoxic agents  -Monitor UOP and electrolytes  -Trend creatinine  -Nephrology consulted

## 2023-06-16 NOTE — ANESTHESIA POSTPROCEDURE EVALUATION
Anesthesia Post Evaluation    Patient: Ruslan Caldwell    Procedure(s) Performed: Procedure(s) (LRB):  LAPAROTOMY, EXPLORATORY (N/A)  EXCISION, SMALL INTESTINE (N/A)  LYSIS, ADHESIONS (N/A)  OMENTECTOMY (N/A)    Final Anesthesia Type: general      Patient location during evaluation: PACU  Patient participation: Yes- Able to Participate  Level of consciousness: awake and alert  Post-procedure vital signs: reviewed and stable  Pain management: adequate  Airway patency: patent    PONV status at discharge: No PONV  Anesthetic complications: no      Cardiovascular status: hemodynamically stable  Respiratory status: unassisted and room air  Hydration status: euvolemic  Follow-up not needed.          Vitals Value Taken Time   /77 06/15/23 1923   Temp 36.4 °C (97.5 °F) 06/15/23 1923   Pulse 98 06/15/23 1923   Resp 18 06/15/23 1923   SpO2 99 % 06/15/23 1923         Event Time   Out of Recovery 06/15/2023 18:20:00         Pain/Jose D Score: Pain Rating Prior to Med Admin: 6 (6/15/2023  6:36 PM)  Pain Rating Post Med Admin: 3 (6/15/2023  6:58 PM)  Jose D Score: 9 (6/15/2023  6:00 PM)

## 2023-06-16 NOTE — CONSULTS
INPATIENT NEPHROLOGY CONSULT   [facility] NEPHROLOGY    Ruslan Caldwell  06/16/2023    Reason for consultation:    Acute kidney injury    Chief Complaint: No chief complaint on file.         History of Present Illness:    Per H and P     Ruslan Caldwell is a 47-year-old male who presents in transfer from [facility] for evaluation of possible developing small bowel obstruction.  Patient presents outside facility complaining of abdominal pain with nausea and vomiting.  Workup at outside facility demonstrated leukocytosis of 16,000, anemia, and thrombocytosis of 843.  CMP with hypokalemia 3.3 and total bilirubin of 1.2.  CT scan at outside facility was concerning for developing small-bowel obstruction.  Patient was admitted to [facility] where he underwent right partial colectomy on 05/29 with pathology consistent with adenocarcinoma.  He was discharged and then came back for another admission on 06/08 where he was treated for an ileus.  Previous medical history includes type 2 diabetes, anemia, hypertension, and right eye blindness.  Patient admitted to Hospital Medicine for treatment management    6/16 Consulted for JOYCE.   Pt POD1 s/p exploratory laparotomy with small bowel resection and omental resection.   He has abdominal pain and bloating.  No chest pain, sob, neurologic symptoms, vomiting, or confusion.  He is a little somnolent on his analgesics        Plan of Care:       Assessment:    Acute kidney injury likely due to multiple etiologies.  He had underlying volume depletion, toradol, and hemodynamically mediated renal injury  --Avoid NSAIDS, Gabriel II inhibitors, and other non-essential nephrotoxic agents  --keep map above 55  --monitor output  --renal dose medication   --ua with microscopy.  FENa  --iv fluids  --urinalysis with microscopy    Hyponatremia  --avid hypotonic iv piggybacks  --urine osm  --urine na  --likely high adh state due to pain  --uric acid    Anemia  --getting iv iron  --hg  stable over several days  --hematology/oncology is on the case     Hypertension  --allow 150/90 for now  --keep map above 55  --will follow      Thank you for allowing us to participate in this patient's care. We will continue to follow.    Vital Signs:  Temp Readings from Last 3 Encounters:   06/16/23 96.4 °F (35.8 °C)   06/11/23 98.4 °F (36.9 °C) (Oral)   06/10/23 97.8 °F (36.6 °C)       Pulse Readings from Last 3 Encounters:   06/16/23 100   06/11/23 (!) 112   06/10/23 82       BP Readings from Last 3 Encounters:   06/16/23 133/86   06/11/23 124/78   06/10/23 (!) 135/91       Weight:  Wt Readings from Last 3 Encounters:   06/15/23 101.6 kg (224 lb)   06/11/23 102.1 kg (225 lb)   06/09/23 102 kg (224 lb 13.9 oz)       Past Medical & Surgical History:  Past Medical History:   Diagnosis Date    Diabetes mellitus, type 2 05/2020    Patient denies    Hypertension        Past Surgical History:   Procedure Laterality Date    COLONOSCOPY N/A 5/29/2023    Procedure: COLONOSCOPY;  Surgeon: Sam Solano MD;  Location: [facility];  Service: Endoscopy;  Laterality: N/A;    EYE SURGERY Right     LUMBAR DISC SURGERY  2005    SUBTOTAL COLECTOMY Right 5/29/2023    Procedure: COLECTOMY, PARTIAL;  Surgeon: Edin Michael MD;  Location: [facility] OR;  Service: General;  Laterality: Right;       Past Social History:  Social History     Socioeconomic History    Marital status:     Number of children: 3    Highest education level: Associate degree: academic program   Occupational History    Occupation: [employer]   Tobacco Use    Smoking status: Every Day     Packs/day: 0.50     Years: 23.00     Pack years: 11.50     Types: Cigarettes    Smokeless tobacco: Never   Substance and Sexual Activity    Alcohol use: Not Currently     Comment: Quit drinking 2 yrs ago    Drug use: Yes     Types: Marijuana     Comment: heavy marijuana use    Sexual activity: Yes     Social Determinants of Health     Financial Resource  Strain: Low Risk     Difficulty of Paying Living Expenses: Not hard at all   Food Insecurity: No Food Insecurity    Worried About Running Out of Food in the Last Year: Never true    Ran Out of Food in the Last Year: Never true   Transportation Needs: No Transportation Needs    Lack of Transportation (Medical): No    Lack of Transportation (Non-Medical): No   Physical Activity: Sufficiently Active    Days of Exercise per Week: 5 days    Minutes of Exercise per Session: 90 min   Stress: No Stress Concern Present    Feeling of Stress : Not at all   Social Connections: Socially Isolated    Frequency of Communication with Friends and Family: Never    Frequency of Social Gatherings with Friends and Family: More than three times a week    Attends [employer] Services: Never    Active Member of Clubs or Organizations: No    Attends Club or Organization Meetings: Never    Marital Status:    Housing Stability: Unknown    Unable to Pay for Housing in the Last Year: No    Unstable Housing in the Last Year: No       Medications:  No current facility-administered medications on file prior to encounter.     Current Outpatient Medications on File Prior to Encounter   Medication Sig Dispense Refill    gabapentin (NEURONTIN) 300 MG capsule Take 1 capsule (300 mg total) by mouth 2 (two) times daily. for 14 days 28 capsule 0     Scheduled Meds:   acetaminophen  1,000 mg Intravenous Q8H    heparin (porcine)  5,000 Units Subcutaneous Q8H    mupirocin   Nasal BID    nicotine  1 patch Transdermal Daily    piperacillin-tazobactam (Zosyn) IV (PEDS and ADULTS) (extended infusion is not appropriate)  4.5 g Intravenous Q8H     Continuous Infusions:   sodium chloride 0.9% 75 mL/hr at 06/16/23 1101    Amino acid 4.25% - dextrose 5% (CLINIMIX-E) solution with additives (1L provides 42.5 gm AA, 50 gm CHO (170 kcal/L dextrose), Na 35, K 30, Mg 5, Ca 4.5, Acetate 70, Cl 39, Phos 15)      hydromorphone in 0.9 % NaCl 6 mg/30 ml       PRN  "Meds:.diazePAM, HYDROmorphone, naloxone, ondansetron, prochlorperazine, promethazine (PHENERGAN) IVPB, simethicone, sodium chloride 0.9%    Allergies:  Patient has no known allergies.    Past Family History:  Reviewed; refer to Hospitalist Admission Note    Review of Systems:  Review of Systems - All 14 systems reviewed and negative, except as noted in HPI    Physical Exam:    /86   Pulse 100   Temp 96.4 °F (35.8 °C)   Resp 19   Ht 6' 3" (1.905 m)   Wt 101.6 kg (224 lb)   SpO2 97%   BMI 28.00 kg/m²     General Appearance:    Alert, cooperative, no distress, appears stated age   Head:    Normocephalic, without obvious abnormality, atraumatic   Eyes:    PER, conjunctiva/corneas clear, EOM's intact in both eyes        Throat:   Lips, mucosa, and tongue normal; teeth and gums normal   Back:     Symmetric, no curvature, ROM normal, no CVA tenderness   Lungs:     Clear to auscultation bilaterally, respirations unlabored   Chest wall:    No tenderness or deformity   Heart:    Regular rate and rhythm, S1 and S2 normal, no murmur, rub   or gallop   Abdomen:     Soft, non-tender, bowel sounds active all four quadrants,     no masses, no organomegaly   Extremities:   Extremities normal, atraumatic, no cyanosis or edema   Pulses:   2+ and symmetric all extremities   MSK:   No joint or muscle swelling, tenderness or deformity   Skin:   Skin color, texture, turgor normal, no rashes or lesions   Neurologic:   CNII-XII intact, normal strength and sensation       Throughout.  No flap     Results:  Lab Results   Component Value Date     (L) 06/16/2023    K 4.2 06/16/2023    CL 87 (L) 06/16/2023    CO2 28 06/16/2023    BUN 22 (H) 06/16/2023    CREATININE 1.6 (H) 06/16/2023    CALCIUM 8.7 06/16/2023    ANIONGAP 18 (H) 06/16/2023    ESTGFRAFRICA >60.0 09/18/2021    EGFRNONAA >60.0 09/18/2021       Lab Results   Component Value Date    CALCIUM 8.7 06/16/2023    PHOS 3.4 06/16/2023       Recent Labs   Lab 06/16/23  0335 "   WBC 21.84*   RBC 4.79   HGB 9.7*   HCT 32.1*   PLT 1,040*   MCV 67*   MCH 20.3*   MCHC 30.2*           I have personally reviewed pertinent radiological imaging and reports.      Patient care was time spent personally by me on the following activities:   Obtaining a history  Examination of patient.  Providing medical care at the patients bedside.  Developing a treatment plan with patient or surrogate and bedside caregivers  Ordering and reviewing laboratory studies, radiographic studies, pulse oximetry.  Ordering and performing treatments and interventions.  Evaluation of patient's response to treatment.  Discussions with consultants while on the unit and immediately available to the patient.  Re-evaluation of the patient's condition.  Documentation in the medical record.     David Vega MD  Nephrology  [facility]  [phone]

## 2023-06-16 NOTE — CARE UPDATE
06/15/23 1845   Patient Assessment/Suction   Level of Consciousness (AVPU) alert   Respiratory Effort Normal   Expansion/Accessory Muscles/Retractions expansion symmetric   Rhythm/Pattern, Respiratory depth regular;pattern regular   Cough Frequency no cough   PRE-TX-O2   Device (Oxygen Therapy) nasal cannula   $ Is the patient on Low Flow Oxygen? Yes   Flow (L/min) 2   SpO2 99 %   Pulse Oximetry Type Continuous   ETCO2   $ ETCO2 Usage Currently wearing   ETCO2 (mmHg) 46 mmHg   Incentive Spirometer   $ Incentive Spirometer Charges postop instruction   Administration (IS) mouthpiece utilized;proper technique demonstrated   Number of Repetitions (IS) 10   Level Incentive Spirometer (mL) 1500   Patient Tolerance (IS) good;no adverse signs/symptoms present

## 2023-06-16 NOTE — PROGRESS NOTES
[facility] Surgery  Progress Note    Subjective:     History of Present Illness:  47-year-old male well known to me.  Presents with abdominal pain nausea and vomiting.  CT was suspicious for obstruction.  Currently he says back pain was much worse than his abdominal pain.  He says he has been having trouble controlling his pain at home.  He denies any recent flatus.  He does not have an NG tube and he is not vomiting currently.  He does not feel nauseated currently.      Post-Op Info:  Procedure(s) (LRB):  LAPAROTOMY, EXPLORATORY (N/A)  EXCISION, SMALL INTESTINE (N/A)  LYSIS, ADHESIONS (N/A)  OMENTECTOMY (N/A)   1 Day Post-Op     Interval History: Patient seen and examined this morning. POD1 s/p ex lap with small bowel resection.     Medications:  Continuous Infusions:   0.45% NaCl with KCl 40 mEq infusion Stopped (06/16/23 0304)    hydromorphone in 0.9 % NaCl 6 mg/30 ml       Scheduled Meds:   acetaminophen  1,000 mg Intravenous Q8H    heparin (porcine)  5,000 Units Subcutaneous Q8H    mupirocin   Nasal BID    nicotine  1 patch Transdermal Daily    piperacillin-tazobactam (Zosyn) IV (PEDS and ADULTS) (extended infusion is not appropriate)  4.5 g Intravenous Q8H     PRN Meds:HYDROmorphone, naloxone, ondansetron, potassium chloride **AND** potassium chloride **AND** potassium chloride, promethazine (PHENERGAN) IVPB, simethicone, sodium chloride 0.9%     Review of patient's allergies indicates:  No Known Allergies  Objective:     Vital Signs (Most Recent):  Temp: 98.2 °F (36.8 °C) (06/16/23 0311)  Pulse: 103 (06/16/23 0720)  Resp: 19 (06/16/23 0720)  BP: (!) 152/84 (06/16/23 0311)  SpO2: 99 % (06/16/23 0720) Vital Signs (24h Range):  Temp:  [97.5 °F (36.4 °C)-99 °F (37.2 °C)] 98.2 °F (36.8 °C)  Pulse:  [] 103  Resp:  [12-24] 19  SpO2:  [94 %-100 %] 99 %  BP: (106-176)/() 152/84     Weight: 101.6 kg (224 lb)  Body mass index is 28 kg/m².    Intake/Output - Last 3 Shifts          06/14 0700  06/15 0659 06/15 0700  06/16 0659 06/16 0700  06/17 0659    I.V. (mL/kg) 2956.5 (29) 4278.4 (42.1)     IV Piggyback 587.3 455.5     Total Intake(mL/kg) 3543.8 (34.7) 4733.9 (46.6)     Urine (mL/kg/hr) 1200 (0.5) 1330 (0.5)     Emesis/NG output 1800      Other  700     Stool 0      Blood  200     Total Output 3000 2230     Net +543.8 +2503.9            Urine Occurrence 2 x 4 x     Stool Occurrence 2 x               Physical Exam  Abdominal:      General: There is distension.      Palpations: Abdomen is soft.      Tenderness: There is abdominal tenderness.        Significant Labs:  I have reviewed all pertinent lab results within the past 24 hours.  CBC:   Recent Labs   Lab 06/16/23  0335   WBC 21.84*   RBC 4.79   HGB 9.7*   HCT 32.1*   PLT 1,040*   MCV 67*   MCH 20.3*   MCHC 30.2*     BMP:   Recent Labs   Lab 06/16/23  0335   *   *   K 4.2   CL 87*   CO2 28   BUN 22*   CREATININE 1.6*   CALCIUM 8.7   MG 1.8       Significant Diagnostics:  I have reviewed all pertinent imaging results/findings within the past 24 hours.    Assessment/Plan:     * Small bowel obstruction  Ruslan Caldwell is a 47yoM who underwent open right hemicolectomy on 5/29/23 who represents with what appears to be a small bowel obstruction.     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

## 2023-06-16 NOTE — PLAN OF CARE
Plan of care reviewed with patient. Verbalized understanding. IV intact and patent with fluids infusing. Patient alert and able to make needs known. Ambulatory to bathroom. Urinating without issues. Pain and nausea managed with prn medications.. Safety maintained. Call light in reach and instructed to call for assistance. Will continue to monitor.

## 2023-06-16 NOTE — SUBJECTIVE & OBJECTIVE
"Interval History: see "Hospital Course"    Review of Systems   Gastrointestinal:  Positive for abdominal pain, nausea and vomiting.   Skin:  Positive for wound.   Allergic/Immunologic: Positive for immunocompromised state.   Objective:     Vital Signs (Most Recent):  Temp: 97.8 °F (36.6 °C) (06/16/23 0751)  Pulse: 106 (06/16/23 0751)  Resp: 16 (06/16/23 0824)  BP: (!) 153/95 (06/16/23 0751)  SpO2: 96 % (06/16/23 0751) Vital Signs (24h Range):  Temp:  [97.5 °F (36.4 °C)-99 °F (37.2 °C)] 97.8 °F (36.6 °C)  Pulse:  [] 106  Resp:  [12-25] 16  SpO2:  [94 %-100 %] 96 %  BP: (106-176)/() 153/95     Weight: 101.6 kg (224 lb)  Body mass index is 28 kg/m².    Intake/Output Summary (Last 24 hours) at 6/16/2023 1029  Last data filed at 6/16/2023 0618  Gross per 24 hour   Intake 4733.86 ml   Output 2230 ml   Net 2503.86 ml         Physical Exam  Vitals and nursing note reviewed.   Constitutional:       General: He is not in acute distress.     Appearance: Normal appearance. He is ill-appearing.   HENT:      Head: Normocephalic and atraumatic.      Right Ear: External ear normal.      Left Ear: External ear normal.      Nose: Nose normal.      Mouth/Throat:      Mouth: Mucous membranes are moist.      Pharynx: Oropharynx is clear.   Eyes:      Extraocular Movements: Extraocular movements intact.   Cardiovascular:      Rate and Rhythm: Regular rhythm. Tachycardia present.      Pulses: Normal pulses.      Heart sounds: Normal heart sounds.   Pulmonary:      Effort: Pulmonary effort is normal. No respiratory distress.      Breath sounds: Normal breath sounds.   Abdominal:      General: There is distension.      Tenderness: There is abdominal tenderness.      Comments: Wound dressing clean, dry and intact.   Musculoskeletal:         General: Normal range of motion.      Cervical back: Normal range of motion and neck supple.      Right lower leg: No edema.      Left lower leg: No edema.   Skin:     General: Skin is warm " and dry.   Neurological:      Mental Status: He is alert. Mental status is at baseline.   Psychiatric:         Mood and Affect: Mood normal.         Behavior: Behavior normal.           Significant Labs: All pertinent labs within the past 24 hours have been reviewed.    Significant Imaging: I have reviewed all pertinent imaging results/findings within the past 24 hours.

## 2023-06-16 NOTE — NURSING
Returns to room 317 AAOX4 vss afebrile denies pain mid line abdominal incision with dressing clean dry  PCA pump in use .45NACL with 40 meq KCL infusing at 150 ml/HR SCDs in place Family at bed side.

## 2023-06-16 NOTE — PLAN OF CARE
Problem: Adult Inpatient Plan of Care  Goal: Optimal Comfort and Wellbeing  Outcome: Ongoing, Progressing     Problem: Infection  Goal: Absence of Infection Signs and Symptoms  Outcome: Ongoing, Progressing     Problem: Fluid and Electrolyte Imbalance (Acute Kidney Injury/Impairment)  Goal: Fluid and Electrolyte Balance  Outcome: Ongoing, Progressing

## 2023-06-17 ENCOUNTER — ANESTHESIA (OUTPATIENT)
Dept: SURGERY | Facility: HOSPITAL | Age: 48
DRG: 329 | End: 2023-06-17
Payer: MEDICAID

## 2023-06-17 ENCOUNTER — ANESTHESIA EVENT (OUTPATIENT)
Dept: SURGERY | Facility: HOSPITAL | Age: 48
DRG: 329 | End: 2023-06-17
Payer: MEDICAID

## 2023-06-17 PROBLEM — A41.9 SEPSIS: Status: ACTIVE | Noted: 2023-06-17

## 2023-06-17 PROBLEM — N17.9 AKI (ACUTE KIDNEY INJURY): Status: RESOLVED | Noted: 2023-06-13 | Resolved: 2023-06-17

## 2023-06-17 PROBLEM — E83.39 HYPOPHOSPHATEMIA: Status: ACTIVE | Noted: 2023-06-17

## 2023-06-17 PROBLEM — E87.1 HYPONATREMIA: Status: ACTIVE | Noted: 2023-06-17

## 2023-06-17 LAB
ALBUMIN SERPL BCP-MCNC: 2.6 G/DL (ref 3.5–5.2)
ALBUMIN SERPL BCP-MCNC: 2.6 G/DL (ref 3.5–5.2)
ALP SERPL-CCNC: 40 U/L (ref 55–135)
ALT SERPL W/O P-5'-P-CCNC: 15 U/L (ref 10–44)
ANION GAP SERPL CALC-SCNC: 10 MMOL/L (ref 8–16)
ANION GAP SERPL CALC-SCNC: 10 MMOL/L (ref 8–16)
ANION GAP SERPL CALC-SCNC: 12 MMOL/L (ref 8–16)
ANISOCYTOSIS BLD QL SMEAR: SLIGHT
ANISOCYTOSIS BLD QL SMEAR: SLIGHT
AST SERPL-CCNC: 17 U/L (ref 10–40)
BASOPHILS # BLD AUTO: ABNORMAL K/UL (ref 0–0.2)
BASOPHILS # BLD AUTO: ABNORMAL K/UL (ref 0–0.2)
BASOPHILS NFR BLD: 0 % (ref 0–1.9)
BASOPHILS NFR BLD: 0 % (ref 0–1.9)
BILIRUB SERPL-MCNC: 1.3 MG/DL (ref 0.1–1)
BLD PROD TYP BPU: NORMAL
BLOOD UNIT EXPIRATION DATE: NORMAL
BLOOD UNIT TYPE CODE: 7300
BLOOD UNIT TYPE: NORMAL
BUN SERPL-MCNC: 14 MG/DL (ref 6–20)
CALCIUM SERPL-MCNC: 8.8 MG/DL (ref 8.7–10.5)
CALCIUM SERPL-MCNC: 8.8 MG/DL (ref 8.7–10.5)
CALCIUM SERPL-MCNC: 9.2 MG/DL (ref 8.7–10.5)
CHLORIDE SERPL-SCNC: 87 MMOL/L (ref 95–110)
CHLORIDE SERPL-SCNC: 88 MMOL/L (ref 95–110)
CHLORIDE SERPL-SCNC: 88 MMOL/L (ref 95–110)
CK SERPL-CCNC: 393 U/L (ref 20–200)
CO2 SERPL-SCNC: 29 MMOL/L (ref 23–29)
CO2 SERPL-SCNC: 29 MMOL/L (ref 23–29)
CO2 SERPL-SCNC: 30 MMOL/L (ref 23–29)
CODING SYSTEM: NORMAL
CREAT SERPL-MCNC: 0.9 MG/DL (ref 0.5–1.4)
CREAT SERPL-MCNC: 1 MG/DL (ref 0.5–1.4)
CREAT SERPL-MCNC: 1 MG/DL (ref 0.5–1.4)
CROSSMATCH INTERPRETATION: NORMAL
DIFFERENTIAL METHOD: ABNORMAL
DIFFERENTIAL METHOD: ABNORMAL
DISPENSE STATUS: NORMAL
EOSINOPHIL # BLD AUTO: ABNORMAL K/UL (ref 0–0.5)
EOSINOPHIL # BLD AUTO: ABNORMAL K/UL (ref 0–0.5)
EOSINOPHIL NFR BLD: 0 % (ref 0–8)
EOSINOPHIL NFR BLD: 0 % (ref 0–8)
ERYTHROCYTE [DISTWIDTH] IN BLOOD BY AUTOMATED COUNT: 18 % (ref 11.5–14.5)
ERYTHROCYTE [DISTWIDTH] IN BLOOD BY AUTOMATED COUNT: 18 % (ref 11.5–14.5)
EST. GFR  (NO RACE VARIABLE): >60 ML/MIN/1.73 M^2
GLUCOSE SERPL-MCNC: 136 MG/DL (ref 70–110)
GLUCOSE SERPL-MCNC: 145 MG/DL (ref 70–110)
GLUCOSE SERPL-MCNC: 145 MG/DL (ref 70–110)
HCT VFR BLD AUTO: 24.5 % (ref 40–54)
HCT VFR BLD AUTO: 24.8 % (ref 40–54)
HCT VFR BLD AUTO: 24.8 % (ref 40–54)
HGB BLD-MCNC: 7.7 G/DL (ref 14–18)
HGB BLD-MCNC: 7.9 G/DL (ref 14–18)
HGB BLD-MCNC: 7.9 G/DL (ref 14–18)
HYPOCHROMIA BLD QL SMEAR: ABNORMAL
HYPOCHROMIA BLD QL SMEAR: ABNORMAL
IMM GRANULOCYTES # BLD AUTO: ABNORMAL K/UL (ref 0–0.04)
IMM GRANULOCYTES # BLD AUTO: ABNORMAL K/UL (ref 0–0.04)
IMM GRANULOCYTES NFR BLD AUTO: ABNORMAL % (ref 0–0.5)
IMM GRANULOCYTES NFR BLD AUTO: ABNORMAL % (ref 0–0.5)
LACTATE SERPL-SCNC: 0.7 MMOL/L (ref 0.5–2.2)
LYMPHOCYTES # BLD AUTO: ABNORMAL K/UL (ref 1–4.8)
LYMPHOCYTES # BLD AUTO: ABNORMAL K/UL (ref 1–4.8)
LYMPHOCYTES NFR BLD: 4 % (ref 18–48)
LYMPHOCYTES NFR BLD: 4 % (ref 18–48)
MAGNESIUM SERPL-MCNC: 1.7 MG/DL (ref 1.6–2.6)
MCH RBC QN AUTO: 21 PG (ref 27–31)
MCH RBC QN AUTO: 21 PG (ref 27–31)
MCHC RBC AUTO-ENTMCNC: 31.9 G/DL (ref 32–36)
MCHC RBC AUTO-ENTMCNC: 31.9 G/DL (ref 32–36)
MCV RBC AUTO: 66 FL (ref 82–98)
MCV RBC AUTO: 66 FL (ref 82–98)
MONOCYTES # BLD AUTO: ABNORMAL K/UL (ref 0.3–1)
MONOCYTES # BLD AUTO: ABNORMAL K/UL (ref 0.3–1)
MONOCYTES NFR BLD: 7 % (ref 4–15)
MONOCYTES NFR BLD: 7 % (ref 4–15)
NEUTROPHILS NFR BLD: 89 % (ref 38–73)
NEUTROPHILS NFR BLD: 89 % (ref 38–73)
NRBC BLD-RTO: 0 /100 WBC
NRBC BLD-RTO: 0 /100 WBC
NUM UNITS TRANS PACKED RBC: NORMAL
OSMOLALITY UR: 698 MOSM/KG (ref 50–1200)
OVALOCYTES BLD QL SMEAR: ABNORMAL
OVALOCYTES BLD QL SMEAR: ABNORMAL
PHOSPHATE SERPL-MCNC: 1.9 MG/DL (ref 2.7–4.5)
PHOSPHATE SERPL-MCNC: 1.9 MG/DL (ref 2.7–4.5)
PLATELET # BLD AUTO: 817 K/UL (ref 150–450)
PLATELET # BLD AUTO: 817 K/UL (ref 150–450)
PLATELET BLD QL SMEAR: ABNORMAL
PLATELET BLD QL SMEAR: ABNORMAL
PMV BLD AUTO: 9.5 FL (ref 9.2–12.9)
PMV BLD AUTO: 9.5 FL (ref 9.2–12.9)
POIKILOCYTOSIS BLD QL SMEAR: SLIGHT
POIKILOCYTOSIS BLD QL SMEAR: SLIGHT
POTASSIUM SERPL-SCNC: 3.8 MMOL/L (ref 3.5–5.1)
POTASSIUM SERPL-SCNC: 3.9 MMOL/L (ref 3.5–5.1)
POTASSIUM SERPL-SCNC: 3.9 MMOL/L (ref 3.5–5.1)
PROT SERPL-MCNC: 6.7 G/DL (ref 6–8.4)
RBC # BLD AUTO: 3.76 M/UL (ref 4.6–6.2)
RBC # BLD AUTO: 3.76 M/UL (ref 4.6–6.2)
SODIUM SERPL-SCNC: 127 MMOL/L (ref 136–145)
SODIUM SERPL-SCNC: 127 MMOL/L (ref 136–145)
SODIUM SERPL-SCNC: 129 MMOL/L (ref 136–145)
TARGETS BLD QL SMEAR: ABNORMAL
TARGETS BLD QL SMEAR: ABNORMAL
URATE SERPL-MCNC: 2.1 MG/DL (ref 3.4–7)
UUN UR-MCNC: 1033 MG/DL (ref 140–1050)
WBC # BLD AUTO: 34.17 K/UL (ref 3.9–12.7)
WBC # BLD AUTO: 34.17 K/UL (ref 3.9–12.7)

## 2023-06-17 PROCEDURE — 83605 ASSAY OF LACTIC ACID: CPT | Performed by: NURSE PRACTITIONER

## 2023-06-17 PROCEDURE — 63600175 PHARM REV CODE 636 W HCPCS: Performed by: SURGERY

## 2023-06-17 PROCEDURE — 49002 REOPENING OF ABDOMEN: CPT | Mod: 80,,, | Performed by: SURGERY

## 2023-06-17 PROCEDURE — 85018 HEMOGLOBIN: CPT | Performed by: NURSE PRACTITIONER

## 2023-06-17 PROCEDURE — 25000003 PHARM REV CODE 250: Performed by: SURGERY

## 2023-06-17 PROCEDURE — 80053 COMPREHEN METABOLIC PANEL: CPT | Performed by: SURGERY

## 2023-06-17 PROCEDURE — 63600175 PHARM REV CODE 636 W HCPCS: Performed by: NURSE PRACTITIONER

## 2023-06-17 PROCEDURE — S0030 INJECTION, METRONIDAZOLE: HCPCS | Performed by: SURGERY

## 2023-06-17 PROCEDURE — 87070 CULTURE OTHR SPECIMN AEROBIC: CPT | Performed by: SURGERY

## 2023-06-17 PROCEDURE — 87102 FUNGUS ISOLATION CULTURE: CPT | Performed by: SURGERY

## 2023-06-17 PROCEDURE — 71000033 HC RECOVERY, INTIAL HOUR: Performed by: SURGERY

## 2023-06-17 PROCEDURE — 84550 ASSAY OF BLOOD/URIC ACID: CPT | Performed by: INTERNAL MEDICINE

## 2023-06-17 PROCEDURE — 84100 ASSAY OF PHOSPHORUS: CPT | Performed by: SURGERY

## 2023-06-17 PROCEDURE — 63600175 PHARM REV CODE 636 W HCPCS: Performed by: STUDENT IN AN ORGANIZED HEALTH CARE EDUCATION/TRAINING PROGRAM

## 2023-06-17 PROCEDURE — D9220A PRA ANESTHESIA: ICD-10-PCS | Mod: ANES,,, | Performed by: ANESTHESIOLOGY

## 2023-06-17 PROCEDURE — 87106 FUNGI IDENTIFICATION YEAST: CPT | Performed by: SURGERY

## 2023-06-17 PROCEDURE — 87205 SMEAR GRAM STAIN: CPT | Performed by: SURGERY

## 2023-06-17 PROCEDURE — 25000003 PHARM REV CODE 250: Performed by: NURSE ANESTHETIST, CERTIFIED REGISTERED

## 2023-06-17 PROCEDURE — 36415 COLL VENOUS BLD VENIPUNCTURE: CPT | Performed by: SURGERY

## 2023-06-17 PROCEDURE — 85014 HEMATOCRIT: CPT | Performed by: NURSE PRACTITIONER

## 2023-06-17 PROCEDURE — 80048 BASIC METABOLIC PNL TOTAL CA: CPT | Mod: XB | Performed by: NURSE PRACTITIONER

## 2023-06-17 PROCEDURE — 36000706: Performed by: SURGERY

## 2023-06-17 PROCEDURE — 87116 MYCOBACTERIA CULTURE: CPT | Performed by: SURGERY

## 2023-06-17 PROCEDURE — 87206 SMEAR FLUORESCENT/ACID STAI: CPT | Performed by: SURGERY

## 2023-06-17 PROCEDURE — 27201423 OPTIME MED/SURG SUP & DEVICES STERILE SUPPLY: Performed by: SURGERY

## 2023-06-17 PROCEDURE — 94761 N-INVAS EAR/PLS OXIMETRY MLT: CPT

## 2023-06-17 PROCEDURE — 83735 ASSAY OF MAGNESIUM: CPT | Performed by: SURGERY

## 2023-06-17 PROCEDURE — 49002 PR REOPEN RECENT ABD EXPLORATORY: ICD-10-PCS | Mod: 80,,, | Performed by: SURGERY

## 2023-06-17 PROCEDURE — 87075 CULTR BACTERIA EXCEPT BLOOD: CPT | Performed by: SURGERY

## 2023-06-17 PROCEDURE — 85007 BL SMEAR W/DIFF WBC COUNT: CPT | Performed by: SURGERY

## 2023-06-17 PROCEDURE — 99900035 HC TECH TIME PER 15 MIN (STAT)

## 2023-06-17 PROCEDURE — 85027 COMPLETE CBC AUTOMATED: CPT | Performed by: SURGERY

## 2023-06-17 PROCEDURE — D9220A PRA ANESTHESIA: Mod: CRNA,,, | Performed by: NURSE ANESTHETIST, CERTIFIED REGISTERED

## 2023-06-17 PROCEDURE — 36415 COLL VENOUS BLD VENIPUNCTURE: CPT | Performed by: NURSE PRACTITIONER

## 2023-06-17 PROCEDURE — 25000003 PHARM REV CODE 250: Performed by: STUDENT IN AN ORGANIZED HEALTH CARE EDUCATION/TRAINING PROGRAM

## 2023-06-17 PROCEDURE — B4185 PARENTERAL SOL 10 GM LIPIDS: HCPCS | Performed by: SURGERY

## 2023-06-17 PROCEDURE — 37000009 HC ANESTHESIA EA ADD 15 MINS: Performed by: SURGERY

## 2023-06-17 PROCEDURE — 12000002 HC ACUTE/MED SURGE SEMI-PRIVATE ROOM

## 2023-06-17 PROCEDURE — 93010 ELECTROCARDIOGRAM REPORT: CPT | Mod: ,,, | Performed by: GENERAL PRACTICE

## 2023-06-17 PROCEDURE — P9016 RBC LEUKOCYTES REDUCED: HCPCS | Performed by: SURGERY

## 2023-06-17 PROCEDURE — 71000039 HC RECOVERY, EACH ADD'L HOUR: Performed by: SURGERY

## 2023-06-17 PROCEDURE — D9220A PRA ANESTHESIA: ICD-10-PCS | Mod: CRNA,,, | Performed by: NURSE ANESTHETIST, CERTIFIED REGISTERED

## 2023-06-17 PROCEDURE — 93010 EKG 12-LEAD: ICD-10-PCS | Mod: ,,, | Performed by: GENERAL PRACTICE

## 2023-06-17 PROCEDURE — S0030 INJECTION, METRONIDAZOLE: HCPCS | Performed by: STUDENT IN AN ORGANIZED HEALTH CARE EDUCATION/TRAINING PROGRAM

## 2023-06-17 PROCEDURE — 37000008 HC ANESTHESIA 1ST 15 MINUTES: Performed by: SURGERY

## 2023-06-17 PROCEDURE — 49002 REOPENING OF ABDOMEN: CPT | Mod: ,,, | Performed by: SURGERY

## 2023-06-17 PROCEDURE — 63600175 PHARM REV CODE 636 W HCPCS: Performed by: NURSE ANESTHETIST, CERTIFIED REGISTERED

## 2023-06-17 PROCEDURE — S4991 NICOTINE PATCH NONLEGEND: HCPCS | Performed by: SURGERY

## 2023-06-17 PROCEDURE — 27000221 HC OXYGEN, UP TO 24 HOURS

## 2023-06-17 PROCEDURE — 36000707: Performed by: SURGERY

## 2023-06-17 PROCEDURE — 49002 PR REOPEN RECENT ABD EXPLORATORY: ICD-10-PCS | Mod: ,,, | Performed by: SURGERY

## 2023-06-17 PROCEDURE — 25000003 PHARM REV CODE 250: Performed by: NURSE PRACTITIONER

## 2023-06-17 PROCEDURE — C1729 CATH, DRAINAGE: HCPCS | Performed by: SURGERY

## 2023-06-17 PROCEDURE — C9290 INJ, BUPIVACAINE LIPOSOME: HCPCS | Performed by: SURGERY

## 2023-06-17 PROCEDURE — 82550 ASSAY OF CK (CPK): CPT | Performed by: INTERNAL MEDICINE

## 2023-06-17 PROCEDURE — D9220A PRA ANESTHESIA: Mod: ANES,,, | Performed by: ANESTHESIOLOGY

## 2023-06-17 PROCEDURE — 36410 VNPNXR 3YR/> PHY/QHP DX/THER: CPT

## 2023-06-17 PROCEDURE — 87040 BLOOD CULTURE FOR BACTERIA: CPT | Performed by: STUDENT IN AN ORGANIZED HEALTH CARE EDUCATION/TRAINING PROGRAM

## 2023-06-17 PROCEDURE — C1751 CATH, INF, PER/CENT/MIDLINE: HCPCS

## 2023-06-17 RX ORDER — BUPIVACAINE HYDROCHLORIDE 2.5 MG/ML
INJECTION, SOLUTION EPIDURAL; INFILTRATION; INTRACAUDAL
Status: DISCONTINUED | OUTPATIENT
Start: 2023-06-17 | End: 2023-06-17 | Stop reason: HOSPADM

## 2023-06-17 RX ORDER — HYDROMORPHONE HYDROCHLORIDE 2 MG/ML
0.2 INJECTION, SOLUTION INTRAMUSCULAR; INTRAVENOUS; SUBCUTANEOUS EVERY 5 MIN PRN
Status: DISCONTINUED | OUTPATIENT
Start: 2023-06-17 | End: 2023-06-17

## 2023-06-17 RX ORDER — OXYMETAZOLINE HCL 0.05 %
2 SPRAY, NON-AEROSOL (ML) NASAL 2 TIMES DAILY
Status: DISCONTINUED | OUTPATIENT
Start: 2023-06-17 | End: 2023-06-17

## 2023-06-17 RX ORDER — DEXMEDETOMIDINE HYDROCHLORIDE 100 UG/ML
INJECTION, SOLUTION INTRAVENOUS
Status: DISCONTINUED | OUTPATIENT
Start: 2023-06-17 | End: 2023-06-17

## 2023-06-17 RX ORDER — SUCCINYLCHOLINE CHLORIDE 20 MG/ML
INJECTION INTRAMUSCULAR; INTRAVENOUS
Status: DISCONTINUED | OUTPATIENT
Start: 2023-06-17 | End: 2023-06-17

## 2023-06-17 RX ORDER — KETAMINE HYDROCHLORIDE 10 MG/ML
INJECTION, SOLUTION INTRAMUSCULAR; INTRAVENOUS
Status: DISCONTINUED | OUTPATIENT
Start: 2023-06-17 | End: 2023-06-17

## 2023-06-17 RX ORDER — OXYCODONE HYDROCHLORIDE 10 MG/1
10 TABLET ORAL
Status: DISCONTINUED | OUTPATIENT
Start: 2023-06-17 | End: 2023-06-17

## 2023-06-17 RX ORDER — ROCURONIUM BROMIDE 10 MG/ML
INJECTION, SOLUTION INTRAVENOUS
Status: DISCONTINUED | OUTPATIENT
Start: 2023-06-17 | End: 2023-06-17

## 2023-06-17 RX ORDER — HYDROCODONE BITARTRATE AND ACETAMINOPHEN 500; 5 MG/1; MG/1
TABLET ORAL
Status: DISCONTINUED | OUTPATIENT
Start: 2023-06-17 | End: 2023-06-24 | Stop reason: HOSPADM

## 2023-06-17 RX ORDER — ONDANSETRON 2 MG/ML
INJECTION INTRAMUSCULAR; INTRAVENOUS
Status: DISCONTINUED | OUTPATIENT
Start: 2023-06-17 | End: 2023-06-17

## 2023-06-17 RX ORDER — ACETAMINOPHEN 10 MG/ML
INJECTION, SOLUTION INTRAVENOUS
Status: DISCONTINUED | OUTPATIENT
Start: 2023-06-17 | End: 2023-06-17

## 2023-06-17 RX ORDER — DEXAMETHASONE SODIUM PHOSPHATE 4 MG/ML
INJECTION, SOLUTION INTRA-ARTICULAR; INTRALESIONAL; INTRAMUSCULAR; INTRAVENOUS; SOFT TISSUE
Status: DISCONTINUED | OUTPATIENT
Start: 2023-06-17 | End: 2023-06-17

## 2023-06-17 RX ORDER — FENTANYL CITRATE 50 UG/ML
INJECTION, SOLUTION INTRAMUSCULAR; INTRAVENOUS
Status: DISCONTINUED | OUTPATIENT
Start: 2023-06-17 | End: 2023-06-17

## 2023-06-17 RX ORDER — MIDAZOLAM HYDROCHLORIDE 1 MG/ML
INJECTION INTRAMUSCULAR; INTRAVENOUS
Status: DISCONTINUED | OUTPATIENT
Start: 2023-06-17 | End: 2023-06-17

## 2023-06-17 RX ORDER — PROPOFOL 10 MG/ML
VIAL (ML) INTRAVENOUS
Status: DISCONTINUED | OUTPATIENT
Start: 2023-06-17 | End: 2023-06-17

## 2023-06-17 RX ORDER — MAGNESIUM SULFATE HEPTAHYDRATE 40 MG/ML
2 INJECTION, SOLUTION INTRAVENOUS ONCE
Status: COMPLETED | OUTPATIENT
Start: 2023-06-17 | End: 2023-06-17

## 2023-06-17 RX ORDER — LIDOCAINE HYDROCHLORIDE 20 MG/ML
INJECTION INTRAVENOUS
Status: DISCONTINUED | OUTPATIENT
Start: 2023-06-17 | End: 2023-06-17

## 2023-06-17 RX ORDER — METRONIDAZOLE 500 MG/100ML
500 INJECTION, SOLUTION INTRAVENOUS
Status: DISCONTINUED | OUTPATIENT
Start: 2023-06-17 | End: 2023-06-21

## 2023-06-17 RX ADMIN — KETAMINE HYDROCHLORIDE 30 MG: 10 INJECTION, SOLUTION INTRAMUSCULAR; INTRAVENOUS at 03:06

## 2023-06-17 RX ADMIN — SUCCINYLCHOLINE CHLORIDE 200 MG: 20 INJECTION, SOLUTION INTRAMUSCULAR; INTRAVENOUS at 03:06

## 2023-06-17 RX ADMIN — DEXMEDETOMIDINE HYDROCHLORIDE 6 MCG: 100 INJECTION, SOLUTION INTRAVENOUS at 04:06

## 2023-06-17 RX ADMIN — ONDANSETRON 4 MG: 2 INJECTION INTRAMUSCULAR; INTRAVENOUS at 03:06

## 2023-06-17 RX ADMIN — METRONIDAZOLE 500 MG: 500 INJECTION, SOLUTION INTRAVENOUS at 12:06

## 2023-06-17 RX ADMIN — Medication: at 05:06

## 2023-06-17 RX ADMIN — Medication: at 12:06

## 2023-06-17 RX ADMIN — MUPIROCIN: 20 OINTMENT TOPICAL at 09:06

## 2023-06-17 RX ADMIN — PROCHLORPERAZINE EDISYLATE 10 MG: 5 INJECTION INTRAMUSCULAR; INTRAVENOUS at 04:06

## 2023-06-17 RX ADMIN — DEXAMETHASONE SODIUM PHOSPHATE 4 MG: 4 INJECTION, SOLUTION INTRA-ARTICULAR; INTRALESIONAL; INTRAMUSCULAR; INTRAVENOUS; SOFT TISSUE at 04:06

## 2023-06-17 RX ADMIN — DEXMEDETOMIDINE HYDROCHLORIDE 8 MCG: 100 INJECTION, SOLUTION INTRAVENOUS at 04:06

## 2023-06-17 RX ADMIN — Medication: at 11:06

## 2023-06-17 RX ADMIN — PIPERACILLIN AND TAZOBACTAM 4.5 G: 4; .5 INJECTION, POWDER, LYOPHILIZED, FOR SOLUTION INTRAVENOUS; PARENTERAL at 01:06

## 2023-06-17 RX ADMIN — PROPOFOL 200 MG: 10 INJECTION, EMULSION INTRAVENOUS at 03:06

## 2023-06-17 RX ADMIN — FENTANYL CITRATE 50 MCG: 50 INJECTION, SOLUTION INTRAMUSCULAR; INTRAVENOUS at 03:06

## 2023-06-17 RX ADMIN — MIDAZOLAM HYDROCHLORIDE 1 MG: 1 INJECTION INTRAMUSCULAR; INTRAVENOUS at 03:06

## 2023-06-17 RX ADMIN — LIDOCAINE HYDROCHLORIDE 100 MG: 20 INJECTION, SOLUTION INTRAVENOUS at 03:06

## 2023-06-17 RX ADMIN — ACETAMINOPHEN 1000 MG: 10 INJECTION INTRAVENOUS at 06:06

## 2023-06-17 RX ADMIN — DIAZEPAM 2 MG: 2 TABLET ORAL at 11:06

## 2023-06-17 RX ADMIN — METRONIDAZOLE 500 MG: 500 INJECTION, SOLUTION INTRAVENOUS at 09:06

## 2023-06-17 RX ADMIN — FENTANYL CITRATE 50 MCG: 50 INJECTION, SOLUTION INTRAMUSCULAR; INTRAVENOUS at 05:06

## 2023-06-17 RX ADMIN — FENTANYL CITRATE 25 MCG: 50 INJECTION, SOLUTION INTRAMUSCULAR; INTRAVENOUS at 05:06

## 2023-06-17 RX ADMIN — VANCOMYCIN HYDROCHLORIDE 1500 MG: 1.5 INJECTION, POWDER, LYOPHILIZED, FOR SOLUTION INTRAVENOUS at 02:06

## 2023-06-17 RX ADMIN — I.V. FAT EMULSION 250 ML: 20 EMULSION INTRAVENOUS at 09:06

## 2023-06-17 RX ADMIN — Medication 1 PATCH: at 09:06

## 2023-06-17 RX ADMIN — ROCURONIUM BROMIDE 20 MG: 10 INJECTION, SOLUTION INTRAVENOUS at 04:06

## 2023-06-17 RX ADMIN — CEFEPIME 2 G: 2 INJECTION, POWDER, FOR SOLUTION INTRAVENOUS at 08:06

## 2023-06-17 RX ADMIN — SODIUM CHLORIDE 500 ML: 0.9 INJECTION, SOLUTION INTRAVENOUS at 06:06

## 2023-06-17 RX ADMIN — ROCURONIUM BROMIDE 5 MG: 10 INJECTION, SOLUTION INTRAVENOUS at 03:06

## 2023-06-17 RX ADMIN — MAGNESIUM SULFATE 2 G: 2 INJECTION INTRAVENOUS at 06:06

## 2023-06-17 RX ADMIN — ONDANSETRON 4 MG: 2 INJECTION INTRAMUSCULAR; INTRAVENOUS at 01:06

## 2023-06-17 RX ADMIN — HEPARIN SODIUM 5000 UNITS: 5000 INJECTION INTRAVENOUS; SUBCUTANEOUS at 09:06

## 2023-06-17 RX ADMIN — LEUCINE, PHENYLALANINE, LYSINE, METHIONINE, ISOLEUCINE, VALINE, HISTIDINE, THREONINE, TRYPTOPHAN, ALANINE, GLYCINE, ARGININE, PROLINE, SERINE, TYROSINE, DEXTROSE: 311; 238; 247; 170; 255; 247; 204; 179; 77; 880; 438; 489; 289; 213; 17; 5 INJECTION INTRAVENOUS at 11:06

## 2023-06-17 RX ADMIN — HEPARIN SODIUM 5000 UNITS: 5000 INJECTION INTRAVENOUS; SUBCUTANEOUS at 06:06

## 2023-06-17 RX ADMIN — DEXMEDETOMIDINE HYDROCHLORIDE 4 MCG: 100 INJECTION, SOLUTION INTRAVENOUS at 04:06

## 2023-06-17 RX ADMIN — PROMETHAZINE HYDROCHLORIDE 12.5 MG: 25 INJECTION INTRAMUSCULAR; INTRAVENOUS at 03:06

## 2023-06-17 RX ADMIN — SODIUM CHLORIDE: 9 INJECTION, SOLUTION INTRAVENOUS at 12:06

## 2023-06-17 RX ADMIN — PIPERACILLIN AND TAZOBACTAM 4.5 G: 4; .5 INJECTION, POWDER, LYOPHILIZED, FOR SOLUTION INTRAVENOUS; PARENTERAL at 10:06

## 2023-06-17 RX ADMIN — ONDANSETRON 4 MG: 2 INJECTION INTRAMUSCULAR; INTRAVENOUS at 10:06

## 2023-06-17 RX ADMIN — LEUCINE, PHENYLALANINE, LYSINE, METHIONINE, ISOLEUCINE, VALINE, HISTIDINE, THREONINE, TRYPTOPHAN, ALANINE, GLYCINE, ARGININE, PROLINE, SERINE, TYROSINE, DEXTROSE: 311; 238; 247; 170; 255; 247; 204; 179; 77; 880; 438; 489; 289; 213; 17; 5 INJECTION INTRAVENOUS at 07:06

## 2023-06-17 RX ADMIN — ROCURONIUM BROMIDE 45 MG: 10 INJECTION, SOLUTION INTRAVENOUS at 03:06

## 2023-06-17 RX ADMIN — SODIUM CHLORIDE, SODIUM GLUCONATE, SODIUM ACETATE, POTASSIUM CHLORIDE, MAGNESIUM CHLORIDE, SODIUM PHOSPHATE, DIBASIC, AND POTASSIUM PHOSPHATE: .53; .5; .37; .037; .03; .012; .00082 INJECTION, SOLUTION INTRAVENOUS at 03:06

## 2023-06-17 RX ADMIN — ACETAMINOPHEN 1000 MG: 10 INJECTION, SOLUTION INTRAVENOUS at 04:06

## 2023-06-17 RX ADMIN — CEFEPIME 2 G: 2 INJECTION, POWDER, FOR SOLUTION INTRAVENOUS at 11:06

## 2023-06-17 NOTE — PLAN OF CARE
Problem: Adult Inpatient Plan of Care  Goal: Plan of Care Review  Outcome: Ongoing, Progressing  Goal: Patient-Specific Goal (Individualized)  Outcome: Ongoing, Progressing  Goal: Absence of Hospital-Acquired Illness or Injury  Outcome: Ongoing, Progressing  Goal: Optimal Comfort and Wellbeing  Outcome: Ongoing, Progressing  Goal: Readiness for Transition of Care  Outcome: Ongoing, Progressing     Problem: Diabetes Comorbidity  Goal: Blood Glucose Level Within Targeted Range  Outcome: Ongoing, Progressing     Problem: Infection  Goal: Absence of Infection Signs and Symptoms  Outcome: Ongoing, Progressing     Problem: Fall Injury Risk  Goal: Absence of Fall and Fall-Related Injury  Outcome: Ongoing, Progressing     Problem: Malnutrition  Goal: Improved Nutritional Intake  Outcome: Ongoing, Progressing     Problem: Fluid and Electrolyte Imbalance (Acute Kidney Injury/Impairment)  Goal: Fluid and Electrolyte Balance  Outcome: Ongoing, Progressing     Problem: Oral Intake Inadequate (Acute Kidney Injury/Impairment)  Goal: Optimal Nutrition Intake  Outcome: Ongoing, Progressing     Problem: Renal Function Impairment (Acute Kidney Injury/Impairment)  Goal: Effective Renal Function  Outcome: Ongoing, Progressing

## 2023-06-17 NOTE — ANESTHESIA PROCEDURE NOTES
Intubation    Date/Time: 6/17/2023 3:38 PM  Performed by: Marietta Pompa CRNA  Authorized by: Leonardo Castaneda MD     Intubation:     Induction:  Rapid sequence induction    Intubated:  Postinduction    Mask Ventilation:  Easy mask    Attempts:  1    Attempted By:  CRNA    Method of Intubation:  Video laryngoscopy    Blade:  [place] 4    Laryngeal View Grade: Grade I - full view of cords      Difficult Airway Encountered?: No      Complications:  None    Airway Device:  Oral endotracheal tube    Airway Device Size:  8.0    Style/Cuff Inflation:  Cuffed (inflated to minimal occlusive pressure)    Inflation Amount (mL):  5    Tube secured:  22    Secured at:  The teeth    Placement Verified By:  Capnometry and Revisualization with laryngoscopy    Complicating Factors:  None    Findings Post-Intubation:  BS equal bilateral and atraumatic/condition of teeth unchanged

## 2023-06-17 NOTE — ASSESSMENT & PLAN NOTE
Worsening WBC count with fever and tachycardia. Unclear source.  -Follow up urine and blood cultures  -Follow up CXR  -Broaden antibiotics to vancomycin, cefepime and Flagyl

## 2023-06-17 NOTE — SUBJECTIVE & OBJECTIVE
"Interval History: see "Hospital Course"    Review of Systems   Constitutional:  Positive for fever.   Gastrointestinal:  Positive for abdominal pain, nausea and vomiting.   Skin:  Positive for wound.   Allergic/Immunologic: Positive for immunocompromised state.   Objective:     Vital Signs (Most Recent):  Temp: 98.3 °F (36.8 °C) (06/17/23 0740)  Pulse: (!) 118 (06/17/23 0803)  Resp: 16 (06/17/23 0803)  BP: (!) 140/91 (06/17/23 0740)  SpO2: (!) 94 % (06/17/23 0803) Vital Signs (24h Range):  Temp:  [96.4 °F (35.8 °C)-100.4 °F (38 °C)] 98.3 °F (36.8 °C)  Pulse:  [100-121] 118  Resp:  [16-30] 16  SpO2:  [94 %-100 %] 94 %  BP: (129-145)/(74-91) 140/91     Weight: 101.6 kg (224 lb)  Body mass index is 28 kg/m².    Intake/Output Summary (Last 24 hours) at 6/17/2023 1032  Last data filed at 6/17/2023 0500  Gross per 24 hour   Intake --   Output 2200 ml   Net -2200 ml         Physical Exam  Vitals and nursing note reviewed.   Constitutional:       General: He is not in acute distress.     Appearance: Normal appearance. He is ill-appearing.   HENT:      Head: Normocephalic and atraumatic.      Right Ear: External ear normal.      Left Ear: External ear normal.      Nose: Nose normal.      Mouth/Throat:      Mouth: Mucous membranes are moist.      Pharynx: Oropharynx is clear.   Eyes:      Extraocular Movements: Extraocular movements intact.   Cardiovascular:      Rate and Rhythm: Regular rhythm. Tachycardia present.      Pulses: Normal pulses.      Heart sounds: Normal heart sounds.   Pulmonary:      Effort: Pulmonary effort is normal. No respiratory distress.      Breath sounds: Normal breath sounds.   Abdominal:      General: There is distension.      Tenderness: There is abdominal tenderness.      Comments: Wound dressing clean, dry and intact.   Musculoskeletal:         General: Normal range of motion.      Cervical back: Normal range of motion and neck supple.      Right lower leg: No edema.      Left lower leg: No " edema.   Skin:     General: Skin is warm and dry.   Neurological:      Mental Status: He is alert. Mental status is at baseline.   Psychiatric:         Mood and Affect: Mood normal.         Behavior: Behavior normal.           Significant Labs: All pertinent labs within the past 24 hours have been reviewed.    Significant Imaging: I have reviewed all pertinent imaging results/findings within the past 24 hours.

## 2023-06-17 NOTE — ASSESSMENT & PLAN NOTE
Acute problem. POD 2.  -NPO  -Await return of bowel function  -PPN with lipids until diet advanced  -IV fluids NS stopped 6/17 given worsening hyponatremia  -Dilaudid PCA and PRN  -Zofran PRN  -Surgery consulted; exploratory laparotomy 6/15 with small bowel removal with anastomosis and SCOUT  -Zosyn changed to vancomycin, cefepime and Flagyl 6/17

## 2023-06-17 NOTE — OP NOTE
DATE OF PROCEDURE: 06/17/2023    PREOPERATIVE DIAGNOSIS:   1. Intra-abdominal free air  2. Sepsis  3. Colonic adenocarcinoma status post right hemicolectomy  4. Small-bowel obstruction status post take back for omentectomy and revision of small-bowel anastomosis    POSTOPERATIVE DIAGNOSIS: Same    PROCEDURE:   1. Exploratory laparotomy negative for obvious perforated viscus  2. Drainage of intra-abdominal abscess    SURGEON: Garfield Hoyt M.D    ASSISTING SURGEON: Ricardo Michael MD    ANESTHESIA: General    ESTIMATED BLOOD LOSS: Minimal    SPECIMEN: Cultures of intra-abdominal fluid    CONDITION: Guarded    COMPLICATIONS: None    FINDINGS:   1. Large amount of succus appearing fluid diffusely throughout the intraperitoneal cavity  2. Significant gastric distention  3. Both anastomosis sites appeared intact without evidence of perforation  4. Remainder of colon and small bowel without evidence of necrosis or perforation  5.  Stomach and duodenum without evidence of perforation     INDICATIONS: The patient is a 47-year-old male who underwent right hemicolectomy for cecal adenocarcinoma. Developed persistent small-bowel obstruction requiring take back at which time a significant inflammatory process was occurring secondary to necrotic omentum. Omentectomy was performed as well as a small-bowel resection and hand-sewn anastomosis due to thickening of bowel wall and mesentery. Patient was 2 days out from his last surgery developed bloating and worsening abdominal pain.  He had an increasing leukocytosis. On CT imaging for evaluation he was found to have large amount of intraperitoneal free air not attributable to recent surgery. Exploration of the abdomen was indicated and patient agreed proceed.    PROCEDURE IN DETAIL: Patient was taken the operating room where light sedation was administered and an NG tube was attempted to be passed. This was somewhat difficult but there was return of bilious fluid. Patient was  subsequently sedated and intubated without issue. A Gallardo catheter was placed to decompress the bladder.  He was then moved to the operating table in a supine position. His previous midline laparotomy incision had staples in place which were removed.  The abdomen was then prepped and draped in typical sterile fashion.  The patient was on scheduled antibiotics.  Time-out performed by members of the operative team. Subcutaneous tissue came apart with blunt dissection.  Some bloody serous fluid was drained. We identified the previously placed PDS sutures and removed these from the fascia. We were immediately met with a large amount of turbid succus appearing fluid which was suctioned clean.  Cultures were taken.  Were able to bluntly take down adhesions of bowel to the anterior abdominal wall.  The falciform was ligated between clamps and tied off.  An Alan wound protecting device was placed. There was a significant amount of succus appearing fluid present throughout the abdomen as well as acute interloop adhesions of bowel.  These were taken down with blunt dissection.  Multiple turbid fluid pockets were encountered and drained. The stomach was noted to be massively dilated. There is issue with passing the NG tube so an OG tube was placed.  Were able to decompress the stomach with over 2 L of fluid and air being removed. This was subsequently changed out to an NG tube and position was confirmed on palpation. We eviscerated the small bowel.  Identified the ligament of Treitz and ran it distally. The patient only had approximally 200 cm of small bowel the proximal half was relatively unaffected by the inflammatory process but the distal half had significant amount of inflammation.  There was also thickened and firm mesentery possibly due to the inflammatory/infectious process or could be related to malignancy with associated lymph nodes. We encountered the 1st proximal small bowel to small bowel anastomosis. This  appeared viable without an obvious perforation.  There was no bile leakage and the staple line appeared intact. Gentle compression was performed on either side and attempt to identify leak but none was identified. We then continued to run the bowel distally and encountered the ileocolic anastomosis. This also appeared intact without obvious signs of perforation or necrosis. No leak could be expressed. The duodenum along the retroperitoneum was exposed during the previous surgery we could identify it.  It appeared soft and did not have an obvious area of significant inflammation or bile leak.  I then opened up the lesser sac and examined the posterior aspect of the stomach and the anterior aspect.  The stomach was dilated still but soft and without evidence of perforation.  There was no significant succus or bilious stained fluid in the lesser sac. The sigmoid, descending and remaining of transverse colon appeared healthy and viable as well. Despite a significant amount of fluid and succus in the abdomen there was no obvious perforation or area of necrosis identified. At this point Dr. Michael joined the case as he is the primary surgeon for this patient. He performed a similar examination and agreed that no obvious perforation was present. At this time we decided to wash the abdomen out and leave drains. We assured we had adequate counts. We thoroughly irrigated the abdomen out with warm saline and suctioned clean. We brought in 2 separate 19 [place] DORIS drains from the right left upper abdomen and positioned him in the intraperitoneal cavity along the gutters.  These were sutured to the skin with a 0 silk sutures. We then closed the fascia with running 0 PDS suture. The skin was staple closed.  Occlusive bandage was applied over the incision sites. Patient was then aroused from sedation, extubated taken to recovery room in guarded condition.  All counts were correct x2 at the end of the case. I was present scrubbed  throughout all operative portions of the case.    DISPO: PACU

## 2023-06-17 NOTE — PROGRESS NOTES
[facility] Hospital Medicine  Progress Note    Patient Name: Ruslan Caldwell  MRN: [id number]  Patient Class: IP- Inpatient   Admission Date: 6/11/2023  Length of Stay: 5 days  Attending Physician: Moo Boone MD  Primary Care Provider: Amira Knutson NP        Subjective:     Principal Problem:Small bowel obstruction        HPI:  Ruslan Caldwell is a 47-year-old male who presents in transfer from [facility] for evaluation of possible developing small bowel obstruction.  Patient presents outside facility complaining of abdominal pain with nausea and vomiting.  Workup at outside facility demonstrated leukocytosis of 16,000, anemia, and thrombocytosis of 843.  CMP with hypokalemia 3.3 and total bilirubin of 1.2.  CT scan at outside facility was concerning for developing small-bowel obstruction.  Patient was admitted to [facility] where he underwent right partial colectomy on 05/29 with pathology consistent with adenocarcinoma.  He was discharged and then came back for another admission on 06/08 where he was treated for an ileus.  Previous medical history includes type 2 diabetes, anemia, hypertension, and right eye blindness.  Patient admitted to Hospital Medicine for treatment management.  Will maintain patient NPO, IV fluids, sliding scale insulin p.r.n., and general surgery consult in a.m..      Overview/Hospital Course:  Ruslan Caldwell is a 47 year old male with a past medical history of recently diagnosed colonic adenocarcinoma s/p partial colectomy with anastomosis, tobacco use, microcytic anemia and thrombocytosis who presented with persistent abdominal pain, nausea and abdominal pain concerning for SBO. An NG tube was unable to be placed. He was placed on IV fluids and Zosyn. He was made NPO. PRN IV analgesics and antiemetics were ordered and General Surgery was consulted. Repeat CT A/P shows SBO 6/13. A gastrografin enema 6/14 was inconclusive. Surgery took the  "patient to the OR 6/15 for exploratory laparotomy where an omental infarction was addressed; the patient also underwent SCOUT and partial small bowel resection with anastomosis. PPN was started 6/16. His course has been complicated by JOYCE in setting of vomiting as well as Toradol use; IV fluids were continued and his kidney function has improved to baseline. Nephrology has been consulted. He was also discovered to have an iron deficiency for which IV iron (one dose) was ordered 6/13. Oncology has also been consulted and will arrange follow up in clinic. His post-operative course has been complicated by low grade fever, tachycardia and an increasing WBC count, despite Zosyn use. Antibiotics were broadened to vancomycin, cefepime and Flagyl. Blood cultures were ordered as well as a CXR and urine culture.       Interval History: see "Hospital Course"    Review of Systems   Constitutional:  Positive for fever.   Gastrointestinal:  Positive for abdominal pain, nausea and vomiting.   Skin:  Positive for wound.   Allergic/Immunologic: Positive for immunocompromised state.   Objective:     Vital Signs (Most Recent):  Temp: 98.3 °F (36.8 °C) (06/17/23 0740)  Pulse: (!) 118 (06/17/23 0803)  Resp: 16 (06/17/23 0803)  BP: (!) 140/91 (06/17/23 0740)  SpO2: (!) 94 % (06/17/23 0803) Vital Signs (24h Range):  Temp:  [96.4 °F (35.8 °C)-100.4 °F (38 °C)] 98.3 °F (36.8 °C)  Pulse:  [100-121] 118  Resp:  [16-30] 16  SpO2:  [94 %-100 %] 94 %  BP: (129-145)/(74-91) 140/91     Weight: 101.6 kg (224 lb)  Body mass index is 28 kg/m².    Intake/Output Summary (Last 24 hours) at 6/17/2023 1032  Last data filed at 6/17/2023 0500  Gross per 24 hour   Intake --   Output 2200 ml   Net -2200 ml         Physical Exam  Vitals and nursing note reviewed.   Constitutional:       General: He is not in acute distress.     Appearance: Normal appearance. He is ill-appearing.   HENT:      Head: Normocephalic and atraumatic.      Right Ear: External ear " normal.      Left Ear: External ear normal.      Nose: Nose normal.      Mouth/Throat:      Mouth: Mucous membranes are moist.      Pharynx: Oropharynx is clear.   Eyes:      Extraocular Movements: Extraocular movements intact.   Cardiovascular:      Rate and Rhythm: Regular rhythm. Tachycardia present.      Pulses: Normal pulses.      Heart sounds: Normal heart sounds.   Pulmonary:      Effort: Pulmonary effort is normal. No respiratory distress.      Breath sounds: Normal breath sounds.   Abdominal:      General: There is distension.      Tenderness: There is abdominal tenderness.      Comments: Wound dressing clean, dry and intact.   Musculoskeletal:         General: Normal range of motion.      Cervical back: Normal range of motion and neck supple.      Right lower leg: No edema.      Left lower leg: No edema.   Skin:     General: Skin is warm and dry.   Neurological:      Mental Status: He is alert. Mental status is at baseline.   Psychiatric:         Mood and Affect: Mood normal.         Behavior: Behavior normal.           Significant Labs: All pertinent labs within the past 24 hours have been reviewed.    Significant Imaging: I have reviewed all pertinent imaging results/findings within the past 24 hours.      Assessment/Plan:      * Small bowel obstruction  Acute problem. POD 2.  -NPO  -Await return of bowel function  -PPN with lipids until diet advanced  -IV fluids NS stopped 6/17 given worsening hyponatremia  -Dilaudid PCA and PRN  -Zofran PRN  -Surgery consulted; exploratory laparotomy 6/15 with small bowel removal with anastomosis and SCOUT  -Zosyn changed to vancomycin, cefepime and Flagyl 6/17        Sepsis  Worsening WBC count with fever and tachycardia. Unclear source.  -Follow up urine and blood cultures  -Follow up CXR  -Broaden antibiotics to vancomycin, cefepime and Flagyl    Omental infarction  -Area removed by Surgery   -Follow up pathology      Colon adenocarcinoma  Recent partial  colectomy.  -Oncology consulted; follow up in clinic  -Follow up pathology      Hyponatremia  Possible SIADH in setting of pain.  -Hold NS IV fluids  -Trend Na  -Nephrology following      Hypophosphatemia  -Replete IV until patient can tolerate PO  -Trend P      Tobacco use  -Patient to be counseled on cessation  -Nicotine patch      Moderate malnutrition  -Diet when appropriate  -Nutrition consulted  -PPN with lipids    Thrombocytosis  Chronic. Stable. In setting of recently diagnosed adenocarcinoma and SBO.  -Trend platelets with CBC      Iron deficiency anemia  Suspect mixed iron deficiency and anemia of chronic inflammation.  -IV iron one dose 6/13  -Trend Hgb with CBC      Essential hypertension  Patient not taking any BP medications.  -Continue to monitor        VTE Risk Mitigation (From admission, onward)         Ordered     heparin (porcine) injection 5,000 Units  Every 8 hours         06/16/23 0738     Place DINA hose  Until discontinued         06/11/23 2043     IP VTE HIGH RISK PATIENT  Once         06/11/23 2043     Place sequential compression device  Until discontinued         06/11/23 2043                Discharge Planning   GABRIEL: 6/20/2023     Code Status: Full Code   Is the patient medically ready for discharge?:     Reason for patient still in hospital (select all that apply): Patient new problem, Patient trending condition, Laboratory test, Treatment, Imaging and Consult recommendations  Discharge Plan A: Home                  Moo Boone MD  Department of Hospital Medicine   [facility]

## 2023-06-17 NOTE — PROGRESS NOTES
INPATIENT NEPHROLOGY CONSULT   [facility] NEPHROLOGY    Ruslan Caldwell  06/17/2023    Reason for consultation:  Acute kidney injury    Chief Complaint: No chief complaint on file.    History of Present Illness:    Per H and P    Ruslan Caldwell is a 47-year-old male who presents in transfer from [facility] for evaluation of possible developing small bowel obstruction.  Patient presents outside facility complaining of abdominal pain with nausea and vomiting.  Workup at outside facility demonstrated leukocytosis of 16,000, anemia, and thrombocytosis of 843.  CMP with hypokalemia 3.3 and total bilirubin of 1.2.  CT scan at outside facility was concerning for developing small-bowel obstruction.  Patient was admitted to [facility] where he underwent right partial colectomy on 05/29 with pathology consistent with adenocarcinoma.  He was discharged and then came back for another admission on 06/08 where he was treated for an ileus.  Previous medical history includes type 2 diabetes, anemia, hypertension, and right eye blindness.  Patient admitted to Hospital Medicine for treatment management    6/16 Consulted for JOYCE.   Pt POD1 s/p exploratory laparotomy with small bowel resection and omental resection.   He has abdominal pain and bloating.  No chest pain, sob, neurologic symptoms, vomiting, or confusion.  He is a little somnolent on his analgesics  6/17 VSS. Worsening anemia, hypophosphatemia, hyponatremia. Fever yesterday evening with tachycardia, worsening WBC.    Plan of Care:    JOYCE due to volume depletion, toradol, hemodynamically mediated renal injury, elevated CPK  Fever, elevated WBC, sepsis  --avoid NSAIDS, Gabriel II inhibitors, and other non-essential nephrotoxic agents  --keep map above 55  --monitor output  --ua with hematuria, CPK elevated  --iv fluids  --diagnose and treat infection    Hyponatremia, worse  Hypophosphatemia  --avid hypotonic iv piggybacks  --urine osm is high, urine na is  high  --likely high adh state due to pain  --replete Phos  --control pain and nausea -> SIADH    Anemia, worse  --getting iv iron  --hgb stable over several days, dropped today  --hematology/oncology is on the case     Hypertension, now relative hypotension  --allow 150/90 for now  --keep map above 55  --hold any BP meds, give IVF, give abx for infection    Thank you for allowing us to participate in this patient's care. We will continue to follow.    Vital Signs:  Temp Readings from Last 3 Encounters:   06/17/23 98.3 °F (36.8 °C)   06/11/23 98.4 °F (36.9 °C) (Oral)   06/10/23 97.8 °F (36.6 °C)       Pulse Readings from Last 3 Encounters:   06/17/23 (!) 118   06/11/23 (!) 112   06/10/23 82       BP Readings from Last 3 Encounters:   06/17/23 (!) 140/91   06/11/23 124/78   06/10/23 (!) 135/91       Weight:  Wt Readings from Last 3 Encounters:   06/15/23 101.6 kg (224 lb)   06/11/23 102.1 kg (225 lb)   06/09/23 102 kg (224 lb 13.9 oz)       Past Medical & Surgical History:  Past Medical History:   Diagnosis Date    Diabetes mellitus, type 2 05/2020    Patient denies    Hypertension        Past Surgical History:   Procedure Laterality Date    COLONOSCOPY N/A 5/29/2023    Procedure: COLONOSCOPY;  Surgeon: Sam Solano MD;  Location: [facility];  Service: Endoscopy;  Laterality: N/A;    EXCISION, SMALL INTESTINE N/A 6/15/2023    Procedure: EXCISION, SMALL INTESTINE;  Surgeon: Edin Michael MD;  Location: [facility];  Service: General;  Laterality: N/A;    EYE SURGERY Right     LAPAROTOMY, EXPLORATORY N/A 6/15/2023    Procedure: LAPAROTOMY, EXPLORATORY;  Surgeon: Edin Michael MD;  Location: [facility] OR;  Service: General;  Laterality: N/A;    LUMBAR DISC SURGERY  2005    LYSIS OF ADHESIONS N/A 6/15/2023    Procedure: LYSIS, ADHESIONS;  Surgeon: Edin Michael MD;  Location: [facility];  Service: General;  Laterality: N/A;    OMENTECTOMY N/A 6/15/2023    Procedure: OMENTECTOMY;  Surgeon: Edin Michael MD;   Location: [facility] OR;  Service: General;  Laterality: N/A;    SUBTOTAL COLECTOMY Right 5/29/2023    Procedure: COLECTOMY, PARTIAL;  Surgeon: Edin Michael MD;  Location: [facility] OR;  Service: General;  Laterality: Right;       Past Social History:  Social History     Socioeconomic History    Marital status:     Number of children: 3    Highest education level: Associate degree: academic program   Occupational History    Occupation: [employer]-   Tobacco Use    Smoking status: Every Day     Packs/day: 0.50     Years: 23.00     Pack years: 11.50     Types: Cigarettes    Smokeless tobacco: Never   Substance and Sexual Activity    Alcohol use: Not Currently     Comment: Quit drinking 2 yrs ago    Drug use: Yes     Types: Marijuana     Comment: heavy marijuana use    Sexual activity: Yes     Social Determinants of Health     Financial Resource Strain: Low Risk     Difficulty of Paying Living Expenses: Not hard at all   Food Insecurity: No Food Insecurity    Worried About Running Out of Food in the Last Year: Never true    Ran Out of Food in the Last Year: Never true   Transportation Needs: No Transportation Needs    Lack of Transportation (Medical): No    Lack of Transportation (Non-Medical): No   Physical Activity: Sufficiently Active    Days of Exercise per Week: 5 days    Minutes of Exercise per Session: 90 min   Stress: No Stress Concern Present    Feeling of Stress : Not at all   Social Connections: Socially Isolated    Frequency of Communication with Friends and Family: Never    Frequency of Social Gatherings with Friends and Family: More than three times a week    Attends Sikhism Services: Never    Active Member of Clubs or Organizations: No    Attends Club or Organization Meetings: Never    Marital Status:    Housing Stability: Unknown    Unable to Pay for Housing in the Last Year: No    Unstable Housing in the Last Year: No       Medications:  No current facility-administered  "medications on file prior to encounter.     Current Outpatient Medications on File Prior to Encounter   Medication Sig Dispense Refill    gabapentin (NEURONTIN) 300 MG capsule Take 1 capsule (300 mg total) by mouth 2 (two) times daily. for 14 days 28 capsule 0     Scheduled Meds:   ceFEPime (MAXIPIME) IVPB  2 g Intravenous Q8H    fat emulsion 20%  250 mL Intravenous Daily    heparin (porcine)  5,000 Units Subcutaneous Q8H    metronidazole  500 mg Intravenous Q8H    mupirocin   Nasal BID    nicotine  1 patch Transdermal Daily    potassium phosphate IVPB  15 mmol Intravenous Once     Continuous Infusions:   Amino acid 4.25% - dextrose 5% (CLINIMIX-E) solution with additives (1L provides 42.5 gm AA, 50 gm CHO (170 kcal/L dextrose), Na 35, K 30, Mg 5, Ca 4.5, Acetate 70, Cl 39, Phos 15) 75 mL/hr at 06/16/23 1556    amino acid 4.25% - dextrose 5% solution with additives      hydromorphone in 0.9 % NaCl 6 mg/30 ml       PRN Meds:.diazePAM, HYDROmorphone, naloxone, ondansetron, prochlorperazine, promethazine (PHENERGAN) IVPB, simethicone, sodium chloride 0.9%, Pharmacy to dose Vancomycin consult **AND** vancomycin - pharmacy to dose    Allergies:  Patient has no known allergies.    Past Family History:  Reviewed; refer to Hospitalist Admission Note    Review of Systems:  Review of Systems - All 14 systems reviewed and negative, except as noted in HPI    Physical Exam:    BP (!) 140/91   Pulse (!) 118   Temp 98.3 °F (36.8 °C)   Resp 16   Ht 6' 3" (1.905 m)   Wt 101.6 kg (224 lb)   SpO2 (!) 94%   BMI 28.00 kg/m²     General Appearance:    Alert, cooperative, no distress, appears stated age   Head:    Normocephalic, without obvious abnormality, atraumatic   Eyes:    PER, conjunctiva/corneas clear, EOM's intact in both eyes        Throat:   Lips, mucosa, and tongue normal; teeth and gums normal   Back:     Symmetric, no curvature, ROM normal, no CVA tenderness   Lungs:     Clear to auscultation bilaterally, respirations " unlabored   Chest wall:    No tenderness or deformity   Heart:    Regular rate and rhythm, S1 and S2 normal, no murmur, rub   or gallop   Abdomen:     Soft, non-tender, bowel sounds active all four quadrants,     no masses, no organomegaly   Extremities:   Extremities normal, atraumatic, no cyanosis or edema   Pulses:   2+ and symmetric all extremities   MSK:   No joint or muscle swelling, tenderness or deformity   Skin:   Skin color, texture, turgor normal, no rashes or lesions   Neurologic:   CNII-XII intact, normal strength and sensation       Throughout.  No flap     Results:  Lab Results   Component Value Date     (L) 06/17/2023     (L) 06/17/2023    K 3.9 06/17/2023    K 3.9 06/17/2023    CL 88 (L) 06/17/2023    CL 88 (L) 06/17/2023    CO2 29 06/17/2023    CO2 29 06/17/2023    BUN 14 06/17/2023    BUN 14 06/17/2023    CREATININE 1.0 06/17/2023    CREATININE 1.0 06/17/2023    CALCIUM 8.8 06/17/2023    CALCIUM 8.8 06/17/2023    ANIONGAP 10 06/17/2023    ANIONGAP 10 06/17/2023    ESTGFRAFRICA >60.0 09/18/2021    EGFRNONAA >60.0 09/18/2021       Lab Results   Component Value Date    CALCIUM 8.8 06/17/2023    CALCIUM 8.8 06/17/2023    PHOS 1.9 (L) 06/17/2023    PHOS 1.9 (L) 06/17/2023       Recent Labs   Lab 06/17/23  0504   WBC 34.17*  34.17*   RBC 3.76*  3.76*   HGB 7.9*  7.9*   HCT 24.8*  24.8*   *  817*   MCV 66*  66*   MCH 21.0*  21.0*   MCHC 31.9*  31.9*         Patient care was time spent personally by me on the following activities:     Obtaining a history  Examination of patient.  Providing medical care at the patients bedside.  Developing a treatment plan with patient or surrogate and bedside caregivers  Ordering and reviewing laboratory studies, radiographic studies, pulse oximetry.  Ordering and performing treatments and interventions.  Evaluation of patient's response to treatment.  Discussions with consultants while on the unit and immediately available to the  patient.  Re-evaluation of the patient's condition.  Documentation in the medical record.     Sergio Bautista MD  Nephrology  [facility]  [phone]

## 2023-06-17 NOTE — PROGRESS NOTES
Pharmacokinetic Initial Assessment: IV Vancomycin    Assessment/Plan:    Initiate intravenous vancomycin with a dose of 1500 mg every 12 hours  Desired empiric serum trough concentration is 15 to 20 mcg/mL  Draw vancomycin trough level 60 min prior to third dose on 6/18 at approximately 1100  Pharmacy will continue to follow and monitor vancomycin.      Please contact pharmacy at extension [phone] with any questions regarding this assessment.     Thank you for the consult,   Soha Taylor       Patient brief summary:  Ruslan Caldwell is a 47 y.o. male initiated on antimicrobial therapy with IV Vancomycin for treatment of suspected sepsis    Drug Allergies:   Review of patient's allergies indicates:  No Known Allergies    Actual Body Weight:   101.6    Renal Function:   Estimated Creatinine Clearance: 120.5 mL/min (based on SCr of 1 mg/dL).,     Dialysis Method (if applicable):  N/A    CBC (last 72 hours):  Recent Labs   Lab Result Units 06/15/23  0258 06/16/23  0335 06/17/23  0504   WBC K/uL 17.09* 21.84* 34.17*  34.17*   Hemoglobin g/dL 9.9* 9.7* 7.9*  7.9*   Hematocrit % 32.2* 32.1* 24.8*  24.8*   Platelets K/uL 930* 1,040* 817*  817*   Gran % % 69.9 82.3* 89.0*  89.0*   Lymph % % 18.3 8.2* 4.0*  4.0*   Mono % % 8.5 8.9 7.0  7.0   Eosinophil % % 2.4 0.0 0.0  0.0   Basophil % % 0.6 0.2 0.0  0.0   Differential Method  Automated Automated Manual  Manual       Metabolic Panel (last 72 hours):  Recent Labs   Lab Result Units 06/15/23  0258 06/15/23  1947 06/16/23  0335 06/16/23  1805 06/17/23  0504   Sodium mmol/L 137 136 133*  --  127*  127*   Sodium, Urine mmol/L  --   --   --  81  --    Potassium mmol/L 2.9* 4.0 4.2  --  3.9  3.9   Chloride mmol/L 79* 87* 87*  --  88*  88*   CO2 mmol/L 39* 32* 28  --  29  29   Glucose mg/dL 102 126* 131*  --  145*  145*   Glucose, UA   --   --   --  Negative  --    BUN mg/dL 27* 24* 22*  --  14  14   Creatinine mg/dL 2.0* 1.7* 1.6*  --  1.0  1.0   Creatinine,  Urine mg/dL  --   --   --  147.1  --    Albumin g/dL 3.7  --  3.2*  --  2.6*  2.6*   Total Bilirubin mg/dL 1.5*  --  1.4*  --  1.3*   Alkaline Phosphatase U/L 58  --  50*  --  40*   AST U/L 13  --  22  --  17   ALT U/L 11  --  18  --  15   Magnesium mg/dL 2.1  --  1.8  --  1.7   Phosphorus mg/dL 3.9  --  3.4  --  1.9*  1.9*       Drug levels (last 3 results):  No results for input(s): VANCOMYCINRA, VANCORANDOM, VANCOMYCINPE, VANCOPEAK, VANCOMYCINTR, VANCOTROUGH in the last 72 hours.    Microbiologic Results:  Microbiology Results (last 7 days)       Procedure Component Value Units Date/Time    Blood culture [[id number]]     Order Status: Sent Specimen: Blood     Blood culture [[id number]]     Order Status: Sent Specimen: Blood     Urine Culture High Risk [[id number]]     Order Status: Sent Specimen: Urine

## 2023-06-17 NOTE — PLAN OF CARE
Report to [facility]. Patent denies pain, no nausea, ng tube to continuous suction, dressing to abdomen dry intact no drainage, suctions x2 with pink tinged bloody drainage, dressing to drains dry intact, vs stable, rodriguez to gravity, resting quietly, family present in room.

## 2023-06-17 NOTE — PROGRESS NOTES
Chest x-ray imaging obtain to evaluate source of leukocytosis shows large volume intraperitoneal free air, more than would be expected even with history of recent abdominal surgery. Given this with his increasing leukocytosis and worsening bloating I am concern for viscus perforation and I have recommended urgent exploratory laparotomy. This has been discussed with the patient and his family as well as his primary provider. Type and screen and preparation of blood has been obtained.

## 2023-06-17 NOTE — ANESTHESIA PREPROCEDURE EVALUATION
06/17/2023  Ruslan Caldwell is a 47 y.o., male.      Pre-op Assessment    I have reviewed the Patient Summary Reports.     I have reviewed the Nursing Notes. I have reviewed the NPO Status.   I have reviewed the Medications.     Review of Systems  Anesthesia Hx:  No problems with previous Anesthesia    Social:  Smoker, Former Smoker    Hematology/Oncology:         -- Cancer in past history: Other (see Oncology comments) surgery    Cardiovascular:   Exercise tolerance: good Hypertension ECG has been reviewed.    Pulmonary:  Pulmonary Normal    Renal/:   Chronic Renal Disease    Hepatic/GI:   Small bowel obstruction s/p small bowel resection 2 days ago, now with free air   Neurological:  Neurology Normal    Endocrine:   Diabetes, type 2        Physical Exam  General: Well nourished    Airway:  Mallampati: II   Mouth Opening: Normal  TM Distance: Normal  Tongue: Normal  Neck ROM: Normal ROM    Dental:  Intact    Chest/Lungs:  Clear to auscultation, Normal Respiratory Rate        Anesthesia Plan  Type of Anesthesia, risks & benefits discussed:    Anesthesia Type: Gen ETT  Intra-op Monitoring Plan: Standard ASA Monitors  Post Op Pain Control Plan: multimodal analgesia and IV/PO Opioids PRN  Induction:  IV  Airway Plan: Direct, Post-Induction  Informed Consent: Informed consent signed with the Patient and all parties understand the risks and agree with anesthesia plan.  All questions answered. Patient consented to blood products? Yes  ASA Score: 3 Emergent    Ready For Surgery From Anesthesia Perspective.     .

## 2023-06-17 NOTE — NURSING
Notified Nilsa Hough of patient having increased HR of 120s-140. Also notified of CBC results. See new orders.

## 2023-06-17 NOTE — CARE UPDATE
06/17/23 0803   Patient Assessment/Suction   Level of Consciousness (AVPU) alert   Respiratory Effort Unlabored   Rhythm/Pattern, Respiratory unlabored   PRE-TX-O2   Device (Oxygen Therapy) nasal cannula   $ Is the patient on Low Flow Oxygen? Yes   Flow (L/min) 1   SpO2 (!) 94 %   Pulse Oximetry Type Continuous   $ Pulse Oximetry - Multiple Charge Pulse Oximetry - Multiple   Pulse (!) 118   Resp 16   ETCO2   $ ETCO2 Usage Currently wearing   ETCO2 (mmHg) 47 mmHg   ETCO2 Device Type Portable Bedside Monitor   Aerosol Therapy   $ Aerosol Therapy Charges PRN treatment not required   Respiratory Treatment Status (SVN) PRN treatment not required   Incentive Spirometer   $ Incentive Spirometer Charges unable to perform  (pt vomiting)

## 2023-06-17 NOTE — TRANSFER OF CARE
"Anesthesia Transfer of Care Note    Patient: Ruslan Caldwell    Procedure(s) Performed: Procedure(s) (LRB):  LAPAROTOMY, EXPLORATORY (N/A)    Anesthesia Type: general    Transport from OR: Transported from OR on 6-10 L/min O2 by face mask with adequate spontaneous ventilation    Post pain: adequate analgesia    Post assessment: no apparent anesthetic complications    Post vital signs: stable    Level of consciousness: sedated    Nausea/Vomiting: no nausea/vomiting    Transfer of care protocol was followed      Last vitals:   Visit Vitals  /79   Pulse 90   Temp 36.3 °C (97.3 °F)   Resp 20   Ht 6' 3" (1.905 m)   Wt 101.6 kg (224 lb)   SpO2 97%   BMI 28.00 kg/m²     "

## 2023-06-17 NOTE — ASSESSMENT & PLAN NOTE
Suspect mixed iron deficiency and anemia of chronic inflammation.  -IV iron one dose 6/13  -Trend Hgb with CBC

## 2023-06-17 NOTE — PROGRESS NOTES
General Surgery Progress Note    Admit Date: 6/11/2023  S/P: Procedure(s) (LRB):  LAPAROTOMY, EXPLORATORY (N/A)  EXCISION, SMALL INTESTINE (N/A)  LYSIS, ADHESIONS (N/A)  OMENTECTOMY (N/A)    Post-operative Day: 2 Days Post-Op    Hospital Day: 7    SUBJECTIVE:   Patient with nausea, vomiting, spit up and hiccups. Requesting antiemetics. Patient is refusing NG tube placement.  States his body is too sensitive.  Also complaining of multiple needle sticks for blood draws. Denies passage of flatus or stool. Reports pain is mostly in the midline abdomen but not too bad on the sides.    OBJECTIVE:     Vital Signs (Most Recent)  Temp:  [97 °F (36.1 °C)-100.4 °F (38 °C)] 97.3 °F (36.3 °C)  Pulse:  [108-121] 113  Resp:  [16-30] 18  SpO2:  [94 %-97 %] 97 %  BP: (129-145)/(74-91) 133/79    I&Os:  I/O last 3 completed shifts:  In: 2133.9 [I.V.:1678.4; IV Piggyback:455.5]  Out: 2200 [Urine:2050; Emesis/NG output:150]    Physical Exam:  Gen: NAD, AAOx3, appears somewhat uncomfortable with hiccups  HEENT: Anicteric sclera  Pulm: unlabored, symmetrical   Abd: Soft, not overly distended, tender to palpation along midline but no rebound or guarding, abdominal incision covered with bandage which has mild soilage    Laboratory:  CBC:   Recent Labs   Lab 06/17/23  0504 06/17/23  1151   WBC 34.17*  34.17*  --    RBC 3.76*  3.76*  --    HGB 7.9*  7.9* 7.7*   HCT 24.8*  24.8* 24.5*   *  817*  --    MCV 66*  66*  --    MCH 21.0*  21.0*  --    MCHC 31.9*  31.9*  --      CMP:   Recent Labs   Lab 06/17/23  0504 06/17/23  1151   *  145* 136*   CALCIUM 8.8  8.8 9.2   ALBUMIN 2.6*  2.6*  --    PROT 6.7  --    *  127* 129*   K 3.9  3.9 3.8   CO2 29  29 30*   CL 88*  88* 87*   BUN 14  14 14   CREATININE 1.0  1.0 0.9   ALKPHOS 40*  --    ALT 15  --    AST 17  --    BILITOT 1.3*  --      Labs within the past 24 hours have been reviewed.    ASSESSMENT/PLAN:     Patient Active Problem List    Diagnosis Date Noted     Sepsis 06/17/2023    Hyponatremia 06/17/2023    Hypophosphatemia 06/17/2023    Omental infarction 06/16/2023    Tobacco use 06/12/2023    Small bowel obstruction 06/11/2023    Ileus 06/09/2023    Moderate malnutrition 06/09/2023    RLQ abdominal pain 05/29/2023    Colon adenocarcinoma 05/28/2023    Thrombocytosis 05/28/2023    Essential hypertension 05/24/2023    Iron deficiency anemia 05/24/2023         47 y.o. male with history of open right hemicolectomy on 05/29/2023 for obstruction with adenocarcinoma, status post re-exploration for small-bowel obstruction at which time omental infarction was encountered and small bowel resection was required  -patient with ongoing bloating, nausea and vomiting, likely postoperative ileus  -I stressed to the patient my recommendation for placement of an NG tube, patient was adamant about not not having tube placed down his nose stating he can not handle it, I have reviewed with him and his family members the indication for NG tube placement to decrease gastric distention, prevent further nausea and vomiting and hopefully improve his discomfort, patient's family members also attempted to encourage him to agree to NG tube however at this time he is refusing, has spoken with nursing staff to revisit NG tube placement later in the day  -leukocytosis worsening, T max 100.4, abdomen is tender but not peritonitic, cultures and UA, broad spectrum abx for now  -hemoglobin has downtrended from 9.7-7.9, recheck 7.7, monitor closely, hold anticoagulation, transfuse if less than 7 or symptomatic, have to consider possible intra-abdominal bleed but again at this time abdomen is not overly distended and not peritonitic

## 2023-06-18 LAB
ALBUMIN SERPL BCP-MCNC: 2.2 G/DL (ref 3.5–5.2)
ALP SERPL-CCNC: 43 U/L (ref 55–135)
ALT SERPL W/O P-5'-P-CCNC: 15 U/L (ref 10–44)
ANION GAP SERPL CALC-SCNC: 10 MMOL/L (ref 8–16)
ANISOCYTOSIS BLD QL SMEAR: ABNORMAL
AST SERPL-CCNC: 15 U/L (ref 10–40)
BASOPHILS # BLD AUTO: ABNORMAL K/UL (ref 0–0.2)
BASOPHILS NFR BLD: 0 % (ref 0–1.9)
BILIRUB SERPL-MCNC: 0.8 MG/DL (ref 0.1–1)
BUN SERPL-MCNC: 12 MG/DL (ref 6–20)
CALCIUM SERPL-MCNC: 8.4 MG/DL (ref 8.7–10.5)
CHLORIDE SERPL-SCNC: 90 MMOL/L (ref 95–110)
CO2 SERPL-SCNC: 30 MMOL/L (ref 23–29)
CREAT SERPL-MCNC: 0.8 MG/DL (ref 0.5–1.4)
DIFFERENTIAL METHOD: ABNORMAL
EOSINOPHIL # BLD AUTO: ABNORMAL K/UL (ref 0–0.5)
EOSINOPHIL NFR BLD: 0 % (ref 0–8)
ERYTHROCYTE [DISTWIDTH] IN BLOOD BY AUTOMATED COUNT: 20.2 % (ref 11.5–14.5)
EST. GFR  (NO RACE VARIABLE): >60 ML/MIN/1.73 M^2
GLUCOSE SERPL-MCNC: 137 MG/DL (ref 70–110)
GRAM STN SPEC: NORMAL
GRAM STN SPEC: NORMAL
HCT VFR BLD AUTO: 27.1 % (ref 40–54)
HGB BLD-MCNC: 8.7 G/DL (ref 14–18)
HYPOCHROMIA BLD QL SMEAR: ABNORMAL
IMM GRANULOCYTES # BLD AUTO: ABNORMAL K/UL (ref 0–0.04)
IMM GRANULOCYTES NFR BLD AUTO: ABNORMAL % (ref 0–0.5)
LYMPHOCYTES # BLD AUTO: ABNORMAL K/UL (ref 1–4.8)
LYMPHOCYTES NFR BLD: 7 % (ref 18–48)
MAGNESIUM SERPL-MCNC: 1.8 MG/DL (ref 1.6–2.6)
MCH RBC QN AUTO: 21.5 PG (ref 27–31)
MCHC RBC AUTO-ENTMCNC: 32.1 G/DL (ref 32–36)
MCV RBC AUTO: 67 FL (ref 82–98)
MONOCYTES # BLD AUTO: ABNORMAL K/UL (ref 0.3–1)
MONOCYTES NFR BLD: 6 % (ref 4–15)
NEUTROPHILS NFR BLD: 87 % (ref 38–73)
NRBC BLD-RTO: 0 /100 WBC
OVALOCYTES BLD QL SMEAR: ABNORMAL
PHOSPHATE SERPL-MCNC: 1.5 MG/DL (ref 2.7–4.5)
PLATELET # BLD AUTO: 837 K/UL (ref 150–450)
PLATELET BLD QL SMEAR: ABNORMAL
PMV BLD AUTO: 9.7 FL (ref 9.2–12.9)
POIKILOCYTOSIS BLD QL SMEAR: SLIGHT
POTASSIUM SERPL-SCNC: 3.9 MMOL/L (ref 3.5–5.1)
PROT SERPL-MCNC: 6.1 G/DL (ref 6–8.4)
RBC # BLD AUTO: 4.05 M/UL (ref 4.6–6.2)
SODIUM SERPL-SCNC: 130 MMOL/L (ref 136–145)
TARGETS BLD QL SMEAR: ABNORMAL
VANCOMYCIN TROUGH SERPL-MCNC: 5.1 UG/ML (ref 10–22)
WBC # BLD AUTO: 27.14 K/UL (ref 3.9–12.7)

## 2023-06-18 PROCEDURE — 63600175 PHARM REV CODE 636 W HCPCS: Performed by: SURGERY

## 2023-06-18 PROCEDURE — 99900035 HC TECH TIME PER 15 MIN (STAT)

## 2023-06-18 PROCEDURE — S4991 NICOTINE PATCH NONLEGEND: HCPCS | Performed by: SURGERY

## 2023-06-18 PROCEDURE — 80053 COMPREHEN METABOLIC PANEL: CPT | Performed by: SURGERY

## 2023-06-18 PROCEDURE — 85027 COMPLETE CBC AUTOMATED: CPT | Performed by: SURGERY

## 2023-06-18 PROCEDURE — 25000003 PHARM REV CODE 250: Performed by: SURGERY

## 2023-06-18 PROCEDURE — 12000002 HC ACUTE/MED SURGE SEMI-PRIVATE ROOM

## 2023-06-18 PROCEDURE — 36415 COLL VENOUS BLD VENIPUNCTURE: CPT | Performed by: STUDENT IN AN ORGANIZED HEALTH CARE EDUCATION/TRAINING PROGRAM

## 2023-06-18 PROCEDURE — 94799 UNLISTED PULMONARY SVC/PX: CPT

## 2023-06-18 PROCEDURE — 63600175 PHARM REV CODE 636 W HCPCS: Performed by: HOSPITALIST

## 2023-06-18 PROCEDURE — 25000003 PHARM REV CODE 250: Performed by: HOSPITALIST

## 2023-06-18 PROCEDURE — 83735 ASSAY OF MAGNESIUM: CPT | Performed by: SURGERY

## 2023-06-18 PROCEDURE — 80202 ASSAY OF VANCOMYCIN: CPT | Performed by: STUDENT IN AN ORGANIZED HEALTH CARE EDUCATION/TRAINING PROGRAM

## 2023-06-18 PROCEDURE — 84100 ASSAY OF PHOSPHORUS: CPT | Performed by: SURGERY

## 2023-06-18 PROCEDURE — 94761 N-INVAS EAR/PLS OXIMETRY MLT: CPT

## 2023-06-18 PROCEDURE — S0030 INJECTION, METRONIDAZOLE: HCPCS | Performed by: SURGERY

## 2023-06-18 PROCEDURE — 63600175 PHARM REV CODE 636 W HCPCS: Performed by: NURSE PRACTITIONER

## 2023-06-18 PROCEDURE — 27000221 HC OXYGEN, UP TO 24 HOURS

## 2023-06-18 PROCEDURE — 36415 COLL VENOUS BLD VENIPUNCTURE: CPT | Performed by: SURGERY

## 2023-06-18 PROCEDURE — 85007 BL SMEAR W/DIFF WBC COUNT: CPT | Performed by: SURGERY

## 2023-06-18 RX ORDER — DIAZEPAM 10 MG/2ML
2.5 INJECTION INTRAMUSCULAR ONCE
Status: COMPLETED | OUTPATIENT
Start: 2023-06-18 | End: 2023-06-18

## 2023-06-18 RX ADMIN — CEFEPIME 2 G: 2 INJECTION, POWDER, FOR SOLUTION INTRAVENOUS at 12:06

## 2023-06-18 RX ADMIN — CEFEPIME 2 G: 2 INJECTION, POWDER, FOR SOLUTION INTRAVENOUS at 05:06

## 2023-06-18 RX ADMIN — VANCOMYCIN HYDROCHLORIDE 1500 MG: 1.5 INJECTION, POWDER, LYOPHILIZED, FOR SOLUTION INTRAVENOUS at 03:06

## 2023-06-18 RX ADMIN — HEPARIN SODIUM 5000 UNITS: 5000 INJECTION INTRAVENOUS; SUBCUTANEOUS at 01:06

## 2023-06-18 RX ADMIN — METRONIDAZOLE 500 MG: 500 INJECTION, SOLUTION INTRAVENOUS at 05:06

## 2023-06-18 RX ADMIN — HEPARIN SODIUM 5000 UNITS: 5000 INJECTION INTRAVENOUS; SUBCUTANEOUS at 05:06

## 2023-06-18 RX ADMIN — VANCOMYCIN HYDROCHLORIDE 1500 MG: 1.5 INJECTION, POWDER, LYOPHILIZED, FOR SOLUTION INTRAVENOUS at 02:06

## 2023-06-18 RX ADMIN — MUPIROCIN: 20 OINTMENT TOPICAL at 09:06

## 2023-06-18 RX ADMIN — DIAZEPAM 2.5 MG: 10 INJECTION, SOLUTION INTRAMUSCULAR; INTRAVENOUS at 11:06

## 2023-06-18 RX ADMIN — Medication 1 PATCH: at 09:06

## 2023-06-18 RX ADMIN — Medication: at 08:06

## 2023-06-18 RX ADMIN — Medication: at 05:06

## 2023-06-18 RX ADMIN — HEPARIN SODIUM 5000 UNITS: 5000 INJECTION INTRAVENOUS; SUBCUTANEOUS at 09:06

## 2023-06-18 RX ADMIN — Medication: at 06:06

## 2023-06-18 RX ADMIN — VANCOMYCIN HYDROCHLORIDE 1250 MG: 1.25 INJECTION, POWDER, LYOPHILIZED, FOR SOLUTION INTRAVENOUS at 11:06

## 2023-06-18 RX ADMIN — Medication: at 10:06

## 2023-06-18 RX ADMIN — CEFEPIME 2 G: 2 INJECTION, POWDER, FOR SOLUTION INTRAVENOUS at 08:06

## 2023-06-18 RX ADMIN — Medication: at 01:06

## 2023-06-18 RX ADMIN — METRONIDAZOLE 500 MG: 500 INJECTION, SOLUTION INTRAVENOUS at 10:06

## 2023-06-18 RX ADMIN — METRONIDAZOLE 500 MG: 500 INJECTION, SOLUTION INTRAVENOUS at 01:06

## 2023-06-18 RX ADMIN — MUPIROCIN: 20 OINTMENT TOPICAL at 08:06

## 2023-06-18 NOTE — PLAN OF CARE
[facility]  Discharge Reassessment    Primary Care Provider: Amira Knutson NP    Expected Discharge Date: 6/20/2023    Case Management continuing to follow and will assist with discharge planning as needed. Patient had sx yesterday with Dr. Hyot-has NG tube in place and drains present.     Reassessment (most recent)       Discharge Reassessment - 06/18/23 1202          Discharge Reassessment    Assessment Type Discharge Planning Reassessment     Did the patient's condition or plan change since previous assessment? No

## 2023-06-18 NOTE — PROGRESS NOTES
General Surgery Progress Note    Admit Date: 6/11/2023  S/P: Procedure(s) (LRB):  LAPAROTOMY, EXPLORATORY (N/A)    Post-operative Day: 1 Day Post-Op    Hospital Day: 8    SUBJECTIVE:   Underwent exploration of the abdominal cavity yesterday due to free air seen on chest x-ray imaging and worsening leukocytosis. Intra-abdominal abscess and turbid fluid identified but no obvious perforation.  NG tube placed in the OR.    Patient reports he feels much better since surgery. His discomfort and abdominal pressure is improved.  His pain is well controlled with a PCA. He was maintained the NG tube in place. His T-max was 99°. His white blood cell count is downtrending. He is not passed flatus or had a bowel movement.    OBJECTIVE:     Vital Signs (Most Recent)  Temp:  [97 °F (36.1 °C)-99 °F (37.2 °C)] 98.6 °F (37 °C)  Pulse:  [] 113  Resp:  [10-26] 20  SpO2:  [93 %-100 %] 97 %  BP: (125-170)/(71-87) 147/86    I&Os:  I/O last 3 completed shifts:  In: 2949 [Blood:349; IV Piggyback:2600]  Out: 7125 [Urine:3825; Emesis/NG output:150; Drains:1200; Other:1850; Blood:100]    Physical Exam:  Gen: NAD, AAOx3  HEENT: Anicteric sclera, NG tube in place  Pulm: unlabored, symmetrical   Abd: Soft with appropriate tenderness palpation, no significant distention, midline bandage in place with very minimal soilage, bilateral DORIS drains with serosanguineous fluid and no evidence of bile    Laboratory:  CBC:   Recent Labs   Lab 06/18/23  0450   WBC 27.14*   RBC 4.05*   HGB 8.7*   HCT 27.1*   *   MCV 67*   MCH 21.5*   MCHC 32.1       CMP:   Recent Labs   Lab 06/18/23  0451   *   CALCIUM 8.4*   ALBUMIN 2.2*   PROT 6.1   *   K 3.9   CO2 30*   CL 90*   BUN 12   CREATININE 0.8   ALKPHOS 43*   ALT 15   AST 15   BILITOT 0.8       Labs within the past 24 hours have been reviewed.    ASSESSMENT/PLAN:     Patient Active Problem List    Diagnosis Date Noted    Sepsis 06/17/2023    Hyponatremia 06/17/2023    Hypophosphatemia  06/17/2023    Omental infarction 06/16/2023    Tobacco use 06/12/2023    Small bowel obstruction 06/11/2023    Ileus 06/09/2023    Moderate malnutrition 06/09/2023    RLQ abdominal pain 05/29/2023    Colon adenocarcinoma 05/28/2023    Thrombocytosis 05/28/2023    Essential hypertension 05/24/2023    Iron deficiency anemia 05/24/2023         47 y.o. male with history of open right hemicolectomy on 05/29/2023 for obstruction with adenocarcinoma, status post re-exploration for small-bowel obstruction at which time omental infarction was encountered and small bowel resection was required, status post re-exploration in the OR for free air which time intra-abdominal abscess was drained but no obvious perforation was identified  -patient appears much improved  -leukocytosis downtrending, continue broad-spectrum antibiotics, follow-up cultures  -continue NG tube to suction, anticipate prolonged ileus due to inflammation, will await return of bowel function before considering advancing diet, continue TPN with lipids for now  -encouraged out of bed to chair and ambulation  -continue PCA  -received a unit of blood intraoperative yesterday, hemoglobin responded appropriately, continue to monitor

## 2023-06-18 NOTE — ANESTHESIA POSTPROCEDURE EVALUATION
Anesthesia Post Evaluation    Patient: Ruslan Caldwell    Procedure(s) Performed: Procedure(s) (LRB):  LAPAROTOMY, EXPLORATORY (N/A)    Final Anesthesia Type: general      Patient location during evaluation: PACU  Patient participation: Yes- Able to Participate  Level of consciousness: awake and alert and oriented  Post-procedure vital signs: reviewed and stable  Pain management: adequate  Airway patency: patent  TATIANNA mitigation strategies: Multimodal analgesia, Verification of full reversal of neuromuscular block, Extubation while patient is awake and Extubation and recovery carried out in lateral, semiupright, or other nonsupine position  PONV status at discharge: No PONV  Anesthetic complications: no      Cardiovascular status: blood pressure returned to baseline  Respiratory status: unassisted, spontaneous ventilation and nasal cannula  Hydration status: euvolemic  Follow-up not needed.          Vitals Value Taken Time   /71 06/18/23 0743   Temp 36.4 °C (97.6 °F) 06/18/23 0743   Pulse 113 06/18/23 0804   Resp 20 06/18/23 0858   SpO2 94 % 06/18/23 0804         Event Time   Out of Recovery 06/17/2023 18:45:00         Pain/Jose D Score: Pain Rating Prior to Med Admin: 10 (6/18/2023  8:58 AM)  Pain Rating Post Med Admin: 9 (6/17/2023  6:53 AM)  Jose D Score: 9 (6/17/2023  6:15 PM)

## 2023-06-18 NOTE — CARE UPDATE
06/18/23 0804   Patient Assessment/Suction   Level of Consciousness (AVPU) alert   Respiratory Effort Unlabored   Expansion/Accessory Muscles/Retractions no use of accessory muscles;no retractions   Rhythm/Pattern, Respiratory unlabored;tachypneic   PRE-TX-O2   Device (Oxygen Therapy) nasal cannula   $ Is the patient on Low Flow Oxygen? Yes   Flow (L/min) 1   SpO2 (!) 94 %   Pulse Oximetry Type Continuous   $ Pulse Oximetry - Multiple Charge Pulse Oximetry - Multiple   Pulse (!) 113   Resp (!) 26   ETCO2   $ ETCO2 Usage Currently wearing   ETCO2 (mmHg) 43 mmHg   ETCO2 Device Type Portable Bedside Monitor   Aerosol Therapy   $ Aerosol Therapy Charges PRN treatment not required   Respiratory Treatment Status (SVN) PRN treatment not required   Incentive Spirometer   $ Incentive Spirometer Charges done with encouragement   Incentive Spirometer Predicted Level (mL) 2000   Administration (IS) instruction provided, follow-up;mouthpiece utilized   Number of Repetitions (IS) 10   Level Incentive Spirometer (mL) 1000   Patient Tolerance (IS) fair

## 2023-06-18 NOTE — ASSESSMENT & PLAN NOTE
Acute problem. -NPO  -Await return of bowel function  -PPN with lipids until diet advanced  -IV fluids NS stopped 6/17 given worsening hyponatremia  -Dilaudid PCA and PRN  -Zofran PRN  -Surgery consulted; exploratory laparotomy 6/15 with small bowel removal with anastomosis and SCOUT  -Zosyn changed to vancomycin, cefepime and Flagyl 6/17  -underwent repeat surgery 6/17, abscess seen, no perforation

## 2023-06-18 NOTE — PROGRESS NOTES
INPATIENT NEPHROLOGY CONSULT   [facility] NEPHROLOGY    Ruslan Caldwell  06/18/2023    Reason for consultation:  Acute kidney injury    Chief Complaint: No chief complaint on file.    History of Present Illness:    Per H and P    Ruslan Caldwell is a 47-year-old male who presents in transfer from [facility] for evaluation of possible developing small bowel obstruction.  Patient presents outside facility complaining of abdominal pain with nausea and vomiting.  Workup at outside facility demonstrated leukocytosis of 16,000, anemia, and thrombocytosis of 843.  CMP with hypokalemia 3.3 and total bilirubin of 1.2.  CT scan at outside facility was concerning for developing small-bowel obstruction.  Patient was admitted to [facility] where he underwent right partial colectomy on 05/29 with pathology consistent with adenocarcinoma.  He was discharged and then came back for another admission on 06/08 where he was treated for an ileus.  Previous medical history includes type 2 diabetes, anemia, hypertension, and right eye blindness.  Patient admitted to Hospital Medicine for treatment management    6/16 Consulted for JOYCE.   Pt POD1 s/p exploratory laparotomy with small bowel resection and omental resection.   He has abdominal pain and bloating.  No chest pain, sob, neurologic symptoms, vomiting, or confusion.  He is a little somnolent on his analgesics  6/17 VSS. Worsening anemia, hypophosphatemia, hyponatremia. Fever yesterday evening with tachycardia, worsening WBC.  6/18 VSS, s/p re-operation on 6/17, evacuation for multiple abscess, no obvious perforation, NG placement with stomach content decompression.    Plan of Care:    JOYCE due to volume depletion, toradol, hemodynamically mediated renal injury, elevated CPK  Fever, elevated WBC, sepsis ue to post-op abscess, evacuated on 6/17  --avoid NSAIDS, Gabriel II inhibitors, and other non-essential nephrotoxic agents  --keep map above 55  --monitor output  --ua with  hematuria, CPK elevated  --iv fluids  --diagnose and treat infection    Hyponatremia, worse  Hypophosphatemia  --avid hypotonic iv piggybacks  --urine osm is high, urine na is high  --likely high adh state due to pain  --replete Phos  --control pain and nausea -> SIADH    Anemia, worse  --getting iv iron  --hgb stable over several days, dropped today  --hematology/oncology is on the case     Hypertension, now relative hypotension  --allow 150/90 for now  --keep map above 55  --hold any BP meds, give IVF, give abx for infection    Thank you for allowing us to participate in this patient's care. We will continue to follow.    Vital Signs:  Temp Readings from Last 3 Encounters:   06/18/23 97.6 °F (36.4 °C) (Oral)   06/11/23 98.4 °F (36.9 °C) (Oral)   06/10/23 97.8 °F (36.6 °C)       Pulse Readings from Last 3 Encounters:   06/18/23 106   06/11/23 (!) 112   06/10/23 82       BP Readings from Last 3 Encounters:   06/18/23 125/71   06/11/23 124/78   06/10/23 (!) 135/91       Weight:  Wt Readings from Last 3 Encounters:   06/15/23 101.6 kg (224 lb)   06/11/23 102.1 kg (225 lb)   06/09/23 102 kg (224 lb 13.9 oz)       Past Medical & Surgical History:  Past Medical History:   Diagnosis Date    Diabetes mellitus, type 2 05/2020    Patient denies    Hypertension        Past Surgical History:   Procedure Laterality Date    COLONOSCOPY N/A 5/29/2023    Procedure: COLONOSCOPY;  Surgeon: Sam Solano MD;  Location: [facility];  Service: Endoscopy;  Laterality: N/A;    EXCISION, SMALL INTESTINE N/A 6/15/2023    Procedure: EXCISION, SMALL INTESTINE;  Surgeon: Edin Michael MD;  Location: [facility] OR;  Service: General;  Laterality: N/A;    EYE SURGERY Right     LAPAROTOMY, EXPLORATORY N/A 6/15/2023    Procedure: LAPAROTOMY, EXPLORATORY;  Surgeon: Edin Michael MD;  Location: [facility] OR;  Service: General;  Laterality: N/A;    LUMBAR DISC SURGERY  2005    LYSIS OF ADHESIONS N/A 6/15/2023    Procedure: LYSIS, ADHESIONS;   Surgeon: Edin Michael MD;  Location: [facility] [place] OR;  Service: General;  Laterality: N/A;    OMENTECTOMY N/A 6/15/2023    Procedure: OMENTECTOMY;  Surgeon: Edin Michael MD;  Location: [facility] [place] OR;  Service: General;  Laterality: N/A;    SUBTOTAL COLECTOMY Right 5/29/2023    Procedure: COLECTOMY, PARTIAL;  Surgeon: Edin Michael MD;  Location: [facility] [place] OR;  Service: General;  Laterality: Right;       Past Social History:  Social History     Socioeconomic History    Marital status:     Number of children: 3    Highest education level: Associate degree: academic program   Occupational History    Occupation: [employer]   Tobacco Use    Smoking status: Every Day     Packs/day: 0.50     Years: 23.00     Pack years: 11.50     Types: Cigarettes    Smokeless tobacco: Never   Substance and Sexual Activity    Alcohol use: Not Currently     Comment: Quit drinking 2 yrs ago    Drug use: Yes     Types: Marijuana     Comment: heavy marijuana use    Sexual activity: Yes     Social Determinants of Health     Financial Resource Strain: Low Risk     Difficulty of Paying Living Expenses: Not hard at all   Food Insecurity: No Food Insecurity    Worried About Running Out of Food in the Last Year: Never true    Ran Out of Food in the Last Year: Never true   Transportation Needs: No Transportation Needs    Lack of Transportation (Medical): No    Lack of Transportation (Non-Medical): No   Physical Activity: Sufficiently Active    Days of Exercise per Week: 5 days    Minutes of Exercise per Session: 90 min   Stress: No Stress Concern Present    Feeling of Stress : Not at all   Social Connections: Socially Isolated    Frequency of Communication with Friends and Family: Never    Frequency of Social Gatherings with Friends and Family: More than three times a week    Attends Mandaen Services: Never    Active Member of Clubs or Organizations: No    Attends Club or Organization Meetings: Never    Marital Status:   "  Housing Stability: Unknown    Unable to Pay for Housing in the Last Year: No    Unstable Housing in the Last Year: No       Medications:  No current facility-administered medications on file prior to encounter.     Current Outpatient Medications on File Prior to Encounter   Medication Sig Dispense Refill    gabapentin (NEURONTIN) 300 MG capsule Take 1 capsule (300 mg total) by mouth 2 (two) times daily. for 14 days 28 capsule 0     Scheduled Meds:   ceFEPime (MAXIPIME) IVPB  2 g Intravenous Q8H    heparin (porcine)  5,000 Units Subcutaneous Q8H    metronidazole  500 mg Intravenous Q8H    mupirocin   Nasal BID    nicotine  1 patch Transdermal Daily    potassium phosphate IVPB  15 mmol Intravenous Once    vancomycin (VANCOCIN) IVPB  1,500 mg Intravenous Q12H     Continuous Infusions:   amino acid 4.25% - dextrose 5% solution with additives 90 mL/hr at 06/17/23 1904    hydromorphone in 0.9 % NaCl 6 mg/30 ml       PRN Meds:.sodium chloride, diazePAM, HYDROmorphone, naloxone, ondansetron, prochlorperazine, promethazine (PHENERGAN) IVPB, simethicone, sodium chloride 0.9%, Pharmacy to dose Vancomycin consult **AND** vancomycin - pharmacy to dose    Allergies:  Patient has no known allergies.    Past Family History:  Reviewed; refer to Hospitalist Admission Note    Review of Systems:  Review of Systems - All 14 systems reviewed and negative, except as noted in HPI    Physical Exam:    /71 (BP Location: Right arm, Patient Position: Lying)   Pulse 106   Temp 97.6 °F (36.4 °C) (Oral)   Resp 20   Ht 6' 3" (1.905 m)   Wt 101.6 kg (224 lb)   SpO2 97%   BMI 28.00 kg/m²     General Appearance:    Alert, cooperative, no distress, appears stated age   Head:    Normocephalic, without obvious abnormality, atraumatic   Eyes:    PER, conjunctiva/corneas clear, EOM's intact in both eyes        Throat:   Lips, mucosa, and tongue normal; teeth and gums normal   Back:     Symmetric, no curvature, ROM normal, no CVA tenderness "   Lungs:     Clear to auscultation bilaterally, respirations unlabored   Chest wall:    No tenderness or deformity   Heart:    Regular rate and rhythm, S1 and S2 normal, no murmur, rub   or gallop   Abdomen:     Soft, non-tender, bowel sounds active all four quadrants,     no masses, no organomegaly   Extremities:   Extremities normal, atraumatic, no cyanosis or edema   Pulses:   2+ and symmetric all extremities   MSK:   No joint or muscle swelling, tenderness or deformity   Skin:   Skin color, texture, turgor normal, no rashes or lesions   Neurologic:   CNII-XII intact, normal strength and sensation       Throughout.  No flap     Results:  Recent Labs   Lab 06/17/23  0504 06/17/23  1151 06/18/23  0451   *  127* 129* 130*   K 3.9  3.9 3.8 3.9   CL 88*  88* 87* 90*   CO2 29  29 30* 30*   BUN 14  14 14 12   CREATININE 1.0  1.0 0.9 0.8   *  145* 136* 137*       Recent Labs   Lab 06/16/23  0335 06/17/23  0504 06/17/23  1151 06/18/23  0451   CALCIUM 8.7 8.8  8.8 9.2 8.4*   ALBUMIN 3.2* 2.6*  2.6*  --  2.2*   PHOS 3.4 1.9*  1.9*  --  1.5*   MG 1.8 1.7  --  1.8             Recent Labs   Lab 06/12/23  0038   POCTGLUCOSE 110       Recent Labs   Lab 09/14/21  0408 06/08/23  0607   Hemoglobin A1C 4.8 5.0       Recent Labs   Lab 06/16/23  0335 06/17/23  0504 06/17/23  1151 06/18/23  0450   WBC 21.84* 34.17*  34.17*  --  27.14*   HGB 9.7* 7.9*  7.9* 7.7* 8.7*   HCT 32.1* 24.8*  24.8* 24.5* 27.1*   PLT 1,040* 817*  817*  --  837*   MCV 67* 66*  66*  --  67*   MCHC 30.2* 31.9*  31.9*  --  32.1   MONO 8.9  1.9* 7.0  7.0  CANCELED  CANCELED  --  6.0  CANCELED       Recent Labs   Lab 06/16/23  0335 06/17/23  0504 06/18/23  0451   BILITOT 1.4* 1.3* 0.8   PROT 7.3 6.7 6.1   ALBUMIN 3.2* 2.6*  2.6* 2.2*   ALKPHOS 50* 40* 43*   ALT 18 15 15   AST 22 17 15       Recent Labs   Lab 05/28/23  0038 06/11/23  1614 06/16/23  1805   Color, UA Yellow Yellow Yellow   Appearance, UA Clear Clear Clear   pH, UA 7.0  6.0 7.0   Specific Gravity, UA 1.020 1.020 1.010   Protein, UA 1+ A 2+ A 2+ A   Glucose, UA Negative Negative Negative   Ketones, UA Negative 2+ A Negative   Urobilinogen, UA Negative Negative Negative   Bilirubin (UA) Negative 2+ A Negative   Occult Blood UA Negative Negative 1+ A   Nitrite, UA Negative Negative Negative   RBC, UA 0 2 14 H   WBC, UA 1 10 H 8 H   Bacteria None Occasional Occasional   Hyaline Casts, UA 0 2 A 0             Microbiology Results (last 7 days)       Procedure Component Value Units Date/Time    Gram stain [[id number]] Collected: 06/17/23 1800    Order Status: Completed Specimen: Abdominal from Abdomen Updated: 06/18/23 0010     Gram Stain Result Few WBC's      No organisms seen    Aerobic culture [[id number]] Collected: 06/17/23 1800    Order Status: Sent Specimen: Incision site from Abdomen Updated: 06/17/23 2229    AFB Culture & Smear [[id number]] Collected: 06/17/23 1800    Order Status: Sent Specimen: Abdominal from Abdomen Updated: 06/17/23 2229    Fungus culture [[id number]] Collected: 06/17/23 1800    Order Status: Sent Specimen: Abdominal from Abdomen Updated: 06/17/23 2229    Culture, Anaerobe [[id number]] Collected: 06/17/23 1800    Order Status: Sent Specimen: Abdominal from Abdomen Updated: 06/17/23 2229    Blood culture [[id number]] Collected: 06/17/23 1151    Order Status: Completed Specimen: Blood Updated: 06/17/23 2115     Blood Culture, Routine No Growth to date    Blood culture [[id number]] Collected: 06/17/23 1151    Order Status: Completed Specimen: Blood Updated: 06/17/23 2115     Blood Culture, Routine No Growth to date    Urine Culture High Risk [[id number]]     Order Status: Sent Specimen: Urine             Obtaining a history  Examination of patient.  Providing medical care at the patients bedside.  Developing a treatment plan with patient or surrogate and bedside caregivers  Ordering and reviewing laboratory studies, radiographic studies, pulse oximetry.  Ordering and  performing treatments and interventions.  Evaluation of patient's response to treatment.  Discussions with consultants while on the unit and immediately available to the patient.  Re-evaluation of the patient's condition.  Documentation in the medical record.     Sergio Bautista MD  Nephrology  [facility]  [phone]

## 2023-06-18 NOTE — PROGRESS NOTES
Pharmacokinetic Assessment Follow Up: IV Vancomycin    Vancomycin serum concentration assessment(s):    The trough level was drawn correctly and can be used to guide therapy at this time. The measurement is below the desired definitive target range of 15 to 20 mcg/mL.    Vancomycin Regimen Plan:    Change regimen to Vancomycin 1250 mg IV every 8 hours with next serum trough concentration measured at 0600 prior to 3rd dose on 6/19    Drug levels (last 3 results):  Recent Labs   Lab Result Units 06/18/23  1340   Vancomycin-Trough ug/mL 5.1*       Pharmacy will continue to follow and monitor vancomycin.    Please contact pharmacy at extension [phone] for questions regarding this assessment.    Thank you for the consult,   Soha Taylor       Patient brief summary:  Ruslan Caldwell is a 47 y.o. male initiated on antimicrobial therapy with IV Vancomycin for treatment of sepsis    The patient's current regimen is 1500 mg every 12 hours    Drug Allergies:   Review of patient's allergies indicates:  No Known Allergies    Actual Body Weight:   101.6    Renal Function:   Estimated Creatinine Clearance: 150.6 mL/min (based on SCr of 0.8 mg/dL).,     Dialysis Method (if applicable):  N/A    CBC (last 72 hours):  Recent Labs   Lab Result Units 06/16/23  0335 06/17/23  0504 06/17/23  1151 06/18/23  0450   WBC K/uL 21.84* 34.17*  34.17*  --  27.14*   Hemoglobin g/dL 9.7* 7.9*  7.9* 7.7* 8.7*   Hematocrit % 32.1* 24.8*  24.8* 24.5* 27.1*   Platelets K/uL 1,040* 817*  817*  --  837*   Gran % % 82.3* 89.0*  89.0*  --  87.0*   Lymph % % 8.2* 4.0*  4.0*  --  7.0*   Mono % % 8.9 7.0  7.0  --  6.0   Eosinophil % % 0.0 0.0  0.0  --  0.0   Basophil % % 0.2 0.0  0.0  --  0.0   Differential Method  Automated Manual  Manual  --  Manual       Metabolic Panel (last 72 hours):  Recent Labs   Lab Result Units 06/15/23  1947 06/16/23  0335 06/16/23  1805 06/17/23  0504 06/17/23  1151 06/18/23  0451   Sodium mmol/L 136 133*  --  127*   127* 129* 130*   Sodium, Urine mmol/L  --   --  81  --   --   --    Potassium mmol/L 4.0 4.2  --  3.9  3.9 3.8 3.9   Chloride mmol/L 87* 87*  --  88*  88* 87* 90*   CO2 mmol/L 32* 28  --  29  29 30* 30*   Glucose mg/dL 126* 131*  --  145*  145* 136* 137*   Glucose, UA   --   --  Negative  --   --   --    BUN mg/dL 24* 22*  --  14  14 14 12   Creatinine mg/dL 1.7* 1.6*  --  1.0  1.0 0.9 0.8   Creatinine, Urine mg/dL  --   --  147.1  --   --   --    Albumin g/dL  --  3.2*  --  2.6*  2.6*  --  2.2*   Total Bilirubin mg/dL  --  1.4*  --  1.3*  --  0.8   Alkaline Phosphatase U/L  --  50*  --  40*  --  43*   AST U/L  --  22  --  17  --  15   ALT U/L  --  18  --  15  --  15   Magnesium mg/dL  --  1.8  --  1.7  --  1.8   Phosphorus mg/dL  --  3.4  --  1.9*  1.9*  --  1.5*       Vancomycin Administrations:  vancomycin given in the last 96 hours                     vancomycin 1,500 mg in dextrose 5 % (D5W) 250 mL IVPB (Vial-Mate) (mg) 1,500 mg New Bag 06/18/23 1504     1,500 mg New Bag  0234     1,500 mg New Bag 06/17/23 1440                    Microbiologic Results:  Microbiology Results (last 7 days)       Procedure Component Value Units Date/Time    Gram stain [[id number]] Collected: 06/17/23 1800    Order Status: Completed Specimen: Abdominal from Abdomen Updated: 06/18/23 0010     Gram Stain Result Few WBC's      No organisms seen    Aerobic culture [[id number]] Collected: 06/17/23 1800    Order Status: Sent Specimen: Incision site from Abdomen Updated: 06/17/23 2229    AFB Culture & Smear [[id number]] Collected: 06/17/23 1800    Order Status: Sent Specimen: Abdominal from Abdomen Updated: 06/17/23 2229    Fungus culture [[id number]] Collected: 06/17/23 1800    Order Status: Sent Specimen: Abdominal from Abdomen Updated: 06/17/23 2229    Culture, Anaerobe [[id number]] Collected: 06/17/23 1800    Order Status: Sent Specimen: Abdominal from Abdomen Updated: 06/17/23 2229    Blood culture [[id number]] Collected:  06/17/23 1151    Order Status: Completed Specimen: Blood Updated: 06/17/23 2115     Blood Culture, Routine No Growth to date    Blood culture [[id number]] Collected: 06/17/23 1151    Order Status: Completed Specimen: Blood Updated: 06/17/23 2115     Blood Culture, Routine No Growth to date    Urine Culture High Risk [[id number]]     Order Status: Sent Specimen: Urine

## 2023-06-18 NOTE — SUBJECTIVE & OBJECTIVE
Interval History:  Went to the OR last night was noted to have abscess, no obvious perforation.  Denies nausea this morning.  Notes pain.  NG tube in place.    Review of Systems   Constitutional:  Negative for fever.   Gastrointestinal:  Positive for abdominal pain. Negative for nausea and vomiting.   Skin:  Positive for wound.   Allergic/Immunologic: Positive for immunocompromised state.   Objective:     Vital Signs (Most Recent):  Temp: 98.6 °F (37 °C) (06/18/23 1132)  Pulse: (!) 113 (06/18/23 1132)  Resp: 16 (06/18/23 1132)  BP: (!) 147/86 (06/18/23 1132)  SpO2: 97 % (06/18/23 1132) Vital Signs (24h Range):  Temp:  [97 °F (36.1 °C)-99 °F (37.2 °C)] 98.6 °F (37 °C)  Pulse:  [] 113  Resp:  [10-26] 16  SpO2:  [93 %-100 %] 97 %  BP: (125-170)/(71-87) 147/86     Weight: 101.6 kg (224 lb)  Body mass index is 28 kg/m².    Intake/Output Summary (Last 24 hours) at 6/18/2023 1212  Last data filed at 6/18/2023 1006  Gross per 24 hour   Intake 2949 ml   Output 6225 ml   Net -3276 ml           Physical Exam  Vitals and nursing note reviewed.   Constitutional:       General: He is not in acute distress.     Appearance: Normal appearance. He is ill-appearing.   HENT:      Head: Normocephalic and atraumatic.      Right Ear: External ear normal.      Left Ear: External ear normal.      Nose: Nose normal.      Mouth/Throat:      Mouth: Mucous membranes are moist.      Pharynx: Oropharynx is clear.   Eyes:      Extraocular Movements: Extraocular movements intact.   Cardiovascular:      Rate and Rhythm: Regular rhythm. Tachycardia present.      Pulses: Normal pulses.      Heart sounds: Normal heart sounds.   Pulmonary:      Effort: Pulmonary effort is normal. No respiratory distress.      Breath sounds: Normal breath sounds.   Abdominal:      Tenderness: There is abdominal tenderness.      Comments: Wound dressing clean, dry and intact.   Musculoskeletal:         General: Normal range of motion.      Cervical back: Normal  range of motion and neck supple.      Right lower leg: No edema.      Left lower leg: No edema.   Skin:     General: Skin is warm and dry.   Neurological:      Mental Status: He is alert. Mental status is at baseline.   Psychiatric:         Mood and Affect: Mood normal.         Behavior: Behavior normal.           Significant Labs: All pertinent labs within the past 24 hours have been reviewed.    Significant Imaging: I have reviewed all pertinent imaging results/findings within the past 24 hours.

## 2023-06-18 NOTE — PROGRESS NOTES
Ruslan Caldwell [id number] is a 47 y.o. male who has been consulted for vancomycin dosing.    Vancomycin trough has been changed to 6/18 at 1330.      Patient will be followed by pharmacy for changes in renal function, toxicity, and efficacy.    Thank you for allowing us to participate in this patient's care.     Mario UribeD

## 2023-06-18 NOTE — PLAN OF CARE
Problem: Adult Inpatient Plan of Care  Goal: Plan of Care Review  Outcome: Ongoing, Progressing  Goal: Patient-Specific Goal (Individualized)  Outcome: Ongoing, Progressing  Goal: Absence of Hospital-Acquired Illness or Injury  Outcome: Ongoing, Progressing  Goal: Optimal Comfort and Wellbeing  Outcome: Ongoing, Progressing  Goal: Readiness for Transition of Care  Outcome: Ongoing, Progressing     Problem: Diabetes Comorbidity  Goal: Blood Glucose Level Within Targeted Range  Outcome: Ongoing, Progressing     Problem: Infection  Goal: Absence of Infection Signs and Symptoms  Outcome: Ongoing, Progressing     Problem: Fall Injury Risk  Goal: Absence of Fall and Fall-Related Injury  Outcome: Ongoing, Progressing     Problem: Malnutrition  Goal: Improved Nutritional Intake  Outcome: Ongoing, Progressing     Problem: Fluid and Electrolyte Imbalance (Acute Kidney Injury/Impairment)  Goal: Fluid and Electrolyte Balance  Outcome: Ongoing, Progressing     Problem: Oral Intake Inadequate (Acute Kidney Injury/Impairment)  Goal: Optimal Nutrition Intake  Outcome: Ongoing, Progressing     Problem: Renal Function Impairment (Acute Kidney Injury/Impairment)  Goal: Effective Renal Function  Outcome: Ongoing, Progressing     Problem: Adjustment to Illness (Sepsis/Septic Shock)  Goal: Optimal Coping  Outcome: Ongoing, Progressing     Problem: Bleeding (Sepsis/Septic Shock)  Goal: Absence of Bleeding  Outcome: Ongoing, Progressing     Problem: Glycemic Control Impaired (Sepsis/Septic Shock)  Goal: Blood Glucose Level Within Desired Range  Outcome: Ongoing, Progressing     Problem: Infection Progression (Sepsis/Septic Shock)  Goal: Absence of Infection Signs and Symptoms  Outcome: Ongoing, Progressing     Problem: Nutrition Impaired (Sepsis/Septic Shock)  Goal: Optimal Nutrition Intake  Outcome: Ongoing, Progressing

## 2023-06-19 LAB
ALBUMIN SERPL BCP-MCNC: 2.1 G/DL (ref 3.5–5.2)
ALP SERPL-CCNC: 42 U/L (ref 55–135)
ALT SERPL W/O P-5'-P-CCNC: 12 U/L (ref 10–44)
ANION GAP SERPL CALC-SCNC: 10 MMOL/L (ref 8–16)
AST SERPL-CCNC: 14 U/L (ref 10–40)
BASOPHILS # BLD AUTO: 0.08 K/UL (ref 0–0.2)
BASOPHILS NFR BLD: 0.4 % (ref 0–1.9)
BILIRUB SERPL-MCNC: 0.8 MG/DL (ref 0.1–1)
BLD PROD TYP BPU: NORMAL
BLOOD UNIT EXPIRATION DATE: NORMAL
BLOOD UNIT TYPE CODE: 7300
BLOOD UNIT TYPE: NORMAL
BUN SERPL-MCNC: 11 MG/DL (ref 6–20)
CALCIUM SERPL-MCNC: 8.9 MG/DL (ref 8.7–10.5)
CHLORIDE SERPL-SCNC: 88 MMOL/L (ref 95–110)
CO2 SERPL-SCNC: 33 MMOL/L (ref 23–29)
CODING SYSTEM: NORMAL
CREAT SERPL-MCNC: 0.7 MG/DL (ref 0.5–1.4)
CROSSMATCH INTERPRETATION: NORMAL
DIFFERENTIAL METHOD: ABNORMAL
DISPENSE STATUS: NORMAL
EOSINOPHIL # BLD AUTO: 0.2 K/UL (ref 0–0.5)
EOSINOPHIL NFR BLD: 1.1 % (ref 0–8)
ERYTHROCYTE [DISTWIDTH] IN BLOOD BY AUTOMATED COUNT: 20.9 % (ref 11.5–14.5)
EST. GFR  (NO RACE VARIABLE): >60 ML/MIN/1.73 M^2
GLUCOSE SERPL-MCNC: 118 MG/DL (ref 70–110)
HCT VFR BLD AUTO: 24.9 % (ref 40–54)
HGB BLD-MCNC: 8 G/DL (ref 14–18)
IMM GRANULOCYTES # BLD AUTO: 0.13 K/UL (ref 0–0.04)
IMM GRANULOCYTES NFR BLD AUTO: 0.6 % (ref 0–0.5)
LYMPHOCYTES # BLD AUTO: 1.8 K/UL (ref 1–4.8)
LYMPHOCYTES NFR BLD: 8.1 % (ref 18–48)
MAGNESIUM SERPL-MCNC: 1.6 MG/DL (ref 1.6–2.6)
MCH RBC QN AUTO: 21.6 PG (ref 27–31)
MCHC RBC AUTO-ENTMCNC: 32.1 G/DL (ref 32–36)
MCV RBC AUTO: 67 FL (ref 82–98)
MONOCYTES # BLD AUTO: 2 K/UL (ref 0.3–1)
MONOCYTES NFR BLD: 9.3 % (ref 4–15)
NEUTROPHILS # BLD AUTO: 17.6 K/UL (ref 1.8–7.7)
NEUTROPHILS NFR BLD: 80.5 % (ref 38–73)
NRBC BLD-RTO: 0 /100 WBC
NUM UNITS TRANS PACKED RBC: NORMAL
PHOSPHATE SERPL-MCNC: 2.6 MG/DL (ref 2.7–4.5)
PLATELET # BLD AUTO: 887 K/UL (ref 150–450)
PMV BLD AUTO: 10.4 FL (ref 9.2–12.9)
POTASSIUM SERPL-SCNC: 3.6 MMOL/L (ref 3.5–5.1)
PROT SERPL-MCNC: 6.3 G/DL (ref 6–8.4)
RBC # BLD AUTO: 3.71 M/UL (ref 4.6–6.2)
SODIUM SERPL-SCNC: 131 MMOL/L (ref 136–145)
VANCOMYCIN TROUGH SERPL-MCNC: 10.1 UG/ML (ref 10–22)
VANCOMYCIN TROUGH SERPL-MCNC: 12 UG/ML (ref 10–22)
WBC # BLD AUTO: 21.83 K/UL (ref 3.9–12.7)

## 2023-06-19 PROCEDURE — 27000221 HC OXYGEN, UP TO 24 HOURS

## 2023-06-19 PROCEDURE — 83735 ASSAY OF MAGNESIUM: CPT | Performed by: SURGERY

## 2023-06-19 PROCEDURE — 94799 UNLISTED PULMONARY SVC/PX: CPT

## 2023-06-19 PROCEDURE — 63600175 PHARM REV CODE 636 W HCPCS: Performed by: HOSPITALIST

## 2023-06-19 PROCEDURE — 99900035 HC TECH TIME PER 15 MIN (STAT)

## 2023-06-19 PROCEDURE — 25000003 PHARM REV CODE 250: Performed by: HOSPITALIST

## 2023-06-19 PROCEDURE — 63600175 PHARM REV CODE 636 W HCPCS: Performed by: SURGERY

## 2023-06-19 PROCEDURE — 80053 COMPREHEN METABOLIC PANEL: CPT | Performed by: SURGERY

## 2023-06-19 PROCEDURE — 12000002 HC ACUTE/MED SURGE SEMI-PRIVATE ROOM

## 2023-06-19 PROCEDURE — 25000003 PHARM REV CODE 250: Performed by: SURGERY

## 2023-06-19 PROCEDURE — 85025 COMPLETE CBC W/AUTO DIFF WBC: CPT | Performed by: SURGERY

## 2023-06-19 PROCEDURE — B4185 PARENTERAL SOL 10 GM LIPIDS: HCPCS | Performed by: HOSPITALIST

## 2023-06-19 PROCEDURE — S4991 NICOTINE PATCH NONLEGEND: HCPCS | Performed by: SURGERY

## 2023-06-19 PROCEDURE — 94761 N-INVAS EAR/PLS OXIMETRY MLT: CPT

## 2023-06-19 PROCEDURE — 84100 ASSAY OF PHOSPHORUS: CPT | Performed by: SURGERY

## 2023-06-19 PROCEDURE — 80202 ASSAY OF VANCOMYCIN: CPT | Mod: 91 | Performed by: HOSPITALIST

## 2023-06-19 PROCEDURE — S0030 INJECTION, METRONIDAZOLE: HCPCS | Performed by: SURGERY

## 2023-06-19 PROCEDURE — 36415 COLL VENOUS BLD VENIPUNCTURE: CPT | Performed by: HOSPITALIST

## 2023-06-19 RX ORDER — MAGNESIUM SULFATE HEPTAHYDRATE 40 MG/ML
2 INJECTION, SOLUTION INTRAVENOUS ONCE
Status: COMPLETED | OUTPATIENT
Start: 2023-06-19 | End: 2023-06-19

## 2023-06-19 RX ADMIN — CEFEPIME 2 G: 2 INJECTION, POWDER, FOR SOLUTION INTRAVENOUS at 12:06

## 2023-06-19 RX ADMIN — METRONIDAZOLE 500 MG: 500 INJECTION, SOLUTION INTRAVENOUS at 05:06

## 2023-06-19 RX ADMIN — I.V. FAT EMULSION 250 ML: 20 EMULSION INTRAVENOUS at 09:06

## 2023-06-19 RX ADMIN — Medication: at 07:06

## 2023-06-19 RX ADMIN — Medication 1 PATCH: at 08:06

## 2023-06-19 RX ADMIN — Medication: at 05:06

## 2023-06-19 RX ADMIN — CEFEPIME 2 G: 2 INJECTION, POWDER, FOR SOLUTION INTRAVENOUS at 04:06

## 2023-06-19 RX ADMIN — CEFEPIME 2 G: 2 INJECTION, POWDER, FOR SOLUTION INTRAVENOUS at 07:06

## 2023-06-19 RX ADMIN — HEPARIN SODIUM 5000 UNITS: 5000 INJECTION INTRAVENOUS; SUBCUTANEOUS at 02:06

## 2023-06-19 RX ADMIN — MAGNESIUM SULFATE 2 G: 2 INJECTION INTRAVENOUS at 08:06

## 2023-06-19 RX ADMIN — VANCOMYCIN HYDROCHLORIDE 1000 MG: 1 INJECTION, POWDER, LYOPHILIZED, FOR SOLUTION INTRAVENOUS at 04:06

## 2023-06-19 RX ADMIN — HEPARIN SODIUM 5000 UNITS: 5000 INJECTION INTRAVENOUS; SUBCUTANEOUS at 09:06

## 2023-06-19 RX ADMIN — MUPIROCIN: 20 OINTMENT TOPICAL at 08:06

## 2023-06-19 RX ADMIN — VANCOMYCIN HYDROCHLORIDE 1000 MG: 1 INJECTION, POWDER, LYOPHILIZED, FOR SOLUTION INTRAVENOUS at 08:06

## 2023-06-19 RX ADMIN — Medication: at 11:06

## 2023-06-19 RX ADMIN — METRONIDAZOLE 500 MG: 500 INJECTION, SOLUTION INTRAVENOUS at 01:06

## 2023-06-19 RX ADMIN — MUPIROCIN: 20 OINTMENT TOPICAL at 10:06

## 2023-06-19 RX ADMIN — HEPARIN SODIUM 5000 UNITS: 5000 INJECTION INTRAVENOUS; SUBCUTANEOUS at 05:06

## 2023-06-19 RX ADMIN — METRONIDAZOLE 500 MG: 500 INJECTION, SOLUTION INTRAVENOUS at 08:06

## 2023-06-19 RX ADMIN — SODIUM PHOSPHATE, MONOBASIC, MONOHYDRATE AND SODIUM PHOSPHATE, DIBASIC, ANHYDROUS 15 MMOL: 276; 142 INJECTION, SOLUTION INTRAVENOUS at 10:06

## 2023-06-19 RX ADMIN — ASCORBIC ACID, VITAMIN A PALMITATE, CHOLECALCIFEROL, THIAMINE HYDROCHLORIDE, RIBOFLAVIN-5 PHOSPHATE SODIUM, PYRIDOXINE HYDROCHLORIDE, NIACINAMIDE, DEXPANTHENOL, ALPHA-TOCOPHEROL ACETATE, VITAMIN K1, FOLIC ACID, BIOTIN, CYANOCOBALAMIN: 200; 3300; 200; 6; 3.6; 6; 40; 15; 10; 150; 600; 60; 5 INJECTION, SOLUTION INTRAVENOUS at 12:06

## 2023-06-19 NOTE — PLAN OF CARE
Recommendations  1) Advance diet slowly when medically able  -full liquids + Boost plus TID when medically able  -when appropriate for solids start with low fiber ( unless not needed per GI/surgery)  2) Weigh weekly   3) Nutrition education and handout given  4) Continue PPN D 5 AA 4.25 @ 90 ml/hr + standard lipids   ( 91 g protein, 1234 kcal)  -If PICC placed start TPN D15 AA5 @ 90 ml/hr + standard lipids  ( provides 108 g protein ( 100% EPN, 2033 kcal ( 85% EEN)    Goals: 1) PO Intakes > 50% of meals on full liquid diet at f/u 2) PPN at 90 ml/hr and diet advanced to full liquids in < 48 hr or plan for PICC placement  Nutrition Goal Status: not meeting/new  Communication of RD Recs:  (POC, sticky note, reviewed with MD/RN)

## 2023-06-19 NOTE — SUBJECTIVE & OBJECTIVE
Interval History:  Patient seen and examined.  Remains on PCA for pain control.  NG tube remains in place.  He denies passing flatus.  No nausea or vomiting.  Will start PPN today.  Replacing magnesium and phosphorus.  White blood cell count is trending down.    Review of Systems   Constitutional:  Negative for fever.   Gastrointestinal:  Positive for abdominal pain. Negative for nausea and vomiting.   Skin:  Positive for wound.   Allergic/Immunologic: Positive for immunocompromised state.   Objective:     Vital Signs (Most Recent):  Temp: 96.2 °F (35.7 °C) (06/19/23 0723)  Pulse: 104 (06/19/23 0729)  Resp: 16 (06/19/23 0729)  BP: 127/70 (06/19/23 0723)  SpO2: 100 % (06/19/23 0729) Vital Signs (24h Range):  Temp:  [96.2 °F (35.7 °C)-99.2 °F (37.3 °C)] 96.2 °F (35.7 °C)  Pulse:  [100-113] 104  Resp:  [16-23] 16  SpO2:  [94 %-100 %] 100 %  BP: (127-174)/(70-86) 127/70     Weight: 101.6 kg (224 lb)  Body mass index is 28 kg/m².    Intake/Output Summary (Last 24 hours) at 6/19/2023 1026  Last data filed at 6/19/2023 0613  Gross per 24 hour   Intake 0 ml   Output 4480 ml   Net -4480 ml           Physical Exam  Vitals and nursing note reviewed.   Constitutional:       General: He is not in acute distress.     Appearance: Normal appearance. He is ill-appearing.   HENT:      Head: Normocephalic and atraumatic.      Right Ear: External ear normal.      Left Ear: External ear normal.      Nose: Nose normal.      Comments: NG tube     Mouth/Throat:      Mouth: Mucous membranes are moist.      Pharynx: Oropharynx is clear.   Eyes:      Extraocular Movements: Extraocular movements intact.   Cardiovascular:      Rate and Rhythm: Regular rhythm. Tachycardia present.      Pulses: Normal pulses.      Heart sounds: Normal heart sounds.   Pulmonary:      Effort: Pulmonary effort is normal. No respiratory distress.      Breath sounds: Normal breath sounds.   Abdominal:      Tenderness: There is abdominal tenderness.      Comments:  Wound dressing clean, dry and intact.   Musculoskeletal:         General: Normal range of motion.      Cervical back: Normal range of motion and neck supple.      Right lower leg: No edema.      Left lower leg: No edema.   Skin:     General: Skin is warm and dry.   Neurological:      Mental Status: He is alert. Mental status is at baseline.   Psychiatric:         Mood and Affect: Mood normal.         Behavior: Behavior normal.           Significant Labs: All pertinent labs within the past 24 hours have been reviewed.    Significant Imaging: I have reviewed all pertinent imaging results/findings within the past 24 hours.

## 2023-06-19 NOTE — PROGRESS NOTES
[facility] Surgery  Progress Note    Subjective:     History of Present Illness:  47-year-old male well known to me.  Presents with abdominal pain nausea and vomiting.  CT was suspicious for obstruction.  Currently he says back pain was much worse than his abdominal pain.  He says he has been having trouble controlling his pain at home.  He denies any recent flatus.  He does not have an NG tube and he is not vomiting currently.  He does not feel nauseated currently.      Post-Op Info:  Procedure(s) (LRB):  LAPAROTOMY, EXPLORATORY (N/A)   2 Days Post-Op     Interval History: no flatus yet  Pain controlled on pca    Medications:  Continuous Infusions:   Amino acid 4.25% - dextrose 5% (CLINIMIX-E) solution with additives (1L provides 42.5 gm AA, 50 gm CHO (170 kcal/L dextrose), Na 35, K 30, Mg 5, Ca 4.5, Acetate 70, Cl 39, Phos 15)      hydromorphone in 0.9 % NaCl 6 mg/30 ml       Scheduled Meds:   ceFEPime (MAXIPIME) IVPB  2 g Intravenous Q8H    fat emulsion 20%  250 mL Intravenous Daily    heparin (porcine)  5,000 Units Subcutaneous Q8H    metronidazole  500 mg Intravenous Q8H    mupirocin   Nasal BID    nicotine  1 patch Transdermal Daily    potassium phosphate IVPB  15 mmol Intravenous Once    vancomycin (VANCOCIN) IVPB  1,000 mg Intravenous Q8H     PRN Meds:sodium chloride, diazePAM, HYDROmorphone, naloxone, ondansetron, prochlorperazine, promethazine (PHENERGAN) IVPB, simethicone, sodium chloride 0.9%, sodium phosphate IVPB, sodium phosphate IVPB, sodium phosphate IVPB, Pharmacy to dose Vancomycin consult **AND** vancomycin - pharmacy to dose     Review of patient's allergies indicates:  No Known Allergies  Objective:     Vital Signs (Most Recent):  Temp: 97.6 °F (36.4 °C) (06/19/23 1036)  Pulse: 102 (06/19/23 1036)  Resp: 18 (06/19/23 1036)  BP: 120/74 (06/19/23 1036)  SpO2: 97 % (06/19/23 1036) Vital Signs (24h Range):  Temp:  [96.2 °F (35.7 °C)-99.2 °F (37.3 °C)] 97.6 °F  (36.4 °C)  Pulse:  [100-110] 102  Resp:  [16-23] 18  SpO2:  [94 %-100 %] 97 %  BP: (120-174)/(70-80) 120/74     Weight: 101.6 kg (224 lb)  Body mass index is 28 kg/m².    Intake/Output - Last 3 Shifts         06/17 0700  06/18 0659 06/18 0700  06/19 0659 06/19 0700  06/20 0659    P.O. 0 0     Blood 349      IV Piggyback 2600      Total Intake(mL/kg) 2949 (29) 0 (0)     Urine (mL/kg/hr) 2475 (1) 2000 (0.8)     Emesis/NG output       Drains 1200 3130     Other 1850      Blood 100      Total Output 0725 5130     Net -8901 -9837                     Physical Exam  Abdominal:      General: Abdomen is flat. There is no distension.      Palpations: Abdomen is soft.      Tenderness: There is no abdominal tenderness.        Significant Labs:  I have reviewed all pertinent lab results within the past 24 hours.  CBC:   Recent Labs   Lab 06/19/23  0609   WBC 21.83*   RBC 3.71*   HGB 8.0*   HCT 24.9*   *   MCV 67*   MCH 21.6*   MCHC 32.1     BMP:   Recent Labs   Lab 06/19/23  0609   *   *   K 3.6   CL 88*   CO2 33*   BUN 11   CREATININE 0.7   CALCIUM 8.9   MG 1.6       Significant Diagnostics:  I have reviewed all pertinent imaging results/findings within the past 24 hours.    Assessment/Plan:     * Small bowel obstruction  Ruslan Caldwell is a 47yoM who underwent open right hemicolectomy on 5/29/23 who represents with what appears to be a small bowel obstruction.     2 Days Post-Op    Re exploration (3rd surgery)- washout hematoma  - appears to be doing much better  -keep pca  -thinks he had some flatus last night but not much- will keep ngt  -despite ngt can have some clear liquids for comfort        Edin Michael MD  General Surgery  [facility]

## 2023-06-19 NOTE — ASSESSMENT & PLAN NOTE
Ruslan Caldwell is a 47yoM who underwent open right hemicolectomy on 5/29/23 who represents with what appears to be a small bowel obstruction.     2 Days Post-Op    Re exploration (3rd surgery)- washout hematoma  - appears to be doing much better  -keep pca  -thinks he had some flatus last night but not much- will keep ngt  -despite ngt can have some clear liquids for comfort

## 2023-06-19 NOTE — PROGRESS NOTES
[facility]  Adult Nutrition  Follow up Note    SUMMARY      Recommendations  1) Advance diet slowly when medically able  -full liquids + Boost plus TID when medically able  -when appropriate for solids start with low fiber ( unless not needed per GI/surgery)  2) Weigh weekly   3) Nutrition education and handout given  4) Continue PPN D 5 AA 4.25 @ 90 ml/hr + standard lipids   ( 91 g protein, 1234 kcal)  -If PICC placed start TPN D15 AA5 @ 90 ml/hr + standard lipids  ( provides 108 g protein ( 100% EPN, 2033 kcal ( 85% EEN)    Goals: 1) PO Intakes > 50% of meals on full liquid diet at f/u 2) PPN at 90 ml/hr and diet advanced to full liquids in < 48 hr or plan for PICC placement  Nutrition Goal Status: not meeting/new  Communication of RD Recs:  (POC, sticky note, reviewed with MD/RN)     Assessment and Plan        Moderate malnutrition  Malnutrition Type:  Context: social/environmental circumstances  Level: moderate     Related to (etiology):   Pain, nausea, altered GI function     Signs and Symptoms (as evidenced by):      Malnutrition Characteristic Summary:  Weight Loss (Malnutrition): greater than 7.5% in 3 months  Energy Intake (Malnutrition): less than 75% for greater than or equal to 3 months        Interventions/Recommendations (treatment strategy):  Collaboration with other providers  Nutrition Diagnosis Status:   continues           Malnutrition Assessment  Malnutrition Context: social/environmental circumstances  Malnutrition Level: moderate  Skin (Micronutrient):  (Mitul = 20)  Teeth (Micronutrient): none   Micronutrient Evaluation Summary: no deficiencies   Weight Loss (Malnutrition): greater than 7.5% in 3 months ( per chart review in < 1 month)  Energy Intake (Malnutrition): less than 75% for greater than or equal to 3 months     Reason for Assessment     Reason For Assessment: follow up  Diagnosis:  (SBO)  Relevant Medical History: CA s/p R. Colectomy 5/29/23, HTN, DM2- pt  "eva ( A1C 5), R. eye blindness  Interdisciplinary Rounds: attended     General Information Comments: 48 y/o male admitted with SBO. Was just admittied with ileus s/p recent colon surgery last month. Since surgery pt has had a hard time advancing diet, feels distended and has N/V. Surgery to see. PTA pt says that he has had decreased appetite r/t pain and nausea for the past couple months along with ~41 lb wt loss. NFPE WDL 6/12/23. Significant wt loss per chart review.  6/14/23 NPO going on day 4 this hospital stay. Poor PO intakes/trouble with diet advancement x > 2 weeks now on top of decreased PO intakes x the last 3 months. 2 episodes of N/V noted x 24 hr. Discussed need for PPN if unable to tolerate diet advancement in < 48 hr. Surgery/GI eval pending, possible ileus.  6/19/23 POD 2 ex LAP, drainage of intra-abd. Abscess + NG. NPO x day 8 inpatient, feeling better today. Standard PPN @ 75 ml/hr + lipids ordered 6/16 and standard PPN @ 90 ml/hr + lipids ordered 6/17, but nothing yesterday. Restarted PPN today, discussed need for PICC for TPN if pt unable to advance PO diet to full liquids in < 48 hr.      Nutrition Discharge Planning: To be determined- DM 1800 kcal, low sodium diet-low fiber recommended     Nutrition Risk Screen     Nutrition Risk Screen: reduced oral intake over the last month, unintentional loss of 10 lbs or more in the past 2 months     Nutrition/Diet History     Patient Reported Diet/Restrictions/Preferences: general  Spiritual, Cultural Beliefs, Yarsani Practices, Values that Affect Care: no  Food Allergies: NKFA  Factors Affecting Nutritional Intake: altered gastrointestinal function, NPO     Anthropometrics    Height Method: Stated  Height: 6' 3"  Height (inches): 75 in  Weight Method: Bed Scale  Weight: 102.1 kg (6/11/23)  Weight (lb): 225 lb  Ideal Body Weight (IBW), Male: 196 lb  BMI (Calculated): 28.1 kg/m2  BMI Grade: 25 - 29.9 - overweight  Weight Loss: unintentional  Usual " Body Weight (UBW), k.4 kg (~ 3 months ago per pt)     Lab/Procedures/Meds     Pertinent Labs Reviewed: reviewed  BMP  Lab Results   Component Value Date     (L) 2023    K 3.6 2023    CL 88 (L) 2023    CO2 33 (H) 2023    BUN 11 2023    CREATININE 0.7 2023    CALCIUM 8.9 2023    ANIONGAP 10 2023    EGFRNORACEVR >60 2023     No results for input(s): POCTGLUCOSE in the last 24 hours.  Lab Results   Component Value Date    ALBUMIN 2.1 (L) 2023       Pertinent Medications Reviewed: reviewed  Kphos, Mg sulfate, zofran, phenergan, NaPhos       Estimated/Assessed Needs     Weight Used For Calorie Calculations: 102.1 kg (224 lb 13.9 oz)  Energy Calorie Requirements (kcal): MSJ ( x 1.2) = 2390 kcal  Energy Need Method: [place]  Protein Requirements: 0.8-1 g protein/kg (  g)  Weight Used For Protein Calculations: 102 kg (224 lb 13.9 oz)  Fluid Requirements (mL): 2400 ml or per MD  Estimated Fluid Requirement Method: RDA Method  CHO Requirement: 268-328 g        Nutrition Prescription Ordered     Current Diet Order: NPO x 8 days     Evaluation of Received Nutrient/Fluid Intake     Energy Calories Required: not meeting needs  Protein Required: not meeting needs  Fluid Required: not meeting needs  Tolerance: NPO    Intake/Output Summary (Last 24 hours) at 2023 1304  Last data filed at 2023 0613  Gross per 24 hour   Intake 0 ml   Output 4480 ml   Net -4480 ml              % Intake of Estimated Energy Needs: 0%  % Meal Intake: NPO     Nutrition Risk     Level of Risk/Frequency of Follow-up:  (2 x weekly)      Monitor and Evaluation     Food and Nutrient Intake: energy intake  Food and Nutrient Adminstration: diet order, enteral and parenteral nutrition administration  Nutrition knowledge  Anthropometric Measurements: weight  Biochemical Data, Medical Tests and Procedures: electrolyte and renal panel, gastrointestinal profile,  glucose/endocrine profile  Nutrition-Focused Physical Findings: overall appearance      Nutrition Follow-Up     RD Follow-up?: Yes

## 2023-06-19 NOTE — CARE UPDATE
06/19/23 0729   Patient Assessment/Suction   Level of Consciousness (AVPU) alert   Respiratory Effort Unlabored;Normal   Expansion/Accessory Muscles/Retractions no use of accessory muscles;expansion symmetric;no retractions   Rhythm/Pattern, Respiratory unlabored;pattern regular;depth regular;no shortness of breath reported   PRE-TX-O2   Device (Oxygen Therapy) nasal cannula   $ Is the patient on Low Flow Oxygen? Yes   Flow (L/min) 1.5  (decereased to 1 LPM)   SpO2 100 %   Pulse Oximetry Type Continuous   $ Pulse Oximetry - Multiple Charge Pulse Oximetry - Multiple   Pulse 104   Resp 16   ETCO2   $ ETCO2 Usage Currently wearing   ETCO2 (mmHg) 43 mmHg   ETCO2 Device Type Portable Bedside Monitor   Aerosol Therapy   $ Aerosol Therapy Charges PRN treatment not required   Respiratory Treatment Status (SVN) PRN treatment not required   Incentive Spirometer   $ Incentive Spirometer Charges done with encouragement   Incentive Spirometer Predicted Level (mL) 2000   Administration (IS) instruction provided, follow-up   Number of Repetitions (IS) 10   Level Incentive Spirometer (mL) 1250   Patient Tolerance (IS) good

## 2023-06-19 NOTE — SUBJECTIVE & OBJECTIVE
Interval History: no flatus yet  Pain controlled on pca    Medications:  Continuous Infusions:   Amino acid 4.25% - dextrose 5% (CLINIMIX-E) solution with additives (1L provides 42.5 gm AA, 50 gm CHO (170 kcal/L dextrose), Na 35, K 30, Mg 5, Ca 4.5, Acetate 70, Cl 39, Phos 15)      hydromorphone in 0.9 % NaCl 6 mg/30 ml       Scheduled Meds:   ceFEPime (MAXIPIME) IVPB  2 g Intravenous Q8H    fat emulsion 20%  250 mL Intravenous Daily    heparin (porcine)  5,000 Units Subcutaneous Q8H    metronidazole  500 mg Intravenous Q8H    mupirocin   Nasal BID    nicotine  1 patch Transdermal Daily    potassium phosphate IVPB  15 mmol Intravenous Once    vancomycin (VANCOCIN) IVPB  1,000 mg Intravenous Q8H     PRN Meds:sodium chloride, diazePAM, HYDROmorphone, naloxone, ondansetron, prochlorperazine, promethazine (PHENERGAN) IVPB, simethicone, sodium chloride 0.9%, sodium phosphate IVPB, sodium phosphate IVPB, sodium phosphate IVPB, Pharmacy to dose Vancomycin consult **AND** vancomycin - pharmacy to dose     Review of patient's allergies indicates:  No Known Allergies  Objective:     Vital Signs (Most Recent):  Temp: 97.6 °F (36.4 °C) (06/19/23 1036)  Pulse: 102 (06/19/23 1036)  Resp: 18 (06/19/23 1036)  BP: 120/74 (06/19/23 1036)  SpO2: 97 % (06/19/23 1036) Vital Signs (24h Range):  Temp:  [96.2 °F (35.7 °C)-99.2 °F (37.3 °C)] 97.6 °F (36.4 °C)  Pulse:  [100-110] 102  Resp:  [16-23] 18  SpO2:  [94 %-100 %] 97 %  BP: (120-174)/(70-80) 120/74     Weight: 101.6 kg (224 lb)  Body mass index is 28 kg/m².    Intake/Output - Last 3 Shifts         06/17 0700  06/18 0659 06/18 0700  06/19 0659 06/19 0700 06/20 0659    P.O. 0 0     Blood 349      IV Piggyback 2600      Total Intake(mL/kg) 2949 (29) 0 (0)     Urine (mL/kg/hr) 2475 (1) 2000 (0.8)     Emesis/NG output       Drains 1200 3130     Other 1850      Blood 100      Total Output 2077 0831     Net -9469 -0593                     Physical Exam  Abdominal:      General: Abdomen is  flat. There is no distension.      Palpations: Abdomen is soft.      Tenderness: There is no abdominal tenderness.        Significant Labs:  I have reviewed all pertinent lab results within the past 24 hours.  CBC:   Recent Labs   Lab 06/19/23  0609   WBC 21.83*   RBC 3.71*   HGB 8.0*   HCT 24.9*   *   MCV 67*   MCH 21.6*   MCHC 32.1     BMP:   Recent Labs   Lab 06/19/23  0609   *   *   K 3.6   CL 88*   CO2 33*   BUN 11   CREATININE 0.7   CALCIUM 8.9   MG 1.6       Significant Diagnostics:  I have reviewed all pertinent imaging results/findings within the past 24 hours.

## 2023-06-19 NOTE — PROGRESS NOTES
[facility] Medicine  Progress Note    Patient Name: Ruslan Caldwell  MRN: [id number]  Patient Class: IP- Inpatient   Admission Date: 6/11/2023  Length of Stay: 7 days  Attending Physician: Marybeth Duran MD  Primary Care Provider: Amira Knutson NP        Subjective:     Principal Problem:Small bowel obstruction        HPI:  Ruslan Caldwell is a 47-year-old male who presents in transfer from [facility] for evaluation of possible developing small bowel obstruction.  Patient presents outside facility complaining of abdominal pain with nausea and vomiting.  Workup at outside facility demonstrated leukocytosis of 16,000, anemia, and thrombocytosis of 843.  CMP with hypokalemia 3.3 and total bilirubin of 1.2.  CT scan at outside facility was concerning for developing small-bowel obstruction.  Patient was admitted to [facility] where he underwent right partial colectomy on 05/29 with pathology consistent with adenocarcinoma.  He was discharged and then came back for another admission on 06/08 where he was treated for an ileus.  Previous medical history includes type 2 diabetes, anemia, hypertension, and right eye blindness.  Patient admitted to Hospital Medicine for treatment management.  Will maintain patient NPO, IV fluids, sliding scale insulin p.r.n., and general surgery consult in a.m..      Overview/Hospital Course:  Ruslan Caldwell is a 47 year old male with a past medical history of recently diagnosed colonic adenocarcinoma s/p partial colectomy with anastomosis, tobacco use, microcytic anemia and thrombocytosis who presented with persistent abdominal pain, nausea and abdominal pain concerning for SBO. An NG tube was unable to be placed. He was placed on IV fluids and Zosyn. He was made NPO. PRN IV analgesics and antiemetics were ordered and General Surgery was consulted. Repeat CT A/P shows SBO 6/13. A gastrografin enema 6/14 was inconclusive. Surgery took the  patient to the OR 6/15 for exploratory laparotomy where an omental infarction was addressed; the patient also underwent SCOUT and partial small bowel resection with anastomosis. PPN was started 6/16. His course has been complicated by JOYCE in setting of vomiting as well as Toradol use; IV fluids were continued and his kidney function has improved to baseline. Nephrology has been consulted. He was also discovered to have an iron deficiency for which IV iron (one dose) was ordered 6/13. Oncology has also been consulted and will arrange follow up in clinic. His post-operative course has been complicated by low grade fever, tachycardia and an increasing WBC count, despite Zosyn use. Antibiotics were broadened to vancomycin, cefepime and Flagyl. Blood cultures were ordered as well as a CXR and urine culture.       Interval History:  Patient seen and examined.  Remains on PCA for pain control.  NG tube remains in place.  He denies passing flatus.  No nausea or vomiting.  Will start PPN today.  Replacing magnesium and phosphorus.  White blood cell count is trending down.    Review of Systems   Constitutional:  Negative for fever.   Gastrointestinal:  Positive for abdominal pain. Negative for nausea and vomiting.   Skin:  Positive for wound.   Allergic/Immunologic: Positive for immunocompromised state.   Objective:     Vital Signs (Most Recent):  Temp: 96.2 °F (35.7 °C) (06/19/23 0723)  Pulse: 104 (06/19/23 0729)  Resp: 16 (06/19/23 0729)  BP: 127/70 (06/19/23 0723)  SpO2: 100 % (06/19/23 0729) Vital Signs (24h Range):  Temp:  [96.2 °F (35.7 °C)-99.2 °F (37.3 °C)] 96.2 °F (35.7 °C)  Pulse:  [100-113] 104  Resp:  [16-23] 16  SpO2:  [94 %-100 %] 100 %  BP: (127-174)/(70-86) 127/70     Weight: 101.6 kg (224 lb)  Body mass index is 28 kg/m².    Intake/Output Summary (Last 24 hours) at 6/19/2023 1026  Last data filed at 6/19/2023 0613  Gross per 24 hour   Intake 0 ml   Output 4480 ml   Net -4480 ml           Physical Exam  Vitals  and nursing note reviewed.   Constitutional:       General: He is not in acute distress.     Appearance: Normal appearance. He is ill-appearing.   HENT:      Head: Normocephalic and atraumatic.      Right Ear: External ear normal.      Left Ear: External ear normal.      Nose: Nose normal.      Comments: NG tube     Mouth/Throat:      Mouth: Mucous membranes are moist.      Pharynx: Oropharynx is clear.   Eyes:      Extraocular Movements: Extraocular movements intact.   Cardiovascular:      Rate and Rhythm: Regular rhythm. Tachycardia present.      Pulses: Normal pulses.      Heart sounds: Normal heart sounds.   Pulmonary:      Effort: Pulmonary effort is normal. No respiratory distress.      Breath sounds: Normal breath sounds.   Abdominal:      Tenderness: There is abdominal tenderness.      Comments: Wound dressing clean, dry and intact.   Musculoskeletal:         General: Normal range of motion.      Cervical back: Normal range of motion and neck supple.      Right lower leg: No edema.      Left lower leg: No edema.   Skin:     General: Skin is warm and dry.   Neurological:      Mental Status: He is alert. Mental status is at baseline.   Psychiatric:         Mood and Affect: Mood normal.         Behavior: Behavior normal.           Significant Labs: All pertinent labs within the past 24 hours have been reviewed.    Significant Imaging: I have reviewed all pertinent imaging results/findings within the past 24 hours.      Assessment/Plan:      * Small bowel obstruction  Acute problem. -NPO  -Await return of bowel function  -PPN with lipids until diet advanced  -IV fluids NS stopped 6/17 given worsening hyponatremia  -Dilaudid PCA and PRN  -Zofran PRN  -Surgery consulted; exploratory laparotomy 6/15 with small bowel removal with anastomosis and SCOUT  -Zosyn changed to vancomycin, cefepime and Flagyl 6/17  -underwent repeat surgery 6/17 for free air, abscess seen, no perforation        Hypophosphatemia  -Replete IV  until patient can tolerate PO  -Trend P      Hyponatremia  Possible SIADH in setting of pain.  -Hold NS IV fluids  -Trend Na  -Nephrology following      Sepsis  Continue vancomycin, cefepime and Flagyl    Omental infarction  -Area removed by Surgery   -Follow up pathology      Tobacco use  -Patient to be counseled on cessation  -Nicotine patch      Moderate malnutrition  -Diet when appropriate  -Nutrition consulted  -PPN with lipids    Thrombocytosis  Chronic. Stable. In setting of recently diagnosed adenocarcinoma and SBO.  -Trend platelets with CBC      Colon adenocarcinoma  Recent partial colectomy.  -Oncology consulted; follow up in clinic  -Follow up pathology      Iron deficiency anemia  Suspect mixed iron deficiency and anemia of chronic inflammation.  -IV iron one dose 6/13  -Trend Hgb with CBC      Essential hypertension  Patient not taking any BP medications.  -Continue to monitor        VTE Risk Mitigation (From admission, onward)         Ordered     heparin (porcine) injection 5,000 Units  Every 8 hours         06/16/23 0738     Place DINA hose  Until discontinued         06/11/23 2043     IP VTE HIGH RISK PATIENT  Once         06/11/23 2043     Place sequential compression device  Until discontinued         06/11/23 2043                Discharge Planning   GABRIEL: 6/20/2023     Code Status: Full Code   Is the patient medically ready for discharge?:     Reason for patient still in hospital (select all that apply): Patient trending condition and Treatment  Discharge Plan A: Home                  Marybeth Duran MD  Department of Hospital Medicine   [facility]

## 2023-06-19 NOTE — ASSESSMENT & PLAN NOTE
Acute problem. -NPO  -Await return of bowel function  -PPN with lipids until diet advanced  -IV fluids NS stopped 6/17 given worsening hyponatremia  -Dilaudid PCA and PRN  -Zofran PRN  -Surgery consulted; exploratory laparotomy 6/15 with small bowel removal with anastomosis and SCOUT  -Zosyn changed to vancomycin, cefepime and Flagyl 6/17  -underwent repeat surgery 6/17 for free air, abscess seen, no perforation

## 2023-06-19 NOTE — PROGRESS NOTES
Pharmacokinetic Assessment Follow Up: IV Vancomycin    Vancomycin serum concentration assessment(s):    The trough level was drawn correctly and can be used to guide therapy at this time. The measurement is below the desired definitive target range of 15 to 20 mcg/mL.    Vancomycin Regimen Plan:    Change regimen to Vancomycin 1000 mg IV every 8 hours with next serum trough concentration measured at 2300 prior to 3rd dose on 6/19    Drug levels (last 3 results):  Recent Labs   Lab Result Units 06/18/23  1340 06/19/23  0609   Vancomycin-Trough ug/mL 5.1* 12.0       Pharmacy will continue to follow and monitor vancomycin.    Please contact pharmacy at extension [phone] for questions regarding this assessment.    Thank you for the consult,   Soha Taylor       Patient brief summary:  Ruslan Caldwell is a 47 y.o. male initiated on antimicrobial therapy with IV Vancomycin for treatment of sepsis    The patient's current regimen is 1250 mg every 8 hours    Drug Allergies:   Review of patient's allergies indicates:  No Known Allergies    Actual Body Weight:   101.6    Renal Function:   Estimated Creatinine Clearance: 172.1 mL/min (based on SCr of 0.7 mg/dL).,     Dialysis Method (if applicable):  N/A    CBC (last 72 hours):  Recent Labs   Lab Result Units 06/17/23  0504 06/17/23  1151 06/18/23  0450 06/19/23  0609   WBC K/uL 34.17*  34.17*  --  27.14* 21.83*   Hemoglobin g/dL 7.9*  7.9* 7.7* 8.7* 8.0*   Hematocrit % 24.8*  24.8* 24.5* 27.1* 24.9*   Platelets K/uL 817*  817*  --  837* 887*   Gran % % 89.0*  89.0*  --  87.0* 80.5*   Lymph % % 4.0*  4.0*  --  7.0* 8.1*   Mono % % 7.0  7.0  --  6.0 9.3   Eosinophil % % 0.0  0.0  --  0.0 1.1   Basophil % % 0.0  0.0  --  0.0 0.4   Differential Method  Manual  Manual  --  Manual Automated       Metabolic Panel (last 72 hours):  Recent Labs   Lab Result Units 06/16/23  1805 06/17/23  0504 06/17/23  1151 06/18/23  0451 06/19/23  0609   Sodium mmol/L  --  127*  127*  129* 130* 131*   Sodium, Urine mmol/L 81  --   --   --   --    Potassium mmol/L  --  3.9  3.9 3.8 3.9 3.6   Chloride mmol/L  --  88*  88* 87* 90* 88*   CO2 mmol/L  --  29  29 30* 30* 33*   Glucose mg/dL  --  145*  145* 136* 137* 118*   Glucose, UA  Negative  --   --   --   --    BUN mg/dL  --  14  14 14 12 11   Creatinine mg/dL  --  1.0  1.0 0.9 0.8 0.7   Creatinine, Urine mg/dL 147.1  --   --   --   --    Albumin g/dL  --  2.6*  2.6*  --  2.2* 2.1*   Total Bilirubin mg/dL  --  1.3*  --  0.8 0.8   Alkaline Phosphatase U/L  --  40*  --  43* 42*   AST U/L  --  17  --  15 14   ALT U/L  --  15  --  15 12   Magnesium mg/dL  --  1.7  --  1.8 1.6   Phosphorus mg/dL  --  1.9*  1.9*  --  1.5* 2.6*       Vancomycin Administrations:  vancomycin given in the last 96 hours                     vancomycin 1,250 mg in dextrose 5 % (D5W) 250 mL IVPB (Vial-Mate) (mg) 1,250 mg New Bag 06/18/23 2325    vancomycin 1,500 mg in dextrose 5 % (D5W) 250 mL IVPB (Vial-Mate) (mg) 1,500 mg New Bag 06/18/23 1504     1,500 mg New Bag  0234     1,500 mg New Bag 06/17/23 1440                    Microbiologic Results:  Microbiology Results (last 7 days)       Procedure Component Value Units Date/Time    Culture, Anaerobe [[id number]] Collected: 06/17/23 1800    Order Status: Completed Specimen: Abdominal from Abdomen Updated: 06/19/23 0459     Anaerobic Culture Culture in progress    Blood culture [[id number]] Collected: 06/17/23 1151    Order Status: Completed Specimen: Blood Updated: 06/18/23 1612     Blood Culture, Routine No Growth to date      No Growth to date    Blood culture [[id number]] Collected: 06/17/23 1151    Order Status: Completed Specimen: Blood Updated: 06/18/23 1612     Blood Culture, Routine No Growth to date      No Growth to date    Gram stain [[id number]] Collected: 06/17/23 1800    Order Status: Completed Specimen: Abdominal from Abdomen Updated: 06/18/23 0010     Gram Stain Result Few WBC's      No organisms seen     Aerobic culture [[id number]] Collected: 06/17/23 1800    Order Status: Sent Specimen: Incision site from Abdomen Updated: 06/17/23 2229    AFB Culture & Smear [[id number]] Collected: 06/17/23 1800    Order Status: Sent Specimen: Abdominal from Abdomen Updated: 06/17/23 2229    Fungus culture [[id number]] Collected: 06/17/23 1800    Order Status: Sent Specimen: Abdominal from Abdomen Updated: 06/17/23 2229    Urine Culture High Risk [[id number]]     Order Status: Sent Specimen: Urine

## 2023-06-19 NOTE — PROGRESS NOTES
INPATIENT NEPHROLOGY Progress Note  [facility] NEPHROLOGY    Ruslan Caldwell  06/19/2023    Reason for consultation:  Acute kidney injury    Chief Complaint: No chief complaint on file.    History of Present Illness:    Per H and P    Ruslan Caldwell is a 47-year-old male who presents in transfer from [facility] for evaluation of possible developing small bowel obstruction.  Patient presents outside facility complaining of abdominal pain with nausea and vomiting.  Workup at outside facility demonstrated leukocytosis of 16,000, anemia, and thrombocytosis of 843.  CMP with hypokalemia 3.3 and total bilirubin of 1.2.  CT scan at outside facility was concerning for developing small-bowel obstruction.  Patient was admitted to [facility] where he underwent right partial colectomy on 05/29 with pathology consistent with adenocarcinoma.  He was discharged and then came back for another admission on 06/08 where he was treated for an ileus.  Previous medical history includes type 2 diabetes, anemia, hypertension, and right eye blindness.  Patient admitted to Hospital Medicine for treatment management    6/16 Consulted for JOYCE.   Pt POD1 s/p exploratory laparotomy with small bowel resection and omental resection.   He has abdominal pain and bloating.  No chest pain, sob, neurologic symptoms, vomiting, or confusion.  He is a little somnolent on his analgesics  6/17 VSS. Worsening anemia, hypophosphatemia, hyponatremia. Fever yesterday evening with tachycardia, worsening WBC.  6/18 VSS, s/p re-operation on 6/17, evacuation for multiple abscess, no obvious perforation, NG placement with stomach content decompression.  6/19 VSS, on 1.5L NC, UOP 2L,c/w NGT, will start CLD for comfort, on clinimix as well    Plan of Care:    JOYCE due to volume depletion, toradol, hemodynamically mediated renal injury, elevated CPK (sepsis due to post-op abscess, evacuated on 6/17)  --JOYCE has resolved  --nonoliguric  --continue rodriguez  until able to do voiding trial  --no nsaids or IV contrast    Hyponatremia  Hypokalemia  Hypomg  Hypophosphatemia  --control pain and nausea -> SIADH  --replete lytes PRN per sliding scale  --customize clinimix if cannot meet oral diet needs    Anemia  --hematology/oncology is on the case  --improving     Hypertension, now relative hypotension  --allow 150/90 for now    Thank you for allowing us to participate in this patient's care. We will continue to follow.    Vital Signs:  Temp Readings from Last 3 Encounters:   06/19/23 97.6 °F (36.4 °C)   06/11/23 98.4 °F (36.9 °C) (Oral)   06/10/23 97.8 °F (36.6 °C)       Pulse Readings from Last 3 Encounters:   06/19/23 102   06/11/23 (!) 112   06/10/23 82       BP Readings from Last 3 Encounters:   06/19/23 120/74   06/11/23 124/78   06/10/23 (!) 135/91       Weight:  Wt Readings from Last 3 Encounters:   06/15/23 101.6 kg (224 lb)   06/11/23 102.1 kg (225 lb)   06/09/23 102 kg (224 lb 13.9 oz)       Medications:  No current facility-administered medications on file prior to encounter.     Current Outpatient Medications on File Prior to Encounter   Medication Sig Dispense Refill    gabapentin (NEURONTIN) 300 MG capsule Take 1 capsule (300 mg total) by mouth 2 (two) times daily. for 14 days 28 capsule 0     Scheduled Meds:   ceFEPime (MAXIPIME) IVPB  2 g Intravenous Q8H    fat emulsion 20%  250 mL Intravenous Daily    heparin (porcine)  5,000 Units Subcutaneous Q8H    metronidazole  500 mg Intravenous Q8H    mupirocin   Nasal BID    nicotine  1 patch Transdermal Daily    potassium phosphate IVPB  15 mmol Intravenous Once    vancomycin (VANCOCIN) IVPB  1,000 mg Intravenous Q8H     Continuous Infusions:   Amino acid 4.25% - dextrose 5% (CLINIMIX-E) solution with additives (1L provides 42.5 gm AA, 50 gm CHO (170 kcal/L dextrose), Na 35, K 30, Mg 5, Ca 4.5, Acetate 70, Cl 39, Phos 15) 90 mL/hr at 06/19/23 1202    hydromorphone in 0.9 % NaCl 6 mg/30 ml       PRN Meds:.sodium  "chloride, diazePAM, HYDROmorphone, naloxone, ondansetron, prochlorperazine, promethazine (PHENERGAN) IVPB, simethicone, sodium chloride 0.9%, sodium phosphate IVPB, sodium phosphate IVPB, sodium phosphate IVPB, Pharmacy to dose Vancomycin consult **AND** vancomycin - pharmacy to dose    Review of Systems:  Neg    Physical Exam:    /74   Pulse 102   Temp 97.6 °F (36.4 °C)   Resp 16   Ht 6' 3" (1.905 m)   Wt 101.6 kg (224 lb)   SpO2 97%   BMI 28.00 kg/m²     General Appearance:    Alert, cooperative, no distress, appears stated age   Head:    Normocephalic, without obvious abnormality, atraumatic   Eyes:    PER, conjunctiva/corneas clear, EOM's intact in both eyes        Throat:   Lips, mucosa, and tongue normal; teeth and gums normal   Back:     Symmetric, no curvature, ROM normal, no CVA tenderness   Lungs:     Clear to auscultation bilaterally, respirations unlabored   Chest wall:    No tenderness or deformity   Heart:    Regular rate and rhythm, S1 and S2 normal, no murmur, rub   or gallop   Abdomen:     Soft, non-tender, bowel sounds active all four quadrants,     no masses, no organomegaly   Extremities:   Extremities normal, atraumatic, no cyanosis or edema   Pulses:   2+ and symmetric all extremities   MSK:   No joint or muscle swelling, tenderness or deformity   Skin:   Skin color, texture, turgor normal, no rashes or lesions   Neurologic:   CNII-XII intact, normal strength and sensation       Throughout.  No flap     Results:  Recent Labs   Lab 06/17/23  1151 06/18/23  0451 06/19/23  0609   * 130* 131*   K 3.8 3.9 3.6   CL 87* 90* 88*   CO2 30* 30* 33*   BUN 14 12 11   CREATININE 0.9 0.8 0.7   * 137* 118*         Recent Labs   Lab 06/17/23  0504 06/17/23  1151 06/18/23  0451 06/19/23  0609   CALCIUM 8.8  8.8 9.2 8.4* 8.9   ALBUMIN 2.6*  2.6*  --  2.2* 2.1*   PHOS 1.9*  1.9*  --  1.5* 2.6*   MG 1.7  --  1.8 1.6               No results for input(s): POCTGLUCOSE in the last 168 " hours.      Recent Labs   Lab 09/14/21  0408 06/08/23  0607   Hemoglobin A1C 4.8 5.0         Recent Labs   Lab 06/17/23  0504 06/17/23  1151 06/18/23  0450 06/19/23  0609   WBC 34.17*  34.17*  --  27.14* 21.83*   HGB 7.9*  7.9* 7.7* 8.7* 8.0*   HCT 24.8*  24.8* 24.5* 27.1* 24.9*   *  817*  --  837* 887*   MCV 66*  66*  --  67* 67*   MCHC 31.9*  31.9*  --  32.1 32.1   MONO 7.0  7.0  CANCELED  CANCELED  --  6.0  CANCELED 9.3  2.0*         Recent Labs   Lab 06/17/23  0504 06/18/23  0451 06/19/23  0609   BILITOT 1.3* 0.8 0.8   PROT 6.7 6.1 6.3   ALBUMIN 2.6*  2.6* 2.2* 2.1*   ALKPHOS 40* 43* 42*   ALT 15 15 12   AST 17 15 14         Recent Labs   Lab 05/28/23  0038 06/11/23  1614 06/16/23  1805   Color, UA Yellow Yellow Yellow   Appearance, UA Clear Clear Clear   pH, UA 7.0 6.0 7.0   Specific [place], UA 1.020 1.020 1.010   Protein, UA 1+ A 2+ A 2+ A   Glucose, UA Negative Negative Negative   Ketones, UA Negative 2+ A Negative   Urobilinogen, UA Negative Negative Negative   Bilirubin (UA) Negative 2+ A Negative   Occult Blood UA Negative Negative 1+ A   Nitrite, UA Negative Negative Negative   RBC, UA 0 2 14 H   WBC, UA 1 10 H 8 H   Bacteria None Occasional Occasional   Hyaline Casts, UA 0 2 A 0               Microbiology Results (last 7 days)       Procedure Component Value Units Date/Time    AFB Culture & Smear [[id number]] Collected: 06/17/23 1800    Order Status: Completed Specimen: Abdominal from Abdomen Updated: 06/19/23 0927     AFB Culture & Smear Culture in progress    Aerobic culture [[id number]] Collected: 06/17/23 1800    Order Status: Completed Specimen: Incision site from Abdomen Updated: 06/19/23 0917     Aerobic Bacterial Culture No growth    Culture, Anaerobe [[id number]] Collected: 06/17/23 1800    Order Status: Completed Specimen: Abdominal from Abdomen Updated: 06/19/23 0459     Anaerobic Culture Culture in progress    Blood culture [[id number]] Collected: 06/17/23 1151    Order  Status: Completed Specimen: Blood Updated: 06/18/23 1612     Blood Culture, Routine No Growth to date      No Growth to date    Blood culture [[id number]] Collected: 06/17/23 1151    Order Status: Completed Specimen: Blood Updated: 06/18/23 1612     Blood Culture, Routine No Growth to date      No Growth to date    Gram stain [[id number]] Collected: 06/17/23 1800    Order Status: Completed Specimen: Abdominal from Abdomen Updated: 06/18/23 0010     Gram Stain Result Few WBC's      No organisms seen    Fungus culture [[id number]] Collected: 06/17/23 1800    Order Status: Sent Specimen: Abdominal from Abdomen Updated: 06/17/23 2229    Urine Culture High Risk [[id number]]     Order Status: Sent Specimen: Urine           Patient care time was spent personally by me on the following activities: > 35 minutes  Obtaining a history.  Examination of patient.  Providing medical care at the patients bedside.  Developing a treatment plan with patient or surrogate and bedside caregivers.  Ordering and reviewing laboratory studies, radiographic studies, pulse oximetry.  Ordering and performing treatments and interventions.  Evaluation of patient's response to treatment.  Discussions with consultants while on the unit and immediately available to the patient.  Re-evaluation of the patient's condition.  Documentation in the medical record.    Jo Montes De Oca MD  Nephrology  [facility] [place]  [phone]

## 2023-06-19 NOTE — PLAN OF CARE
Problem: Adult Inpatient Plan of Care  Goal: Plan of Care Review  Outcome: Ongoing, Progressing  Goal: Patient-Specific Goal (Individualized)  Outcome: Ongoing, Progressing  Goal: Optimal Comfort and Wellbeing  Outcome: Ongoing, Progressing     Problem: Fall Injury Risk  Goal: Absence of Fall and Fall-Related Injury  Outcome: Ongoing, Progressing     Problem: Malnutrition  Goal: Improved Nutritional Intake  Outcome: Ongoing, Progressing     Problem: Skin Injury Risk Increased  Goal: Skin Health and Integrity  Outcome: Ongoing, Progressing   POC has been reviewed with pt.  Pt will continue IV fluids and IV ABTs.  Pt was started on PPN today and tolerating well.  Pt's diet was also advanced to clear liquids.  Pt still has NG tube to L nare draining dark green.  Pt's rodriguez was removed and pt has voided.  Pt still continues on PCA pump of dilaudid.  Dr. Michael came to see pt; and said that pt could keep PCA pump one more day.  PCA will be discontinued tomorrow on 6/20/2023.  No possible discharge at this time.

## 2023-06-19 NOTE — PLAN OF CARE
Pt appears to be resting, eyes are closed, breaths are deep and even. VSS, NAD noted at this time. Discussed PoC with pt, stated they understood and would help as able. PCA button within reach, will continue to monitor.

## 2023-06-20 LAB
ALBUMIN SERPL BCP-MCNC: 2.4 G/DL (ref 3.5–5.2)
ALP SERPL-CCNC: 78 U/L (ref 55–135)
ALT SERPL W/O P-5'-P-CCNC: 13 U/L (ref 10–44)
ANION GAP SERPL CALC-SCNC: 14 MMOL/L (ref 8–16)
AST SERPL-CCNC: 16 U/L (ref 10–40)
BASOPHILS # BLD AUTO: 0.11 K/UL (ref 0–0.2)
BASOPHILS NFR BLD: 0.6 % (ref 0–1.9)
BILIRUB SERPL-MCNC: 0.7 MG/DL (ref 0.1–1)
BUN SERPL-MCNC: 13 MG/DL (ref 6–20)
CALCIUM SERPL-MCNC: 10 MG/DL (ref 8.7–10.5)
CHLORIDE SERPL-SCNC: 84 MMOL/L (ref 95–110)
CO2 SERPL-SCNC: 37 MMOL/L (ref 23–29)
CREAT SERPL-MCNC: 0.7 MG/DL (ref 0.5–1.4)
DIFFERENTIAL METHOD: ABNORMAL
EOSINOPHIL # BLD AUTO: 0.4 K/UL (ref 0–0.5)
EOSINOPHIL NFR BLD: 2.2 % (ref 0–8)
ERYTHROCYTE [DISTWIDTH] IN BLOOD BY AUTOMATED COUNT: 21.4 % (ref 11.5–14.5)
EST. GFR  (NO RACE VARIABLE): >60 ML/MIN/1.73 M^2
GLUCOSE SERPL-MCNC: 119 MG/DL (ref 70–110)
HCT VFR BLD AUTO: 28.4 % (ref 40–54)
HGB BLD-MCNC: 8.9 G/DL (ref 14–18)
IMM GRANULOCYTES # BLD AUTO: 0.08 K/UL (ref 0–0.04)
IMM GRANULOCYTES NFR BLD AUTO: 0.5 % (ref 0–0.5)
LYMPHOCYTES # BLD AUTO: 1.7 K/UL (ref 1–4.8)
LYMPHOCYTES NFR BLD: 9.7 % (ref 18–48)
MAGNESIUM SERPL-MCNC: 1.9 MG/DL (ref 1.6–2.6)
MCH RBC QN AUTO: 21.2 PG (ref 27–31)
MCHC RBC AUTO-ENTMCNC: 31.3 G/DL (ref 32–36)
MCV RBC AUTO: 68 FL (ref 82–98)
MONOCYTES # BLD AUTO: 1.8 K/UL (ref 0.3–1)
MONOCYTES NFR BLD: 10.4 % (ref 4–15)
NEUTROPHILS # BLD AUTO: 13.3 K/UL (ref 1.8–7.7)
NEUTROPHILS NFR BLD: 76.6 % (ref 38–73)
NRBC BLD-RTO: 0 /100 WBC
PHOSPHATE SERPL-MCNC: 3.3 MG/DL (ref 2.7–4.5)
PLATELET # BLD AUTO: 1183 K/UL (ref 150–450)
PMV BLD AUTO: 10 FL (ref 9.2–12.9)
POTASSIUM SERPL-SCNC: 3.2 MMOL/L (ref 3.5–5.1)
PROT SERPL-MCNC: 7.4 G/DL (ref 6–8.4)
RBC # BLD AUTO: 4.2 M/UL (ref 4.6–6.2)
SODIUM SERPL-SCNC: 135 MMOL/L (ref 136–145)
VANCOMYCIN TROUGH SERPL-MCNC: 10 UG/ML (ref 10–22)
WBC # BLD AUTO: 17.33 K/UL (ref 3.9–12.7)

## 2023-06-20 PROCEDURE — 25000003 PHARM REV CODE 250: Performed by: SURGERY

## 2023-06-20 PROCEDURE — 36415 COLL VENOUS BLD VENIPUNCTURE: CPT | Performed by: SURGERY

## 2023-06-20 PROCEDURE — 63600175 PHARM REV CODE 636 W HCPCS: Performed by: HOSPITALIST

## 2023-06-20 PROCEDURE — 27000221 HC OXYGEN, UP TO 24 HOURS

## 2023-06-20 PROCEDURE — 94799 UNLISTED PULMONARY SVC/PX: CPT

## 2023-06-20 PROCEDURE — 83735 ASSAY OF MAGNESIUM: CPT | Performed by: SURGERY

## 2023-06-20 PROCEDURE — 84100 ASSAY OF PHOSPHORUS: CPT | Performed by: SURGERY

## 2023-06-20 PROCEDURE — B4185 PARENTERAL SOL 10 GM LIPIDS: HCPCS | Performed by: HOSPITALIST

## 2023-06-20 PROCEDURE — S4991 NICOTINE PATCH NONLEGEND: HCPCS | Performed by: SURGERY

## 2023-06-20 PROCEDURE — 63600175 PHARM REV CODE 636 W HCPCS: Performed by: SURGERY

## 2023-06-20 PROCEDURE — 12000002 HC ACUTE/MED SURGE SEMI-PRIVATE ROOM

## 2023-06-20 PROCEDURE — 25000003 PHARM REV CODE 250: Performed by: STUDENT IN AN ORGANIZED HEALTH CARE EDUCATION/TRAINING PROGRAM

## 2023-06-20 PROCEDURE — 36415 COLL VENOUS BLD VENIPUNCTURE: CPT | Performed by: HOSPITALIST

## 2023-06-20 PROCEDURE — 25000003 PHARM REV CODE 250: Performed by: HOSPITALIST

## 2023-06-20 PROCEDURE — 63600175 PHARM REV CODE 636 W HCPCS: Performed by: STUDENT IN AN ORGANIZED HEALTH CARE EDUCATION/TRAINING PROGRAM

## 2023-06-20 PROCEDURE — 80053 COMPREHEN METABOLIC PANEL: CPT | Performed by: SURGERY

## 2023-06-20 PROCEDURE — S0030 INJECTION, METRONIDAZOLE: HCPCS | Performed by: SURGERY

## 2023-06-20 PROCEDURE — 94761 N-INVAS EAR/PLS OXIMETRY MLT: CPT

## 2023-06-20 PROCEDURE — 80202 ASSAY OF VANCOMYCIN: CPT | Performed by: HOSPITALIST

## 2023-06-20 PROCEDURE — A4217 STERILE WATER/SALINE, 500 ML: HCPCS | Performed by: HOSPITALIST

## 2023-06-20 PROCEDURE — 85025 COMPLETE CBC W/AUTO DIFF WBC: CPT | Performed by: SURGERY

## 2023-06-20 RX ORDER — HYDROMORPHONE HYDROCHLORIDE 1 MG/ML
1 INJECTION, SOLUTION INTRAMUSCULAR; INTRAVENOUS; SUBCUTANEOUS EVERY 4 HOURS PRN
Status: DISCONTINUED | OUTPATIENT
Start: 2023-06-20 | End: 2023-06-24 | Stop reason: HOSPADM

## 2023-06-20 RX ORDER — OXYCODONE HYDROCHLORIDE 10 MG/1
10 TABLET ORAL EVERY 4 HOURS PRN
Status: DISCONTINUED | OUTPATIENT
Start: 2023-06-20 | End: 2023-06-24 | Stop reason: HOSPADM

## 2023-06-20 RX ADMIN — CEFEPIME 2 G: 2 INJECTION, POWDER, FOR SOLUTION INTRAVENOUS at 11:06

## 2023-06-20 RX ADMIN — HYDROMORPHONE HYDROCHLORIDE 1 MG: 1 INJECTION, SOLUTION INTRAMUSCULAR; INTRAVENOUS; SUBCUTANEOUS at 09:06

## 2023-06-20 RX ADMIN — Medication: at 10:06

## 2023-06-20 RX ADMIN — HYDROMORPHONE HYDROCHLORIDE 1 MG: 1 INJECTION, SOLUTION INTRAMUSCULAR; INTRAVENOUS; SUBCUTANEOUS at 05:06

## 2023-06-20 RX ADMIN — HEPARIN SODIUM 5000 UNITS: 5000 INJECTION INTRAVENOUS; SUBCUTANEOUS at 02:06

## 2023-06-20 RX ADMIN — VANCOMYCIN HYDROCHLORIDE 1250 MG: 1.25 INJECTION, POWDER, LYOPHILIZED, FOR SOLUTION INTRAVENOUS at 08:06

## 2023-06-20 RX ADMIN — VANCOMYCIN HYDROCHLORIDE 1250 MG: 1.25 INJECTION, POWDER, LYOPHILIZED, FOR SOLUTION INTRAVENOUS at 12:06

## 2023-06-20 RX ADMIN — ASCORBIC ACID, VITAMIN A PALMITATE, CHOLECALCIFEROL, THIAMINE HYDROCHLORIDE, RIBOFLAVIN-5 PHOSPHATE SODIUM, PYRIDOXINE HYDROCHLORIDE, NIACINAMIDE, DEXPANTHENOL, ALPHA-TOCOPHEROL ACETATE, VITAMIN K1, FOLIC ACID, BIOTIN, CYANOCOBALAMIN: 200; 3300; 200; 6; 3.6; 6; 40; 15; 10; 150; 600; 60; 5 INJECTION, SOLUTION INTRAVENOUS at 09:06

## 2023-06-20 RX ADMIN — I.V. FAT EMULSION 250 ML: 20 EMULSION INTRAVENOUS at 09:06

## 2023-06-20 RX ADMIN — CEFEPIME 2 G: 2 INJECTION, POWDER, FOR SOLUTION INTRAVENOUS at 03:06

## 2023-06-20 RX ADMIN — Medication: at 04:06

## 2023-06-20 RX ADMIN — HEPARIN SODIUM 5000 UNITS: 5000 INJECTION INTRAVENOUS; SUBCUTANEOUS at 09:06

## 2023-06-20 RX ADMIN — Medication 1 PATCH: at 08:06

## 2023-06-20 RX ADMIN — METRONIDAZOLE 500 MG: 500 INJECTION, SOLUTION INTRAVENOUS at 05:06

## 2023-06-20 RX ADMIN — METRONIDAZOLE 500 MG: 500 INJECTION, SOLUTION INTRAVENOUS at 09:06

## 2023-06-20 RX ADMIN — CEFEPIME 2 G: 2 INJECTION, POWDER, FOR SOLUTION INTRAVENOUS at 09:06

## 2023-06-20 RX ADMIN — MUPIROCIN: 20 OINTMENT TOPICAL at 08:06

## 2023-06-20 RX ADMIN — HEPARIN SODIUM 5000 UNITS: 5000 INJECTION INTRAVENOUS; SUBCUTANEOUS at 05:06

## 2023-06-20 RX ADMIN — ASCORBIC ACID, VITAMIN A PALMITATE, CHOLECALCIFEROL, THIAMINE HYDROCHLORIDE, RIBOFLAVIN-5 PHOSPHATE SODIUM, PYRIDOXINE HYDROCHLORIDE, NIACINAMIDE, DEXPANTHENOL, ALPHA-TOCOPHEROL ACETATE, VITAMIN K1, FOLIC ACID, BIOTIN, CYANOCOBALAMIN: 200; 3300; 200; 6; 3.6; 6; 40; 15; 10; 150; 600; 60; 5 INJECTION, SOLUTION INTRAVENOUS at 11:06

## 2023-06-20 RX ADMIN — VANCOMYCIN HYDROCHLORIDE 1750 MG: 1 INJECTION, POWDER, LYOPHILIZED, FOR SOLUTION INTRAVENOUS at 06:06

## 2023-06-20 RX ADMIN — OXYCODONE HYDROCHLORIDE 10 MG: 10 TABLET ORAL at 08:06

## 2023-06-20 RX ADMIN — METRONIDAZOLE 500 MG: 500 INJECTION, SOLUTION INTRAVENOUS at 12:06

## 2023-06-20 NOTE — SUBJECTIVE & OBJECTIVE
Interval History: small amount of flatus last night    Medications:  Continuous Infusions:   Amino acid 4.25% - dextrose 5% (CLINIMIX-E) solution with additives (1L provides 42.5 gm AA, 50 gm CHO (170 kcal/L dextrose), Na 35, K 30, Mg 5, Ca 4.5, Acetate 70, Cl 39, Phos 15)      TPN ADULT PERIPHERAL CUSTOM       Scheduled Meds:   ceFEPime (MAXIPIME) IVPB  2 g Intravenous Q8H    fat emulsion 20%  250 mL Intravenous Daily    heparin (porcine)  5,000 Units Subcutaneous Q8H    metronidazole  500 mg Intravenous Q8H    nicotine  1 patch Transdermal Daily    vancomycin (VANCOCIN) IVPB  1,750 mg Intravenous Q8H     PRN Meds:sodium chloride, diazePAM, HYDROmorphone, naloxone, ondansetron, oxyCODONE, prochlorperazine, promethazine (PHENERGAN) IVPB, simethicone, sodium chloride 0.9%, sodium phosphate IVPB, sodium phosphate IVPB, sodium phosphate IVPB, Pharmacy to dose Vancomycin consult **AND** vancomycin - pharmacy to dose     Review of patient's allergies indicates:  No Known Allergies  Objective:     Vital Signs (Most Recent):  Temp: 97.5 °F (36.4 °C) (06/20/23 1620)  Pulse: 99 (06/20/23 1620)  Resp: 16 (06/20/23 1727)  BP: 129/76 (06/20/23 1620)  SpO2: 98 % (06/20/23 1620) Vital Signs (24h Range):  Temp:  [96.7 °F (35.9 °C)-98.7 °F (37.1 °C)] 97.5 °F (36.4 °C)  Pulse:  [] 99  Resp:  [14-18] 16  SpO2:  [94 %-100 %] 98 %  BP: (128-159)/(73-82) 129/76     Weight: 101.6 kg (224 lb)  Body mass index is 28 kg/m².    Intake/Output - Last 3 Shifts         06/18 0700  06/19 0659 06/19 0700  06/20 0659 06/20 0700  06/21 0659    P.O. 0      I.V. (mL/kg)  50 (0.5)     Blood       IV Piggyback  2477.8     TPN  2709.8     Total Intake(mL/kg) 0 (0) 5237.6 (51.6)     Urine (mL/kg/hr) 2000 (0.8) 1550 (0.6)     Drains 3130 4450     Other       Blood       Total Output 5130 6000     Net -5124 -762.4                     Physical Exam  Abdominal:      General: Abdomen is flat. There is no distension.      Palpations: Abdomen is soft.       Tenderness: There is no abdominal tenderness.      Comments: Ngt bilious  Juan Ramon ss        Significant Labs:  I have reviewed all pertinent lab results within the past 24 hours.      Significant Diagnostics:  I have reviewed all pertinent imaging results/findings within the past 24 hours.

## 2023-06-20 NOTE — CARE UPDATE
06/20/23 0818   Patient Assessment/Suction   Level of Consciousness (AVPU) alert   Respiratory Effort Normal;Unlabored   Expansion/Accessory Muscles/Retractions no use of accessory muscles   PRE-TX-O2   Device (Oxygen Therapy) nasal cannula   $ Is the patient on Low Flow Oxygen? Yes   Flow (L/min) 1   SpO2 100 %   Pulse Oximetry Type Continuous   $ Pulse Oximetry - Multiple Charge Pulse Oximetry - Multiple   Pulse 99   Resp 18

## 2023-06-20 NOTE — SUBJECTIVE & OBJECTIVE
Interval History:  Patient seen and examined.  Remains on PCA for pain control.  NG tube remains in place.  He reports passing flatus.  Discussed with Nephrology, customizing PPN to address electrolytes.    Review of Systems   Constitutional:  Negative for fever.   Gastrointestinal:  Positive for abdominal pain. Negative for nausea and vomiting.   Skin:  Positive for wound.   Allergic/Immunologic: Positive for immunocompromised state.   Objective:     Vital Signs (Most Recent):  Temp: 98.1 °F (36.7 °C) (06/20/23 0739)  Pulse: 99 (06/20/23 0818)  Resp: 18 (06/20/23 1021)  BP: 131/79 (06/20/23 0739)  SpO2: 100 % (06/20/23 0818) Vital Signs (24h Range):  Temp:  [96.6 °F (35.9 °C)-98.7 °F (37.1 °C)] 98.1 °F (36.7 °C)  Pulse:  [] 99  Resp:  [14-19] 18  SpO2:  [94 %-100 %] 100 %  BP: (119-159)/(70-79) 131/79     Weight: 101.6 kg (224 lb)  Body mass index is 28 kg/m².    Intake/Output Summary (Last 24 hours) at 6/20/2023 1116  Last data filed at 6/20/2023 0540  Gross per 24 hour   Intake 5237.59 ml   Output 4600 ml   Net 637.59 ml           Physical Exam  Vitals and nursing note reviewed.   Constitutional:       General: He is not in acute distress.     Appearance: Normal appearance. He is ill-appearing.   HENT:      Head: Normocephalic and atraumatic.      Right Ear: External ear normal.      Left Ear: External ear normal.      Nose: Nose normal.      Comments: NG tube     Mouth/Throat:      Mouth: Mucous membranes are moist.      Pharynx: Oropharynx is clear.   Eyes:      Extraocular Movements: Extraocular movements intact.   Cardiovascular:      Rate and Rhythm: Normal rate and regular rhythm.      Pulses: Normal pulses.      Heart sounds: Normal heart sounds.   Pulmonary:      Effort: Pulmonary effort is normal. No respiratory distress.      Breath sounds: Normal breath sounds.   Abdominal:      Tenderness: There is abdominal tenderness.      Comments: Wound dressing clean, dry and intact.   Musculoskeletal:          General: Normal range of motion.      Cervical back: Normal range of motion and neck supple.      Right lower leg: No edema.      Left lower leg: No edema.   Skin:     General: Skin is warm and dry.   Neurological:      Mental Status: He is alert. Mental status is at baseline.   Psychiatric:         Mood and Affect: Mood normal.         Behavior: Behavior normal.           Significant Labs: All pertinent labs within the past 24 hours have been reviewed.    Significant Imaging: I have reviewed all pertinent imaging results/findings within the past 24 hours.

## 2023-06-20 NOTE — ASSESSMENT & PLAN NOTE
Ruslan Caldwell is a 47yoM who underwent open right hemicolectomy on 5/29/23 who represents with what appears to be a small bowel obstruction.     3 Days Post-Op    Re exploration (3rd surgery)- washout hematoma    -doing better  Dc pca  Oral medication for pain  Continue ngt- clamp for meds

## 2023-06-20 NOTE — PROGRESS NOTES
Pharmacokinetic Assessment Follow Up: IV Vancomycin    Vancomycin serum concentration assessment(s):    The trough level was drawn correctly and can be used to guide therapy at this time. The measurement is below the desired definitive target range of 15 to 20 mcg/mL.    Vancomycin Regimen Plan:    Change regimen to Vancomycin 1250 mg IV every 8 hours with next serum trough concentration measured at 1530 prior to 3rd dose on 6/20    Drug levels (last 3 results):  Recent Labs   Lab Result Units 06/18/23  1340 06/19/23  0609 06/19/23  2255   Vancomycin-Trough ug/mL 5.1* 12.0 10.1       Pharmacy will continue to follow and monitor vancomycin.    Please contact pharmacy at extension [phone] for questions regarding this assessment.    Thank you for the consult,   Cuate Yuen       Patient brief summary:  Ruslan Caldwell is a 47 y.o. male initiated on antimicrobial therapy with IV Vancomycin for treatment of sepsis      Drug Allergies:   Review of patient's allergies indicates:  No Known Allergies    Actual Body Weight:   101.6 kg    Renal Function:   Estimated Creatinine Clearance: 172.1 mL/min (based on SCr of 0.7 mg/dL).,     Dialysis Method (if applicable):  N/A    CBC (last 72 hours):  Recent Labs   Lab Result Units 06/17/23  0504 06/17/23  1151 06/18/23  0450 06/19/23  0609   WBC K/uL 34.17*  34.17*  --  27.14* 21.83*   Hemoglobin g/dL 7.9*  7.9* 7.7* 8.7* 8.0*   Hematocrit % 24.8*  24.8* 24.5* 27.1* 24.9*   Platelets K/uL 817*  817*  --  837* 887*   Gran % % 89.0*  89.0*  --  87.0* 80.5*   Lymph % % 4.0*  4.0*  --  7.0* 8.1*   Mono % % 7.0  7.0  --  6.0 9.3   Eosinophil % % 0.0  0.0  --  0.0 1.1   Basophil % % 0.0  0.0  --  0.0 0.4   Differential Method  Manual  Manual  --  Manual Automated       Metabolic Panel (last 72 hours):  Recent Labs   Lab Result Units 06/17/23  0504 06/17/23  1151 06/18/23  0451 06/19/23  0609   Sodium mmol/L 127*  127* 129* 130* 131*   Potassium mmol/L 3.9  3.9 3.8 3.9 3.6    Chloride mmol/L 88*  88* 87* 90* 88*   CO2 mmol/L 29  29 30* 30* 33*   Glucose mg/dL 145*  145* 136* 137* 118*   BUN mg/dL 14  14 14 12 11   Creatinine mg/dL 1.0  1.0 0.9 0.8 0.7   Albumin g/dL 2.6*  2.6*  --  2.2* 2.1*   Total Bilirubin mg/dL 1.3*  --  0.8 0.8   Alkaline Phosphatase U/L 40*  --  43* 42*   AST U/L 17  --  15 14   ALT U/L 15  --  15 12   Magnesium mg/dL 1.7  --  1.8 1.6   Phosphorus mg/dL 1.9*  1.9*  --  1.5* 2.6*       Vancomycin Administrations:  vancomycin given in the last 96 hours                     vancomycin (VANCOCIN) 1,000 mg in dextrose 5 % (D5W) 250 mL IVPB (Vial-Mate) (mg) 1,000 mg New Bag 06/19/23 1625     1,000 mg New Bag  0818    vancomycin 1,250 mg in dextrose 5 % (D5W) 250 mL IVPB (Vial-Mate) (mg) 1,250 mg New Bag 06/18/23 2325    vancomycin 1,500 mg in dextrose 5 % (D5W) 250 mL IVPB (Vial-Mate) (mg) 1,500 mg New Bag 06/18/23 1504     1,500 mg New Bag  0234     1,500 mg New Bag 06/17/23 1440                    Microbiologic Results:  Microbiology Results (last 7 days)       Procedure Component Value Units Date/Time    Blood culture [[id number]] Collected: 06/17/23 1151    Order Status: Completed Specimen: Blood Updated: 06/19/23 1612     Blood Culture, Routine No Growth to date      No Growth to date      No Growth to date    Blood culture [[id number]] Collected: 06/17/23 1151    Order Status: Completed Specimen: Blood Updated: 06/19/23 1612     Blood Culture, Routine No Growth to date      No Growth to date      No Growth to date    AFB Culture & Smear [[id number]] Collected: 06/17/23 1800    Order Status: Completed Specimen: Abdominal from Abdomen Updated: 06/19/23 1455     AFB Culture & Smear Culture in progress     AFB CULTURE STAIN No acid fast bacilli seen.    Aerobic culture [[id number]] Collected: 06/17/23 1800    Order Status: Completed Specimen: Incision site from Abdomen Updated: 06/19/23 0917     Aerobic Bacterial Culture No growth    Culture, Anaerobe [[id number]]  Collected: 06/17/23 1800    Order Status: Completed Specimen: Abdominal from Abdomen Updated: 06/19/23 0459     Anaerobic Culture Culture in progress    Gram stain [[id number]] Collected: 06/17/23 1800    Order Status: Completed Specimen: Abdominal from Abdomen Updated: 06/18/23 0010     Gram Stain Result Few WBC's      No organisms seen    Fungus culture [[id number]] Collected: 06/17/23 1800    Order Status: Sent Specimen: Abdominal from Abdomen Updated: 06/17/23 2229    Urine Culture High Risk [[id number]]     Order Status: Sent Specimen: Urine

## 2023-06-20 NOTE — PROGRESS NOTES
Pharmacokinetic Assessment Follow Up: IV Vancomycin    Vancomycin serum concentration assessment(s):    The trough level was drawn correctly and can be used to guide therapy at this time. The measurement is below the desired definitive target range of 15 to 20 mcg/mL.    Vancomycin Regimen Plan:    Change regimen to Vancomycin 1750 mg IV every 8 hours with next serum trough concentration measured at 0830 prior to 3rd dose on 6/21/23.    Drug levels (last 3 results):  Recent Labs   Lab Result Units 06/19/23  0609 06/19/23  2255 06/20/23  1554   Vancomycin-Trough ug/mL 12.0 10.1 10.0       Pharmacy will continue to follow and monitor vancomycin.    Please contact pharmacy at extension [phone] for questions regarding this assessment.    Thank you for the consult,   Kwabena Magallanes, PharmD  [phone]         Patient brief summary:  Ruslan Caldwell is a 47 y.o. male initiated on antimicrobial therapy with IV Vancomycin for treatment of sepsis    The patient's current regimen is vancomycin 1250 mg every 8 hours.    Drug Allergies:   Review of patient's allergies indicates:  No Known Allergies    Actual Body Weight:   101.6 kg (224 lb)    Renal Function:   Estimated Creatinine Clearance: 172.1 mL/min (based on SCr of 0.7 mg/dL).,     Dialysis Method (if applicable):  N/A    CBC (last 72 hours):  Recent Labs   Lab Result Units 06/18/23  0450 06/19/23  0609 06/20/23  0331   WBC K/uL 27.14* 21.83* 17.33*   Hemoglobin g/dL 8.7* 8.0* 8.9*   Hematocrit % 27.1* 24.9* 28.4*   Platelets K/uL 837* 887* 1,183*   Gran % % 87.0* 80.5* 76.6*   Lymph % % 7.0* 8.1* 9.7*   Mono % % 6.0 9.3 10.4   Eosinophil % % 0.0 1.1 2.2   Basophil % % 0.0 0.4 0.6   Differential Method  Manual Automated Automated       Metabolic Panel (last 72 hours):  Recent Labs   Lab Result Units 06/18/23  0451 06/19/23  0609 06/20/23  0331   Sodium mmol/L 130* 131* 135*   Potassium mmol/L 3.9 3.6 3.2*   Chloride mmol/L 90* 88* 84*   CO2 mmol/L 30* 33* 37*    Glucose mg/dL 137* 118* 119*   BUN mg/dL 12 11 13   Creatinine mg/dL 0.8 0.7 0.7   Albumin g/dL 2.2* 2.1* 2.4*   Total Bilirubin mg/dL 0.8 0.8 0.7   Alkaline Phosphatase U/L 43* 42* 78   AST U/L 15 14 16   ALT U/L 15 12 13   Magnesium mg/dL 1.8 1.6 1.9   Phosphorus mg/dL 1.5* 2.6* 3.3       Vancomycin Administrations:  vancomycin given in the last 96 hours                     vancomycin 1,250 mg in dextrose 5 % (D5W) 250 mL IVPB (Vial-Mate) (mg) 1,250 mg New Bag 06/20/23 0848     1,250 mg New Bag  0007    vancomycin (VANCOCIN) 1,000 mg in dextrose 5 % (D5W) 250 mL IVPB (Vial-Mate) (mg) 1,000 mg New Bag 06/19/23 1625     1,000 mg New Bag  0818    vancomycin 1,250 mg in dextrose 5 % (D5W) 250 mL IVPB (Vial-Mate) (mg) 1,250 mg New Bag 06/18/23 2325    vancomycin 1,500 mg in dextrose 5 % (D5W) 250 mL IVPB (Vial-Mate) (mg) 1,500 mg New Bag 06/18/23 1504     1,500 mg New Bag  0234     1,500 mg New Bag 06/17/23 1440                    Microbiologic Results:  Microbiology Results (last 7 days)       Procedure Component Value Units Date/Time    Blood culture [[id number]] Collected: 06/17/23 1151    Order Status: Completed Specimen: Blood Updated: 06/20/23 1612     Blood Culture, Routine No Growth to date      No Growth to date      No Growth to date      No Growth to date    Blood culture [[id number]] Collected: 06/17/23 1151    Order Status: Completed Specimen: Blood Updated: 06/20/23 1612     Blood Culture, Routine No Growth to date      No Growth to date      No Growth to date      No Growth to date    Aerobic culture [[id number]] Collected: 06/17/23 1800    Order Status: Completed Specimen: Incision site from Abdomen Updated: 06/20/23 1416     Aerobic Bacterial Culture Further report to follow    Culture, Anaerobe [[id number]] Collected: 06/17/23 1800    Order Status: Completed Specimen: Abdominal from Abdomen Updated: 06/20/23 1137     Anaerobic Culture Culture in progress    AFB Culture & Smear [[id number]] Collected:  06/17/23 1800    Order Status: Completed Specimen: Abdominal from Abdomen Updated: 06/19/23 1455     AFB Culture & Smear Culture in progress     AFB CULTURE STAIN No acid fast bacilli seen.    Gram stain [[id number]] Collected: 06/17/23 1800    Order Status: Completed Specimen: Abdominal from Abdomen Updated: 06/18/23 0010     Gram Stain Result Few WBC's      No organisms seen    Fungus culture [[id number]] Collected: 06/17/23 1800    Order Status: Sent Specimen: Abdominal from Abdomen Updated: 06/17/23 2229    Urine Culture High Risk [[id number]]     Order Status: Sent Specimen: Urine

## 2023-06-20 NOTE — PROGRESS NOTES
[facility] Surgery  Progress Note    Subjective:     History of Present Illness:  47-year-old male well known to me.  Presents with abdominal pain nausea and vomiting.  CT was suspicious for obstruction.  Currently he says back pain was much worse than his abdominal pain.  He says he has been having trouble controlling his pain at home.  He denies any recent flatus.  He does not have an NG tube and he is not vomiting currently.  He does not feel nauseated currently.      Post-Op Info:  Procedure(s) (LRB):  LAPAROTOMY, EXPLORATORY (N/A)   3 Days Post-Op     Interval History: small amount of flatus last night    Medications:  Continuous Infusions:   Amino acid 4.25% - dextrose 5% (CLINIMIX-E) solution with additives (1L provides 42.5 gm AA, 50 gm CHO (170 kcal/L dextrose), Na 35, K 30, Mg 5, Ca 4.5, Acetate 70, Cl 39, Phos 15)      TPN ADULT PERIPHERAL CUSTOM       Scheduled Meds:   ceFEPime (MAXIPIME) IVPB  2 g Intravenous Q8H    fat emulsion 20%  250 mL Intravenous Daily    heparin (porcine)  5,000 Units Subcutaneous Q8H    metronidazole  500 mg Intravenous Q8H    nicotine  1 patch Transdermal Daily    vancomycin (VANCOCIN) IVPB  1,750 mg Intravenous Q8H     PRN Meds:sodium chloride, diazePAM, HYDROmorphone, naloxone, ondansetron, oxyCODONE, prochlorperazine, promethazine (PHENERGAN) IVPB, simethicone, sodium chloride 0.9%, sodium phosphate IVPB, sodium phosphate IVPB, sodium phosphate IVPB, Pharmacy to dose Vancomycin consult **AND** vancomycin - pharmacy to dose     Review of patient's allergies indicates:  No Known Allergies  Objective:     Vital Signs (Most Recent):  Temp: 97.5 °F (36.4 °C) (06/20/23 1620)  Pulse: 99 (06/20/23 1620)  Resp: 16 (06/20/23 1727)  BP: 129/76 (06/20/23 1620)  SpO2: 98 % (06/20/23 1620) Vital Signs (24h Range):  Temp:  [96.7 °F (35.9 °C)-98.7 °F (37.1 °C)] 97.5 °F (36.4 °C)  Pulse:  [] 99  Resp:  [14-18] 16  SpO2:  [94 %-100 %] 98 %  BP:  (128-159)/(73-82) 129/76     Weight: 101.6 kg (224 lb)  Body mass index is 28 kg/m².    Intake/Output - Last 3 Shifts         06/18 0700  06/19 0659 06/19 0700  06/20 0659 06/20 0700  06/21 0659    P.O. 0      I.V. (mL/kg)  50 (0.5)     Blood       IV Piggyback  2477.8     TPN  2709.8     Total Intake(mL/kg) 0 (0) 5237.6 (51.6)     Urine (mL/kg/hr) 2000 (0.8) 1550 (0.6)     Drains 3130 4450     Other       Blood       Total Output 5130 6000     Net -5130 -762.4                     Physical Exam  Abdominal:      General: Abdomen is flat. There is no distension.      Palpations: Abdomen is soft.      Tenderness: There is no abdominal tenderness.      Comments: Ngt bilious  Juan Ramon ss        Significant Labs:  I have reviewed all pertinent lab results within the past 24 hours.      Significant Diagnostics:  I have reviewed all pertinent imaging results/findings within the past 24 hours.    Assessment/Plan:     * Small bowel obstruction  Ruslan Caldwell is a 47yoM who underwent open right hemicolectomy on 5/29/23 who represents with what appears to be a small bowel obstruction.     3 Days Post-Op    Re exploration (3rd surgery)- washout hematoma    -doing better  Dc pca  Oral medication for pain  Continue ngt- clamp for meds        Edin Michael MD  General Surgery  [facility]

## 2023-06-20 NOTE — PROGRESS NOTES
[facility] Medicine  Progress Note    Patient Name: Ruslan Caldwell  MRN: [id number]  Patient Class: IP- Inpatient   Admission Date: 6/11/2023  Length of Stay: 8 days  Attending Physician: Marybeth Duran MD  Primary Care Provider: Amira Knutson NP        Subjective:     Principal Problem:Small bowel obstruction        HPI:  Ruslan Caldwell is a 47-year-old male who presents in transfer from [facility] for evaluation of possible developing small bowel obstruction.  Patient presents outside facility complaining of abdominal pain with nausea and vomiting.  Workup at outside facility demonstrated leukocytosis of 16,000, anemia, and thrombocytosis of 843.  CMP with hypokalemia 3.3 and total bilirubin of 1.2.  CT scan at outside facility was concerning for developing small-bowel obstruction.  Patient was admitted to [facility] where he underwent right partial colectomy on 05/29 with pathology consistent with adenocarcinoma.  He was discharged and then came back for another admission on 06/08 where he was treated for an ileus.  Previous medical history includes type 2 diabetes, anemia, hypertension, and right eye blindness.  Patient admitted to Hospital Medicine for treatment management.  Will maintain patient NPO, IV fluids, sliding scale insulin p.r.n., and general surgery consult in a.m..      Overview/Hospital Course:  Ruslan Caldwell is a 47 year old male with a past medical history of recently diagnosed colonic adenocarcinoma s/p partial colectomy with anastomosis, tobacco use, microcytic anemia and thrombocytosis who presented with persistent abdominal pain, nausea and abdominal pain concerning for SBO. An NG tube was unable to be placed. He was placed on IV fluids and Zosyn. He was made NPO. PRN IV analgesics and antiemetics were ordered and General Surgery was consulted. Repeat CT A/P shows SBO 6/13. A gastrografin enema 6/14 was inconclusive. Surgery took the  patient to the OR 6/15 for exploratory laparotomy where an omental infarction was addressed; the patient also underwent SCOUT and partial small bowel resection with anastomosis. PPN was started 6/16. His course has been complicated by JOYCE in setting of vomiting as well as Toradol use; IV fluids were continued and his kidney function has improved to baseline. Nephrology has been consulted. He was also discovered to have an iron deficiency for which IV iron (one dose) was ordered 6/13. Oncology has also been consulted and will arrange follow up in clinic. His post-operative course has been complicated by low grade fever, tachycardia and an increasing WBC count, despite Zosyn use. Antibiotics were broadened to vancomycin, cefepime and Flagyl. Blood cultures were ordered as well as a CXR and urine culture.       Interval History:  Patient seen and examined.  Remains on PCA for pain control.  NG tube remains in place.  He reports passing flatus.  Discussed with Nephrology, customizing PPN to address electrolytes.    Review of Systems   Constitutional:  Negative for fever.   Gastrointestinal:  Positive for abdominal pain. Negative for nausea and vomiting.   Skin:  Positive for wound.   Allergic/Immunologic: Positive for immunocompromised state.   Objective:     Vital Signs (Most Recent):  Temp: 98.1 °F (36.7 °C) (06/20/23 0739)  Pulse: 99 (06/20/23 0818)  Resp: 18 (06/20/23 1021)  BP: 131/79 (06/20/23 0739)  SpO2: 100 % (06/20/23 0818) Vital Signs (24h Range):  Temp:  [96.6 °F (35.9 °C)-98.7 °F (37.1 °C)] 98.1 °F (36.7 °C)  Pulse:  [] 99  Resp:  [14-19] 18  SpO2:  [94 %-100 %] 100 %  BP: (119-159)/(70-79) 131/79     Weight: 101.6 kg (224 lb)  Body mass index is 28 kg/m².    Intake/Output Summary (Last 24 hours) at 6/20/2023 1116  Last data filed at 6/20/2023 0540  Gross per 24 hour   Intake 5237.59 ml   Output 4600 ml   Net 637.59 ml           Physical Exam  Vitals and nursing note reviewed.   Constitutional:        General: He is not in acute distress.     Appearance: Normal appearance. He is ill-appearing.   HENT:      Head: Normocephalic and atraumatic.      Right Ear: External ear normal.      Left Ear: External ear normal.      Nose: Nose normal.      Comments: NG tube     Mouth/Throat:      Mouth: Mucous membranes are moist.      Pharynx: Oropharynx is clear.   Eyes:      Extraocular Movements: Extraocular movements intact.   Cardiovascular:      Rate and Rhythm: Normal rate and regular rhythm.      Pulses: Normal pulses.      Heart sounds: Normal heart sounds.   Pulmonary:      Effort: Pulmonary effort is normal. No respiratory distress.      Breath sounds: Normal breath sounds.   Abdominal:      Tenderness: There is abdominal tenderness.      Comments: Wound dressing clean, dry and intact.   Musculoskeletal:         General: Normal range of motion.      Cervical back: Normal range of motion and neck supple.      Right lower leg: No edema.      Left lower leg: No edema.   Skin:     General: Skin is warm and dry.   Neurological:      Mental Status: He is alert. Mental status is at baseline.   Psychiatric:         Mood and Affect: Mood normal.         Behavior: Behavior normal.           Significant Labs: All pertinent labs within the past 24 hours have been reviewed.    Significant Imaging: I have reviewed all pertinent imaging results/findings within the past 24 hours.      Assessment/Plan:      * Small bowel obstruction  Acute problem. -NPO  -Await return of bowel function  -PPN with lipids until diet advanced  -IV fluids NS stopped 6/17 given worsening hyponatremia  -Dilaudid PCA and PRN  -Zofran PRN  -Surgery consulted; exploratory laparotomy 6/15 with small bowel removal with anastomosis and SCOUT  -Zosyn changed to vancomycin, cefepime and Flagyl 6/17  -underwent repeat surgery 6/17 for free air, abscess seen, no perforation        Hypophosphatemia  -Replete IV until patient can tolerate PO  -Trend  P      Hyponatremia  Possible SIADH in setting of pain.  -Hold NS IV fluids  -Trend Na  -Nephrology following      Sepsis  Continue vancomycin, cefepime and Flagyl    Omental infarction  -Area removed by Surgery   -Follow up pathology      Tobacco use  -Patient to be counseled on cessation  -Nicotine patch      Moderate malnutrition  -Diet when appropriate  -Nutrition consulted  -PPN with lipids    Thrombocytosis  Chronic. Stable. In setting of recently diagnosed adenocarcinoma and SBO.  -Trend platelets with CBC      Colon adenocarcinoma  Recent partial colectomy.  -Oncology consulted; follow up in clinic  -Follow up pathology      Iron deficiency anemia  Suspect mixed iron deficiency and anemia of chronic inflammation.  -IV iron one dose 6/13  -Trend Hgb with CBC      Essential hypertension  Patient not taking any BP medications.  -Continue to monitor        VTE Risk Mitigation (From admission, onward)         Ordered     heparin (porcine) injection 5,000 Units  Every 8 hours         06/16/23 0738     Place DINA hose  Until discontinued         06/11/23 2043     IP VTE HIGH RISK PATIENT  Once         06/11/23 2043     Place sequential compression device  Until discontinued         06/11/23 2043                Discharge Planning   GABRIEL: 6/23/2023     Code Status: Full Code   Is the patient medically ready for discharge?:     Reason for patient still in hospital (select all that apply): Patient trending condition and Treatment  Discharge Plan A: Home                  Marybeth Duran MD  Department of Hospital Medicine   [facility]

## 2023-06-20 NOTE — PROGRESS NOTES
INPATIENT NEPHROLOGY Progress Note  [facility] NEPHROLOGY    Ruslan Caldwell  06/20/2023    Reason for consultation:  Acute kidney injury    Chief Complaint: SBO    History of Present Illness:    Per H and P    Ruslan Caldwell is a 47-year-old male who presents in transfer from [facility] for evaluation of possible developing small bowel obstruction.  Patient presents outside facility complaining of abdominal pain with nausea and vomiting.  Workup at outside facility demonstrated leukocytosis of 16,000, anemia, and thrombocytosis of 843.  CMP with hypokalemia 3.3 and total bilirubin of 1.2.  CT scan at outside facility was concerning for developing small-bowel obstruction.  Patient was admitted to [place] where he underwent right partial colectomy on 05/29 with pathology consistent with adenocarcinoma.  He was discharged and then came back for another admission on 06/08 where he was treated for an ileus.  Previous medical history includes type 2 diabetes, anemia, hypertension, and right eye blindness.  Patient admitted to Hospital Medicine for treatment management    6/16 Consulted for JOYCE.   Pt POD1 s/p exploratory laparotomy with small bowel resection and omental resection.   He has abdominal pain and bloating.  No chest pain, sob, neurologic symptoms, vomiting, or confusion.  He is a little somnolent on his analgesics  6/17 VSS. Worsening anemia, hypophosphatemia, hyponatremia. Fever yesterday evening with tachycardia, worsening WBC.  6/18 VSS, s/p re-operation on 6/17, evacuation for multiple abscess, no obvious perforation, NG placement with stomach content decompression.  6/19 VSS, on 1.5L NC, UOP 2L,c/w NGT, will start CLD for comfort, on clinimix as well  6/20 customize clinimix today, VSS, on 1L NC, UOP 1.5L, rodriguez removed    Plan of Care:    JOYCE due to volume depletion, toradol, hemodynamically mediated renal injury, elevated CPK (sepsis due to post-op abscess, evacuated on 6/17)  --JOYCE  has resolved  --nonoliguric  --rodriguez out  --no nsaids or IV contrast    Hyponatremia  Hypokalemia  Hypomg  Hypophosphatemia  Alkalosis  --control pain and nausea -> SIADH  --customize clinimix today    Anemia/Thrombocytosis   --hematology/oncology is on the case    Thank you for allowing us to participate in this patient's care. We will continue to follow.    Vital Signs:  Temp Readings from Last 3 Encounters:   06/20/23 98.1 °F (36.7 °C) (Oral)   06/11/23 98.4 °F (36.9 °C) (Oral)   06/10/23 97.8 °F (36.6 °C)       Pulse Readings from Last 3 Encounters:   06/20/23 99   06/11/23 (!) 112   06/10/23 82       BP Readings from Last 3 Encounters:   06/20/23 131/79   06/11/23 124/78   06/10/23 (!) 135/91       Weight:  Wt Readings from Last 3 Encounters:   06/15/23 101.6 kg (224 lb)   06/11/23 102.1 kg (225 lb)   06/09/23 102 kg (224 lb 13.9 oz)       Medications:  No current facility-administered medications on file prior to encounter.     Current Outpatient Medications on File Prior to Encounter   Medication Sig Dispense Refill    gabapentin (NEURONTIN) 300 MG capsule Take 1 capsule (300 mg total) by mouth 2 (two) times daily. for 14 days 28 capsule 0     Scheduled Meds:   ceFEPime (MAXIPIME) IVPB  2 g Intravenous Q8H    fat emulsion 20%  250 mL Intravenous Daily    heparin (porcine)  5,000 Units Subcutaneous Q8H    metronidazole  500 mg Intravenous Q8H    mupirocin   Nasal BID    nicotine  1 patch Transdermal Daily    potassium phosphate IVPB  15 mmol Intravenous Once    vancomycin (VANCOCIN) IVPB  1,250 mg Intravenous Q8H     Continuous Infusions:   Amino acid 4.25% - dextrose 5% (CLINIMIX-E) solution with additives (1L provides 42.5 gm AA, 50 gm CHO (170 kcal/L dextrose), Na 35, K 30, Mg 5, Ca 4.5, Acetate 70, Cl 39, Phos 15) 90 mL/hr at 06/19/23 1855    hydromorphone in 0.9 % NaCl 6 mg/30 ml       PRN Meds:.sodium chloride, diazePAM, HYDROmorphone, naloxone, ondansetron, prochlorperazine, promethazine (PHENERGAN)  "IVPB, simethicone, sodium chloride 0.9%, sodium phosphate IVPB, sodium phosphate IVPB, sodium phosphate IVPB, Pharmacy to dose Vancomycin consult **AND** vancomycin - pharmacy to dose    Review of Systems:  Neg    Physical Exam:    /79 (BP Location: Right arm, Patient Position: Lying)   Pulse 99   Temp 98.1 °F (36.7 °C) (Oral)   Resp 18   Ht 6' 3" (1.905 m)   Wt 101.6 kg (224 lb)   SpO2 100%   BMI 28.00 kg/m²     General Appearance:    Alert, cooperative, no distress, appears stated age   Head:    Normocephalic, without obvious abnormality, atraumatic   Eyes:    PER, conjunctiva/corneas clear, EOM's intact in both eyes        Throat:   Lips, mucosa, and tongue normal; teeth and gums normal   Back:     Symmetric, no curvature, ROM normal, no CVA tenderness   Lungs:     Clear to auscultation bilaterally, respirations unlabored   Chest wall:    No tenderness or deformity   Heart:    Regular rate and rhythm, S1 and S2 normal, no murmur, rub   or gallop   Abdomen:     Soft, non-tender, bowel sounds active all four quadrants,     no masses, no organomegaly   Extremities:   Extremities normal, atraumatic, no cyanosis or edema   Pulses:   2+ and symmetric all extremities   MSK:   No joint or muscle swelling, tenderness or deformity   Skin:   Skin color, texture, turgor normal, no rashes or lesions   Neurologic:   CNII-XII intact, normal strength and sensation       Throughout.  No flap     Results:  Recent Labs   Lab 06/18/23  0451 06/19/23  0609 06/20/23  0331   * 131* 135*   K 3.9 3.6 3.2*   CL 90* 88* 84*   CO2 30* 33* 37*   BUN 12 11 13   CREATININE 0.8 0.7 0.7   * 118* 119*         Recent Labs   Lab 06/18/23  0451 06/19/23  0609 06/20/23  0331   CALCIUM 8.4* 8.9 10.0   ALBUMIN 2.2* 2.1* 2.4*   PHOS 1.5* 2.6* 3.3   MG 1.8 1.6 1.9               No results for input(s): POCTGLUCOSE in the last 168 hours.      Recent Labs   Lab 09/14/21  0408 06/08/23  0607   Hemoglobin A1C 4.8 5.0         Recent " Labs   Lab 06/18/23  0450 06/19/23  0609 06/20/23  0331   WBC 27.14* 21.83* 17.33*   HGB 8.7* 8.0* 8.9*   HCT 27.1* 24.9* 28.4*   * 887* 1,183*   MCV 67* 67* 68*   MCHC 32.1 32.1 31.3*   MONO 6.0  CANCELED 9.3  2.0* 10.4  1.8*         Recent Labs   Lab 06/18/23  0451 06/19/23  0609 06/20/23  0331   BILITOT 0.8 0.8 0.7   PROT 6.1 6.3 7.4   ALBUMIN 2.2* 2.1* 2.4*   ALKPHOS 43* 42* 78   ALT 15 12 13   AST 15 14 16         Recent Labs   Lab 05/28/23  0038 06/11/23  1614 06/16/23  1805   Color, UA Yellow Yellow Yellow   Appearance, UA Clear Clear Clear   pH, UA 7.0 6.0 7.0   Specific Quinter, UA 1.020 1.020 1.010   Protein, UA 1+ A 2+ A 2+ A   Glucose, UA Negative Negative Negative   Ketones, UA Negative 2+ A Negative   Urobilinogen, UA Negative Negative Negative   Bilirubin (UA) Negative 2+ A Negative   Occult Blood UA Negative Negative 1+ A   Nitrite, UA Negative Negative Negative   RBC, UA 0 2 14 H   WBC, UA 1 10 H 8 H   Bacteria None Occasional Occasional   Hyaline Casts, UA 0 2 A 0               Microbiology Results (last 7 days)       Procedure Component Value Units Date/Time    Blood culture [[id number]] Collected: 06/17/23 1151    Order Status: Completed Specimen: Blood Updated: 06/19/23 1612     Blood Culture, Routine No Growth to date      No Growth to date      No Growth to date    Blood culture [[id number]] Collected: 06/17/23 1151    Order Status: Completed Specimen: Blood Updated: 06/19/23 1612     Blood Culture, Routine No Growth to date      No Growth to date      No Growth to date    AFB Culture & Smear [[id number]] Collected: 06/17/23 1800    Order Status: Completed Specimen: Abdominal from Abdomen Updated: 06/19/23 1455     AFB Culture & Smear Culture in progress     AFB CULTURE STAIN No acid fast bacilli seen.    Aerobic culture [[id number]] Collected: 06/17/23 1800    Order Status: Completed Specimen: Incision site from Abdomen Updated: 06/19/23 0914     Aerobic Bacterial Culture No growth     Culture, Anaerobe [[id number]] Collected: 06/17/23 1800    Order Status: Completed Specimen: Abdominal from Abdomen Updated: 06/19/23 0459     Anaerobic Culture Culture in progress    Gram stain [[id number]] Collected: 06/17/23 1800    Order Status: Completed Specimen: Abdominal from Abdomen Updated: 06/18/23 0010     Gram Stain Result Few WBC's      No organisms seen    Fungus culture [[id number]] Collected: 06/17/23 1800    Order Status: Sent Specimen: Abdominal from Abdomen Updated: 06/17/23 2229    Urine Culture High Risk [[id number]]     Order Status: Sent Specimen: Urine           Patient care time was spent personally by me on the following activities: > 35 minutes  Obtaining a history.  Examination of patient.  Providing medical care at the patients bedside.  Developing a treatment plan with patient or surrogate and bedside caregivers.  Ordering and reviewing laboratory studies, radiographic studies, pulse oximetry.  Ordering and performing treatments and interventions.  Evaluation of patient's response to treatment.  Discussions with consultants while on the unit and immediately available to the patient.  Re-evaluation of the patient's condition.  Documentation in the medical record.    Jo Montes De Oca MD  Nephrology  [facility]  [phone]

## 2023-06-20 NOTE — PLAN OF CARE
POC/Meds reviewed, pt verbalized understanding. Vitals stable.  Afebrile.   Tele In place-2612. Tpn infusing. Juan Ramon drains monitored. Ngt to low int suction. Pca in use. Voiding per urinal. Up with x1 assist. Repositions self. Hourly/Q2hr rounding performed, safety maintained. Bed in lowest position, wheels locked, SR up x2, call light in easy reach. No  complaints at this time.

## 2023-06-21 LAB
ALBUMIN SERPL BCP-MCNC: 2.5 G/DL (ref 3.5–5.2)
ALP SERPL-CCNC: 88 U/L (ref 55–135)
ALT SERPL W/O P-5'-P-CCNC: 17 U/L (ref 10–44)
ANION GAP SERPL CALC-SCNC: 12 MMOL/L (ref 8–16)
AST SERPL-CCNC: 12 U/L (ref 10–40)
BACTERIA SPEC AEROBE CULT: NO GROWTH
BASOPHILS # BLD AUTO: 0.15 K/UL (ref 0–0.2)
BASOPHILS NFR BLD: 0.8 % (ref 0–1.9)
BILIRUB SERPL-MCNC: 0.6 MG/DL (ref 0.1–1)
BUN SERPL-MCNC: 13 MG/DL (ref 6–20)
CALCIUM SERPL-MCNC: 9.6 MG/DL (ref 8.7–10.5)
CHLORIDE SERPL-SCNC: 84 MMOL/L (ref 95–110)
CO2 SERPL-SCNC: 33 MMOL/L (ref 23–29)
CREAT SERPL-MCNC: 0.8 MG/DL (ref 0.5–1.4)
DIFFERENTIAL METHOD: ABNORMAL
EOSINOPHIL # BLD AUTO: 0.5 K/UL (ref 0–0.5)
EOSINOPHIL NFR BLD: 2.8 % (ref 0–8)
ERYTHROCYTE [DISTWIDTH] IN BLOOD BY AUTOMATED COUNT: 21 % (ref 11.5–14.5)
EST. GFR  (NO RACE VARIABLE): >60 ML/MIN/1.73 M^2
GLUCOSE SERPL-MCNC: 168 MG/DL (ref 70–110)
HCT VFR BLD AUTO: 27.8 % (ref 40–54)
HGB BLD-MCNC: 8.5 G/DL (ref 14–18)
IMM GRANULOCYTES # BLD AUTO: 0.15 K/UL (ref 0–0.04)
IMM GRANULOCYTES NFR BLD AUTO: 0.8 % (ref 0–0.5)
LYMPHOCYTES # BLD AUTO: 2.6 K/UL (ref 1–4.8)
LYMPHOCYTES NFR BLD: 13.6 % (ref 18–48)
MAGNESIUM SERPL-MCNC: 1.5 MG/DL (ref 1.6–2.6)
MCH RBC QN AUTO: 20.5 PG (ref 27–31)
MCHC RBC AUTO-ENTMCNC: 30.6 G/DL (ref 32–36)
MCV RBC AUTO: 67 FL (ref 82–98)
MONOCYTES # BLD AUTO: 1.9 K/UL (ref 0.3–1)
MONOCYTES NFR BLD: 9.8 % (ref 4–15)
NEUTROPHILS # BLD AUTO: 13.6 K/UL (ref 1.8–7.7)
NEUTROPHILS NFR BLD: 72.2 % (ref 38–73)
NRBC BLD-RTO: 0 /100 WBC
PHOSPHATE SERPL-MCNC: 3.2 MG/DL (ref 2.7–4.5)
PLATELET # BLD AUTO: 1189 K/UL (ref 150–450)
PMV BLD AUTO: 9.7 FL (ref 9.2–12.9)
POTASSIUM SERPL-SCNC: 3.1 MMOL/L (ref 3.5–5.1)
PROT SERPL-MCNC: 7.4 G/DL (ref 6–8.4)
RBC # BLD AUTO: 4.14 M/UL (ref 4.6–6.2)
SODIUM SERPL-SCNC: 129 MMOL/L (ref 136–145)
VANCOMYCIN TROUGH SERPL-MCNC: 18.4 UG/ML (ref 10–22)
WBC # BLD AUTO: 18.84 K/UL (ref 3.9–12.7)

## 2023-06-21 PROCEDURE — 36415 COLL VENOUS BLD VENIPUNCTURE: CPT | Performed by: HOSPITALIST

## 2023-06-21 PROCEDURE — 63600175 PHARM REV CODE 636 W HCPCS: Performed by: SURGERY

## 2023-06-21 PROCEDURE — 80053 COMPREHEN METABOLIC PANEL: CPT | Performed by: SURGERY

## 2023-06-21 PROCEDURE — 83735 ASSAY OF MAGNESIUM: CPT | Performed by: SURGERY

## 2023-06-21 PROCEDURE — 80202 ASSAY OF VANCOMYCIN: CPT | Performed by: HOSPITALIST

## 2023-06-21 PROCEDURE — 25000003 PHARM REV CODE 250: Performed by: HOSPITALIST

## 2023-06-21 PROCEDURE — 25000003 PHARM REV CODE 250: Performed by: SURGERY

## 2023-06-21 PROCEDURE — 63600175 PHARM REV CODE 636 W HCPCS: Performed by: HOSPITALIST

## 2023-06-21 PROCEDURE — 12000002 HC ACUTE/MED SURGE SEMI-PRIVATE ROOM

## 2023-06-21 PROCEDURE — S0030 INJECTION, METRONIDAZOLE: HCPCS | Performed by: SURGERY

## 2023-06-21 PROCEDURE — 85025 COMPLETE CBC W/AUTO DIFF WBC: CPT | Performed by: SURGERY

## 2023-06-21 PROCEDURE — 84100 ASSAY OF PHOSPHORUS: CPT | Performed by: SURGERY

## 2023-06-21 PROCEDURE — S4991 NICOTINE PATCH NONLEGEND: HCPCS | Performed by: SURGERY

## 2023-06-21 PROCEDURE — 94799 UNLISTED PULMONARY SVC/PX: CPT

## 2023-06-21 PROCEDURE — 94761 N-INVAS EAR/PLS OXIMETRY MLT: CPT

## 2023-06-21 PROCEDURE — 36415 COLL VENOUS BLD VENIPUNCTURE: CPT | Performed by: SURGERY

## 2023-06-21 RX ADMIN — HEPARIN SODIUM 5000 UNITS: 5000 INJECTION INTRAVENOUS; SUBCUTANEOUS at 11:06

## 2023-06-21 RX ADMIN — PIPERACILLIN AND TAZOBACTAM 4.5 G: 4; .5 INJECTION, POWDER, LYOPHILIZED, FOR SOLUTION INTRAVENOUS; PARENTERAL at 03:06

## 2023-06-21 RX ADMIN — OXYCODONE HYDROCHLORIDE 10 MG: 10 TABLET ORAL at 12:06

## 2023-06-21 RX ADMIN — VANCOMYCIN HYDROCHLORIDE 1500 MG: 1.5 INJECTION, POWDER, LYOPHILIZED, FOR SOLUTION INTRAVENOUS at 08:06

## 2023-06-21 RX ADMIN — OXYCODONE HYDROCHLORIDE 10 MG: 10 TABLET ORAL at 11:06

## 2023-06-21 RX ADMIN — VANCOMYCIN HYDROCHLORIDE 1750 MG: 1 INJECTION, POWDER, LYOPHILIZED, FOR SOLUTION INTRAVENOUS at 01:06

## 2023-06-21 RX ADMIN — Medication 1 PATCH: at 09:06

## 2023-06-21 RX ADMIN — HEPARIN SODIUM 5000 UNITS: 5000 INJECTION INTRAVENOUS; SUBCUTANEOUS at 02:06

## 2023-06-21 RX ADMIN — HYDROMORPHONE HYDROCHLORIDE 1 MG: 1 INJECTION, SOLUTION INTRAMUSCULAR; INTRAVENOUS; SUBCUTANEOUS at 09:06

## 2023-06-21 RX ADMIN — HYDROMORPHONE HYDROCHLORIDE 1 MG: 1 INJECTION, SOLUTION INTRAMUSCULAR; INTRAVENOUS; SUBCUTANEOUS at 08:06

## 2023-06-21 RX ADMIN — DIAZEPAM 2 MG: 2 TABLET ORAL at 03:06

## 2023-06-21 RX ADMIN — METRONIDAZOLE 500 MG: 500 INJECTION, SOLUTION INTRAVENOUS at 06:06

## 2023-06-21 RX ADMIN — VANCOMYCIN HYDROCHLORIDE 1500 MG: 1.5 INJECTION, POWDER, LYOPHILIZED, FOR SOLUTION INTRAVENOUS at 12:06

## 2023-06-21 RX ADMIN — HYDROMORPHONE HYDROCHLORIDE 1 MG: 1 INJECTION, SOLUTION INTRAMUSCULAR; INTRAVENOUS; SUBCUTANEOUS at 02:06

## 2023-06-21 RX ADMIN — HEPARIN SODIUM 5000 UNITS: 5000 INJECTION INTRAVENOUS; SUBCUTANEOUS at 06:06

## 2023-06-21 RX ADMIN — OXYCODONE HYDROCHLORIDE 10 MG: 10 TABLET ORAL at 06:06

## 2023-06-21 RX ADMIN — PIPERACILLIN AND TAZOBACTAM 4.5 G: 4; .5 INJECTION, POWDER, LYOPHILIZED, FOR SOLUTION INTRAVENOUS; PARENTERAL at 09:06

## 2023-06-21 NOTE — PLAN OF CARE
Recommendations  1) Advance diet slowly when medically able  -full liquids + Boost plus TID when medically able  -when appropriate for solids start with low fiber ( unless not needed per GI/surgery)  2) Weigh weekly   3) Nutrition education and handout given    4) Changed nutrition support to Custom PPN D 10 AA 2.25 @ 100 ml/hr + standard lipids, adjust lytes per nephrology discretion   (1580 kcal ( 66% EEN), 66 g protein ( 81% EPN)  -REC. PICC PLACEMENT:start Custom TPN D15 AA5 @ 90 ml/hr + standard lipids, adjust lytes per nephrology discretion  ( provides 108 g protein ( 100% EPN), 2033 kcal ( 85% EEN)    Goals: 1) PO Intakes > 50% of meals on full liquid diet at f/u 2) PPN at 90 ml/hr and diet advanced to full liquids in < 48 hr or plan for PICC placement  Nutrition Goal Status: not meeting/not met-continues  Communication of RD Recs:  (POC, sticky note, reviewed with MD/RN)

## 2023-06-21 NOTE — PROGRESS NOTES
Pharmacokinetic Assessment Follow Up: IV Vancomycin    Vancomycin serum concentration assessment(s):    The trough level was drawn correctly and can be used to guide therapy at this time. The measurement is within the desired definitive target range of 15 to 20 mcg/mL.    Vancomycin Regimen Plan:    Change regimen to Vancomycin 1500 mg IV every 8 hours with next serum trough concentration measured at 0030 prior to third dose on 6/22/23    Drug levels (last 3 results):  Recent Labs   Lab Result Units 06/19/23  2255 06/20/23  1554 06/21/23  0835   Vancomycin-Trough ug/mL 10.1 10.0 18.4       Pharmacy will continue to follow and monitor vancomycin.    Please contact pharmacy at extension [phone] for questions regarding this assessment.    Thank you for the consult,   Jane Magallanes       Patient brief summary:  Ruslan Caldwell is a 47 y.o. male initiated on antimicrobial therapy with IV Vancomycin for treatment of sepsis      Drug Allergies:   Review of patient's allergies indicates:  No Known Allergies    Actual Body Weight:   91.7 kg    Renal Function:   Estimated Creatinine Clearance: 139.4 mL/min (based on SCr of 0.8 mg/dL).,     Dialysis Method (if applicable):  N/A    CBC (last 72 hours):  Recent Labs   Lab Result Units 06/19/23  0609 06/20/23  0331 06/21/23  0310   WBC K/uL 21.83* 17.33* 18.84*   Hemoglobin g/dL 8.0* 8.9* 8.5*   Hematocrit % 24.9* 28.4* 27.8*   Platelets K/uL 887* 1,183* 1,189*   Gran % % 80.5* 76.6* 72.2   Lymph % % 8.1* 9.7* 13.6*   Mono % % 9.3 10.4 9.8   Eosinophil % % 1.1 2.2 2.8   Basophil % % 0.4 0.6 0.8   Differential Method  Automated Automated Automated       Metabolic Panel (last 72 hours):  Recent Labs   Lab Result Units 06/19/23  0609 06/20/23  0331 06/21/23  0310   Sodium mmol/L 131* 135* 129*   Potassium mmol/L 3.6 3.2* 3.1*   Chloride mmol/L 88* 84* 84*   CO2 mmol/L 33* 37* 33*   Glucose mg/dL 118* 119* 168*   BUN mg/dL 11 13 13   Creatinine mg/dL 0.7 0.7 0.8   Albumin g/dL 2.1*  2.4* 2.5*   Total Bilirubin mg/dL 0.8 0.7 0.6   Alkaline Phosphatase U/L 42* 78 88   AST U/L 14 16 12   ALT U/L 12 13 17   Magnesium mg/dL 1.6 1.9 1.5*   Phosphorus mg/dL 2.6* 3.3 3.2       Vancomycin Administrations:  vancomycin given in the last 96 hours                     vancomycin (VANCOCIN) 1,750 mg in dextrose 5 % (D5W) 500 mL IVPB (mg) 1,750 mg New Bag 06/21/23 0112     1,750 mg New Bag 06/20/23 1843    vancomycin 1,250 mg in dextrose 5 % (D5W) 250 mL IVPB (Vial-Mate) (mg) 1,250 mg New Bag 06/20/23 0848     1,250 mg New Bag  0007    vancomycin (VANCOCIN) 1,000 mg in dextrose 5 % (D5W) 250 mL IVPB (Vial-Mate) (mg) 1,000 mg New Bag 06/19/23 1625     1,000 mg New Bag  0818    vancomycin 1,250 mg in dextrose 5 % (D5W) 250 mL IVPB (Vial-Mate) (mg) 1,250 mg New Bag 06/18/23 2325    vancomycin 1,500 mg in dextrose 5 % (D5W) 250 mL IVPB (Vial-Mate) (mg) 1,500 mg New Bag 06/18/23 1504     1,500 mg New Bag  0234     1,500 mg New Bag 06/17/23 1440                    Microbiologic Results:  Microbiology Results (last 7 days)       Procedure Component Value Units Date/Time    Blood culture [[id number]] Collected: 06/17/23 1151    Order Status: Completed Specimen: Blood Updated: 06/20/23 1612     Blood Culture, Routine No Growth to date      No Growth to date      No Growth to date      No Growth to date    Blood culture [[id number]] Collected: 06/17/23 1151    Order Status: Completed Specimen: Blood Updated: 06/20/23 1612     Blood Culture, Routine No Growth to date      No Growth to date      No Growth to date      No Growth to date    Aerobic culture [[id number]] Collected: 06/17/23 1800    Order Status: Completed Specimen: Incision site from Abdomen Updated: 06/20/23 1416     Aerobic Bacterial Culture Further report to follow    Culture, Anaerobe [[id number]] Collected: 06/17/23 1800    Order Status: Completed Specimen: Abdominal from Abdomen Updated: 06/20/23 1137     Anaerobic Culture Culture in progress    AFB Culture  & Smear [[id number]] Collected: 06/17/23 1800    Order Status: Completed Specimen: Abdominal from Abdomen Updated: 06/19/23 1455     AFB Culture & Smear Culture in progress     AFB CULTURE STAIN No acid fast bacilli seen.    Gram stain [[id number]] Collected: 06/17/23 1800    Order Status: Completed Specimen: Abdominal from Abdomen Updated: 06/18/23 0010     Gram Stain Result Few WBC's      No organisms seen    Fungus culture [[id number]] Collected: 06/17/23 1800    Order Status: Sent Specimen: Abdominal from Abdomen Updated: 06/17/23 2229    Urine Culture High Risk [[id number]]     Order Status: Sent Specimen: Urine

## 2023-06-21 NOTE — PROGRESS NOTES
[facility] Surgery  Progress Note    Subjective:     History of Present Illness:  47-year-old male well known to me.  Presents with abdominal pain nausea and vomiting.  CT was suspicious for obstruction.  Currently he says back pain was much worse than his abdominal pain.  He says he has been having trouble controlling his pain at home.  He denies any recent flatus.  He does not have an NG tube and he is not vomiting currently.  He does not feel nauseated currently.      Post-Op Info:  Procedure(s) (LRB):  LAPAROTOMY, EXPLORATORY (N/A)   4 Days Post-Op     Interval History: having more flatus  Feels better    Medications:  Continuous Infusions:   TPN ADULT PERIPHERAL CUSTOM 90 mL/hr at 06/20/23 2151     Scheduled Meds:   ceFEPime (MAXIPIME) IVPB  2 g Intravenous Q8H    fat emulsion 20%  250 mL Intravenous Daily    heparin (porcine)  5,000 Units Subcutaneous Q8H    metronidazole  500 mg Intravenous Q8H    nicotine  1 patch Transdermal Daily    vancomycin (VANCOCIN) IVPB  1,750 mg Intravenous Q8H     PRN Meds:sodium chloride, diazePAM, HYDROmorphone, naloxone, ondansetron, oxyCODONE, prochlorperazine, promethazine (PHENERGAN) IVPB, simethicone, sodium chloride 0.9%, sodium phosphate IVPB, sodium phosphate IVPB, sodium phosphate IVPB, Pharmacy to dose Vancomycin consult **AND** vancomycin - pharmacy to dose     Review of patient's allergies indicates:  No Known Allergies  Objective:     Vital Signs (Most Recent):  Temp: 97.3 °F (36.3 °C) (06/21/23 0311)  Pulse: 99 (06/21/23 0311)  Resp: 18 (06/21/23 0311)  BP: (!) 157/75 (06/21/23 0311)  SpO2: 96 % (06/21/23 0311) Vital Signs (24h Range):  Temp:  [96.2 °F (35.7 °C)-98.1 °F (36.7 °C)] 97.3 °F (36.3 °C)  Pulse:  [] 99  Resp:  [16-18] 18  SpO2:  [93 %-100 %] 96 %  BP: (128-157)/(75-88) 157/75     Weight: 91.7 kg (202 lb 2.6 oz)  Body mass index is 25.27 kg/m².    Intake/Output - Last 3 Shifts         06/19 0700  06/20 0659 06/20  0700  06/21 0659 06/21 0700  06/22 0659    P.O.  870     I.V. (mL/kg) 50 (0.5)      IV Piggyback 2477.8      TPN 2709.8      Total Intake(mL/kg) 5237.6 (51.6) 870 (9.5)     Urine (mL/kg/hr) 1550 (0.6) 850 (0.4)     Drains 4450 600     Stool  0     Total Output 6000 1450     Net -762.4 -580            Urine Occurrence  1 x     Stool Occurrence  2 x              Physical Exam  Abdominal:      General: There is no distension.      Palpations: Abdomen is soft.      Tenderness: There is no abdominal tenderness.        Significant Labs:  I have reviewed all pertinent lab results within the past 24 hours.  CBC:   Recent Labs   Lab 06/21/23  0310   WBC 18.84*   RBC 4.14*   HGB 8.5*   HCT 27.8*   PLT 1,189*   MCV 67*   MCH 20.5*   MCHC 30.6*     BMP:   Recent Labs   Lab 06/21/23  0310   *   *   K 3.1*   CL 84*   CO2 33*   BUN 13   CREATININE 0.8   CALCIUM 9.6   MG 1.5*       Significant Diagnostics:  I have reviewed all pertinent imaging results/findings within the past 24 hours.    Assessment/Plan:     * Small bowel obstruction  Ruslan Caldwell is a 47yoM who underwent open right hemicolectomy on 5/29/23 who represents with what appears to be a small bowel obstruction.     4 Days Post-Op    Re exploration (3rd surgery)- washout hematoma    Dc ngt  Cl diet  Continue antibiotics        Edin Michael MD  General Surgery  [facility]

## 2023-06-21 NOTE — ASSESSMENT & PLAN NOTE
Ruslan Cadlwell is a 47yoM who underwent open right hemicolectomy on 5/29/23 who represents with what appears to be a small bowel obstruction.     4 Days Post-Op    Re exploration (3rd surgery)- washout hematoma    Dc ngt  Cl diet  Continue antibiotics

## 2023-06-21 NOTE — PROGRESS NOTES
6/21/2023 Vancomycin Follow Up     Ruslan Caldwell is a 47 y.o. male being treated for sepsis. Goal 15 to 20 mcg/mL.     Vancomycin level has been adjusted to 6/22/23 @ 0300.     Jane Magallanes, MarioD

## 2023-06-21 NOTE — SUBJECTIVE & OBJECTIVE
Interval History: having more flatus  Feels better    Medications:  Continuous Infusions:   TPN ADULT PERIPHERAL CUSTOM 90 mL/hr at 06/20/23 2151     Scheduled Meds:   ceFEPime (MAXIPIME) IVPB  2 g Intravenous Q8H    fat emulsion 20%  250 mL Intravenous Daily    heparin (porcine)  5,000 Units Subcutaneous Q8H    metronidazole  500 mg Intravenous Q8H    nicotine  1 patch Transdermal Daily    vancomycin (VANCOCIN) IVPB  1,750 mg Intravenous Q8H     PRN Meds:sodium chloride, diazePAM, HYDROmorphone, naloxone, ondansetron, oxyCODONE, prochlorperazine, promethazine (PHENERGAN) IVPB, simethicone, sodium chloride 0.9%, sodium phosphate IVPB, sodium phosphate IVPB, sodium phosphate IVPB, Pharmacy to dose Vancomycin consult **AND** vancomycin - pharmacy to dose     Review of patient's allergies indicates:  No Known Allergies  Objective:     Vital Signs (Most Recent):  Temp: 97.3 °F (36.3 °C) (06/21/23 0311)  Pulse: 99 (06/21/23 0311)  Resp: 18 (06/21/23 0311)  BP: (!) 157/75 (06/21/23 0311)  SpO2: 96 % (06/21/23 0311) Vital Signs (24h Range):  Temp:  [96.2 °F (35.7 °C)-98.1 °F (36.7 °C)] 97.3 °F (36.3 °C)  Pulse:  [] 99  Resp:  [16-18] 18  SpO2:  [93 %-100 %] 96 %  BP: (128-157)/(75-88) 157/75     Weight: 91.7 kg (202 lb 2.6 oz)  Body mass index is 25.27 kg/m².    Intake/Output - Last 3 Shifts         06/19 0700 06/20 0659 06/20 0700 06/21 0659 06/21 0700 06/22 0659    P.O.  870     I.V. (mL/kg) 50 (0.5)      IV Piggyback 2477.8      TPN 2709.8      Total Intake(mL/kg) 5237.6 (51.6) 870 (9.5)     Urine (mL/kg/hr) 1550 (0.6) 850 (0.4)     Drains 4450 600     Stool  0     Total Output 6000 1450     Net -762.4 -580            Urine Occurrence  1 x     Stool Occurrence  2 x              Physical Exam  Abdominal:      General: There is no distension.      Palpations: Abdomen is soft.      Tenderness: There is no abdominal tenderness.        Significant Labs:  I have reviewed all pertinent lab results within the past 24  hours.  CBC:   Recent Labs   Lab 06/21/23 0310   WBC 18.84*   RBC 4.14*   HGB 8.5*   HCT 27.8*   PLT 1,189*   MCV 67*   MCH 20.5*   MCHC 30.6*     BMP:   Recent Labs   Lab 06/21/23 0310   *   *   K 3.1*   CL 84*   CO2 33*   BUN 13   CREATININE 0.8   CALCIUM 9.6   MG 1.5*       Significant Diagnostics:  I have reviewed all pertinent imaging results/findings within the past 24 hours.

## 2023-06-21 NOTE — PROGRESS NOTES
[facility] Hospital Medicine  Progress Note    Patient Name: Ruslan Caldwell  MRN: [id number]  Patient Class: IP- Inpatient   Admission Date: 6/11/2023  Length of Stay: 9 days  Attending Physician: Marybeth Duran MD  Primary Care Provider: Amira Knutson NP        Subjective:     Principal Problem:Small bowel obstruction        HPI:  Ruslan Caldwell is a 47-year-old male who presents in transfer from [facility] for evaluation of possible developing small bowel obstruction.  Patient presents outside facility complaining of abdominal pain with nausea and vomiting.  Workup at outside facility demonstrated leukocytosis of 16,000, anemia, and thrombocytosis of 843.  CMP with hypokalemia 3.3 and total bilirubin of 1.2.  CT scan at outside facility was concerning for developing small-bowel obstruction.  Patient was admitted to [place] where he underwent right partial colectomy on 05/29 with pathology consistent with adenocarcinoma.  He was discharged and then came back for another admission on 06/08 where he was treated for an ileus.  Previous medical history includes type 2 diabetes, anemia, hypertension, and right eye blindness.  Patient admitted to Hospital Medicine for treatment management.  Will maintain patient NPO, IV fluids, sliding scale insulin p.r.n., and general surgery consult in a.m..      Overview/Hospital Course:  Ruslan Caldwell is a 47 year old male with a past medical history of recently diagnosed colonic adenocarcinoma s/p partial colectomy with anastomosis, tobacco use, microcytic anemia and thrombocytosis who presented with persistent abdominal pain, nausea and abdominal pain concerning for SBO. An NG tube was unable to be placed. He was placed on IV fluids and Zosyn. He was made NPO. PRN IV analgesics and antiemetics were ordered and General Surgery was consulted. Repeat CT A/P shows SBO 6/13. A gastrografin enema 6/14 was inconclusive. Surgery took the  patient to the OR 6/15 for exploratory laparotomy where an omental infarction was addressed; the patient also underwent SCOUT and partial small bowel resection with anastomosis. PPN was started 6/16. His course has been complicated by JOYCE in setting of vomiting as well as Toradol use; IV fluids were continued and his kidney function has improved to baseline. Nephrology has been consulted. He was also discovered to have an iron deficiency for which IV iron (one dose) was ordered 6/13. Oncology has also been consulted and will arrange follow up in clinic. His post-operative course has been complicated by low grade fever, tachycardia and an increasing WBC count, despite Zosyn use. Antibiotics were broadened to vancomycin, cefepime and Flagyl. Blood cultures were ordered as well as a CXR and urine culture.       Interval History:  Patient seen and examined.  NG tube is been removed and he is tolerating clears.  He has a left basilic vein thrombus, the midline and the arm has been removed.  Adjusting PPN with electrolytes.  Review of Systems   Constitutional:  Negative for fever.   Gastrointestinal:  Positive for abdominal pain. Negative for nausea and vomiting.   Skin:  Positive for wound.   Allergic/Immunologic: Positive for immunocompromised state.   Objective:     Vital Signs (Most Recent):  Temp: 97.9 °F (36.6 °C) (06/21/23 0720)  Pulse: (!) 119 (06/21/23 0720)  Resp: 18 (06/21/23 0901)  BP: 124/80 (06/21/23 0720)  SpO2: 95 % (06/21/23 0720) Vital Signs (24h Range):  Temp:  [96.2 °F (35.7 °C)-98.1 °F (36.7 °C)] 97.9 °F (36.6 °C)  Pulse:  [] 119  Resp:  [16-18] 18  SpO2:  [93 %-100 %] 95 %  BP: (124-157)/(75-88) 124/80     Weight: 91.7 kg (202 lb 2.6 oz)  Body mass index is 25.27 kg/m².    Intake/Output Summary (Last 24 hours) at 6/21/2023 1110  Last data filed at 6/21/2023 0745  Gross per 24 hour   Intake 840 ml   Output 1750 ml   Net -910 ml           Physical Exam  Vitals and nursing note reviewed.    Constitutional:       General: He is not in acute distress.     Appearance: Normal appearance. He is ill-appearing.   HENT:      Head: Normocephalic and atraumatic.      Right Ear: External ear normal.      Left Ear: External ear normal.      Nose: Nose normal.      Mouth/Throat:      Mouth: Mucous membranes are moist.      Pharynx: Oropharynx is clear.   Eyes:      Extraocular Movements: Extraocular movements intact.   Cardiovascular:      Rate and Rhythm: Normal rate and regular rhythm.      Pulses: Normal pulses.      Heart sounds: Normal heart sounds.   Pulmonary:      Effort: Pulmonary effort is normal. No respiratory distress.      Breath sounds: Normal breath sounds.   Abdominal:      Tenderness: There is abdominal tenderness.      Comments: Wound dressing clean, dry and intact.   Musculoskeletal:         General: Normal range of motion.      Cervical back: Normal range of motion and neck supple.      Right lower leg: No edema.      Left lower leg: No edema.   Skin:     General: Skin is warm and dry.   Neurological:      Mental Status: He is alert. Mental status is at baseline.   Psychiatric:         Mood and Affect: Mood normal.         Behavior: Behavior normal.           Significant Labs: All pertinent labs within the past 24 hours have been reviewed.    Significant Imaging: I have reviewed all pertinent imaging results/findings within the past 24 hours.      Assessment/Plan:      * Small bowel obstruction  Acute problem. -NPO  -Await return of bowel function  -PPN with lipids until diet advanced  -IV fluids NS stopped 6/17 given worsening hyponatremia  -Dilaudid PCA and PRN  -Zofran PRN  -Surgery consulted; exploratory laparotomy 6/15 with small bowel removal with anastomosis and SCOUT  -Zosyn changed to vancomycin, cefepime and Flagyl 6/17  -underwent repeat surgery 6/17 for free air, abscess seen, no perforation        Hypophosphatemia  -Replete IV until patient can tolerate PO  -Trend  P      Hyponatremia  Possible SIADH in setting of pain.  -Hold NS IV fluids  -Trend Na  -Nephrology following      Sepsis  Continue vancomycin, cefepime and Flagyl    Omental infarction  -Area removed by Surgery   -Follow up pathology      Tobacco use  -Patient to be counseled on cessation  -Nicotine patch      Moderate malnutrition  -Diet when appropriate  -Nutrition consulted  -PPN with lipids    Thrombocytosis  Chronic. Stable. In setting of recently diagnosed adenocarcinoma and SBO.  -Trend platelets with CBC      Colon adenocarcinoma  Recent partial colectomy.  -Oncology consulted; follow up in clinic        Iron deficiency anemia  Suspect mixed iron deficiency and anemia of chronic inflammation.  -IV iron one dose 6/13  -Trend Hgb with CBC      Essential hypertension  Patient not taking any BP medications.  -Continue to monitor        VTE Risk Mitigation (From admission, onward)         Ordered     heparin (porcine) injection 5,000 Units  Every 8 hours         06/16/23 0738     Place DINA hose  Until discontinued         06/11/23 2043     IP VTE HIGH RISK PATIENT  Once         06/11/23 2043     Place sequential compression device  Until discontinued         06/11/23 2043                Discharge Planning   GABRIEL: 6/23/2023     Code Status: Full Code   Is the patient medically ready for discharge?:     Reason for patient still in hospital (select all that apply): Patient trending condition and Treatment  Discharge Plan A: Home                  Marybeth Duran MD  Department of Hospital Medicine   [facility]

## 2023-06-21 NOTE — PROGRESS NOTES
INPATIENT NEPHROLOGY Progress Note  [facility] NEPHROLOGY    Ruslan Caldwell  06/21/2023    Reason for consultation:  Acute kidney injury    Chief Complaint: SBO    History of Present Illness:    Per H and P    Ruslan Caldwell is a 47-year-old male who presents in transfer from [facility] for evaluation of possible developing small bowel obstruction.  Patient presents outside facility complaining of abdominal pain with nausea and vomiting.  Workup at outside facility demonstrated leukocytosis of 16,000, anemia, and thrombocytosis of 843.  CMP with hypokalemia 3.3 and total bilirubin of 1.2.  CT scan at outside facility was concerning for developing small-bowel obstruction.  Patient was admitted to [place] where he underwent right partial colectomy on 05/29 with pathology consistent with adenocarcinoma.  He was discharged and then came back for another admission on 06/08 where he was treated for an ileus.  Previous medical history includes type 2 diabetes, anemia, hypertension, and right eye blindness.  Patient admitted to Hospital Medicine for treatment management    6/16 Consulted for JOYCE.   Pt POD1 s/p exploratory laparotomy with small bowel resection and omental resection.   He has abdominal pain and bloating.  No chest pain, sob, neurologic symptoms, vomiting, or confusion.  He is a little somnolent on his analgesics  6/17 VSS. Worsening anemia, hypophosphatemia, hyponatremia. Fever yesterday evening with tachycardia, worsening WBC.  6/18 VSS, s/p re-operation on 6/17, evacuation for multiple abscess, no obvious perforation, NG placement with stomach content decompression.  6/19 VSS, on 1.5L NC, UOP 2L,c/w NGT, will start CLD for comfort, on clinimix as well  6/20 customize clinimix today, VSS, on 1L NC, UOP 1.5L, rodriguez removed  6/21 customize TPN again today- if this doesn't work, will need PICC for further customization, UOP 850cc, NGT out, still on CLD (not happy about it), passing  gas    Plan of Care:    JOYCE due to volume depletion, toradol, hemodynamically mediated renal injury, elevated CPK (sepsis due to post-op abscess, evacuated on 6/17)  --JOYCE has resolved  --nonoliguric  --rodriguez out  --no nsaids or IV contrast    Hyponatremia  Hypokalemia  Hypomg  Hypophosphatemia  Alkalosis  --control pain and nausea -> SIADH  --customize clinimix today    Anemia/Thrombocytosis   --hematology/oncology is on the case    Thank you for allowing us to participate in this patient's care. We will continue to follow.    Vital Signs:  Temp Readings from Last 3 Encounters:   06/21/23 98.5 °F (36.9 °C)   06/11/23 98.4 °F (36.9 °C) (Oral)   06/10/23 97.8 °F (36.6 °C)       Pulse Readings from Last 3 Encounters:   06/21/23 (!) 125   06/11/23 (!) 112   06/10/23 82       BP Readings from Last 3 Encounters:   06/21/23 121/73   06/11/23 124/78   06/10/23 (!) 135/91       Weight:  Wt Readings from Last 3 Encounters:   06/21/23 91.7 kg (202 lb 2.6 oz)   06/11/23 102.1 kg (225 lb)   06/09/23 102 kg (224 lb 13.9 oz)       Medications:  No current facility-administered medications on file prior to encounter.     Current Outpatient Medications on File Prior to Encounter   Medication Sig Dispense Refill    gabapentin (NEURONTIN) 300 MG capsule Take 1 capsule (300 mg total) by mouth 2 (two) times daily. for 14 days 28 capsule 0     Scheduled Meds:   fat emulsion 20%  250 mL Intravenous Daily    heparin (porcine)  5,000 Units Subcutaneous Q8H    nicotine  1 patch Transdermal Daily    piperacillin-tazobactam (Zosyn) IV (PEDS and ADULTS) (extended infusion is not appropriate)  4.5 g Intravenous Q8H    vancomycin (VANCOCIN) IVPB  1,500 mg Intravenous Q8H     Continuous Infusions:   TPN ADULT PERIPHERAL CUSTOM 90 mL/hr at 06/20/23 2151    TPN ADULT PERIPHERAL CUSTOM       PRN Meds:.sodium chloride, diazePAM, HYDROmorphone, naloxone, ondansetron, oxyCODONE, prochlorperazine, promethazine (PHENERGAN) IVPB, simethicone, sodium  "chloride 0.9%, sodium phosphate IVPB, sodium phosphate IVPB, sodium phosphate IVPB, Pharmacy to dose Vancomycin consult **AND** vancomycin - pharmacy to dose    Review of Systems:  Neg    Physical Exam:    /73   Pulse (!) 125   Temp 98.5 °F (36.9 °C)   Resp 20   Ht 6' 3" (1.905 m)   Wt 91.7 kg (202 lb 2.6 oz)   SpO2 97%   BMI 25.27 kg/m²     General Appearance:    Alert, cooperative, no distress, appears stated age   Head:    Normocephalic, without obvious abnormality, atraumatic   Eyes:    PER, conjunctiva/corneas clear, EOM's intact in both eyes        Throat:   Lips, mucosa, and tongue normal; teeth and gums normal   Back:     Symmetric, no curvature, ROM normal, no CVA tenderness   Lungs:     Clear to auscultation bilaterally, respirations unlabored   Chest wall:    No tenderness or deformity   Heart:    Regular rate and rhythm, S1 and S2 normal, no murmur, rub   or gallop   Abdomen:     Soft, non-tender, bowel sounds active all four quadrants,     no masses, no organomegaly   Extremities:   Extremities normal, atraumatic, no cyanosis or edema   Pulses:   2+ and symmetric all extremities   MSK:   No joint or muscle swelling, tenderness or deformity   Skin:   Skin color, texture, turgor normal, no rashes or lesions   Neurologic:   CNII-XII intact, normal strength and sensation       Throughout.  No flap     Results:  Recent Labs   Lab 06/19/23  0609 06/20/23  0331 06/21/23  0310   * 135* 129*   K 3.6 3.2* 3.1*   CL 88* 84* 84*   CO2 33* 37* 33*   BUN 11 13 13   CREATININE 0.7 0.7 0.8   * 119* 168*         Recent Labs   Lab 06/19/23  0609 06/20/23  0331 06/21/23  0310   CALCIUM 8.9 10.0 9.6   ALBUMIN 2.1* 2.4* 2.5*   PHOS 2.6* 3.3 3.2   MG 1.6 1.9 1.5*               No results for input(s): POCTGLUCOSE in the last 168 hours.      Recent Labs   Lab 09/14/21  0408 06/08/23  0607   Hemoglobin A1C 4.8 5.0         Recent Labs   Lab 06/19/23  0609 06/20/23  0331 06/21/23  0310   WBC 21.83* " 17.33* 18.84*   HGB 8.0* 8.9* 8.5*   HCT 24.9* 28.4* 27.8*   * 1,183* 1,189*   MCV 67* 68* 67*   MCHC 32.1 31.3* 30.6*   MONO 9.3  2.0* 10.4  1.8* 9.8  1.9*         Recent Labs   Lab 06/19/23  0609 06/20/23  0331 06/21/23  0310   BILITOT 0.8 0.7 0.6   PROT 6.3 7.4 7.4   ALBUMIN 2.1* 2.4* 2.5*   ALKPHOS 42* 78 88   ALT 12 13 17   AST 14 16 12         Recent Labs   Lab 05/28/23  0038 06/11/23  1614 06/16/23  1805   Color, UA Yellow Yellow Yellow   Appearance, UA Clear Clear Clear   pH, UA 7.0 6.0 7.0   Specific Johnston, UA 1.020 1.020 1.010   Protein, UA 1+ A 2+ A 2+ A   Glucose, UA Negative Negative Negative   Ketones, UA Negative 2+ A Negative   Urobilinogen, UA Negative Negative Negative   Bilirubin (UA) Negative 2+ A Negative   Occult Blood UA Negative Negative 1+ A   Nitrite, UA Negative Negative Negative   RBC, UA 0 2 14 H   WBC, UA 1 10 H 8 H   Bacteria None Occasional Occasional   Hyaline Casts, UA 0 2 A 0               Microbiology Results (last 7 days)       Procedure Component Value Units Date/Time    Blood culture [[id number]] Collected: 06/17/23 1151    Order Status: Completed Specimen: Blood Updated: 06/20/23 1612     Blood Culture, Routine No Growth to date      No Growth to date      No Growth to date      No Growth to date    Blood culture [[id number]] Collected: 06/17/23 1151    Order Status: Completed Specimen: Blood Updated: 06/20/23 1612     Blood Culture, Routine No Growth to date      No Growth to date      No Growth to date      No Growth to date    Aerobic culture [[id number]] Collected: 06/17/23 1800    Order Status: Completed Specimen: Incision site from Abdomen Updated: 06/20/23 1416     Aerobic Bacterial Culture Further report to follow    Culture, Anaerobe [[id number]] Collected: 06/17/23 1800    Order Status: Completed Specimen: Abdominal from Abdomen Updated: 06/20/23 1137     Anaerobic Culture Culture in progress    AFB Culture & Smear [[id number]] Collected: 06/17/23 1800     Order Status: Completed Specimen: Abdominal from Abdomen Updated: 06/19/23 1455     AFB Culture & Smear Culture in progress     AFB CULTURE STAIN No acid fast bacilli seen.    Gram stain [[id number]] Collected: 06/17/23 1800    Order Status: Completed Specimen: Abdominal from Abdomen Updated: 06/18/23 0010     Gram Stain Result Few WBC's      No organisms seen    Fungus culture [[id number]] Collected: 06/17/23 1800    Order Status: Sent Specimen: Abdominal from Abdomen Updated: 06/17/23 2229    Urine Culture High Risk [[id number]]     Order Status: Sent Specimen: Urine           Patient care time was spent personally by me on the following activities: > 35 minutes  Obtaining a history.  Examination of patient.  Providing medical care at the patients bedside.  Developing a treatment plan with patient or surrogate and bedside caregivers.  Ordering and reviewing laboratory studies, radiographic studies, pulse oximetry.  Ordering and performing treatments and interventions.  Evaluation of patient's response to treatment.  Discussions with consultants while on the unit and immediately available to the patient.  Re-evaluation of the patient's condition.  Documentation in the medical record.    Jo Montes De Oca MD  Nephrology  [facility]  [phone]

## 2023-06-21 NOTE — PLAN OF CARE
POC/Meds reviewed, pt verbalized understanding. Vitals stable. Afebrile. Remains on room air. IVPB abx administered. Tele In place-NSR/ST. Up with x1 assist to bathroom. IS at bedside, instructed on use and return demonstration performed. PRN pain meds administered. NG tube removed. Repositions self with occasional weight shifting assistance provided. Hourly/Q2hr rounding performed, safety maintained. Bed in lowest position, wheels locked, SR up x2, call light in easy reach. No complaints at this time. Will continue to monitor.

## 2023-06-21 NOTE — SUBJECTIVE & OBJECTIVE
Interval History:  Patient seen and examined.  NG tube is been removed and he is tolerating clears.  He has a left basilic vein thrombus, the midline and the arm has been removed.  Adjusting PPN with electrolytes.  Review of Systems   Constitutional:  Negative for fever.   Gastrointestinal:  Positive for abdominal pain. Negative for nausea and vomiting.   Skin:  Positive for wound.   Allergic/Immunologic: Positive for immunocompromised state.   Objective:     Vital Signs (Most Recent):  Temp: 97.9 °F (36.6 °C) (06/21/23 0720)  Pulse: (!) 119 (06/21/23 0720)  Resp: 18 (06/21/23 0901)  BP: 124/80 (06/21/23 0720)  SpO2: 95 % (06/21/23 0720) Vital Signs (24h Range):  Temp:  [96.2 °F (35.7 °C)-98.1 °F (36.7 °C)] 97.9 °F (36.6 °C)  Pulse:  [] 119  Resp:  [16-18] 18  SpO2:  [93 %-100 %] 95 %  BP: (124-157)/(75-88) 124/80     Weight: 91.7 kg (202 lb 2.6 oz)  Body mass index is 25.27 kg/m².    Intake/Output Summary (Last 24 hours) at 6/21/2023 1110  Last data filed at 6/21/2023 0745  Gross per 24 hour   Intake 840 ml   Output 1750 ml   Net -910 ml           Physical Exam  Vitals and nursing note reviewed.   Constitutional:       General: He is not in acute distress.     Appearance: Normal appearance. He is ill-appearing.   HENT:      Head: Normocephalic and atraumatic.      Right Ear: External ear normal.      Left Ear: External ear normal.      Nose: Nose normal.      Mouth/Throat:      Mouth: Mucous membranes are moist.      Pharynx: Oropharynx is clear.   Eyes:      Extraocular Movements: Extraocular movements intact.   Cardiovascular:      Rate and Rhythm: Normal rate and regular rhythm.      Pulses: Normal pulses.      Heart sounds: Normal heart sounds.   Pulmonary:      Effort: Pulmonary effort is normal. No respiratory distress.      Breath sounds: Normal breath sounds.   Abdominal:      Tenderness: There is abdominal tenderness.      Comments: Wound dressing clean, dry and intact.   Musculoskeletal:          General: Normal range of motion.      Cervical back: Normal range of motion and neck supple.      Right lower leg: No edema.      Left lower leg: No edema.   Skin:     General: Skin is warm and dry.   Neurological:      Mental Status: He is alert. Mental status is at baseline.   Psychiatric:         Mood and Affect: Mood normal.         Behavior: Behavior normal.           Significant Labs: All pertinent labs within the past 24 hours have been reviewed.    Significant Imaging: I have reviewed all pertinent imaging results/findings within the past 24 hours.

## 2023-06-21 NOTE — PLAN OF CARE
POC/Meds reviewed, pt verbalized understanding. Vitals stable.  Afebrile.   Tele In place-[id number]. Tpn infusing. Juan Ramon drains monitored. Ngt to low int suction. Prn medication given. Voiding per urinal. Up with x1 assist. Repositions self. Hourly/Q2hr rounding performed, safety maintained. Bed in lowest position, wheels locked, SR up x2, call light in easy reach. No  complaints at this time.

## 2023-06-21 NOTE — PROGRESS NOTES
[facility]  Adult Nutrition  Follow up Note    SUMMARY      Recommendations  1) Advance diet slowly when medically able  -full liquids + Boost plus TID when medically able  -when appropriate for solids start with low fiber ( unless not needed per GI/surgery)  2) Weigh weekly   3) Nutrition education and handout given    4) Changed nutrition support to Custom PPN D 10 AA 2.25 @ 100 ml/hr + standard lipids, adjust lytes per nephrology discretion   (1580 kcal ( 66% EEN), 66 g protein ( 81% EPN)  -REC. PICC PLACEMENT:start Custom TPN D15 AA5 @ 90 ml/hr + standard lipids, adjust lytes per nephrology discretion  ( provides 108 g protein ( 100% EPN), 2033 kcal ( 85% EEN)    Goals: 1) PO Intakes > 50% of meals on full liquid diet at f/u 2) PPN at 90 ml/hr and diet advanced to full liquids in < 48 hr or plan for PICC placement  Nutrition Goal Status: not meeting/not met-continues  Communication of RD Recs:  (POC, sticky note, reviewed with MD/RN)     Assessment and Plan        Moderate malnutrition  Malnutrition Type:  Context: social/environmental circumstances  Level: moderate     Related to (etiology):   Pain, nausea, altered GI function     Signs and Symptoms (as evidenced by):      Malnutrition Characteristic Summary:  Weight Loss (Malnutrition): greater than 7.5% in 3 months  Energy Intake (Malnutrition): less than 75% for greater than or equal to 3 months        Interventions/Recommendations (treatment strategy):  Collaboration with other providers  Nutrition Diagnosis Status:   continues           Malnutrition Assessment  Malnutrition Context: social/environmental circumstances  Malnutrition Level: moderate  Skin (Micronutrient):  (Mitul = 17, Peripheral IV)  Teeth (Micronutrient): none   Micronutrient Evaluation Summary: no deficiencies   Weight Loss (Malnutrition): greater than 7.5% in 3 months ( per chart review in < 1 month)  Energy Intake (Malnutrition): less than 75% for greater than or  equal to 3 months     Reason for Assessment     Reason For Assessment: follow up  Diagnosis:  (SBO)  Relevant Medical History: CA s/p R. Colectomy 5/29/23, HTN, DM2- pt denies ( A1C 5), R. eye blindness  Interdisciplinary Rounds: attended     General Information Comments: 48 y/o male admitted with SBO. Was just admittied with ileus s/p recent colon surgery last month. Since surgery pt has had a hard time advancing diet, feels distended and has N/V. Surgery to see. PTA pt says that he has had decreased appetite r/t pain and nausea for the past couple months along with ~41 lb wt loss. NFPE WDL 6/12/23. Significant wt loss per chart review.  6/14/23 NPO going on day 4 this hospital stay. Poor PO intakes/trouble with diet advancement x > 2 weeks now on top of decreased PO intakes x the last 3 months. 2 episodes of N/V noted x 24 hr. Discussed need for PPN if unable to tolerate diet advancement in < 48 hr. Surgery/GI eval pending, possible ileus.  6/19/23 POD 2 ex LAP, drainage of intra-abd. Abscess + NG. NPO x day 8 inpatient, feeling better today. Standard PPN @ 75 ml/hr + lipids ordered 6/16 and standard PPN @ 90 ml/hr + lipids ordered 6/17, but nothing yesterday. Restarted PPN today, discussed need for PICC for TPN if pt unable to advance PO diet to full liquids in < 48 hr.   6/21/23 POD 4, 3rd surgery per note. NGT removed, pt still on clear liquids per surgery recommendation, tolerating small amounts, and PPN was infusing @ goal of 90 ml/hr. Nephrology needs to adjust lytes so PPN changed to custom. Pt requiring more NaCl than is compatible in PPN and may need PICC placement if unable to advance diet tomorrow.     Nutrition Discharge Planning: To be determined- DM 1800 kcal, low sodium diet-low fiber recommended     Nutrition Risk Screen     Nutrition Risk Screen: reduced oral intake over the last month, unintentional loss of 10 lbs or more in the past 2 months     Nutrition/Diet History     Patient Reported  "Diet/Restrictions/Preferences: general  Spiritual, Cultural Beliefs, [employer] Practices, Values that Affect Care: no  Food Allergies: NKFA  Factors Affecting Nutritional Intake: altered gastrointestinal function, clear liquid diet     Anthropometrics    Height Method: Stated  Height: 6' 3"  Height (inches): 75 in  Weight Method: Bed Scale  Weight: 91.7 kg 23, 102.1 kg (23)  Weight (lb): 202 lb  9% wt loss in < 1 week per chart review  Ideal Body Weight (IBW), Male: 196 lb  BMI (Calculated): 25 kg/m2  BMI Grade: 25 - 29.9 - overweight  Weight Loss: unintentional  Usual Body Weight (UBW), k.4 kg (~ 3 months ago per pt)     Lab/Procedures/Meds     Pertinent Labs Reviewed: reviewed  BMP  Lab Results   Component Value Date     (L) 2023    K 3.1 (L) 2023    CL 84 (L) 2023    CO2 33 (H) 2023    BUN 13 2023    CREATININE 0.8 2023    CALCIUM 9.6 2023    ANIONGAP 12 2023    EGFRNORACEVR >60 2023     Lab Results   Component Value Date    ALBUMIN 2.5 (L) 2023     Lab Results   Component Value Date    CALCIUM 9.6 2023    PHOS 3.2 2023     No results for input(s): POCTGLUCOSE in the last 24 hours.    Pertinent Medications Reviewed: reviewed  Ondansetron, prochlorperazine, promethazine, NaPhos         Estimated/Assessed Needs     Weight Used For Calorie Calculations: 102.1 kg (224 lb 13.9 oz)  Energy Calorie Requirements (kcal): MSJ ( x 1.2) = 2390 kcal  Energy Need Method: [place] Meza  Protein Requirements: 0.8-1 g protein/kg (  g)  Weight Used For Protein Calculations: 102 kg (224 lb 13.9 oz)  Fluid Requirements (mL): 2400 ml or per MD  Estimated Fluid Requirement Method: RDA Method  CHO Requirement: 268-328 g        Nutrition Prescription Ordered     Current Diet Order: Clear liquid diet + PPN above     Evaluation of Received Nutrient/Fluid Intake     Energy Calories Required: not meeting needs-improving  Protein Required: " not meeting needs-improving  Fluid Required: meeting needs  Tolerance: tolerating    Intake/Output Summary (Last 24 hours) at 6/21/2023 1331  Last data filed at 6/21/2023 1200  Gross per 24 hour   Intake 600 ml   Output 1950 ml   Net -1350 ml     PPN @ 100 ml/hr + PO         % Intake of Estimated Energy Needs: 65-75% with PPN  % Meal Intake: 25% clears     Nutrition Risk     Level of Risk/Frequency of Follow-up:  (2 x weekly)      Monitor and Evaluation     Food and Nutrient Intake: energy intake  Food and Nutrient Adminstration: diet order, enteral and parenteral nutrition administration  Nutrition knowledge  Anthropometric Measurements: weight  Biochemical Data, Medical Tests and Procedures: electrolyte and renal panel, gastrointestinal profile, glucose/endocrine profile  Nutrition-Focused Physical Findings: overall appearance      Nutrition Follow-Up     RD Follow-up?: Yes

## 2023-06-22 LAB
ALBUMIN SERPL BCP-MCNC: 2.8 G/DL (ref 3.5–5.2)
ALP SERPL-CCNC: 101 U/L (ref 55–135)
ALT SERPL W/O P-5'-P-CCNC: 15 U/L (ref 10–44)
ANION GAP SERPL CALC-SCNC: 15 MMOL/L (ref 8–16)
ANISOCYTOSIS BLD QL SMEAR: ABNORMAL
AST SERPL-CCNC: 15 U/L (ref 10–40)
BACTERIA BLD CULT: NORMAL
BACTERIA BLD CULT: NORMAL
BASOPHILS # BLD AUTO: ABNORMAL K/UL (ref 0–0.2)
BASOPHILS NFR BLD: 0 % (ref 0–1.9)
BILIRUB SERPL-MCNC: 0.7 MG/DL (ref 0.1–1)
BUN SERPL-MCNC: 16 MG/DL (ref 6–20)
CALCIUM SERPL-MCNC: 9.8 MG/DL (ref 8.7–10.5)
CHLORIDE SERPL-SCNC: 83 MMOL/L (ref 95–110)
CO2 SERPL-SCNC: 34 MMOL/L (ref 23–29)
CREAT SERPL-MCNC: 1.2 MG/DL (ref 0.5–1.4)
DIFFERENTIAL METHOD: ABNORMAL
EOSINOPHIL # BLD AUTO: ABNORMAL K/UL (ref 0–0.5)
EOSINOPHIL NFR BLD: 2 % (ref 0–8)
ERYTHROCYTE [DISTWIDTH] IN BLOOD BY AUTOMATED COUNT: 20.9 % (ref 11.5–14.5)
EST. GFR  (NO RACE VARIABLE): >60 ML/MIN/1.73 M^2
FINAL PATHOLOGIC DIAGNOSIS: NORMAL
GLUCOSE SERPL-MCNC: 113 MG/DL (ref 70–110)
HCT VFR BLD AUTO: 30.1 % (ref 40–54)
HGB BLD-MCNC: 9.5 G/DL (ref 14–18)
HYPOCHROMIA BLD QL SMEAR: ABNORMAL
IMM GRANULOCYTES # BLD AUTO: ABNORMAL K/UL (ref 0–0.04)
IMM GRANULOCYTES NFR BLD AUTO: ABNORMAL % (ref 0–0.5)
LYMPHOCYTES # BLD AUTO: ABNORMAL K/UL (ref 1–4.8)
LYMPHOCYTES NFR BLD: 14 % (ref 18–48)
Lab: NORMAL
MAGNESIUM SERPL-MCNC: 1.7 MG/DL (ref 1.6–2.6)
MCH RBC QN AUTO: 21.3 PG (ref 27–31)
MCHC RBC AUTO-ENTMCNC: 31.6 G/DL (ref 32–36)
MCV RBC AUTO: 67 FL (ref 82–98)
MONOCYTES # BLD AUTO: ABNORMAL K/UL (ref 0.3–1)
MONOCYTES NFR BLD: 13 % (ref 4–15)
NEUTROPHILS NFR BLD: 71 % (ref 38–73)
NRBC BLD-RTO: 0 /100 WBC
OVALOCYTES BLD QL SMEAR: ABNORMAL
PHOSPHATE SERPL-MCNC: 4 MG/DL (ref 2.7–4.5)
PLATELET # BLD AUTO: 1264 K/UL (ref 150–450)
PLATELET BLD QL SMEAR: ABNORMAL
PMV BLD AUTO: 9.4 FL (ref 9.2–12.9)
POIKILOCYTOSIS BLD QL SMEAR: SLIGHT
POTASSIUM SERPL-SCNC: 2.9 MMOL/L (ref 3.5–5.1)
PROT SERPL-MCNC: 8.1 G/DL (ref 6–8.4)
RBC # BLD AUTO: 4.47 M/UL (ref 4.6–6.2)
SODIUM SERPL-SCNC: 132 MMOL/L (ref 136–145)
TARGETS BLD QL SMEAR: ABNORMAL
VANCOMYCIN TROUGH SERPL-MCNC: 11.7 UG/ML (ref 10–22)
VANCOMYCIN TROUGH SERPL-MCNC: 28.9 UG/ML (ref 10–22)
WBC # BLD AUTO: 19.14 K/UL (ref 3.9–12.7)

## 2023-06-22 PROCEDURE — 25000003 PHARM REV CODE 250: Performed by: INTERNAL MEDICINE

## 2023-06-22 PROCEDURE — 63600175 PHARM REV CODE 636 W HCPCS: Performed by: HOSPITALIST

## 2023-06-22 PROCEDURE — 99233 PR SUBSEQUENT HOSPITAL CARE,LEVL III: ICD-10-PCS | Mod: ,,, | Performed by: INTERNAL MEDICINE

## 2023-06-22 PROCEDURE — 80053 COMPREHEN METABOLIC PANEL: CPT | Performed by: SURGERY

## 2023-06-22 PROCEDURE — 12000002 HC ACUTE/MED SURGE SEMI-PRIVATE ROOM

## 2023-06-22 PROCEDURE — 83735 ASSAY OF MAGNESIUM: CPT | Performed by: SURGERY

## 2023-06-22 PROCEDURE — 84100 ASSAY OF PHOSPHORUS: CPT | Performed by: SURGERY

## 2023-06-22 PROCEDURE — 25000003 PHARM REV CODE 250: Performed by: SURGERY

## 2023-06-22 PROCEDURE — 94761 N-INVAS EAR/PLS OXIMETRY MLT: CPT

## 2023-06-22 PROCEDURE — 63600175 PHARM REV CODE 636 W HCPCS: Performed by: SURGERY

## 2023-06-22 PROCEDURE — 94799 UNLISTED PULMONARY SVC/PX: CPT

## 2023-06-22 PROCEDURE — 85007 BL SMEAR W/DIFF WBC COUNT: CPT | Performed by: SURGERY

## 2023-06-22 PROCEDURE — 99406 BEHAV CHNG SMOKING 3-10 MIN: CPT

## 2023-06-22 PROCEDURE — 80202 ASSAY OF VANCOMYCIN: CPT | Performed by: HOSPITALIST

## 2023-06-22 PROCEDURE — 99233 SBSQ HOSP IP/OBS HIGH 50: CPT | Mod: ,,, | Performed by: INTERNAL MEDICINE

## 2023-06-22 PROCEDURE — S4991 NICOTINE PATCH NONLEGEND: HCPCS | Performed by: SURGERY

## 2023-06-22 PROCEDURE — 36415 COLL VENOUS BLD VENIPUNCTURE: CPT | Performed by: HOSPITALIST

## 2023-06-22 PROCEDURE — 25000003 PHARM REV CODE 250: Performed by: HOSPITALIST

## 2023-06-22 PROCEDURE — 85027 COMPLETE CBC AUTOMATED: CPT | Performed by: SURGERY

## 2023-06-22 PROCEDURE — 25000003 PHARM REV CODE 250: Performed by: NURSE PRACTITIONER

## 2023-06-22 RX ORDER — POTASSIUM CHLORIDE 20 MEQ/1
40 TABLET, EXTENDED RELEASE ORAL ONCE
Status: COMPLETED | OUTPATIENT
Start: 2023-06-22 | End: 2023-06-22

## 2023-06-22 RX ORDER — POTASSIUM CHLORIDE 20 MEQ/1
40 TABLET, EXTENDED RELEASE ORAL 2 TIMES DAILY
Status: DISCONTINUED | OUTPATIENT
Start: 2023-06-22 | End: 2023-06-24 | Stop reason: HOSPADM

## 2023-06-22 RX ORDER — SODIUM CHLORIDE 9 MG/ML
INJECTION, SOLUTION INTRAVENOUS CONTINUOUS
Status: ACTIVE | OUTPATIENT
Start: 2023-06-22 | End: 2023-06-22

## 2023-06-22 RX ORDER — LANOLIN ALCOHOL/MO/W.PET/CERES
400 CREAM (GRAM) TOPICAL DAILY
Status: DISCONTINUED | OUTPATIENT
Start: 2023-06-22 | End: 2023-06-24 | Stop reason: HOSPADM

## 2023-06-22 RX ORDER — HYDROMORPHONE HYDROCHLORIDE 1 MG/ML
1 INJECTION, SOLUTION INTRAMUSCULAR; INTRAVENOUS; SUBCUTANEOUS ONCE
Status: COMPLETED | OUTPATIENT
Start: 2023-06-22 | End: 2023-06-22

## 2023-06-22 RX ORDER — POTASSIUM CHLORIDE 7.45 MG/ML
10 INJECTION INTRAVENOUS
Status: DISCONTINUED | OUTPATIENT
Start: 2023-06-22 | End: 2023-06-22

## 2023-06-22 RX ADMIN — SODIUM CHLORIDE: 9 INJECTION, SOLUTION INTRAVENOUS at 11:06

## 2023-06-22 RX ADMIN — HYDROMORPHONE HYDROCHLORIDE 1 MG: 1 INJECTION, SOLUTION INTRAMUSCULAR; INTRAVENOUS; SUBCUTANEOUS at 08:06

## 2023-06-22 RX ADMIN — PIPERACILLIN AND TAZOBACTAM 4.5 G: 4; .5 INJECTION, POWDER, LYOPHILIZED, FOR SOLUTION INTRAVENOUS; PARENTERAL at 04:06

## 2023-06-22 RX ADMIN — POTASSIUM CHLORIDE 40 MEQ: 1500 TABLET, EXTENDED RELEASE ORAL at 07:06

## 2023-06-22 RX ADMIN — POTASSIUM CHLORIDE 40 MEQ: 1500 TABLET, EXTENDED RELEASE ORAL at 05:06

## 2023-06-22 RX ADMIN — VANCOMYCIN HYDROCHLORIDE 1500 MG: 1.5 INJECTION, POWDER, LYOPHILIZED, FOR SOLUTION INTRAVENOUS at 04:06

## 2023-06-22 RX ADMIN — HEPARIN SODIUM 5000 UNITS: 5000 INJECTION INTRAVENOUS; SUBCUTANEOUS at 10:06

## 2023-06-22 RX ADMIN — HEPARIN SODIUM 5000 UNITS: 5000 INJECTION INTRAVENOUS; SUBCUTANEOUS at 02:06

## 2023-06-22 RX ADMIN — HYDROMORPHONE HYDROCHLORIDE 1 MG: 1 INJECTION, SOLUTION INTRAMUSCULAR; INTRAVENOUS; SUBCUTANEOUS at 10:06

## 2023-06-22 RX ADMIN — HYDROMORPHONE HYDROCHLORIDE 1 MG: 1 INJECTION, SOLUTION INTRAMUSCULAR; INTRAVENOUS; SUBCUTANEOUS at 04:06

## 2023-06-22 RX ADMIN — PIPERACILLIN AND TAZOBACTAM 4.5 G: 4; .5 INJECTION, POWDER, LYOPHILIZED, FOR SOLUTION INTRAVENOUS; PARENTERAL at 12:06

## 2023-06-22 RX ADMIN — HEPARIN SODIUM 5000 UNITS: 5000 INJECTION INTRAVENOUS; SUBCUTANEOUS at 05:06

## 2023-06-22 RX ADMIN — VANCOMYCIN HYDROCHLORIDE 1750 MG: 750 INJECTION, POWDER, LYOPHILIZED, FOR SOLUTION INTRAVENOUS at 12:06

## 2023-06-22 RX ADMIN — PIPERACILLIN AND TAZOBACTAM 4.5 G: 4; .5 INJECTION, POWDER, LYOPHILIZED, FOR SOLUTION INTRAVENOUS; PARENTERAL at 08:06

## 2023-06-22 RX ADMIN — SODIUM CHLORIDE: 9 INJECTION, SOLUTION INTRAVENOUS at 10:06

## 2023-06-22 RX ADMIN — Medication 1 PATCH: at 08:06

## 2023-06-22 RX ADMIN — HYDROMORPHONE HYDROCHLORIDE 1 MG: 1 INJECTION, SOLUTION INTRAMUSCULAR; INTRAVENOUS; SUBCUTANEOUS at 12:06

## 2023-06-22 RX ADMIN — Medication 400 MG: at 11:06

## 2023-06-22 NOTE — PROGRESS NOTES
INPATIENT NEPHROLOGY Progress Note  [facility] NEPHROLOGY    Ruslan Caldwell  06/22/2023    Reason for consultation:  Acute kidney injury    Chief Complaint: SBO    History of Present Illness:    Per H and P    Ruslan Caldwell is a 47-year-old male who presents in transfer from [facility] for evaluation of possible developing small bowel obstruction.  Patient presents outside facility complaining of abdominal pain with nausea and vomiting.  Workup at outside facility demonstrated leukocytosis of 16,000, anemia, and thrombocytosis of 843.  CMP with hypokalemia 3.3 and total bilirubin of 1.2.  CT scan at outside facility was concerning for developing small-bowel obstruction.  Patient was admitted to [facility] where he underwent right partial colectomy on 05/29 with pathology consistent with adenocarcinoma.  He was discharged and then came back for another admission on 06/08 where he was treated for an ileus.  Previous medical history includes type 2 diabetes, anemia, hypertension, and right eye blindness.  Patient admitted to Hospital Medicine for treatment management    6/16 Consulted for JOYCE.   Pt POD1 s/p exploratory laparotomy with small bowel resection and omental resection.   He has abdominal pain and bloating.  No chest pain, sob, neurologic symptoms, vomiting, or confusion.  He is a little somnolent on his analgesics  6/17 VSS. Worsening anemia, hypophosphatemia, hyponatremia. Fever yesterday evening with tachycardia, worsening WBC.  6/18 VSS, s/p re-operation on 6/17, evacuation for multiple abscess, no obvious perforation, NG placement with stomach content decompression.  6/19 VSS, on 1.5L NC, UOP 2L,c/w NGT, will start CLD for comfort, on clinimix as well  6/20 customize clinimix today, VSS, on 1L NC, UOP 1.5L, rodriguez removed  6/21 customize TPN again today- if this doesn't work, will need PICC for further customization, UOP 850cc, NGT out, still on CLD (not happy about it), passing gas  6/22  VSS, on RA, UOP 1.3L, PIV infiltrated and refused another one so PPN not hung last night    Plan of Care:    JOYCE due to volume depletion, toradol, hemodynamically mediated renal injury, elevated CPK (sepsis due to post-op abscess, evacuated on 6/17)  --he has JOYCE again- ordered 500cc NS   --nonoliguric  --rodriguez out  --no nsaids or IV contrast    Hyponatremia  Hypokalemia  Hypomg  Hypophosphatemia  Alkalosis  --control pain and nausea -> SIADH  --needs PIV to continue customized clinimix  --start oral KCl 40mEq BID  --start oral Mg 400mg daily    Thank you for allowing us to participate in this patient's care. We will continue to follow.    Vital Signs:  Temp Readings from Last 3 Encounters:   06/22/23 98 °F (36.7 °C) (Oral)   06/11/23 98.4 °F (36.9 °C) (Oral)   06/10/23 97.8 °F (36.6 °C)       Pulse Readings from Last 3 Encounters:   06/22/23 96   06/11/23 (!) 112   06/10/23 82       BP Readings from Last 3 Encounters:   06/22/23 109/70   06/11/23 124/78   06/10/23 (!) 135/91       Weight:  Wt Readings from Last 3 Encounters:   06/21/23 91.7 kg (202 lb 2.6 oz)   06/11/23 102.1 kg (225 lb)   06/09/23 102 kg (224 lb 13.9 oz)       Medications:  No current facility-administered medications on file prior to encounter.     Current Outpatient Medications on File Prior to Encounter   Medication Sig Dispense Refill    gabapentin (NEURONTIN) 300 MG capsule Take 1 capsule (300 mg total) by mouth 2 (two) times daily. for 14 days 28 capsule 0     Scheduled Meds:   fat emulsion 20%  250 mL Intravenous Daily    heparin (porcine)  5,000 Units Subcutaneous Q8H    nicotine  1 patch Transdermal Daily    piperacillin-tazobactam (Zosyn) IV (PEDS and ADULTS) (extended infusion is not appropriate)  4.5 g Intravenous Q8H    vancomycin (VANCOCIN) IVPB  20 mg/kg Intravenous Q8H     Continuous Infusions:   TPN ADULT PERIPHERAL CUSTOM       PRN Meds:.sodium chloride, diazePAM, HYDROmorphone, naloxone, ondansetron, oxyCODONE, prochlorperazine,  "promethazine (PHENERGAN) IVPB, simethicone, sodium chloride 0.9%, sodium phosphate IVPB, sodium phosphate IVPB, sodium phosphate IVPB, Pharmacy to dose Vancomycin consult **AND** vancomycin - pharmacy to dose    Review of Systems:  Neg    Physical Exam:    /70 (BP Location: Right arm, Patient Position: Lying)   Pulse 96   Temp 98 °F (36.7 °C) (Oral)   Resp 18   Ht 6' 3" (1.905 m)   Wt 91.7 kg (202 lb 2.6 oz)   SpO2 98%   BMI 25.27 kg/m²     General Appearance:    Alert, cooperative, no distress, appears stated age   Head:    Normocephalic, without obvious abnormality, atraumatic   Eyes:    PER, conjunctiva/corneas clear, EOM's intact in both eyes        Throat:   Lips, mucosa, and tongue normal; teeth and gums normal   Back:     Symmetric, no curvature, ROM normal, no CVA tenderness   Lungs:     Clear to auscultation bilaterally, respirations unlabored   Chest wall:    No tenderness or deformity   Heart:    Regular rate and rhythm, S1 and S2 normal, no murmur, rub   or gallop   Abdomen:     Soft, non-tender, bowel sounds active all four quadrants,     no masses, no organomegaly   Extremities:   Extremities normal, atraumatic, no cyanosis or edema   Pulses:   2+ and symmetric all extremities   MSK:   No joint or muscle swelling, tenderness or deformity   Skin:   Skin color, texture, turgor normal, no rashes or lesions   Neurologic:   CNII-XII intact, normal strength and sensation       Throughout.  No flap     Results:  Recent Labs   Lab 06/20/23  0331 06/21/23  0310 06/22/23  0302   * 129* 132*   K 3.2* 3.1* 2.9*   CL 84* 84* 83*   CO2 37* 33* 34*   BUN 13 13 16   CREATININE 0.7 0.8 1.2   * 168* 113*         Recent Labs   Lab 06/20/23  0331 06/21/23  0310 06/22/23  0302   CALCIUM 10.0 9.6 9.8   ALBUMIN 2.4* 2.5* 2.8*   PHOS 3.3 3.2 4.0   MG 1.9 1.5* 1.7               No results for input(s): POCTGLUCOSE in the last 168 hours.      Recent Labs   Lab 09/14/21  0408 06/08/23  0607   Hemoglobin " A1C 4.8 5.0         Recent Labs   Lab 06/20/23  0331 06/21/23  0310 06/22/23  0302   WBC 17.33* 18.84* 19.14*   HGB 8.9* 8.5* 9.5*   HCT 28.4* 27.8* 30.1*   PLT 1,183* 1,189* 1,264*   MCV 68* 67* 67*   MCHC 31.3* 30.6* 31.6*   MONO 10.4  1.8* 9.8  1.9* 13.0  CANCELED         Recent Labs   Lab 06/20/23  0331 06/21/23  0310 06/22/23  0302   BILITOT 0.7 0.6 0.7   PROT 7.4 7.4 8.1   ALBUMIN 2.4* 2.5* 2.8*   ALKPHOS 78 88 101   ALT 13 17 15   AST 16 12 15         Recent Labs   Lab 05/28/23  0038 06/11/23  1614 06/16/23  1805   Color, UA Yellow Yellow Yellow   Appearance, UA Clear Clear Clear   pH, UA 7.0 6.0 7.0   Specific [place], UA 1.020 1.020 1.010   Protein, UA 1+ A 2+ A 2+ A   Glucose, UA Negative Negative Negative   Ketones, UA Negative 2+ A Negative   Urobilinogen, UA Negative Negative Negative   Bilirubin (UA) Negative 2+ A Negative   Occult Blood UA Negative Negative 1+ A   Nitrite, UA Negative Negative Negative   RBC, UA 0 2 14 H   WBC, UA 1 10 H 8 H   Bacteria None Occasional Occasional   Hyaline Casts, UA 0 2 A 0               Microbiology Results (last 7 days)       Procedure Component Value Units Date/Time    Blood culture [[id number]] Collected: 06/17/23 1151    Order Status: Completed Specimen: Blood Updated: 06/21/23 1612     Blood Culture, Routine No Growth to date      No Growth to date      No Growth to date      No Growth to date      No Growth to date    Blood culture [[id number]] Collected: 06/17/23 1151    Order Status: Completed Specimen: Blood Updated: 06/21/23 1612     Blood Culture, Routine No Growth to date      No Growth to date      No Growth to date      No Growth to date      No Growth to date    Aerobic culture [[id number]] Collected: 06/17/23 1800    Order Status: Completed Specimen: Incision site from Abdomen Updated: 06/21/23 1504     Aerobic Bacterial Culture No growth    Fungus culture [[id number]] Collected: 06/17/23 1800    Order Status: Completed Specimen: Abdominal from Abdomen  Updated: 06/21/23 1233     Fungus (Mycology) Culture Culture in progress    Culture, Anaerobe [[id number]] Collected: 06/17/23 1800    Order Status: Completed Specimen: Abdominal from Abdomen Updated: 06/20/23 1137     Anaerobic Culture Culture in progress    AFB Culture & Smear [[id number]] Collected: 06/17/23 1800    Order Status: Completed Specimen: Abdominal from Abdomen Updated: 06/19/23 1455     AFB Culture & Smear Culture in progress     AFB CULTURE STAIN No acid fast bacilli seen.    Gram stain [[id number]] Collected: 06/17/23 1800    Order Status: Completed Specimen: Abdominal from Abdomen Updated: 06/18/23 0010     Gram Stain Result Few WBC's      No organisms seen    Urine Culture High Risk [[id number]]     Order Status: Sent Specimen: Urine           Patient care time was spent personally by me on the following activities: > 35 minutes  Obtaining a history.  Examination of patient.  Providing medical care at the patients bedside.  Developing a treatment plan with patient or surrogate and bedside caregivers.  Ordering and reviewing laboratory studies, radiographic studies, pulse oximetry.  Ordering and performing treatments and interventions.  Evaluation of patient's response to treatment.  Discussions with consultants while on the unit and immediately available to the patient.  Re-evaluation of the patient's condition.  Documentation in the medical record.    Jo Montes De Oca MD  Nephrology  [facility]  [phone]

## 2023-06-22 NOTE — PROGRESS NOTES
Already eating  Having gi function  Labs vitals reviewed  Abdomen benign    Juan Ramon ss    A/P  Plan to dc drains today  Possible dc tomorrow if tolerates diet

## 2023-06-22 NOTE — PROGRESS NOTES
Pharmacokinetic Assessment Follow Up: IV Vancomycin    Vancomycin serum concentration assessment(s):    The trough level was drawn correctly and can be used to guide therapy at this time. The measurement is below the desired definitive target range of 15 to 20 mcg/mL.    Vancomycin Regimen Plan:    Change regimen to Vancomycin 1750 mg IV every 8 hours with next serum trough concentration measured at 1900 prior to 3rd dose on 06/22/23    Drug levels (last 3 results):  Recent Labs   Lab Result Units 06/20/23  1554 06/21/23  0835 06/22/23  0302   Vancomycin-Trough ug/mL 10.0 18.4 11.7       Pharmacy will continue to follow and monitor vancomycin.    Please contact pharmacy at extension [phone] for questions regarding this assessment.    Thank you for the consult,   Josef Magallanes       Patient brief summary:  Ruslan Caldwell is a 47 y.o. male initiated on antimicrobial therapy with IV Vancomycin for treatment of sepsis    The patient's current regimen is 1500mg q8h    Drug Allergies:   Review of patient's allergies indicates:  No Known Allergies    Actual Body Weight:   91.7kg    Renal Function:   Estimated Creatinine Clearance: 92.9 mL/min (based on SCr of 1.2 mg/dL).,     CBC (last 72 hours):  Recent Labs   Lab Result Units 06/19/23  0609 06/20/23  0331 06/21/23  0310 06/22/23  0302   WBC K/uL 21.83* 17.33* 18.84* 19.14*   Hemoglobin g/dL 8.0* 8.9* 8.5* 9.5*   Hematocrit % 24.9* 28.4* 27.8* 30.1*   Platelets K/uL 887* 1,183* 1,189* 1,264*   Gran % % 80.5* 76.6* 72.2 71.0   Lymph % % 8.1* 9.7* 13.6* 14.0*   Mono % % 9.3 10.4 9.8 13.0   Eosinophil % % 1.1 2.2 2.8 2.0   Basophil % % 0.4 0.6 0.8 0.0   Differential Method  Automated Automated Automated Manual       Metabolic Panel (last 72 hours):  Recent Labs   Lab Result Units 06/19/23  0609 06/20/23  0331 06/21/23  0310 06/22/23  0302   Sodium mmol/L 131* 135* 129* 132*   Potassium mmol/L 3.6 3.2* 3.1* 2.9*   Chloride mmol/L 88* 84* 84* 83*   CO2 mmol/L 33* 37* 33*  34*   Glucose mg/dL 118* 119* 168* 113*   BUN mg/dL 11 13 13 16   Creatinine mg/dL 0.7 0.7 0.8 1.2   Albumin g/dL 2.1* 2.4* 2.5* 2.8*   Total Bilirubin mg/dL 0.8 0.7 0.6 0.7   Alkaline Phosphatase U/L 42* 78 88 101   AST U/L 14 16 12 15   ALT U/L 12 13 17 15   Magnesium mg/dL 1.6 1.9 1.5* 1.7   Phosphorus mg/dL 2.6* 3.3 3.2 4.0       Vancomycin Administrations:  vancomycin given in the last 96 hours                     vancomycin 1,500 mg in dextrose 5 % (D5W) 250 mL IVPB (Vial-Mate) (mg) 1,500 mg New Bag 06/22/23 0424     1,500 mg New Bag 06/21/23 2014     1,500 mg New Bag  1201    vancomycin (VANCOCIN) 1,750 mg in dextrose 5 % (D5W) 500 mL IVPB (mg) 1,750 mg New Bag 06/21/23 0112      Restarted 06/20/23 1957     1,750 mg New Bag  1843    vancomycin 1,250 mg in dextrose 5 % (D5W) 250 mL IVPB (Vial-Mate) (mg) 1,250 mg New Bag 06/20/23 0848     1,250 mg New Bag  0007    vancomycin (VANCOCIN) 1,000 mg in dextrose 5 % (D5W) 250 mL IVPB (Vial-Mate) (mg) 1,000 mg New Bag 06/19/23 1625     1,000 mg New Bag  0818    vancomycin 1,250 mg in dextrose 5 % (D5W) 250 mL IVPB (Vial-Mate) (mg) 1,250 mg New Bag 06/18/23 2325    vancomycin 1,500 mg in dextrose 5 % (D5W) 250 mL IVPB (Vial-Mate) (mg) 1,500 mg New Bag 06/18/23 1504                    Microbiologic Results:  Microbiology Results (last 7 days)       Procedure Component Value Units Date/Time    Blood culture [[id number]] Collected: 06/17/23 1151    Order Status: Completed Specimen: Blood Updated: 06/21/23 1612     Blood Culture, Routine No Growth to date      No Growth to date      No Growth to date      No Growth to date      No Growth to date    Blood culture [[id number]] Collected: 06/17/23 1151    Order Status: Completed Specimen: Blood Updated: 06/21/23 1612     Blood Culture, Routine No Growth to date      No Growth to date      No Growth to date      No Growth to date      No Growth to date    Aerobic culture [[id number]] Collected: 06/17/23 1800    Order Status:  Completed Specimen: Incision site from Abdomen Updated: 06/21/23 1504     Aerobic Bacterial Culture No growth    Fungus culture [[id number]] Collected: 06/17/23 1800    Order Status: Completed Specimen: Abdominal from Abdomen Updated: 06/21/23 1233     Fungus (Mycology) Culture Culture in progress    Culture, Anaerobe [[id number]] Collected: 06/17/23 1800    Order Status: Completed Specimen: Abdominal from Abdomen Updated: 06/20/23 1137     Anaerobic Culture Culture in progress    AFB Culture & Smear [[id number]] Collected: 06/17/23 1800    Order Status: Completed Specimen: Abdominal from Abdomen Updated: 06/19/23 1455     AFB Culture & Smear Culture in progress     AFB CULTURE STAIN No acid fast bacilli seen.    Gram stain [[id number]] Collected: 06/17/23 1800    Order Status: Completed Specimen: Abdominal from Abdomen Updated: 06/18/23 0010     Gram Stain Result Few WBC's      No organisms seen    Urine Culture High Risk [[id number]]     Order Status: Sent Specimen: Urine            None

## 2023-06-22 NOTE — PLAN OF CARE
Problem: Adult Inpatient Plan of Care  Goal: Optimal Comfort and Wellbeing  Outcome: Ongoing, Progressing     Problem: Malnutrition  Goal: Improved Nutritional Intake  Outcome: Ongoing, Progressing     Problem: Fluid and Electrolyte Imbalance (Acute Kidney Injury/Impairment)  Goal: Fluid and Electrolyte Balance  Outcome: Ongoing, Progressing     Problem: Oral Intake Inadequate (Acute Kidney Injury/Impairment)  Goal: Optimal Nutrition Intake  Outcome: Ongoing, Progressing

## 2023-06-22 NOTE — RESPIRATORY THERAPY
06/22/23 0857   Patient Assessment/Suction   Level of Consciousness (AVPU) alert   Respiratory Effort Normal;Unlabored   Expansion/Accessory Muscles/Retractions expansion symmetric;no use of accessory muscles;no retractions   Rhythm/Pattern, Respiratory pattern regular;depth regular;unlabored   Cough Frequency no cough   PRE-TX-O2   Device (Oxygen Therapy) room air   SpO2 98 %   Pulse Oximetry Type Intermittent   $ Pulse Oximetry - Multiple Charge Pulse Oximetry - Multiple   Pulse 95   Resp 18   Positioning HOB elevated 45 degrees   Incentive Spirometer   $ Incentive Spirometer Charges done with encouragement   Incentive Spirometer Predicted Level (mL) 2000   Administration (IS) self-administered   Number of Repetitions (IS) 5   Level Incentive Spirometer (mL) 2000   Patient Tolerance (IS) good

## 2023-06-22 NOTE — CARE UPDATE
06/21/23 1945   Patient Assessment/Suction   Level of Consciousness (AVPU) alert   Respiratory Effort Unlabored   Expansion/Accessory Muscles/Retractions no retractions;no use of accessory muscles   All Lung Fields Breath Sounds clear   Rhythm/Pattern, Respiratory unlabored   PRE-TX-O2   Device (Oxygen Therapy) room air   SpO2 98 %   Pulse Oximetry Type Intermittent   $ Pulse Oximetry - Multiple Charge Pulse Oximetry - Multiple   Pulse 95   Resp 18   Incentive Spirometer   $ Incentive Spirometer Charges done with encouragement   Incentive Spirometer Predicted Level (mL) 2000   Administration (IS) proper technique demonstrated   Number of Repetitions (IS) 5   Level Incentive Spirometer (mL) 2000   Patient Tolerance (IS) good

## 2023-06-22 NOTE — PROGRESS NOTES
[facility] Medicine  Progress Note    Patient Name: Ruslan Caldwell  MRN: [id number]  Patient Class: IP- Inpatient   Admission Date: 6/11/2023  Length of Stay: 10 days  Attending Physician: Marybeth Duran MD  Primary Care Provider: Amira Knutson NP        Subjective:     Principal Problem:Small bowel obstruction        HPI:  Ruslan Caldwell is a 47-year-old male who presents in transfer from [facility] for evaluation of possible developing small bowel obstruction.  Patient presents outside facility complaining of abdominal pain with nausea and vomiting.  Workup at outside facility demonstrated leukocytosis of 16,000, anemia, and thrombocytosis of 843.  CMP with hypokalemia 3.3 and total bilirubin of 1.2.  CT scan at outside facility was concerning for developing small-bowel obstruction.  Patient was admitted to [facility] where he underwent right partial colectomy on 05/29 with pathology consistent with adenocarcinoma.  He was discharged and then came back for another admission on 06/08 where he was treated for an ileus.  Previous medical history includes type 2 diabetes, anemia, hypertension, and right eye blindness.  Patient admitted to Hospital Medicine for treatment management.  Will maintain patient NPO, IV fluids, sliding scale insulin p.r.n., and general surgery consult in a.m..      Overview/Hospital Course:  Ruslan Caldwell is a 47 year old male with a past medical history of recently diagnosed colonic adenocarcinoma s/p partial colectomy with anastomosis, tobacco use, microcytic anemia and thrombocytosis who presented with persistent abdominal pain, nausea and abdominal pain concerning for SBO. An NG tube was unable to be placed. He was placed on IV fluids and Zosyn. He was made NPO. PRN IV analgesics and antiemetics were ordered and General Surgery was consulted. Repeat CT A/P shows SBO 6/13. A gastrografin enema 6/14 was inconclusive. Surgery took the  patient to the OR 6/15 for exploratory laparotomy where an omental infarction was addressed; the patient also underwent SCOUT and partial small bowel resection with anastomosis. PPN was started 6/16. His course has been complicated by JOYCE in setting of vomiting as well as Toradol use; IV fluids were continued and his kidney function has improved to baseline. Nephrology has been consulted. He was also discovered to have an iron deficiency for which IV iron (one dose) was ordered 6/13. Oncology has also been consulted and will arrange follow up in clinic. His post-operative course has been complicated by low grade fever, tachycardia and an increasing WBC count, despite Zosyn use. Antibiotics were broadened to vancomycin, cefepime and Flagyl. Blood cultures were ordered as well as a CXR and urine culture.       Interval History:  Patient seen and examined.  Per nurse patient ate outside food last night and tolerated it.  Surgeon has started regular diet and is planning for possible discharge tomorrow  Review of Systems   Constitutional:  Negative for fever.   Gastrointestinal:  Positive for abdominal pain. Negative for nausea and vomiting.   Skin:  Positive for wound.   Allergic/Immunologic: Positive for immunocompromised state.   Objective:     Vital Signs (Most Recent):  Temp: 96.2 °F (35.7 °C) (06/22/23 1115)  Pulse: 92 (06/22/23 1115)  Resp: 16 (06/22/23 1115)  BP: 117/66 (06/22/23 1115)  SpO2: 99 % (06/22/23 1115) Vital Signs (24h Range):  Temp:  [96.2 °F (35.7 °C)-99 °F (37.2 °C)] 96.2 °F (35.7 °C)  Pulse:  [] 92  Resp:  [16-20] 16  SpO2:  [97 %-99 %] 99 %  BP: (109-126)/(66-73) 117/66     Weight: 91.7 kg (202 lb 2.6 oz)  Body mass index is 25.27 kg/m².    Intake/Output Summary (Last 24 hours) at 6/22/2023 1129  Last data filed at 6/22/2023 0345  Gross per 24 hour   Intake 5812.56 ml   Output 1000 ml   Net 4812.56 ml           Physical Exam  Vitals and nursing note reviewed.   Constitutional:       General:  He is not in acute distress.     Appearance: Normal appearance. He is ill-appearing.   HENT:      Head: Normocephalic and atraumatic.      Right Ear: External ear normal.      Left Ear: External ear normal.      Nose: Nose normal.      Mouth/Throat:      Mouth: Mucous membranes are moist.      Pharynx: Oropharynx is clear.   Eyes:      Extraocular Movements: Extraocular movements intact.   Cardiovascular:      Rate and Rhythm: Normal rate and regular rhythm.      Pulses: Normal pulses.      Heart sounds: Normal heart sounds.   Pulmonary:      Effort: Pulmonary effort is normal. No respiratory distress.      Breath sounds: Normal breath sounds.   Abdominal:      Tenderness: There is abdominal tenderness.      Comments: Wound dressing clean, dry and intact.   Musculoskeletal:         General: Normal range of motion.      Cervical back: Normal range of motion and neck supple.      Right lower leg: No edema.      Left lower leg: No edema.   Skin:     General: Skin is warm and dry.   Neurological:      Mental Status: He is alert. Mental status is at baseline.   Psychiatric:         Mood and Affect: Mood normal.         Behavior: Behavior normal.           Significant Labs: All pertinent labs within the past 24 hours have been reviewed.    Significant Imaging: I have reviewed all pertinent imaging results/findings within the past 24 hours.      Assessment/Plan:      * Small bowel obstruction  Acute problem. -NPO  -Await return of bowel function  -PPN with lipids until diet advanced  -IV fluids NS stopped 6/17 given worsening hyponatremia  -Dilaudid PCA and PRN  -Zofran PRN  -Surgery consulted; exploratory laparotomy 6/15 with small bowel removal with anastomosis and SCOUT  -Zosyn changed to vancomycin, cefepime and Flagyl 6/17  -underwent repeat surgery 6/17 for free air, abscess seen, no perforation        Hypophosphatemia  Resolved      Hyponatremia  Possible SIADH in setting of pain.  -Hold NS IV fluids  -Trend  Na  -Nephrology following      Sepsis  Continue vancomycin, cefepime and Flagyl    Omental infarction  -Area removed by Surgery   -Follow up pathology      Tobacco use  -Patient to be counseled on cessation  -Nicotine patch      Moderate malnutrition  Regular diet  -Nutrition consulted      Thrombocytosis  Chronic. Stable. In setting of recently diagnosed adenocarcinoma and SBO.  -Trend platelets with CBC      Colon adenocarcinoma  Recent partial colectomy.  -Oncology consulted; follow up in clinic        Iron deficiency anemia  Suspect mixed iron deficiency and anemia of chronic inflammation.  -IV iron one dose 6/13  -Trend Hgb with CBC      Essential hypertension  Patient not taking any BP medications.  -Continue to monitor        VTE Risk Mitigation (From admission, onward)         Ordered     heparin (porcine) injection 5,000 Units  Every 8 hours         06/16/23 0738     Place DINA hose  Until discontinued         06/11/23 2043     IP VTE HIGH RISK PATIENT  Once         06/11/23 2043     Place sequential compression device  Until discontinued         06/11/23 2043                Discharge Planning   GABRIEL: 6/23/2023     Code Status: Full Code   Is the patient medically ready for discharge?:     Reason for patient still in hospital (select all that apply): Patient trending condition and Treatment  Discharge Plan A: Home                  Marybeth Duran MD  Department of Hospital Medicine   [facility]

## 2023-06-22 NOTE — NURSING
Iv infusing ppn infiltrated. Before re-starting ppn patient expressed his concern that this is the second time this has happened. Pt stated he really did not want ppn at this time and would like to speak to the Doctor regarding it. On call hospital medicine notified and made aware.

## 2023-06-22 NOTE — PROGRESS NOTES
HPI    47 years old male newly diagnosed colon cancer.  He was transferred from [facility] for evaluation possible developed a small-bowel obstruction.  He was complaining abdominal pain nausea and vomiting.  Workup shows leukocytosis, anemia and thrombocytosis.  CT scan from outside facility was concerning for developing small-bowel obstruction.  Patient was transferred admitted to [facility] underwent right partial colectomy on 05/29 with pathology consistent adenocarcinoma.  He was discharged then and return to hospital for another admission on 06/08/2023 where he was treated for ileus.  Medical history including type 2 diabetes anemia hypertension right eye blindness.       Overview/Hospital Course:  Ruslan Caldwell is a 47 year old male with a past medical history of recently diagnosed colonic adenocarcinoma s/p partial colectomy with anastomosis, tobacco use, microcytic anemia and thrombocytosis who presented with persistent abdominal pain, nausea and abdominal pain concerning for SBO. An NG tube was unable to be placed. He was placed on IV fluids and Zosyn. He was made NPO. PRN IV analgesics and antiemetics were ordered and General Surgery was consulted. Repeat CT A/P shows SBO 6/13. A gastrografin enema 6/14 was inconclusive. Surgery took the patient to the OR 6/15 for exploratory laparotomy where an omental infarction was addressed; the patient also underwent SCOUT and partial small bowel resection with anastomosis. PPN was started 6/16. His course has been complicated by JOYCE in setting of vomiting as well as Toradol use; IV fluids were continued and his kidney function has improved to baseline. Nephrology has been consulted. He was also discovered to have an iron deficiency for which IV iron (one dose) was ordered 6/13.  His post-operative course has been complicated by low grade fever, tachycardia and an increasing WBC count, despite Zosyn use. Antibiotics were broadened to vancomycin,  cefepime and Flagyl. Blood cultures were ordered as well as a CXR and urine culture.         Interval History:  Patient seen and examined.  Per nurse patient ate outside food last night and tolerated it.  Surgeon has started regular diet and is planning for possible discharge tomorrow  Review of Systems   Constitutional:  Negative for fever.   Gastrointestinal:  Positive for abdominal pain. Negative for nausea and vomiting.   Skin:  Positive for wound.   Allergic/Immunologic: Positive for immunocompromised state.         Past Medical History:   Diagnosis Date    Diabetes mellitus, type 2 05/2020    Patient denies    Hypertension      Past Surgical History:   Procedure Laterality Date    COLONOSCOPY N/A 5/29/2023    Procedure: COLONOSCOPY;  Surgeon: Sam Solano MD;  Location: [facility] ENDO;  Service: Endoscopy;  Laterality: N/A;    EXCISION, SMALL INTESTINE N/A 6/15/2023    Procedure: EXCISION, SMALL INTESTINE;  Surgeon: Edin Michael MD;  Location: [facility] OR;  Service: General;  Laterality: N/A;    EYE SURGERY Right     LAPAROTOMY, EXPLORATORY N/A 6/15/2023    Procedure: LAPAROTOMY, EXPLORATORY;  Surgeon: Edin Michael MD;  Location: [facility] OR;  Service: General;  Laterality: N/A;    LAPAROTOMY, EXPLORATORY N/A 6/17/2023    Procedure: LAPAROTOMY, EXPLORATORY;  Surgeon: Garfield Hoyt Jr., MD;  Location: [facility] OR;  Service: General;  Laterality: N/A;    LUMBAR DISC SURGERY  2005    LYSIS OF ADHESIONS N/A 6/15/2023    Procedure: LYSIS, ADHESIONS;  Surgeon: Edin Michael MD;  Location: [facility] OR;  Service: General;  Laterality: N/A;    OMENTECTOMY N/A 6/15/2023    Procedure: OMENTECTOMY;  Surgeon: Edin Michael MD;  Location: [facility] OR;  Service: General;  Laterality: N/A;    SUBTOTAL COLECTOMY Right 5/29/2023    Procedure: COLECTOMY, PARTIAL;  Surgeon: Edin Michael MD;  Location: [facility] OR;  Service: General;  Laterality: Right;     Social History     Socioeconomic History    Marital status:      Number of children: 3    Highest education level: Associate degree: academic program   Occupational History    Occupation: [employer]   Tobacco Use    Smoking status: Every Day     Packs/day: 0.50     Years: 23.00     Pack years: 11.50     Types: Cigarettes    Smokeless tobacco: Never   Substance and Sexual Activity    Alcohol use: Not Currently     Comment: Quit drinking 2 yrs ago    Drug use: Yes     Types: Marijuana     Comment: heavy marijuana use    Sexual activity: Yes     Social Determinants of Health     Financial Resource Strain: Low Risk     Difficulty of Paying Living Expenses: Not hard at all   Food Insecurity: No Food Insecurity    Worried About Running Out of Food in the Last Year: Never true    Ran Out of Food in the Last Year: Never true   Transportation Needs: No Transportation Needs    Lack of Transportation (Medical): No    Lack of Transportation (Non-Medical): No   Physical Activity: Sufficiently Active    Days of Exercise per Week: 5 days    Minutes of Exercise per Session: 90 min   Stress: No Stress Concern Present    Feeling of Stress : Not at all   Social Connections: Socially Isolated    Frequency of Communication with Friends and Family: Never    Frequency of Social Gatherings with Friends and Family: More than three times a week    Attends Mu-ism Services: Never    Active Member of Clubs or Organizations: No    Attends Club or Organization Meetings: Never    Marital Status:    Housing Stability: Unknown    Unable to Pay for Housing in the Last Year: No    Unstable Housing in the Last Year: No     Social History     Socioeconomic History    Marital status:     Number of children: 3    Highest education level: Associate degree: academic program   Occupational History    Occupation: [employer]   Tobacco Use    Smoking status: Every Day     Packs/day: 0.50     Years: 23.00     Pack years: 11.50     Types: Cigarettes    Smokeless tobacco:  Never   Substance and Sexual Activity    Alcohol use: Not Currently     Comment: Quit drinking 2 yrs ago    Drug use: Yes     Types: Marijuana     Comment: heavy marijuana use    Sexual activity: Yes     Social Determinants of Health     Financial Resource Strain: Low Risk     Difficulty of Paying Living Expenses: Not hard at all   Food Insecurity: No Food Insecurity    Worried About Running Out of Food in the Last Year: Never true    Ran Out of Food in the Last Year: Never true   Transportation Needs: No Transportation Needs    Lack of Transportation (Medical): No    Lack of Transportation (Non-Medical): No   Physical Activity: Sufficiently Active    Days of Exercise per Week: 5 days    Minutes of Exercise per Session: 90 min   Stress: No Stress Concern Present    Feeling of Stress : Not at all   Social Connections: Socially Isolated    Frequency of Communication with Friends and Family: Never    Frequency of Social Gatherings with Friends and Family: More than three times a week    Attends Yazdanism Services: Never    Active Member of Clubs or Organizations: No    Attends Club or Organization Meetings: Never    Marital Status:    Housing Stability: Unknown    Unable to Pay for Housing in the Last Year: No    Unstable Housing in the Last Year: No     Review of patient's allergies indicates:  No Known Allergies    Physical exam  Vitals:    06/22/23 1211   BP:    Pulse:    Resp: 18   Temp:      Physical Exam  Vitals and nursing note reviewed.   Constitutional:       General: He is not in acute distress.     Appearance: Normal appearance. He is ill-appearing.   HENT:      Head: Normocephalic and atraumatic.      Right Ear: External ear normal.      Left Ear: External ear normal.      Nose: Nose normal.      Mouth/Throat:      Mouth: Mucous membranes are moist.      Pharynx: Oropharynx is clear.   Eyes:      Extraocular Movements: Extraocular movements intact.   Cardiovascular:      Rate and Rhythm: Normal rate  and regular rhythm.      Pulses: Normal pulses.      Heart sounds: Normal heart sounds.   Pulmonary:      Effort: Pulmonary effort is normal. No respiratory distress.      Breath sounds: Normal breath sounds.   Abdominal:      Tenderness: There is abdominal tenderness.      Comments: Wound dressing clean, dry and intact.   Musculoskeletal:         General: Normal range of motion.      Cervical back: Normal range of motion and neck supple.      Right lower leg: No edema.      Left lower leg: No edema.   Skin:     General: Skin is warm and dry.   Neurological:      Mental Status: He is alert. Mental status is at baseline.   Psychiatric:         Mood and Affect: Mood normal.         Behavior: Behavior normal.        Lab Results   Component Value Date    WBC 19.14 (H) 06/22/2023    HGB 9.5 (L) 06/22/2023    HCT 30.1 (L) 06/22/2023    MCV 67 (L) 06/22/2023    PLT 1,264 (HH) 06/22/2023       CMP  Sodium   Date Value Ref Range Status   06/22/2023 132 (L) 136 - 145 mmol/L Final     Potassium   Date Value Ref Range Status   06/22/2023 2.9 (L) 3.5 - 5.1 mmol/L Final     Chloride   Date Value Ref Range Status   06/22/2023 83 (L) 95 - 110 mmol/L Final     CO2   Date Value Ref Range Status   06/22/2023 34 (H) 23 - 29 mmol/L Final     Glucose   Date Value Ref Range Status   06/22/2023 113 (H) 70 - 110 mg/dL Final     BUN   Date Value Ref Range Status   06/22/2023 16 6 - 20 mg/dL Final     Creatinine   Date Value Ref Range Status   06/22/2023 1.2 0.5 - 1.4 mg/dL Final     Calcium   Date Value Ref Range Status   06/22/2023 9.8 8.7 - 10.5 mg/dL Final     Total Protein   Date Value Ref Range Status   06/22/2023 8.1 6.0 - 8.4 g/dL Final     Albumin   Date Value Ref Range Status   06/22/2023 2.8 (L) 3.5 - 5.2 g/dL Final     Total Bilirubin   Date Value Ref Range Status   06/22/2023 0.7 0.1 - 1.0 mg/dL Final     Comment:     For infants and newborns, interpretation of results should be based  on gestational age, weight and in agreement  with clinical  observations.    Premature Infant recommended reference ranges:  Up to 24 hours.............<8.0 mg/dL  Up to 48 hours............<12.0 mg/dL  3-5 days..................<15.0 mg/dL  6-29 days.................<15.0 mg/dL       Alkaline Phosphatase   Date Value Ref Range Status   06/22/2023 101 55 - 135 U/L Final     AST   Date Value Ref Range Status   06/22/2023 15 10 - 40 U/L Final     ALT   Date Value Ref Range Status   06/22/2023 15 10 - 44 U/L Final     Anion Gap   Date Value Ref Range Status   06/22/2023 15 8 - 16 mmol/L Final     eGFR   Date Value Ref Range Status   06/22/2023 >60 >60 mL/min/1.73 m^2 Final     Assessment and plan    Colon adenocarcinoma recently colostomy small-bowel obstruction.  Waiting for bowel to return function surgery follows.  Possible discharge 6/23/23     Iron deficiency anemia microcytosis likely blood loss     Thrombocytosis reactive    Leukocytosis leukemoid reaction    > outpatient follow-up Oncology Clinic.  > NGS  > IV iron gluconate 125mg daily x 5 can be done as out patient  > monitor CBC   > we will arrange for outpatient follow-up

## 2023-06-22 NOTE — SUBJECTIVE & OBJECTIVE
Interval History:  Patient seen and examined.  Per nurse patient ate outside food last night and tolerated it.  Surgeon has started regular diet and is planning for possible discharge tomorrow  Review of Systems   Constitutional:  Negative for fever.   Gastrointestinal:  Positive for abdominal pain. Negative for nausea and vomiting.   Skin:  Positive for wound.   Allergic/Immunologic: Positive for immunocompromised state.   Objective:     Vital Signs (Most Recent):  Temp: 96.2 °F (35.7 °C) (06/22/23 1115)  Pulse: 92 (06/22/23 1115)  Resp: 16 (06/22/23 1115)  BP: 117/66 (06/22/23 1115)  SpO2: 99 % (06/22/23 1115) Vital Signs (24h Range):  Temp:  [96.2 °F (35.7 °C)-99 °F (37.2 °C)] 96.2 °F (35.7 °C)  Pulse:  [] 92  Resp:  [16-20] 16  SpO2:  [97 %-99 %] 99 %  BP: (109-126)/(66-73) 117/66     Weight: 91.7 kg (202 lb 2.6 oz)  Body mass index is 25.27 kg/m².    Intake/Output Summary (Last 24 hours) at 6/22/2023 1129  Last data filed at 6/22/2023 0345  Gross per 24 hour   Intake 5812.56 ml   Output 1000 ml   Net 4812.56 ml           Physical Exam  Vitals and nursing note reviewed.   Constitutional:       General: He is not in acute distress.     Appearance: Normal appearance. He is ill-appearing.   HENT:      Head: Normocephalic and atraumatic.      Right Ear: External ear normal.      Left Ear: External ear normal.      Nose: Nose normal.      Mouth/Throat:      Mouth: Mucous membranes are moist.      Pharynx: Oropharynx is clear.   Eyes:      Extraocular Movements: Extraocular movements intact.   Cardiovascular:      Rate and Rhythm: Normal rate and regular rhythm.      Pulses: Normal pulses.      Heart sounds: Normal heart sounds.   Pulmonary:      Effort: Pulmonary effort is normal. No respiratory distress.      Breath sounds: Normal breath sounds.   Abdominal:      Tenderness: There is abdominal tenderness.      Comments: Wound dressing clean, dry and intact.   Musculoskeletal:         General: Normal range of  motion.      Cervical back: Normal range of motion and neck supple.      Right lower leg: No edema.      Left lower leg: No edema.   Skin:     General: Skin is warm and dry.   Neurological:      Mental Status: He is alert. Mental status is at baseline.   Psychiatric:         Mood and Affect: Mood normal.         Behavior: Behavior normal.           Significant Labs: All pertinent labs within the past 24 hours have been reviewed.    Significant Imaging: I have reviewed all pertinent imaging results/findings within the past 24 hours.

## 2023-06-22 NOTE — CARE UPDATE
06/22/23 0815   Tobacco Cessation Intervention   Do you use any type of tobacco product? Yes   Are you interested in quitting use of tobacco products? Not interested   Are you interested in Nicotine Replacement for symptom relief? Yes  (in use)   $ Smoking Cessation Charges Smoking Cessation - Intermediate (CTTS)     Patient not interested in smoking cessation at this time, Nicotine patch in use.

## 2023-06-23 LAB
ALBUMIN SERPL BCP-MCNC: 2.8 G/DL (ref 3.5–5.2)
ALP SERPL-CCNC: 98 U/L (ref 55–135)
ALT SERPL W/O P-5'-P-CCNC: 16 U/L (ref 10–44)
ANION GAP SERPL CALC-SCNC: 13 MMOL/L (ref 8–16)
ANISOCYTOSIS BLD QL SMEAR: ABNORMAL
AST SERPL-CCNC: 19 U/L (ref 10–40)
BASOPHILS # BLD AUTO: 0.28 K/UL (ref 0–0.2)
BASOPHILS NFR BLD: 1.2 % (ref 0–1.9)
BILIRUB SERPL-MCNC: 0.7 MG/DL (ref 0.1–1)
BUN SERPL-MCNC: 18 MG/DL (ref 6–20)
CALCIUM SERPL-MCNC: 9.6 MG/DL (ref 8.7–10.5)
CHLORIDE SERPL-SCNC: 87 MMOL/L (ref 95–110)
CO2 SERPL-SCNC: 32 MMOL/L (ref 23–29)
CREAT SERPL-MCNC: 1.6 MG/DL (ref 0.5–1.4)
DIFFERENTIAL METHOD: ABNORMAL
EOSINOPHIL # BLD AUTO: 0.4 K/UL (ref 0–0.5)
EOSINOPHIL NFR BLD: 1.8 % (ref 0–8)
ERYTHROCYTE [DISTWIDTH] IN BLOOD BY AUTOMATED COUNT: 21 % (ref 11.5–14.5)
EST. GFR  (NO RACE VARIABLE): 54 ML/MIN/1.73 M^2
GLUCOSE SERPL-MCNC: 107 MG/DL (ref 70–110)
HCT VFR BLD AUTO: 27.9 % (ref 40–54)
HGB BLD-MCNC: 8.9 G/DL (ref 14–18)
IMM GRANULOCYTES # BLD AUTO: 0.3 K/UL (ref 0–0.04)
IMM GRANULOCYTES NFR BLD AUTO: 1.3 % (ref 0–0.5)
LYMPHOCYTES # BLD AUTO: 3.1 K/UL (ref 1–4.8)
LYMPHOCYTES NFR BLD: 13.1 % (ref 18–48)
MAGNESIUM SERPL-MCNC: 1.8 MG/DL (ref 1.6–2.6)
MCH RBC QN AUTO: 21.4 PG (ref 27–31)
MCHC RBC AUTO-ENTMCNC: 31.9 G/DL (ref 32–36)
MCV RBC AUTO: 67 FL (ref 82–98)
MONOCYTES # BLD AUTO: 2.7 K/UL (ref 0.3–1)
MONOCYTES NFR BLD: 11.7 % (ref 4–15)
NEUTROPHILS # BLD AUTO: 16.4 K/UL (ref 1.8–7.7)
NEUTROPHILS NFR BLD: 70.9 % (ref 38–73)
NRBC BLD-RTO: 0 /100 WBC
PHOSPHATE SERPL-MCNC: 3.4 MG/DL (ref 2.7–4.5)
PLATELET # BLD AUTO: 1225 K/UL (ref 150–450)
PLATELET BLD QL SMEAR: ABNORMAL
PMV BLD AUTO: 9.6 FL (ref 9.2–12.9)
POIKILOCYTOSIS BLD QL SMEAR: SLIGHT
POTASSIUM SERPL-SCNC: 3.4 MMOL/L (ref 3.5–5.1)
PROT SERPL-MCNC: 7.9 G/DL (ref 6–8.4)
RBC # BLD AUTO: 4.16 M/UL (ref 4.6–6.2)
SODIUM SERPL-SCNC: 132 MMOL/L (ref 136–145)
VANCOMYCIN SERPL-MCNC: 13.9 UG/ML
VANCOMYCIN SERPL-MCNC: 17.5 UG/ML
WBC # BLD AUTO: 23.2 K/UL (ref 3.9–12.7)

## 2023-06-23 PROCEDURE — 12000002 HC ACUTE/MED SURGE SEMI-PRIVATE ROOM

## 2023-06-23 PROCEDURE — 63600175 PHARM REV CODE 636 W HCPCS: Performed by: SURGERY

## 2023-06-23 PROCEDURE — 36415 COLL VENOUS BLD VENIPUNCTURE: CPT | Performed by: HOSPITALIST

## 2023-06-23 PROCEDURE — 25000003 PHARM REV CODE 250: Performed by: SURGERY

## 2023-06-23 PROCEDURE — 25000003 PHARM REV CODE 250: Performed by: INTERNAL MEDICINE

## 2023-06-23 PROCEDURE — 80202 ASSAY OF VANCOMYCIN: CPT | Mod: 91 | Performed by: HOSPITALIST

## 2023-06-23 PROCEDURE — 84100 ASSAY OF PHOSPHORUS: CPT | Performed by: SURGERY

## 2023-06-23 PROCEDURE — 80053 COMPREHEN METABOLIC PANEL: CPT | Performed by: SURGERY

## 2023-06-23 PROCEDURE — 85007 BL SMEAR W/DIFF WBC COUNT: CPT | Performed by: SURGERY

## 2023-06-23 PROCEDURE — 94799 UNLISTED PULMONARY SVC/PX: CPT

## 2023-06-23 PROCEDURE — 63600175 PHARM REV CODE 636 W HCPCS: Performed by: HOSPITALIST

## 2023-06-23 PROCEDURE — S4991 NICOTINE PATCH NONLEGEND: HCPCS | Performed by: SURGERY

## 2023-06-23 PROCEDURE — 83735 ASSAY OF MAGNESIUM: CPT | Performed by: SURGERY

## 2023-06-23 PROCEDURE — 25000003 PHARM REV CODE 250: Performed by: HOSPITALIST

## 2023-06-23 PROCEDURE — 94760 N-INVAS EAR/PLS OXIMETRY 1: CPT

## 2023-06-23 PROCEDURE — 99900035 HC TECH TIME PER 15 MIN (STAT)

## 2023-06-23 PROCEDURE — 85027 COMPLETE CBC AUTOMATED: CPT | Performed by: SURGERY

## 2023-06-23 PROCEDURE — 94761 N-INVAS EAR/PLS OXIMETRY MLT: CPT

## 2023-06-23 RX ORDER — SODIUM CHLORIDE 9 MG/ML
INJECTION, SOLUTION INTRAVENOUS CONTINUOUS
Status: DISCONTINUED | OUTPATIENT
Start: 2023-06-23 | End: 2023-06-24 | Stop reason: HOSPADM

## 2023-06-23 RX ORDER — POTASSIUM CHLORIDE 20 MEQ/1
40 TABLET, EXTENDED RELEASE ORAL ONCE
Status: COMPLETED | OUTPATIENT
Start: 2023-06-23 | End: 2023-06-23

## 2023-06-23 RX ADMIN — SODIUM CHLORIDE: 9 INJECTION, SOLUTION INTRAVENOUS at 12:06

## 2023-06-23 RX ADMIN — Medication 1 PATCH: at 09:06

## 2023-06-23 RX ADMIN — HYDROMORPHONE HYDROCHLORIDE 1 MG: 1 INJECTION, SOLUTION INTRAMUSCULAR; INTRAVENOUS; SUBCUTANEOUS at 04:06

## 2023-06-23 RX ADMIN — VANCOMYCIN HYDROCHLORIDE 1500 MG: 1.5 INJECTION, POWDER, LYOPHILIZED, FOR SOLUTION INTRAVENOUS at 05:06

## 2023-06-23 RX ADMIN — HYDROMORPHONE HYDROCHLORIDE 1 MG: 1 INJECTION, SOLUTION INTRAMUSCULAR; INTRAVENOUS; SUBCUTANEOUS at 12:06

## 2023-06-23 RX ADMIN — HYDROMORPHONE HYDROCHLORIDE 1 MG: 1 INJECTION, SOLUTION INTRAMUSCULAR; INTRAVENOUS; SUBCUTANEOUS at 10:06

## 2023-06-23 RX ADMIN — HYDROMORPHONE HYDROCHLORIDE 1 MG: 1 INJECTION, SOLUTION INTRAMUSCULAR; INTRAVENOUS; SUBCUTANEOUS at 06:06

## 2023-06-23 RX ADMIN — HEPARIN SODIUM 5000 UNITS: 5000 INJECTION INTRAVENOUS; SUBCUTANEOUS at 09:06

## 2023-06-23 RX ADMIN — PIPERACILLIN AND TAZOBACTAM 4.5 G: 4; .5 INJECTION, POWDER, LYOPHILIZED, FOR SOLUTION INTRAVENOUS; PARENTERAL at 12:06

## 2023-06-23 RX ADMIN — PIPERACILLIN AND TAZOBACTAM 4.5 G: 4; .5 INJECTION, POWDER, LYOPHILIZED, FOR SOLUTION INTRAVENOUS; PARENTERAL at 10:06

## 2023-06-23 RX ADMIN — POTASSIUM CHLORIDE 40 MEQ: 1500 TABLET, EXTENDED RELEASE ORAL at 09:06

## 2023-06-23 RX ADMIN — POTASSIUM CHLORIDE 40 MEQ: 1500 TABLET, EXTENDED RELEASE ORAL at 12:06

## 2023-06-23 RX ADMIN — HYDROMORPHONE HYDROCHLORIDE 1 MG: 1 INJECTION, SOLUTION INTRAMUSCULAR; INTRAVENOUS; SUBCUTANEOUS at 09:06

## 2023-06-23 RX ADMIN — HEPARIN SODIUM 5000 UNITS: 5000 INJECTION INTRAVENOUS; SUBCUTANEOUS at 05:06

## 2023-06-23 RX ADMIN — HEPARIN SODIUM 5000 UNITS: 5000 INJECTION INTRAVENOUS; SUBCUTANEOUS at 02:06

## 2023-06-23 RX ADMIN — HYDROMORPHONE HYDROCHLORIDE 1 MG: 1 INJECTION, SOLUTION INTRAMUSCULAR; INTRAVENOUS; SUBCUTANEOUS at 02:06

## 2023-06-23 RX ADMIN — Medication 400 MG: at 09:06

## 2023-06-23 RX ADMIN — ONDANSETRON 4 MG: 2 INJECTION INTRAMUSCULAR; INTRAVENOUS at 04:06

## 2023-06-23 NOTE — PLAN OF CARE
Problem: Adult Inpatient Plan of Care  Goal: Plan of Care Review  Outcome: Ongoing, Progressing   POC reviewed with patient. Pt verbalizes understanding.  Problem: Adult Inpatient Plan of Care  Goal: Absence of Hospital-Acquired Illness or Injury  Outcome: Ongoing, Progressing   Q 2 hour rounds completed throughout shift. Pain assessed, toileting offered. Safety maintained with bed left in lowest position with wheels locked. Side rails up x2. Call light left within reach. Patient instructed to call for any assistance.    Problem: Infection  Goal: Absence of Infection Signs and Symptoms  Outcome: Ongoing, Progressing     Problem: Fluid and Electrolyte Imbalance (Acute Kidney Injury/Impairment)  Goal: Fluid and Electrolyte Balance  Outcome: Ongoing, Progressing     Problem: Renal Function Impairment (Acute Kidney Injury/Impairment)  Goal: Effective Renal Function  Outcome: Ongoing, Progressing

## 2023-06-23 NOTE — PROGRESS NOTES
Pharmacokinetic Assessment Follow Up: IV Vancomycin    Vancomycin serum concentration assessment(s):    The trough level was drawn correctly and can be used to guide therapy at this time. The measurement is above the desired definitive target range of 15 to 20 mcg/mL.    Vancomycin Regimen Plan:    Discontinue the scheduled vancomycin regimen and re-dose when the random level is less than 20 mcg/mL, next level to be drawn at [facility] on 6/23/23.    Drug levels (last 3 results):  Recent Labs   Lab Result Units 06/21/23  0835 06/22/23  0302 06/22/23  1835   Vancomycin-Trough ug/mL 18.4 11.7 28.9*       Pharmacy will continue to follow and monitor vancomycin.    Please contact pharmacy at extension [phone] for questions regarding this assessment.    Thank you for the consult,   Kelin Albrecht       Patient brief summary:  Ruslan Caldwell is a 47 y.o. male initiated on antimicrobial therapy with IV Vancomycin for treatment of sepsis      Drug Allergies:   Review of patient's allergies indicates:  No Known Allergies    Actual Body Weight:   91.7 kg    Renal Function:   Estimated Creatinine Clearance: 92.9 mL/min (based on SCr of 1.2 mg/dL).,     Dialysis Method (if applicable):  N/A    CBC (last 72 hours):  Recent Labs   Lab Result Units 06/20/23  0331 06/21/23  0310 06/22/23  0302   WBC K/uL 17.33* 18.84* 19.14*   Hemoglobin g/dL 8.9* 8.5* 9.5*   Hematocrit % 28.4* 27.8* 30.1*   Platelets K/uL 1,183* 1,189* 1,264*   Gran % % 76.6* 72.2 71.0   Lymph % % 9.7* 13.6* 14.0*   Mono % % 10.4 9.8 13.0   Eosinophil % % 2.2 2.8 2.0   Basophil % % 0.6 0.8 0.0   Differential Method  Automated Automated Manual       Metabolic Panel (last 72 hours):  Recent Labs   Lab Result Units 06/20/23  0331 06/21/23  0310 06/22/23  0302   Sodium mmol/L 135* 129* 132*   Potassium mmol/L 3.2* 3.1* 2.9*   Chloride mmol/L 84* 84* 83*   CO2 mmol/L 37* 33* 34*   Glucose mg/dL 119* 168* 113*   BUN mg/dL 13 13 16   Creatinine mg/dL 0.7 0.8 1.2    Albumin g/dL 2.4* 2.5* 2.8*   Total Bilirubin mg/dL 0.7 0.6 0.7   Alkaline Phosphatase U/L 78 88 101   AST U/L 16 12 15   ALT U/L 13 17 15   Magnesium mg/dL 1.9 1.5* 1.7   Phosphorus mg/dL 3.3 3.2 4.0       Vancomycin Administrations:  vancomycin given in the last 96 hours                     vancomycin (VANCOCIN) 1,750 mg in dextrose 5 % (D5W) 500 mL IVPB (mg) 1,750 mg New Bag 06/22/23 1220    vancomycin 1,500 mg in dextrose 5 % (D5W) 250 mL IVPB (Vial-Mate) (mg) 1,500 mg New Bag 06/22/23 0424     1,500 mg New Bag 06/21/23 2014     1,500 mg New Bag  1201    vancomycin (VANCOCIN) 1,750 mg in dextrose 5 % (D5W) 500 mL IVPB (mg) 1,750 mg New Bag 06/21/23 0112      Restarted 06/20/23 1957     1,750 mg New Bag  1843    vancomycin 1,250 mg in dextrose 5 % (D5W) 250 mL IVPB (Vial-Mate) (mg) 1,250 mg New Bag 06/20/23 0848     1,250 mg New Bag  0007    vancomycin (VANCOCIN) 1,000 mg in dextrose 5 % (D5W) 250 mL IVPB (Vial-Mate) (mg) 1,000 mg New Bag 06/19/23 1625     1,000 mg New Bag  0818    vancomycin 1,250 mg in dextrose 5 % (D5W) 250 mL IVPB (Vial-Mate) (mg) 1,250 mg New Bag 06/18/23 2325                    Microbiologic Results:  Microbiology Results (last 7 days)       Procedure Component Value Units Date/Time    Blood culture [[id number]] Collected: 06/17/23 1151    Order Status: Completed Specimen: Blood Updated: 06/22/23 1612     Blood Culture, Routine No growth after 5 days.    Blood culture [[id number]] Collected: 06/17/23 1151    Order Status: Completed Specimen: Blood Updated: 06/22/23 1612     Blood Culture, Routine No growth after 5 days.    Aerobic culture [[id number]] Collected: 06/17/23 1800    Order Status: Completed Specimen: Incision site from Abdomen Updated: 06/21/23 1504     Aerobic Bacterial Culture No growth    Fungus culture [[id number]] Collected: 06/17/23 1800    Order Status: Completed Specimen: Abdominal from Abdomen Updated: 06/21/23 1233     Fungus (Mycology) Culture Culture in progress     Culture, Anaerobe [[id number]] Collected: 06/17/23 1800    Order Status: Completed Specimen: Abdominal from Abdomen Updated: 06/20/23 1137     Anaerobic Culture Culture in progress    AFB Culture & Smear [[id number]] Collected: 06/17/23 1800    Order Status: Completed Specimen: Abdominal from Abdomen Updated: 06/19/23 1455     AFB Culture & Smear Culture in progress     AFB CULTURE STAIN No acid fast bacilli seen.    Gram stain [[id number]] Collected: 06/17/23 1800    Order Status: Completed Specimen: Abdominal from Abdomen Updated: 06/18/23 0010     Gram Stain Result Few WBC's      No organisms seen    Urine Culture High Risk [[id number]]     Order Status: Sent Specimen: Urine

## 2023-06-23 NOTE — SUBJECTIVE & OBJECTIVE
Interval History:  Patient seen and examined.  Tolerating regular diet.  Creatinine up to 1.7 today.  Discussed with Nephrology who wants to give IV fluids and continue monitoring.  Review of Systems   Constitutional:  Negative for fever.   Gastrointestinal:  Positive for abdominal pain. Negative for nausea and vomiting.   Skin:  Positive for wound.   Allergic/Immunologic: Positive for immunocompromised state.   Objective:     Vital Signs (Most Recent):  Temp: 98 °F (36.7 °C) (06/23/23 1116)  Pulse: 101 (06/23/23 1116)  Resp: 18 (06/23/23 1116)  BP: 117/76 (06/23/23 1116)  SpO2: 99 % (06/23/23 1116) Vital Signs (24h Range):  Temp:  [97.3 °F (36.3 °C)-98.4 °F (36.9 °C)] 98 °F (36.7 °C)  Pulse:  [] 101  Resp:  [16-19] 18  SpO2:  [96 %-99 %] 99 %  BP: (114-123)/(64-81) 117/76     Weight: 91.7 kg (202 lb 2.6 oz)  Body mass index is 25.27 kg/m².    Intake/Output Summary (Last 24 hours) at 6/23/2023 1325  Last data filed at 6/23/2023 0542  Gross per 24 hour   Intake 200 ml   Output 1100 ml   Net -900 ml           Physical Exam  Vitals and nursing note reviewed.   Constitutional:       General: He is not in acute distress.     Appearance: Normal appearance. He is ill-appearing.   HENT:      Head: Normocephalic and atraumatic.      Right Ear: External ear normal.      Left Ear: External ear normal.      Nose: Nose normal.      Mouth/Throat:      Mouth: Mucous membranes are moist.      Pharynx: Oropharynx is clear.   Eyes:      Extraocular Movements: Extraocular movements intact.   Cardiovascular:      Rate and Rhythm: Normal rate and regular rhythm.      Pulses: Normal pulses.      Heart sounds: Normal heart sounds.   Pulmonary:      Effort: Pulmonary effort is normal. No respiratory distress.      Breath sounds: Normal breath sounds.   Abdominal:      Tenderness: There is abdominal tenderness.      Comments: Wound dressing clean, dry and intact.   Musculoskeletal:         General: Normal range of motion.       Cervical back: Normal range of motion and neck supple.      Right lower leg: No edema.      Left lower leg: No edema.   Skin:     General: Skin is warm and dry.   Neurological:      Mental Status: He is alert. Mental status is at baseline.   Psychiatric:         Mood and Affect: Mood normal.         Behavior: Behavior normal.           Significant Labs: All pertinent labs within the past 24 hours have been reviewed.    Significant Imaging: I have reviewed all pertinent imaging results/findings within the past 24 hours.

## 2023-06-23 NOTE — PROGRESS NOTES
INPATIENT NEPHROLOGY Progress Note  [facility] NEPHROLOGY    Ruslan Caldwell  06/23/2023    Reason for consultation:  Acute kidney injury    Chief Complaint: SBO    History of Present Illness:    Per H and P    Ruslan Caldwell is a 47-year-old male who presents in transfer from [facility] for evaluation of possible developing small bowel obstruction.  Patient presents outside facility complaining of abdominal pain with nausea and vomiting.  Workup at outside facility demonstrated leukocytosis of 16,000, anemia, and thrombocytosis of 843.  CMP with hypokalemia 3.3 and total bilirubin of 1.2.  CT scan at outside facility was concerning for developing small-bowel obstruction.  Patient was admitted to [facility] where he underwent right partial colectomy on 05/29 with pathology consistent with adenocarcinoma.  He was discharged and then came back for another admission on 06/08 where he was treated for an ileus.  Previous medical history includes type 2 diabetes, anemia, hypertension, and right eye blindness.  Patient admitted to Hospital Medicine for treatment management    6/16 Consulted for JOYCE.   Pt POD1 s/p exploratory laparotomy with small bowel resection and omental resection.   He has abdominal pain and bloating.  No chest pain, sob, neurologic symptoms, vomiting, or confusion.  He is a little somnolent on his analgesics  6/17 VSS. Worsening anemia, hypophosphatemia, hyponatremia. Fever yesterday evening with tachycardia, worsening WBC.  6/18 VSS, s/p re-operation on 6/17, evacuation for multiple abscess, no obvious perforation, NG placement with stomach content decompression.  6/19 VSS, on 1.5L NC, UOP 2L,c/w NGT, will start CLD for comfort, on clinimix as well  6/20 customize clinimix today, VSS, on 1L NC, UOP 1.5L, rodriguez removed  6/21 customize TPN again today- if this doesn't work, will need PICC for further customization, UOP 850cc, NGT out, still on CLD (not happy about it), passing gas  6/22  VSS, on RA, UOP 1.3L, PIV infiltrated and refused another one so PPN not hung last night  6/23 switched to regular diet yest with no PPN- renal function worse- UOP 1.7L- VSS, on RA- fluid challenge today- WBC worse- defer to GS/hospitalist- hold discharge today- he is upset about staying but explained rationale with nurse present    Plan of Care:    JOYCE due to volume depletion, toradol, hemodynamically mediated renal injury, elevated CPK (sepsis due to post-op abscess, evacuated on 6/17)  --he has JOYCE again- ordered NS 100cc/hr   --nonoliguric  - if worse tomorrow, will get imaging  --rodriguez out but with degree of UOP, suspicion for retention is low today  --no nsaids or IV contrast    Hyponatremia  Hypokalemia  Hypomg  Hypophosphatemia  Alkalosis  --control pain and nausea -> SIADH  --continue oral KCl 40mEq BID- ordered extra 40mEq today  --continue oral Mg 400mg daily    Thank you for allowing us to participate in this patient's care. We will continue to follow.    Vital Signs:  Temp Readings from Last 3 Encounters:   06/23/23 97.7 °F (36.5 °C)   06/11/23 98.4 °F (36.9 °C) (Oral)   06/10/23 97.8 °F (36.6 °C)       Pulse Readings from Last 3 Encounters:   06/23/23 95   06/11/23 (!) 112   06/10/23 82       BP Readings from Last 3 Encounters:   06/23/23 114/81   06/11/23 124/78   06/10/23 (!) 135/91       Weight:  Wt Readings from Last 3 Encounters:   06/21/23 91.7 kg (202 lb 2.6 oz)   06/11/23 102.1 kg (225 lb)   06/09/23 102 kg (224 lb 13.9 oz)       Medications:  No current facility-administered medications on file prior to encounter.     Current Outpatient Medications on File Prior to Encounter   Medication Sig Dispense Refill    gabapentin (NEURONTIN) 300 MG capsule Take 1 capsule (300 mg total) by mouth 2 (two) times daily. for 14 days 28 capsule 0     Scheduled Meds:   heparin (porcine)  5,000 Units Subcutaneous Q8H    magnesium oxide  400 mg Oral Daily    nicotine  1 patch Transdermal Daily     "piperacillin-tazobactam (Zosyn) IV (PEDS and ADULTS) (extended infusion is not appropriate)  4.5 g Intravenous Q8H    potassium chloride  40 mEq Oral BID     Continuous Infusions:   sodium chloride 0.9%       PRN Meds:.sodium chloride, diazePAM, HYDROmorphone, naloxone, ondansetron, oxyCODONE, prochlorperazine, promethazine (PHENERGAN) IVPB, simethicone, sodium chloride 0.9%, sodium phosphate IVPB, sodium phosphate IVPB, sodium phosphate IVPB, Pharmacy to dose Vancomycin consult **AND** vancomycin - pharmacy to dose    Review of Systems:  Neg    Physical Exam:    /81   Pulse 95   Temp 97.7 °F (36.5 °C)   Resp 18   Ht 6' 3" (1.905 m)   Wt 91.7 kg (202 lb 2.6 oz)   SpO2 97%   BMI 25.27 kg/m²     General Appearance:    Alert, cooperative, no distress, appears stated age   Head:    Normocephalic, without obvious abnormality, atraumatic   Eyes:    PER, conjunctiva/corneas clear, EOM's intact in both eyes        Throat:   Lips, mucosa, and tongue normal; teeth and gums normal   Back:     Symmetric, no curvature, ROM normal, no CVA tenderness   Lungs:     Clear to auscultation bilaterally, respirations unlabored   Chest wall:    No tenderness or deformity   Heart:    Regular rate and rhythm, S1 and S2 normal, no murmur, rub   or gallop   Abdomen:     Soft, non-tender, bowel sounds active all four quadrants,     no masses, no organomegaly   Extremities:   Extremities normal, atraumatic, no cyanosis or edema   Pulses:   2+ and symmetric all extremities   MSK:   No joint or muscle swelling, tenderness or deformity   Skin:   Skin color, texture, turgor normal, no rashes or lesions   Neurologic:   CNII-XII intact, normal strength and sensation       Throughout.  No flap     Results:  Recent Labs   Lab 06/21/23  0310 06/22/23  0302 06/23/23  0251   * 132* 132*   K 3.1* 2.9* 3.4*   CL 84* 83* 87*   CO2 33* 34* 32*   BUN 13 16 18   CREATININE 0.8 1.2 1.6*   * 113* 107         Recent Labs   Lab " 06/21/23  0310 06/22/23  0302 06/23/23  0251   CALCIUM 9.6 9.8 9.6   ALBUMIN 2.5* 2.8* 2.8*   PHOS 3.2 4.0 3.4   MG 1.5* 1.7 1.8               No results for input(s): POCTGLUCOSE in the last 168 hours.      Recent Labs   Lab 09/14/21  0408 06/08/23  0607   Hemoglobin A1C 4.8 5.0         Recent Labs   Lab 06/21/23  0310 06/22/23  0302 06/23/23  0251   WBC 18.84* 19.14* 23.20*   HGB 8.5* 9.5* 8.9*   HCT 27.8* 30.1* 27.9*   PLT 1,189* 1,264* 1,225*   MCV 67* 67* 67*   MCHC 30.6* 31.6* 31.9*   MONO 9.8  1.9* 13.0  CANCELED 11.7  2.7*         Recent Labs   Lab 06/21/23  0310 06/22/23  0302 06/23/23  0251   BILITOT 0.6 0.7 0.7   PROT 7.4 8.1 7.9   ALBUMIN 2.5* 2.8* 2.8*   ALKPHOS 88 101 98   ALT 17 15 16   AST 12 15 19         Recent Labs   Lab 05/28/23  0038 06/11/23  1614 06/16/23  1805   Color, UA Yellow Yellow Yellow   Appearance, UA Clear Clear Clear   pH, UA 7.0 6.0 7.0   Specific Win[place]red, UA 1.020 1.020 1.010   Protein, UA 1+ A 2+ A 2+ A   Glucose, UA Negative Negative Negative   Ketones, UA Negative 2+ A Negative   Urobilinogen, UA Negative Negative Negative   Bilirubin (UA) Negative 2+ A Negative   Occult Blood UA Negative Negative 1+ A   Nitrite, UA Negative Negative Negative   RBC, UA 0 2 14 H   WBC, UA 1 10 H 8 H   Bacteria None Occasional Occasional   Hyaline Casts, UA 0 2 A 0               Microbiology Results (last 7 days)       Procedure Component Value Units Date/Time    Blood culture [[id number]] Collected: 06/17/23 1151    Order Status: Completed Specimen: Blood Updated: 06/22/23 1612     Blood Culture, Routine No growth after 5 days.    Blood culture [[id number]] Collected: 06/17/23 1151    Order Status: Completed Specimen: Blood Updated: 06/22/23 1612     Blood Culture, Routine No growth after 5 days.    Aerobic culture [[id number]] Collected: 06/17/23 1800    Order Status: Completed Specimen: Incision site from Abdomen Updated: 06/21/23 1504     Aerobic Bacterial Culture No growth    Fungus culture  [[id number]] Collected: 06/17/23 1800    Order Status: Completed Specimen: Abdominal from Abdomen Updated: 06/21/23 1233     Fungus (Mycology) Culture Culture in progress    Culture, Anaerobe [[id number]] Collected: 06/17/23 1800    Order Status: Completed Specimen: Abdominal from Abdomen Updated: 06/20/23 1137     Anaerobic Culture Culture in progress    AFB Culture & Smear [[id number]] Collected: 06/17/23 1800    Order Status: Completed Specimen: Abdominal from Abdomen Updated: 06/19/23 1455     AFB Culture & Smear Culture in progress     AFB CULTURE STAIN No acid fast bacilli seen.    Gram stain [[id number]] Collected: 06/17/23 1800    Order Status: Completed Specimen: Abdominal from Abdomen Updated: 06/18/23 0010     Gram Stain Result Few WBC's      No organisms seen    Urine Culture High Risk [[id number]]     Order Status: Sent Specimen: Urine           Patient care time was spent personally by me on the following activities: > 35 minutes  Obtaining a history.  Examination of patient.  Providing medical care at the patients bedside.  Developing a treatment plan with patient or surrogate and bedside caregivers.  Ordering and reviewing laboratory studies, radiographic studies, pulse oximetry.  Ordering and performing treatments and interventions.  Evaluation of patient's response to treatment.  Discussions with consultants while on the unit and immediately available to the patient.  Re-evaluation of the patient's condition.  Documentation in the medical record.    Jo Montes De Oca MD  Nephrology  [facility] [place]  [phone]

## 2023-06-23 NOTE — ASSESSMENT & PLAN NOTE
-IV fluids with NS  -Renally dose all medications  -Avoid nephrotoxic agents  -Monitor UOP and electrolytes  -Trend creatinine  -Nephrology consulted

## 2023-06-23 NOTE — PROGRESS NOTES
Pt seen and examined.  Resting comfortably.  States he is having bowel function.  No n/v.  Wants to go home.   Increase in Cr today  Having good UOP    Wt Readings from Last 3 Encounters:   06/21/23 91.7 kg (202 lb 2.6 oz)   06/11/23 102.1 kg (225 lb)   06/09/23 102 kg (224 lb 13.9 oz)     Temp Readings from Last 3 Encounters:   06/23/23 98 °F (36.7 °C)   06/11/23 98.4 °F (36.9 °C) (Oral)   06/10/23 97.8 °F (36.6 °C)     BP Readings from Last 3 Encounters:   06/23/23 117/76   06/11/23 124/78   06/10/23 (!) 135/91     Pulse Readings from Last 3 Encounters:   06/23/23 101   06/11/23 (!) 112   06/10/23 82     AAOx3  Sinus  Soft/nd/appt ttp  2+ pulses    Lab Results   Component Value Date    WBC 23.20 (H) 06/23/2023    HGB 8.9 (L) 06/23/2023    HCT 27.9 (L) 06/23/2023    MCV 67 (L) 06/23/2023    PLT 1,225 (HH) 06/23/2023       BMP  Lab Results   Component Value Date     (L) 06/23/2023    K 3.4 (L) 06/23/2023    CL 87 (L) 06/23/2023    CO2 32 (H) 06/23/2023    BUN 18 06/23/2023    CREATININE 1.6 (H) 06/23/2023    CALCIUM 9.6 06/23/2023    ANIONGAP 13 06/23/2023    EGFRNORACEVR 54 (A) 06/23/2023     A/P: s/p ex lap with R rosaura  Cont reg diet  Monitor BUn/ CR, order per nephrology  Pain control

## 2023-06-23 NOTE — PROGRESS NOTES
Pharmacokinetic Assessment Follow Up: IV Vancomycin    Vancomycin serum concentration assessment(s):    The random level was drawn correctly and can be used to guide therapy at this time. The measurement is within the desired definitive target range of 15 to 20 mcg/mL.    Vancomycin Regimen Plan:    Pt's renal function is not stable.  Pharmacy will dose by level.  Pharmacy will dose vancomycin 1500 mg x1.  A vancomycin level will be ordered on 06/23/23 at 2230.       Drug levels (last 3 results):  Recent Labs   Lab Result Units 06/21/23  0835 06/22/23  0302 06/22/23  1835 06/23/23  0251   Vancomycin, Random ug/mL  --   --   --  17.5   Vancomycin-Trough ug/mL 18.4 11.7 28.9*  --        Pharmacy will continue to follow and monitor vancomycin.    Please contact pharmacy at extension [phone] for questions regarding this assessment.    Thank you for the consult,   Josef Magallanes       Patient brief summary:  uRslan Caldwell is a 47 y.o. male initiated on antimicrobial therapy with IV Vancomycin for treatment of sepsis    Drug Allergies:   Review of patient's allergies indicates:  No Known Allergies    Actual Body Weight:   91.7kg    Renal Function:   Estimated Creatinine Clearance: 69.7 mL/min (A) (based on SCr of 1.6 mg/dL (H)).,     CBC (last 72 hours):  Recent Labs   Lab Result Units 06/21/23  0310 06/22/23  0302 06/23/23  0251   WBC K/uL 18.84* 19.14* 23.20*   Hemoglobin g/dL 8.5* 9.5* 8.9*   Hematocrit % 27.8* 30.1* 27.9*   Platelets K/uL 1,189* 1,264* 1,225*   Gran % % 72.2 71.0 70.9   Lymph % % 13.6* 14.0* 13.1*   Mono % % 9.8 13.0 11.7   Eosinophil % % 2.8 2.0 1.8   Basophil % % 0.8 0.0 1.2   Differential Method  Automated Manual Automated       Metabolic Panel (last 72 hours):  Recent Labs   Lab Result Units 06/21/23  0310 06/22/23  0302 06/23/23  0251   Sodium mmol/L 129* 132* 132*   Potassium mmol/L 3.1* 2.9* 3.4*   Chloride mmol/L 84* 83* 87*   CO2 mmol/L 33* 34* 32*   Glucose mg/dL 168* 113* 107   BUN mg/dL  13 16 18   Creatinine mg/dL 0.8 1.2 1.6*   Albumin g/dL 2.5* 2.8* 2.8*   Total Bilirubin mg/dL 0.6 0.7 0.7   Alkaline Phosphatase U/L 88 101 98   AST U/L 12 15 19   ALT U/L 17 15 16   Magnesium mg/dL 1.5* 1.7 1.8   Phosphorus mg/dL 3.2 4.0 3.4       Vancomycin Administrations:  vancomycin given in the last 96 hours                     vancomycin (VANCOCIN) 1,750 mg in dextrose 5 % (D5W) 500 mL IVPB (mg) 1,750 mg New Bag 06/22/23 1220    vancomycin 1,500 mg in dextrose 5 % (D5W) 250 mL IVPB (Vial-Mate) (mg) 1,500 mg New Bag 06/22/23 0424     1,500 mg New Bag 06/21/23 2014     1,500 mg New Bag  1201    vancomycin (VANCOCIN) 1,750 mg in dextrose 5 % (D5W) 500 mL IVPB (mg) 1,750 mg New Bag 06/21/23 0112      Restarted 06/20/23 1957     1,750 mg New Bag  1843    vancomycin 1,250 mg in dextrose 5 % (D5W) 250 mL IVPB (Vial-Mate) (mg) 1,250 mg New Bag 06/20/23 0848     1,250 mg New Bag  0007    vancomycin (VANCOCIN) 1,000 mg in dextrose 5 % (D5W) 250 mL IVPB (Vial-Mate) (mg) 1,000 mg New Bag 06/19/23 1625     1,000 mg New Bag  0818                    Microbiologic Results:  Microbiology Results (last 7 days)       Procedure Component Value Units Date/Time    Blood culture [[id number]] Collected: 06/17/23 1151    Order Status: Completed Specimen: Blood Updated: 06/22/23 1612     Blood Culture, Routine No growth after 5 days.    Blood culture [[id number]] Collected: 06/17/23 1151    Order Status: Completed Specimen: Blood Updated: 06/22/23 1612     Blood Culture, Routine No growth after 5 days.    Aerobic culture [[id number]] Collected: 06/17/23 1800    Order Status: Completed Specimen: Incision site from Abdomen Updated: 06/21/23 1504     Aerobic Bacterial Culture No growth    Fungus culture [[id number]] Collected: 06/17/23 1800    Order Status: Completed Specimen: Abdominal from Abdomen Updated: 06/21/23 1233     Fungus (Mycology) Culture Culture in progress    Culture, Anaerobe [[id number]] Collected: 06/17/23 1800    Order  Status: Completed Specimen: Abdominal from Abdomen Updated: 06/20/23 1137     Anaerobic Culture Culture in progress    AFB Culture & Smear [[id number]] Collected: 06/17/23 1800    Order Status: Completed Specimen: Abdominal from Abdomen Updated: 06/19/23 1455     AFB Culture & Smear Culture in progress     AFB CULTURE STAIN No acid fast bacilli seen.    Gram stain [[id number]] Collected: 06/17/23 1800    Order Status: Completed Specimen: Abdominal from Abdomen Updated: 06/18/23 0010     Gram Stain Result Few WBC's      No organisms seen    Urine Culture High Risk [[id number]]     Order Status: Sent Specimen: Urine

## 2023-06-23 NOTE — PLAN OF CARE
Problem: Adult Inpatient Plan of Care  Goal: Plan of Care Review  Outcome: Ongoing, Progressing  Goal: Patient-Specific Goal (Individualized)  Outcome: Ongoing, Progressing  Goal: Absence of Hospital-Acquired Illness or Injury  Outcome: Ongoing, Progressing  Goal: Optimal Comfort and Wellbeing  Outcome: Ongoing, Progressing  Goal: Readiness for Transition of Care  Outcome: Ongoing, Progressing     Problem: Diabetes Comorbidity  Goal: Blood Glucose Level Within Targeted Range  Outcome: Ongoing, Progressing     Problem: Infection  Goal: Absence of Infection Signs and Symptoms  Outcome: Ongoing, Progressing     Problem: Fall Injury Risk  Goal: Absence of Fall and Fall-Related Injury  Outcome: Ongoing, Progressing     Problem: Malnutrition  Goal: Improved Nutritional Intake  Outcome: Ongoing, Progressing     Problem: Fluid and Electrolyte Imbalance (Acute Kidney Injury/Impairment)  Goal: Fluid and Electrolyte Balance  Outcome: Ongoing, Progressing     Problem: Oral Intake Inadequate (Acute Kidney Injury/Impairment)  Goal: Optimal Nutrition Intake  Outcome: Ongoing, Progressing     Problem: Renal Function Impairment (Acute Kidney Injury/Impairment)  Goal: Effective Renal Function  Outcome: Ongoing, Progressing     Problem: Adjustment to Illness (Sepsis/Septic Shock)  Goal: Optimal Coping  Outcome: Ongoing, Progressing     Problem: Bleeding (Sepsis/Septic Shock)  Goal: Absence of Bleeding  Outcome: Ongoing, Progressing     Problem: Glycemic Control Impaired (Sepsis/Septic Shock)  Goal: Blood Glucose Level Within Desired Range  Outcome: Ongoing, Progressing     Problem: Infection Progression (Sepsis/Septic Shock)  Goal: Absence of Infection Signs and Symptoms  Outcome: Ongoing, Progressing     Problem: Nutrition Impaired (Sepsis/Septic Shock)  Goal: Optimal Nutrition Intake  Outcome: Ongoing, Progressing     Problem: Skin Injury Risk Increased  Goal: Skin Health and Integrity  Outcome: Ongoing, Progressing

## 2023-06-23 NOTE — PROGRESS NOTES
[facility] Medicine  Progress Note    Patient Name: Ruslan Caldwell  MRN: [id number]  Patient Class: IP- Inpatient   Admission Date: 6/11/2023  Length of Stay: 11 days  Attending Physician: Marybeth Duran MD  Primary Care Provider: Amira Knutson NP        Subjective:     Principal Problem:Small bowel obstruction        HPI:  Ruslan Caldwell is a 47-year-old male who presents in transfer from [facility] for evaluation of possible developing small bowel obstruction.  Patient presents outside facility complaining of abdominal pain with nausea and vomiting.  Workup at outside facility demonstrated leukocytosis of 16,000, anemia, and thrombocytosis of 843.  CMP with hypokalemia 3.3 and total bilirubin of 1.2.  CT scan at outside facility was concerning for developing small-bowel obstruction.  Patient was admitted to [facility] where he underwent right partial colectomy on 05/29 with pathology consistent with adenocarcinoma.  He was discharged and then came back for another admission on 06/08 where he was treated for an ileus.  Previous medical history includes type 2 diabetes, anemia, hypertension, and right eye blindness.  Patient admitted to Hospital Medicine for treatment management.  Will maintain patient NPO, IV fluids, sliding scale insulin p.r.n., and general surgery consult in a.m..      Overview/Hospital Course:  Ruslan Caldwell is a 47 year old male with a past medical history of recently diagnosed colonic adenocarcinoma s/p partial colectomy with anastomosis, tobacco use, microcytic anemia and thrombocytosis who presented with persistent abdominal pain, nausea and abdominal pain concerning for SBO. An NG tube was unable to be placed. He was placed on IV fluids and Zosyn. He was made NPO. PRN IV analgesics and antiemetics were ordered and General Surgery was consulted. Repeat CT A/P shows SBO 6/13. A gastrografin enema 6/14 was inconclusive. Surgery took the  patient to the OR 6/15 for exploratory laparotomy where an omental infarction was addressed; the patient also underwent SCOUT and partial small bowel resection with anastomosis. PPN was started 6/16. His course has been complicated by JOYCE in setting of vomiting as well as Toradol use; IV fluids were continued and his kidney function has improved to baseline. Nephrology has been consulted. He was also discovered to have an iron deficiency for which IV iron (one dose) was ordered 6/13. Oncology has also been consulted and will arrange follow up in clinic. His post-operative course has been complicated by low grade fever, tachycardia and an increasing WBC count, despite Zosyn use. Antibiotics were broadened to vancomycin, cefepime and Flagyl. Blood cultures were ordered as well as a CXR and urine culture.       Interval History:  Patient seen and examined.  Tolerating regular diet.  Creatinine up to 1.7 today.  Discussed with Nephrology who wants to give IV fluids and continue monitoring.  Review of Systems   Constitutional:  Negative for fever.   Gastrointestinal:  Positive for abdominal pain. Negative for nausea and vomiting.   Skin:  Positive for wound.   Allergic/Immunologic: Positive for immunocompromised state.   Objective:     Vital Signs (Most Recent):  Temp: 98 °F (36.7 °C) (06/23/23 1116)  Pulse: 101 (06/23/23 1116)  Resp: 18 (06/23/23 1116)  BP: 117/76 (06/23/23 1116)  SpO2: 99 % (06/23/23 1116) Vital Signs (24h Range):  Temp:  [97.3 °F (36.3 °C)-98.4 °F (36.9 °C)] 98 °F (36.7 °C)  Pulse:  [] 101  Resp:  [16-19] 18  SpO2:  [96 %-99 %] 99 %  BP: (114-123)/(64-81) 117/76     Weight: 91.7 kg (202 lb 2.6 oz)  Body mass index is 25.27 kg/m².    Intake/Output Summary (Last 24 hours) at 6/23/2023 1325  Last data filed at 6/23/2023 0542  Gross per 24 hour   Intake 200 ml   Output 1100 ml   Net -900 ml           Physical Exam  Vitals and nursing note reviewed.   Constitutional:       General: He is not in acute  distress.     Appearance: Normal appearance. He is ill-appearing.   HENT:      Head: Normocephalic and atraumatic.      Right Ear: External ear normal.      Left Ear: External ear normal.      Nose: Nose normal.      Mouth/Throat:      Mouth: Mucous membranes are moist.      Pharynx: Oropharynx is clear.   Eyes:      Extraocular Movements: Extraocular movements intact.   Cardiovascular:      Rate and Rhythm: Normal rate and regular rhythm.      Pulses: Normal pulses.      Heart sounds: Normal heart sounds.   Pulmonary:      Effort: Pulmonary effort is normal. No respiratory distress.      Breath sounds: Normal breath sounds.   Abdominal:      Tenderness: There is abdominal tenderness.      Comments: Wound dressing clean, dry and intact.   Musculoskeletal:         General: Normal range of motion.      Cervical back: Normal range of motion and neck supple.      Right lower leg: No edema.      Left lower leg: No edema.   Skin:     General: Skin is warm and dry.   Neurological:      Mental Status: He is alert. Mental status is at baseline.   Psychiatric:         Mood and Affect: Mood normal.         Behavior: Behavior normal.           Significant Labs: All pertinent labs within the past 24 hours have been reviewed.    Significant Imaging: I have reviewed all pertinent imaging results/findings within the past 24 hours.      Assessment/Plan:      * Small bowel obstruction  Acute problem. -NPO  -Await return of bowel function  -PPN with lipids until diet advanced  -IV fluids NS stopped 6/17 given worsening hyponatremia  -Dilaudid PCA and PRN  -Zofran PRN  -Surgery consulted; exploratory laparotomy 6/15 with small bowel removal with anastomosis and SCOUT  -Zosyn changed to vancomycin, cefepime and Flagyl 6/17  -underwent repeat surgery 6/17 for free air, abscess seen, no perforation        Hypophosphatemia  Resolved      Hyponatremia  Possible SIADH in setting of pain.  Nephrology following      Sepsis  Continue IV  antibiotics    Omental infarction  -Area removed by Surgery   -Follow up pathology      JOYCE (acute kidney injury)  -IV fluids with NS  -Renally dose all medications  -Avoid nephrotoxic agents  -Monitor UOP and electrolytes  -Trend creatinine  -Nephrology consulted      Tobacco use  -Patient to be counseled on cessation  -Nicotine patch      Moderate malnutrition  Regular diet  -Nutrition consulted      Thrombocytosis  Chronic. Stable. In setting of recently diagnosed adenocarcinoma and SBO.  -Trend platelets with CBC      Colon adenocarcinoma  Recent partial colectomy.  -Oncology consulted; follow up in clinic        Iron deficiency anemia  Suspect mixed iron deficiency and anemia of chronic inflammation.  -IV iron one dose 6/13  -Trend Hgb with CBC      Essential hypertension  Patient not taking any BP medications.  -Continue to monitor        VTE Risk Mitigation (From admission, onward)         Ordered     heparin (porcine) injection 5,000 Units  Every 8 hours         06/16/23 0738     Place DINA hose  Until discontinued         06/11/23 2043     IP VTE HIGH RISK PATIENT  Once         06/11/23 2043     Place sequential compression device  Until discontinued         06/11/23 2043                Discharge Planning   GABRIEL: 6/23/2023     Code Status: Full Code   Is the patient medically ready for discharge?:     Reason for patient still in hospital (select all that apply): Patient trending condition and Treatment  Discharge Plan A: Home                  Marybeth Duran MD  Department of Hospital Medicine   [facility]

## 2023-06-24 VITALS
RESPIRATION RATE: 17 BRPM | DIASTOLIC BLOOD PRESSURE: 63 MMHG | OXYGEN SATURATION: 98 % | HEIGHT: 75 IN | WEIGHT: 194.44 LBS | SYSTOLIC BLOOD PRESSURE: 132 MMHG | HEART RATE: 89 BPM | BODY MASS INDEX: 24.18 KG/M2 | TEMPERATURE: 98 F

## 2023-06-24 LAB
ALBUMIN SERPL BCP-MCNC: 2.7 G/DL (ref 3.5–5.2)
ALP SERPL-CCNC: 82 U/L (ref 55–135)
ALT SERPL W/O P-5'-P-CCNC: 16 U/L (ref 10–44)
ANION GAP SERPL CALC-SCNC: 10 MMOL/L (ref 8–16)
ANISOCYTOSIS BLD QL SMEAR: SLIGHT
AST SERPL-CCNC: 19 U/L (ref 10–40)
BACTERIA SPEC ANAEROBE CULT: NORMAL
BASOPHILS # BLD AUTO: 0.2 K/UL (ref 0–0.2)
BASOPHILS NFR BLD: 0.7 % (ref 0–1.9)
BILIRUB SERPL-MCNC: 0.7 MG/DL (ref 0.1–1)
BUN SERPL-MCNC: 15 MG/DL (ref 6–20)
CALCIUM SERPL-MCNC: 9.3 MG/DL (ref 8.7–10.5)
CHLORIDE SERPL-SCNC: 91 MMOL/L (ref 95–110)
CO2 SERPL-SCNC: 29 MMOL/L (ref 23–29)
CREAT SERPL-MCNC: 1.7 MG/DL (ref 0.5–1.4)
DIFFERENTIAL METHOD: ABNORMAL
EOSINOPHIL # BLD AUTO: 0.2 K/UL (ref 0–0.5)
EOSINOPHIL NFR BLD: 0.7 % (ref 0–8)
ERYTHROCYTE [DISTWIDTH] IN BLOOD BY AUTOMATED COUNT: 20.9 % (ref 11.5–14.5)
EST. GFR  (NO RACE VARIABLE): 50 ML/MIN/1.73 M^2
GLUCOSE SERPL-MCNC: 156 MG/DL (ref 70–110)
HCT VFR BLD AUTO: 26.2 % (ref 40–54)
HGB BLD-MCNC: 8.3 G/DL (ref 14–18)
IMM GRANULOCYTES # BLD AUTO: 0.26 K/UL (ref 0–0.04)
IMM GRANULOCYTES NFR BLD AUTO: 0.9 % (ref 0–0.5)
LYMPHOCYTES # BLD AUTO: 2.6 K/UL (ref 1–4.8)
LYMPHOCYTES NFR BLD: 9.6 % (ref 18–48)
MAGNESIUM SERPL-MCNC: 1.9 MG/DL (ref 1.6–2.6)
MCH RBC QN AUTO: 21.7 PG (ref 27–31)
MCHC RBC AUTO-ENTMCNC: 31.7 G/DL (ref 32–36)
MCV RBC AUTO: 68 FL (ref 82–98)
MONOCYTES # BLD AUTO: 3.1 K/UL (ref 0.3–1)
MONOCYTES NFR BLD: 11.2 % (ref 4–15)
NEUTROPHILS # BLD AUTO: 21.1 K/UL (ref 1.8–7.7)
NEUTROPHILS NFR BLD: 76.9 % (ref 38–73)
NRBC BLD-RTO: 0 /100 WBC
PHOSPHATE SERPL-MCNC: 2.5 MG/DL (ref 2.7–4.5)
PLATELET # BLD AUTO: 1133 K/UL (ref 150–450)
PLATELET BLD QL SMEAR: ABNORMAL
PMV BLD AUTO: 9.7 FL (ref 9.2–12.9)
POIKILOCYTOSIS BLD QL SMEAR: SLIGHT
POTASSIUM SERPL-SCNC: 3.8 MMOL/L (ref 3.5–5.1)
PROT SERPL-MCNC: 7.6 G/DL (ref 6–8.4)
RBC # BLD AUTO: 3.83 M/UL (ref 4.6–6.2)
SODIUM SERPL-SCNC: 130 MMOL/L (ref 136–145)
WBC # BLD AUTO: 27.42 K/UL (ref 3.9–12.7)

## 2023-06-24 PROCEDURE — S4991 NICOTINE PATCH NONLEGEND: HCPCS | Performed by: SURGERY

## 2023-06-24 PROCEDURE — 63600175 PHARM REV CODE 636 W HCPCS: Performed by: HOSPITALIST

## 2023-06-24 PROCEDURE — 25000003 PHARM REV CODE 250: Performed by: INTERNAL MEDICINE

## 2023-06-24 PROCEDURE — 94761 N-INVAS EAR/PLS OXIMETRY MLT: CPT

## 2023-06-24 PROCEDURE — 36415 COLL VENOUS BLD VENIPUNCTURE: CPT | Performed by: SURGERY

## 2023-06-24 PROCEDURE — 25000003 PHARM REV CODE 250: Performed by: SURGERY

## 2023-06-24 PROCEDURE — 63600175 PHARM REV CODE 636 W HCPCS: Performed by: SURGERY

## 2023-06-24 PROCEDURE — 83735 ASSAY OF MAGNESIUM: CPT | Performed by: SURGERY

## 2023-06-24 PROCEDURE — 80053 COMPREHEN METABOLIC PANEL: CPT | Performed by: SURGERY

## 2023-06-24 PROCEDURE — 84100 ASSAY OF PHOSPHORUS: CPT | Performed by: SURGERY

## 2023-06-24 PROCEDURE — 99900035 HC TECH TIME PER 15 MIN (STAT)

## 2023-06-24 PROCEDURE — 85007 BL SMEAR W/DIFF WBC COUNT: CPT | Performed by: SURGERY

## 2023-06-24 PROCEDURE — 94799 UNLISTED PULMONARY SVC/PX: CPT

## 2023-06-24 PROCEDURE — 85027 COMPLETE CBC AUTOMATED: CPT | Performed by: SURGERY

## 2023-06-24 PROCEDURE — 25000003 PHARM REV CODE 250: Performed by: HOSPITALIST

## 2023-06-24 RX ORDER — HYDROCODONE BITARTRATE AND ACETAMINOPHEN 10; 325 MG/1; MG/1
1 TABLET ORAL EVERY 6 HOURS PRN
Qty: 16 TABLET | Refills: 0 | Status: SHIPPED | OUTPATIENT
Start: 2023-06-24 | End: 2023-06-28

## 2023-06-24 RX ORDER — AMOXICILLIN AND CLAVULANATE POTASSIUM 875; 125 MG/1; MG/1
1 TABLET, FILM COATED ORAL EVERY 12 HOURS
Qty: 10 TABLET | Refills: 0 | Status: SHIPPED | OUTPATIENT
Start: 2023-06-24 | End: 2023-06-29

## 2023-06-24 RX ADMIN — Medication 400 MG: at 08:06

## 2023-06-24 RX ADMIN — HYDROMORPHONE HYDROCHLORIDE 1 MG: 1 INJECTION, SOLUTION INTRAMUSCULAR; INTRAVENOUS; SUBCUTANEOUS at 02:06

## 2023-06-24 RX ADMIN — HEPARIN SODIUM 5000 UNITS: 5000 INJECTION INTRAVENOUS; SUBCUTANEOUS at 06:06

## 2023-06-24 RX ADMIN — HYDROMORPHONE HYDROCHLORIDE 1 MG: 1 INJECTION, SOLUTION INTRAMUSCULAR; INTRAVENOUS; SUBCUTANEOUS at 10:06

## 2023-06-24 RX ADMIN — OXYCODONE HYDROCHLORIDE 10 MG: 10 TABLET ORAL at 08:06

## 2023-06-24 RX ADMIN — PIPERACILLIN AND TAZOBACTAM 4.5 G: 4; .5 INJECTION, POWDER, LYOPHILIZED, FOR SOLUTION INTRAVENOUS; PARENTERAL at 06:06

## 2023-06-24 RX ADMIN — HYDROMORPHONE HYDROCHLORIDE 1 MG: 1 INJECTION, SOLUTION INTRAMUSCULAR; INTRAVENOUS; SUBCUTANEOUS at 06:06

## 2023-06-24 RX ADMIN — POTASSIUM CHLORIDE 40 MEQ: 1500 TABLET, EXTENDED RELEASE ORAL at 08:06

## 2023-06-24 RX ADMIN — VANCOMYCIN HYDROCHLORIDE 1750 MG: 750 INJECTION, POWDER, LYOPHILIZED, FOR SOLUTION INTRAVENOUS at 12:06

## 2023-06-24 RX ADMIN — Medication 1 PATCH: at 08:06

## 2023-06-24 NOTE — CARE UPDATE
06/24/23 0746   Patient Assessment/Suction   Level of Consciousness (AVPU) alert   Respiratory Effort Unlabored;Normal   Expansion/Accessory Muscles/Retractions no use of accessory muscles;no retractions   All Lung Fields Breath Sounds equal bilaterally   Rhythm/Pattern, Respiratory depth regular   PRE-TX-O2   Device (Oxygen Therapy) room air   SpO2 97 %   Pulse Oximetry Type Intermittent   $ Pulse Oximetry - Multiple Charge Pulse Oximetry - Multiple   Pulse 92   Resp 17   Incentive Spirometer   $ Incentive Spirometer Charges done with encouragement   Administration (IS) proper technique demonstrated   Number of Repetitions (IS) 15   Level Incentive Spirometer (mL) 2500   Patient Tolerance (IS) good

## 2023-06-24 NOTE — PROGRESS NOTES
INPATIENT NEPHROLOGY Progress Note  [facility] NEPHROLOGY    Ruslan Caldwell  06/24/2023    Reason for consultation:  Acute kidney injury    Chief Complaint: SBO    History of Present Illness:    Per H and P    Ruslan Caldwell is a 47-year-old male who presents in transfer from [facility] for evaluation of possible developing small bowel obstruction.  Patient presents outside facility complaining of abdominal pain with nausea and vomiting.  Workup at outside facility demonstrated leukocytosis of 16,000, anemia, and thrombocytosis of 843.  CMP with hypokalemia 3.3 and total bilirubin of 1.2.  CT scan at outside facility was concerning for developing small-bowel obstruction.  Patient was admitted to [facility] where he underwent right partial colectomy on 05/29 with pathology consistent with adenocarcinoma.  He was discharged and then came back for another admission on 06/08 where he was treated for an ileus.  Previous medical history includes type 2 diabetes, anemia, hypertension, and right eye blindness.  Patient admitted to Hospital Medicine for treatment management    6/16 Consulted for JOYCE.   Pt POD1 s/p exploratory laparotomy with small bowel resection and omental resection.   He has abdominal pain and bloating.  No chest pain, sob, neurologic symptoms, vomiting, or confusion.  He is a little somnolent on his analgesics  6/17 VSS. Worsening anemia, hypophosphatemia, hyponatremia. Fever yesterday evening with tachycardia, worsening WBC.  6/18 VSS, s/p re-operation on 6/17, evacuation for multiple abscess, no obvious perforation, NG placement with stomach content decompression.  6/19 VSS, on 1.5L NC, UOP 2L,c/w NGT, will start CLD for comfort, on clinimix as well  6/20 customize clinimix today, VSS, on 1L NC, UOP 1.5L, rodriguez removed  6/21 customize TPN again today- if this doesn't work, will need PICC for further customization, UOP 850cc, NGT out, still on CLD (not happy about it), passing gas  6/22  VSS, on RA, UOP 1.3L, PIV infiltrated and refused another one so PPN not hung last night  6/23 switched to regular diet yest with no PPN- renal function worse- UOP 1.7L- VSS, on RA- fluid challenge today- WBC worse- defer to GS/hospitalist- hold discharge today- he is upset about staying but explained rationale with nurse present  6/24  UOP 2.5L, VSS, renal function worse.  WBC's trended up.  Pt upset that his numbers are worse, insists he is going home today, even if AMA.  Advised against this.      Plan of Care:    JOYCE due to volume depletion, toradol, hemodynamically mediated renal injury, elevated CPK (sepsis due to post-op abscess, evacuated on 6/17)  --he has JOYCE again- ordered NS 100cc/hr - started 6/23.  F/u labs in am if he stays  --nonoliguric  - CT abd pelvis done 6/12/23, kidneys unremarkable  --rodriguez out but with degree of UOP, suspicion for retention is low today  --no nsaids or IV contrast    Hyponatremia  Hypokalemia  Hypomg  Hypophosphatemia  Alkalosis  --control pain and nausea -> SIADH  --continue oral KCl 40mEq BID- ordered extra 40mEq 6/23  --continue oral Mg 400mg daily    Thank you for allowing us to participate in this patient's care. We will continue to follow.    Vital Signs:  Temp Readings from Last 3 Encounters:   06/24/23 98.3 °F (36.8 °C) (Oral)   06/11/23 98.4 °F (36.9 °C) (Oral)   06/10/23 97.8 °F (36.6 °C)       Pulse Readings from Last 3 Encounters:   06/24/23 92   06/11/23 (!) 112   06/10/23 82       BP Readings from Last 3 Encounters:   06/24/23 113/73   06/11/23 124/78   06/10/23 (!) 135/91       Weight:  Wt Readings from Last 3 Encounters:   06/24/23 88.2 kg (194 lb 7.1 oz)   06/11/23 102.1 kg (225 lb)   06/09/23 102 kg (224 lb 13.9 oz)       Medications:  No current facility-administered medications on file prior to encounter.     Current Outpatient Medications on File Prior to Encounter   Medication Sig Dispense Refill    gabapentin (NEURONTIN) 300 MG capsule Take 1 capsule  "(300 mg total) by mouth 2 (two) times daily. for 14 days 28 capsule 0     Scheduled Meds:   heparin (porcine)  5,000 Units Subcutaneous Q8H    magnesium oxide  400 mg Oral Daily    nicotine  1 patch Transdermal Daily    piperacillin-tazobactam (Zosyn) IV (PEDS and ADULTS) (extended infusion is not appropriate)  4.5 g Intravenous Q8H    potassium chloride  40 mEq Oral BID     Continuous Infusions:   sodium chloride 0.9% 100 mL/hr at 06/23/23 1205     PRN Meds:.sodium chloride, diazePAM, HYDROmorphone, naloxone, ondansetron, oxyCODONE, prochlorperazine, promethazine (PHENERGAN) IVPB, simethicone, sodium chloride 0.9%, sodium phosphate IVPB, sodium phosphate IVPB, sodium phosphate IVPB, Pharmacy to dose Vancomycin consult **AND** vancomycin - pharmacy to dose    Review of Systems:  Neg    Physical Exam:    /73 (BP Location: Left arm, Patient Position: Lying)   Pulse 92   Temp 98.3 °F (36.8 °C) (Oral)   Resp 16   Ht 6' 3" (1.905 m)   Wt 88.2 kg (194 lb 7.1 oz)   SpO2 97%   BMI 24.30 kg/m²     General Appearance:    Alert, cooperative, no distress, appears stated age   Head:    Normocephalic, without obvious abnormality, atraumatic   Eyes:    PER, conjunctiva/corneas clear, EOM's intact in both eyes        Throat:   Lips, mucosa, and tongue normal; teeth and gums normal   Back:     Symmetric, no curvature, ROM normal, no CVA tenderness   Lungs:     Clear to auscultation bilaterally, respirations unlabored   Chest wall:    No tenderness or deformity   Heart:    Regular rate and rhythm, S1 and S2 normal, no murmur, rub   or gallop   Abdomen:     Soft, non-tender, bowel sounds active all four quadrants,     no masses, no organomegaly   Extremities:   Extremities normal, atraumatic, no cyanosis or edema   Pulses:   2+ and symmetric all extremities   MSK:   No joint or muscle swelling, tenderness or deformity   Skin:   Skin color, texture, turgor normal, no rashes or lesions   Neurologic:   CNII-XII intact, " normal strength and sensation       Throughout.  No flap     Results:  Recent Labs   Lab 06/22/23  0302 06/23/23  0251 06/24/23  0243   * 132* 130*   K 2.9* 3.4* 3.8   CL 83* 87* 91*   CO2 34* 32* 29   BUN 16 18 15   CREATININE 1.2 1.6* 1.7*   * 107 156*         Recent Labs   Lab 06/22/23  0302 06/23/23  0251 06/24/23  0243   CALCIUM 9.8 9.6 9.3   ALBUMIN 2.8* 2.8* 2.7*   PHOS 4.0 3.4 2.5*   MG 1.7 1.8 1.9               No results for input(s): POCTGLUCOSE in the last 168 hours.      Recent Labs   Lab 09/14/21  0408 06/08/23  0607   Hemoglobin A1C 4.8 5.0         Recent Labs   Lab 06/22/23  0302 06/23/23  0251 06/24/23  0243   WBC 19.14* 23.20* 27.42*   HGB 9.5* 8.9* 8.3*   HCT 30.1* 27.9* 26.2*   PLT 1,264* 1,225* 1,133*   MCV 67* 67* 68*   MCHC 31.6* 31.9* 31.7*   MONO 13.0  CANCELED 11.7  2.7* 11.2  3.1*         Recent Labs   Lab 06/22/23  0302 06/23/23  0251 06/24/23  0243   BILITOT 0.7 0.7 0.7   PROT 8.1 7.9 7.6   ALBUMIN 2.8* 2.8* 2.7*   ALKPHOS 101 98 82   ALT 15 16 16   AST 15 19 19         Recent Labs   Lab 05/28/23  0038 06/11/23  1614 06/16/23  1805   Color, UA Yellow Yellow Yellow   Appearance, UA Clear Clear Clear   pH, UA 7.0 6.0 7.0   Specific [place], UA 1.020 1.020 1.010   Protein, UA 1+ A 2+ A 2+ A   Glucose, UA Negative Negative Negative   Ketones, UA Negative 2+ A Negative   Urobilinogen, UA Negative Negative Negative   Bilirubin (UA) Negative 2+ A Negative   Occult Blood UA Negative Negative 1+ A   Nitrite, UA Negative Negative Negative   RBC, UA 0 2 14 H   WBC, UA 1 10 H 8 H   Bacteria None Occasional Occasional   Hyaline Casts, UA 0 2 A 0               Microbiology Results (last 7 days)       Procedure Component Value Units Date/Time    Blood culture [[id number]] Collected: 06/17/23 1151    Order Status: Completed Specimen: Blood Updated: 06/22/23 1612     Blood Culture, Routine No growth after 5 days.    Blood culture [[id number]] Collected: 06/17/23 1151    Order Status:  Completed Specimen: Blood Updated: 06/22/23 1612     Blood Culture, Routine No growth after 5 days.    Aerobic culture [[id number]] Collected: 06/17/23 1800    Order Status: Completed Specimen: Incision site from Abdomen Updated: 06/21/23 1504     Aerobic Bacterial Culture No growth    Fungus culture [[id number]] Collected: 06/17/23 1800    Order Status: Completed Specimen: Abdominal from Abdomen Updated: 06/21/23 1233     Fungus (Mycology) Culture Culture in progress    Culture, Anaerobe [[id number]] Collected: 06/17/23 1800    Order Status: Completed Specimen: Abdominal from Abdomen Updated: 06/20/23 1137     Anaerobic Culture Culture in progress    AFB Culture & Smear [[id number]] Collected: 06/17/23 1800    Order Status: Completed Specimen: Abdominal from Abdomen Updated: 06/19/23 1455     AFB Culture & Smear Culture in progress     AFB CULTURE STAIN No acid fast bacilli seen.    Gram stain [[id number]] Collected: 06/17/23 1800    Order Status: Completed Specimen: Abdominal from Abdomen Updated: 06/18/23 0010     Gram Stain Result Few WBC's      No organisms seen    Urine Culture High Risk [[id number]]     Order Status: Sent Specimen: Urine           Patient care time was spent personally by me on the following activities: > 35 minutes  Obtaining a history.  Examination of patient.  Providing medical care at the patients bedside.  Developing a treatment plan with patient or surrogate and bedside caregivers.  Ordering and reviewing laboratory studies, radiographic studies, pulse oximetry.  Ordering and performing treatments and interventions.  Evaluation of patient's response to treatment.  Discussions with consultants while on the unit and immediately available to the patient.  Re-evaluation of the patient's condition.  Documentation in the medical record.    Yeny Davis NP    Nephrology  [facility]  [phone]

## 2023-06-24 NOTE — CARE UPDATE
06/23/23 1849   Patient Assessment/Suction   Level of Consciousness (AVPU) alert   Respiratory Effort Unlabored   PRE-TX-O2   Device (Oxygen Therapy) room air   SpO2 97 %   Pulse Oximetry Type Intermittent   $ Pulse Oximetry - Multiple Charge Pulse Oximetry - Multiple   Pulse 90   Resp 18   Incentive Spirometer   $ Incentive Spirometer Charges done with encouragement;proper technique demonstrated   Administration (IS) proper technique demonstrated   Number of Repetitions (IS) 10   Level Incentive Spirometer (mL) 2500   Patient Tolerance (IS) no adverse signs/symptoms present;good

## 2023-06-24 NOTE — DISCHARGE SUMMARY
[facility] Hospital Medicine  Discharge Summary      Patient Name: Ruslan Caldwell  MRN: [id number]  [facility]: [id number]  Patient Class: IP- Inpatient  Admission Date: 6/11/2023  Hospital Length of Stay: 12 days  Discharge/AMA Date and Time: 6/24/2023 12:30 PM  Attending Physician: Edil Steele MD   Discharging Provider: Edil Steele MD  Primary Care Provider: Amira Knutson NP    Primary Care Team: Networked reference to record PCT     HPI:   Ruslan Caldwell is a 47-year-old male who presents in transfer from [facility] for evaluation of possible developing small bowel obstruction.  Patient presents outside facility complaining of abdominal pain with nausea and vomiting.  Workup at outside facility demonstrated leukocytosis of 16,000, anemia, and thrombocytosis of 843.  CMP with hypokalemia 3.3 and total bilirubin of 1.2.  CT scan at outside facility was concerning for developing small-bowel obstruction.  Patient was admitted to [facility] where he underwent right partial colectomy on 05/29 with pathology consistent with adenocarcinoma.  He was discharged and then came back for another admission on 06/08 where he was treated for an ileus.  Previous medical history includes type 2 diabetes, anemia, hypertension, and right eye blindness.  Patient admitted to Hospital Medicine for treatment management.  Will maintain patient NPO, IV fluids, sliding scale insulin p.r.n., and general surgery consult in a.m..      Procedure(s) (LRB):  LAPAROTOMY, EXPLORATORY (N/A)      Hospital Course:   Ruslan Caldwell is a 47 year old male with a past medical history of recently diagnosed colonic adenocarcinoma s/p partial colectomy with anastomosis, tobacco use, microcytic anemia and thrombocytosis who presented with persistent abdominal pain, nausea and abdominal pain concerning for SBO. An NG tube was unable to be placed. He was placed on IV fluids and Zosyn. He was made NPO. PRN IV  analgesics and antiemetics were ordered and General Surgery was consulted. Repeat CT A/P shows SBO 6/13. A gastrografin enema 6/14 was inconclusive. Surgery took the patient to the OR 6/15 for exploratory laparotomy where an omental infarction was addressed; the patient also underwent SCOUT and partial small bowel resection with anastomosis. PPN was started 6/16 and later weaned off. His course has been complicated by JOYCE in setting of vomiting as well as Toradol use; IV fluids were continued and his kidney function has improved to baseline. Nephrology has been consulted. He was also discovered to have an iron deficiency for which IV iron (one dose) was ordered 6/13. Oncology has also been consulted and will arrange follow up in clinic. His post-operative course has been complicated by low grade fever, tachycardia and an increasing WBC count, despite Zosyn use. Antibiotics were broadened to vancomycin, cefepime and Flagyl. Blood cultures were ordered as well as a CXR and urine culture. CXR showed free air below diaphragm. He was taken back to the OR on 06/17 for exploratory laparotomy which was negative for obvious perforated viscus but an intra-abdominal abscess was seen and drained.  Patient had NG tube after surgery.  This was removed a few days after surgery and diet was advanced.  Patient had rising creatinine on 06/23 and was started on IV fluids.  Nephrology continued to follow. He desired to leave 6/24 despite elevating white count and creatinine. He left AMA. Pain meds and abx sent to his pharmacy and strongly recommended to f/u outpatient.        Goals of Care Treatment Preferences:  Code Status: Full Code      Consults:   Consults (From admission, onward)        Status Ordering Provider     Inpatient consult to Midline team  Once        Provider:  (Not yet assigned)    Completed RICA JOHN     Inpatient consult to Midline team  Once        Provider:  (Not yet assigned)    Completed DEVIKA RUIZ      Pharmacy to dose Vancomycin consult  Once        Provider:  (Not yet assigned)   See Hyperspace for full Linked Orders Report.    Acknowledged RACHAEL HOYT JR     Inpatient consult to Nephrology  Once        Provider:  (Not yet assigned)    TIFF Little     Inpatient consult to Oncology  Once        Provider:  Stella Frye MD    Completed DEVIKA RUIZ     Inpatient consult to General Surgery  Once        Provider:  Rachael Hoyt Jr., MD    Completed IVAN BRAMBILA     IP consult to dietary  Once        Provider:  (Not yet assigned)    Completed IVAN BRAMBILA          No new Assessment & Plan notes have been filed under this hospital service since the last note was generated.  Service: Hospital Medicine    Final Active Diagnoses:    Diagnosis Date Noted POA    PRINCIPAL PROBLEM:  Small bowel obstruction [K56.609] 06/11/2023 Yes    Sepsis [A41.9] 06/17/2023 No    Hyponatremia [E87.1] 06/17/2023 No    Hypophosphatemia [E83.39] 06/17/2023 No    Omental infarction [K55.069] 06/16/2023 Yes    JOYCE (acute kidney injury) [N17.9] 06/13/2023 Yes    Tobacco use [Z72.0] 06/12/2023 Yes    Moderate malnutrition [E44.0] 06/09/2023 Yes    Colon adenocarcinoma [C18.9] 05/28/2023 Yes    Thrombocytosis [D75.839] 05/28/2023 Yes     Chronic    Essential hypertension [I10] 05/24/2023 Yes     Chronic    Iron deficiency anemia [D50.9] 05/24/2023 Yes      Problems Resolved During this Admission:    Diagnosis Date Noted Date Resolved POA    Hypokalemia [E87.6] 06/13/2023 06/16/2023 No       Discharged Condition: against medical advice    Disposition: Left Against Medical Adv*    Follow Up:   Follow-up Information     Tiff Michael MD Follow up.    Specialties: General Surgery, Bariatrics, Surgery  Contact information:  [address] [facility]  [address]  [place] [address] [phone]             Amira Knutson NP. Schedule an appointment as soon as possible for a visit in 1  week(s).    Specialty: Family Medicine  Contact information:  [address]  [address]  [facility] [place] [address]  [phone]                         Significant Diagnostic Studies:     Recent Results (from the past 200 hour(s))   Sodium, Random Urine    Collection Time: 06/16/23  6:05 PM   Result Value Ref Range    Sodium, Urine 81 20 - 250 mmol/L   Protein, Random Urine    Collection Time: 06/16/23  6:05 PM   Result Value Ref Range    Protein, Urine Random 66 (H) 0 - 15 mg/dL   Creatinine, Random Urine    Collection Time: 06/16/23  6:05 PM   Result Value Ref Range    Creatinine, Urine 147.1 23.0 - 375.0 mg/dL   Urea Nitrogen, Random Urine    Collection Time: 06/16/23  6:05 PM   Result Value Ref Range    Urine Urea Nitrogen 1033 140 - 1050 mg/dL   Urinalysis Microscopic    Collection Time: 06/16/23  6:05 PM   Result Value Ref Range    RBC, UA 14 (H) 0 - 4 /hpf    WBC, UA 8 (H) 0 - 5 /hpf    Bacteria Occasional None-Occ /hpf    Squam Epithel, UA 1 /hpf    Hyaline Casts, UA 0 0-1/lpf /lpf    Microscopic Comment SEE COMMENT    Osmolality, Urine    Collection Time: 06/16/23  6:05 PM   Result Value Ref Range    Osmolality, Urine 698 50 - 1200 mOsm/kg   Urinalysis    Collection Time: 06/16/23  6:05 PM   Result Value Ref Range    Specimen UA Urine, Clean Catch     Color, UA Yellow Yellow, Straw, Veena    Appearance, UA Clear Clear    pH, UA 7.0 5.0 - 8.0    Specific Gravity, UA 1.010 1.005 - 1.030    Protein, UA 2+ (A) Negative    Glucose, UA Negative Negative    Ketones, UA Negative Negative    Bilirubin (UA) Negative Negative    Occult Blood UA 1+ (A) Negative    Nitrite, UA Negative Negative    Urobilinogen, UA Negative <2.0 EU/dL    Leukocytes, UA Negative Negative   CBC with Automated Differential    Collection Time: 06/17/23  5:04 AM   Result Value Ref Range    WBC 34.17 (H) 3.90 - 12.70 K/uL    RBC 3.76 (L) 4.60 - 6.20 M/uL    Hemoglobin 7.9 (L) 14.0 - 18.0 g/dL    Hematocrit 24.8 (L) 40.0 - 54.0 %    MCV 66 (L)  82 - 98 fL    MCH 21.0 (L) 27.0 - 31.0 pg    MCHC 31.9 (L) 32.0 - 36.0 g/dL    RDW 18.0 (H) 11.5 - 14.5 %    Platelets 817 (H) 150 - 450 K/uL    MPV 9.5 9.2 - 12.9 fL    Immature Granulocytes CANCELED 0.0 - 0.5 %    Immature Grans (Abs) CANCELED 0.00 - 0.04 K/uL    Lymph # CANCELED 1.0 - 4.8 K/uL    Mono # CANCELED 0.3 - 1.0 K/uL    Eos # CANCELED 0.0 - 0.5 K/uL    Baso # CANCELED 0.00 - 0.20 K/uL    nRBC 0 0 /100 WBC    Gran % 89.0 (H) 38.0 - 73.0 %    Lymph % 4.0 (L) 18.0 - 48.0 %    Mono % 7.0 4.0 - 15.0 %    Eosinophil % 0.0 0.0 - 8.0 %    Basophil % 0.0 0.0 - 1.9 %    Platelet Estimate Increased (A)     Aniso Slight     Poik Slight     Hypo Moderate     Ovalocytes Occasional     Target Cells Occasional     Differential Method Manual    Comprehensive Metabolic Panel (CMP)    Collection Time: 06/17/23  5:04 AM   Result Value Ref Range    Sodium 127 (L) 136 - 145 mmol/L    Potassium 3.9 3.5 - 5.1 mmol/L    Chloride 88 (L) 95 - 110 mmol/L    CO2 29 23 - 29 mmol/L    Glucose 145 (H) 70 - 110 mg/dL    BUN 14 6 - 20 mg/dL    Creatinine 1.0 0.5 - 1.4 mg/dL    Calcium 8.8 8.7 - 10.5 mg/dL    Total Protein 6.7 6.0 - 8.4 g/dL    Albumin 2.6 (L) 3.5 - 5.2 g/dL    Total Bilirubin 1.3 (H) 0.1 - 1.0 mg/dL    Alkaline Phosphatase 40 (L) 55 - 135 U/L    AST 17 10 - 40 U/L    ALT 15 10 - 44 U/L    Anion Gap 10 8 - 16 mmol/L    eGFR >60 >60 mL/min/1.73 m^2   Magnesium    Collection Time: 06/17/23  5:04 AM   Result Value Ref Range    Magnesium 1.7 1.6 - 2.6 mg/dL   Phosphorus    Collection Time: 06/17/23  5:04 AM   Result Value Ref Range    Phosphorus 1.9 (L) 2.7 - 4.5 mg/dL   Renal Function Panel    Collection Time: 06/17/23  5:04 AM   Result Value Ref Range    Glucose 145 (H) 70 - 110 mg/dL    Sodium 127 (L) 136 - 145 mmol/L    Potassium 3.9 3.5 - 5.1 mmol/L    Chloride 88 (L) 95 - 110 mmol/L    CO2 29 23 - 29 mmol/L    BUN 14 6 - 20 mg/dL    Calcium 8.8 8.7 - 10.5 mg/dL    Creatinine 1.0 0.5 - 1.4 mg/dL    Albumin 2.6 (L) 3.5 -  5.2 g/dL    Phosphorus 1.9 (L) 2.7 - 4.5 mg/dL    eGFR >60 >60 mL/min/1.73 m^2    Anion Gap 10 8 - 16 mmol/L   CBC Auto Differential    Collection Time: 06/17/23  5:04 AM   Result Value Ref Range    WBC 34.17 (H) 3.90 - 12.70 K/uL    RBC 3.76 (L) 4.60 - 6.20 M/uL    Hemoglobin 7.9 (L) 14.0 - 18.0 g/dL    Hematocrit 24.8 (L) 40.0 - 54.0 %    MCV 66 (L) 82 - 98 fL    MCH 21.0 (L) 27.0 - 31.0 pg    MCHC 31.9 (L) 32.0 - 36.0 g/dL    RDW 18.0 (H) 11.5 - 14.5 %    Platelets 817 (H) 150 - 450 K/uL    MPV 9.5 9.2 - 12.9 fL    Immature Granulocytes CANCELED 0.0 - 0.5 %    Immature Grans (Abs) CANCELED 0.00 - 0.04 K/uL    Lymph # CANCELED 1.0 - 4.8 K/uL    Mono # CANCELED 0.3 - 1.0 K/uL    Eos # CANCELED 0.0 - 0.5 K/uL    Baso # CANCELED 0.00 - 0.20 K/uL    nRBC 0 0 /100 WBC    Gran % 89.0 (H) 38.0 - 73.0 %    Lymph % 4.0 (L) 18.0 - 48.0 %    Mono % 7.0 4.0 - 15.0 %    Eosinophil % 0.0 0.0 - 8.0 %    Basophil % 0.0 0.0 - 1.9 %    Platelet Estimate Increased (A)     Aniso Slight     Poik Slight     Hypo Moderate     Ovalocytes Occasional     Target Cells Occasional     Differential Method Manual    Uric Acid    Collection Time: 06/17/23  5:04 AM   Result Value Ref Range    Uric Acid 2.1 (L) 3.4 - 7.0 mg/dL   CK    Collection Time: 06/17/23  5:04 AM   Result Value Ref Range     (H) 20 - 200 U/L   Lactic Acid, Plasma    Collection Time: 06/17/23  6:30 AM   Result Value Ref Range    Lactate (Lactic Acid) 0.7 0.5 - 2.2 mmol/L   Hemoglobin    Collection Time: 06/17/23 11:51 AM   Result Value Ref Range    Hemoglobin 7.7 (L) 14.0 - 18.0 g/dL   Hematocrit    Collection Time: 06/17/23 11:51 AM   Result Value Ref Range    Hematocrit 24.5 (L) 40.0 - 54.0 %   Basic Metabolic Panel    Collection Time: 06/17/23 11:51 AM   Result Value Ref Range    Sodium 129 (L) 136 - 145 mmol/L    Potassium 3.8 3.5 - 5.1 mmol/L    Chloride 87 (L) 95 - 110 mmol/L    CO2 30 (H) 23 - 29 mmol/L    Glucose 136 (H) 70 - 110 mg/dL    BUN 14 6 - 20 mg/dL     Creatinine 0.9 0.5 - 1.4 mg/dL    Calcium 9.2 8.7 - 10.5 mg/dL    Anion Gap 12 8 - 16 mmol/L    eGFR >60 >60 mL/min/1.73 m^2   Blood culture    Collection Time: 06/17/23 11:51 AM    Specimen: Blood   Result Value Ref Range    Blood Culture, Routine No growth after 5 days.    Blood culture    Collection Time: 06/17/23 11:51 AM    Specimen: Blood   Result Value Ref Range    Blood Culture, Routine No growth after 5 days.    Culture, Anaerobe    Collection Time: 06/17/23  6:00 PM    Specimen: Abdomen; Abdominal   Result Value Ref Range    Anaerobic Culture No anaerobes isolated    Aerobic culture    Collection Time: 06/17/23  6:00 PM    Specimen: Abdomen; Incision site   Result Value Ref Range    Aerobic Bacterial Culture No growth    AFB Culture & Smear    Collection Time: 06/17/23  6:00 PM    Specimen: Abdomen; Abdominal   Result Value Ref Range    AFB Culture & Smear Culture in progress     AFB CULTURE STAIN No acid fast bacilli seen.    Fungus culture    Collection Time: 06/17/23  6:00 PM    Specimen: Abdomen; Abdominal   Result Value Ref Range    Fungus (Mycology) Culture Culture in progress    Gram stain    Collection Time: 06/17/23  6:00 PM    Specimen: Abdomen; Abdominal   Result Value Ref Range    Gram Stain Result Few WBC's     Gram Stain Result No organisms seen    CBC with Automated Differential    Collection Time: 06/18/23  4:50 AM   Result Value Ref Range    WBC 27.14 (H) 3.90 - 12.70 K/uL    RBC 4.05 (L) 4.60 - 6.20 M/uL    Hemoglobin 8.7 (L) 14.0 - 18.0 g/dL    Hematocrit 27.1 (L) 40.0 - 54.0 %    MCV 67 (L) 82 - 98 fL    MCH 21.5 (L) 27.0 - 31.0 pg    MCHC 32.1 32.0 - 36.0 g/dL    RDW 20.2 (H) 11.5 - 14.5 %    Platelets 837 (H) 150 - 450 K/uL    MPV 9.7 9.2 - 12.9 fL    Immature Granulocytes CANCELED 0.0 - 0.5 %    Immature Grans (Abs) CANCELED 0.00 - 0.04 K/uL    Lymph # CANCELED 1.0 - 4.8 K/uL    Mono # CANCELED 0.3 - 1.0 K/uL    Eos # CANCELED 0.0 - 0.5 K/uL    Baso # CANCELED 0.00 - 0.20 K/uL     nRBC 0 0 /100 WBC    Gran % 87.0 (H) 38.0 - 73.0 %    Lymph % 7.0 (L) 18.0 - 48.0 %    Mono % 6.0 4.0 - 15.0 %    Eosinophil % 0.0 0.0 - 8.0 %    Basophil % 0.0 0.0 - 1.9 %    Platelet Estimate Increased (A)     Aniso Moderate     Poik Slight     Hypo Occasional     Ovalocytes Occasional     Target Cells Occasional     Differential Method Manual    Comprehensive Metabolic Panel (CMP)    Collection Time: 06/18/23  4:51 AM   Result Value Ref Range    Sodium 130 (L) 136 - 145 mmol/L    Potassium 3.9 3.5 - 5.1 mmol/L    Chloride 90 (L) 95 - 110 mmol/L    CO2 30 (H) 23 - 29 mmol/L    Glucose 137 (H) 70 - 110 mg/dL    BUN 12 6 - 20 mg/dL    Creatinine 0.8 0.5 - 1.4 mg/dL    Calcium 8.4 (L) 8.7 - 10.5 mg/dL    Total Protein 6.1 6.0 - 8.4 g/dL    Albumin 2.2 (L) 3.5 - 5.2 g/dL    Total Bilirubin 0.8 0.1 - 1.0 mg/dL    Alkaline Phosphatase 43 (L) 55 - 135 U/L    AST 15 10 - 40 U/L    ALT 15 10 - 44 U/L    Anion Gap 10 8 - 16 mmol/L    eGFR >60 >60 mL/min/1.73 m^2   Magnesium    Collection Time: 06/18/23  4:51 AM   Result Value Ref Range    Magnesium 1.8 1.6 - 2.6 mg/dL   Phosphorus    Collection Time: 06/18/23  4:51 AM   Result Value Ref Range    Phosphorus 1.5 (L) 2.7 - 4.5 mg/dL   VANCOMYCIN, TROUGH    Collection Time: 06/18/23  1:40 PM   Result Value Ref Range    Vancomycin-Trough 5.1 (L) 10.0 - 22.0 ug/mL   CBC with Automated Differential    Collection Time: 06/19/23  6:09 AM   Result Value Ref Range    WBC 21.83 (H) 3.90 - 12.70 K/uL    RBC 3.71 (L) 4.60 - 6.20 M/uL    Hemoglobin 8.0 (L) 14.0 - 18.0 g/dL    Hematocrit 24.9 (L) 40.0 - 54.0 %    MCV 67 (L) 82 - 98 fL    MCH 21.6 (L) 27.0 - 31.0 pg    MCHC 32.1 32.0 - 36.0 g/dL    RDW 20.9 (H) 11.5 - 14.5 %    Platelets 887 (H) 150 - 450 K/uL    MPV 10.4 9.2 - 12.9 fL    Immature Granulocytes 0.6 (H) 0.0 - 0.5 %    Gran # (ANC) 17.6 (H) 1.8 - 7.7 K/uL    Immature Grans (Abs) 0.13 (H) 0.00 - 0.04 K/uL    Lymph # 1.8 1.0 - 4.8 K/uL    Mono # 2.0 (H) 0.3 - 1.0 K/uL    Eos #  0.2 0.0 - 0.5 K/uL    Baso # 0.08 0.00 - 0.20 K/uL    nRBC 0 0 /100 WBC    Gran % 80.5 (H) 38.0 - 73.0 %    Lymph % 8.1 (L) 18.0 - 48.0 %    Mono % 9.3 4.0 - 15.0 %    Eosinophil % 1.1 0.0 - 8.0 %    Basophil % 0.4 0.0 - 1.9 %    Differential Method Automated    Comprehensive Metabolic Panel (CMP)    Collection Time: 06/19/23  6:09 AM   Result Value Ref Range    Sodium 131 (L) 136 - 145 mmol/L    Potassium 3.6 3.5 - 5.1 mmol/L    Chloride 88 (L) 95 - 110 mmol/L    CO2 33 (H) 23 - 29 mmol/L    Glucose 118 (H) 70 - 110 mg/dL    BUN 11 6 - 20 mg/dL    Creatinine 0.7 0.5 - 1.4 mg/dL    Calcium 8.9 8.7 - 10.5 mg/dL    Total Protein 6.3 6.0 - 8.4 g/dL    Albumin 2.1 (L) 3.5 - 5.2 g/dL    Total Bilirubin 0.8 0.1 - 1.0 mg/dL    Alkaline Phosphatase 42 (L) 55 - 135 U/L    AST 14 10 - 40 U/L    ALT 12 10 - 44 U/L    Anion Gap 10 8 - 16 mmol/L    eGFR >60 >60 mL/min/1.73 m^2   Magnesium    Collection Time: 06/19/23  6:09 AM   Result Value Ref Range    Magnesium 1.6 1.6 - 2.6 mg/dL   Phosphorus    Collection Time: 06/19/23  6:09 AM   Result Value Ref Range    Phosphorus 2.6 (L) 2.7 - 4.5 mg/dL   VANCOMYCIN, TROUGH    Collection Time: 06/19/23  6:09 AM   Result Value Ref Range    Vancomycin-Trough 12.0 10.0 - 22.0 ug/mL   VANCOMYCIN, TROUGH    Collection Time: 06/19/23 10:55 PM   Result Value Ref Range    Vancomycin-Trough 10.1 10.0 - 22.0 ug/mL   CBC with Automated Differential    Collection Time: 06/20/23  3:31 AM   Result Value Ref Range    WBC 17.33 (H) 3.90 - 12.70 K/uL    RBC 4.20 (L) 4.60 - 6.20 M/uL    Hemoglobin 8.9 (L) 14.0 - 18.0 g/dL    Hematocrit 28.4 (L) 40.0 - 54.0 %    MCV 68 (L) 82 - 98 fL    MCH 21.2 (L) 27.0 - 31.0 pg    MCHC 31.3 (L) 32.0 - 36.0 g/dL    RDW 21.4 (H) 11.5 - 14.5 %    Platelets 1,183 (HH) 150 - 450 K/uL    MPV 10.0 9.2 - 12.9 fL    Immature Granulocytes 0.5 0.0 - 0.5 %    Gran # (ANC) 13.3 (H) 1.8 - 7.7 K/uL    Immature Grans (Abs) 0.08 (H) 0.00 - 0.04 K/uL    Lymph # 1.7 1.0 - 4.8 K/uL    Mono  # 1.8 (H) 0.3 - 1.0 K/uL    Eos # 0.4 0.0 - 0.5 K/uL    Baso # 0.11 0.00 - 0.20 K/uL    nRBC 0 0 /100 WBC    Gran % 76.6 (H) 38.0 - 73.0 %    Lymph % 9.7 (L) 18.0 - 48.0 %    Mono % 10.4 4.0 - 15.0 %    Eosinophil % 2.2 0.0 - 8.0 %    Basophil % 0.6 0.0 - 1.9 %    Differential Method Automated    Comprehensive Metabolic Panel (CMP)    Collection Time: 06/20/23  3:31 AM   Result Value Ref Range    Sodium 135 (L) 136 - 145 mmol/L    Potassium 3.2 (L) 3.5 - 5.1 mmol/L    Chloride 84 (L) 95 - 110 mmol/L    CO2 37 (H) 23 - 29 mmol/L    Glucose 119 (H) 70 - 110 mg/dL    BUN 13 6 - 20 mg/dL    Creatinine 0.7 0.5 - 1.4 mg/dL    Calcium 10.0 8.7 - 10.5 mg/dL    Total Protein 7.4 6.0 - 8.4 g/dL    Albumin 2.4 (L) 3.5 - 5.2 g/dL    Total Bilirubin 0.7 0.1 - 1.0 mg/dL    Alkaline Phosphatase 78 55 - 135 U/L    AST 16 10 - 40 U/L    ALT 13 10 - 44 U/L    eGFR >60 >60 mL/min/1.73 m^2    Anion Gap 14 8 - 16 mmol/L   Magnesium    Collection Time: 06/20/23  3:31 AM   Result Value Ref Range    Magnesium 1.9 1.6 - 2.6 mg/dL   Phosphorus    Collection Time: 06/20/23  3:31 AM   Result Value Ref Range    Phosphorus 3.3 2.7 - 4.5 mg/dL   VANCOMYCIN, TROUGH    Collection Time: 06/20/23  3:54 PM   Result Value Ref Range    Vancomycin-Trough 10.0 10.0 - 22.0 ug/mL   CBC with Automated Differential    Collection Time: 06/21/23  3:10 AM   Result Value Ref Range    WBC 18.84 (H) 3.90 - 12.70 K/uL    RBC 4.14 (L) 4.60 - 6.20 M/uL    Hemoglobin 8.5 (L) 14.0 - 18.0 g/dL    Hematocrit 27.8 (L) 40.0 - 54.0 %    MCV 67 (L) 82 - 98 fL    MCH 20.5 (L) 27.0 - 31.0 pg    MCHC 30.6 (L) 32.0 - 36.0 g/dL    RDW 21.0 (H) 11.5 - 14.5 %    Platelets 1,189 (HH) 150 - 450 K/uL    MPV 9.7 9.2 - 12.9 fL    Immature Granulocytes 0.8 (H) 0.0 - 0.5 %    Gran # (ANC) 13.6 (H) 1.8 - 7.7 K/uL    Immature Grans (Abs) 0.15 (H) 0.00 - 0.04 K/uL    Lymph # 2.6 1.0 - 4.8 K/uL    Mono # 1.9 (H) 0.3 - 1.0 K/uL    Eos # 0.5 0.0 - 0.5 K/uL    Baso # 0.15 0.00 - 0.20 K/uL    nRBC  0 0 /100 WBC    Gran % 72.2 38.0 - 73.0 %    Lymph % 13.6 (L) 18.0 - 48.0 %    Mono % 9.8 4.0 - 15.0 %    Eosinophil % 2.8 0.0 - 8.0 %    Basophil % 0.8 0.0 - 1.9 %    Differential Method Automated    Comprehensive Metabolic Panel (CMP)    Collection Time: 06/21/23  3:10 AM   Result Value Ref Range    Sodium 129 (L) 136 - 145 mmol/L    Potassium 3.1 (L) 3.5 - 5.1 mmol/L    Chloride 84 (L) 95 - 110 mmol/L    CO2 33 (H) 23 - 29 mmol/L    Glucose 168 (H) 70 - 110 mg/dL    BUN 13 6 - 20 mg/dL    Creatinine 0.8 0.5 - 1.4 mg/dL    Calcium 9.6 8.7 - 10.5 mg/dL    Total Protein 7.4 6.0 - 8.4 g/dL    Albumin 2.5 (L) 3.5 - 5.2 g/dL    Total Bilirubin 0.6 0.1 - 1.0 mg/dL    Alkaline Phosphatase 88 55 - 135 U/L    AST 12 10 - 40 U/L    ALT 17 10 - 44 U/L    eGFR >60 >60 mL/min/1.73 m^2    Anion Gap 12 8 - 16 mmol/L   Magnesium    Collection Time: 06/21/23  3:10 AM   Result Value Ref Range    Magnesium 1.5 (L) 1.6 - 2.6 mg/dL   Phosphorus    Collection Time: 06/21/23  3:10 AM   Result Value Ref Range    Phosphorus 3.2 2.7 - 4.5 mg/dL   VANCOMYCIN, TROUGH    Collection Time: 06/21/23  8:35 AM   Result Value Ref Range    Vancomycin-Trough 18.4 10.0 - 22.0 ug/mL   CBC with Automated Differential    Collection Time: 06/22/23  3:02 AM   Result Value Ref Range    WBC 19.14 (H) 3.90 - 12.70 K/uL    RBC 4.47 (L) 4.60 - 6.20 M/uL    Hemoglobin 9.5 (L) 14.0 - 18.0 g/dL    Hematocrit 30.1 (L) 40.0 - 54.0 %    MCV 67 (L) 82 - 98 fL    MCH 21.3 (L) 27.0 - 31.0 pg    MCHC 31.6 (L) 32.0 - 36.0 g/dL    RDW 20.9 (H) 11.5 - 14.5 %    Platelets 1,264 (HH) 150 - 450 K/uL    MPV 9.4 9.2 - 12.9 fL    Immature Granulocytes CANCELED 0.0 - 0.5 %    Immature Grans (Abs) CANCELED 0.00 - 0.04 K/uL    Lymph # CANCELED 1.0 - 4.8 K/uL    Mono # CANCELED 0.3 - 1.0 K/uL    Eos # CANCELED 0.0 - 0.5 K/uL    Baso # CANCELED 0.00 - 0.20 K/uL    nRBC 0 0 /100 WBC    Gran % 71.0 38.0 - 73.0 %    Lymph % 14.0 (L) 18.0 - 48.0 %    Mono % 13.0 4.0 - 15.0 %    Eosinophil  % 2.0 0.0 - 8.0 %    Basophil % 0.0 0.0 - 1.9 %    Platelet Estimate Increased (A)     Aniso Moderate     Poik Slight     Hypo Occasional     Ovalocytes Occasional     Target Cells Occasional     Differential Method Manual    Comprehensive Metabolic Panel (CMP)    Collection Time: 06/22/23  3:02 AM   Result Value Ref Range    Sodium 132 (L) 136 - 145 mmol/L    Potassium 2.9 (L) 3.5 - 5.1 mmol/L    Chloride 83 (L) 95 - 110 mmol/L    CO2 34 (H) 23 - 29 mmol/L    Glucose 113 (H) 70 - 110 mg/dL    BUN 16 6 - 20 mg/dL    Creatinine 1.2 0.5 - 1.4 mg/dL    Calcium 9.8 8.7 - 10.5 mg/dL    Total Protein 8.1 6.0 - 8.4 g/dL    Albumin 2.8 (L) 3.5 - 5.2 g/dL    Total Bilirubin 0.7 0.1 - 1.0 mg/dL    Alkaline Phosphatase 101 55 - 135 U/L    AST 15 10 - 40 U/L    ALT 15 10 - 44 U/L    eGFR >60 >60 mL/min/1.73 m^2    Anion Gap 15 8 - 16 mmol/L   Magnesium    Collection Time: 06/22/23  3:02 AM   Result Value Ref Range    Magnesium 1.7 1.6 - 2.6 mg/dL   Phosphorus    Collection Time: 06/22/23  3:02 AM   Result Value Ref Range    Phosphorus 4.0 2.7 - 4.5 mg/dL   VANCOMYCIN, TROUGH    Collection Time: 06/22/23  3:02 AM   Result Value Ref Range    Vancomycin-Trough 11.7 10.0 - 22.0 ug/mL   VANCOMYCIN, TROUGH    Collection Time: 06/22/23  6:35 PM   Result Value Ref Range    Vancomycin-Trough 28.9 (H) 10.0 - 22.0 ug/mL   CBC with Automated Differential    Collection Time: 06/23/23  2:51 AM   Result Value Ref Range    WBC 23.20 (H) 3.90 - 12.70 K/uL    RBC 4.16 (L) 4.60 - 6.20 M/uL    Hemoglobin 8.9 (L) 14.0 - 18.0 g/dL    Hematocrit 27.9 (L) 40.0 - 54.0 %    MCV 67 (L) 82 - 98 fL    MCH 21.4 (L) 27.0 - 31.0 pg    MCHC 31.9 (L) 32.0 - 36.0 g/dL    RDW 21.0 (H) 11.5 - 14.5 %    Platelets 1,225 (HH) 150 - 450 K/uL    MPV 9.6 9.2 - 12.9 fL    Immature Granulocytes 1.3 (H) 0.0 - 0.5 %    Gran # (ANC) 16.4 (H) 1.8 - 7.7 K/uL    Immature Grans (Abs) 0.30 (H) 0.00 - 0.04 K/uL    Lymph # 3.1 1.0 - 4.8 K/uL    Mono # 2.7 (H) 0.3 - 1.0 K/uL    Eos  # 0.4 0.0 - 0.5 K/uL    Baso # 0.28 (H) 0.00 - 0.20 K/uL    nRBC 0 0 /100 WBC    Gran % 70.9 38.0 - 73.0 %    Lymph % 13.1 (L) 18.0 - 48.0 %    Mono % 11.7 4.0 - 15.0 %    Eosinophil % 1.8 0.0 - 8.0 %    Basophil % 1.2 0.0 - 1.9 %    Platelet Estimate Increased (A)     Aniso Moderate     Poik Slight     Differential Method Automated    Comprehensive Metabolic Panel (CMP)    Collection Time: 06/23/23  2:51 AM   Result Value Ref Range    Sodium 132 (L) 136 - 145 mmol/L    Potassium 3.4 (L) 3.5 - 5.1 mmol/L    Chloride 87 (L) 95 - 110 mmol/L    CO2 32 (H) 23 - 29 mmol/L    Glucose 107 70 - 110 mg/dL    BUN 18 6 - 20 mg/dL    Creatinine 1.6 (H) 0.5 - 1.4 mg/dL    Calcium 9.6 8.7 - 10.5 mg/dL    Total Protein 7.9 6.0 - 8.4 g/dL    Albumin 2.8 (L) 3.5 - 5.2 g/dL    Total Bilirubin 0.7 0.1 - 1.0 mg/dL    Alkaline Phosphatase 98 55 - 135 U/L    AST 19 10 - 40 U/L    ALT 16 10 - 44 U/L    eGFR 54 (A) >60 mL/min/1.73 m^2    Anion Gap 13 8 - 16 mmol/L   Magnesium    Collection Time: 06/23/23  2:51 AM   Result Value Ref Range    Magnesium 1.8 1.6 - 2.6 mg/dL   Phosphorus    Collection Time: 06/23/23  2:51 AM   Result Value Ref Range    Phosphorus 3.4 2.7 - 4.5 mg/dL   Vancomycin, Random    Collection Time: 06/23/23  2:51 AM   Result Value Ref Range    Vancomycin, Random 17.5 Not established ug/mL   Vancomycin, Random    Collection Time: 06/23/23 10:33 PM   Result Value Ref Range    Vancomycin, Random 13.9 Not established ug/mL   CBC with Automated Differential    Collection Time: 06/24/23  2:43 AM   Result Value Ref Range    WBC 27.42 (H) 3.90 - 12.70 K/uL    RBC 3.83 (L) 4.60 - 6.20 M/uL    Hemoglobin 8.3 (L) 14.0 - 18.0 g/dL    Hematocrit 26.2 (L) 40.0 - 54.0 %    MCV 68 (L) 82 - 98 fL    MCH 21.7 (L) 27.0 - 31.0 pg    MCHC 31.7 (L) 32.0 - 36.0 g/dL    RDW 20.9 (H) 11.5 - 14.5 %    Platelets 1,133 (HH) 150 - 450 K/uL    MPV 9.7 9.2 - 12.9 fL    Immature Granulocytes 0.9 (H) 0.0 - 0.5 %    Gran # (ANC) 21.1 (H) 1.8 - 7.7 K/uL     Immature Grans (Abs) 0.26 (H) 0.00 - 0.04 K/uL    Lymph # 2.6 1.0 - 4.8 K/uL    Mono # 3.1 (H) 0.3 - 1.0 K/uL    Eos # 0.2 0.0 - 0.5 K/uL    Baso # 0.20 0.00 - 0.20 K/uL    nRBC 0 0 /100 WBC    Gran % 76.9 (H) 38.0 - 73.0 %    Lymph % 9.6 (L) 18.0 - 48.0 %    Mono % 11.2 4.0 - 15.0 %    Eosinophil % 0.7 0.0 - 8.0 %    Basophil % 0.7 0.0 - 1.9 %    Platelet Estimate Increased (A)     Aniso Slight     Poik Slight     Differential Method Automated    Comprehensive Metabolic Panel (CMP)    Collection Time: 06/24/23  2:43 AM   Result Value Ref Range    Sodium 130 (L) 136 - 145 mmol/L    Potassium 3.8 3.5 - 5.1 mmol/L    Chloride 91 (L) 95 - 110 mmol/L    CO2 29 23 - 29 mmol/L    Glucose 156 (H) 70 - 110 mg/dL    BUN 15 6 - 20 mg/dL    Creatinine 1.7 (H) 0.5 - 1.4 mg/dL    Calcium 9.3 8.7 - 10.5 mg/dL    Total Protein 7.6 6.0 - 8.4 g/dL    Albumin 2.7 (L) 3.5 - 5.2 g/dL    Total Bilirubin 0.7 0.1 - 1.0 mg/dL    Alkaline Phosphatase 82 55 - 135 U/L    AST 19 10 - 40 U/L    ALT 16 10 - 44 U/L    eGFR 50 (A) >60 mL/min/1.73 m^2    Anion Gap 10 8 - 16 mmol/L   Magnesium    Collection Time: 06/24/23  2:43 AM   Result Value Ref Range    Magnesium 1.9 1.6 - 2.6 mg/dL   Phosphorus    Collection Time: 06/24/23  2:43 AM   Result Value Ref Range    Phosphorus 2.5 (L) 2.7 - 4.5 mg/dL               XR CHEST 1 VIEW   CLINICAL HISTORY:   developing post-operative sepsis;   TECHNIQUE:   Single frontal view of the chest was performed.   COMPARISON:   Abdomen x-ray of [place] 15, 2023.   FINDINGS:   There is prominent amount of free air under both hemidiaphragms in this postoperative patient. There is atelectasis identified at both lung bases right greater than left and hypoventilation of the chest. The cardiac size and contours within normal limits.   Impression:   Hypoventilation of the chest with atelectasis at both lung bases. Large amount of free air remains under the hemidiaphragms.            CT ABDOMEN PELVIS WITHOUT CONTRAST    CLINICAL HISTORY:   Abdominal pain, acute, nonlocalized;   TECHNIQUE:   Low dose axial images, sagittal and coronal reformations were obtained from the lung bases to the pubic symphysis. 30 mL of oral Omnipaque 350 was administered.   COMPARISON:   06/11/2023   FINDINGS:   There is atelectasis at the right lung base.   The liver, spleen, adrenal glands, kidneys and pancreas are unremarkable. The gallbladder is in place.   There is a large amount of food stuff seen within the stomach.   Multiple dilated fluid-filled loops of small bowel are seen throughout the abdomen. Chain suture material is seen in the region of the cecum.   There is no evidence discrete, drainable abdominal fluid collection. There is no evidence intra-abdominal free air.   The osseous structures are unremarkable.   Impression:   There are CT findings of small bowel obstruction progressed from prior exam. There is a large amount of fluid seen within the stomach.            Pending Diagnostic Studies:     Procedure Component Value Units Date/Time    Vancomycin, Random [[id number]]     Order Status: Sent Lab Status: No result     Specimen: Blood          Medications:  Reconciled Home Medications:      Medication List      START taking these medications    amoxicillin-clavulanate 875-125mg 875-125 mg per tablet  Commonly known as: AUGMENTIN  Take 1 tablet by mouth every 12 (twelve) hours. for 5 days        CONTINUE taking these medications    HYDROcodone-acetaminophen  mg per tablet  Commonly known as: NORCO  Take 1 tablet by mouth every 6 (six) hours as needed.        STOP taking these medications    gabapentin 300 MG capsule  Commonly known as: NEURONTIN     ondansetron 4 MG tablet  Commonly known as: ZOFRAN            Indwelling Lines/Drains at time of discharge:   Lines/Drains/Airways     None                 Time spent on the discharge of patient: 36 minutes         Edil Steele MD  Department of Hospital Medicine  [facility]  [facility]

## 2023-06-24 NOTE — PLAN OF CARE
Problem: Adult Inpatient Plan of Care  Goal: Plan of Care Review  Outcome: Ongoing, Progressing     Problem: Adult Inpatient Plan of Care  Goal: Patient-Specific Goal (Individualized)  Outcome: Ongoing, Progressing     Problem: Adult Inpatient Plan of Care  Goal: Absence of Hospital-Acquired Illness or Injury  Outcome: Ongoing, Progressing     Problem: Adult Inpatient Plan of Care  Goal: Optimal Comfort and Wellbeing  Outcome: Ongoing, Progressing     Problem: Infection  Goal: Absence of Infection Signs and Symptoms  Outcome: Ongoing, Progressing     Problem: Fall Injury Risk  Goal: Absence of Fall and Fall-Related Injury  Outcome: Ongoing, Progressing   POC discussed with patient, verbalized understanding. IV to right hand remains intact and patent with iv fluids infusing without difficulty.  Antibiotics admin as ordered. Pain well controlled with iv pain meds.  Voiding without difficulty. Ambulating to restroom independently. Q2H purposeful rounding. Safety measures maintained with side rails up, bed alarm on, slip resistant socks on, call light in reach, pt instructed to call for needs.

## 2023-06-24 NOTE — NURSING
"Patient refusing any further treatment, despite education and recommendations given by hospitalist. Patient states "I am done. I'm leaving whether I get discharged or not." AMA form signed. IV removed with catheter intact. Patient tolerated well.  "

## 2023-06-24 NOTE — NURSING
ANIKA Gan NP notified of critical platelet count level of 1133 and also that wbc has increased from 23.20 to 27.42.

## 2023-06-24 NOTE — PROGRESS NOTES
Pharmacokinetic Assessment Follow Up: IV Vancomycin    Vancomycin serum concentration assessment(s):    The random level was drawn correctly and can be used to guide therapy at this time. The measurement is below the desired definitive target range of 15 to 20 mcg/mL.    Vancomycin Regimen Plan:    Pt's renal function is not stable.  Pharmacy will dose by level.   Pharmacy will dose vancomycin 1750 mg x1.  A vancomycin level will be ordered on 06/24/23 at 1700.     Drug levels (last 3 results):  Recent Labs   Lab Result Units 06/21/23  0835 06/22/23  0302 06/22/23  1835 06/23/23  0251 06/23/23  2233   Vancomycin, Random ug/mL  --   --   --  17.5 13.9   Vancomycin-Trough ug/mL 18.4 11.7 28.9*  --   --        Pharmacy will continue to follow and monitor vancomycin.    Please contact pharmacy at extension [phone] for questions regarding this assessment.    Thank you for the consult,   Josef Magallanes       Patient brief summary:  Ruslan Caldwell is a 47 y.o. male initiated on antimicrobial therapy with IV Vancomycin for treatment of sepsis    The patient's current regimen is pulse dosing    Drug Allergies:   Review of patient's allergies indicates:   Allergen Reactions    Fish containing products Other (See Comments)       Actual Body Weight:   91.7kg    Renal Function:   Estimated Creatinine Clearance: 69.7 mL/min (A) (based on SCr of 1.6 mg/dL (H)).,     CBC (last 72 hours):  Recent Labs   Lab Result Units 06/21/23  0310 06/22/23  0302 06/23/23  0251   WBC K/uL 18.84* 19.14* 23.20*   Hemoglobin g/dL 8.5* 9.5* 8.9*   Hematocrit % 27.8* 30.1* 27.9*   Platelets K/uL 1,189* 1,264* 1,225*   Gran % % 72.2 71.0 70.9   Lymph % % 13.6* 14.0* 13.1*   Mono % % 9.8 13.0 11.7   Eosinophil % % 2.8 2.0 1.8   Basophil % % 0.8 0.0 1.2   Differential Method  Automated Manual Automated       Metabolic Panel (last 72 hours):  Recent Labs   Lab Result Units 06/21/23  0310 06/22/23  0302 06/23/23  0251   Sodium mmol/L 129* 132* 132*    Potassium mmol/L 3.1* 2.9* 3.4*   Chloride mmol/L 84* 83* 87*   CO2 mmol/L 33* 34* 32*   Glucose mg/dL 168* 113* 107   BUN mg/dL 13 16 18   Creatinine mg/dL 0.8 1.2 1.6*   Albumin g/dL 2.5* 2.8* 2.8*   Total Bilirubin mg/dL 0.6 0.7 0.7   Alkaline Phosphatase U/L 88 101 98   AST U/L 12 15 19   ALT U/L 17 15 16   Magnesium mg/dL 1.5* 1.7 1.8   Phosphorus mg/dL 3.2 4.0 3.4       Vancomycin Administrations:  vancomycin given in the last 96 hours                     vancomycin 1,500 mg in dextrose 5 % (D5W) 250 mL IVPB (Vial-Mate) (mg) 1,500 mg New Bag 06/23/23 0538    vancomycin (VANCOCIN) 1,750 mg in dextrose 5 % (D5W) 500 mL IVPB (mg) 1,750 mg New Bag 06/22/23 1220    vancomycin 1,500 mg in dextrose 5 % (D5W) 250 mL IVPB (Vial-Mate) (mg) 1,500 mg New Bag 06/22/23 0424     1,500 mg New Bag 06/21/23 2014     1,500 mg New Bag  1201    vancomycin (VANCOCIN) 1,750 mg in dextrose 5 % (D5W) 500 mL IVPB (mg) 1,750 mg New Bag 06/21/23 0112      Restarted 06/20/23 1957     1,750 mg New Bag  1843    vancomycin 1,250 mg in dextrose 5 % (D5W) 250 mL IVPB (Vial-Mate) (mg) 1,250 mg New Bag 06/20/23 0848     1,250 mg New Bag  0007                    Microbiologic Results:  Microbiology Results (last 7 days)       Procedure Component Value Units Date/Time    Blood culture [[id number]] Collected: 06/17/23 1151    Order Status: Completed Specimen: Blood Updated: 06/22/23 1612     Blood Culture, Routine No growth after 5 days.    Blood culture [[id number]] Collected: 06/17/23 1151    Order Status: Completed Specimen: Blood Updated: 06/22/23 1612     Blood Culture, Routine No growth after 5 days.    Aerobic culture [[id number]] Collected: 06/17/23 1800    Order Status: Completed Specimen: Incision site from Abdomen Updated: 06/21/23 1504     Aerobic Bacterial Culture No growth    Fungus culture [[id number]] Collected: 06/17/23 1800    Order Status: Completed Specimen: Abdominal from Abdomen Updated: 06/21/23 1233     Fungus (Mycology)  Culture Culture in progress    Culture, Anaerobe [[id number]] Collected: 06/17/23 1800    Order Status: Completed Specimen: Abdominal from Abdomen Updated: 06/20/23 1137     Anaerobic Culture Culture in progress    AFB Culture & Smear [[id number]] Collected: 06/17/23 1800    Order Status: Completed Specimen: Abdominal from Abdomen Updated: 06/19/23 1455     AFB Culture & Smear Culture in progress     AFB CULTURE STAIN No acid fast bacilli seen.    Gram stain [[id number]] Collected: 06/17/23 1800    Order Status: Completed Specimen: Abdominal from Abdomen Updated: 06/18/23 0010     Gram Stain Result Few WBC's      No organisms seen    Urine Culture High Risk [[id number]]     Order Status: Sent Specimen: Urine

## 2023-06-25 ENCOUNTER — PATIENT MESSAGE (OUTPATIENT)
Dept: BARIATRICS | Facility: CLINIC | Age: 48
End: 2023-06-25
Payer: MEDICAID

## 2023-06-25 ENCOUNTER — NURSE TRIAGE (OUTPATIENT)
Dept: ADMINISTRATIVE | Facility: CLINIC | Age: 48
End: 2023-06-25
Payer: MEDICAID

## 2023-06-25 NOTE — TELEPHONE ENCOUNTER
Post op pt reports severe pain despite taking his prescribed medication. I have triaged him, per protocol. Dr. Abebe who is on call advises patient may have ibuprofen in addition to pain medication and follow up with provider tomorrow. Patient has 5 tablets left.   I have updated patient and he reports he has been taking Ibuprofen also. I have advised he follow up with provider tomorrow and he verbalizes understanding.     Reason for Disposition   [1] SEVERE post-op pain (e.g., excruciating, pain scale 8-10) AND [2] not controlled with pain medications    Additional Information   Negative: Sounds like a life-threatening emergency to the triager   Negative: [1] Widespread rash AND [2] bright red, sunburn-like   Negative: [1] Drinking very little AND [2] dehydration suspected (e.g., no urine > 12 hours, very dry mouth, very lightheaded)   Negative: [1] Vomiting AND [2] persists > 4 hours   Negative: [1] Vomiting AND [2] abdomen looks much more swollen than usual   Negative: [1] SEVERE headache AND [2] after spinal (epidural) anesthesia   Negative: Patient sounds very sick or weak to the triager   Negative: Sounds like a serious complication to the triager   Negative: Fever > 100.4 F (38.0 C)    Protocols used: Post-Op Symptoms and [id number]

## 2023-06-26 ENCOUNTER — TELEPHONE (OUTPATIENT)
Dept: BARIATRICS | Facility: CLINIC | Age: 48
End: 2023-06-26
Payer: MEDICAID

## 2023-06-26 ENCOUNTER — PATIENT MESSAGE (OUTPATIENT)
Dept: BARIATRICS | Facility: CLINIC | Age: 48
End: 2023-06-26
Payer: MEDICAID

## 2023-06-26 ENCOUNTER — HOSPITAL ENCOUNTER (EMERGENCY)
Facility: HOSPITAL | Age: 48
Discharge: HOME | End: 2023-06-27
Attending: EMERGENCY MEDICINE
Payer: MEDICAID

## 2023-06-26 DIAGNOSIS — D72.829 LEUKOCYTOSIS, UNSPECIFIED TYPE: ICD-10-CM

## 2023-06-26 DIAGNOSIS — R10.9 ACUTE POSTOPERATIVE PAIN OF ABDOMEN: Primary | ICD-10-CM

## 2023-06-26 DIAGNOSIS — G89.18 ACUTE POSTOPERATIVE PAIN OF ABDOMEN: Primary | ICD-10-CM

## 2023-06-26 LAB
ALBUMIN SERPL BCP-MCNC: 2.8 G/DL (ref 3.5–5.2)
ALP SERPL-CCNC: 73 U/L (ref 55–135)
ALT SERPL W/O P-5'-P-CCNC: 18 U/L (ref 10–44)
ANION GAP SERPL CALC-SCNC: 10 MMOL/L (ref 8–16)
AST SERPL-CCNC: 18 U/L (ref 10–40)
BACTERIA #/AREA URNS HPF: NORMAL /HPF
BASOPHILS # BLD AUTO: 0.12 K/UL (ref 0–0.2)
BASOPHILS NFR BLD: 0.5 % (ref 0–1.9)
BILIRUB SERPL-MCNC: 0.6 MG/DL (ref 0.1–1)
BILIRUB UR QL STRIP: NEGATIVE
BUN SERPL-MCNC: 11 MG/DL (ref 6–20)
CALCIUM SERPL-MCNC: 9.6 MG/DL (ref 8.7–10.5)
CHLORIDE SERPL-SCNC: 98 MMOL/L (ref 95–110)
CLARITY UR: CLEAR
CO2 SERPL-SCNC: 25 MMOL/L (ref 23–29)
COLOR UR: YELLOW
CREAT SERPL-MCNC: 1.4 MG/DL (ref 0.5–1.4)
DIFFERENTIAL METHOD: ABNORMAL
EOSINOPHIL # BLD AUTO: 0.1 K/UL (ref 0–0.5)
EOSINOPHIL NFR BLD: 0.3 % (ref 0–8)
ERYTHROCYTE [DISTWIDTH] IN BLOOD BY AUTOMATED COUNT: 21.8 % (ref 11.5–14.5)
EST. GFR  (NO RACE VARIABLE): >60 ML/MIN/1.73 M^2
GLUCOSE SERPL-MCNC: 127 MG/DL (ref 70–110)
GLUCOSE UR QL STRIP: NEGATIVE
HCT VFR BLD AUTO: 26.8 % (ref 40–54)
HGB BLD-MCNC: 8.7 G/DL (ref 14–18)
HGB UR QL STRIP: ABNORMAL
HYALINE CASTS #/AREA URNS LPF: 0 /LPF
IMM GRANULOCYTES # BLD AUTO: 0.23 K/UL (ref 0–0.04)
IMM GRANULOCYTES NFR BLD AUTO: 0.9 % (ref 0–0.5)
KETONES UR QL STRIP: NEGATIVE
LACTATE SERPL-SCNC: 0.8 MMOL/L (ref 0.5–2.2)
LEUKOCYTE ESTERASE UR QL STRIP: NEGATIVE
LIPASE SERPL-CCNC: 62 U/L (ref 4–60)
LYMPHOCYTES # BLD AUTO: 2.1 K/UL (ref 1–4.8)
LYMPHOCYTES NFR BLD: 7.9 % (ref 18–48)
MAGNESIUM SERPL-MCNC: 1.7 MG/DL (ref 1.6–2.6)
MCH RBC QN AUTO: 21.8 PG (ref 27–31)
MCHC RBC AUTO-ENTMCNC: 32.5 G/DL (ref 32–36)
MCV RBC AUTO: 67 FL (ref 82–98)
MICROSCOPIC COMMENT: NORMAL
MONOCYTES # BLD AUTO: 2 K/UL (ref 0.3–1)
MONOCYTES NFR BLD: 7.5 % (ref 4–15)
NEUTROPHILS # BLD AUTO: 22.1 K/UL (ref 1.8–7.7)
NEUTROPHILS NFR BLD: 82.9 % (ref 38–73)
NITRITE UR QL STRIP: NEGATIVE
NRBC BLD-RTO: 0 /100 WBC
PH UR STRIP: 7 [PH] (ref 5–8)
PLATELET # BLD AUTO: 1069 K/UL (ref 150–450)
PMV BLD AUTO: 9.1 FL (ref 9.2–12.9)
POTASSIUM SERPL-SCNC: 3.8 MMOL/L (ref 3.5–5.1)
PROT SERPL-MCNC: 8.1 G/DL (ref 6–8.4)
PROT UR QL STRIP: ABNORMAL
RBC # BLD AUTO: 4 M/UL (ref 4.6–6.2)
RBC #/AREA URNS HPF: 2 /HPF (ref 0–4)
SODIUM SERPL-SCNC: 133 MMOL/L (ref 136–145)
SP GR UR STRIP: 1.01 (ref 1–1.03)
SQUAMOUS #/AREA URNS HPF: 1 /HPF
URN SPEC COLLECT METH UR: ABNORMAL
UROBILINOGEN UR STRIP-ACNC: NEGATIVE EU/DL
WBC # BLD AUTO: 26.63 K/UL (ref 3.9–12.7)
WBC #/AREA URNS HPF: 2 /HPF (ref 0–5)

## 2023-06-26 PROCEDURE — 85025 COMPLETE CBC W/AUTO DIFF WBC: CPT | Performed by: EMERGENCY MEDICINE

## 2023-06-26 PROCEDURE — 25000003 PHARM REV CODE 250: Performed by: EMERGENCY MEDICINE

## 2023-06-26 PROCEDURE — 96375 TX/PRO/DX INJ NEW DRUG ADDON: CPT

## 2023-06-26 PROCEDURE — 63600175 PHARM REV CODE 636 W HCPCS: Performed by: EMERGENCY MEDICINE

## 2023-06-26 PROCEDURE — 87040 BLOOD CULTURE FOR BACTERIA: CPT | Performed by: EMERGENCY MEDICINE

## 2023-06-26 PROCEDURE — 74177 CT ABD & PELVIS W/CONTRAST: CPT | Mod: TC

## 2023-06-26 PROCEDURE — 74177 CT ABD & PELVIS W/CONTRAST: CPT | Mod: 26,,, | Performed by: RADIOLOGY

## 2023-06-26 PROCEDURE — 25500020 PHARM REV CODE 255: Performed by: EMERGENCY MEDICINE

## 2023-06-26 PROCEDURE — 83605 ASSAY OF LACTIC ACID: CPT | Performed by: EMERGENCY MEDICINE

## 2023-06-26 PROCEDURE — 83735 ASSAY OF MAGNESIUM: CPT | Performed by: EMERGENCY MEDICINE

## 2023-06-26 PROCEDURE — 99285 EMERGENCY DEPT VISIT HI MDM: CPT | Mod: 25

## 2023-06-26 PROCEDURE — 83690 ASSAY OF LIPASE: CPT | Performed by: EMERGENCY MEDICINE

## 2023-06-26 PROCEDURE — 96376 TX/PRO/DX INJ SAME DRUG ADON: CPT

## 2023-06-26 PROCEDURE — 96365 THER/PROPH/DIAG IV INF INIT: CPT | Mod: 59

## 2023-06-26 PROCEDURE — 96361 HYDRATE IV INFUSION ADD-ON: CPT

## 2023-06-26 PROCEDURE — 81000 URINALYSIS NONAUTO W/SCOPE: CPT | Performed by: EMERGENCY MEDICINE

## 2023-06-26 PROCEDURE — 74177 CT ABDOMEN PELVIS WITH CONTRAST: ICD-10-PCS | Mod: 26,,, | Performed by: RADIOLOGY

## 2023-06-26 PROCEDURE — 80053 COMPREHEN METABOLIC PANEL: CPT | Performed by: EMERGENCY MEDICINE

## 2023-06-26 RX ORDER — OXYCODONE AND ACETAMINOPHEN 10; 325 MG/1; MG/1
1 TABLET ORAL EVERY 4 HOURS PRN
Qty: 20 TABLET | Refills: 0 | Status: SHIPPED | OUTPATIENT
Start: 2023-06-26 | End: 2023-06-30

## 2023-06-26 RX ORDER — HYDROMORPHONE HYDROCHLORIDE 1 MG/ML
1 INJECTION, SOLUTION INTRAMUSCULAR; INTRAVENOUS; SUBCUTANEOUS
Status: COMPLETED | OUTPATIENT
Start: 2023-06-26 | End: 2023-06-26

## 2023-06-26 RX ORDER — ONDANSETRON 2 MG/ML
4 INJECTION INTRAMUSCULAR; INTRAVENOUS
Status: COMPLETED | OUTPATIENT
Start: 2023-06-26 | End: 2023-06-26

## 2023-06-26 RX ORDER — OXYCODONE AND ACETAMINOPHEN 10; 325 MG/1; MG/1
1 TABLET ORAL EVERY 6 HOURS PRN
Qty: 12 TABLET | Refills: 0 | Status: SHIPPED | OUTPATIENT
Start: 2023-06-26 | End: 2023-06-30

## 2023-06-26 RX ADMIN — IOHEXOL 100 ML: 350 INJECTION, SOLUTION INTRAVENOUS at 10:06

## 2023-06-26 RX ADMIN — HYDROMORPHONE HYDROCHLORIDE 1 MG: 1 INJECTION, SOLUTION INTRAMUSCULAR; INTRAVENOUS; SUBCUTANEOUS at 11:06

## 2023-06-26 RX ADMIN — HYDROMORPHONE HYDROCHLORIDE 1 MG: 1 INJECTION, SOLUTION INTRAMUSCULAR; INTRAVENOUS; SUBCUTANEOUS at 09:06

## 2023-06-26 RX ADMIN — ONDANSETRON 4 MG: 2 INJECTION INTRAMUSCULAR; INTRAVENOUS at 08:06

## 2023-06-26 RX ADMIN — SODIUM CHLORIDE 1000 ML: 9 INJECTION, SOLUTION INTRAVENOUS at 08:06

## 2023-06-26 RX ADMIN — PIPERACILLIN AND TAZOBACTAM 3.38 G: 3; .375 INJECTION, POWDER, LYOPHILIZED, FOR SOLUTION INTRAVENOUS; PARENTERAL at 09:06

## 2023-06-26 RX ADMIN — HYDROMORPHONE HYDROCHLORIDE 1 MG: 1 INJECTION, SOLUTION INTRAMUSCULAR; INTRAVENOUS; SUBCUTANEOUS at 08:06

## 2023-06-26 NOTE — PLAN OF CARE
06/26/23 0715   Final Note   Assessment Type Final Discharge Note   Anticipated Discharge Disposition Left Against

## 2023-06-26 NOTE — TELEPHONE ENCOUNTER
Returned call, spoke to pt. Informed him that I spoke with the pharmacy and it is too soon to fill his Rx because he received pain meds prescribed by a different doctor on Saturday. Informed him that I sent his message to Dr. Michael, but he is in surgery and may not see it until after hours. Pt has appt for 11:00 am tomorrow.     ----- Message from Max Durán sent at 6/26/2023  3:53 PM CDT -----  Contact: Pt wife  Type: Needs Medical Advice    Who Called:pt wife  Best Call Back Number:[phone]    Additional Information Requesting a call back regarding Pt wife was calling to follow up on the status of pt medication wife stated pt needed to call in to pharmacy and verify some information wife stated to please call when available Thank you  Please Advise-Thank you

## 2023-06-27 ENCOUNTER — TELEPHONE (OUTPATIENT)
Dept: HEMATOLOGY/ONCOLOGY | Facility: CLINIC | Age: 48
End: 2023-06-27
Payer: MEDICAID

## 2023-06-27 ENCOUNTER — PATIENT MESSAGE (OUTPATIENT)
Dept: HEMATOLOGY/ONCOLOGY | Facility: CLINIC | Age: 48
End: 2023-06-27
Payer: MEDICAID

## 2023-06-27 ENCOUNTER — OFFICE VISIT (OUTPATIENT)
Dept: BARIATRICS | Facility: CLINIC | Age: 48
End: 2023-06-27
Payer: MEDICAID

## 2023-06-27 VITALS
HEIGHT: 75 IN | OXYGEN SATURATION: 97 % | WEIGHT: 195 LBS | DIASTOLIC BLOOD PRESSURE: 77 MMHG | HEART RATE: 118 BPM | BODY MASS INDEX: 24.25 KG/M2 | SYSTOLIC BLOOD PRESSURE: 122 MMHG | RESPIRATION RATE: 18 BRPM | TEMPERATURE: 99 F

## 2023-06-27 VITALS
TEMPERATURE: 98 F | BODY MASS INDEX: 22.86 KG/M2 | SYSTOLIC BLOOD PRESSURE: 97 MMHG | HEART RATE: 119 BPM | HEIGHT: 75 IN | DIASTOLIC BLOOD PRESSURE: 66 MMHG | WEIGHT: 183.88 LBS | RESPIRATION RATE: 16 BRPM

## 2023-06-27 VITALS
HEIGHT: 75 IN | WEIGHT: 225.13 LBS | DIASTOLIC BLOOD PRESSURE: 78 MMHG | SYSTOLIC BLOOD PRESSURE: 119 MMHG | HEART RATE: 107 BPM | OXYGEN SATURATION: 99 % | BODY MASS INDEX: 27.99 KG/M2 | RESPIRATION RATE: 16 BRPM | TEMPERATURE: 98 F

## 2023-06-27 DIAGNOSIS — C18.9 COLON ADENOCARCINOMA: Primary | ICD-10-CM

## 2023-06-27 DIAGNOSIS — R10.84 GENERALIZED ABDOMINAL PAIN: Primary | ICD-10-CM

## 2023-06-27 DIAGNOSIS — G89.18 POST-OPERATIVE PAIN: ICD-10-CM

## 2023-06-27 PROCEDURE — 3078F DIAST BP <80 MM HG: CPT | Mod: CPTII,,, | Performed by: SURGERY

## 2023-06-27 PROCEDURE — 99283 EMERGENCY DEPT VISIT LOW MDM: CPT | Mod: 27

## 2023-06-27 PROCEDURE — 25000003 PHARM REV CODE 250: Performed by: PHYSICIAN ASSISTANT

## 2023-06-27 PROCEDURE — 3074F PR MOST RECENT SYSTOLIC BLOOD PRESSURE < 130 MM HG: ICD-10-PCS | Mod: CPTII,,, | Performed by: SURGERY

## 2023-06-27 PROCEDURE — 3008F PR BODY MASS INDEX (BMI) DOCUMENTED: ICD-10-PCS | Mod: CPTII,,, | Performed by: SURGERY

## 2023-06-27 PROCEDURE — 99024 POSTOP FOLLOW-UP VISIT: CPT | Mod: ,,, | Performed by: SURGERY

## 2023-06-27 PROCEDURE — 99214 OFFICE O/P EST MOD 30 MIN: CPT | Mod: PBBFAC,PN | Performed by: SURGERY

## 2023-06-27 PROCEDURE — 1159F PR MEDICATION LIST DOCUMENTED IN MEDICAL RECORD: ICD-10-PCS | Mod: CPTII,,, | Performed by: SURGERY

## 2023-06-27 PROCEDURE — 3044F HG A1C LEVEL LT 7.0%: CPT | Mod: CPTII,,, | Performed by: SURGERY

## 2023-06-27 PROCEDURE — 99024 PR POST-OP FOLLOW-UP VISIT: ICD-10-PCS | Mod: ,,, | Performed by: SURGERY

## 2023-06-27 PROCEDURE — 99999 PR PBB SHADOW E&M-EST. PATIENT-LVL IV: CPT | Mod: PBBFAC,,, | Performed by: SURGERY

## 2023-06-27 PROCEDURE — 3078F PR MOST RECENT DIASTOLIC BLOOD PRESSURE < 80 MM HG: ICD-10-PCS | Mod: CPTII,,, | Performed by: SURGERY

## 2023-06-27 PROCEDURE — 3044F PR MOST RECENT HEMOGLOBIN A1C LEVEL <7.0%: ICD-10-PCS | Mod: CPTII,,, | Performed by: SURGERY

## 2023-06-27 PROCEDURE — 3008F BODY MASS INDEX DOCD: CPT | Mod: CPTII,,, | Performed by: SURGERY

## 2023-06-27 PROCEDURE — 99999 PR PBB SHADOW E&M-EST. PATIENT-LVL IV: ICD-10-PCS | Mod: PBBFAC,,, | Performed by: SURGERY

## 2023-06-27 PROCEDURE — 1159F MED LIST DOCD IN RCRD: CPT | Mod: CPTII,,, | Performed by: SURGERY

## 2023-06-27 PROCEDURE — 3074F SYST BP LT 130 MM HG: CPT | Mod: CPTII,,, | Performed by: SURGERY

## 2023-06-27 RX ORDER — OXYCODONE HYDROCHLORIDE 5 MG/1
5 TABLET ORAL
Status: COMPLETED | OUTPATIENT
Start: 2023-06-27 | End: 2023-06-27

## 2023-06-27 RX ORDER — ONDANSETRON 4 MG/1
8 TABLET, ORALLY DISINTEGRATING ORAL
Status: COMPLETED | OUTPATIENT
Start: 2023-06-27 | End: 2023-06-27

## 2023-06-27 RX ORDER — HYDROMORPHONE HYDROCHLORIDE 2 MG/1
2 TABLET ORAL EVERY 4 HOURS PRN
Qty: 30 TABLET | Refills: 0 | Status: SHIPPED | OUTPATIENT
Start: 2023-06-27 | End: 2023-06-30 | Stop reason: SDUPTHER

## 2023-06-27 RX ORDER — ACETAMINOPHEN 500 MG
1000 TABLET ORAL
Status: COMPLETED | OUTPATIENT
Start: 2023-06-27 | End: 2023-06-27

## 2023-06-27 RX ORDER — ONDANSETRON 4 MG/1
4 TABLET, ORALLY DISINTEGRATING ORAL EVERY 6 HOURS PRN
Qty: 30 TABLET | Refills: 0 | Status: SHIPPED | OUTPATIENT
Start: 2023-06-27 | End: 2023-11-14 | Stop reason: SDUPTHER

## 2023-06-27 RX ADMIN — ONDANSETRON 8 MG: 4 TABLET, ORALLY DISINTEGRATING ORAL at 09:06

## 2023-06-27 RX ADMIN — OXYCODONE HYDROCHLORIDE 5 MG: 5 TABLET ORAL at 09:06

## 2023-06-27 RX ADMIN — ACETAMINOPHEN 1000 MG: 500 TABLET ORAL at 09:06

## 2023-06-27 NOTE — ED NOTES
Pt here for abdominal pain.  Pt recently discharged from [facility] after two abdominal surgeries on 6/17.  Pt wife stating he was given 10 loratabs upon discharge and was called in new pain meds today but pharmacy did not have them.  Pt stating they tried calling office to have them transferred to another pharmacy but office was already closed.  No other needs voiced at this time.  Pt midline incision well approximated.  Scant drainage to incision site.  NAD noted.

## 2023-06-27 NOTE — ED PROVIDER NOTES
Encounter Date: 6/27/2023       History     Chief Complaint   Patient presents with    Abdominal Pain     Patient states he is in pain after surgery, has not filled any of the 2 prescriptions for meds he has been given in the last 2 er visits has an appointment with Alec 11 am and stated he couldn't get the meds filled before his appointment and is in pain      Ruslan Caldwell is a 47 y.o. male presenting for evaluation of persistent abdominal pain s/p abdominal surgery, performed by Dr. Michael on 6/15.  He left AMA from the hospital on 6/24/23 and was sent home with a prescription for pain medication, which he quickly used and was out by Monday.  He then went to [place] late Monday evening and was discharged home with a new prescription for pain medication, but he hasn't been able to get it filled yet.  This morning, he had persistent pain and decided to come here for medication prior to going to his regularly scheduled appointment with Dr. Michael at 11 AM.  He has some associated nausea, but no vomiting.  No fever.   He has a past medical history of Diabetes mellitus, type 2 (05/2020) and Hypertension.      The history is provided by the patient and a relative.   Review of patient's allergies indicates:   Allergen Reactions    Fish containing products Other (See Comments)     Past Medical History:   Diagnosis Date    Diabetes mellitus, type 2 05/2020    Patient denies    Hypertension      Past Surgical History:   Procedure Laterality Date    COLONOSCOPY N/A 5/29/2023    Procedure: COLONOSCOPY;  Surgeon: Sam Solano MD;  Location: [facility] ENDO;  Service: Endoscopy;  Laterality: N/A;    EXCISION, SMALL INTESTINE N/A 6/15/2023    Procedure: EXCISION, SMALL INTESTINE;  Surgeon: Edin Michael MD;  Location: [facility] OR;  Service: General;  Laterality: N/A;    EYE SURGERY Right     LAPAROTOMY, EXPLORATORY N/A 6/15/2023    Procedure: LAPAROTOMY, EXPLORATORY;  Surgeon: Edin Michael MD;  Location: [facility]  OR;  Service: General;  Laterality: N/A;    LAPAROTOMY, EXPLORATORY N/A 6/17/2023    Procedure: LAPAROTOMY, EXPLORATORY;  Surgeon: Garfield Hoyt Jr., MD;  Location: [facility] OR;  Service: General;  Laterality: N/A;    LUMBAR DISC SURGERY  2005    LYSIS OF ADHESIONS N/A 6/15/2023    Procedure: LYSIS, ADHESIONS;  Surgeon: Edin Michael MD;  Location: [facility] OR;  Service: General;  Laterality: N/A;    OMENTECTOMY N/A 6/15/2023    Procedure: OMENTECTOMY;  Surgeon: Edin Michael MD;  Location: [facility] OR;  Service: General;  Laterality: N/A;    SUBTOTAL COLECTOMY Right 5/29/2023    Procedure: COLECTOMY, PARTIAL;  Surgeon: Edin Michael MD;  Location: [facility] OR;  Service: General;  Laterality: Right;     Family History   Problem Relation Age of Onset    Diabetes Mother     Diabetes Maternal Grandfather     No Known Problems Father      Social History     Tobacco Use    Smoking status: Every Day     Packs/day: 0.50     Years: 23.00     Pack years: 11.50     Types: Cigarettes    Smokeless tobacco: Never   Substance Use Topics    Alcohol use: Not Currently     Comment: Quit drinking 2 yrs ago    Drug use: Yes     Types: Marijuana     Comment: heavy marijuana use     Review of Systems   Constitutional:  Negative for chills and fever.   Respiratory:  Negative for cough, chest tightness, shortness of breath and wheezing.    Cardiovascular:  Negative for chest pain and palpitations.   Gastrointestinal:  Positive for abdominal pain. Negative for diarrhea, nausea and vomiting.   Genitourinary:  Negative for dysuria and hematuria.   Musculoskeletal:  Negative for arthralgias, back pain, joint swelling, myalgias, neck pain and neck stiffness.   Skin:  Negative for color change, pallor, rash and wound.   Neurological:  Negative for weakness and numbness.   Hematological:  Does not bruise/bleed easily.     Physical Exam     Initial Vitals [06/27/23 0904]   BP Pulse Resp Temp SpO2   114/70 (!) 111 17 97.7 °F (36.5 °C) 99 %       MAP       --         Physical Exam    Nursing note and vitals reviewed.  Constitutional: He appears well-developed and well-nourished. He is not diaphoretic. No distress.   HENT:   Head: Normocephalic and atraumatic.   Mouth/Throat: Oropharynx is clear and moist.   Cardiovascular:  Normal rate, regular rhythm, normal heart sounds and intact distal pulses.     Exam reveals no gallop and no friction rub.       No murmur heard.  Pulmonary/Chest: Breath sounds normal. No respiratory distress. He has no wheezes. He has no rhonchi. He has no rales. He exhibits no tenderness.   Abdominal: Abdomen is soft. He exhibits no distension and no mass. There is abdominal tenderness.   Intact bandage noted to mid abdomen without surrounding erythema, induration.  Generalized abdominal tenderness noted.  No distention.    Musculoskeletal:         General: No tenderness or edema. Normal range of motion.     Neurological: He is alert and oriented to person, place, and time. He has normal strength. No sensory deficit.   Skin: Skin is warm and dry. No rash and no abscess noted. No erythema.   Psychiatric: He has a normal mood and affect.       ED Course   Procedures  Labs Reviewed - No data to display       Imaging Results    None          Medications   acetaminophen tablet 1,000 mg (1,000 mg Oral Given 6/27/23 0935)   oxyCODONE immediate release tablet 5 mg (5 mg Oral Given 6/27/23 0943)   ondansetron disintegrating tablet 8 mg (8 mg Oral Given 6/27/23 0935)     Medical Decision Making:   History:   Old Medical Records: I decided to obtain old medical records.  Old Records Summarized: records from clinic visits and records from previous admission(s).  Differential Diagnosis:   Post-op pain   Medication refill     ED Management:  Pt emergently evaluated here in the ED.    Pt is here for pain medication prior to his outpatient appointment with Dr. Michael at 11 AM.  He had labs, CT at [facility] last night.  Pt hasn't had any recent  vomiting.  Some associated nausea.  His HR improved here in the ED after a dose of Oxycodone.  He will be discharged to follow-up with Dr. Michael as scheduled for further recommendations.  Patient voices understanding and is agreeable to the plan.  Specific return precautions are given.                           Clinical Impression:   Final diagnoses:  [R10.84] Generalized abdominal pain (Primary)  [G89.18] Post-operative pain        ED Disposition Condition    Discharge Stable          ED Prescriptions    None       Follow-up Information       Follow up With Specialties Details Why Contact [facility] Emergency Dept Emergency Medicine  As needed, If symptoms worsen [address] [place] [id number] [phone]             Claudia Bell PA-C  06/27/23 2640

## 2023-06-27 NOTE — PROGRESS NOTES
Continues to eat and tolerate diet.  He is having GI function by having flatus and bowel movements.  He does report the pain is pretty bad and persists.  He would gone to the ED today because of the pain.  He just got a CT scan which did not show any obvious complications.  He does have some fluid that is reddish drainage from his midline.  Vitals:    06/27/23 1118   BP: 97/66   Pulse: (!) 119   Resp: 16   Temp: 98.2 °F (36.8 °C)     Abdomen benign  Ss drainage from midline wound- seroma    CT reviewed    A/P    He is gone through 3 major surgeries and I suspect this is made it very difficult for him to control his pain.  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  This was all within about a month's period time.  Percocet does not seem to do a good job of controlling his pain at home.  I have offered and talked to him about using oral Dilaudid as a way to control his pain at least temporarily.  He agrees with the plan to try this.  I send in Rx to his pharmacy.  I suspect he will need a short-term follow-up and a very short-term prescription for this medication.  He will see me next week.  He will also need to see Oncology.

## 2023-06-27 NOTE — ED PROVIDER NOTES
Encounter Date: 6/26/2023       History     Chief Complaint   Patient presents with    Abdominal Pain     Dx with colon cancer end of may has had 4 surgeries since then  n/v/d x 2 hrs .  Cramping stabbing pain generalized in abd   6 inch vertical surgical site with staples draining at base.     Pt is 1wk postop ex lap with partial colectomy for colon ca here with abd pain and NV. Out of pain meds at home. No urinary sxs. Pt says he is having BM's.     The history is provided by the patient and the spouse.   Abdominal Pain  The current episode started several days ago. The onset of the illness was gradual. The problem has been gradually worsening. The abdominal pain is generalized. The abdominal pain does not radiate. The severity of the abdominal pain is 9/10. The abdominal pain is relieved by nothing. The abdominal pain is exacerbated by movement. The other symptoms of the illness include nausea and vomiting. The other symptoms of the illness do not include fever, fatigue, jaundice, melena, diarrhea, dysuria, hematemesis or hematochezia.   The emesis contains stomach contents.   Additional symptoms associated with the illness include anorexia. Symptoms associated with the illness do not include chills, diaphoresis, heartburn, constipation, urgency, hematuria, frequency or back pain.   Review of patient's allergies indicates:   Allergen Reactions    Fish containing products Other (See Comments)     Past Medical History:   Diagnosis Date    Diabetes mellitus, type 2 05/2020    Patient denies    Hypertension      Past Surgical History:   Procedure Laterality Date    COLONOSCOPY N/A 5/29/2023    Procedure: COLONOSCOPY;  Surgeon: Sam Solano MD;  Location: [facility] ENDO;  Service: Endoscopy;  Laterality: N/A;    EXCISION, SMALL INTESTINE N/A 6/15/2023    Procedure: EXCISION, SMALL INTESTINE;  Surgeon: Edin Michael MD;  Location: [facility] OR;  Service: General;  Laterality: N/A;    EYE SURGERY Right     LAPAROTOMY,  EXPLORATORY N/A 6/15/2023    Procedure: LAPAROTOMY, EXPLORATORY;  Surgeon: Edin Michale MD;  Location: [facility] OR;  Service: General;  Laterality: N/A;    LAPAROTOMY, EXPLORATORY N/A 6/17/2023    Procedure: LAPAROTOMY, EXPLORATORY;  Surgeon: Garfield Hoyt Jr., MD;  Location: [facility] OR;  Service: General;  Laterality: N/A;    LUMBAR DISC SURGERY  2005    LYSIS OF ADHESIONS N/A 6/15/2023    Procedure: LYSIS, ADHESIONS;  Surgeon: Edin Michael MD;  Location: [facility] OR;  Service: General;  Laterality: N/A;    OMENTECTOMY N/A 6/15/2023    Procedure: OMENTECTOMY;  Surgeon: Edin Michael MD;  Location: [facility] OR;  Service: General;  Laterality: N/A;    SUBTOTAL COLECTOMY Right 5/29/2023    Procedure: COLECTOMY, PARTIAL;  Surgeon: Edin Michael MD;  Location: [facility] OR;  Service: General;  Laterality: Right;     Family History   Problem Relation Age of Onset    Diabetes Mother     Diabetes Maternal Grandfather     No Known Problems Father      Social History     Tobacco Use    Smoking status: Every Day     Packs/day: 0.50     Years: 23.00     Pack years: 11.50     Types: Cigarettes    Smokeless tobacco: Never   Substance Use Topics    Alcohol use: Not Currently     Comment: Quit drinking 2 yrs ago    Drug use: Yes     Types: Marijuana     Comment: heavy marijuana use     Review of Systems   Constitutional:  Negative for chills, diaphoresis, fatigue and fever.   Gastrointestinal:  Positive for abdominal pain, anorexia, nausea and vomiting. Negative for constipation, diarrhea, heartburn, hematemesis, hematochezia, jaundice and melena.   Genitourinary:  Negative for dysuria, frequency, hematuria and urgency.   Musculoskeletal:  Negative for back pain.   All other systems reviewed and are negative.    Physical Exam     Initial Vitals [06/26/23 2018]   BP Pulse Resp Temp SpO2   101/85 (!) 118 18 98.8 °F (37.1 °C) 100 %      MAP       --         Physical Exam    Nursing note and vitals reviewed.  Constitutional:  He appears well-developed and well-nourished. He is not diaphoretic. No distress.   Uncomfortable, nontoxic   HENT:   Mouth/Throat: Oropharynx is clear and moist.   Eyes: No scleral icterus.   Neck: Neck supple.   Normal range of motion.  Cardiovascular:  Regular rhythm, normal heart sounds and intact distal pulses.           tachy   Pulmonary/Chest: Breath sounds normal.   Abdominal: Abdomen is soft. He exhibits no distension and no mass. There is abdominal tenderness.   Incision CDI, no infection. Hypoactive BS. Mod diffuse ttp.  There is guarding. There is no rebound.   Musculoskeletal:         General: No tenderness or edema. Normal range of motion.      Cervical back: Normal range of motion and neck supple.     Neurological: He is alert and oriented to person, place, and time. GCS score is 15. GCS eye subscore is 4. GCS verbal subscore is 5. GCS motor subscore is 6.   Skin: Skin is warm and dry. Capillary refill takes less than 2 seconds. No rash noted. No erythema. No pallor.   Psychiatric: He has a normal mood and affect.       ED Course   Procedures  Labs Reviewed   CBC W/ AUTO DIFFERENTIAL - Abnormal; Notable for the following components:       Result Value    WBC 26.63 (*)     RBC 4.00 (*)     Hemoglobin 8.7 (*)     Hematocrit 26.8 (*)     MCV 67 (*)     MCH 21.8 (*)     RDW 21.8 (*)     Platelets 1,069 (*)     MPV 9.1 (*)     Immature Granulocytes 0.9 (*)     Gran # (ANC) 22.1 (*)     Immature Grans (Abs) 0.23 (*)     Mono # 2.0 (*)     Gran % 82.9 (*)     Lymph % 7.9 (*)     All other components within normal limits    Narrative:        PLT critical result(s) called and verbal readback obtained from   VIRI Baird by BESSY 06/26/2023 20:49   COMPREHENSIVE METABOLIC PANEL - Abnormal; Notable for the following components:    Sodium 133 (*)     Glucose 127 (*)     Albumin 2.8 (*)     All other components within normal limits   LIPASE - Abnormal; Notable for the following components:    Lipase 62 (*)     All  other components within normal limits   URINALYSIS, REFLEX TO URINE CULTURE - Abnormal; Notable for the following components:    Protein, UA 1+ (*)     Occult Blood UA Trace (*)     All other components within normal limits    Narrative:     Preferred Collection Type->Urine, Clean Catch  Specimen Source->Urine   CULTURE, BLOOD   CULTURE, BLOOD   MAGNESIUM   LACTIC ACID, PLASMA   URINALYSIS MICROSCOPIC    Narrative:     Preferred Collection Type->Urine, Clean Catch  Specimen Source->Urine          Imaging Results              CT Abdomen Pelvis With Contrast (Final result)  Result time 06/26/23 22:49:39      Final result by Lucy Torres MD (06/26/23 22:49:39)                   Impression:      Postoperative ileus versus partial small bowel obstruction.    Postsurgical changes of exploratory laparotomy with fluid and soft tissue gas spanning the entire length of the incision.  Findings may represent seromas in the setting of recent surgery, however please correlate with clinical findings to exclude wound infection and dehiscence.      Electronically signed by: Lucy Torres  Date:    06/26/2023  Time:    22:49               Narrative:    EXAMINATION:  CT ABDOMEN PELVIS WITH CONTRAST    CLINICAL HISTORY:  Abdominal abscess/infection suspected;    TECHNIQUE:  Low dose axial images, sagittal and coronal reformations were obtained from the lung bases to the pubic symphysis following the IV administration of 100 mL of Omnipaque 350 .  Oral contrast was not administered.    COMPARISON:  06/12/2023    FINDINGS:  Abdomen:    - Lung bases: Right basilar atelectasis.    - Liver: No focal mass.    - Gallbladder: No calcified gallstones.    - Bile Ducts: No evidence of intra or extra hepatic biliary ductal dilation.    - Spleen: Negative.    - Kidneys: No mass or hydronephrosis.    - Adrenals: Unremarkable.    - Pancreas: No mass or peripancreatic fat stranding.    - Retroperitoneum:  No significant adenopathy.    -  Vascular: No abdominal aortic aneurysm.    - Abdominal wall:  Postsurgical changes of exploratory laparotomy with fluid and soft tissue gas spanning the entire length of the incision.    Pelvis:    No pelvic mass, adenopathy, or free fluid.    Bowel/Mesentery:    Postsurgical changes in the right lower quadrant.  Multiple small bowel loops seen throughout the abdomen which are moderately distended with fluid.  No discrete transition point    Bones:  No acute osseous abnormality and no suspicious lytic or blastic lesion.                                       Medications   piperacillin-tazobactam (ZOSYN) 3.375 g in dextrose 5 % in water (D5W) 5 % 100 mL IVPB (MB+) (0 g Intravenous Stopped 6/26/23 2248)   sodium chloride 0.9% bolus 1,000 mL 1,000 mL (0 mLs Intravenous Stopped 6/26/23 2142)   HYDROmorphone injection 1 mg (1 mg Intravenous Given 6/26/23 2041)   ondansetron injection 4 mg (4 mg Intravenous Given 6/26/23 2041)   HYDROmorphone injection 1 mg (1 mg Intravenous Given 6/26/23 2156)   iohexoL (OMNIPAQUE 350) injection 100 mL (100 mLs Intravenous Given 6/26/23 2210)   HYDROmorphone injection 1 mg (1 mg Intravenous Given 6/26/23 2340)     Medical Decision Making:   Differential Diagnosis:   Postop infection, SBO, postop complication, ileus  Clinical Tests:   Lab Tests: Ordered and Reviewed  Radiological Study: Ordered and Reviewed  ED Management:  Pt presented with postop abd pain with NV. Pt had right hemicolectomy earlier this month c/b omental necrosis causing sepsis and take back for omentectomy and the another ex lap for possible anastomotic leak. Pt with pain and not able to get his Rx bc the pharmacy was out of the med. Not toxic exam. Abd tender but not surgical. His incision looked fine. Labs showed leukocytosis of 26k but it was 27k when he was released on Sat. He was given empiric dose of zosyn here. Pain was treated with a total of 3mg dilaudid. I do not suspect that he was in as much pain as he was  stating, but given his recent surgical hx, he was treated. He was given 1L NS for volume depletion. His lactate was normal. CMP, lipase, UA and Mg unremarkable. CT showed postop changes vs partial SBO. I discussed the findings with Dr Bull on call for Dr Michael and he agrees that pt ok for discharge and f/u in am as previously scheduled. Pt requested percocet Rx since he is unable to get his from his surgeon. Will give pt small amount.           ED Course as of 06/26/23 2349   Mon Jun 26, 2023 2312 Pain improved. I reviewed OP note from last week. His WBC at NM on Sat was 27k. 26k today. Will consult with his surgeon re dispo. He has appt with Dr Michael tomorrow. [DC]      ED Course User Index  [DC] Edilberto Padron Jr., MD                 Clinical Impression:   Final diagnoses:  [G89.18, R10.9] Acute postoperative pain of abdomen (Primary)  [D72.829] Leukocytosis, unspecified type        ED Disposition Condition    Discharge Stable          ED Prescriptions       Medication Sig Dispense Start Date End Date Auth. Provider    oxyCODONE-acetaminophen (PERCOCET)  mg per tablet Take 1 tablet by mouth every 6 (six) hours as needed for Pain. 12 tablet 6/26/2023 -- Edilberto Padron Jr., MD          Follow-up Information       Follow up With Specialties Details Why Contact Info    Dr Michael  Today               Edilberto Padron Jr., MD  06/26/23 9391

## 2023-06-28 ENCOUNTER — PATIENT MESSAGE (OUTPATIENT)
Dept: HEMATOLOGY/ONCOLOGY | Facility: CLINIC | Age: 48
End: 2023-06-28
Payer: MEDICAID

## 2023-06-29 ENCOUNTER — TELEPHONE (OUTPATIENT)
Dept: SURGERY | Facility: CLINIC | Age: 48
End: 2023-06-29
Payer: MEDICAID

## 2023-06-30 ENCOUNTER — TELEPHONE (OUTPATIENT)
Dept: BARIATRICS | Facility: CLINIC | Age: 48
End: 2023-06-30
Payer: MEDICAID

## 2023-06-30 DIAGNOSIS — C18.9 COLON ADENOCARCINOMA: Primary | ICD-10-CM

## 2023-06-30 RX ORDER — HYDROMORPHONE HYDROCHLORIDE 2 MG/1
2 TABLET ORAL EVERY 4 HOURS PRN
Qty: 30 TABLET | Refills: 0 | Status: SHIPPED | OUTPATIENT
Start: 2023-06-30 | End: 2023-07-04 | Stop reason: SDUPTHER

## 2023-06-30 NOTE — TELEPHONE ENCOUNTER
----- Message from Shanice Husain sent at 6/30/2023 12:06 PM CDT -----  Type:  RX Refill Request    Who Called:  pt sister liz   Refill or New Rx:  refill   RX Name and Strength:  HYDROmorphone (DILAUDID) 2 MG tablet  How is the patient currently taking it? (ex. 1XDay):  as directed   Is this a 30 day or 90 day RX:  90   Preferred Pharmacy with phone number:        [id number] [address] & [address] [address]  Phone: [phone] Fax: [phone]     Local or Mail Order:  local   Ordering Provider:  Alec Tristan Call Back Number:  [phone]     Additional Information:pt has 4 pills left  please advise thank you

## 2023-07-02 LAB
BACTERIA BLD CULT: NORMAL
BACTERIA BLD CULT: NORMAL

## 2023-07-04 ENCOUNTER — PATIENT MESSAGE (OUTPATIENT)
Dept: SURGERY | Facility: CLINIC | Age: 48
End: 2023-07-04
Payer: MEDICAID

## 2023-07-04 DIAGNOSIS — C18.9 COLON ADENOCARCINOMA: ICD-10-CM

## 2023-07-05 RX ORDER — HYDROMORPHONE HYDROCHLORIDE 2 MG/1
2 TABLET ORAL EVERY 4 HOURS PRN
Qty: 30 TABLET | Refills: 0 | Status: SHIPPED | OUTPATIENT
Start: 2023-07-05 | End: 2023-07-11 | Stop reason: SDUPTHER

## 2023-07-06 ENCOUNTER — OFFICE VISIT (OUTPATIENT)
Dept: SURGERY | Facility: CLINIC | Age: 48
End: 2023-07-06
Payer: MEDICAID

## 2023-07-06 VITALS
HEIGHT: 75 IN | HEART RATE: 89 BPM | WEIGHT: 188.13 LBS | SYSTOLIC BLOOD PRESSURE: 127 MMHG | RESPIRATION RATE: 16 BRPM | DIASTOLIC BLOOD PRESSURE: 86 MMHG | TEMPERATURE: 98 F | BODY MASS INDEX: 23.39 KG/M2

## 2023-07-06 DIAGNOSIS — R10.84 GENERALIZED ABDOMINAL PAIN: Primary | ICD-10-CM

## 2023-07-06 DIAGNOSIS — C18.9 COLON ADENOCARCINOMA: ICD-10-CM

## 2023-07-06 PROCEDURE — 1159F PR MEDICATION LIST DOCUMENTED IN MEDICAL RECORD: ICD-10-PCS | Mod: CPTII,,, | Performed by: SURGERY

## 2023-07-06 PROCEDURE — 3074F SYST BP LT 130 MM HG: CPT | Mod: CPTII,,, | Performed by: SURGERY

## 2023-07-06 PROCEDURE — 3008F BODY MASS INDEX DOCD: CPT | Mod: CPTII,,, | Performed by: SURGERY

## 2023-07-06 PROCEDURE — 99999 PR PBB SHADOW E&M-EST. PATIENT-LVL IV: ICD-10-PCS | Mod: PBBFAC,,, | Performed by: SURGERY

## 2023-07-06 PROCEDURE — 3074F PR MOST RECENT SYSTOLIC BLOOD PRESSURE < 130 MM HG: ICD-10-PCS | Mod: CPTII,,, | Performed by: SURGERY

## 2023-07-06 PROCEDURE — 99214 OFFICE O/P EST MOD 30 MIN: CPT | Mod: PBBFAC | Performed by: SURGERY

## 2023-07-06 PROCEDURE — 3079F PR MOST RECENT DIASTOLIC BLOOD PRESSURE 80-89 MM HG: ICD-10-PCS | Mod: CPTII,,, | Performed by: SURGERY

## 2023-07-06 PROCEDURE — 3079F DIAST BP 80-89 MM HG: CPT | Mod: CPTII,,, | Performed by: SURGERY

## 2023-07-06 PROCEDURE — 99024 PR POST-OP FOLLOW-UP VISIT: ICD-10-PCS | Mod: ,,, | Performed by: SURGERY

## 2023-07-06 PROCEDURE — 3008F PR BODY MASS INDEX (BMI) DOCUMENTED: ICD-10-PCS | Mod: CPTII,,, | Performed by: SURGERY

## 2023-07-06 PROCEDURE — 99999 PR PBB SHADOW E&M-EST. PATIENT-LVL IV: CPT | Mod: PBBFAC,,, | Performed by: SURGERY

## 2023-07-06 PROCEDURE — 1159F MED LIST DOCD IN RCRD: CPT | Mod: CPTII,,, | Performed by: SURGERY

## 2023-07-06 PROCEDURE — 3044F PR MOST RECENT HEMOGLOBIN A1C LEVEL <7.0%: ICD-10-PCS | Mod: CPTII,,, | Performed by: SURGERY

## 2023-07-06 PROCEDURE — 3044F HG A1C LEVEL LT 7.0%: CPT | Mod: CPTII,,, | Performed by: SURGERY

## 2023-07-06 PROCEDURE — 99024 POSTOP FOLLOW-UP VISIT: CPT | Mod: ,,, | Performed by: SURGERY

## 2023-07-06 NOTE — PROGRESS NOTES
With pain.  I have refilled his pain medicine multiple.  We have talked about a pain management specialist for this.  He is planning to see the oncologist next week.  He is putting on weight, tolerating a regular diet, having some loose stools when he eats.  This is tolerable.    Vitals:    07/06/23 1046   BP: 127/86   Pulse: 89   Resp: 16   Temp: 98.1 °F (36.7 °C)     Abdomen is soft non distended and benign.  Staples are in place and are be removed today.  no signs of infection no drainage from wound.      Assessment plan  Colon Cancer status post right colectomy-Path has been reviewed 2 positive lymph nodes negative margins he is the oncology.  If he needs a port which I suspect he will we can arrange this at follow up after seeing Dr. Flores on 7/13.    As far as his pain is concerned I think he needs to see a pain management specialist.  I do not prescribe this is what would help pain and a baseline using pills as breakthrough for his pain.  I refill his pain meds for now.  He will also discuss pain management with oncology.    Because of his on going pain a shower chair will be helpful for his decreased mobility.

## 2023-07-11 DIAGNOSIS — C18.9 COLON ADENOCARCINOMA: ICD-10-CM

## 2023-07-11 RX ORDER — HYDROMORPHONE HYDROCHLORIDE 2 MG/1
2 TABLET ORAL EVERY 4 HOURS PRN
Qty: 30 TABLET | Refills: 0 | Status: SHIPPED | OUTPATIENT
Start: 2023-07-11 | End: 2023-07-17 | Stop reason: SDUPTHER

## 2023-07-12 ENCOUNTER — PATIENT MESSAGE (OUTPATIENT)
Dept: HEMATOLOGY/ONCOLOGY | Facility: CLINIC | Age: 48
End: 2023-07-12
Payer: MEDICAID

## 2023-07-13 ENCOUNTER — OFFICE VISIT (OUTPATIENT)
Dept: HEMATOLOGY/ONCOLOGY | Facility: CLINIC | Age: 48
End: 2023-07-13
Payer: MEDICAID

## 2023-07-13 VITALS
SYSTOLIC BLOOD PRESSURE: 127 MMHG | DIASTOLIC BLOOD PRESSURE: 73 MMHG | HEART RATE: 90 BPM | RESPIRATION RATE: 16 BRPM | WEIGHT: 190.5 LBS | BODY MASS INDEX: 24.45 KG/M2 | OXYGEN SATURATION: 99 % | HEIGHT: 74 IN | TEMPERATURE: 98 F

## 2023-07-13 DIAGNOSIS — C18.9 COLON ADENOCARCINOMA: ICD-10-CM

## 2023-07-13 PROCEDURE — 99215 OFFICE O/P EST HI 40 MIN: CPT | Mod: PBBFAC,PN | Performed by: INTERNAL MEDICINE

## 2023-07-13 PROCEDURE — 3008F PR BODY MASS INDEX (BMI) DOCUMENTED: ICD-10-PCS | Mod: CPTII,,, | Performed by: INTERNAL MEDICINE

## 2023-07-13 PROCEDURE — 3074F PR MOST RECENT SYSTOLIC BLOOD PRESSURE < 130 MM HG: ICD-10-PCS | Mod: CPTII,,, | Performed by: INTERNAL MEDICINE

## 2023-07-13 PROCEDURE — 99999 PR PBB SHADOW E&M-EST. PATIENT-LVL V: CPT | Mod: PBBFAC,,, | Performed by: INTERNAL MEDICINE

## 2023-07-13 PROCEDURE — 1111F PR DISCHARGE MEDS RECONCILED W/ CURRENT OUTPATIENT MED LIST: ICD-10-PCS | Mod: CPTII,,, | Performed by: INTERNAL MEDICINE

## 2023-07-13 PROCEDURE — 3044F PR MOST RECENT HEMOGLOBIN A1C LEVEL <7.0%: ICD-10-PCS | Mod: CPTII,,, | Performed by: INTERNAL MEDICINE

## 2023-07-13 PROCEDURE — 1111F DSCHRG MED/CURRENT MED MERGE: CPT | Mod: CPTII,,, | Performed by: INTERNAL MEDICINE

## 2023-07-13 PROCEDURE — 1159F MED LIST DOCD IN RCRD: CPT | Mod: CPTII,,, | Performed by: INTERNAL MEDICINE

## 2023-07-13 PROCEDURE — 3044F HG A1C LEVEL LT 7.0%: CPT | Mod: CPTII,,, | Performed by: INTERNAL MEDICINE

## 2023-07-13 PROCEDURE — 99215 PR OFFICE/OUTPT VISIT, EST, LEVL V, 40-54 MIN: ICD-10-PCS | Mod: S$PBB,,, | Performed by: INTERNAL MEDICINE

## 2023-07-13 PROCEDURE — 1159F PR MEDICATION LIST DOCUMENTED IN MEDICAL RECORD: ICD-10-PCS | Mod: CPTII,,, | Performed by: INTERNAL MEDICINE

## 2023-07-13 PROCEDURE — 99215 OFFICE O/P EST HI 40 MIN: CPT | Mod: S$PBB,,, | Performed by: INTERNAL MEDICINE

## 2023-07-13 PROCEDURE — 99999 PR PBB SHADOW E&M-EST. PATIENT-LVL V: ICD-10-PCS | Mod: PBBFAC,,, | Performed by: INTERNAL MEDICINE

## 2023-07-13 PROCEDURE — 3008F BODY MASS INDEX DOCD: CPT | Mod: CPTII,,, | Performed by: INTERNAL MEDICINE

## 2023-07-13 PROCEDURE — 3074F SYST BP LT 130 MM HG: CPT | Mod: CPTII,,, | Performed by: INTERNAL MEDICINE

## 2023-07-13 PROCEDURE — 3078F PR MOST RECENT DIASTOLIC BLOOD PRESSURE < 80 MM HG: ICD-10-PCS | Mod: CPTII,,, | Performed by: INTERNAL MEDICINE

## 2023-07-13 PROCEDURE — 3078F DIAST BP <80 MM HG: CPT | Mod: CPTII,,, | Performed by: INTERNAL MEDICINE

## 2023-07-13 NOTE — PROGRESS NOTES
HPI    47 years old male newly diagnosed colon cancer.  He was transferred from [facility] for evaluation possible developed a small-bowel obstruction.  He was complaining abdominal pain nausea and vomiting.  Workup shows leukocytosis, anemia and thrombocytosis.  CT scan from outside facility was concerning for developing small-bowel obstruction.  Patient was transferred admitted to [facility] underwent right partial colectomy on 05/29 with pathology consistent adenocarcinoma.  He was discharged then and return to hospital for another admission on 06/08/2023 where he was treated for ileus.  Medical history including type 2 diabetes anemia hypertension right eye blindness.       Overview/Hospital Course:  Ruslan Caldwell is a 47 year old male with a past medical history of recently diagnosed colonic adenocarcinoma s/p partial colectomy with anastomosis, tobacco use, microcytic anemia and thrombocytosis who presented with persistent abdominal pain, nausea and abdominal pain concerning for SBO. An NG tube was unable to be placed. He was placed on IV fluids and Zosyn. He was made NPO. PRN IV analgesics and antiemetics were ordered and General Surgery was consulted. Repeat CT A/P shows SBO 6/13. A gastrografin enema 6/14 was inconclusive. Surgery took the patient to the OR 6/15 for exploratory laparotomy where an omental infarction was addressed; the patient also underwent SCOUT and partial small bowel resection with anastomosis. PPN was started 6/16. His course has been complicated by JOYCE in setting of vomiting as well as Toradol use; IV fluids were continued and his kidney function has improved to baseline. Nephrology has been consulted. He was also discovered to have an iron deficiency for which IV iron (one dose) was ordered 6/13.  His post-operative course has been complicated by low grade fever, tachycardia and an increasing WBC count, despite Zosyn use. Antibiotics were broadened to vancomycin,  cefepime and Flagyl. Blood cultures were ordered as well as a CXR and urine culture.         Interval History:  Patient seen and examined.  Per nurse patient ate outside food last night and tolerated it.  Surgeon has started regular diet and is planning for possible discharge tomorrow  Review of Systems   Constitutional:  Negative for fever.   Gastrointestinal:  Positive for abdominal pain. Negative for nausea and vomiting.   Skin:  Positive for wound.   Allergic/Immunologic: Positive for immunocompromised state.         Past Medical History:   Diagnosis Date    Diabetes mellitus, type 2 05/2020    Patient denies    Hypertension      Past Surgical History:   Procedure Laterality Date    COLONOSCOPY N/A 5/29/2023    Procedure: COLONOSCOPY;  Surgeon: Sam Solano MD;  Location: [facility] ENDO;  Service: Endoscopy;  Laterality: N/A;    EXCISION, SMALL INTESTINE N/A 6/15/2023    Procedure: EXCISION, SMALL INTESTINE;  Surgeon: Edin Michael MD;  Location: [facility] OR;  Service: General;  Laterality: N/A;    EYE SURGERY Right     LAPAROTOMY, EXPLORATORY N/A 6/15/2023    Procedure: LAPAROTOMY, EXPLORATORY;  Surgeon: Edin Michael MD;  Location: [facility] OR;  Service: General;  Laterality: N/A;    LAPAROTOMY, EXPLORATORY N/A 6/17/2023    Procedure: LAPAROTOMY, EXPLORATORY;  Surgeon: Garfield Hoyt Jr., MD;  Location: [facility] OR;  Service: General;  Laterality: N/A;    LUMBAR DISC SURGERY  2005    LYSIS OF ADHESIONS N/A 6/15/2023    Procedure: LYSIS, ADHESIONS;  Surgeon: Edin Michael MD;  Location: [facility] OR;  Service: General;  Laterality: N/A;    OMENTECTOMY N/A 6/15/2023    Procedure: OMENTECTOMY;  Surgeon: Edin Michael MD;  Location: [facility] OR;  Service: General;  Laterality: N/A;    SUBTOTAL COLECTOMY Right 5/29/2023    Procedure: COLECTOMY, PARTIAL;  Surgeon: Edin Michael MD;  Location: [facility] OR;  Service: General;  Laterality: Right;     Social History     Socioeconomic History    Marital status:      Number of children: 3    Highest education level: Associate degree: academic program   Occupational History    Occupation: [employer]   Tobacco Use    Smoking status: Every Day     Packs/day: 0.50     Years: 23.00     Pack years: 11.50     Types: Cigarettes    Smokeless tobacco: Never   Substance and Sexual Activity    Alcohol use: Not Currently     Comment: Quit drinking 2 yrs ago    Drug use: Yes     Types: Marijuana     Comment: heavy marijuana use    Sexual activity: Yes     Social Determinants of Health     Financial Resource Strain: Low Risk     Difficulty of Paying Living Expenses: Not hard at all   Food Insecurity: No Food Insecurity    Worried About Running Out of Food in the Last Year: Never true    Ran Out of Food in the Last Year: Never true   Transportation Needs: No Transportation Needs    Lack of Transportation (Medical): No    Lack of Transportation (Non-Medical): No   Physical Activity: Sufficiently Active    Days of Exercise per Week: 5 days    Minutes of Exercise per Session: 90 min   Stress: No Stress Concern Present    Feeling of Stress : Not at all   Social Connections: Socially Isolated    Frequency of Communication with Friends and Family: Never    Frequency of Social Gatherings with Friends and Family: More than three times a week    Attends Cheondoism Services: Never    Active Member of Clubs or Organizations: No    Attends Club or Organization Meetings: Never    Marital Status:    Housing Stability: Unknown    Unable to Pay for Housing in the Last Year: No    Unstable Housing in the Last Year: No     Social History     Socioeconomic History    Marital status:     Number of children: 3    Highest education level: Associate degree: academic program   Occupational History    Occupation: [employer]   Tobacco Use    Smoking status: Every Day     Packs/day: 0.50     Years: 23.00     Pack years: 11.50     Types: Cigarettes    Smokeless tobacco:  Never   Substance and Sexual Activity    Alcohol use: Not Currently     Comment: Quit drinking 2 yrs ago    Drug use: Yes     Types: Marijuana     Comment: heavy marijuana use    Sexual activity: Yes     Social Determinants of Health     Financial Resource Strain: Low Risk     Difficulty of Paying Living Expenses: Not hard at all   Food Insecurity: No Food Insecurity    Worried About Running Out of Food in the Last Year: Never true    Ran Out of Food in the Last Year: Never true   Transportation Needs: No Transportation Needs    Lack of Transportation (Medical): No    Lack of Transportation (Non-Medical): No   Physical Activity: Sufficiently Active    Days of Exercise per Week: 5 days    Minutes of Exercise per Session: 90 min   Stress: No Stress Concern Present    Feeling of Stress : Not at all   Social Connections: Socially Isolated    Frequency of Communication with Friends and Family: Never    Frequency of Social Gatherings with Friends and Family: More than three times a week    Attends Judaism Services: Never    Active Member of Clubs or Organizations: No    Attends Club or Organization Meetings: Never    Marital Status:    Housing Stability: Unknown    Unable to Pay for Housing in the Last Year: No    Unstable Housing in the Last Year: No     Review of patient's allergies indicates:   Allergen Reactions    Fish containing products Other (See Comments)       Physical exam  Vitals:    07/13/23 1149   BP: 127/73   Pulse: 90   Resp: 16   Temp: 98 °F (36.7 °C)     Physical Exam  Vitals and nursing note reviewed.   Constitutional:       General: He is not in acute distress.     Appearance: Normal appearance. He is ill-appearing.   HENT:      Head: Normocephalic and atraumatic.      Right Ear: External ear normal.      Left Ear: External ear normal.      Nose: Nose normal.      Mouth/Throat:      Mouth: Mucous membranes are moist.      Pharynx: Oropharynx is clear.   Eyes:      Extraocular Movements:  Extraocular movements intact.   Cardiovascular:      Rate and Rhythm: Normal rate and regular rhythm.      Pulses: Normal pulses.      Heart sounds: Normal heart sounds.   Pulmonary:      Effort: Pulmonary effort is normal. No respiratory distress.      Breath sounds: Normal breath sounds.   Abdominal:      Tenderness: There is abdominal tenderness.      Comments: Wound dressing clean, dry and intact.   Musculoskeletal:         General: Normal range of motion.      Cervical back: Normal range of motion and neck supple.      Right lower leg: No edema.      Left lower leg: No edema.   Skin:     General: Skin is warm and dry.   Neurological:      Mental Status: He is alert. Mental status is at baseline.   Psychiatric:         Mood and Affect: Mood normal.         Behavior: Behavior normal.        Lab Results   Component Value Date    WBC 26.63 (H) 06/26/2023    HGB 8.7 (L) 06/26/2023    HCT 26.8 (L) 06/26/2023    MCV 67 (L) 06/26/2023    PLT 1,069 (HH) 06/26/2023       CMP  Sodium   Date Value Ref Range Status   06/26/2023 133 (L) 136 - 145 mmol/L Final     Potassium   Date Value Ref Range Status   06/26/2023 3.8 3.5 - 5.1 mmol/L Final     Chloride   Date Value Ref Range Status   06/26/2023 98 95 - 110 mmol/L Final     CO2   Date Value Ref Range Status   06/26/2023 25 23 - 29 mmol/L Final     Glucose   Date Value Ref Range Status   06/26/2023 127 (H) 70 - 110 mg/dL Final     BUN   Date Value Ref Range Status   06/26/2023 11 6 - 20 mg/dL Final     Creatinine   Date Value Ref Range Status   06/26/2023 1.4 0.5 - 1.4 mg/dL Final     Calcium   Date Value Ref Range Status   06/26/2023 9.6 8.7 - 10.5 mg/dL Final     Total Protein   Date Value Ref Range Status   06/26/2023 8.1 6.0 - 8.4 g/dL Final     Albumin   Date Value Ref Range Status   06/26/2023 2.8 (L) 3.5 - 5.2 g/dL Final     Total Bilirubin   Date Value Ref Range Status   06/26/2023 0.6 0.1 - 1.0 mg/dL Final     Comment:     For infants and newborns, interpretation of  results should be based  on gestational age, weight and in agreement with clinical  observations.    Premature Infant recommended reference ranges:  Up to 24 hours.............<8.0 mg/dL  Up to 48 hours............<12.0 mg/dL  3-5 days..................<15.0 mg/dL  6-29 days.................<15.0 mg/dL       Alkaline Phosphatase   Date Value Ref Range Status   06/26/2023 73 55 - 135 U/L Final     AST   Date Value Ref Range Status   06/26/2023 18 10 - 40 U/L Final     ALT   Date Value Ref Range Status   06/26/2023 18 10 - 44 U/L Final     Anion Gap   Date Value Ref Range Status   06/26/2023 10 8 - 16 mmol/L Final     eGFR   Date Value Ref Range Status   06/26/2023 >60.0 >60 mL/min/1.73 m^2 Final     Assessment and plan    Colon adenocarcinoma recently colostomy small-bowel obstruction.      [id number] G2     MSI intact    Iron deficiency anemia microcytosis likely blood loss     Thrombocytosis reactive      > NGS    > chemo port    > chest CT without contrast

## 2023-07-13 NOTE — PLAN OF CARE
START ON PATHWAY REGIMEN - Colorectal    COS83        Oxaliplatin (Eloxatin)       Leucovorin       Fluorouracil       Fluorouracil     **Always confirm dose/schedule in your pharmacy ordering system**    Patient Characteristics:  Postoperative without Neoadjuvant Therapy, M0 (Pathologic Staging), Colon, Stage   III, Low Risk (pT1-3, pN1)  Tumor Location: Colon  Therapeutic Status: Postoperative without Neoadjuvant Therapy, M0 (Pathologic   Staging)  AJCC M Category: cM0  AJCC T Category: pT3  AJCC N Category: pN1b  AJCC 8 Stage Grouping: IIIB  Intent of Therapy:  Curative Intent, Not Discussed with Patient

## 2023-07-14 ENCOUNTER — TELEPHONE (OUTPATIENT)
Dept: SURGERY | Facility: CLINIC | Age: 48
End: 2023-07-14
Payer: MEDICAID

## 2023-07-14 NOTE — TELEPHONE ENCOUNTER
"GenSurg referral scheduled with patient and sister, Jan: 7/19/2023  [facility]. They ask for sooner appointment as "they want chemo to start in 2 weeks". Staff message sent asking for patient contact/sooner appointment.  "

## 2023-07-14 NOTE — TELEPHONE ENCOUNTER
He is a [facility] patient.  He mentions port placement in his notes.  Will send message to him for advice.

## 2023-07-14 NOTE — TELEPHONE ENCOUNTER
Patient is scheduled to  see Dr Hoyt on Wednesday, 7-19 and Dr Bull does not have any thing sooner in clinic.  So leave him on the wait list for [facility], he is good about getting them taken care of in a timely fashion.

## 2023-07-14 NOTE — TELEPHONE ENCOUNTER
"----- Message from Velia Quispe LPN sent at 7/14/2023 12:13 PM CDT -----  Regarding: SOONER APPOINTMENT  I have scheduled patient in first-available appointment slot, 7/19/2023 , and have added to wait-list.     If you could review chart and contact patient, for sooner appointment, your help is appreciated    Patient and sister, Jan, state "they want chemo started in 2 weeks".     Thanks,  D[facility] Referral [facility]YANTN     "

## 2023-07-17 DIAGNOSIS — C18.9 COLON ADENOCARCINOMA: ICD-10-CM

## 2023-07-17 LAB — FUNGUS SPEC CULT: ABNORMAL

## 2023-07-17 RX ORDER — HYDROMORPHONE HYDROCHLORIDE 2 MG/1
2 TABLET ORAL EVERY 4 HOURS PRN
Qty: 30 TABLET | Refills: 0 | Status: SHIPPED | OUTPATIENT
Start: 2023-07-17 | End: 2023-07-21 | Stop reason: SDUPTHER

## 2023-07-19 ENCOUNTER — OFFICE VISIT (OUTPATIENT)
Dept: SURGERY | Facility: CLINIC | Age: 48
End: 2023-07-19
Payer: MEDICAID

## 2023-07-19 VITALS
DIASTOLIC BLOOD PRESSURE: 90 MMHG | SYSTOLIC BLOOD PRESSURE: 131 MMHG | BODY MASS INDEX: 24.13 KG/M2 | WEIGHT: 188 LBS | HEIGHT: 74 IN | TEMPERATURE: 74 F | HEART RATE: 90 BPM

## 2023-07-19 DIAGNOSIS — C18.9 COLON ADENOCARCINOMA: Primary | ICD-10-CM

## 2023-07-19 PROCEDURE — 3080F DIAST BP >= 90 MM HG: CPT | Mod: CPTII,S$GLB,, | Performed by: SURGERY

## 2023-07-19 PROCEDURE — 3075F SYST BP GE 130 - 139MM HG: CPT | Mod: CPTII,S$GLB,, | Performed by: SURGERY

## 2023-07-19 PROCEDURE — 3044F HG A1C LEVEL LT 7.0%: CPT | Mod: CPTII,S$GLB,, | Performed by: SURGERY

## 2023-07-19 PROCEDURE — 3008F PR BODY MASS INDEX (BMI) DOCUMENTED: ICD-10-PCS | Mod: CPTII,S$GLB,, | Performed by: SURGERY

## 2023-07-19 PROCEDURE — 1159F PR MEDICATION LIST DOCUMENTED IN MEDICAL RECORD: ICD-10-PCS | Mod: CPTII,S$GLB,, | Performed by: SURGERY

## 2023-07-19 PROCEDURE — 3008F BODY MASS INDEX DOCD: CPT | Mod: CPTII,S$GLB,, | Performed by: SURGERY

## 2023-07-19 PROCEDURE — 3075F PR MOST RECENT SYSTOLIC BLOOD PRESS GE 130-139MM HG: ICD-10-PCS | Mod: CPTII,S$GLB,, | Performed by: SURGERY

## 2023-07-19 PROCEDURE — 3080F PR MOST RECENT DIASTOLIC BLOOD PRESSURE >= 90 MM HG: ICD-10-PCS | Mod: CPTII,S$GLB,, | Performed by: SURGERY

## 2023-07-19 PROCEDURE — 99024 PR POST-OP FOLLOW-UP VISIT: ICD-10-PCS | Mod: S$GLB,,, | Performed by: SURGERY

## 2023-07-19 PROCEDURE — 99024 POSTOP FOLLOW-UP VISIT: CPT | Mod: S$GLB,,, | Performed by: SURGERY

## 2023-07-19 PROCEDURE — 3044F PR MOST RECENT HEMOGLOBIN A1C LEVEL <7.0%: ICD-10-PCS | Mod: CPTII,S$GLB,, | Performed by: SURGERY

## 2023-07-19 PROCEDURE — 1159F MED LIST DOCD IN RCRD: CPT | Mod: CPTII,S$GLB,, | Performed by: SURGERY

## 2023-07-19 NOTE — PROGRESS NOTES
GENERAL SURGERY PROGRESS NOTE    HPI: Ruslan Caldwell is a 47 y.o. male history of right hemicolectomy for cecal adenocarcinoma.  Developed a persistent small-bowel reception requiring take back which time significant inflammatory process secondary to necrotic omentum was identified.  Omentectomy small-bowel resection was. Patient was then taken back by me 06/17/2023 worsening abdominal pain and leukocytosis at which time an intraperitoneal abscess was identified and drained. Subsequently was discharge from the hospital.  His pathology from his initial surgery showed pT3 pN1b adenocarcinoma.  He is met with Oncology is planning to undergo adjuvant chemotherapy. Referred to me for placement of port.      VITALS:  Vitals:    07/19/23 0924   BP: (!) 131/90   Pulse: 90   Temp: (!) 74 °F (23.3 °C)       Physical Exam  Vitals reviewed.   Constitutional:       General: He is not in acute distress.     Appearance: Normal appearance. He is well-developed.   HENT:      Head: Normocephalic and atraumatic.   Eyes:      General: No scleral icterus.  Neck:      Trachea: No tracheal deviation.   Cardiovascular:      Rate and Rhythm: Normal rate and regular rhythm.      Pulses: Normal pulses.   Pulmonary:      Effort: Pulmonary effort is normal. No respiratory distress.      Breath sounds: Normal breath sounds.   Abdominal:      General: There is no distension.      Palpations: Abdomen is soft.      Tenderness: There is abdominal tenderness. There is no guarding or rebound.      Comments: Midline abdominal incision intact without signs of infection or breakdown   Musculoskeletal:         General: No swelling or tenderness. Normal range of motion.      Cervical back: Normal range of motion and neck supple. No rigidity.   Skin:     General: Skin is warm and dry.      Coloration: Skin is not jaundiced.      Findings: No erythema.   Neurological:      General: No focal deficit present.      Mental Status: He is alert and oriented  to person, place, and time. He is not disoriented.      Motor: No weakness or abnormal muscle tone.   Psychiatric:         Mood and Affect: Mood normal.         Behavior: Behavior normal.         Thought Content: Thought content normal.         Judgment: Judgment normal.          ASSESSMENT & PLAN:  47 y.o. male with cecal adenocarcinoma status post resection, take back for obstruction requiring small bowel resection, take back for drainage of intra-abdominal abscess  - we discussed the indication for port placement to ensure secure and easy access to the venous system for chemotherapy infusion   - the procedure for placement as well as associated risk of pain, bleeding, scarring, infection, need to remove port, port malfunction, port malpositioning, injury to artery, injury to lung, arrhythmia, malfunction, hematoma, thrombus, etc.  were reviewed, patient expressed understanding these risks and agreed proceed with surgical intervention  -will schedule for next Monday 07/24/2023 at [facility]  -patient getting new labs today and also undergoing CT imaging with a chest, will add abdomen pelvis to assure no other intra-abdominal fluid collection due to ongoing pain

## 2023-07-19 NOTE — H&P (VIEW-ONLY)
GENERAL SURGERY PROGRESS NOTE    HPI: Ruslan Caldwell is a 47 y.o. male history of right hemicolectomy for cecal adenocarcinoma.  Developed a persistent small-bowel reception requiring take back which time significant inflammatory process secondary to necrotic omentum was identified.  Omentectomy small-bowel resection was. Patient was then taken back by me 06/17/2023 worsening abdominal pain and leukocytosis at which time an intraperitoneal abscess was identified and drained. Subsequently was discharge from the hospital.  His pathology from his initial surgery showed pT3 pN1b adenocarcinoma.  He is met with Oncology is planning to undergo adjuvant chemotherapy. Referred to me for placement of port.      VITALS:  Vitals:    07/19/23 0924   BP: (!) 131/90   Pulse: 90   Temp: (!) 74 °F (23.3 °C)       Physical Exam  Vitals reviewed.   Constitutional:       General: He is not in acute distress.     Appearance: Normal appearance. He is well-developed.   HENT:      Head: Normocephalic and atraumatic.   Eyes:      General: No scleral icterus.  Neck:      Trachea: No tracheal deviation.   Cardiovascular:      Rate and Rhythm: Normal rate and regular rhythm.      Pulses: Normal pulses.   Pulmonary:      Effort: Pulmonary effort is normal. No respiratory distress.      Breath sounds: Normal breath sounds.   Abdominal:      General: There is no distension.      Palpations: Abdomen is soft.      Tenderness: There is abdominal tenderness. There is no guarding or rebound.      Comments: Midline abdominal incision intact without signs of infection or breakdown   Musculoskeletal:         General: No swelling or tenderness. Normal range of motion.      Cervical back: Normal range of motion and neck supple. No rigidity.   Skin:     General: Skin is warm and dry.      Coloration: Skin is not jaundiced.      Findings: No erythema.   Neurological:      General: No focal deficit present.      Mental Status: He is alert and oriented  to person, place, and time. He is not disoriented.      Motor: No weakness or abnormal muscle tone.   Psychiatric:         Mood and Affect: Mood normal.         Behavior: Behavior normal.         Thought Content: Thought content normal.         Judgment: Judgment normal.          ASSESSMENT & PLAN:  47 y.o. male with cecal adenocarcinoma status post resection, take back for obstruction requiring small bowel resection, take back for drainage of intra-abdominal abscess  - we discussed the indication for port placement to ensure secure and easy access to the venous system for chemotherapy infusion   - the procedure for placement as well as associated risk of pain, bleeding, scarring, infection, need to remove port, port malfunction, port malpositioning, injury to artery, injury to lung, arrhythmia, malfunction, hematoma, thrombus, etc.  were reviewed, patient expressed understanding these risks and agreed proceed with surgical intervention  -will schedule for next Monday 07/24/2023 at [facility]  -patient getting new labs today and also undergoing CT imaging with a chest, will add abdomen pelvis to assure no other intra-abdominal fluid collection due to ongoing pain

## 2023-07-20 ENCOUNTER — OFFICE VISIT (OUTPATIENT)
Dept: HEMATOLOGY/ONCOLOGY | Facility: CLINIC | Age: 48
End: 2023-07-20
Payer: MEDICAID

## 2023-07-20 ENCOUNTER — LAB VISIT (OUTPATIENT)
Dept: LAB | Facility: HOSPITAL | Age: 48
End: 2023-07-20
Attending: SURGERY
Payer: MEDICAID

## 2023-07-20 VITALS
HEART RATE: 88 BPM | RESPIRATION RATE: 16 BRPM | HEIGHT: 75 IN | SYSTOLIC BLOOD PRESSURE: 126 MMHG | WEIGHT: 187 LBS | BODY MASS INDEX: 23.25 KG/M2 | OXYGEN SATURATION: 60 % | DIASTOLIC BLOOD PRESSURE: 75 MMHG | TEMPERATURE: 98 F

## 2023-07-20 DIAGNOSIS — C18.9 COLON ADENOCARCINOMA: ICD-10-CM

## 2023-07-20 DIAGNOSIS — K56.609 SMALL BOWEL OBSTRUCTION: ICD-10-CM

## 2023-07-20 DIAGNOSIS — C18.9 COLON ADENOCARCINOMA: Primary | ICD-10-CM

## 2023-07-20 LAB
ALBUMIN SERPL BCP-MCNC: 3.2 G/DL (ref 3.5–5.2)
ALP SERPL-CCNC: 89 U/L (ref 55–135)
ALT SERPL W/O P-5'-P-CCNC: 22 U/L (ref 10–44)
ANION GAP SERPL CALC-SCNC: 10 MMOL/L (ref 8–16)
AST SERPL-CCNC: 10 U/L (ref 10–40)
BASOPHILS # BLD AUTO: 0.05 K/UL (ref 0–0.2)
BASOPHILS NFR BLD: 0.6 % (ref 0–1.9)
BILIRUB SERPL-MCNC: 0.6 MG/DL (ref 0.1–1)
BUN SERPL-MCNC: 6 MG/DL (ref 6–20)
CALCIUM SERPL-MCNC: 9.2 MG/DL (ref 8.7–10.5)
CHLORIDE SERPL-SCNC: 101 MMOL/L (ref 95–110)
CO2 SERPL-SCNC: 29 MMOL/L (ref 23–29)
CREAT SERPL-MCNC: 1.1 MG/DL (ref 0.5–1.4)
DIFFERENTIAL METHOD: ABNORMAL
EOSINOPHIL # BLD AUTO: 0.2 K/UL (ref 0–0.5)
EOSINOPHIL NFR BLD: 2.2 % (ref 0–8)
ERYTHROCYTE [DISTWIDTH] IN BLOOD BY AUTOMATED COUNT: 21.7 % (ref 11.5–14.5)
EST. GFR  (NO RACE VARIABLE): >60 ML/MIN/1.73 M^2
FERRITIN SERPL-MCNC: 25 NG/ML (ref 20–300)
GLUCOSE SERPL-MCNC: 95 MG/DL (ref 70–110)
HCT VFR BLD AUTO: 30 % (ref 40–54)
HGB BLD-MCNC: 9.1 G/DL (ref 14–18)
IMM GRANULOCYTES # BLD AUTO: 0.02 K/UL (ref 0–0.04)
IMM GRANULOCYTES NFR BLD AUTO: 0.2 % (ref 0–0.5)
IRON SERPL-MCNC: 16 UG/DL (ref 45–160)
LYMPHOCYTES # BLD AUTO: 2 K/UL (ref 1–4.8)
LYMPHOCYTES NFR BLD: 21.9 % (ref 18–48)
MAGNESIUM SERPL-MCNC: 1.4 MG/DL (ref 1.6–2.6)
MCH RBC QN AUTO: 21.7 PG (ref 27–31)
MCHC RBC AUTO-ENTMCNC: 30.3 G/DL (ref 32–36)
MCV RBC AUTO: 72 FL (ref 82–98)
MONOCYTES # BLD AUTO: 0.6 K/UL (ref 0.3–1)
MONOCYTES NFR BLD: 6.2 % (ref 4–15)
NEUTROPHILS # BLD AUTO: 6.1 K/UL (ref 1.8–7.7)
NEUTROPHILS NFR BLD: 68.9 % (ref 38–73)
NRBC BLD-RTO: 0 /100 WBC
PLATELET # BLD AUTO: 578 K/UL (ref 150–450)
PMV BLD AUTO: 8.8 FL (ref 9.2–12.9)
POTASSIUM SERPL-SCNC: 3 MMOL/L (ref 3.5–5.1)
PROT SERPL-MCNC: 7.6 G/DL (ref 6–8.4)
RBC # BLD AUTO: 4.19 M/UL (ref 4.6–6.2)
SATURATED IRON: 5 % (ref 20–50)
SODIUM SERPL-SCNC: 140 MMOL/L (ref 136–145)
TOTAL IRON BINDING CAPACITY: 340 UG/DL (ref 250–450)
TRANSFERRIN SERPL-MCNC: 230 MG/DL (ref 200–375)
WBC # BLD AUTO: 8.9 K/UL (ref 3.9–12.7)

## 2023-07-20 PROCEDURE — 82728 ASSAY OF FERRITIN: CPT | Performed by: INTERNAL MEDICINE

## 2023-07-20 PROCEDURE — 3074F SYST BP LT 130 MM HG: CPT | Mod: CPTII,,, | Performed by: NURSE PRACTITIONER

## 2023-07-20 PROCEDURE — 99215 PR OFFICE/OUTPT VISIT, EST, LEVL V, 40-54 MIN: ICD-10-PCS | Mod: S$PBB,,, | Performed by: NURSE PRACTITIONER

## 2023-07-20 PROCEDURE — 99999 PR PBB SHADOW E&M-EST. PATIENT-LVL IV: CPT | Mod: PBBFAC,,, | Performed by: NURSE PRACTITIONER

## 2023-07-20 PROCEDURE — 3044F HG A1C LEVEL LT 7.0%: CPT | Mod: CPTII,,, | Performed by: NURSE PRACTITIONER

## 2023-07-20 PROCEDURE — 80053 COMPREHEN METABOLIC PANEL: CPT | Performed by: INTERNAL MEDICINE

## 2023-07-20 PROCEDURE — 3078F DIAST BP <80 MM HG: CPT | Mod: CPTII,,, | Performed by: NURSE PRACTITIONER

## 2023-07-20 PROCEDURE — 85025 COMPLETE CBC W/AUTO DIFF WBC: CPT | Performed by: INTERNAL MEDICINE

## 2023-07-20 PROCEDURE — 1111F DSCHRG MED/CURRENT MED MERGE: CPT | Mod: CPTII,,, | Performed by: NURSE PRACTITIONER

## 2023-07-20 PROCEDURE — 1159F MED LIST DOCD IN RCRD: CPT | Mod: CPTII,,, | Performed by: NURSE PRACTITIONER

## 2023-07-20 PROCEDURE — 3074F PR MOST RECENT SYSTOLIC BLOOD PRESSURE < 130 MM HG: ICD-10-PCS | Mod: CPTII,,, | Performed by: NURSE PRACTITIONER

## 2023-07-20 PROCEDURE — 3008F BODY MASS INDEX DOCD: CPT | Mod: CPTII,,, | Performed by: NURSE PRACTITIONER

## 2023-07-20 PROCEDURE — 99999 PR PBB SHADOW E&M-EST. PATIENT-LVL IV: ICD-10-PCS | Mod: PBBFAC,,, | Performed by: NURSE PRACTITIONER

## 2023-07-20 PROCEDURE — 1111F PR DISCHARGE MEDS RECONCILED W/ CURRENT OUTPATIENT MED LIST: ICD-10-PCS | Mod: CPTII,,, | Performed by: NURSE PRACTITIONER

## 2023-07-20 PROCEDURE — 1159F PR MEDICATION LIST DOCUMENTED IN MEDICAL RECORD: ICD-10-PCS | Mod: CPTII,,, | Performed by: NURSE PRACTITIONER

## 2023-07-20 PROCEDURE — 84466 ASSAY OF TRANSFERRIN: CPT | Performed by: INTERNAL MEDICINE

## 2023-07-20 PROCEDURE — 3044F PR MOST RECENT HEMOGLOBIN A1C LEVEL <7.0%: ICD-10-PCS | Mod: CPTII,,, | Performed by: NURSE PRACTITIONER

## 2023-07-20 PROCEDURE — 99215 OFFICE O/P EST HI 40 MIN: CPT | Mod: S$PBB,,, | Performed by: NURSE PRACTITIONER

## 2023-07-20 PROCEDURE — 3078F PR MOST RECENT DIASTOLIC BLOOD PRESSURE < 80 MM HG: ICD-10-PCS | Mod: CPTII,,, | Performed by: NURSE PRACTITIONER

## 2023-07-20 PROCEDURE — 36415 COLL VENOUS BLD VENIPUNCTURE: CPT | Performed by: INTERNAL MEDICINE

## 2023-07-20 PROCEDURE — 3008F PR BODY MASS INDEX (BMI) DOCUMENTED: ICD-10-PCS | Mod: CPTII,,, | Performed by: NURSE PRACTITIONER

## 2023-07-20 PROCEDURE — 83735 ASSAY OF MAGNESIUM: CPT | Performed by: INTERNAL MEDICINE

## 2023-07-20 PROCEDURE — 99214 OFFICE O/P EST MOD 30 MIN: CPT | Mod: PBBFAC | Performed by: NURSE PRACTITIONER

## 2023-07-20 RX ORDER — ONDANSETRON 8 MG/1
8 TABLET, ORALLY DISINTEGRATING ORAL EVERY 8 HOURS PRN
Qty: 60 TABLET | Refills: 5 | Status: SHIPPED | OUTPATIENT
Start: 2023-07-20 | End: 2023-09-22 | Stop reason: SDUPTHER

## 2023-07-20 RX ORDER — DEXAMETHASONE 4 MG/1
8 TABLET ORAL DAILY
Qty: 24 TABLET | Refills: 5 | Status: SHIPPED | OUTPATIENT
Start: 2023-07-21

## 2023-07-20 RX ORDER — PROCHLORPERAZINE MALEATE 10 MG
10 TABLET ORAL EVERY 6 HOURS PRN
Qty: 30 TABLET | Refills: 1 | Status: ON HOLD | OUTPATIENT
Start: 2023-07-20 | End: 2023-07-24 | Stop reason: SDUPTHER

## 2023-07-20 RX ORDER — LIDOCAINE AND PRILOCAINE 25; 25 MG/G; MG/G
CREAM TOPICAL
Qty: 30 G | Refills: 0 | Status: ON HOLD | OUTPATIENT
Start: 2023-07-20 | End: 2023-07-24 | Stop reason: SDUPTHER

## 2023-07-20 NOTE — PROGRESS NOTES
FOLLOW-UP APPOINTMENT    PATIENT:   Ruslan Caldwell  :    1975  MR#:    [id number]  DATE OF VISIT:  2023      Chief Complaint: Colon Cancer     HPI:   Mr. Ruslan Caldwell presents today for chemotherapy education.  He will be starting treatment with FOLFOX q 2 weeks for the above diagnosis.       Review of Systems   Constitutional:  Positive for fatigue.   HENT:  Negative.     Eyes: Negative.    Respiratory: Negative.     Cardiovascular: Negative.    Gastrointestinal:  Positive for abdominal pain.   Endocrine: Negative.    Genitourinary: Negative.     Musculoskeletal: Negative.    Skin: Negative.    Neurological: Negative.    Hematological: Negative.    Psychiatric/Behavioral: Negative.       Oncology History   Colon adenocarcinoma   2023 Initial Diagnosis    Colon adenocarcinoma     2023 -  Chemotherapy    Treatment Summary   Plan Name: OP mFOLFOX 6 Q2W  Treatment Goal: Curative  Status: Active  Start Date: 2023 (Planned)  End Date: 2023 (Planned)  Provider: Haroon Baird MD  Chemotherapy: fluorouraciL injection 850 mg, 400 mg/m2, Intravenous, Clinic/HOD 1 time, 0 of 12 cycles  oxaliplatin (ELOXATIN) 85 mg/m2 = 180 mg in dextrose 5 % (D5W) 536 mL chemo infusion, 85 mg/m2, Intravenous, Clinic/HOD 1 time, 0 of 12 cycles         Patient Active Problem List   Diagnosis    Essential hypertension    Iron deficiency anemia    Colon adenocarcinoma    Thrombocytosis    RLQ abdominal pain    Ileus    Moderate malnutrition    Small bowel obstruction    Tobacco use    JOYCE (acute kidney injury)    Omental infarction    Sepsis    Hyponatremia    Hypophosphatemia       Past Medical History:   Diagnosis Date    Colon cancer 2023    Diabetes mellitus, type 2 2020    Patient denies    Hypertension        Past Surgical History:   Procedure Laterality Date    COLONOSCOPY N/A 2023    Procedure: COLONOSCOPY;  Surgeon: Sam Solano MD;  Location: [facility];  Service:  Endoscopy;  Laterality: N/A;    EXCISION, SMALL INTESTINE N/A 6/15/2023    Procedure: EXCISION, SMALL INTESTINE;  Surgeon: Edin Michael MD;  Location: [facility];  Service: General;  Laterality: N/A;    EYE SURGERY Right     LAPAROTOMY, EXPLORATORY N/A 6/15/2023    Procedure: LAPAROTOMY, EXPLORATORY;  Surgeon: Edin Michael MD;  Location: [facility];  Service: General;  Laterality: N/A;    LAPAROTOMY, EXPLORATORY N/A 6/17/2023    Procedure: LAPAROTOMY, EXPLORATORY;  Surgeon: Garfield Hoyt Jr., MD;  Location: [facility];  Service: General;  Laterality: N/A;    LUMBAR DISC SURGERY  2005    LYSIS OF ADHESIONS N/A 6/15/2023    Procedure: LYSIS, ADHESIONS;  Surgeon: Edin Michael MD;  Location: [facility];  Service: General;  Laterality: N/A;    OMENTECTOMY N/A 6/15/2023    Procedure: OMENTECTOMY;  Surgeon: Edin Michael MD;  Location: [facility] OR;  Service: General;  Laterality: N/A;    SUBTOTAL COLECTOMY Right 5/29/2023    Procedure: COLECTOMY, PARTIAL;  Surgeon: Edin Michael MD;  Location: [facility] OR;  Service: General;  Laterality: Right;       Social History     Socioeconomic History    Marital status:     Number of children: 3    Highest education level: Associate degree: academic program   Occupational History    Occupation: [employer]   Tobacco Use    Smoking status: Every Day     Packs/day: 0.50     Years: 23.00     Pack years: 11.50     Types: Cigarettes    Smokeless tobacco: Never   Substance and Sexual Activity    Alcohol use: Not Currently     Comment: Quit drinking 2 yrs ago    Drug use: Yes     Types: Marijuana     Comment: heavy marijuana use    Sexual activity: Yes     Social Determinants of Health     Financial Resource Strain: Low Risk     Difficulty of Paying Living Expenses: Not hard at all   Food Insecurity: No Food Insecurity    Worried About Running Out of Food in the Last Year: Never true    Ran Out of Food in the Last Year: Never true   Transportation Needs: No  Transportation Needs    Lack of Transportation (Medical): No    Lack of Transportation (Non-Medical): No   Physical Activity: Sufficiently Active    Days of Exercise per Week: 5 days    Minutes of Exercise per Session: 90 min   Stress: No Stress Concern Present    Feeling of Stress : Not at all   Social Connections: Socially Isolated    Frequency of Communication with Friends and Family: Never    Frequency of Social Gatherings with Friends and Family: More than three times a week    Attends Church Services: Never    Active Member of Clubs or Organizations: No    Attends Club or Organization Meetings: Never    Marital Status:    Housing Stability: Unknown    Unable to Pay for Housing in the Last Year: No    Unstable Housing in the Last Year: No       Family History   Problem Relation Age of Onset    Diabetes Mother     Diabetes Maternal Grandfather     No Known Problems Father          Current Outpatient Medications:     HYDROmorphone (DILAUDID) 2 MG tablet, Take 1 tablet (2 mg total) by mouth every 4 (four) hours as needed for Pain., Disp: 30 tablet, Rfl: 0    ondansetron (ZOFRAN-ODT) 4 MG TbDL, Take 1 tablet (4 mg total) by mouth every 6 (six) hours as needed (nv)., Disp: 30 tablet, Rfl: 0    Review of patient's allergies indicates:   Allergen Reactions    Fish containing products Other (See Comments)       Physcial Examination  VITAL SIGNS:    There is no height or weight on file to calculate BSA.   Pain Assessment: abdominal pain r/t surgery  There were no vitals filed for this visit.       Wt Readings from Last 5 Encounters:   07/19/23 85.3 kg (188 lb)   07/13/23 86.4 kg (190 lb 7.6 oz)   07/06/23 85.3 kg (188 lb 1.6 oz)   06/27/23 83.4 kg (183 lb 13.8 oz)   06/27/23 102.1 kg (225 lb 1.6 oz)       GENERAL:  Ruslan Caldwell is healthy-appearing 47 y.o. male, in no distress.   EYES:   Pupils equal, round, reactive.  Conjunctivae, sclera and lids normal.  HEENT: Head normocephalic and atraumatic,  without alopecia.  Oropharynx is unremarkable.  No icterus, jaundice, stomatitis, mucositis, or ulceration is noted.  Ears are clear and unremarkable.  Nose, nares, and septum are unremarkable.    NECK:   No masses.  Thyroid and trachea are normal.    BREASTS:  Deferred.  RESPIRATORY: Clear to auscultation bilaterally.  Symmetrically effortless expansion.  No wheezing and no stridor.    CV: Heart reveals regular rate and rhythm without murmur, rub, or gallops.  ABDOMEN: Soft, non-tender.  No masses, no hernias, and no rebound or rigidity are noted.  /RECTAL:  Deferred.  LYMPHATICS: No preauricular, submandibular, cervical, supraclavicular, axillary, lymphadenopathy.  MUSCULOSKELETAL:Fair musculature, no atrophy.  No arthritic changes.  No edema or cyanosis. Back is without gross abnormal curvature.   NEUROLOGICAL: Cranial nerves II-XII grossly intact.  Motor and sensory exam intact.  SKIN:   No lesions, bruises, petechiae or rashes.  Good turgor.    PSYCHIATRIC: Patient is alert and oriented to time, place and person.  Mood and affect are appropriate.         Laboratory and Radiology   Lab Results   Component Value Date    WBC 8.90 07/20/2023    RBC 4.19 (L) 07/20/2023    HGB 9.1 (L) 07/20/2023    HCT 30.0 (L) 07/20/2023    MCV 72 (L) 07/20/2023    MCH 21.7 (L) 07/20/2023    MCHC 30.3 (L) 07/20/2023    RDW 21.7 (H) 07/20/2023     (H) 07/20/2023    MPV 8.8 (L) 07/20/2023    GRAN 6.1 07/20/2023    GRAN 68.9 07/20/2023    LYMPH 2.0 07/20/2023    LYMPH 21.9 07/20/2023    MONO 0.6 07/20/2023    MONO 6.2 07/20/2023    EOS 0.2 07/20/2023    BASO 0.05 07/20/2023    EOSINOPHIL 2.2 07/20/2023    BASOPHIL 0.6 07/20/2023     BMP  Lab Results   Component Value Date     (L) 06/26/2023    K 3.8 06/26/2023    CL 98 06/26/2023    CO2 25 06/26/2023    BUN 11 06/26/2023    CREATININE 1.4 06/26/2023    CALCIUM 9.6 06/26/2023    ANIONGAP 10 06/26/2023    ESTGFRAFRICA >60.0 09/18/2021    EGFRNONAA >60.0 09/18/2021     Lab  Results   Component Value Date    ALT 18 06/26/2023    AST 18 06/26/2023    ALKPHOS 73 06/26/2023    BILITOT 0.6 06/26/2023     Results for orders placed or performed during the hospital encounter of 06/26/23 (from the past 2160 hour(s))   CT Abdomen Pelvis With Contrast    Narrative    EXAMINATION:  CT ABDOMEN PELVIS WITH CONTRAST    CLINICAL HISTORY:  Abdominal abscess/infection suspected;    TECHNIQUE:  Low dose axial images, sagittal and coronal reformations were obtained from the lung bases to the pubic symphysis following the IV administration of 100 mL of Omnipaque 350 .  Oral contrast was not administered.    COMPARISON:  06/12/2023    FINDINGS:  Abdomen:    - Lung bases: Right basilar atelectasis.    - Liver: No focal mass.    - Gallbladder: No calcified gallstones.    - Bile Ducts: No evidence of intra or extra hepatic biliary ductal dilation.    - Spleen: Negative.    - Kidneys: No mass or hydronephrosis.    - Adrenals: Unremarkable.    - Pancreas: No mass or peripancreatic fat stranding.    - Retroperitoneum:  No significant adenopathy.    - Vascular: No abdominal aortic aneurysm.    - Abdominal wall:  Postsurgical changes of exploratory laparotomy with fluid and soft tissue gas spanning the entire length of the incision.    Pelvis:    No pelvic mass, adenopathy, or free fluid.    Bowel/Mesentery:    Postsurgical changes in the right lower quadrant.  Multiple small bowel loops seen throughout the abdomen which are moderately distended with fluid.  No discrete transition point    Bones:  No acute osseous abnormality and no suspicious lytic or blastic lesion.      Impression    Postoperative ileus versus partial small bowel obstruction.    Postsurgical changes of exploratory laparotomy with fluid and soft tissue gas spanning the entire length of the incision.  Findings may represent seromas in the setting of recent surgery, however please correlate with clinical findings to exclude wound infection and  dehiscence.      Electronically signed by: Lucy Torres  Date:    06/26/2023  Time:    22:49   Results for orders placed or performed during the hospital encounter of 06/11/23 (from the past 2160 hour(s))   CT Abdomen Pelvis  Without Contrast    Narrative    EXAMINATION:  CT ABDOMEN PELVIS WITHOUT CONTRAST    CLINICAL HISTORY:  Abdominal pain, acute, nonlocalized;    TECHNIQUE:  Low dose axial images, sagittal and coronal reformations were obtained from the lung bases to the pubic symphysis.  30 mL of oral Omnipaque 350 was administered.    COMPARISON:  06/11/2023    FINDINGS:  There is atelectasis at the right lung base.    The liver, spleen, adrenal glands, kidneys and pancreas are unremarkable.  The gallbladder is in place.    There is a large amount of food stuff seen within the stomach.    Multiple dilated fluid-filled loops of small bowel are seen throughout the abdomen.  Chain suture material is seen in the region of the cecum.    There is no evidence discrete, drainable abdominal fluid collection.  There is no evidence intra-abdominal free air.    The osseous structures are unremarkable.      Impression    There are CT findings of small bowel obstruction progressed from prior exam.  There is a large amount of fluid seen within the stomach.      Electronically signed by: Bere Butler MD  Date:    06/12/2023  Time:    17:46     No results found for this or any previous visit (from the past 2160 hour(s)).  No results found for this or any previous visit (from the past 2160 hour(s)).    Pathology  Pathology Results  (Last 10 years)                 06/15/23 1556  Specimen to Pathology, Surgery General Surgery Final result    Narrative:  Pre-op Diagnosis: Small bowel obstruction [K56.609]   Procedure(s):   LAPAROTOMY, EXPLORATORY   Number of specimens: 2   Name of specimens: 1. ADHESION    2. SMALL BOWEL    3. ADDITIONAL SMALL BOWEL 4. OMENTUM   Which provider would you like to cc?->TIFF RUVALCABA    Release to patient->Immediate   Specimen total (fresh, frozen, permanent):->4       05/29/23 1527  Specimen to Pathology, Surgery General Surgery Final result    Narrative:  Pre-op Diagnosis: RLQ abdominal pain [R10.31]   Procedure(s):   COLECTOMY, PARTIAL   Number of specimens: 1   Name of specimens: 1. RIGHT COLON   Which provider would you like to cc?->TIFF RUVALCABA   Release to patient->Immediate   Specimen total (fresh, frozen, permanent):->1       05/29/23 1057  Specimen to Pathology, Surgery Gastrointestinal tract (Abnormal) Final result    Narrative:  Pre-op Diagnosis: RLQ abdominal pain [R10.31]   Abnormal finding on GI tract imaging [R93.3]   Procedure(s):   COLONOSCOPY   Number of specimens: 2   Name of specimens: 1) colon mass biopsy   2) Sigmoid colon polyp   Which provider would you like to cc?->TWIN ARMENDARIZ   Release to patient->Immediate   Specimen total (fresh, frozen, permanent):->2               TITLE: PLAN OF CARE FOR THE CHEMOTHERAPY PATIENT / TEACHING PROTOCOL    PURPOSE: To involve the patient / significant other in the plan of care and to provide teaching to the significant other & patient receiving chemotherapy.    LEVEL: Independent.    CONTENT: The Plan of Care for the chemotherapy patient is individualized and appropriate to the patients needs, strengths, limitations, & goals.  Education includes information regarding chemotherapy side effects, the treatment itself, and self-care  Activities.    GOAL / OUTCOME STANDARDS    PHYSIOLOGIC: The client will remain free or experience minimal side effects or toxicities throughout the chemotherapy treatment period.     PSYCHOLOGIC: The client/significant others will demonstrate positive coping mechanisms in relation to chemotherapy and its side effects.      COGINITIVE: The client/significant others will verbalize understanding of self-care measure to avoid/minimize side effects of the chemotherapy regime.    EVALUATION / COMMENT  KEY:    V = Audiovisual/Video  S = Successfully meets outcome  N = Needs further instruction  NA = Not applicable to the patient  P = Previous knowledge  U = Unable to comprehend  * = See progress notes          PLAN OF CARE  INFORMATION TO BE DELIVERED / NURSING INTERVENTIONS DATE EVALUATION   Assessment of client/caregiver,         knowledge of cancer diagnosis,         and chemotherapy as a treatment. 1a. Evaluate patient/caregiver learning ability    b. Plan teaching sessions with patient/caregiver according to needs and present anxiety level/ability to learn.    c. Provide Chemotherapy Education Packet,        Mouth Care Protocol,         Specific Patient Education Sheets. 07/20/2023 S   Individual chemotherapy treatment         plan. 2a. Review of Chemotherapy Education handout from [employer]            07/20/2023   S   Knowledge Deficit & Self-Management of general side effects common to all chemotherapy:  Nausea/Vomiting  b.   Diarrhea  Mouth Care  Dental care  Constipation  Hair Loss  Potential for infection  Fatigue   3a. Reinforce that the majority of side effects from chemotherapy are reversible and are  controlled both in the hospital and at home        (blood counts recover, hair grows back).   b.  Refer to the following for reinforcement of         information post-treatment:  Mouth Care Protocol.  Bowel Protocol for constipation or diarrhea.  3.  Drug Specific Chemotherapy Information Sheets for each medication patient receiving.    07/20/2023     S     PLAN OF CARE  INFORMATION TO BE DELIVERED / NURSING INTERVENTIONS DATE EVALUATION   h. Potential for bleeding         i. Potential anemia/fatigue         j. Potential sunburn         k. Birth control measures  l. Safety measures post treatment 4.  Chemotherapy Home Care Instruction  and Safety Information Sheet.  A. patient/caregivers to thoroughly cook shellfish (shrimp, crab, etc) to decrease the chance of infection.    B.  Use sunscreen and  protective clothing while in the sun.   07/20/2023      Knowledge deficit & Self Management of Drug Specific  Side Effects.    BLADDER EFFECTS        (Hemorrhagic Cystitis)                  Preventable with adequate hydration; occurs 2-3 days or more post treatment.   1.  Instruct patient to:  a.   Void at least every 2 hours; increase intake.  b.   DO NOT hold urine; go when urge is felt.  c.    Empty bladder at bedtime and on         awakening.  d.   Observe for color changes (red to tea           colored), amount and frequency changes.  e.   Notify oncologist of any abnormalities           in urine or voiding or if you cannot               drink adequate fluids.   07/20/2023   S   b.   CHANGES IN URINE        COLOR:      1.   Instruct patient:  a.   Most evident in first 2-3 voidings after           administration.  Lasts less than 24 hours.  If urine is discolored 2 or more days post- treatment, notify oncologist.      07/20/2023 S   c.    KIDNEY EFFECTS           (Nephrotoxicity)   1.  Instruct patient to:  a.   Drink 8-16 glasses of fluid/day the day   pre-treatment and 3-4 days post-treatment to maintain hydration; the best way to minimize kidney problems.  b.   Notify oncologist immediately if unable to drink fluids or if changes are noted in urinary elimination.     07/20/2023   S   PULMONARY TOXICITY    Instruct patient to report symptoms such as shortness of breath, chest pain, shallow breathing, or chest wall discomfort to physician.  Reinforce preventative measures used by the health care team.  Baseline and periodic PFT and chest x-ray.   07/20/2023   S     PLAN OF CARE INFORMATION TO BE DELIVERED / NURSING INTERVENTIONS DATE EVALUATION   NERVE & MUSCLE EFFECTS (neurotoxocity; neuropathy, possible visual/hearing changes)        Instruct patient to:    Report numbness or tingling of the hands/feet, loss of fine motor movement (buttoning shirt, tying shoelaces), or gait changes to your oncologist.  If  numbness/tingling are present:  protect feet with shoes at all times.  Use gloves for washing dishes/gardening & potholders in kitchen.       07/20/2023   S   CARDIOTOXICITY  Decreased effectiveness of             cardiac function. Effective are                  cumulative and irreversible.                                    CARDIAC ARRYTHMIAS              4   Instruct:  Heart function may be tested before treatment and perdiocally during treatment.  Notify oncologist of irregular pulse, palpitations, shortness of breath, or swelling in lower extremities/feet.          Taxol and Taxotere can cause arrhythmias on infusion that resolve once infusion discontinued. Instruct nurse if any irregularity felt.    07/20/2023   S   EXTRAVASTION  Occurs when vesicants leak outside of vein and cause damage to the skin and underlying tissues.   Reinforce preventive measures used to avoid complications.  Fresh IV site or central line monitored continuously with vesicant IVP.  Continuous infusion via central line site and blood return monitored periodically around the clock.  Instruct to:  Notify nurse of any discomfort, burning, stinging, etc. at IV site during chemotherapy administration.  Notify oncologist of any redness, pain, or swelling at IV site after discharge from hospital.   07/20/2023   S   HYPERSENSITIVITY can happen with any medication.   Instruct patient:  Nurse is with them during the initial part of treatment and will be close by to monitor.  Pre-medication ordered by the oncologist must be taken on time. If doses are missed, treatment will need to be re-scheduled.  Skin redness, itching, or hives appearing after discharge should be reported to oncologist. 07/20/2023   S       PLAN OF CARE INFORMATION TO BE DELIVERED / NURSING INTERVENTIONS DATE EVALUATION   FLU-LIKE SYNDROME      Instruct patient symptoms are hard to prevent and may include fever, shaking chills, muscle and body aches.  Taking prescribed  medications from physician if needed.  Adequate fluids are important.    Reinforce the need to call if temperature is         elevated to 100.4 or more  07/20/2023   S   HAND-FOOT SYNDROME  causes painful, symmetric swelling and redness of palms and soles                  Instruct patient to report any numbness or tingling in the hands or feet.  Explain prevention techniques, such as     Use heavy moisturizers to lessen skin dryness and itching, but to avoid if skin is cracked or broken  Bathe in tepid water, use non-perfumed soap, and wash gently. Baths with oatmeal or diluted baking soda may be soothing.  Avoid tight fitting shoes and repetitive actions, such as rubbing hands or applying pressure to hands/feet.  Review measures to take should syndrome occur:  Cold compresses and elevation for          edema  Pain medications and other measures as ordered by oncologist.   4.   Syndrome resolves few weeks after therapy. 07/20/2023   S   5. DISCHARGE PLANNING /        EDUCATION 1.    Explain importance of compliance with follow- up  tests (CBC, CMP).  2.    Verify patient/caregiver know:  a.    Oncologists office phone number.  b.    Dates of follow-up appointments.  c.    Prescriptions given for nausea  3.   Review side effects to monitor and notify          oncologist about.  4.   Reinforce the need for patient and caregivers to:  a.    Review information given.  b.    Call oncologists office with questions          or symptoms  5.   Provide [facility] Brochure make referrals if needed for financial or .   07/20/2023   S     PROGRESS NOTES: I met with the patient  today for chemotherapy education. he will be starting treatment with FOLFOX q 2 weeks. We discussed the mechanism of action, potential side effects of this treatment as well as ways he can manage them at home. Some of these side effects include but or not limited to fever, nausea, vomiting, decreased appetite, fatigue,  weakness, cytopenias, myalgia/arthralgia, constipation, diarrhea, bleeding, headache, shortness of breath, nail changes, taste change, hair thinning/loss, mood disturbances, or edema. We also discussed dietary modifications he should make although this will be discussed in more detail with the dietician. he was provided with anti-emetic medication, a copy of all of the information we discussed today as well as our contact information. he will be provided a schedule on his first day of treatment. We will obtain labs on a weekly basis and the patient will follow-up with the physician for toxicity monitoring throughout treatment. All questions were answered and an informed consent was obtained. he was reminded to certainly contact us sooner if needed.  Attached to the patients folder and discussed with the patient the 24 hour/ 7 days a week after hours telephone number for the physician.  Patient notified to call anytime 24/7 because their is a physician on call for any problems that may arise.  Patient also notified to report to [facility] if they can not get in touch with a physician after hours.  Discussed the five wishes booklet with the patient and their family.           Assessment/Plan     ICD-10-CM ICD-9-CM   1. Colon adenocarcinoma  C18.9 153.9    -proceed with cycle 1 7/25/23  -proceed with cycle 1   -see md for clearance        Medications Ordered:  Zofran 8mg 1 tab PO Q8h prn nausea  Compazine 10 mg PO Q4-6h prn nausea  Emla cream: Apply to port site 30min prior to treatment and cover with saran wrap  OTC Imodium as directed  OTC MiraLax 17 g daily for constipation      * please notify clinic or report to the emergency department for fever of 100.4 grade  * avoid tobacco and alcohol use  * maintain a healthy diet and good oral hydration  * good blood pressure and blood sugar control is important.  Please keep all followups with your primary care provider  *good hand hygiene  * please call clinic with  any questions or concerns  * please take all medications as directed     Patient is in agreement with the proposed treatment plan. All questions were answered to the patient's satisfaction. Patient knows to call clinic for any new or worsening symptoms and if anything is needed before the next clinic visit.    Nellie Humphrey NP-JOEY  Hematology & Medical Oncology     Collaborating physician, Dr. Baird.    50 minutes of total time spent on the encounter, which includes face-to-face time and non face-to-face time preparing to see the patient (e.g., review of tests), obtaining and/or reviewing separately obtain history, documenting clinical information in the electronic or other health record, independently interpreting results (not separately reported) and communicating results to the patient/family/caregiver or care coordination (not separately reported).  Electronically signed by: Nellie NUNN

## 2023-07-21 ENCOUNTER — PATIENT MESSAGE (OUTPATIENT)
Dept: HEMATOLOGY/ONCOLOGY | Facility: CLINIC | Age: 48
End: 2023-07-21
Payer: MEDICAID

## 2023-07-21 ENCOUNTER — PATIENT MESSAGE (OUTPATIENT)
Dept: SURGERY | Facility: CLINIC | Age: 48
End: 2023-07-21
Payer: MEDICAID

## 2023-07-21 DIAGNOSIS — C18.9 COLON ADENOCARCINOMA: ICD-10-CM

## 2023-07-22 ENCOUNTER — NURSE TRIAGE (OUTPATIENT)
Dept: ADMINISTRATIVE | Facility: CLINIC | Age: 48
End: 2023-07-22
Payer: MEDICAID

## 2023-07-22 ENCOUNTER — PATIENT MESSAGE (OUTPATIENT)
Dept: SURGERY | Facility: HOSPITAL | Age: 48
End: 2023-07-22

## 2023-07-22 ENCOUNTER — PATIENT MESSAGE (OUTPATIENT)
Dept: SURGERY | Facility: CLINIC | Age: 48
End: 2023-07-22
Payer: MEDICAID

## 2023-07-22 DIAGNOSIS — G89.3 CANCER-RELATED PAIN: ICD-10-CM

## 2023-07-22 DIAGNOSIS — C18.9 COLON ADENOCARCINOMA: Primary | ICD-10-CM

## 2023-07-22 RX ORDER — HYDROMORPHONE HYDROCHLORIDE 2 MG/1
2 TABLET ORAL EVERY 4 HOURS PRN
Qty: 30 TABLET | Refills: 0 | Status: SHIPPED | OUTPATIENT
Start: 2023-07-22 | End: 2023-07-27 | Stop reason: SDUPTHER

## 2023-07-22 NOTE — TELEPHONE ENCOUNTER
Additional Information   Commented on: [1] SEVERE post-op pain (e.g., excruciating, pain scale 8-10) AND [2] not controlled with pain medications     Patient states his pain medication does control his pain.    Protocols used: Post-Op Symptoms and Noxrrcxwx-V-MD

## 2023-07-22 NOTE — TELEPHONE ENCOUNTER
Dr. Hoyt states prescription was sent to [facility].  Patient was notified and has no further needs.

## 2023-07-22 NOTE — TELEPHONE ENCOUNTER
Spoke with patient states he is wanting hydromorphone refilled. He states he had surgery about a month ago.  Patient states he has frequent severe headaches last one being last night.  Denies having a severe headache right now. Patient states he vomited last night but not today.  Patient has colon cancer.  Spoke with Dr. Hoyt who states he would have to review patient's chart to determine if he can authorize refill.  Dr. Hoyt states states as far as the headache he cannot advice and asked if this is related to B/P concern.  Provider informed that patient states he has had migraines since childhood.  No headache at this time.  Patient stated that he has no concerns of B/P and he is not concerned about headache that comes and goes.  Patient states he only wants medication refilled. He was made aware that the provider has to do a chart review to make a decision if refill will be sent today.  Informed patient that he would receive a follow up call.  Patient advised to call back with worsening symptoms.  Patient verbalized understanding.     Reason for Disposition   [1] Caller has URGENT question AND [2] triager unable to answer question   Headache is a chronic symptom (recurrent or ongoing AND present > 4 weeks)    Additional Information   Negative: Sounds like a life-threatening emergency to the triager   Negative: [1] Widespread rash AND [2] bright red, sunburn-like   Negative: [1] SEVERE headache AND [2] after spinal (epidural) anesthesia   Negative: [1] Vomiting AND [2] persists > 4 hours   Negative: [1] Vomiting AND [2] abdomen looks much more swollen than usual   Negative: [1] Drinking very little AND [2] dehydration suspected (e.g., no urine > 12 hours, very dry mouth, very lightheaded)   Negative: Patient sounds very sick or weak to the triager   Negative: Sounds like a serious complication to the triager   Negative: Fever > 100.4 F (38.0 C)   Negative: [1] SEVERE post-op pain (e.g., excruciating,  "pain scale 8-10) AND [2] not controlled with pain medications   Negative: Difficult to awaken or acting confused (e.g., disoriented, slurred speech)   Negative: [1] Weakness of the face, arm or leg on one side of the body AND [2] new-onset   Negative: [1] Numbness of the face, arm or leg on one side of the body AND [2] new-onset   Negative: [1] Loss of speech or garbled speech AND [2] new-onset   Negative: Passed out (i.e., lost consciousness, collapsed and was not responding)   Negative: Sounds like a life-threatening emergency to the triager   Negative: Unable to walk, or can only walk with assistance (e.g., requires support)   Negative: Stiff neck (can't touch chin to chest)   Negative: Severe pain in one eye   Negative: [1] Other family members (or people in same household) with headaches AND [2] possibility of carbon monoxide exposure   Negative: [1] SEVERE headache (e.g., excruciating) AND [2] "worst headache" of life   Negative: [1] SEVERE headache AND [2] sudden-onset (i.e., reaching maximum intensity within seconds to 1 hour)   Negative: [1] SEVERE headache AND [2] fever   Negative: Loss of vision or double vision  (Exception: Same as prior migraines.)   Negative: [1] Fever > 100.0 F (37.8 C) AND [2] diabetes mellitus or weak immune system (e.g., HIV positive, cancer chemo, splenectomy, organ transplant, chronic steroids)   Negative: Patient sounds very sick or weak to the triager   Negative: [1] SEVERE headache (e.g., excruciating) AND [2] not improved after 2 hours of pain medicine   Negative: [1] Vomiting AND [2] 2 or more times  (Exception: Similar to previous migraines.)   Negative: Fever > 104 F (40 C)   Negative: [1] MODERATE headache (e.g., interferes with normal activities) AND [2] present > 24 hours AND [3] unexplained  (Exceptions: analgesics not tried, typical migraine, or headache part of viral illness)   Negative: [1] New headache AND [2] weak immune system (e.g., HIV positive, cancer chemo, " splenectomy, organ transplant, chronic steroids)   Negative: [1] New headache AND [2] age > 50   Negative: [1] Sinus pain of forehead AND [2] yellow or green nasal discharge   Negative: Fever present > 3 days (72 hours)   Negative: [1] MILD-MODERATE headache AND [2] present > 72 hours   Negative: Headache started during exertion (e.g., sex, strenuous exercise, heavy lifting)    Protocols used: Post-Op Symptoms and Ggneipnvv-K-EF, Headache-A-AH

## 2023-07-24 ENCOUNTER — ANESTHESIA (OUTPATIENT)
Dept: SURGERY | Facility: HOSPITAL | Age: 48
End: 2023-07-24
Payer: MEDICAID

## 2023-07-24 ENCOUNTER — ANESTHESIA EVENT (OUTPATIENT)
Dept: SURGERY | Facility: HOSPITAL | Age: 48
End: 2023-07-24
Payer: MEDICAID

## 2023-07-24 ENCOUNTER — HOSPITAL ENCOUNTER (OUTPATIENT)
Facility: HOSPITAL | Age: 48
Discharge: HOME | End: 2023-07-24
Attending: SURGERY | Admitting: SURGERY
Payer: MEDICAID

## 2023-07-24 VITALS
RESPIRATION RATE: 16 BRPM | DIASTOLIC BLOOD PRESSURE: 80 MMHG | SYSTOLIC BLOOD PRESSURE: 128 MMHG | HEIGHT: 75 IN | OXYGEN SATURATION: 99 % | TEMPERATURE: 98 F | HEART RATE: 64 BPM | WEIGHT: 205 LBS | BODY MASS INDEX: 25.49 KG/M2

## 2023-07-24 DIAGNOSIS — C80.1 ADENOCARCINOMA: ICD-10-CM

## 2023-07-24 DIAGNOSIS — C18.9 COLON ADENOCARCINOMA: ICD-10-CM

## 2023-07-24 LAB
GLUCOSE SERPL-MCNC: 168 MG/DL (ref 70–110)
POTASSIUM SERPL-SCNC: 3.2 MMOL/L (ref 3.5–5.1)

## 2023-07-24 PROCEDURE — 63600175 PHARM REV CODE 636 W HCPCS: Performed by: NURSE ANESTHETIST, CERTIFIED REGISTERED

## 2023-07-24 PROCEDURE — D9220A PRA ANESTHESIA: Mod: ANES,,, | Performed by: STUDENT IN AN ORGANIZED HEALTH CARE EDUCATION/TRAINING PROGRAM

## 2023-07-24 PROCEDURE — 84132 ASSAY OF SERUM POTASSIUM: CPT | Performed by: STUDENT IN AN ORGANIZED HEALTH CARE EDUCATION/TRAINING PROGRAM

## 2023-07-24 PROCEDURE — D9220A PRA ANESTHESIA: ICD-10-PCS | Mod: CRNA,,, | Performed by: NURSE ANESTHETIST, CERTIFIED REGISTERED

## 2023-07-24 PROCEDURE — 71000033 HC RECOVERY, INTIAL HOUR: Performed by: SURGERY

## 2023-07-24 PROCEDURE — 25000003 PHARM REV CODE 250: Performed by: NURSE ANESTHETIST, CERTIFIED REGISTERED

## 2023-07-24 PROCEDURE — 63600175 PHARM REV CODE 636 W HCPCS: Performed by: SURGERY

## 2023-07-24 PROCEDURE — 36561 INSERT TUNNELED CV CATH: CPT | Mod: 79,LT,, | Performed by: SURGERY

## 2023-07-24 PROCEDURE — D9220A PRA ANESTHESIA: Mod: CRNA,,, | Performed by: NURSE ANESTHETIST, CERTIFIED REGISTERED

## 2023-07-24 PROCEDURE — 36561 PR INSERT TUNNELED CV CATH WITH PORT: ICD-10-PCS | Mod: 79,LT,, | Performed by: SURGERY

## 2023-07-24 PROCEDURE — 37000008 HC ANESTHESIA 1ST 15 MINUTES: Performed by: SURGERY

## 2023-07-24 PROCEDURE — 00532 ANES ACCESS CTR VENOUS CRCJ: CPT | Performed by: SURGERY

## 2023-07-24 PROCEDURE — C1788 PORT, INDWELLING, IMP: HCPCS | Performed by: SURGERY

## 2023-07-24 PROCEDURE — 71000016 HC POSTOP RECOV ADDL HR: Performed by: SURGERY

## 2023-07-24 PROCEDURE — 27201423 OPTIME MED/SURG SUP & DEVICES STERILE SUPPLY: Performed by: SURGERY

## 2023-07-24 PROCEDURE — 37000009 HC ANESTHESIA EA ADD 15 MINS: Performed by: SURGERY

## 2023-07-24 PROCEDURE — 82962 GLUCOSE BLOOD TEST: CPT | Performed by: SURGERY

## 2023-07-24 PROCEDURE — 71000039 HC RECOVERY, EACH ADD'L HOUR: Performed by: SURGERY

## 2023-07-24 PROCEDURE — 25000003 PHARM REV CODE 250: Performed by: SURGERY

## 2023-07-24 PROCEDURE — 71000015 HC POSTOP RECOV 1ST HR: Performed by: SURGERY

## 2023-07-24 PROCEDURE — D9220A PRA ANESTHESIA: ICD-10-PCS | Mod: ANES,,, | Performed by: STUDENT IN AN ORGANIZED HEALTH CARE EDUCATION/TRAINING PROGRAM

## 2023-07-24 PROCEDURE — 77001 CHG FLUOROGUIDE CNTRL VEN ACCESS,PLACE,REPLACE,REMOVE: ICD-10-PCS | Mod: 26,,, | Performed by: SURGERY

## 2023-07-24 PROCEDURE — 36000706: Performed by: SURGERY

## 2023-07-24 PROCEDURE — 77001 FLUOROGUIDE FOR VEIN DEVICE: CPT | Mod: 26,,, | Performed by: SURGERY

## 2023-07-24 PROCEDURE — 36000707: Performed by: SURGERY

## 2023-07-24 DEVICE — PORT POWER MRI 8FR 1808000: Type: IMPLANTABLE DEVICE | Site: SUBCLAVIAN | Status: FUNCTIONAL

## 2023-07-24 RX ORDER — PROCHLORPERAZINE MALEATE 10 MG
10 TABLET ORAL EVERY 6 HOURS PRN
Qty: 30 TABLET | Refills: 1 | Status: SHIPPED | OUTPATIENT
Start: 2023-07-24 | End: 2023-12-14 | Stop reason: SDUPTHER

## 2023-07-24 RX ORDER — CEFAZOLIN SODIUM 1 G/3ML
INJECTION, POWDER, FOR SOLUTION INTRAMUSCULAR; INTRAVENOUS
Status: DISCONTINUED | OUTPATIENT
Start: 2023-07-24 | End: 2023-07-24

## 2023-07-24 RX ORDER — LIDOCAINE AND PRILOCAINE 25; 25 MG/G; MG/G
CREAM TOPICAL
Qty: 30 G | Refills: 0 | Status: SHIPPED | OUTPATIENT
Start: 2023-07-24

## 2023-07-24 RX ORDER — DIPHENHYDRAMINE HYDROCHLORIDE 50 MG/ML
12.5 INJECTION INTRAMUSCULAR; INTRAVENOUS
Status: DISCONTINUED | OUTPATIENT
Start: 2023-07-24 | End: 2023-07-24 | Stop reason: HOSPADM

## 2023-07-24 RX ORDER — DEXAMETHASONE SODIUM PHOSPHATE 4 MG/ML
INJECTION, SOLUTION INTRA-ARTICULAR; INTRALESIONAL; INTRAMUSCULAR; INTRAVENOUS; SOFT TISSUE
Status: DISCONTINUED | OUTPATIENT
Start: 2023-07-24 | End: 2023-07-24

## 2023-07-24 RX ORDER — PROPOFOL 10 MG/ML
VIAL (ML) INTRAVENOUS
Status: DISCONTINUED | OUTPATIENT
Start: 2023-07-24 | End: 2023-07-24

## 2023-07-24 RX ORDER — ACETAMINOPHEN 10 MG/ML
INJECTION, SOLUTION INTRAVENOUS
Status: DISCONTINUED | OUTPATIENT
Start: 2023-07-24 | End: 2023-07-24

## 2023-07-24 RX ORDER — OXYCODONE HYDROCHLORIDE 5 MG/1
5 TABLET ORAL
Status: DISCONTINUED | OUTPATIENT
Start: 2023-07-24 | End: 2023-07-24 | Stop reason: HOSPADM

## 2023-07-24 RX ORDER — HEPARIN SODIUM 1000 [USP'U]/ML
INJECTION, SOLUTION INTRAVENOUS; SUBCUTANEOUS
Status: DISCONTINUED | OUTPATIENT
Start: 2023-07-24 | End: 2023-07-24 | Stop reason: HOSPADM

## 2023-07-24 RX ORDER — HYDROMORPHONE HYDROCHLORIDE 1 MG/ML
0.2 INJECTION, SOLUTION INTRAMUSCULAR; INTRAVENOUS; SUBCUTANEOUS EVERY 5 MIN PRN
Status: DISCONTINUED | OUTPATIENT
Start: 2023-07-24 | End: 2023-07-24 | Stop reason: HOSPADM

## 2023-07-24 RX ORDER — CEFAZOLIN SODIUM 2 G/50ML
2 SOLUTION INTRAVENOUS
Status: DISCONTINUED | OUTPATIENT
Start: 2023-07-24 | End: 2023-07-24 | Stop reason: HOSPADM

## 2023-07-24 RX ORDER — MIDAZOLAM HYDROCHLORIDE 1 MG/ML
INJECTION INTRAMUSCULAR; INTRAVENOUS
Status: DISCONTINUED | OUTPATIENT
Start: 2023-07-24 | End: 2023-07-24

## 2023-07-24 RX ORDER — ONDANSETRON 2 MG/ML
INJECTION INTRAMUSCULAR; INTRAVENOUS
Status: DISCONTINUED | OUTPATIENT
Start: 2023-07-24 | End: 2023-07-24

## 2023-07-24 RX ORDER — ONDANSETRON 2 MG/ML
4 INJECTION INTRAMUSCULAR; INTRAVENOUS DAILY PRN
Status: DISCONTINUED | OUTPATIENT
Start: 2023-07-24 | End: 2023-07-24 | Stop reason: HOSPADM

## 2023-07-24 RX ORDER — FAMOTIDINE 10 MG/ML
INJECTION INTRAVENOUS
Status: DISCONTINUED | OUTPATIENT
Start: 2023-07-24 | End: 2023-07-24

## 2023-07-24 RX ADMIN — PROPOFOL 50 MG: 10 INJECTION, EMULSION INTRAVENOUS at 10:07

## 2023-07-24 RX ADMIN — SODIUM CHLORIDE, SODIUM LACTATE, POTASSIUM CHLORIDE, AND CALCIUM CHLORIDE: .6; .31; .03; .02 INJECTION, SOLUTION INTRAVENOUS at 10:07

## 2023-07-24 RX ADMIN — ONDANSETRON 4 MG: 2 INJECTION INTRAMUSCULAR; INTRAVENOUS at 10:07

## 2023-07-24 RX ADMIN — POTASSIUM CHLORIDE 10 ML/HR: 149 INJECTION, SOLUTION, CONCENTRATE INTRAVENOUS at 10:07

## 2023-07-24 RX ADMIN — MIDAZOLAM HYDROCHLORIDE 2 MG: 1 INJECTION, SOLUTION INTRAMUSCULAR; INTRAVENOUS at 10:07

## 2023-07-24 RX ADMIN — DEXAMETHASONE SODIUM PHOSPHATE 4 MG: 4 INJECTION, SOLUTION INTRAMUSCULAR; INTRAVENOUS at 10:07

## 2023-07-24 RX ADMIN — ACETAMINOPHEN 1000 MG: 10 INJECTION, SOLUTION INTRAVENOUS at 10:07

## 2023-07-24 RX ADMIN — FAMOTIDINE 20 MG: 10 INJECTION, SOLUTION INTRAVENOUS at 10:07

## 2023-07-24 RX ADMIN — CEFAZOLIN 2 G: 330 INJECTION, POWDER, FOR SOLUTION INTRAMUSCULAR; INTRAVENOUS at 10:07

## 2023-07-24 NOTE — ANESTHESIA POSTPROCEDURE EVALUATION
Anesthesia Post Evaluation    Patient: Ruslan Caldwell    Procedure(s) Performed: Procedure(s) (LRB):  [id number] (Left)    Final Anesthesia Type: general      Patient location during evaluation: PACU  Patient participation: Yes- Able to Participate  Level of consciousness: awake and alert  Post-procedure vital signs: reviewed and stable  Pain management: adequate  Airway patency: patent    PONV status at discharge: No PONV  Anesthetic complications: no      Cardiovascular status: hemodynamically stable  Respiratory status: unassisted, spontaneous ventilation and room air  Hydration status: euvolemic  Follow-up not needed.          Vitals Value Taken Time   /69 07/24/23 1200   Temp 36.2 °C (97.1 °F) 07/24/23 1200   Pulse 58 07/24/23 1206   Resp 15 07/24/23 1206   SpO2 98 % 07/24/23 1206   Vitals shown include unvalidated device data.      Event Time   Out of Recovery 12:09:07         Pain/Jose D Score: Jose D Score: 9 (7/24/2023 12:00 PM)

## 2023-07-24 NOTE — ANESTHESIA PREPROCEDURE EVALUATION
07/24/2023  Ruslan Caldwell is a 47 y.o., male.      Patient Active Problem List   Diagnosis    Essential hypertension    Iron deficiency anemia    Colon adenocarcinoma    Thrombocytosis    RLQ abdominal pain    Ileus    Moderate malnutrition    Small bowel obstruction    Tobacco use    JOYCE (acute kidney injury)    Omental infarction    Sepsis    Hyponatremia    Hypophosphatemia       Past Surgical History:   Procedure Laterality Date    COLONOSCOPY N/A 5/29/2023    Procedure: COLONOSCOPY;  Surgeon: Sam Solano MD;  Location: [facility] ENDO;  Service: Endoscopy;  Laterality: N/A;    EXCISION, SMALL INTESTINE N/A 6/15/2023    Procedure: EXCISION, SMALL INTESTINE;  Surgeon: Edin Michael MD;  Location: [facility] OR;  Service: General;  Laterality: N/A;    EYE SURGERY Right     LAPAROTOMY, EXPLORATORY N/A 6/15/2023    Procedure: LAPAROTOMY, EXPLORATORY;  Surgeon: Edin Michael MD;  Location: [facility] OR;  Service: General;  Laterality: N/A;    LAPAROTOMY, EXPLORATORY N/A 6/17/2023    Procedure: LAPAROTOMY, EXPLORATORY;  Surgeon: Garfield Hoyt Jr., MD;  Location: [facility] OR;  Service: General;  Laterality: N/A;    LUMBAR DISC SURGERY  2005    LYSIS OF ADHESIONS N/A 6/15/2023    Procedure: LYSIS, ADHESIONS;  Surgeon: Edin Michael MD;  Location: [facility] OR;  Service: General;  Laterality: N/A;    OMENTECTOMY N/A 6/15/2023    Procedure: OMENTECTOMY;  Surgeon: Edin Michael MD;  Location: [facility] OR;  Service: General;  Laterality: N/A;    SUBTOTAL COLECTOMY Right 5/29/2023    Procedure: COLECTOMY, PARTIAL;  Surgeon: Edin iMchael MD;  Location: [facility] OR;  Service: General;  Laterality: Right;        Tobacco Use:  The patient  reports that he has been smoking cigarettes. He has a 11.50 pack-year smoking history. He has never used smokeless tobacco.     Results for orders  placed or performed during the hospital encounter of 06/11/23   EKG 12-lead    Collection Time: 06/11/23  1:32 PM    Narrative    Test Reason : R11.2,    Vent. Rate : 105 BPM     Atrial Rate : 105 BPM     P-R Int : 142 ms          QRS Dur : 086 ms      QT Int : 376 ms       P-R-T Axes : 066 006 035 degrees     QTc Int : 496 ms    Sinus tachycardia  Nonspecific T wave abnormality  Abnormal ECG  When compared with ECG of 24-MAY-2023 09:14,  Premature ventricular complexes are no longer Present  Vent. rate has increased BY  52 BPM  Nonspecific T wave abnormality now evident in Anterior leads  QT has lengthened  Confirmed by Roger Lee MD ([id number]) on 6/12/2023 12:16:51 PM    Referred By: [email or web address]   SELF           Confirmed By:Roger Lee MD             Lab Results   Component Value Date    WBC 8.90 07/20/2023    HGB 9.1 (L) 07/20/2023    HCT 30.0 (L) 07/20/2023    MCV 72 (L) 07/20/2023     (H) 07/20/2023     BMP  Lab Results   Component Value Date     07/20/2023    K 3.0 (L) 07/20/2023     07/20/2023    CO2 29 07/20/2023    BUN 6 07/20/2023    CREATININE 1.1 07/20/2023    CALCIUM 9.2 07/20/2023    ANIONGAP 10 07/20/2023    GLU 95 07/20/2023     (H) 06/26/2023     (H) 06/24/2023       No results found for this or any previous visit.                Pre-op Assessment    I have reviewed the Patient Summary Reports.     I have reviewed the Nursing Notes. I have reviewed the NPO Status.   I have reviewed the Medications.     Review of Systems  Anesthesia Hx:  No problems with previous Anesthesia  Denies Family Hx of Anesthesia complications.   Denies Personal Hx of Anesthesia complications.   Social:  Former Smoker    Hematology/Oncology:         -- Anemia: --  Cancer in past history (colon adenocarcinoma):  Other (see Oncology comments) surgery    Cardiovascular:   Exercise tolerance: good Hypertension, well controlled ECG has been reviewed.    Pulmonary:  Pulmonary Normal    Renal/:   Chronic  Renal Disease (hx of JOYCE - resolved), ARF    Hepatic/GI:   Hx of SBO, s/p surgery   Neurological:  Neurology Normal    Endocrine:   Diabetes, well controlled, type 2        Physical Exam  General: Well nourished    Airway:  Mallampati: II   Mouth Opening: Normal  TM Distance: Normal  Tongue: Normal  Neck ROM: Normal ROM    Chest/Lungs:  Clear to auscultation, Normal Respiratory Rate        Anesthesia Plan  Type of Anesthesia, risks & benefits discussed:    Anesthesia Type: Gen Natural Airway  Intra-op Monitoring Plan: Standard ASA Monitors  Post Op Pain Control Plan: multimodal analgesia and IV/PO Opioids PRN  Induction:  IV  Airway Plan: Direct, Post-Induction  Informed Consent: Informed consent signed with the Patient and all parties understand the risks and agree with anesthesia plan.  All questions answered. Patient consented to blood products? Yes  ASA Score: 3 Emergent  Anesthesia Plan Notes:     GNA - poss LMA  IV tylenol  K + 10 mEq IV intra-op  Zofran Decadron    Ready For Surgery From Anesthesia Perspective.     .

## 2023-07-24 NOTE — PLAN OF CARE
PATIENT WAS SLEEPY BUT AROUSABLE WHEN RETURNING TO [facility]SU FROM PACU.  VSS AND RESPONDS TO VOICE.  ALLOWED PATIENT TO SLEEP UNTIL SOME OF THE ANESTHESIA WORE OFF.  VITAL SIGNS REMAINED STABLE.  ONCE AWAKE PATIENT WAS DISCHARGED HOME WITH FAMILY.

## 2023-07-24 NOTE — OP NOTE
DATE OF PROCEDURE: 07/24/2023    PREOPERATIVE DIAGNOSIS: Adenocarcinoma of the colon    POSTOPERATIVE DIAGNOSIS: Same     PROCEDURE: Left IJ Port-A-Cath Placement    SURGEON: Garfield Hoyt M.D.    ASSISTANT: None    ANESTHESIA: Local MAC    PREP: Chlorhexidine    ESTIMATED BLOOD LOSS: Minimal    INDICATION: Access for infusions    FINDINGS:  -  the left IJ was found to be widely patent on US evaluation  -  IJ accessed using ultrasound with return of dark red nonpulsatile blood.  -  Wire easily passed and confirmed in the venous system on fluoro  -  Catheter tip positioned at the atrial caval junction under fluoro  -  Port aspirated and flushed with ease.    PROCEDURE IN DETAIL:  The patient was identified in the preoperative unit and taken back to the operating room and laid supine on the operating room table. IV antibiotics were administered prior to the start of anesthesia. MAC anesthesia was administered without complication. The patient was then prepped and draped in the standard sterile fashion. The ultrasound was used to identify the left internal jugular vein.  Local anesthetic was injected and then a small skin nick was made.  The patient was placed into the Trendelenburg position and an 18g needle was used to access the vein under ultrasound guidance.We had return of dark red, non-pulsatile blood. The wire was advanced without issue under fluoroscopic guidance. We turned our attention to creation of the port pocket in the upper chest. Local anesthetic was injected into the upper chest in the location of the port pocket and along the path of the planned catheter tunnel. A 4cm incision was made sharpley and the subcutaneous tissue was dissected until an appropriate sized pocket was created to accommodate the port. Once we were satisfied with the pocket, the catheter was flushed and tunneled from the chest to the neck incision. Under fluoroscopic guidance the dilator and sheath were placed over the wire  using Seldinger technique. The dilator and wire were removed and the catheter was introduced and fed into the sheath as the sheath was peeled away. Fluoro was used to confirm the position of the catheter in the atriocaval junction and confirm that the catheter was not kinked. The catheter was then cut to size and attached to the port which was then placed in the chest wall pocket. The port was then access and easily aspirated blood. It was then flushed with heparinized saline. Hemostasis was assured and the dermis of the port pocket was re-approximated with 3-0 vicryl suture in an interrupted fashion. The skin was re-approximated using 4-0 monocryl suture in a running fashion. Dermabond was then applied. The patient was awakened from anesthesia without complication and returned to the postoperative recovery unit in stable condition. At the end of the case, sponge, instrument, and needle counts were correct and hemostasis was achieved. I was present and scrubbed throughout the entirety of the case. A post-operative CXR will be obtained to confirm appropriate catheter location and to rule out pneumothorax.      All images were personally interpreted by me and stored.    COMPLICATIONS: None    CONDITION:  Stable    DISPO: To PACU then home after CXR    Garfield Hoyt Jr

## 2023-07-24 NOTE — DISCHARGE INSTRUCTIONS
NO DRIVING, DRINKING ALCOHOL AND NO SIGNING LEGAL DOCUMENTS FOR 24 HOURS OR WHILE TAKING PAIN MEDICATIONS.  DO NOT SHOWER FOR 24 HOURS. WHEN YOU TAKE A SHOWER DO NOT RUB INCISION WITH WASH CLOTH AND PAT DRY.   NOTIFY YOUR DOCTOR FOR UNCONTROLLED PAIN, TEMPERATURE GREATER THAN 100.5 AND/OR UNCONTROLLED NAUSEA/VOMITING

## 2023-07-24 NOTE — TRANSFER OF CARE
"Anesthesia Transfer of Care Note    Patient: Ruslan Caldwell    Procedure(s) Performed: Procedure(s) (LRB):  [id number] (Left)    Patient location: PACU    Anesthesia Type: general    Transport from OR: Transported from OR on room air with adequate spontaneous ventilation    Post pain: adequate analgesia    Post assessment: no apparent anesthetic complications    Post vital signs: stable    Level of consciousness: awake    Nausea/Vomiting: no nausea/vomiting    Complications: none    Transfer of care protocol was followed      Last vitals:   Visit Vitals  /87 (BP Location: Right arm, Patient Position: Sitting)   Pulse 60   Temp 36.6 °C (97.8 °F) (Oral)   Resp 18   Ht 6' 3" (1.905 m)   Wt 93 kg (205 lb)   SpO2 99%   BMI 25.62 kg/m²     "

## 2023-07-24 NOTE — DISCHARGE SUMMARY
Count includes the [facility]  Discharge Note  Short Stay    Procedure(s) (LRB):  [id number] (Left)      OUTCOME: Patient tolerated treatment/procedure well without complication and is now ready for discharge.    DISPOSITION: Home or Self Care    FINAL DIAGNOSIS:  <principal problem not specified>    FOLLOWUP: In clinic    DISCHARGE INSTRUCTIONS:    Discharge Procedure Orders   Diet Adult Regular     Ice to affected area     Lifting restrictions   Order Comments: Please avoid lifting greater than 20 lb, straining, strenuous activity for one week.     Change dressing (specify)   Order Comments: Post-Operative Wound Care    A surgical glue has been placed over your incisions, please leave the glue in place and do not attempt to remove it.  It is ok to shower using mild soap and water over the incisions the day after your procedure. Pat dry your incisions. Do not soak in a bathtub or other body of water for 2 weeks or until cleared by your surgeon.     If you noticed redness, swelling, fever, increasing pain or significant drainage from your wound please call/message the office or the 24 hr nurse hotline after hours.     Notify your health care provider if you experience any of the following:  temperature >100.4     Notify your health care provider if you experience any of the following:  persistent nausea and vomiting or diarrhea     Notify your health care provider if you experience any of the following:  severe uncontrolled pain     Notify your health care provider if you experience any of the following:  redness, tenderness, or signs of infection (pain, swelling, redness, odor or green/yellow discharge around incision site)     Notify your health care provider if you experience any of the following:  worsening rash     Notify your health care provider if you experience any of the following:  increased confusion or weakness     Shower on day dressing removed (No bath)        TIME SPENT ON DISCHARGE: 10  minutes

## 2023-07-25 ENCOUNTER — TELEPHONE (OUTPATIENT)
Dept: HEMATOLOGY/ONCOLOGY | Facility: CLINIC | Age: 48
End: 2023-07-25
Payer: MEDICAID

## 2023-07-25 NOTE — TELEPHONE ENCOUNTER
----- Message from Janiya Weathers sent at 7/25/2023  3:32 PM CDT -----  Regarding: Needs Medical Advice  Contact: patient's wife at [phone]  Type: Needs Medical Advice  Who Called:  patient's wife at [phone]    Pharmacy name and phone #:    [id number] - [address] & [address] [address]  Phone: [phone] Fax: [phone]    Additional Information: calling because patient states that the medication is not strong enough and is helping very little. He is taking more than 1 for it to try and help. Please call and advise. Thank you      HYDROmorphone (DILAUDID) 2 MG tablet 30 tablet 0 7/22/2023    Sig - Route: Take 1 tablet (2 mg total) by mouth every 4 (four) hours as needed for Pain. - Oral   Sent to pharmacy as: HYDROmorphone (DILAUDID) 2 MG tablet   Earliest Fill Date: 7/22/2023   Notes to Pharmacy: n/a    E-Prescribing Status: Receipt confirmed by pharmacy (7/22/2023  3:09 PM CDT)

## 2023-07-27 ENCOUNTER — PATIENT MESSAGE (OUTPATIENT)
Dept: HEMATOLOGY/ONCOLOGY | Facility: CLINIC | Age: 48
End: 2023-07-27
Payer: MEDICAID

## 2023-07-27 DIAGNOSIS — C18.9 COLON ADENOCARCINOMA: ICD-10-CM

## 2023-07-28 RX ORDER — HYDROMORPHONE HYDROCHLORIDE 2 MG/1
2 TABLET ORAL EVERY 4 HOURS PRN
Qty: 30 TABLET | Refills: 0 | Status: SHIPPED | OUTPATIENT
Start: 2023-07-28 | End: 2023-08-01 | Stop reason: SDUPTHER

## 2023-07-31 ENCOUNTER — TELEPHONE (OUTPATIENT)
Dept: HEMATOLOGY/ONCOLOGY | Facility: CLINIC | Age: 48
End: 2023-07-31
Payer: MEDICAID

## 2023-07-31 NOTE — TELEPHONE ENCOUNTER
LVM to let pt know that labs are needed prior to his appt today at 1:40. Gave pt [phone] to call back to get r/s

## 2023-08-01 ENCOUNTER — PATIENT MESSAGE (OUTPATIENT)
Dept: HEMATOLOGY/ONCOLOGY | Facility: CLINIC | Age: 48
End: 2023-08-01
Payer: MEDICAID

## 2023-08-01 ENCOUNTER — INFUSION (OUTPATIENT)
Dept: INFUSION THERAPY | Facility: HOSPITAL | Age: 48
End: 2023-08-01
Attending: INTERNAL MEDICINE
Payer: MEDICAID

## 2023-08-01 ENCOUNTER — OFFICE VISIT (OUTPATIENT)
Dept: HEMATOLOGY/ONCOLOGY | Facility: CLINIC | Age: 48
End: 2023-08-01
Payer: MEDICAID

## 2023-08-01 ENCOUNTER — SOCIAL WORK (OUTPATIENT)
Dept: HEMATOLOGY/ONCOLOGY | Facility: CLINIC | Age: 48
End: 2023-08-01

## 2023-08-01 VITALS
TEMPERATURE: 98 F | OXYGEN SATURATION: 100 % | SYSTOLIC BLOOD PRESSURE: 124 MMHG | DIASTOLIC BLOOD PRESSURE: 84 MMHG | HEART RATE: 61 BPM | BODY MASS INDEX: 24.73 KG/M2 | HEIGHT: 75 IN | WEIGHT: 198.88 LBS | RESPIRATION RATE: 18 BRPM

## 2023-08-01 VITALS
OXYGEN SATURATION: 98 % | RESPIRATION RATE: 10 BRPM | HEART RATE: 87 BPM | TEMPERATURE: 99 F | WEIGHT: 198.88 LBS | HEIGHT: 75 IN | SYSTOLIC BLOOD PRESSURE: 124 MMHG | DIASTOLIC BLOOD PRESSURE: 82 MMHG | BODY MASS INDEX: 24.73 KG/M2

## 2023-08-01 DIAGNOSIS — C18.9 COLON ADENOCARCINOMA: ICD-10-CM

## 2023-08-01 DIAGNOSIS — C18.9 COLON ADENOCARCINOMA: Primary | ICD-10-CM

## 2023-08-01 DIAGNOSIS — K56.609 SMALL BOWEL OBSTRUCTION: Primary | ICD-10-CM

## 2023-08-01 PROCEDURE — 99999 PR PBB SHADOW E&M-EST. PATIENT-LVL III: CPT | Mod: PBBFAC,,, | Performed by: INTERNAL MEDICINE

## 2023-08-01 PROCEDURE — 1160F RVW MEDS BY RX/DR IN RCRD: CPT | Mod: CPTII,,, | Performed by: INTERNAL MEDICINE

## 2023-08-01 PROCEDURE — 96368 THER/DIAG CONCURRENT INF: CPT

## 2023-08-01 PROCEDURE — 3079F DIAST BP 80-89 MM HG: CPT | Mod: CPTII,,, | Performed by: INTERNAL MEDICINE

## 2023-08-01 PROCEDURE — 99215 OFFICE O/P EST HI 40 MIN: CPT | Mod: S$PBB,,, | Performed by: INTERNAL MEDICINE

## 2023-08-01 PROCEDURE — 3044F HG A1C LEVEL LT 7.0%: CPT | Mod: CPTII,,, | Performed by: INTERNAL MEDICINE

## 2023-08-01 PROCEDURE — 99215 PR OFFICE/OUTPT VISIT, EST, LEVL V, 40-54 MIN: ICD-10-PCS | Mod: S$PBB,,, | Performed by: INTERNAL MEDICINE

## 2023-08-01 PROCEDURE — 96411 CHEMO IV PUSH ADDL DRUG: CPT

## 2023-08-01 PROCEDURE — 3079F PR MOST RECENT DIASTOLIC BLOOD PRESSURE 80-89 MM HG: ICD-10-PCS | Mod: CPTII,,, | Performed by: INTERNAL MEDICINE

## 2023-08-01 PROCEDURE — 99999 PR PBB SHADOW E&M-EST. PATIENT-LVL III: ICD-10-PCS | Mod: PBBFAC,,, | Performed by: INTERNAL MEDICINE

## 2023-08-01 PROCEDURE — 96366 THER/PROPH/DIAG IV INF ADDON: CPT

## 2023-08-01 PROCEDURE — 1159F PR MEDICATION LIST DOCUMENTED IN MEDICAL RECORD: ICD-10-PCS | Mod: CPTII,,, | Performed by: INTERNAL MEDICINE

## 2023-08-01 PROCEDURE — 3074F SYST BP LT 130 MM HG: CPT | Mod: CPTII,,, | Performed by: INTERNAL MEDICINE

## 2023-08-01 PROCEDURE — 63600175 PHARM REV CODE 636 W HCPCS: Performed by: INTERNAL MEDICINE

## 2023-08-01 PROCEDURE — 96415 CHEMO IV INFUSION ADDL HR: CPT

## 2023-08-01 PROCEDURE — 3074F PR MOST RECENT SYSTOLIC BLOOD PRESSURE < 130 MM HG: ICD-10-PCS | Mod: CPTII,,, | Performed by: INTERNAL MEDICINE

## 2023-08-01 PROCEDURE — 96416 CHEMO PROLONG INFUSE W/PUMP: CPT

## 2023-08-01 PROCEDURE — 96413 CHEMO IV INFUSION 1 HR: CPT

## 2023-08-01 PROCEDURE — 1160F PR REVIEW ALL MEDS BY PRESCRIBER/CLIN PHARMACIST DOCUMENTED: ICD-10-PCS | Mod: CPTII,,, | Performed by: INTERNAL MEDICINE

## 2023-08-01 PROCEDURE — 99213 OFFICE O/P EST LOW 20 MIN: CPT | Mod: PBBFAC,PN | Performed by: INTERNAL MEDICINE

## 2023-08-01 PROCEDURE — 3044F PR MOST RECENT HEMOGLOBIN A1C LEVEL <7.0%: ICD-10-PCS | Mod: CPTII,,, | Performed by: INTERNAL MEDICINE

## 2023-08-01 PROCEDURE — 1159F MED LIST DOCD IN RCRD: CPT | Mod: CPTII,,, | Performed by: INTERNAL MEDICINE

## 2023-08-01 PROCEDURE — 3008F BODY MASS INDEX DOCD: CPT | Mod: CPTII,,, | Performed by: INTERNAL MEDICINE

## 2023-08-01 PROCEDURE — 25000003 PHARM REV CODE 250: Performed by: INTERNAL MEDICINE

## 2023-08-01 PROCEDURE — 3008F PR BODY MASS INDEX (BMI) DOCUMENTED: ICD-10-PCS | Mod: CPTII,,, | Performed by: INTERNAL MEDICINE

## 2023-08-01 PROCEDURE — 96367 TX/PROPH/DG ADDL SEQ IV INF: CPT

## 2023-08-01 RX ORDER — HYDROMORPHONE HYDROCHLORIDE 2 MG/1
2 TABLET ORAL EVERY 4 HOURS PRN
Qty: 10 TABLET | Refills: 0 | Status: SHIPPED | OUTPATIENT
Start: 2023-08-01 | End: 2023-08-04 | Stop reason: SDUPTHER

## 2023-08-01 RX ORDER — FLUOROURACIL 50 MG/ML
400 INJECTION, SOLUTION INTRAVENOUS
Status: COMPLETED | OUTPATIENT
Start: 2023-08-01 | End: 2023-08-01

## 2023-08-01 RX ORDER — POTASSIUM CHLORIDE 20 MEQ/1
20 TABLET, EXTENDED RELEASE ORAL DAILY
Qty: 30 TABLET | Refills: 11 | Status: SHIPPED | OUTPATIENT
Start: 2023-08-01 | End: 2023-08-04 | Stop reason: SDUPTHER

## 2023-08-01 RX ORDER — DIPHENHYDRAMINE HYDROCHLORIDE 50 MG/ML
50 INJECTION INTRAMUSCULAR; INTRAVENOUS ONCE AS NEEDED
Status: CANCELLED | OUTPATIENT
Start: 2023-08-01

## 2023-08-01 RX ORDER — EPINEPHRINE 0.3 MG/.3ML
0.3 INJECTION SUBCUTANEOUS ONCE AS NEEDED
Status: CANCELLED | OUTPATIENT
Start: 2023-08-01

## 2023-08-01 RX ORDER — DIPHENHYDRAMINE HYDROCHLORIDE 50 MG/ML
50 INJECTION INTRAMUSCULAR; INTRAVENOUS ONCE AS NEEDED
Status: DISCONTINUED | OUTPATIENT
Start: 2023-08-01 | End: 2023-08-01 | Stop reason: HOSPADM

## 2023-08-01 RX ORDER — EPINEPHRINE 0.3 MG/.3ML
0.3 INJECTION SUBCUTANEOUS ONCE AS NEEDED
Status: DISCONTINUED | OUTPATIENT
Start: 2023-08-01 | End: 2023-08-01 | Stop reason: HOSPADM

## 2023-08-01 RX ORDER — SODIUM CHLORIDE 0.9 % (FLUSH) 0.9 %
10 SYRINGE (ML) INJECTION
Status: CANCELLED | OUTPATIENT
Start: 2023-08-01

## 2023-08-01 RX ORDER — FLUOROURACIL 50 MG/ML
400 INJECTION, SOLUTION INTRAVENOUS
Status: CANCELLED | OUTPATIENT
Start: 2023-08-01

## 2023-08-01 RX ORDER — HEPARIN 100 UNIT/ML
500 SYRINGE INTRAVENOUS
Status: CANCELLED | OUTPATIENT
Start: 2023-08-01

## 2023-08-01 RX ORDER — HYDROMORPHONE HYDROCHLORIDE 2 MG/1
2 TABLET ORAL EVERY 4 HOURS PRN
Qty: 10 TABLET | Refills: 0 | Status: SHIPPED | OUTPATIENT
Start: 2023-08-01 | End: 2023-08-01 | Stop reason: SDUPTHER

## 2023-08-01 RX ORDER — FLUOROURACIL 50 MG/ML
2400 INJECTION, SOLUTION INTRAVENOUS
Status: CANCELLED | OUTPATIENT
Start: 2023-08-01

## 2023-08-01 RX ORDER — SODIUM CHLORIDE 0.9 % (FLUSH) 0.9 %
10 SYRINGE (ML) INJECTION
Status: CANCELLED | OUTPATIENT
Start: 2023-08-03

## 2023-08-01 RX ORDER — SODIUM CHLORIDE 0.9 % (FLUSH) 0.9 %
10 SYRINGE (ML) INJECTION
Status: DISCONTINUED | OUTPATIENT
Start: 2023-08-01 | End: 2023-08-01 | Stop reason: HOSPADM

## 2023-08-01 RX ORDER — HEPARIN 100 UNIT/ML
500 SYRINGE INTRAVENOUS
Status: CANCELLED | OUTPATIENT
Start: 2023-08-03

## 2023-08-01 RX ADMIN — OXALIPLATIN 180 MG: 5 INJECTION, SOLUTION INTRAVENOUS at 11:08

## 2023-08-01 RX ADMIN — PALONOSETRON HYDROCHLORIDE 0.25 MG: 0.25 INJECTION INTRAVENOUS at 11:08

## 2023-08-01 RX ADMIN — LEUCOVORIN CALCIUM 850 MG: 500 INJECTION, POWDER, LYOPHILIZED, FOR SOLUTION INTRAMUSCULAR; INTRAVENOUS at 11:08

## 2023-08-01 RX ADMIN — DEXTROSE: 5 SOLUTION INTRAVENOUS at 10:08

## 2023-08-01 RX ADMIN — FLUOROURACIL 5090 MG: 50 INJECTION, SOLUTION INTRAVENOUS at 02:08

## 2023-08-01 RX ADMIN — FLUOROURACIL 850 MG: 50 INJECTION, SOLUTION INTRAVENOUS at 02:08

## 2023-08-01 NOTE — PROGRESS NOTES
Ruslan Caldwell is a 47 year old diagnosed with colon cancer.  Patient is here today, accompanied by his sister, for his first chemo infusion.  I met with patient to complete new patient orientation and to complete the NCCN Distress Screening; patient indicated a rating of 6.  Patient denied needing psychosocial support at this time.  I provided patient with the [facility] community events flyer and my contact information in the event supportive services arise in the future.

## 2023-08-01 NOTE — PROGRESS NOTES
Service Date:  8/1/23    Chief Complaint: Colon Cancer (First Chemo Clear)    Ruslan Caldwell is a 47 y.o. male with stage III colon adenocarcinoma, here to start chemotherapy.  We will have his 1st dose of FOLFOX today.  Lab work looks okay.  Patient is anxious about treatment otherwise doing well.  Complaining of postsurgical abdominal pain.  Taking 2 of his 2 mg Dilaudid every 4 hours which has not improved his pain.    Review of Systems   Constitutional: Negative.    HENT: Negative.     Eyes: Negative.    Respiratory: Negative.     Cardiovascular: Negative.    Gastrointestinal: Negative.    Endocrine: Negative.    Genitourinary: Negative.    Musculoskeletal: Negative.    Integumentary:  Negative.   Neurological: Negative.    Hematological: Negative.    Psychiatric/Behavioral: Negative.          Current Outpatient Medications   Medication Instructions    dexAMETHasone (DECADRON) 8 mg, Oral, Daily, Take as directed on days 2 and 3 of your chemotherapy cycle.    HYDROmorphone (DILAUDID) 2 mg, Oral, Every 4 hours PRN    LIDOcaine-prilocaine (EMLA) cream Topical (Top), As needed (PRN)    ondansetron (ZOFRAN-ODT) 4 mg, Oral, Every 6 hours PRN    ondansetron (ZOFRAN-ODT) 8 mg, Oral, Every 8 hours PRN    prochlorperazine (COMPAZINE) 10 mg, Oral, Every 6 hours PRN        Past Medical History:   Diagnosis Date    Colon cancer 05/19/2023    Diabetes mellitus, type 2 05/2020    Patient denies    Hypertension         Past Surgical History:   Procedure Laterality Date    COLONOSCOPY N/A 5/29/2023    Procedure: COLONOSCOPY;  Surgeon: Sam Solano MD;  Location: [facility] ENDO;  Service: Endoscopy;  Laterality: N/A;    EXCISION, SMALL INTESTINE N/A 6/15/2023    Procedure: EXCISION, SMALL INTESTINE;  Surgeon: Edin Michael MD;  Location: [facility] OR;  Service: General;  Laterality: N/A;    EYE SURGERY Right     INSERTION OF TUNNELED CENTRAL VENOUS CATHETER (CVC) WITH SUBCUTANEOUS PORT Left 7/24/2023    Procedure:  "[id number];  Surgeon: Garfield Hoyt Jr., MD;  Location: [facility] OR;  Service: General;  Laterality: Left;    LAPAROTOMY, EXPLORATORY N/A 6/15/2023    Procedure: LAPAROTOMY, EXPLORATORY;  Surgeon: Edin Michael MD;  Location: [facility] OR;  Service: General;  Laterality: N/A;    LAPAROTOMY, EXPLORATORY N/A 6/17/2023    Procedure: LAPAROTOMY, EXPLORATORY;  Surgeon: Garfield Hoyt Jr., MD;  Location: [facility] OR;  Service: General;  Laterality: N/A;    LUMBAR DISC SURGERY  2005    LYSIS OF ADHESIONS N/A 6/15/2023    Procedure: LYSIS, ADHESIONS;  Surgeon: Edin Michael MD;  Location: [facility] OR;  Service: General;  Laterality: N/A;    OMENTECTOMY N/A 6/15/2023    Procedure: OMENTECTOMY;  Surgeon: Edin Michael MD;  Location: [facility] OR;  Service: General;  Laterality: N/A;    SUBTOTAL COLECTOMY Right 5/29/2023    Procedure: COLECTOMY, PARTIAL;  Surgeon: Edin Michael MD;  Location: [facility] OR;  Service: General;  Laterality: Right;        Family History   Problem Relation Age of Onset    Diabetes Mother     Diabetes Maternal Grandfather     No Known Problems Father        Social History     Tobacco Use    Smoking status: Every Day     Current packs/day: 0.50     Average packs/day: 0.5 packs/day for 23.0 years (11.5 ttl pk-yrs)     Types: Cigarettes    Smokeless tobacco: Never    Tobacco comments:     Do not smoke day of surgery   Substance Use Topics    Alcohol use: Not Currently     Comment: Quit drinking 2 yrs ago    Drug use: Yes     Types: Marijuana     Comment: heavy marijuana use         Vitals:    08/01/23 0924   BP: 124/82   Pulse:    Resp:    Temp:         Physical Exam:  /82 (BP Location: Right arm, Patient Position: Sitting, BP Method: Large (Manual))   Pulse 87   Temp 98.5 °F (36.9 °C) (Temporal)   Resp 10   Ht 6' 3" (1.905 m)   Wt 90.2 kg (198 lb 13.7 oz)   SpO2 98%   BMI 24.86 kg/m²     Physical Exam  Vitals and nursing note reviewed.   Constitutional:       Appearance: " Normal appearance.   HENT:      Head: Normocephalic and atraumatic.      Nose: Nose normal.      Mouth/Throat:      Mouth: Mucous membranes are moist.      Pharynx: Oropharynx is clear.   Eyes:      Extraocular Movements: Extraocular movements intact.      Conjunctiva/sclera: Conjunctivae normal.   Cardiovascular:      Rate and Rhythm: Normal rate and regular rhythm.      Heart sounds: Normal heart sounds.   Pulmonary:      Effort: Pulmonary effort is normal.      Breath sounds: Normal breath sounds.   Abdominal:      General: Abdomen is flat. Bowel sounds are normal.      Palpations: Abdomen is soft.   Musculoskeletal:         General: Normal range of motion.      Cervical back: Normal range of motion and neck supple.   Skin:     General: Skin is warm and dry.   Neurological:      General: No focal deficit present.      Mental Status: He is alert and oriented to person, place, and time. Mental status is at baseline.   Psychiatric:         Mood and Affect: Mood normal.          Labs:  Lab Results   Component Value Date    WBC 14.44 (H) 08/01/2023    RBC 4.52 (L) 08/01/2023    HGB 9.9 (L) 08/01/2023    HCT 32.4 (L) 08/01/2023    MCV 72 (L) 08/01/2023    MCH 21.9 (L) 08/01/2023    MCHC 30.6 (L) 08/01/2023    RDW 21.7 (H) 08/01/2023     (H) 08/01/2023    MPV 9.0 (L) 08/01/2023    GRAN 8.8 (H) 08/01/2023    GRAN 61.0 08/01/2023    LYMPH 4.2 08/01/2023    LYMPH 29.3 08/01/2023    MONO 1.0 08/01/2023    MONO 7.2 08/01/2023    EOS 0.2 08/01/2023    BASO 0.10 08/01/2023    EOSINOPHIL 1.5 08/01/2023    BASOPHIL 0.7 08/01/2023     Sodium   Date Value Ref Range Status   07/20/2023 140 136 - 145 mmol/L Final     Potassium   Date Value Ref Range Status   07/24/2023 3.2 (L) 3.5 - 5.1 mmol/L Final     Chloride   Date Value Ref Range Status   07/20/2023 101 95 - 110 mmol/L Final     CO2   Date Value Ref Range Status   07/20/2023 29 23 - 29 mmol/L Final     Glucose   Date Value Ref Range Status   07/20/2023 95 70 - 110 mg/dL  Final     BUN   Date Value Ref Range Status   07/20/2023 6 6 - 20 mg/dL Final     Creatinine   Date Value Ref Range Status   07/20/2023 1.1 0.5 - 1.4 mg/dL Final     Calcium   Date Value Ref Range Status   07/20/2023 9.2 8.7 - 10.5 mg/dL Final     Total Protein   Date Value Ref Range Status   07/20/2023 7.6 6.0 - 8.4 g/dL Final     Albumin   Date Value Ref Range Status   07/20/2023 3.2 (L) 3.5 - 5.2 g/dL Final     Total Bilirubin   Date Value Ref Range Status   07/20/2023 0.6 0.1 - 1.0 mg/dL Final     Comment:     For infants and newborns, interpretation of results should be based  on gestational age, weight and in agreement with clinical  observations.    Premature Infant recommended reference ranges:  Up to 24 hours.............<8.0 mg/dL  Up to 48 hours............<12.0 mg/dL  3-5 days..................<15.0 mg/dL  6-29 days.................<15.0 mg/dL       Alkaline Phosphatase   Date Value Ref Range Status   07/20/2023 89 55 - 135 U/L Final     AST   Date Value Ref Range Status   07/20/2023 10 10 - 40 U/L Final     ALT   Date Value Ref Range Status   07/20/2023 22 10 - 44 U/L Final     Anion Gap   Date Value Ref Range Status   07/20/2023 10 8 - 16 mmol/L Final     eGFR if    Date Value Ref Range Status   09/18/2021 >60.0 >60 mL/min/1.73 m^2 Final     eGFR if non    Date Value Ref Range Status   09/18/2021 >60.0 >60 mL/min/1.73 m^2 Final     Comment:     Calculation used to obtain the estimated glomerular filtration  rate (eGFR) is the CKD-EPI equation.          A/P:    Colon adenocarcinoma  -stage III   -okay to start FOLFOX today  -proceed with cycle 1  -reiterated for an take his Decadron for the next 2 days after treatment   -return to clinic in 2 weeks with labs for next treatment     Postsurgical abdominal pain   -refilled Dilaudid at just 10 pills and asked him to increase it from 2 mg to 6 mg with 3 pills.  Asked him to follow up with his oncologist for this.  Patient may  be having cramping from all his GI surgery, and could benefit from having a GI referral, but will defer at this time.      Aurash Khoobehi, MD  Hematology and Oncology

## 2023-08-01 NOTE — PLAN OF CARE
Problem: Anemia (Chemotherapy Effects)  Goal: Anemia Symptom Improvement  Outcome: Ongoing, Not Progressing     Problem: Urinary Bleeding Risk or Actual (Chemotherapy Effects)  Goal: Absence of Hematuria  Outcome: Ongoing, Not Progressing     Problem: Nausea and Vomiting (Chemotherapy Effects)  Goal: Fluid and Electrolyte Balance  Outcome: Ongoing, Not Progressing     Problem: Neurotoxicity (Chemotherapy Effects)  Goal: Neurotoxicity Symptom Control  Outcome: Ongoing, Not Progressing     Problem: Neutropenia (Chemotherapy Effects)  Goal: Absence of Infection  Outcome: Ongoing, Not Progressing     Problem: Oral Mucositis (Chemotherapy Effects)  Goal: Improved Oral Mucous Membrane Integrity  Outcome: Ongoing, Not Progressing     Problem: Thrombocytopenia Bleeding Risk (Chemotherapy Effects)  Goal: Absence of Bleeding  Outcome: Ongoing, Not Progressing

## 2023-08-03 ENCOUNTER — INFUSION (OUTPATIENT)
Dept: INFUSION THERAPY | Facility: HOSPITAL | Age: 48
End: 2023-08-03
Attending: INTERNAL MEDICINE
Payer: MEDICAID

## 2023-08-03 VITALS
HEART RATE: 72 BPM | BODY MASS INDEX: 25.61 KG/M2 | HEIGHT: 75 IN | SYSTOLIC BLOOD PRESSURE: 131 MMHG | WEIGHT: 206 LBS | DIASTOLIC BLOOD PRESSURE: 82 MMHG | RESPIRATION RATE: 17 BRPM | OXYGEN SATURATION: 99 %

## 2023-08-03 DIAGNOSIS — C18.9 COLON ADENOCARCINOMA: ICD-10-CM

## 2023-08-03 DIAGNOSIS — K56.609 SMALL BOWEL OBSTRUCTION: Primary | ICD-10-CM

## 2023-08-03 PROCEDURE — 63600175 PHARM REV CODE 636 W HCPCS: Performed by: INTERNAL MEDICINE

## 2023-08-03 PROCEDURE — 96523 IRRIG DRUG DELIVERY DEVICE: CPT

## 2023-08-03 RX ORDER — HEPARIN 100 UNIT/ML
500 SYRINGE INTRAVENOUS
Status: DISCONTINUED | OUTPATIENT
Start: 2023-08-03 | End: 2023-08-03 | Stop reason: HOSPADM

## 2023-08-03 RX ORDER — SODIUM CHLORIDE 0.9 % (FLUSH) 0.9 %
10 SYRINGE (ML) INJECTION
Status: DISCONTINUED | OUTPATIENT
Start: 2023-08-03 | End: 2023-08-03 | Stop reason: HOSPADM

## 2023-08-03 RX ADMIN — HEPARIN 500 UNITS: 100 SYRINGE at 12:08

## 2023-08-03 NOTE — PLAN OF CARE
Problem: Fatigue  Goal: Improved Activity Tolerance  Intervention: Promote Improved Energy  Flowsheets (Taken 8/3/2023 1201)  Fatigue Management: fatigue-related activity identified  Activity Management: Ambulated -L4

## 2023-08-04 ENCOUNTER — TELEPHONE (OUTPATIENT)
Dept: HEMATOLOGY/ONCOLOGY | Facility: CLINIC | Age: 48
End: 2023-08-04
Payer: MEDICAID

## 2023-08-04 DIAGNOSIS — C18.9 COLON ADENOCARCINOMA: ICD-10-CM

## 2023-08-04 RX ORDER — POTASSIUM CHLORIDE 20 MEQ/1
20 TABLET, EXTENDED RELEASE ORAL DAILY
Qty: 30 TABLET | Refills: 11 | Status: SHIPPED | OUTPATIENT
Start: 2023-08-04 | End: 2024-08-03

## 2023-08-04 RX ORDER — HYDROMORPHONE HYDROCHLORIDE 2 MG/1
2 TABLET ORAL EVERY 4 HOURS PRN
Qty: 10 TABLET | Refills: 0 | Status: SHIPPED | OUTPATIENT
Start: 2023-08-04 | End: 2023-11-14 | Stop reason: ALTCHOICE

## 2023-08-04 RX ORDER — HYDROMORPHONE HYDROCHLORIDE 2 MG/1
2 TABLET ORAL EVERY 4 HOURS PRN
Qty: 10 TABLET | Refills: 0 | OUTPATIENT
Start: 2023-08-04

## 2023-08-04 NOTE — TELEPHONE ENCOUNTER
Spoke to pt to let him know that medication was sent in to [facility]. Pt verbalized understanding

## 2023-08-06 DIAGNOSIS — C18.9 COLON ADENOCARCINOMA: ICD-10-CM

## 2023-08-06 LAB
ACID FAST MOD KINY STN SPEC: NORMAL
MYCOBACTERIUM SPEC QL CULT: NORMAL

## 2023-08-06 RX ORDER — HYDROMORPHONE HYDROCHLORIDE 2 MG/1
2 TABLET ORAL EVERY 4 HOURS PRN
Qty: 10 TABLET | Refills: 0 | Status: CANCELLED | OUTPATIENT
Start: 2023-08-06

## 2023-08-07 ENCOUNTER — TELEPHONE (OUTPATIENT)
Dept: HEMATOLOGY/ONCOLOGY | Facility: CLINIC | Age: 48
End: 2023-08-07
Payer: MEDICAID

## 2023-08-07 DIAGNOSIS — C18.9 COLON ADENOCARCINOMA: ICD-10-CM

## 2023-08-07 RX ORDER — HYDROMORPHONE HYDROCHLORIDE 2 MG/1
2 TABLET ORAL EVERY 4 HOURS PRN
Qty: 10 TABLET | Refills: 0 | Status: CANCELLED | OUTPATIENT
Start: 2023-08-07

## 2023-08-07 NOTE — TELEPHONE ENCOUNTER
Mr. Caldwell called reporting that he takes HYDROmorphone (DILAUDID) 2 MG tablet 2 tablets (4mg) 4 times a day & that he is in a lot of pain.right now. Please reply when the Rx is filled & I will send a message via the patient portal to inform him. Thanks!

## 2023-08-07 NOTE — TELEPHONE ENCOUNTER
Valerie, RN spoke to pt. He is coming to office to speak to Dr. Baird tomorrow regarding pain management. Per Dr. Baird no pain medication will be ordered until tomorrow's visit.     ----- Message from Amita Hernandez sent at 8/7/2023  2:24 PM CDT -----  Type:  RX Refill Request    Who Called:  Pt    Refill or New Rx:  refill    RX Name and Strength:  HYDROmorphone (DILAUDID) 2 MG tablet    How is the patient currently taking it? (ex. 1XDay):  as directed    Is this a 30 day or 90 day RX:  90    Preferred Pharmacy with phone number:    [id number] - [facility] [address] [address]  Phone: [phone] Fax: [phone]    Local or Mail Order:  Local    Ordering Provider:  RICHA Humphrey and Dr. Baird supervised    Would the patient rather a call back or a response via [facility]?  Call back    Best Call Back Number:  [phone]    Additional Information:  Pt is out and can not handle the pain. He is currently stuck in bed due to pain.  Please call back to advise. Thanks!

## 2023-08-07 NOTE — TELEPHONE ENCOUNTER
*DARVIN PT*    This is per Dr Khoobehi last note  Postsurgical abdominal pain   -refilled Dilaudid at just 10 pills and asked him to increase it from 2 mg to 6 mg with 3 pills.  Asked him to follow up with his oncologist for this.  Patient may be having cramping from all his GI surgery, and could benefit from having a GI referral, but will defer at this time.    Please advise

## 2023-08-08 ENCOUNTER — OFFICE VISIT (OUTPATIENT)
Dept: HEMATOLOGY/ONCOLOGY | Facility: CLINIC | Age: 48
End: 2023-08-08
Payer: MEDICAID

## 2023-08-08 VITALS
HEIGHT: 75 IN | TEMPERATURE: 99 F | BODY MASS INDEX: 23.98 KG/M2 | DIASTOLIC BLOOD PRESSURE: 76 MMHG | WEIGHT: 192.88 LBS | HEART RATE: 113 BPM | RESPIRATION RATE: 12 BRPM | SYSTOLIC BLOOD PRESSURE: 132 MMHG | OXYGEN SATURATION: 99 %

## 2023-08-08 DIAGNOSIS — R10.9 ABDOMINAL PAIN, UNSPECIFIED ABDOMINAL LOCATION: Primary | ICD-10-CM

## 2023-08-08 DIAGNOSIS — C18.9 COLON ADENOCARCINOMA: ICD-10-CM

## 2023-08-08 DIAGNOSIS — K56.609 SMALL BOWEL OBSTRUCTION: ICD-10-CM

## 2023-08-08 PROCEDURE — 3008F BODY MASS INDEX DOCD: CPT | Mod: CPTII,,, | Performed by: INTERNAL MEDICINE

## 2023-08-08 PROCEDURE — 3078F PR MOST RECENT DIASTOLIC BLOOD PRESSURE < 80 MM HG: ICD-10-PCS | Mod: CPTII,,, | Performed by: INTERNAL MEDICINE

## 2023-08-08 PROCEDURE — 1159F MED LIST DOCD IN RCRD: CPT | Mod: CPTII,,, | Performed by: INTERNAL MEDICINE

## 2023-08-08 PROCEDURE — 3075F PR MOST RECENT SYSTOLIC BLOOD PRESS GE 130-139MM HG: ICD-10-PCS | Mod: CPTII,,, | Performed by: INTERNAL MEDICINE

## 2023-08-08 PROCEDURE — 1159F PR MEDICATION LIST DOCUMENTED IN MEDICAL RECORD: ICD-10-PCS | Mod: CPTII,,, | Performed by: INTERNAL MEDICINE

## 2023-08-08 PROCEDURE — 3044F PR MOST RECENT HEMOGLOBIN A1C LEVEL <7.0%: ICD-10-PCS | Mod: CPTII,,, | Performed by: INTERNAL MEDICINE

## 2023-08-08 PROCEDURE — 3075F SYST BP GE 130 - 139MM HG: CPT | Mod: CPTII,,, | Performed by: INTERNAL MEDICINE

## 2023-08-08 PROCEDURE — 3044F HG A1C LEVEL LT 7.0%: CPT | Mod: CPTII,,, | Performed by: INTERNAL MEDICINE

## 2023-08-08 PROCEDURE — 99999 PR PBB SHADOW E&M-EST. PATIENT-LVL IV: ICD-10-PCS | Mod: PBBFAC,,, | Performed by: INTERNAL MEDICINE

## 2023-08-08 PROCEDURE — 99215 PR OFFICE/OUTPT VISIT, EST, LEVL V, 40-54 MIN: ICD-10-PCS | Mod: S$PBB,,, | Performed by: INTERNAL MEDICINE

## 2023-08-08 PROCEDURE — 3078F DIAST BP <80 MM HG: CPT | Mod: CPTII,,, | Performed by: INTERNAL MEDICINE

## 2023-08-08 PROCEDURE — 99999 PR PBB SHADOW E&M-EST. PATIENT-LVL IV: CPT | Mod: PBBFAC,,, | Performed by: INTERNAL MEDICINE

## 2023-08-08 PROCEDURE — 99215 OFFICE O/P EST HI 40 MIN: CPT | Mod: S$PBB,,, | Performed by: INTERNAL MEDICINE

## 2023-08-08 PROCEDURE — 3008F PR BODY MASS INDEX (BMI) DOCUMENTED: ICD-10-PCS | Mod: CPTII,,, | Performed by: INTERNAL MEDICINE

## 2023-08-08 PROCEDURE — 99214 OFFICE O/P EST MOD 30 MIN: CPT | Mod: PBBFAC,PN | Performed by: INTERNAL MEDICINE

## 2023-08-08 RX ORDER — MORPHINE SULFATE 30 MG/1
30 TABLET, FILM COATED, EXTENDED RELEASE ORAL EVERY 8 HOURS PRN
Qty: 90 TABLET | Refills: 0 | Status: SHIPPED | OUTPATIENT
Start: 2023-08-08 | End: 2023-09-20 | Stop reason: SDUPTHER

## 2023-08-08 NOTE — PROGRESS NOTES
HPI    47 years old male newly diagnosed colon cancer.  He was transferred from [facility] for evaluation possible developed a small-bowel obstruction.  He was complaining abdominal pain nausea and vomiting.  Workup shows leukocytosis, anemia and thrombocytosis.  CT scan from outside facility was concerning for developing small-bowel obstruction.  Patient was transferred admitted to [facility] underwent right partial colectomy on 05/29 with pathology consistent adenocarcinoma.  He was discharged then and return to hospital for another admission on 06/08/2023 where he was treated for ileus.  Medical history including type 2 diabetes anemia hypertension right eye blindness.       Overview/Hospital Course:  Ruslan Caldwell is a 47 year old male with a past medical history of recently diagnosed colonic adenocarcinoma s/p partial colectomy with anastomosis, tobacco use, microcytic anemia and thrombocytosis who presented with persistent abdominal pain, nausea and abdominal pain concerning for SBO. An NG tube was unable to be placed. He was placed on IV fluids and Zosyn. He was made NPO. PRN IV analgesics and antiemetics were ordered and General Surgery was consulted. Repeat CT A/P shows SBO 6/13. A gastrografin enema 6/14 was inconclusive. Surgery took the patient to the OR 6/15 for exploratory laparotomy where an omental infarction was addressed; the patient also underwent SCOUT and partial small bowel resection with anastomosis. PPN was started 6/16. His course has been complicated by JOYCE in setting of vomiting as well as Toradol use; IV fluids were continued and his kidney function has improved to baseline. Nephrology has been consulted. He was also discovered to have an iron deficiency for which IV iron (one dose) was ordered 6/13.  His post-operative course has been complicated by low grade fever, tachycardia and an increasing WBC count, despite Zosyn use. Antibiotics were broadened to vancomycin,  cefepime and Flagyl. Blood cultures were ordered as well as a CXR and urine culture.         Interval History:  Patient seen and examined.  Per nurse patient ate outside food last night and tolerated it.  Surgeon has started regular diet and is planning for possible discharge tomorrow  Review of Systems   Constitutional:  Negative for fever.   Gastrointestinal:  Positive for abdominal pain. Negative for nausea and vomiting.   Skin:  Positive for wound.   Allergic/Immunologic: Positive for immunocompromised state.         Past Medical History:   Diagnosis Date    Colon cancer 05/19/2023    Diabetes mellitus, type 2 05/2020    Patient denies    Hypertension      Past Surgical History:   Procedure Laterality Date    COLONOSCOPY N/A 5/29/2023    Procedure: COLONOSCOPY;  Surgeon: Sam Solano MD;  Location: [facility];  Service: Endoscopy;  Laterality: N/A;    EXCISION, SMALL INTESTINE N/A 6/15/2023    Procedure: EXCISION, SMALL INTESTINE;  Surgeon: Edin Michael MD;  Location: [facility];  Service: General;  Laterality: N/A;    EYE SURGERY Right     INSERTION OF TUNNELED CENTRAL VENOUS CATHETER (CVC) WITH SUBCUTANEOUS PORT Left 7/24/2023    Procedure: [id number];  Surgeon: Garfield Hoyt Jr., MD;  Location: [facility];  Service: General;  Laterality: Left;    LAPAROTOMY, EXPLORATORY N/A 6/15/2023    Procedure: LAPAROTOMY, EXPLORATORY;  Surgeon: Edin Michael MD;  Location: [facility];  Service: General;  Laterality: N/A;    LAPAROTOMY, EXPLORATORY N/A 6/17/2023    Procedure: LAPAROTOMY, EXPLORATORY;  Surgeon: Garfield Hoyt Jr., MD;  Location: [facility] OR;  Service: General;  Laterality: N/A;    LUMBAR DISC SURGERY  2005    LYSIS OF ADHESIONS N/A 6/15/2023    Procedure: LYSIS, ADHESIONS;  Surgeon: Edin Michael MD;  Location: [facility] OR;  Service: General;  Laterality: N/A;    OMENTECTOMY N/A 6/15/2023    Procedure: OMENTECTOMY;  Surgeon: Edin Michael MD;  Location: [facility] OR;  Service:  General;  Laterality: N/A;    SUBTOTAL COLECTOMY Right 5/29/2023    Procedure: COLECTOMY, PARTIAL;  Surgeon: Edin Michael MD;  Location: [facility];  Service: General;  Laterality: Right;     Social History     Socioeconomic History    Marital status:     Number of children: 3    Highest education level: Associate degree: academic program   Occupational History    Occupation: [employer]-   Tobacco Use    Smoking status: Every Day     Current packs/day: 0.50     Average packs/day: 0.5 packs/day for 23.0 years (11.5 ttl pk-yrs)     Types: Cigarettes    Smokeless tobacco: Never    Tobacco comments:     Do not smoke day of surgery   Substance and Sexual Activity    Alcohol use: Not Currently     Comment: Quit drinking 2 yrs ago    Drug use: Yes     Types: Marijuana     Comment: heavy marijuana use    Sexual activity: Yes     Social Determinants of Health     Financial Resource Strain: Low Risk  (5/28/2023)    Overall Financial Resource Strain (CARDIA)     Difficulty of Paying Living Expenses: Not hard at all   Food Insecurity: No Food Insecurity (5/28/2023)    Hunger Vital Sign     Worried About Running Out of Food in the Last Year: Never true     Ran Out of Food in the Last Year: Never true   Transportation Needs: No Transportation Needs (5/28/2023)    PRAPARE - Transportation     Lack of Transportation (Medical): No     Lack of Transportation (Non-Medical): No   Physical Activity: Sufficiently Active (5/28/2023)    Exercise Vital Sign     Days of Exercise per Week: 5 days     Minutes of Exercise per Session: 90 min   Stress: No Stress Concern Present (5/24/2023)    [place] [employer] - Occupational Stress Questionnaire     Feeling of Stress : Not at all   Social Connections: Socially Isolated (5/28/2023)    Social Connection and Isolation Panel [NHANES]     Frequency of Communication with Friends and Family: Never     Frequency of Social Gatherings with Friends and Family:  More than three times a week     Attends Yazdanism Services: Never     Active Member of Clubs or Organizations: No     Attends Club or Organization Meetings: Never     Marital Status:    Housing Stability: Unknown (5/28/2023)    Housing Stability Vital Sign     Unable to Pay for Housing in the Last Year: No     Unstable Housing in the Last Year: No     Social History     Socioeconomic History    Marital status:     Number of children: 3    Highest education level: Associate degree: academic program   Occupational History    Occupation: [employer]-   Tobacco Use    Smoking status: Every Day     Current packs/day: 0.50     Average packs/day: 0.5 packs/day for 23.0 years (11.5 ttl pk-yrs)     Types: Cigarettes    Smokeless tobacco: Never    Tobacco comments:     Do not smoke day of surgery   Substance and Sexual Activity    Alcohol use: Not Currently     Comment: Quit drinking 2 yrs ago    Drug use: Yes     Types: Marijuana     Comment: heavy marijuana use    Sexual activity: Yes     Social Determinants of Health     Financial Resource Strain: Low Risk  (5/28/2023)    Overall Financial Resource Strain (CARDIA)     Difficulty of Paying Living Expenses: Not hard at all   Food Insecurity: No Food Insecurity (5/28/2023)    Hunger Vital Sign     Worried About Running Out of Food in the Last Year: Never true     Ran Out of Food in the Last Year: Never true   Transportation Needs: No Transportation Needs (5/28/2023)    PRAPARE - Transportation     Lack of Transportation (Medical): No     Lack of Transportation (Non-Medical): No   Physical Activity: Sufficiently Active (5/28/2023)    Exercise Vital Sign     Days of Exercise per Week: 5 days     Minutes of Exercise per Session: 90 min   Stress: No Stress Concern Present (5/24/2023)    [place] [employer] - Occupational Stress Questionnaire     Feeling of Stress : Not at all   Social Connections: Socially Isolated (5/28/2023)     Social Connection and Isolation Panel [NHANES]     Frequency of Communication with Friends and Family: Never     Frequency of Social Gatherings with Friends and Family: More than three times a week     Attends Yazidism Services: Never     Active Member of Clubs or Organizations: No     Attends Club or Organization Meetings: Never     Marital Status:    Housing Stability: Unknown (5/28/2023)    Housing Stability Vital Sign     Unable to Pay for Housing in the Last Year: No     Unstable Housing in the Last Year: No     Review of patient's allergies indicates:   Allergen Reactions    Fish containing products Other (See Comments)     Patient doesn't think any connection to iodine.       Physical exam  Vitals:    08/08/23 1056   BP: 132/76   Pulse: (!) 113   Resp: 12   Temp: 99.1 °F (37.3 °C)     Mid abdomen tenderness.  No evidence of skin infection on examination.  Normal speech pattern  Normal respiratory effort  Tachycardic sinus  Move all 4 extremity      Lab Results   Component Value Date    WBC 14.44 (H) 08/01/2023    HGB 9.9 (L) 08/01/2023    HCT 32.4 (L) 08/01/2023    MCV 72 (L) 08/01/2023     (H) 08/01/2023       CMP  Sodium   Date Value Ref Range Status   08/01/2023 140 136 - 145 mmol/L Final     Potassium   Date Value Ref Range Status   08/01/2023 3.2 (L) 3.5 - 5.1 mmol/L Final     Chloride   Date Value Ref Range Status   08/01/2023 105 95 - 110 mmol/L Final     CO2   Date Value Ref Range Status   08/01/2023 29 23 - 29 mmol/L Final     Glucose   Date Value Ref Range Status   08/01/2023 114 (H) 70 - 110 mg/dL Final     BUN   Date Value Ref Range Status   08/01/2023 11 6 - 20 mg/dL Final     Creatinine   Date Value Ref Range Status   08/01/2023 1.2 0.5 - 1.4 mg/dL Final     Calcium   Date Value Ref Range Status   08/01/2023 9.0 8.7 - 10.5 mg/dL Final     Total Protein   Date Value Ref Range Status   08/01/2023 7.7 6.0 - 8.4 g/dL Final     Albumin   Date Value Ref Range Status   08/01/2023 3.8  3.5 - 5.2 g/dL Final     Total Bilirubin   Date Value Ref Range Status   08/01/2023 0.7 0.1 - 1.0 mg/dL Final     Comment:     For infants and newborns, interpretation of results should be based  on gestational age, weight and in agreement with clinical  observations.    Premature Infant recommended reference ranges:  Up to 24 hours.............<8.0 mg/dL  Up to 48 hours............<12.0 mg/dL  3-5 days..................<15.0 mg/dL  6-29 days.................<15.0 mg/dL       Alkaline Phosphatase   Date Value Ref Range Status   08/01/2023 75 55 - 135 U/L Final     AST   Date Value Ref Range Status   08/01/2023 25 10 - 40 U/L Final     ALT   Date Value Ref Range Status   08/01/2023 33 10 - 44 U/L Final     Anion Gap   Date Value Ref Range Status   08/01/2023 6 (L) 8 - 16 mmol/L Final     eGFR   Date Value Ref Range Status   08/01/2023 >60.0 >60 mL/min/1.73 m^2 Final     Assessment and plan    Colon adenocarcinoma recently colostomy small-bowel obstruction.      [id number] G2 - stage III    MSI intact    Iron deficiency anemia microcytosis likely blood loss     Thrombocytosis reactive    Disproportionate demand of opiates medication relative to his disease.  I am not sure the his pain is due to surgery or others.  I would like to have a palliative care team or pain management specialist, for the pain evaluation.    Complaining of left-sided neck pain.  Where the port placement is.  No evidence of skin infection or pustular.  X-ray from end of July shows good position of the port.    Abdominal pain disproportionate as described as above.  Patient denies any history of chronic opiate use.  States the pain is really causing a problem.  Denies fever.  elevated leukocytosis and elevated platelets.  We will order CT scan chest abdomen pelvis for evaluation.  Also advised that if pain worsening patient should go to ER for further evaluation.      > request tempus     > CT chest abdomen pelvis with contrast - evaluation for  surgical cause of pain.  Hold treatment x1 week    > port studies    > long-acting morphine Q 8 hours    > consult palliative Care for pain evaluation.

## 2023-08-08 NOTE — Clinical Note
Hold treatment x1 week.  CT scan poor study today.  Blood work next week for treatment clearance.  Medications sent to pharmacy today.

## 2023-08-09 ENCOUNTER — PATIENT MESSAGE (OUTPATIENT)
Dept: HEMATOLOGY/ONCOLOGY | Facility: CLINIC | Age: 48
End: 2023-08-09
Payer: MEDICAID

## 2023-08-09 ENCOUNTER — TELEPHONE (OUTPATIENT)
Dept: HEMATOLOGY/ONCOLOGY | Facility: CLINIC | Age: 48
End: 2023-08-09
Payer: MEDICAID

## 2023-08-09 NOTE — TELEPHONE ENCOUNTER
Called [phone] that was provided on a PA for pt morphine. This was to [facility]-they said that the patient is not in their system.     Called haleigh to ask for a different number. Haleigh sent the PA throught cover my meds.     Clarita will finish this PA in covermymeds

## 2023-08-10 ENCOUNTER — PATIENT MESSAGE (OUTPATIENT)
Dept: HEMATOLOGY/ONCOLOGY | Facility: CLINIC | Age: 48
End: 2023-08-10
Payer: MEDICAID

## 2023-08-10 ENCOUNTER — HOSPITAL ENCOUNTER (OUTPATIENT)
Dept: RADIOLOGY | Facility: HOSPITAL | Age: 48
Discharge: HOME | End: 2023-08-10
Attending: INTERNAL MEDICINE
Payer: MEDICAID

## 2023-08-10 DIAGNOSIS — K56.609 SMALL BOWEL OBSTRUCTION: ICD-10-CM

## 2023-08-10 DIAGNOSIS — C18.9 COLON ADENOCARCINOMA: ICD-10-CM

## 2023-08-10 PROCEDURE — 25500020 PHARM REV CODE 255: Performed by: INTERNAL MEDICINE

## 2023-08-10 PROCEDURE — 36598 INJ W/FLUOR EVAL CV DEVICE: CPT

## 2023-08-10 PROCEDURE — 63600175 PHARM REV CODE 636 W HCPCS: Performed by: INTERNAL MEDICINE

## 2023-08-10 RX ORDER — HEPARIN 100 UNIT/ML
5 SYRINGE INTRAVENOUS ONCE
Status: COMPLETED | OUTPATIENT
Start: 2023-08-10 | End: 2023-08-10

## 2023-08-10 RX ADMIN — HEPARIN 500 UNITS: 100 SYRINGE at 02:08

## 2023-08-10 RX ADMIN — IOHEXOL 5 ML: 300 INJECTION, SOLUTION INTRAVENOUS at 02:08

## 2023-08-14 ENCOUNTER — DOCUMENTATION ONLY (OUTPATIENT)
Dept: HEMATOLOGY/ONCOLOGY | Facility: CLINIC | Age: 48
End: 2023-08-14

## 2023-08-14 ENCOUNTER — OFFICE VISIT (OUTPATIENT)
Dept: HEMATOLOGY/ONCOLOGY | Facility: CLINIC | Age: 48
End: 2023-08-14
Payer: MEDICAID

## 2023-08-14 ENCOUNTER — TELEPHONE (OUTPATIENT)
Dept: HEMATOLOGY/ONCOLOGY | Facility: CLINIC | Age: 48
End: 2023-08-14

## 2023-08-14 ENCOUNTER — LAB VISIT (OUTPATIENT)
Dept: LAB | Facility: HOSPITAL | Age: 48
End: 2023-08-14
Attending: NURSE PRACTITIONER
Payer: MEDICAID

## 2023-08-14 VITALS
TEMPERATURE: 98 F | RESPIRATION RATE: 12 BRPM | HEART RATE: 99 BPM | WEIGHT: 189.38 LBS | DIASTOLIC BLOOD PRESSURE: 80 MMHG | BODY MASS INDEX: 23.55 KG/M2 | SYSTOLIC BLOOD PRESSURE: 118 MMHG | HEIGHT: 75 IN | OXYGEN SATURATION: 100 %

## 2023-08-14 DIAGNOSIS — E87.6 HYPOKALEMIA: Primary | ICD-10-CM

## 2023-08-14 DIAGNOSIS — K56.609 SMALL BOWEL OBSTRUCTION: ICD-10-CM

## 2023-08-14 DIAGNOSIS — C18.9 COLON ADENOCARCINOMA: Primary | ICD-10-CM

## 2023-08-14 DIAGNOSIS — C18.9 COLON ADENOCARCINOMA: ICD-10-CM

## 2023-08-14 DIAGNOSIS — R10.9 ABDOMINAL PAIN, UNSPECIFIED ABDOMINAL LOCATION: ICD-10-CM

## 2023-08-14 LAB
ALBUMIN SERPL BCP-MCNC: 3.1 G/DL (ref 3.5–5.2)
ALP SERPL-CCNC: 96 U/L (ref 55–135)
ALT SERPL W/O P-5'-P-CCNC: 50 U/L (ref 10–44)
ANION GAP SERPL CALC-SCNC: 9 MMOL/L (ref 8–16)
AST SERPL-CCNC: 23 U/L (ref 10–40)
BASOPHILS # BLD AUTO: 0.07 K/UL (ref 0–0.2)
BASOPHILS NFR BLD: 0.7 % (ref 0–1.9)
BILIRUB SERPL-MCNC: 0.9 MG/DL (ref 0.1–1)
BUN SERPL-MCNC: 8 MG/DL (ref 6–20)
CALCIUM SERPL-MCNC: 9.2 MG/DL (ref 8.7–10.5)
CHLORIDE SERPL-SCNC: 96 MMOL/L (ref 95–110)
CO2 SERPL-SCNC: 31 MMOL/L (ref 23–29)
CREAT SERPL-MCNC: 1 MG/DL (ref 0.5–1.4)
DIFFERENTIAL METHOD: ABNORMAL
EOSINOPHIL # BLD AUTO: 0.1 K/UL (ref 0–0.5)
EOSINOPHIL NFR BLD: 1.2 % (ref 0–8)
ERYTHROCYTE [DISTWIDTH] IN BLOOD BY AUTOMATED COUNT: 20.4 % (ref 11.5–14.5)
EST. GFR  (NO RACE VARIABLE): >60 ML/MIN/1.73 M^2
GLUCOSE SERPL-MCNC: 170 MG/DL (ref 70–110)
HCT VFR BLD AUTO: 28.8 % (ref 40–54)
HGB BLD-MCNC: 8.9 G/DL (ref 14–18)
IMM GRANULOCYTES # BLD AUTO: 0.03 K/UL (ref 0–0.04)
IMM GRANULOCYTES NFR BLD AUTO: 0.3 % (ref 0–0.5)
LYMPHOCYTES # BLD AUTO: 2.1 K/UL (ref 1–4.8)
LYMPHOCYTES NFR BLD: 21.2 % (ref 18–48)
MAGNESIUM SERPL-MCNC: 1.7 MG/DL (ref 1.6–2.6)
MCH RBC QN AUTO: 21.9 PG (ref 27–31)
MCHC RBC AUTO-ENTMCNC: 30.9 G/DL (ref 32–36)
MCV RBC AUTO: 71 FL (ref 82–98)
MONOCYTES # BLD AUTO: 1.2 K/UL (ref 0.3–1)
MONOCYTES NFR BLD: 11.5 % (ref 4–15)
NEUTROPHILS # BLD AUTO: 6.6 K/UL (ref 1.8–7.7)
NEUTROPHILS NFR BLD: 65.1 % (ref 38–73)
NRBC BLD-RTO: 0 /100 WBC
PLATELET # BLD AUTO: 538 K/UL (ref 150–450)
PMV BLD AUTO: 8.9 FL (ref 9.2–12.9)
POTASSIUM SERPL-SCNC: 2.6 MMOL/L (ref 3.5–5.1)
PROT SERPL-MCNC: 8.1 G/DL (ref 6–8.4)
RBC # BLD AUTO: 4.06 M/UL (ref 4.6–6.2)
SODIUM SERPL-SCNC: 136 MMOL/L (ref 136–145)
WBC # BLD AUTO: 10.1 K/UL (ref 3.9–12.7)

## 2023-08-14 PROCEDURE — 1159F MED LIST DOCD IN RCRD: CPT | Mod: CPTII,,, | Performed by: INTERNAL MEDICINE

## 2023-08-14 PROCEDURE — 3044F PR MOST RECENT HEMOGLOBIN A1C LEVEL <7.0%: ICD-10-PCS | Mod: CPTII,,, | Performed by: INTERNAL MEDICINE

## 2023-08-14 PROCEDURE — 99999 PR PBB SHADOW E&M-EST. PATIENT-LVL III: ICD-10-PCS | Mod: PBBFAC,,, | Performed by: INTERNAL MEDICINE

## 2023-08-14 PROCEDURE — 85025 COMPLETE CBC W/AUTO DIFF WBC: CPT | Performed by: INTERNAL MEDICINE

## 2023-08-14 PROCEDURE — 99215 OFFICE O/P EST HI 40 MIN: CPT | Mod: S$PBB,,, | Performed by: INTERNAL MEDICINE

## 2023-08-14 PROCEDURE — 99215 PR OFFICE/OUTPT VISIT, EST, LEVL V, 40-54 MIN: ICD-10-PCS | Mod: S$PBB,,, | Performed by: INTERNAL MEDICINE

## 2023-08-14 PROCEDURE — 3008F PR BODY MASS INDEX (BMI) DOCUMENTED: ICD-10-PCS | Mod: CPTII,,, | Performed by: INTERNAL MEDICINE

## 2023-08-14 PROCEDURE — 99213 OFFICE O/P EST LOW 20 MIN: CPT | Mod: PBBFAC,PN | Performed by: INTERNAL MEDICINE

## 2023-08-14 PROCEDURE — 80053 COMPREHEN METABOLIC PANEL: CPT | Performed by: INTERNAL MEDICINE

## 2023-08-14 PROCEDURE — 83735 ASSAY OF MAGNESIUM: CPT | Performed by: INTERNAL MEDICINE

## 2023-08-14 PROCEDURE — 3044F HG A1C LEVEL LT 7.0%: CPT | Mod: CPTII,,, | Performed by: INTERNAL MEDICINE

## 2023-08-14 PROCEDURE — 36415 COLL VENOUS BLD VENIPUNCTURE: CPT | Performed by: INTERNAL MEDICINE

## 2023-08-14 PROCEDURE — 3008F BODY MASS INDEX DOCD: CPT | Mod: CPTII,,, | Performed by: INTERNAL MEDICINE

## 2023-08-14 PROCEDURE — 1159F PR MEDICATION LIST DOCUMENTED IN MEDICAL RECORD: ICD-10-PCS | Mod: CPTII,,, | Performed by: INTERNAL MEDICINE

## 2023-08-14 PROCEDURE — 99999 PR PBB SHADOW E&M-EST. PATIENT-LVL III: CPT | Mod: PBBFAC,,, | Performed by: INTERNAL MEDICINE

## 2023-08-14 PROCEDURE — 3079F DIAST BP 80-89 MM HG: CPT | Mod: CPTII,,, | Performed by: INTERNAL MEDICINE

## 2023-08-14 PROCEDURE — 3074F PR MOST RECENT SYSTOLIC BLOOD PRESSURE < 130 MM HG: ICD-10-PCS | Mod: CPTII,,, | Performed by: INTERNAL MEDICINE

## 2023-08-14 PROCEDURE — 3079F PR MOST RECENT DIASTOLIC BLOOD PRESSURE 80-89 MM HG: ICD-10-PCS | Mod: CPTII,,, | Performed by: INTERNAL MEDICINE

## 2023-08-14 PROCEDURE — 3074F SYST BP LT 130 MM HG: CPT | Mod: CPTII,,, | Performed by: INTERNAL MEDICINE

## 2023-08-14 RX ORDER — SODIUM CHLORIDE 0.9 % (FLUSH) 0.9 %
10 SYRINGE (ML) INJECTION
Status: CANCELLED | OUTPATIENT
Start: 2023-08-15

## 2023-08-14 RX ORDER — FLUOROURACIL 50 MG/ML
2400 INJECTION, SOLUTION INTRAVENOUS
Status: CANCELLED | OUTPATIENT
Start: 2023-08-15

## 2023-08-14 RX ORDER — POTASSIUM CHLORIDE 20 MEQ/1
20 TABLET, EXTENDED RELEASE ORAL 2 TIMES DAILY
Qty: 30 TABLET | Refills: 11 | Status: SHIPPED | OUTPATIENT
Start: 2023-08-14 | End: 2024-08-13

## 2023-08-14 RX ORDER — HEPARIN 100 UNIT/ML
500 SYRINGE INTRAVENOUS
Status: CANCELLED | OUTPATIENT
Start: 2023-08-15

## 2023-08-14 RX ORDER — DIPHENHYDRAMINE HYDROCHLORIDE 50 MG/ML
50 INJECTION INTRAMUSCULAR; INTRAVENOUS ONCE AS NEEDED
Status: CANCELLED | OUTPATIENT
Start: 2023-08-15

## 2023-08-14 RX ORDER — EPINEPHRINE 0.3 MG/.3ML
0.3 INJECTION SUBCUTANEOUS ONCE AS NEEDED
Status: CANCELLED | OUTPATIENT
Start: 2023-08-15

## 2023-08-14 RX ORDER — FLUOROURACIL 50 MG/ML
400 INJECTION, SOLUTION INTRAVENOUS
Status: CANCELLED | OUTPATIENT
Start: 2023-08-15

## 2023-08-14 RX ORDER — HEPARIN 100 UNIT/ML
500 SYRINGE INTRAVENOUS
Status: CANCELLED | OUTPATIENT
Start: 2023-08-17

## 2023-08-14 RX ORDER — SODIUM CHLORIDE 0.9 % (FLUSH) 0.9 %
10 SYRINGE (ML) INJECTION
Status: CANCELLED | OUTPATIENT
Start: 2023-08-17

## 2023-08-14 NOTE — PROGRESS NOTES
HPI    47 years old male newly diagnosed colon cancer.  He was transferred from [facility] for evaluation possible developed a small-bowel obstruction.  He was complaining abdominal pain nausea and vomiting.  Workup shows leukocytosis, anemia and thrombocytosis.  CT scan from outside facility was concerning for developing small-bowel obstruction.  Patient was transferred admitted to [facility] underwent right partial colectomy on 05/29 with pathology consistent adenocarcinoma.  He was discharged then and return to hospital for another admission on 06/08/2023 where he was treated for ileus.  Medical history including type 2 diabetes anemia hypertension right eye blindness.       Overview/Hospital Course:  Ruslan Caldwell is a 47 year old male with a past medical history of recently diagnosed colonic adenocarcinoma s/p partial colectomy with anastomosis, tobacco use, microcytic anemia and thrombocytosis who presented with persistent abdominal pain, nausea and abdominal pain concerning for SBO. An NG tube was unable to be placed. He was placed on IV fluids and Zosyn. He was made NPO. PRN IV analgesics and antiemetics were ordered and General Surgery was consulted. Repeat CT A/P shows SBO 6/13. A gastrografin enema 6/14 was inconclusive. Surgery took the patient to the OR 6/15 for exploratory laparotomy where an omental infarction was addressed; the patient also underwent SCOUT and partial small bowel resection with anastomosis. PPN was started 6/16. His course has been complicated by JOYCE in setting of vomiting as well as Toradol use; IV fluids were continued and his kidney function has improved to baseline. Nephrology has been consulted. He was also discovered to have an iron deficiency for which IV iron (one dose) was ordered 6/13.  His post-operative course has been complicated by low grade fever, tachycardia and an increasing WBC count, despite Zosyn use. Antibiotics were broadened to vancomycin,  cefepime and Flagyl. Blood cultures were ordered as well as a CXR and urine culture.         Interval History:  Patient seen and examined.  Per nurse patient ate outside food last night and tolerated it.  Surgeon has started regular diet and is planning for possible discharge tomorrow  Review of Systems   Constitutional:  Negative for fever.   Gastrointestinal:  Positive for abdominal pain. Negative for nausea and vomiting.   Skin:  Positive for wound.   Allergic/Immunologic: Positive for immunocompromised state.         Past Medical History:   Diagnosis Date    Colon cancer 05/19/2023    Diabetes mellitus, type 2 05/2020    Patient denies    Hypertension      Past Surgical History:   Procedure Laterality Date    COLONOSCOPY N/A 5/29/2023    Procedure: COLONOSCOPY;  Surgeon: Sam Solano MD;  Location: [facility];  Service: Endoscopy;  Laterality: N/A;    EXCISION, SMALL INTESTINE N/A 6/15/2023    Procedure: EXCISION, SMALL INTESTINE;  Surgeon: Edin Michael MD;  Location: [facility];  Service: General;  Laterality: N/A;    EYE SURGERY Right     INSERTION OF TUNNELED CENTRAL VENOUS CATHETER (CVC) WITH SUBCUTANEOUS PORT Left 7/24/2023    Procedure: [id number];  Surgeon: Garfield Hoyt Jr., MD;  Location: [facility];  Service: General;  Laterality: Left;    LAPAROTOMY, EXPLORATORY N/A 6/15/2023    Procedure: LAPAROTOMY, EXPLORATORY;  Surgeon: Edin Michael MD;  Location: [facility];  Service: General;  Laterality: N/A;    LAPAROTOMY, EXPLORATORY N/A 6/17/2023    Procedure: LAPAROTOMY, EXPLORATORY;  Surgeon: Garfield Hoyt Jr., MD;  Location: [facility] OR;  Service: General;  Laterality: N/A;    LUMBAR DISC SURGERY  2005    LYSIS OF ADHESIONS N/A 6/15/2023    Procedure: LYSIS, ADHESIONS;  Surgeon: Edin Michael MD;  Location: [facility] OR;  Service: General;  Laterality: N/A;    OMENTECTOMY N/A 6/15/2023    Procedure: OMENTECTOMY;  Surgeon: Edin Michael MD;  Location: [facility] OR;  Service:  General;  Laterality: N/A;    SUBTOTAL COLECTOMY Right 5/29/2023    Procedure: COLECTOMY, PARTIAL;  Surgeon: Edin Michael MD;  Location: [facility];  Service: General;  Laterality: Right;     Social History     Socioeconomic History    Marital status:     Number of children: 3    Highest education level: Associate degree: academic program   Occupational History    Occupation: [employer]-   Tobacco Use    Smoking status: Every Day     Current packs/day: 0.50     Average packs/day: 0.5 packs/day for 23.0 years (11.5 ttl pk-yrs)     Types: Cigarettes    Smokeless tobacco: Never    Tobacco comments:     Do not smoke day of surgery   Substance and Sexual Activity    Alcohol use: Not Currently     Comment: Quit drinking 2 yrs ago    Drug use: Yes     Types: Marijuana     Comment: heavy marijuana use    Sexual activity: Yes     Social Determinants of Health     Financial Resource Strain: Low Risk  (5/28/2023)    Overall Financial Resource Strain (CARDIA)     Difficulty of Paying Living Expenses: Not hard at all   Food Insecurity: No Food Insecurity (5/28/2023)    Hunger Vital Sign     Worried About Running Out of Food in the Last Year: Never true     Ran Out of Food in the Last Year: Never true   Transportation Needs: No Transportation Needs (5/28/2023)    PRAPARE - Transportation     Lack of Transportation (Medical): No     Lack of Transportation (Non-Medical): No   Physical Activity: Sufficiently Active (5/28/2023)    Exercise Vital Sign     Days of Exercise per Week: 5 days     Minutes of Exercise per Session: 90 min   Stress: No Stress Concern Present (5/24/2023)    [employer] [place] [employer] - Occupational Stress Questionnaire     Feeling of Stress : Not at all   Social Connections: Socially Isolated (5/28/2023)    Social Connection and Isolation Panel [NHANES]     Frequency of Communication with Friends and Family: Never     Frequency of Social Gatherings with Friends and Family:  More than three times a week     Attends Muslim Services: Never     Active Member of Clubs or Organizations: No     Attends Club or Organization Meetings: Never     Marital Status:    Housing Stability: Unknown (5/28/2023)    Housing Stability Vital Sign     Unable to Pay for Housing in the Last Year: No     Unstable Housing in the Last Year: No     Social History     Socioeconomic History    Marital status:     Number of children: 3    Highest education level: Associate degree: academic program   Occupational History    Occupation: [employer]-   Tobacco Use    Smoking status: Every Day     Current packs/day: 0.50     Average packs/day: 0.5 packs/day for 23.0 years (11.5 ttl pk-yrs)     Types: Cigarettes    Smokeless tobacco: Never    Tobacco comments:     Do not smoke day of surgery   Substance and Sexual Activity    Alcohol use: Not Currently     Comment: Quit drinking 2 yrs ago    Drug use: Yes     Types: Marijuana     Comment: heavy marijuana use    Sexual activity: Yes     Social Determinants of Health     Financial Resource Strain: Low Risk  (5/28/2023)    Overall Financial Resource Strain (CARDIA)     Difficulty of Paying Living Expenses: Not hard at all   Food Insecurity: No Food Insecurity (5/28/2023)    Hunger Vital Sign     Worried About Running Out of Food in the Last Year: Never true     Ran Out of Food in the Last Year: Never true   Transportation Needs: No Transportation Needs (5/28/2023)    PRAPARE - Transportation     Lack of Transportation (Medical): No     Lack of Transportation (Non-Medical): No   Physical Activity: Sufficiently Active (5/28/2023)    Exercise Vital Sign     Days of Exercise per Week: 5 days     Minutes of Exercise per Session: 90 min   Stress: No Stress Concern Present (5/24/2023)    [employer] - Occupational Stress Questionnaire     Feeling of Stress : Not at all   Social Connections: Socially Isolated (5/28/2023)     Social Connection and Isolation Panel [NHANES]     Frequency of Communication with Friends and Family: Never     Frequency of Social Gatherings with Friends and Family: More than three times a week     Attends Roman Catholic Services: Never     Active Member of Clubs or Organizations: No     Attends Club or Organization Meetings: Never     Marital Status:    Housing Stability: Unknown (5/28/2023)    Housing Stability Vital Sign     Unable to Pay for Housing in the Last Year: No     Unstable Housing in the Last Year: No     Review of patient's allergies indicates:   Allergen Reactions    Fish containing products Other (See Comments)     Patient doesn't think any connection to iodine.       Physical exam  Vitals:    08/14/23 1047   BP: 118/80   Pulse: 99   Resp: 12   Temp: 97.6 °F (36.4 °C)     Mid abdomen tenderness.  No evidence of skin infection on examination.  Normal speech pattern  Normal respiratory effort  Tachycardic sinus  Move all 4 extremity      Lab Results   Component Value Date    WBC 10.10 08/14/2023    HGB 8.9 (L) 08/14/2023    HCT 28.8 (L) 08/14/2023    MCV 71 (L) 08/14/2023     (H) 08/14/2023       CMP  Sodium   Date Value Ref Range Status   08/01/2023 140 136 - 145 mmol/L Final     Potassium   Date Value Ref Range Status   08/01/2023 3.2 (L) 3.5 - 5.1 mmol/L Final     Chloride   Date Value Ref Range Status   08/01/2023 105 95 - 110 mmol/L Final     CO2   Date Value Ref Range Status   08/01/2023 29 23 - 29 mmol/L Final     Glucose   Date Value Ref Range Status   08/01/2023 114 (H) 70 - 110 mg/dL Final     BUN   Date Value Ref Range Status   08/01/2023 11 6 - 20 mg/dL Final     Creatinine   Date Value Ref Range Status   08/01/2023 1.2 0.5 - 1.4 mg/dL Final     Calcium   Date Value Ref Range Status   08/01/2023 9.0 8.7 - 10.5 mg/dL Final     Total Protein   Date Value Ref Range Status   08/01/2023 7.7 6.0 - 8.4 g/dL Final     Albumin   Date Value Ref Range Status   08/01/2023 3.8 3.5 - 5.2  g/dL Final     Total Bilirubin   Date Value Ref Range Status   08/01/2023 0.7 0.1 - 1.0 mg/dL Final     Comment:     For infants and newborns, interpretation of results should be based  on gestational age, weight and in agreement with clinical  observations.    Premature Infant recommended reference ranges:  Up to 24 hours.............<8.0 mg/dL  Up to 48 hours............<12.0 mg/dL  3-5 days..................<15.0 mg/dL  6-29 days.................<15.0 mg/dL       Alkaline Phosphatase   Date Value Ref Range Status   08/01/2023 75 55 - 135 U/L Final     AST   Date Value Ref Range Status   08/01/2023 25 10 - 40 U/L Final     ALT   Date Value Ref Range Status   08/01/2023 33 10 - 44 U/L Final     Anion Gap   Date Value Ref Range Status   08/01/2023 6 (L) 8 - 16 mmol/L Final     eGFR   Date Value Ref Range Status   08/01/2023 >60.0 >60 mL/min/1.73 m^2 Final     Assessment and plan    Colon adenocarcinoma recently colostomy small-bowel obstruction.      [id number] G2 - stage III    MSI intact    Iron deficiency anemia microcytosis likely blood loss     Thrombocytosis reactive    Disproportionate demand of opiates medication relative to his disease.  I am not sure the his pain is due to surgery or others.  I would like to have a palliative care team or pain management specialist, for the pain evaluation.    Complaining of left-sided neck pain.  Where the port placement is.  No evidence of skin infection or pustular.  X-ray from end of July shows good position of the port.    Abdominal pain disproportionate as described as above.  Patient denies any history of chronic opiate use.  States the pain is really causing a problem.  Denies fever.  elevated leukocytosis and elevated platelets.  We will order CT scan chest abdomen pelvis for evaluation.  Also advised that if pain worsening patient should go to ER for further evaluation.      > request tempus     > CT chest abdomen pelvis with contrast - evaluation for surgical  cause of pain.     > port studies - normal appearance  and function of left internal jugular port-A-Cath    > long-acting morphine Q 8 hours    > consult palliative Care for pain evaluation.

## 2023-08-14 NOTE — TELEPHONE ENCOUNTER
Spoke to patient. Explained that he needed to  oral potassium rx at pharmacy today per Dr. Baird. Pt verbalized understanding.

## 2023-08-15 ENCOUNTER — DOCUMENTATION ONLY (OUTPATIENT)
Dept: HEMATOLOGY/ONCOLOGY | Facility: CLINIC | Age: 48
End: 2023-08-15

## 2023-08-15 ENCOUNTER — INFUSION (OUTPATIENT)
Dept: INFUSION THERAPY | Facility: HOSPITAL | Age: 48
End: 2023-08-15
Attending: INTERNAL MEDICINE
Payer: MEDICAID

## 2023-08-15 VITALS
TEMPERATURE: 97 F | WEIGHT: 192.38 LBS | BODY MASS INDEX: 23.92 KG/M2 | HEIGHT: 75 IN | RESPIRATION RATE: 18 BRPM | SYSTOLIC BLOOD PRESSURE: 114 MMHG | DIASTOLIC BLOOD PRESSURE: 76 MMHG | HEART RATE: 80 BPM

## 2023-08-15 DIAGNOSIS — C18.9 COLON ADENOCARCINOMA: ICD-10-CM

## 2023-08-15 DIAGNOSIS — K56.609 SMALL BOWEL OBSTRUCTION: Primary | ICD-10-CM

## 2023-08-15 PROCEDURE — 96366 THER/PROPH/DIAG IV INF ADDON: CPT

## 2023-08-15 PROCEDURE — 96413 CHEMO IV INFUSION 1 HR: CPT

## 2023-08-15 PROCEDURE — 96368 THER/DIAG CONCURRENT INF: CPT

## 2023-08-15 PROCEDURE — 96367 TX/PROPH/DG ADDL SEQ IV INF: CPT

## 2023-08-15 PROCEDURE — 96415 CHEMO IV INFUSION ADDL HR: CPT

## 2023-08-15 PROCEDURE — 25000003 PHARM REV CODE 250: Performed by: INTERNAL MEDICINE

## 2023-08-15 PROCEDURE — 96416 CHEMO PROLONG INFUSE W/PUMP: CPT

## 2023-08-15 PROCEDURE — 63600175 PHARM REV CODE 636 W HCPCS: Performed by: INTERNAL MEDICINE

## 2023-08-15 PROCEDURE — 96411 CHEMO IV PUSH ADDL DRUG: CPT

## 2023-08-15 RX ORDER — FLUOROURACIL 50 MG/ML
400 INJECTION, SOLUTION INTRAVENOUS
Status: COMPLETED | OUTPATIENT
Start: 2023-08-15 | End: 2023-08-15

## 2023-08-15 RX ORDER — EPINEPHRINE 0.3 MG/.3ML
0.3 INJECTION SUBCUTANEOUS ONCE AS NEEDED
Status: DISCONTINUED | OUTPATIENT
Start: 2023-08-15 | End: 2023-08-15 | Stop reason: HOSPADM

## 2023-08-15 RX ORDER — DIPHENHYDRAMINE HYDROCHLORIDE 50 MG/ML
50 INJECTION INTRAMUSCULAR; INTRAVENOUS ONCE AS NEEDED
Status: DISCONTINUED | OUTPATIENT
Start: 2023-08-15 | End: 2023-08-15 | Stop reason: HOSPADM

## 2023-08-15 RX ORDER — SODIUM CHLORIDE 0.9 % (FLUSH) 0.9 %
10 SYRINGE (ML) INJECTION
Status: DISCONTINUED | OUTPATIENT
Start: 2023-08-15 | End: 2023-08-15 | Stop reason: HOSPADM

## 2023-08-15 RX ADMIN — FLUOROURACIL 5090 MG: 50 INJECTION, SOLUTION INTRAVENOUS at 02:08

## 2023-08-15 RX ADMIN — FLUOROURACIL 850 MG: 50 INJECTION, SOLUTION INTRAVENOUS at 02:08

## 2023-08-15 RX ADMIN — OXALIPLATIN 180 MG: 100 INJECTION, SOLUTION, CONCENTRATE INTRAVENOUS at 12:08

## 2023-08-15 RX ADMIN — LEUCOVORIN CALCIUM 850 MG: 350 INJECTION, POWDER, LYOPHILIZED, FOR SOLUTION INTRAMUSCULAR; INTRAVENOUS at 12:08

## 2023-08-15 RX ADMIN — DEXTROSE: 5 SOLUTION INTRAVENOUS at 11:08

## 2023-08-15 RX ADMIN — PALONOSETRON HYDROCHLORIDE 0.25 MG: 0.25 INJECTION INTRAVENOUS at 11:08

## 2023-08-15 NOTE — PROGRESS NOTES
CHEMO SCHOOL- NUTRITION    Ruslan Caldwell is a 47 y.o. male with colon cancer. Pt will be receiving folfox. I met with Mr. Caldwell and his sister for chemo school today. Pt reports excellent appetite and intake. He is beginning C2 today a denies having side effects from first cycle. He reports excellent hydration. He denies any recent unintentional weight loss. Denies N/V/D/C. BMs normal. Denies having any nutrition related questions or concerns at the current time.     CW: 192#.     Food Insecurity:  Patient is worried whether their food would run out before they have more money to buy more: Sometimes  The food they bought just didn't last and they didn't have money to buy more: Sometimes       Plan:   Reviewed chemo school packet & provided copy to pt.   Discussed importance of maintaining wt & staying hydrated.   Reviewed food safety guidelines recommended during treatment.   TFP referral provided  Provided RD contact info & encouraged pt to call with any questions/concerns.   Will f/u as needed.       Electronically signed by: Cyn Briceno MBA, RDN, LDN

## 2023-08-15 NOTE — PLAN OF CARE
Problem: Fatigue  Goal: Improved Activity Tolerance  8/15/2023 1142 by Erin Heller, RN  Outcome: Met  8/15/2023 1142 by Erin Heller, RN  Outcome: Ongoing, Progressing

## 2023-08-17 ENCOUNTER — INFUSION (OUTPATIENT)
Dept: INFUSION THERAPY | Facility: HOSPITAL | Age: 48
End: 2023-08-17
Attending: INTERNAL MEDICINE
Payer: MEDICAID

## 2023-08-17 VITALS
DIASTOLIC BLOOD PRESSURE: 77 MMHG | WEIGHT: 194.63 LBS | HEART RATE: 76 BPM | BODY MASS INDEX: 24.32 KG/M2 | TEMPERATURE: 98 F | OXYGEN SATURATION: 100 % | RESPIRATION RATE: 16 BRPM | SYSTOLIC BLOOD PRESSURE: 113 MMHG

## 2023-08-17 DIAGNOSIS — K56.609 SMALL BOWEL OBSTRUCTION: Primary | ICD-10-CM

## 2023-08-17 DIAGNOSIS — C18.9 COLON ADENOCARCINOMA: ICD-10-CM

## 2023-08-17 PROCEDURE — 25000003 PHARM REV CODE 250: Performed by: INTERNAL MEDICINE

## 2023-08-17 PROCEDURE — 63600175 PHARM REV CODE 636 W HCPCS: Performed by: INTERNAL MEDICINE

## 2023-08-17 PROCEDURE — 96523 IRRIG DRUG DELIVERY DEVICE: CPT

## 2023-08-17 PROCEDURE — A4216 STERILE WATER/SALINE, 10 ML: HCPCS | Performed by: INTERNAL MEDICINE

## 2023-08-17 RX ORDER — HEPARIN 100 UNIT/ML
500 SYRINGE INTRAVENOUS
Status: DISCONTINUED | OUTPATIENT
Start: 2023-08-17 | End: 2023-08-17 | Stop reason: HOSPADM

## 2023-08-17 RX ORDER — SODIUM CHLORIDE 0.9 % (FLUSH) 0.9 %
10 SYRINGE (ML) INJECTION
Status: DISCONTINUED | OUTPATIENT
Start: 2023-08-17 | End: 2023-08-17 | Stop reason: HOSPADM

## 2023-08-17 RX ADMIN — SODIUM CHLORIDE, PRESERVATIVE FREE 10 ML: 5 INJECTION INTRAVENOUS at 12:08

## 2023-08-17 RX ADMIN — HEPARIN 500 UNITS: 100 SYRINGE at 12:08

## 2023-08-17 NOTE — PLAN OF CARE
Problem: Activity Intolerance  Goal: Enhanced Capacity and Energy  Outcome: Ongoing, Progressing  Intervention: Optimize Activity Tolerance  Flowsheets (Taken 8/17/2023 1229)  Self-Care Promotion: independence encouraged  Activity Management: Ambulated -L4  Environmental Support: calm environment promoted

## 2023-08-28 ENCOUNTER — OFFICE VISIT (OUTPATIENT)
Dept: HEMATOLOGY/ONCOLOGY | Facility: CLINIC | Age: 48
End: 2023-08-28
Payer: MEDICAID

## 2023-08-28 ENCOUNTER — LAB VISIT (OUTPATIENT)
Dept: LAB | Facility: HOSPITAL | Age: 48
End: 2023-08-28
Attending: INTERNAL MEDICINE
Payer: MEDICAID

## 2023-08-28 VITALS
TEMPERATURE: 98 F | HEART RATE: 84 BPM | WEIGHT: 192.69 LBS | DIASTOLIC BLOOD PRESSURE: 88 MMHG | RESPIRATION RATE: 10 BRPM | SYSTOLIC BLOOD PRESSURE: 126 MMHG | BODY MASS INDEX: 23.96 KG/M2 | OXYGEN SATURATION: 96 % | HEIGHT: 75 IN

## 2023-08-28 DIAGNOSIS — E87.6 HYPOKALEMIA: Primary | ICD-10-CM

## 2023-08-28 DIAGNOSIS — R10.9 ABDOMINAL PAIN, UNSPECIFIED ABDOMINAL LOCATION: ICD-10-CM

## 2023-08-28 DIAGNOSIS — K56.609 SMALL BOWEL OBSTRUCTION: ICD-10-CM

## 2023-08-28 DIAGNOSIS — C18.9 COLON ADENOCARCINOMA: ICD-10-CM

## 2023-08-28 LAB
ALBUMIN SERPL BCP-MCNC: 3.6 G/DL (ref 3.5–5.2)
ALP SERPL-CCNC: 62 U/L (ref 55–135)
ALT SERPL W/O P-5'-P-CCNC: 15 U/L (ref 10–44)
ANION GAP SERPL CALC-SCNC: 7 MMOL/L (ref 8–16)
AST SERPL-CCNC: 19 U/L (ref 10–40)
BASOPHILS # BLD AUTO: 0.07 K/UL (ref 0–0.2)
BASOPHILS NFR BLD: 0.6 % (ref 0–1.9)
BILIRUB SERPL-MCNC: 1.1 MG/DL (ref 0.1–1)
BUN SERPL-MCNC: 9 MG/DL (ref 6–20)
CALCIUM SERPL-MCNC: 9.4 MG/DL (ref 8.7–10.5)
CHLORIDE SERPL-SCNC: 106 MMOL/L (ref 95–110)
CO2 SERPL-SCNC: 27 MMOL/L (ref 23–29)
CREAT SERPL-MCNC: 1 MG/DL (ref 0.5–1.4)
DIFFERENTIAL METHOD: ABNORMAL
EOSINOPHIL # BLD AUTO: 0.2 K/UL (ref 0–0.5)
EOSINOPHIL NFR BLD: 1.5 % (ref 0–8)
ERYTHROCYTE [DISTWIDTH] IN BLOOD BY AUTOMATED COUNT: 22.3 % (ref 11.5–14.5)
EST. GFR  (NO RACE VARIABLE): >60 ML/MIN/1.73 M^2
GLUCOSE SERPL-MCNC: 101 MG/DL (ref 70–110)
HCT VFR BLD AUTO: 30.9 % (ref 40–54)
HGB BLD-MCNC: 9.5 G/DL (ref 14–18)
IMM GRANULOCYTES # BLD AUTO: 0.02 K/UL (ref 0–0.04)
IMM GRANULOCYTES NFR BLD AUTO: 0.2 % (ref 0–0.5)
LYMPHOCYTES # BLD AUTO: 3.5 K/UL (ref 1–4.8)
LYMPHOCYTES NFR BLD: 28.3 % (ref 18–48)
MAGNESIUM SERPL-MCNC: 1.7 MG/DL (ref 1.6–2.6)
MCH RBC QN AUTO: 22.4 PG (ref 27–31)
MCHC RBC AUTO-ENTMCNC: 30.7 G/DL (ref 32–36)
MCV RBC AUTO: 73 FL (ref 82–98)
MONOCYTES # BLD AUTO: 0.9 K/UL (ref 0.3–1)
MONOCYTES NFR BLD: 7.5 % (ref 4–15)
NEUTROPHILS # BLD AUTO: 7.7 K/UL (ref 1.8–7.7)
NEUTROPHILS NFR BLD: 61.9 % (ref 38–73)
NRBC BLD-RTO: 0 /100 WBC
PLATELET # BLD AUTO: 696 K/UL (ref 150–450)
PMV BLD AUTO: 8.3 FL (ref 9.2–12.9)
POTASSIUM SERPL-SCNC: 4 MMOL/L (ref 3.5–5.1)
PROT SERPL-MCNC: 7.8 G/DL (ref 6–8.4)
RBC # BLD AUTO: 4.24 M/UL (ref 4.6–6.2)
SODIUM SERPL-SCNC: 140 MMOL/L (ref 136–145)
WBC # BLD AUTO: 12.35 K/UL (ref 3.9–12.7)

## 2023-08-28 PROCEDURE — 99215 PR OFFICE/OUTPT VISIT, EST, LEVL V, 40-54 MIN: ICD-10-PCS | Mod: S$PBB,,, | Performed by: INTERNAL MEDICINE

## 2023-08-28 PROCEDURE — 99213 OFFICE O/P EST LOW 20 MIN: CPT | Mod: PBBFAC,PN | Performed by: INTERNAL MEDICINE

## 2023-08-28 PROCEDURE — 1159F MED LIST DOCD IN RCRD: CPT | Mod: CPTII,,, | Performed by: INTERNAL MEDICINE

## 2023-08-28 PROCEDURE — 3079F PR MOST RECENT DIASTOLIC BLOOD PRESSURE 80-89 MM HG: ICD-10-PCS | Mod: CPTII,,, | Performed by: INTERNAL MEDICINE

## 2023-08-28 PROCEDURE — 3044F PR MOST RECENT HEMOGLOBIN A1C LEVEL <7.0%: ICD-10-PCS | Mod: CPTII,,, | Performed by: INTERNAL MEDICINE

## 2023-08-28 PROCEDURE — 83735 ASSAY OF MAGNESIUM: CPT | Performed by: INTERNAL MEDICINE

## 2023-08-28 PROCEDURE — 80053 COMPREHEN METABOLIC PANEL: CPT | Performed by: INTERNAL MEDICINE

## 2023-08-28 PROCEDURE — 85025 COMPLETE CBC W/AUTO DIFF WBC: CPT | Performed by: INTERNAL MEDICINE

## 2023-08-28 PROCEDURE — 36415 COLL VENOUS BLD VENIPUNCTURE: CPT | Performed by: INTERNAL MEDICINE

## 2023-08-28 PROCEDURE — 99999 PR PBB SHADOW E&M-EST. PATIENT-LVL III: CPT | Mod: PBBFAC,,, | Performed by: INTERNAL MEDICINE

## 2023-08-28 PROCEDURE — 3008F BODY MASS INDEX DOCD: CPT | Mod: CPTII,,, | Performed by: INTERNAL MEDICINE

## 2023-08-28 PROCEDURE — 3079F DIAST BP 80-89 MM HG: CPT | Mod: CPTII,,, | Performed by: INTERNAL MEDICINE

## 2023-08-28 PROCEDURE — 1159F PR MEDICATION LIST DOCUMENTED IN MEDICAL RECORD: ICD-10-PCS | Mod: CPTII,,, | Performed by: INTERNAL MEDICINE

## 2023-08-28 PROCEDURE — 3074F SYST BP LT 130 MM HG: CPT | Mod: CPTII,,, | Performed by: INTERNAL MEDICINE

## 2023-08-28 PROCEDURE — 3008F PR BODY MASS INDEX (BMI) DOCUMENTED: ICD-10-PCS | Mod: CPTII,,, | Performed by: INTERNAL MEDICINE

## 2023-08-28 PROCEDURE — 3074F PR MOST RECENT SYSTOLIC BLOOD PRESSURE < 130 MM HG: ICD-10-PCS | Mod: CPTII,,, | Performed by: INTERNAL MEDICINE

## 2023-08-28 PROCEDURE — 99999 PR PBB SHADOW E&M-EST. PATIENT-LVL III: ICD-10-PCS | Mod: PBBFAC,,, | Performed by: INTERNAL MEDICINE

## 2023-08-28 PROCEDURE — 99215 OFFICE O/P EST HI 40 MIN: CPT | Mod: S$PBB,,, | Performed by: INTERNAL MEDICINE

## 2023-08-28 PROCEDURE — 3044F HG A1C LEVEL LT 7.0%: CPT | Mod: CPTII,,, | Performed by: INTERNAL MEDICINE

## 2023-08-28 RX ORDER — FLUOROURACIL 50 MG/ML
400 INJECTION, SOLUTION INTRAVENOUS
Status: CANCELLED | OUTPATIENT
Start: 2023-08-29

## 2023-08-28 RX ORDER — FLUOROURACIL 50 MG/ML
2400 INJECTION, SOLUTION INTRAVENOUS
Status: CANCELLED | OUTPATIENT
Start: 2023-08-29

## 2023-08-28 RX ORDER — SODIUM CHLORIDE 0.9 % (FLUSH) 0.9 %
10 SYRINGE (ML) INJECTION
Status: CANCELLED | OUTPATIENT
Start: 2023-08-29

## 2023-08-28 RX ORDER — DIPHENHYDRAMINE HYDROCHLORIDE 50 MG/ML
50 INJECTION INTRAMUSCULAR; INTRAVENOUS ONCE AS NEEDED
Status: CANCELLED | OUTPATIENT
Start: 2023-08-29

## 2023-08-28 RX ORDER — EPINEPHRINE 0.3 MG/.3ML
0.3 INJECTION SUBCUTANEOUS ONCE AS NEEDED
Status: CANCELLED | OUTPATIENT
Start: 2023-08-29

## 2023-08-28 RX ORDER — SODIUM CHLORIDE 0.9 % (FLUSH) 0.9 %
10 SYRINGE (ML) INJECTION
Status: CANCELLED | OUTPATIENT
Start: 2023-08-31

## 2023-08-28 RX ORDER — HEPARIN 100 UNIT/ML
500 SYRINGE INTRAVENOUS
Status: CANCELLED | OUTPATIENT
Start: 2023-08-31

## 2023-08-28 RX ORDER — HEPARIN 100 UNIT/ML
500 SYRINGE INTRAVENOUS
Status: CANCELLED | OUTPATIENT
Start: 2023-08-29

## 2023-08-28 NOTE — PROGRESS NOTES
HPI    47 years old male newly diagnosed colon cancer.  He was transferred from [facility] for evaluation possible developed a small-bowel obstruction.  He was complaining abdominal pain nausea and vomiting.  Workup shows leukocytosis, anemia and thrombocytosis.  CT scan from outside facility was concerning for developing small-bowel obstruction.  Patient was transferred admitted to [facility] underwent right partial colectomy on 05/29 with pathology consistent adenocarcinoma.  He was discharged then and return to hospital for another admission on 06/08/2023 where he was treated for ileus.  Medical history including type 2 diabetes anemia hypertension right eye blindness.       Overview/Hospital Course:  Ruslan Caldwell is a 47 year old male with a past medical history of recently diagnosed colonic adenocarcinoma s/p partial colectomy with anastomosis, tobacco use, microcytic anemia and thrombocytosis who presented with persistent abdominal pain, nausea and abdominal pain concerning for SBO. An NG tube was unable to be placed. He was placed on IV fluids and Zosyn. He was made NPO. PRN IV analgesics and antiemetics were ordered and General Surgery was consulted. Repeat CT A/P shows SBO 6/13. A gastrografin enema 6/14 was inconclusive. Surgery took the patient to the OR 6/15 for exploratory laparotomy where an omental infarction was addressed; the patient also underwent SCOUT and partial small bowel resection with anastomosis. PPN was started 6/16. His course has been complicated by JOYCE in setting of vomiting as well as Toradol use; IV fluids were continued and his kidney function has improved to baseline. Nephrology has been consulted. He was also discovered to have an iron deficiency for which IV iron (one dose) was ordered 6/13.  His post-operative course has been complicated by low grade fever, tachycardia and an increasing WBC count, despite Zosyn use. Antibiotics were broadened to vancomycin,  cefepime and Flagyl. Blood cultures were ordered as well as a CXR and urine culture.         Interval History:  Patient seen and examined.  Per nurse patient ate outside food last night and tolerated it.  Surgeon has started regular diet and is planning for possible discharge tomorrow  Review of Systems   Constitutional:  Negative for fever.   Gastrointestinal:  Positive for abdominal pain. Negative for nausea and vomiting.   Skin:  Positive for wound.   Allergic/Immunologic: Positive for immunocompromised state.         Past Medical History:   Diagnosis Date    Colon cancer 05/19/2023    Diabetes mellitus, type 2 05/2020    Patient denies    Hypertension      Past Surgical History:   Procedure Laterality Date    COLONOSCOPY N/A 5/29/2023    Procedure: COLONOSCOPY;  Surgeon: Sam Solano MD;  Location: [facility];  Service: Endoscopy;  Laterality: N/A;    EXCISION, SMALL INTESTINE N/A 6/15/2023    Procedure: EXCISION, SMALL INTESTINE;  Surgeon: Edin Michael MD;  Location: [facility];  Service: General;  Laterality: N/A;    EYE SURGERY Right     INSERTION OF TUNNELED CENTRAL VENOUS CATHETER (CVC) WITH SUBCUTANEOUS PORT Left 7/24/2023    Procedure: [id number];  Surgeon: Garfield Hoyt Jr., MD;  Location: [facility];  Service: General;  Laterality: Left;    LAPAROTOMY, EXPLORATORY N/A 6/15/2023    Procedure: LAPAROTOMY, EXPLORATORY;  Surgeon: Edin Michael MD;  Location: [facility];  Service: General;  Laterality: N/A;    LAPAROTOMY, EXPLORATORY N/A 6/17/2023    Procedure: LAPAROTOMY, EXPLORATORY;  Surgeon: Garfield Hoyt Jr., MD;  Location: [facility];  Service: General;  Laterality: N/A;    LUMBAR DISC SURGERY  2005    LYSIS OF ADHESIONS N/A 6/15/2023    Procedure: LYSIS, ADHESIONS;  Surgeon: Edin Michael MD;  Location: [facility] OR;  Service: General;  Laterality: N/A;    OMENTECTOMY N/A 6/15/2023    Procedure: OMENTECTOMY;  Surgeon: Edin Michael MD;  Location: [facility] OR;  Service:  General;  Laterality: N/A;    SUBTOTAL COLECTOMY Right 5/29/2023    Procedure: COLECTOMY, PARTIAL;  Surgeon: Edin Michael MD;  Location: [facility];  Service: General;  Laterality: Right;     Social History     Socioeconomic History    Marital status:     Number of children: 3    Highest education level: Associate degree: academic program   Occupational History    Occupation: [employer]-   Tobacco Use    Smoking status: Every Day     Current packs/day: 0.50     Average packs/day: 0.5 packs/day for 23.0 years (11.5 ttl pk-yrs)     Types: Cigarettes    Smokeless tobacco: Never    Tobacco comments:     Do not smoke day of surgery   Substance and Sexual Activity    Alcohol use: Not Currently     Comment: Quit drinking 2 yrs ago    Drug use: Yes     Types: Marijuana     Comment: heavy marijuana use    Sexual activity: Yes     Social Determinants of Health     Financial Resource Strain: Low Risk  (5/28/2023)    Overall Financial Resource Strain (CARDIA)     Difficulty of Paying Living Expenses: Not hard at all   Food Insecurity: No Food Insecurity (5/28/2023)    Hunger Vital Sign     Worried About Running Out of Food in the Last Year: Never true     Ran Out of Food in the Last Year: Never true   Transportation Needs: No Transportation Needs (5/28/2023)    PRAPARE - Transportation     Lack of Transportation (Medical): No     Lack of Transportation (Non-Medical): No   Physical Activity: Sufficiently Active (5/28/2023)    Exercise Vital Sign     Days of Exercise per Week: 5 days     Minutes of Exercise per Session: 90 min   Stress: No Stress Concern Present (5/24/2023)    [employer] [place] [employer] - Occupational Stress Questionnaire     Feeling of Stress : Not at all   Social Connections: Socially Isolated (5/28/2023)    Social Connection and Isolation Panel [NHANES]     Frequency of Communication with Friends and Family: Never     Frequency of Social Gatherings with Friends and Family:  More than three times a week     Attends Orthodoxy Services: Never     Active Member of Clubs or Organizations: No     Attends Club or Organization Meetings: Never     Marital Status:    Housing Stability: Unknown (5/28/2023)    Housing Stability Vital Sign     Unable to Pay for Housing in the Last Year: No     Unstable Housing in the Last Year: No     Social History     Socioeconomic History    Marital status:     Number of children: 3    Highest education level: Associate degree: academic program   Occupational History    Occupation: [employer]-   Tobacco Use    Smoking status: Every Day     Current packs/day: 0.50     Average packs/day: 0.5 packs/day for 23.0 years (11.5 ttl pk-yrs)     Types: Cigarettes    Smokeless tobacco: Never    Tobacco comments:     Do not smoke day of surgery   Substance and Sexual Activity    Alcohol use: Not Currently     Comment: Quit drinking 2 yrs ago    Drug use: Yes     Types: Marijuana     Comment: heavy marijuana use    Sexual activity: Yes     Social Determinants of Health     Financial Resource Strain: Low Risk  (5/28/2023)    Overall Financial Resource Strain (CARDIA)     Difficulty of Paying Living Expenses: Not hard at all   Food Insecurity: No Food Insecurity (5/28/2023)    Hunger Vital Sign     Worried About Running Out of Food in the Last Year: Never true     Ran Out of Food in the Last Year: Never true   Transportation Needs: No Transportation Needs (5/28/2023)    PRAPARE - Transportation     Lack of Transportation (Medical): No     Lack of Transportation (Non-Medical): No   Physical Activity: Sufficiently Active (5/28/2023)    Exercise Vital Sign     Days of Exercise per Week: 5 days     Minutes of Exercise per Session: 90 min   Stress: No Stress Concern Present (5/24/2023)    [place] [employer] - Occupational Stress Questionnaire     Feeling of Stress : Not at all   Social Connections: Socially Isolated (5/28/2023)     Social Connection and Isolation Panel [NHANES]     Frequency of Communication with Friends and Family: Never     Frequency of Social Gatherings with Friends and Family: More than three times a week     Attends Confucianist Services: Never     Active Member of Clubs or Organizations: No     Attends Club or Organization Meetings: Never     Marital Status:    Housing Stability: Unknown (5/28/2023)    Housing Stability Vital Sign     Unable to Pay for Housing in the Last Year: No     Unstable Housing in the Last Year: No     Review of patient's allergies indicates:   Allergen Reactions    Fish containing products Other (See Comments)     Patient doesn't think any connection to iodine.       Physical exam  Vitals:    08/28/23 1045   BP: 126/88   Pulse: 84   Resp: 10   Temp: 97.6 °F (36.4 °C)     Mid abdomen tenderness.  No evidence of skin infection on examination.  Normal speech pattern  Normal respiratory effort  Tachycardic sinus  Move all 4 extremity      Lab Results   Component Value Date    WBC 12.35 08/28/2023    HGB 9.5 (L) 08/28/2023    HCT 30.9 (L) 08/28/2023    MCV 73 (L) 08/28/2023     (H) 08/28/2023       CMP  Sodium   Date Value Ref Range Status   08/14/2023 136 136 - 145 mmol/L Final     Potassium   Date Value Ref Range Status   08/14/2023 2.6 (LL) 3.5 - 5.1 mmol/L Final     Comment:     Potassium critical result(s) called and verbal readback obtained from   Clarita Li RN by HS3 08/14/2023 13:37       Chloride   Date Value Ref Range Status   08/14/2023 96 95 - 110 mmol/L Final     CO2   Date Value Ref Range Status   08/14/2023 31 (H) 23 - 29 mmol/L Final     Glucose   Date Value Ref Range Status   08/14/2023 170 (H) 70 - 110 mg/dL Final     BUN   Date Value Ref Range Status   08/14/2023 8 6 - 20 mg/dL Final     Creatinine   Date Value Ref Range Status   08/14/2023 1.0 0.5 - 1.4 mg/dL Final     Calcium   Date Value Ref Range Status   08/14/2023 9.2 8.7 - 10.5 mg/dL Final     Total Protein    Date Value Ref Range Status   08/14/2023 8.1 6.0 - 8.4 g/dL Final     Albumin   Date Value Ref Range Status   08/14/2023 3.1 (L) 3.5 - 5.2 g/dL Final     Total Bilirubin   Date Value Ref Range Status   08/14/2023 0.9 0.1 - 1.0 mg/dL Final     Comment:     For infants and newborns, interpretation of results should be based  on gestational age, weight and in agreement with clinical  observations.    Premature Infant recommended reference ranges:  Up to 24 hours.............<8.0 mg/dL  Up to 48 hours............<12.0 mg/dL  3-5 days..................<15.0 mg/dL  6-29 days.................<15.0 mg/dL       Alkaline Phosphatase   Date Value Ref Range Status   08/14/2023 96 55 - 135 U/L Final     AST   Date Value Ref Range Status   08/14/2023 23 10 - 40 U/L Final     ALT   Date Value Ref Range Status   08/14/2023 50 (H) 10 - 44 U/L Final     Anion Gap   Date Value Ref Range Status   08/14/2023 9 8 - 16 mmol/L Final     eGFR   Date Value Ref Range Status   08/14/2023 >60.0 >60 mL/min/1.73 m^2 Final     Assessment and plan    Colon adenocarcinoma recently colostomy small-bowel obstruction.      [id number] G2 - stage III    MSI intact    Iron deficiency anemia microcytosis likely blood loss     Thrombocytosis reactive    Disproportionate demand of opiates medication relative to his disease.  I am not sure the his pain is due to surgery or others.  I would like to have a palliative care team or pain management specialist, for the pain evaluation.    Complaining of left-sided neck pain.  Where the port placement is.  No evidence of skin infection or pustular.  X-ray from end of July shows good position of the port.    Abdominal pain disproportionate as described as above.  Patient denies any history of chronic opiate use.  States the pain is really causing a problem.  Denies fever.  elevated leukocytosis and elevated platelets.  We will order CT scan chest abdomen pelvis for evaluation.  Also advised that if pain worsening  patient should go to ER for further evaluation.    Hypokalemia    Reactive thrombocytosis    > potassium chloride 40 mEq daily    > cycle #3 of treatment.  Follow up with CMP    > request tempus     > CT chest abdomen pelvis with contrast - evaluation for surgical cause of pain.     > port studies - normal appearance  and function of left internal jugular port-A-Cath    > long-acting morphine Q 8 hours    > consult palliative Care for pain evaluation.

## 2023-08-29 ENCOUNTER — INFUSION (OUTPATIENT)
Dept: INFUSION THERAPY | Facility: HOSPITAL | Age: 48
End: 2023-08-29
Attending: INTERNAL MEDICINE
Payer: MEDICAID

## 2023-08-29 VITALS
SYSTOLIC BLOOD PRESSURE: 140 MMHG | TEMPERATURE: 98 F | HEIGHT: 75 IN | OXYGEN SATURATION: 100 % | WEIGHT: 195.13 LBS | RESPIRATION RATE: 18 BRPM | HEART RATE: 78 BPM | DIASTOLIC BLOOD PRESSURE: 90 MMHG | BODY MASS INDEX: 24.26 KG/M2

## 2023-08-29 DIAGNOSIS — C18.9 COLON ADENOCARCINOMA: ICD-10-CM

## 2023-08-29 DIAGNOSIS — K56.609 SMALL BOWEL OBSTRUCTION: Primary | ICD-10-CM

## 2023-08-29 PROCEDURE — 96367 TX/PROPH/DG ADDL SEQ IV INF: CPT

## 2023-08-29 PROCEDURE — 96411 CHEMO IV PUSH ADDL DRUG: CPT

## 2023-08-29 PROCEDURE — 96415 CHEMO IV INFUSION ADDL HR: CPT

## 2023-08-29 PROCEDURE — 96416 CHEMO PROLONG INFUSE W/PUMP: CPT

## 2023-08-29 PROCEDURE — 25000003 PHARM REV CODE 250: Performed by: INTERNAL MEDICINE

## 2023-08-29 PROCEDURE — 63600175 PHARM REV CODE 636 W HCPCS: Performed by: INTERNAL MEDICINE

## 2023-08-29 PROCEDURE — 96368 THER/DIAG CONCURRENT INF: CPT

## 2023-08-29 PROCEDURE — 96413 CHEMO IV INFUSION 1 HR: CPT

## 2023-08-29 PROCEDURE — 96366 THER/PROPH/DIAG IV INF ADDON: CPT

## 2023-08-29 RX ORDER — EPINEPHRINE 0.3 MG/.3ML
0.3 INJECTION SUBCUTANEOUS ONCE AS NEEDED
Status: DISCONTINUED | OUTPATIENT
Start: 2023-08-29 | End: 2023-08-29 | Stop reason: HOSPADM

## 2023-08-29 RX ORDER — SODIUM CHLORIDE 0.9 % (FLUSH) 0.9 %
10 SYRINGE (ML) INJECTION
Status: DISCONTINUED | OUTPATIENT
Start: 2023-08-29 | End: 2023-08-29 | Stop reason: HOSPADM

## 2023-08-29 RX ORDER — DIPHENHYDRAMINE HYDROCHLORIDE 50 MG/ML
50 INJECTION INTRAMUSCULAR; INTRAVENOUS ONCE AS NEEDED
Status: DISCONTINUED | OUTPATIENT
Start: 2023-08-29 | End: 2023-08-29 | Stop reason: HOSPADM

## 2023-08-29 RX ORDER — FLUOROURACIL 50 MG/ML
400 INJECTION, SOLUTION INTRAVENOUS
Status: COMPLETED | OUTPATIENT
Start: 2023-08-29 | End: 2023-08-29

## 2023-08-29 RX ADMIN — OXALIPLATIN 180 MG: 100 INJECTION, SOLUTION, CONCENTRATE INTRAVENOUS at 11:08

## 2023-08-29 RX ADMIN — FLUOROURACIL 850 MG: 50 INJECTION, SOLUTION INTRAVENOUS at 01:08

## 2023-08-29 RX ADMIN — PALONOSETRON HYDROCHLORIDE 0.25 MG: 0.25 INJECTION INTRAVENOUS at 11:08

## 2023-08-29 RX ADMIN — DEXTROSE: 5 SOLUTION INTRAVENOUS at 11:08

## 2023-08-29 RX ADMIN — LEUCOVORIN CALCIUM 850 MG: 500 INJECTION, POWDER, LYOPHILIZED, FOR SOLUTION INTRAMUSCULAR; INTRAVENOUS at 11:08

## 2023-08-29 RX ADMIN — FLUOROURACIL 5090 MG: 50 INJECTION, SOLUTION INTRAVENOUS at 01:08

## 2023-08-29 NOTE — PLAN OF CARE
Problem: Fall Injury Risk  Goal: Absence of Fall and Fall-Related Injury  Outcome: Ongoing, Progressing  Intervention: Identify and Manage Contributors  Flowsheets (Taken 8/29/2023 1059)  Self-Care Promotion: independence encouraged  Medication Review/Management: medications reviewed  Intervention: Promote Injury-Free Environment  Flowsheets (Taken 8/29/2023 1059)  Safety Promotion/Fall Prevention: medications reviewed

## 2023-08-31 ENCOUNTER — TELEPHONE (OUTPATIENT)
Dept: INFUSION THERAPY | Facility: HOSPITAL | Age: 48
End: 2023-08-31

## 2023-08-31 NOTE — TELEPHONE ENCOUNTER
Attempted to call pts brother, Roger, due to not being able to get in touch with pt. No answer from brother and unable to leave voicemail.

## 2023-08-31 NOTE — TELEPHONE ENCOUNTER
LVM for pt regarding missed appt today for pump d/c. Call back number left in message. Notified MD office and scheduling.

## 2023-08-31 NOTE — TELEPHONE ENCOUNTER
Attempted to call pt sister, Jan, due to being unable to reach pt regarding missed appt today for pump d/c. Pt sister did not answer and I was unable to leave a voicemail due to voicemail box being full.

## 2023-09-01 ENCOUNTER — PATIENT MESSAGE (OUTPATIENT)
Dept: HEMATOLOGY/ONCOLOGY | Facility: CLINIC | Age: 48
End: 2023-09-01
Payer: MEDICAID

## 2023-09-01 ENCOUNTER — TELEPHONE (OUTPATIENT)
Dept: HEMATOLOGY/ONCOLOGY | Facility: CLINIC | Age: 48
End: 2023-09-01
Payer: MEDICAID

## 2023-09-01 ENCOUNTER — DOCUMENTATION ONLY (OUTPATIENT)
Dept: INFUSION THERAPY | Facility: HOSPITAL | Age: 48
End: 2023-09-01

## 2023-09-01 NOTE — TELEPHONE ENCOUNTER
LVM with on pt's phone advising him to go to infusion center today for pump d/c. No answer on patient's brother or sister's number. Both phones voicemail box full. Message sent via [facility]. Dr. Baird and infusion staff notified.

## 2023-09-01 NOTE — PROGRESS NOTES
Spoke w/ Roger Caldwell patients brother and asked him to contact  Ruslan to contact [facility] for pump d/c, brother stated he was going to locate him and give him the instructions.

## 2023-09-05 ENCOUNTER — DOCUMENTATION ONLY (OUTPATIENT)
Dept: HEMATOLOGY/ONCOLOGY | Facility: CLINIC | Age: 48
End: 2023-09-05
Payer: MEDICAID

## 2023-09-05 NOTE — PROGRESS NOTES
Spoke with pt. He states he had an out of town family emergency. He had the pump dc'd at [facility]. Plans to return home tomorrow and will bring us the pump.

## 2023-09-12 ENCOUNTER — TELEPHONE (OUTPATIENT)
Dept: HEMATOLOGY/ONCOLOGY | Facility: CLINIC | Age: 48
End: 2023-09-12
Payer: MEDICAID

## 2023-09-12 NOTE — TELEPHONE ENCOUNTER
Pt came to office to notify he is leaving town today and will not be able to complete treatment scheduled 09/13/23, notified pt per nurse appt can be rescheduled to next week, appt for lab and NP today cancelled, msg sent to infusion to have treatment rescheduled.

## 2023-09-18 ENCOUNTER — TELEPHONE (OUTPATIENT)
Dept: HEMATOLOGY/ONCOLOGY | Facility: CLINIC | Age: 48
End: 2023-09-18
Payer: MEDICAID

## 2023-09-18 NOTE — TELEPHONE ENCOUNTER
Spoke to pt to get him r/s for clearance. Pt will be in to get labs at [facility] this evening. Pt will be put on schedule to see Brie at 9 on 9/19 with chemo at 10

## 2023-09-19 ENCOUNTER — TELEPHONE (OUTPATIENT)
Dept: HEMATOLOGY/ONCOLOGY | Facility: CLINIC | Age: 48
End: 2023-09-19
Payer: MEDICAID

## 2023-09-19 ENCOUNTER — INFUSION (OUTPATIENT)
Dept: INFUSION THERAPY | Facility: HOSPITAL | Age: 48
End: 2023-09-19
Attending: INTERNAL MEDICINE
Payer: MEDICAID

## 2023-09-19 ENCOUNTER — OFFICE VISIT (OUTPATIENT)
Dept: HEMATOLOGY/ONCOLOGY | Facility: CLINIC | Age: 48
End: 2023-09-19
Payer: MEDICAID

## 2023-09-19 VITALS
WEIGHT: 197.75 LBS | HEIGHT: 75 IN | RESPIRATION RATE: 12 BRPM | SYSTOLIC BLOOD PRESSURE: 130 MMHG | HEART RATE: 73 BPM | TEMPERATURE: 98 F | BODY MASS INDEX: 24.59 KG/M2 | OXYGEN SATURATION: 100 % | DIASTOLIC BLOOD PRESSURE: 70 MMHG

## 2023-09-19 VITALS
WEIGHT: 197.75 LBS | DIASTOLIC BLOOD PRESSURE: 74 MMHG | BODY MASS INDEX: 24.59 KG/M2 | HEART RATE: 65 BPM | HEIGHT: 75 IN | OXYGEN SATURATION: 100 % | SYSTOLIC BLOOD PRESSURE: 124 MMHG | TEMPERATURE: 98 F | RESPIRATION RATE: 16 BRPM

## 2023-09-19 DIAGNOSIS — E87.6 HYPOKALEMIA: Primary | ICD-10-CM

## 2023-09-19 DIAGNOSIS — K56.609 SMALL BOWEL OBSTRUCTION: ICD-10-CM

## 2023-09-19 DIAGNOSIS — C18.9 COLON ADENOCARCINOMA: ICD-10-CM

## 2023-09-19 DIAGNOSIS — R10.9 ABDOMINAL PAIN, UNSPECIFIED ABDOMINAL LOCATION: ICD-10-CM

## 2023-09-19 DIAGNOSIS — K56.609 SMALL BOWEL OBSTRUCTION: Primary | ICD-10-CM

## 2023-09-19 PROCEDURE — 3075F PR MOST RECENT SYSTOLIC BLOOD PRESS GE 130-139MM HG: ICD-10-PCS | Mod: CPTII,,, | Performed by: INTERNAL MEDICINE

## 2023-09-19 PROCEDURE — 96368 THER/DIAG CONCURRENT INF: CPT

## 2023-09-19 PROCEDURE — 99999 PR PBB SHADOW E&M-EST. PATIENT-LVL III: ICD-10-PCS | Mod: PBBFAC,,, | Performed by: INTERNAL MEDICINE

## 2023-09-19 PROCEDURE — 96366 THER/PROPH/DIAG IV INF ADDON: CPT

## 2023-09-19 PROCEDURE — 99215 OFFICE O/P EST HI 40 MIN: CPT | Mod: S$PBB,,, | Performed by: INTERNAL MEDICINE

## 2023-09-19 PROCEDURE — 96411 CHEMO IV PUSH ADDL DRUG: CPT

## 2023-09-19 PROCEDURE — 99215 PR OFFICE/OUTPT VISIT, EST, LEVL V, 40-54 MIN: ICD-10-PCS | Mod: S$PBB,,, | Performed by: INTERNAL MEDICINE

## 2023-09-19 PROCEDURE — 96413 CHEMO IV INFUSION 1 HR: CPT

## 2023-09-19 PROCEDURE — 96416 CHEMO PROLONG INFUSE W/PUMP: CPT

## 2023-09-19 PROCEDURE — 25000003 PHARM REV CODE 250: Performed by: INTERNAL MEDICINE

## 2023-09-19 PROCEDURE — 3044F HG A1C LEVEL LT 7.0%: CPT | Mod: CPTII,,, | Performed by: INTERNAL MEDICINE

## 2023-09-19 PROCEDURE — 3008F PR BODY MASS INDEX (BMI) DOCUMENTED: ICD-10-PCS | Mod: CPTII,,, | Performed by: INTERNAL MEDICINE

## 2023-09-19 PROCEDURE — 63600175 PHARM REV CODE 636 W HCPCS: Performed by: INTERNAL MEDICINE

## 2023-09-19 PROCEDURE — 3078F DIAST BP <80 MM HG: CPT | Mod: CPTII,,, | Performed by: INTERNAL MEDICINE

## 2023-09-19 PROCEDURE — 99999 PR PBB SHADOW E&M-EST. PATIENT-LVL III: CPT | Mod: PBBFAC,,, | Performed by: INTERNAL MEDICINE

## 2023-09-19 PROCEDURE — 96415 CHEMO IV INFUSION ADDL HR: CPT

## 2023-09-19 PROCEDURE — 3044F PR MOST RECENT HEMOGLOBIN A1C LEVEL <7.0%: ICD-10-PCS | Mod: CPTII,,, | Performed by: INTERNAL MEDICINE

## 2023-09-19 PROCEDURE — 1159F MED LIST DOCD IN RCRD: CPT | Mod: CPTII,,, | Performed by: INTERNAL MEDICINE

## 2023-09-19 PROCEDURE — 3075F SYST BP GE 130 - 139MM HG: CPT | Mod: CPTII,,, | Performed by: INTERNAL MEDICINE

## 2023-09-19 PROCEDURE — 3078F PR MOST RECENT DIASTOLIC BLOOD PRESSURE < 80 MM HG: ICD-10-PCS | Mod: CPTII,,, | Performed by: INTERNAL MEDICINE

## 2023-09-19 PROCEDURE — 99213 OFFICE O/P EST LOW 20 MIN: CPT | Mod: PBBFAC,PN | Performed by: INTERNAL MEDICINE

## 2023-09-19 PROCEDURE — 3008F BODY MASS INDEX DOCD: CPT | Mod: CPTII,,, | Performed by: INTERNAL MEDICINE

## 2023-09-19 PROCEDURE — 1159F PR MEDICATION LIST DOCUMENTED IN MEDICAL RECORD: ICD-10-PCS | Mod: CPTII,,, | Performed by: INTERNAL MEDICINE

## 2023-09-19 PROCEDURE — 96367 TX/PROPH/DG ADDL SEQ IV INF: CPT

## 2023-09-19 RX ORDER — FLUOROURACIL 50 MG/ML
400 INJECTION, SOLUTION INTRAVENOUS
Status: CANCELLED | OUTPATIENT
Start: 2023-09-19

## 2023-09-19 RX ORDER — FLUOROURACIL 50 MG/ML
2400 INJECTION, SOLUTION INTRAVENOUS
Status: CANCELLED | OUTPATIENT
Start: 2023-09-19

## 2023-09-19 RX ORDER — SODIUM CHLORIDE 0.9 % (FLUSH) 0.9 %
10 SYRINGE (ML) INJECTION
Status: CANCELLED | OUTPATIENT
Start: 2023-09-19

## 2023-09-19 RX ORDER — DIPHENHYDRAMINE HYDROCHLORIDE 50 MG/ML
50 INJECTION INTRAMUSCULAR; INTRAVENOUS ONCE AS NEEDED
Status: CANCELLED | OUTPATIENT
Start: 2023-09-19

## 2023-09-19 RX ORDER — EPINEPHRINE 0.3 MG/.3ML
0.3 INJECTION SUBCUTANEOUS ONCE AS NEEDED
Status: CANCELLED | OUTPATIENT
Start: 2023-09-19

## 2023-09-19 RX ORDER — EPINEPHRINE 0.3 MG/.3ML
0.3 INJECTION SUBCUTANEOUS ONCE AS NEEDED
Status: DISCONTINUED | OUTPATIENT
Start: 2023-09-19 | End: 2023-09-19 | Stop reason: HOSPADM

## 2023-09-19 RX ORDER — HEPARIN 100 UNIT/ML
500 SYRINGE INTRAVENOUS
Status: DISCONTINUED | OUTPATIENT
Start: 2023-09-19 | End: 2023-09-19 | Stop reason: HOSPADM

## 2023-09-19 RX ORDER — HEPARIN 100 UNIT/ML
500 SYRINGE INTRAVENOUS
Status: CANCELLED | OUTPATIENT
Start: 2023-09-19

## 2023-09-19 RX ORDER — SODIUM CHLORIDE 0.9 % (FLUSH) 0.9 %
10 SYRINGE (ML) INJECTION
Status: CANCELLED | OUTPATIENT
Start: 2023-09-21

## 2023-09-19 RX ORDER — SODIUM CHLORIDE 0.9 % (FLUSH) 0.9 %
10 SYRINGE (ML) INJECTION
Status: DISCONTINUED | OUTPATIENT
Start: 2023-09-19 | End: 2023-09-19 | Stop reason: HOSPADM

## 2023-09-19 RX ORDER — HEPARIN 100 UNIT/ML
500 SYRINGE INTRAVENOUS
Status: CANCELLED | OUTPATIENT
Start: 2023-09-21

## 2023-09-19 RX ORDER — DIPHENHYDRAMINE HYDROCHLORIDE 50 MG/ML
50 INJECTION INTRAMUSCULAR; INTRAVENOUS ONCE AS NEEDED
Status: DISCONTINUED | OUTPATIENT
Start: 2023-09-19 | End: 2023-09-19 | Stop reason: HOSPADM

## 2023-09-19 RX ORDER — FLUOROURACIL 50 MG/ML
400 INJECTION, SOLUTION INTRAVENOUS
Status: COMPLETED | OUTPATIENT
Start: 2023-09-19 | End: 2023-09-19

## 2023-09-19 RX ADMIN — FLUOROURACIL 850 MG: 50 INJECTION, SOLUTION INTRAVENOUS at 01:09

## 2023-09-19 RX ADMIN — LEUCOVORIN CALCIUM 850 MG: 350 INJECTION, POWDER, LYOPHILIZED, FOR SUSPENSION INTRAMUSCULAR; INTRAVENOUS at 11:09

## 2023-09-19 RX ADMIN — FLUOROURACIL 5090 MG: 50 INJECTION, SOLUTION INTRAVENOUS at 01:09

## 2023-09-19 RX ADMIN — OXALIPLATIN 180 MG: 100 INJECTION, SOLUTION, CONCENTRATE INTRAVENOUS at 11:09

## 2023-09-19 RX ADMIN — DEXTROSE: 5 SOLUTION INTRAVENOUS at 10:09

## 2023-09-19 RX ADMIN — PALONOSETRON HYDROCHLORIDE 0.25 MG: 0.25 INJECTION INTRAVENOUS at 10:09

## 2023-09-19 NOTE — PROGRESS NOTES
HPI    47 years old male newly diagnosed colon cancer.  He was transferred from [facility] for evaluation possible developed a small-bowel obstruction.  He was complaining abdominal pain nausea and vomiting.  Workup shows leukocytosis, anemia and thrombocytosis.  CT scan from outside facility was concerning for developing small-bowel obstruction.  Patient was transferred admitted to [facility] underwent right partial colectomy on 05/29 with pathology consistent adenocarcinoma.  He was discharged then and return to hospital for another admission on 06/08/2023 where he was treated for ileus.  Medical history including type 2 diabetes anemia hypertension right eye blindness.         Past Medical History:   Diagnosis Date    Colon cancer 05/19/2023    Diabetes mellitus, type 2 05/2020    Patient denies    Hypertension      Past Surgical History:   Procedure Laterality Date    COLONOSCOPY N/A 5/29/2023    Procedure: COLONOSCOPY;  Surgeon: Sam Solano MD;  Location: [facility];  Service: Endoscopy;  Laterality: N/A;    EXCISION, SMALL INTESTINE N/A 6/15/2023    Procedure: EXCISION, SMALL INTESTINE;  Surgeon: Edin Michael MD;  Location: [facility] OR;  Service: General;  Laterality: N/A;    EYE SURGERY Right     INSERTION OF TUNNELED CENTRAL VENOUS CATHETER (CVC) WITH SUBCUTANEOUS PORT Left 7/24/2023    Procedure: [id number];  Surgeon: Garfield Hoyt Jr., MD;  Location: [facility] OR;  Service: General;  Laterality: Left;    LAPAROTOMY, EXPLORATORY N/A 6/15/2023    Procedure: LAPAROTOMY, EXPLORATORY;  Surgeon: Edin Michael MD;  Location: [facility] OR;  Service: General;  Laterality: N/A;    LAPAROTOMY, EXPLORATORY N/A 6/17/2023    Procedure: LAPAROTOMY, EXPLORATORY;  Surgeon: Garfield Hoyt Jr., MD;  Location: [facility] OR;  Service: General;  Laterality: N/A;    LUMBAR DISC SURGERY  2005    LYSIS OF ADHESIONS N/A 6/15/2023    Procedure: LYSIS, ADHESIONS;  Surgeon: Edin RUIZ  MD Alec;  Location: [facility] OR;  Service: General;  Laterality: N/A;    OMENTECTOMY N/A 6/15/2023    Procedure: OMENTECTOMY;  Surgeon: Edin Michael MD;  Location: [facility] OR;  Service: General;  Laterality: N/A;    SUBTOTAL COLECTOMY Right 5/29/2023    Procedure: COLECTOMY, PARTIAL;  Surgeon: Edin Michael MD;  Location: [facility] OR;  Service: General;  Laterality: Right;     Social History     Socioeconomic History    Marital status:     Number of children: 3    Highest education level: Associate degree: academic program   Occupational History    Occupation: [employer]   Tobacco Use    Smoking status: Every Day     Current packs/day: 0.50     Average packs/day: 0.5 packs/day for 23.0 years (11.5 ttl pk-yrs)     Types: Cigarettes    Smokeless tobacco: Never    Tobacco comments:     Do not smoke day of surgery   Substance and Sexual Activity    Alcohol use: Not Currently     Comment: Quit drinking 2 yrs ago    Drug use: Yes     Types: Marijuana     Comment: heavy marijuana use    Sexual activity: Yes     Social Determinants of Health     Financial Resource Strain: Low Risk  (5/28/2023)    Overall Financial Resource Strain (CARDIA)     Difficulty of Paying Living Expenses: Not hard at all   Food Insecurity: No Food Insecurity (5/28/2023)    Hunger Vital Sign     Worried About Running Out of Food in the Last Year: Never true     Ran Out of Food in the Last Year: Never true   Transportation Needs: No Transportation Needs (5/28/2023)    PRAPARE - Transportation     Lack of Transportation (Medical): No     Lack of Transportation (Non-Medical): No   Physical Activity: Sufficiently Active (5/28/2023)    Exercise Vital Sign     Days of Exercise per Week: 5 days     Minutes of Exercise per Session: 90 min   Stress: No Stress Concern Present (5/24/2023)    [employer] [employer] - Occupational Stress Questionnaire     Feeling of Stress : Not at all   Social Connections: Socially  Isolated (5/28/2023)    Social Connection and Isolation Panel [NHANES]     Frequency of Communication with Friends and Family: Never     Frequency of Social Gatherings with Friends and Family: More than three times a week     Attends Orthodoxy Services: Never     Active Member of Clubs or Organizations: No     Attends Club or Organization Meetings: Never     Marital Status:    Housing Stability: Unknown (5/28/2023)    Housing Stability Vital Sign     Unable to Pay for Housing in the Last Year: No     Unstable Housing in the Last Year: No     Social History     Socioeconomic History    Marital status:     Number of children: 3    Highest education level: Associate degree: academic program   Occupational History    Occupation: [employer]   Tobacco Use    Smoking status: Every Day     Current packs/day: 0.50     Average packs/day: 0.5 packs/day for 23.0 years (11.5 ttl pk-yrs)     Types: Cigarettes    Smokeless tobacco: Never    Tobacco comments:     Do not smoke day of surgery   Substance and Sexual Activity    Alcohol use: Not Currently     Comment: Quit drinking 2 yrs ago    Drug use: Yes     Types: Marijuana     Comment: heavy marijuana use    Sexual activity: Yes     Social Determinants of Health     Financial Resource Strain: Low Risk  (5/28/2023)    Overall Financial Resource Strain (CARDIA)     Difficulty of Paying Living Expenses: Not hard at all   Food Insecurity: No Food Insecurity (5/28/2023)    Hunger Vital Sign     Worried About Running Out of Food in the Last Year: Never true     Ran Out of Food in the Last Year: Never true   Transportation Needs: No Transportation Needs (5/28/2023)    PRAPARE - Transportation     Lack of Transportation (Medical): No     Lack of Transportation (Non-Medical): No   Physical Activity: Sufficiently Active (5/28/2023)    Exercise Vital Sign     Days of Exercise per Week: 5 days     Minutes of Exercise per Session: 90 min   Stress: No Stress Concern  Present (5/24/2023)    [employer] [employer] - Occupational Stress Questionnaire     Feeling of Stress : Not at all   Social Connections: Socially Isolated (5/28/2023)    Social Connection and Isolation Panel [NHANES]     Frequency of Communication with Friends and Family: Never     Frequency of Social Gatherings with Friends and Family: More than three times a week     Attends Taoism Services: Never     Active Member of Clubs or Organizations: No     Attends Club or Organization Meetings: Never     Marital Status:    Housing Stability: Unknown (5/28/2023)    Housing Stability Vital Sign     Unable to Pay for Housing in the Last Year: No     Unstable Housing in the Last Year: No     Review of patient's allergies indicates:   Allergen Reactions    Fish containing products Other (See Comments)     Patient doesn't think any connection to iodine.       Physical exam  Vitals:    09/19/23 0943   BP: 130/70   Pulse: 73   Resp: 12   Temp: 97.5 °F (36.4 °C)     Mid abdomen tenderness.  No evidence of skin infection on examination.  Normal speech pattern  Normal respiratory effort  Tachycardic sinus  Move all 4 extremity      Lab Results   Component Value Date    WBC 10.03 09/19/2023    HGB 9.9 (L) 09/19/2023    HCT 32.6 (L) 09/19/2023    MCV 75 (L) 09/19/2023     09/19/2023       CMP  Sodium   Date Value Ref Range Status   09/19/2023 140 136 - 145 mmol/L Final     Potassium   Date Value Ref Range Status   09/19/2023 3.8 3.5 - 5.1 mmol/L Final     Chloride   Date Value Ref Range Status   09/19/2023 103 95 - 110 mmol/L Final     CO2   Date Value Ref Range Status   09/19/2023 29 23 - 29 mmol/L Final     Glucose   Date Value Ref Range Status   09/19/2023 95 70 - 110 mg/dL Final     BUN   Date Value Ref Range Status   09/19/2023 11 6 - 20 mg/dL Final     Creatinine   Date Value Ref Range Status   09/19/2023 0.9 0.5 - 1.4 mg/dL Final     Calcium   Date Value Ref Range Status   09/19/2023 9.4  8.7 - 10.5 mg/dL Final     Total Protein   Date Value Ref Range Status   09/19/2023 7.7 6.0 - 8.4 g/dL Final     Albumin   Date Value Ref Range Status   08/28/2023 3.6 3.5 - 5.2 g/dL Final     Total Bilirubin   Date Value Ref Range Status   09/19/2023 1.1 (H) 0.1 - 1.0 mg/dL Final     Comment:     For infants and newborns, interpretation of results should be based  on gestational age, weight and in agreement with clinical  observations.    Premature Infant recommended reference ranges:  Up to 24 hours.............<8.0 mg/dL  Up to 48 hours............<12.0 mg/dL  3-5 days..................<15.0 mg/dL  6-29 days.................<15.0 mg/dL       Alkaline Phosphatase   Date Value Ref Range Status   09/19/2023 63 55 - 135 U/L Final     AST   Date Value Ref Range Status   09/19/2023 13 10 - 40 U/L Final     ALT   Date Value Ref Range Status   09/19/2023 12 10 - 44 U/L Final     Anion Gap   Date Value Ref Range Status   09/19/2023 8 8 - 16 mmol/L Final     eGFR   Date Value Ref Range Status   09/19/2023 >60.0 >60 mL/min/1.73 m^2 Final     Assessment and plan    Colon adenocarcinoma recently colostomy small-bowel obstruction.      [id number] G2 - stage III    MSI intact    Iron deficiency anemia microcytosis likely blood loss     Thrombocytosis reactive resolved     Disproportionate demand of opiates medication relative to his disease.  I am not sure the his pain is due to surgery or others.  I would like to have a palliative care team or pain management specialist, for the pain evaluation.    Complaining of left-sided neck pain.  Where the port placement is.  No evidence of skin infection or pustular.  X-ray from end of July shows good position of the port.    Abdominal pain disproportionate as described as above.  Patient denies any history of chronic opiate use.  States the pain is really causing a problem.  Denies fever.  elevated leukocytosis and elevated platelets.  We will order CT scan chest abdomen pelvis for  evaluation.  Also advised that if pain worsening patient should go to ER for further evaluation.    Hypokalemia    Reactive thrombocytosis    > potassium chloride 40 mEq daily    > cycle #4 of treatment.     > request tempus     > CT chest abdomen pelvis with contrast (did not get the study)    > port studies - normal appearance  and function of left internal jugular port-A-Cath    > long-acting morphine Q 8 hours    > consult palliative Care for pain evaluation.

## 2023-09-19 NOTE — PLAN OF CARE
Problem: Fatigue  Goal: Improved Activity Tolerance  Outcome: Ongoing, Progressing  Intervention: Promote Improved Energy  Flowsheets (Taken 9/19/2023 1125)  Fatigue Management:   fatigue-related activity identified   frequent rest breaks encouraged   paced activity encouraged  Sleep/Rest Enhancement:   regular sleep/rest pattern promoted   relaxation techniques promoted  Activity Management: Ambulated -L4

## 2023-09-20 ENCOUNTER — PATIENT MESSAGE (OUTPATIENT)
Dept: HEMATOLOGY/ONCOLOGY | Facility: CLINIC | Age: 48
End: 2023-09-20
Payer: MEDICAID

## 2023-09-20 DIAGNOSIS — C18.9 COLON ADENOCARCINOMA: ICD-10-CM

## 2023-09-20 DIAGNOSIS — R10.9 ABDOMINAL PAIN, UNSPECIFIED ABDOMINAL LOCATION: ICD-10-CM

## 2023-09-20 DIAGNOSIS — E87.6 HYPOKALEMIA: ICD-10-CM

## 2023-09-20 RX ORDER — HYDROMORPHONE HYDROCHLORIDE 2 MG/1
2 TABLET ORAL EVERY 4 HOURS PRN
Qty: 10 TABLET | Refills: 0 | Status: CANCELLED | OUTPATIENT
Start: 2023-09-20

## 2023-09-20 RX ORDER — POTASSIUM CHLORIDE 20 MEQ/1
20 TABLET, EXTENDED RELEASE ORAL 2 TIMES DAILY
Qty: 30 TABLET | Refills: 11 | Status: CANCELLED | OUTPATIENT
Start: 2023-09-20 | End: 2024-09-19

## 2023-09-20 NOTE — TELEPHONE ENCOUNTER
This medication was sent to provider in a separate encounter to be refilled. Portal message sent to inform that potassium has refills at pharmacy

## 2023-09-21 ENCOUNTER — INFUSION (OUTPATIENT)
Dept: INFUSION THERAPY | Facility: HOSPITAL | Age: 48
End: 2023-09-21
Attending: INTERNAL MEDICINE
Payer: MEDICAID

## 2023-09-21 VITALS
BODY MASS INDEX: 25.12 KG/M2 | WEIGHT: 202 LBS | HEIGHT: 75 IN | OXYGEN SATURATION: 100 % | SYSTOLIC BLOOD PRESSURE: 104 MMHG | TEMPERATURE: 98 F | HEART RATE: 68 BPM | RESPIRATION RATE: 17 BRPM | DIASTOLIC BLOOD PRESSURE: 61 MMHG

## 2023-09-21 DIAGNOSIS — K56.609 SMALL BOWEL OBSTRUCTION: Primary | ICD-10-CM

## 2023-09-21 DIAGNOSIS — C18.9 COLON ADENOCARCINOMA: ICD-10-CM

## 2023-09-21 PROCEDURE — 25000003 PHARM REV CODE 250: Performed by: INTERNAL MEDICINE

## 2023-09-21 PROCEDURE — 96523 IRRIG DRUG DELIVERY DEVICE: CPT

## 2023-09-21 PROCEDURE — 63600175 PHARM REV CODE 636 W HCPCS: Performed by: INTERNAL MEDICINE

## 2023-09-21 PROCEDURE — A4216 STERILE WATER/SALINE, 10 ML: HCPCS | Performed by: INTERNAL MEDICINE

## 2023-09-21 RX ORDER — HEPARIN 100 UNIT/ML
500 SYRINGE INTRAVENOUS
Status: DISCONTINUED | OUTPATIENT
Start: 2023-09-21 | End: 2023-09-21 | Stop reason: HOSPADM

## 2023-09-21 RX ORDER — SODIUM CHLORIDE 0.9 % (FLUSH) 0.9 %
10 SYRINGE (ML) INJECTION
Status: DISCONTINUED | OUTPATIENT
Start: 2023-09-21 | End: 2023-09-21 | Stop reason: HOSPADM

## 2023-09-21 RX ADMIN — SODIUM CHLORIDE, PRESERVATIVE FREE 10 ML: 5 INJECTION INTRAVENOUS at 11:09

## 2023-09-21 RX ADMIN — HEPARIN 500 UNITS: 100 SYRINGE at 11:09

## 2023-09-21 NOTE — PLAN OF CARE
Problem: Infection  Goal: Absence of Infection Signs and Symptoms  Outcome: Ongoing, Progressing  Intervention: Prevent or Manage Infection  Flowsheets (Taken 9/21/2023 1123)  Infection Management: aseptic technique maintained  Isolation Precautions:   precautions initiated   precautions maintained

## 2023-09-21 NOTE — NURSING
Pt stated needing refill on home nausea meds. Pt stated he has been nauseous at home but without meds. Offered to contact MD and pt stay and have possible nausea meds and IVF but pt refused and said he cant stay due to transportation. Sent message to MD Brie office to send refill for pt. Educated pt to contact MD office if nausea gets worse and to seek immediate medical care if dehydration/nausea/vomiting occurs. Pt stated understanding. Notified MD office.

## 2023-09-22 DIAGNOSIS — K56.609 SMALL BOWEL OBSTRUCTION: ICD-10-CM

## 2023-09-22 DIAGNOSIS — C18.9 COLON ADENOCARCINOMA: ICD-10-CM

## 2023-09-22 RX ORDER — ONDANSETRON 8 MG/1
8 TABLET, ORALLY DISINTEGRATING ORAL EVERY 8 HOURS PRN
Qty: 60 TABLET | Refills: 5 | Status: SHIPPED | OUTPATIENT
Start: 2023-09-22

## 2023-09-25 ENCOUNTER — TELEPHONE (OUTPATIENT)
Dept: HEMATOLOGY/ONCOLOGY | Facility: CLINIC | Age: 48
End: 2023-09-25
Payer: MEDICAID

## 2023-09-25 DIAGNOSIS — C18.9 COLON ADENOCARCINOMA: Primary | ICD-10-CM

## 2023-09-25 DIAGNOSIS — C18.9 COLON ADENOCARCINOMA: ICD-10-CM

## 2023-09-25 PROBLEM — N17.9 AKI (ACUTE KIDNEY INJURY): Status: RESOLVED | Noted: 2023-06-13 | Resolved: 2023-09-25

## 2023-09-25 PROBLEM — A41.9 SEPSIS: Status: RESOLVED | Noted: 2023-06-17 | Resolved: 2023-09-25

## 2023-09-25 RX ORDER — MORPHINE SULFATE 15 MG/1
15 TABLET, FILM COATED, EXTENDED RELEASE ORAL 2 TIMES DAILY
Qty: 60 TABLET | Refills: 0 | Status: SHIPPED | OUTPATIENT
Start: 2023-09-25 | End: 2023-09-25

## 2023-09-25 RX ORDER — MORPHINE SULFATE 15 MG/1
15 TABLET, FILM COATED, EXTENDED RELEASE ORAL 2 TIMES DAILY
Qty: 60 TABLET | Refills: 0 | Status: SHIPPED | OUTPATIENT
Start: 2023-09-25 | End: 2023-10-25

## 2023-09-25 NOTE — TELEPHONE ENCOUNTER
Patient called asking when pain medication will be filled. Spoke to Dr. Baird, and Dr. Baird stated he will fill pain medication today before he leaves clinic. Patient instructed on what Dr. Baird said, and patient verbalized understanding back to staff.

## 2023-09-25 NOTE — TELEPHONE ENCOUNTER
----- Message from Stephanie Doni sent at 9/25/2023 10:23 AM CDT -----  Regarding: refill  Contact: patient  Type:  RX Refill Request    Who Called:  patient  Refill or New Rx:  refill  RX Name and Strength:  morphine  How is the patient currently taking it? (ex. 1XDay):  as directed  Is this a 30 day or 90 day RX:  30  Preferred Pharmacy with phone number:    [id number] [place], [place]  [facility] [facility] & [address] [address]  Phone: [phone] Fax: [phone]      Local or Mail Order:  local  Ordering Provider:  Dr. Brie Tristan Call Back Number:  [phone]  Additional Information:  Please call patient to advise,  thanks!

## 2023-09-25 NOTE — TELEPHONE ENCOUNTER
Dr. Baird aware and states he will order medication for pt.    ----- Message from Ruthie Martin MA sent at 9/25/2023 11:05 AM CDT -----  Regarding: FW: refill  Contact: patient    ----- Message -----  From: Stephanie Marion  Sent: 9/25/2023  10:25 AM CDT  To: Brie Patiño Staff  Subject: refill                                           Type:  RX Refill Request    Who Called:  patient  Refill or New Rx:  refill  RX Name and Strength:  morphine  How is the patient currently taking it? (ex. 1XDay):  as directed  Is this a 30 day or 90 day RX:  30  Preferred Pharmacy with phone number:    [facility] #[id number] - [place], [place] - [address] [facility] & [address] [address]  Phone: [phone] Fax: [phone]      Local or Mail Order:  local  Ordering Provider:  Dr. Brie Tristan Call Back Number:  [phone]  Additional Information:  Please call patient to advise,  thanks!

## 2023-09-28 NOTE — ASSESSMENT & PLAN NOTE
gge not revealing  Still not opening  I have worry this is mechanical obstruction, worried about some swirling on ct scan of mesentery  Will give one more night to open up   Plan laparotomy tomorrow     Preparation Of Recipient Site - Flap: The eschar was removed surgically with sharp dissection to facilitate appropriate wound healing of the following adjacent tissue rearrangement.

## 2023-10-02 ENCOUNTER — TELEPHONE (OUTPATIENT)
Dept: HEMATOLOGY/ONCOLOGY | Facility: CLINIC | Age: 48
End: 2023-10-02
Payer: MEDICAID

## 2023-10-02 ENCOUNTER — LAB VISIT (OUTPATIENT)
Dept: LAB | Facility: HOSPITAL | Age: 48
End: 2023-10-02
Attending: INTERNAL MEDICINE
Payer: MEDICAID

## 2023-10-02 DIAGNOSIS — R10.9 ABDOMINAL PAIN, UNSPECIFIED ABDOMINAL LOCATION: ICD-10-CM

## 2023-10-02 DIAGNOSIS — K56.609 SMALL BOWEL OBSTRUCTION: ICD-10-CM

## 2023-10-02 DIAGNOSIS — E87.6 HYPOKALEMIA: ICD-10-CM

## 2023-10-02 DIAGNOSIS — C18.9 COLON ADENOCARCINOMA: ICD-10-CM

## 2023-10-02 LAB
ALBUMIN SERPL BCP-MCNC: 4 G/DL (ref 3.5–5.2)
ALP SERPL-CCNC: 64 U/L (ref 55–135)
ALT SERPL W/O P-5'-P-CCNC: 7 U/L (ref 10–44)
ANION GAP SERPL CALC-SCNC: 6 MMOL/L (ref 8–16)
AST SERPL-CCNC: 12 U/L (ref 10–40)
BASOPHILS # BLD AUTO: 0.05 K/UL (ref 0–0.2)
BASOPHILS NFR BLD: 0.6 % (ref 0–1.9)
BILIRUB SERPL-MCNC: 1.3 MG/DL (ref 0.1–1)
BUN SERPL-MCNC: 9 MG/DL (ref 6–20)
CALCIUM SERPL-MCNC: 9.4 MG/DL (ref 8.7–10.5)
CHLORIDE SERPL-SCNC: 105 MMOL/L (ref 95–110)
CO2 SERPL-SCNC: 28 MMOL/L (ref 23–29)
CREAT SERPL-MCNC: 1 MG/DL (ref 0.5–1.4)
DIFFERENTIAL METHOD: ABNORMAL
EOSINOPHIL # BLD AUTO: 0.2 K/UL (ref 0–0.5)
EOSINOPHIL NFR BLD: 1.8 % (ref 0–8)
ERYTHROCYTE [DISTWIDTH] IN BLOOD BY AUTOMATED COUNT: 22.1 % (ref 11.5–14.5)
EST. GFR  (NO RACE VARIABLE): >60 ML/MIN/1.73 M^2
GLUCOSE SERPL-MCNC: 129 MG/DL (ref 70–110)
HCT VFR BLD AUTO: 33.1 % (ref 40–54)
HGB BLD-MCNC: 10.2 G/DL (ref 14–18)
IMM GRANULOCYTES # BLD AUTO: 0.02 K/UL (ref 0–0.04)
IMM GRANULOCYTES NFR BLD AUTO: 0.2 % (ref 0–0.5)
LYMPHOCYTES # BLD AUTO: 2.8 K/UL (ref 1–4.8)
LYMPHOCYTES NFR BLD: 33.9 % (ref 18–48)
MAGNESIUM SERPL-MCNC: 1.6 MG/DL (ref 1.6–2.6)
MCH RBC QN AUTO: 22.8 PG (ref 27–31)
MCHC RBC AUTO-ENTMCNC: 30.8 G/DL (ref 32–36)
MCV RBC AUTO: 74 FL (ref 82–98)
MONOCYTES # BLD AUTO: 0.6 K/UL (ref 0.3–1)
MONOCYTES NFR BLD: 7 % (ref 4–15)
NEUTROPHILS # BLD AUTO: 4.6 K/UL (ref 1.8–7.7)
NEUTROPHILS NFR BLD: 56.5 % (ref 38–73)
NRBC BLD-RTO: 0 /100 WBC
PLATELET # BLD AUTO: 488 K/UL (ref 150–450)
PMV BLD AUTO: 9.2 FL (ref 9.2–12.9)
POTASSIUM SERPL-SCNC: 3.5 MMOL/L (ref 3.5–5.1)
PROT SERPL-MCNC: 7.3 G/DL (ref 6–8.4)
RBC # BLD AUTO: 4.48 M/UL (ref 4.6–6.2)
SODIUM SERPL-SCNC: 139 MMOL/L (ref 136–145)
WBC # BLD AUTO: 8.17 K/UL (ref 3.9–12.7)

## 2023-10-02 PROCEDURE — 85025 COMPLETE CBC W/AUTO DIFF WBC: CPT | Performed by: INTERNAL MEDICINE

## 2023-10-02 PROCEDURE — 36415 COLL VENOUS BLD VENIPUNCTURE: CPT | Performed by: INTERNAL MEDICINE

## 2023-10-02 PROCEDURE — 83735 ASSAY OF MAGNESIUM: CPT | Performed by: INTERNAL MEDICINE

## 2023-10-02 PROCEDURE — 80053 COMPREHEN METABOLIC PANEL: CPT | Performed by: INTERNAL MEDICINE

## 2023-10-03 ENCOUNTER — INFUSION (OUTPATIENT)
Dept: INFUSION THERAPY | Facility: HOSPITAL | Age: 48
End: 2023-10-03
Attending: INTERNAL MEDICINE
Payer: MEDICAID

## 2023-10-03 ENCOUNTER — OFFICE VISIT (OUTPATIENT)
Dept: HEMATOLOGY/ONCOLOGY | Facility: CLINIC | Age: 48
End: 2023-10-03
Payer: MEDICAID

## 2023-10-03 VITALS
HEART RATE: 65 BPM | HEIGHT: 75 IN | TEMPERATURE: 98 F | WEIGHT: 194.44 LBS | SYSTOLIC BLOOD PRESSURE: 129 MMHG | OXYGEN SATURATION: 100 % | RESPIRATION RATE: 12 BRPM | BODY MASS INDEX: 24.18 KG/M2 | DIASTOLIC BLOOD PRESSURE: 72 MMHG

## 2023-10-03 VITALS
DIASTOLIC BLOOD PRESSURE: 89 MMHG | OXYGEN SATURATION: 100 % | HEART RATE: 62 BPM | BODY MASS INDEX: 24.22 KG/M2 | WEIGHT: 194.81 LBS | TEMPERATURE: 98 F | HEIGHT: 75 IN | SYSTOLIC BLOOD PRESSURE: 136 MMHG | RESPIRATION RATE: 15 BRPM

## 2023-10-03 DIAGNOSIS — K56.609 SMALL BOWEL OBSTRUCTION: ICD-10-CM

## 2023-10-03 DIAGNOSIS — K56.609 SMALL BOWEL OBSTRUCTION: Primary | ICD-10-CM

## 2023-10-03 DIAGNOSIS — C18.9 COLON ADENOCARCINOMA: ICD-10-CM

## 2023-10-03 DIAGNOSIS — E87.6 HYPOKALEMIA: ICD-10-CM

## 2023-10-03 DIAGNOSIS — C18.9 COLON ADENOCARCINOMA: Primary | ICD-10-CM

## 2023-10-03 PROCEDURE — 99213 OFFICE O/P EST LOW 20 MIN: CPT | Mod: PBBFAC,PN | Performed by: INTERNAL MEDICINE

## 2023-10-03 PROCEDURE — 25000003 PHARM REV CODE 250: Performed by: INTERNAL MEDICINE

## 2023-10-03 PROCEDURE — 99215 PR OFFICE/OUTPT VISIT, EST, LEVL V, 40-54 MIN: ICD-10-PCS | Mod: S$PBB,,, | Performed by: INTERNAL MEDICINE

## 2023-10-03 PROCEDURE — 3044F HG A1C LEVEL LT 7.0%: CPT | Mod: CPTII,,, | Performed by: INTERNAL MEDICINE

## 2023-10-03 PROCEDURE — 3074F SYST BP LT 130 MM HG: CPT | Mod: CPTII,,, | Performed by: INTERNAL MEDICINE

## 2023-10-03 PROCEDURE — 3008F PR BODY MASS INDEX (BMI) DOCUMENTED: ICD-10-PCS | Mod: CPTII,,, | Performed by: INTERNAL MEDICINE

## 2023-10-03 PROCEDURE — 96415 CHEMO IV INFUSION ADDL HR: CPT

## 2023-10-03 PROCEDURE — 3074F PR MOST RECENT SYSTOLIC BLOOD PRESSURE < 130 MM HG: ICD-10-PCS | Mod: CPTII,,, | Performed by: INTERNAL MEDICINE

## 2023-10-03 PROCEDURE — 3044F PR MOST RECENT HEMOGLOBIN A1C LEVEL <7.0%: ICD-10-PCS | Mod: CPTII,,, | Performed by: INTERNAL MEDICINE

## 2023-10-03 PROCEDURE — 1159F PR MEDICATION LIST DOCUMENTED IN MEDICAL RECORD: ICD-10-PCS | Mod: CPTII,,, | Performed by: INTERNAL MEDICINE

## 2023-10-03 PROCEDURE — 3078F PR MOST RECENT DIASTOLIC BLOOD PRESSURE < 80 MM HG: ICD-10-PCS | Mod: CPTII,,, | Performed by: INTERNAL MEDICINE

## 2023-10-03 PROCEDURE — 1159F MED LIST DOCD IN RCRD: CPT | Mod: CPTII,,, | Performed by: INTERNAL MEDICINE

## 2023-10-03 PROCEDURE — 96367 TX/PROPH/DG ADDL SEQ IV INF: CPT

## 2023-10-03 PROCEDURE — 96413 CHEMO IV INFUSION 1 HR: CPT

## 2023-10-03 PROCEDURE — 96416 CHEMO PROLONG INFUSE W/PUMP: CPT

## 2023-10-03 PROCEDURE — 99999 PR PBB SHADOW E&M-EST. PATIENT-LVL III: ICD-10-PCS | Mod: PBBFAC,,, | Performed by: INTERNAL MEDICINE

## 2023-10-03 PROCEDURE — 99215 OFFICE O/P EST HI 40 MIN: CPT | Mod: S$PBB,,, | Performed by: INTERNAL MEDICINE

## 2023-10-03 PROCEDURE — 63600175 PHARM REV CODE 636 W HCPCS: Performed by: INTERNAL MEDICINE

## 2023-10-03 PROCEDURE — 96368 THER/DIAG CONCURRENT INF: CPT

## 2023-10-03 PROCEDURE — 96411 CHEMO IV PUSH ADDL DRUG: CPT

## 2023-10-03 PROCEDURE — 3008F BODY MASS INDEX DOCD: CPT | Mod: CPTII,,, | Performed by: INTERNAL MEDICINE

## 2023-10-03 PROCEDURE — 99999 PR PBB SHADOW E&M-EST. PATIENT-LVL III: CPT | Mod: PBBFAC,,, | Performed by: INTERNAL MEDICINE

## 2023-10-03 PROCEDURE — 3078F DIAST BP <80 MM HG: CPT | Mod: CPTII,,, | Performed by: INTERNAL MEDICINE

## 2023-10-03 RX ORDER — HEPARIN 100 UNIT/ML
500 SYRINGE INTRAVENOUS
Status: CANCELLED | OUTPATIENT
Start: 2023-10-03

## 2023-10-03 RX ORDER — DIPHENHYDRAMINE HYDROCHLORIDE 50 MG/ML
50 INJECTION INTRAMUSCULAR; INTRAVENOUS ONCE AS NEEDED
Status: DISCONTINUED | OUTPATIENT
Start: 2023-10-03 | End: 2023-10-03 | Stop reason: HOSPADM

## 2023-10-03 RX ORDER — FLUOROURACIL 50 MG/ML
400 INJECTION, SOLUTION INTRAVENOUS
Status: CANCELLED | OUTPATIENT
Start: 2023-10-03

## 2023-10-03 RX ORDER — SODIUM CHLORIDE 0.9 % (FLUSH) 0.9 %
10 SYRINGE (ML) INJECTION
Status: CANCELLED | OUTPATIENT
Start: 2023-10-03

## 2023-10-03 RX ORDER — FLUOROURACIL 50 MG/ML
2400 INJECTION, SOLUTION INTRAVENOUS
Status: CANCELLED | OUTPATIENT
Start: 2023-10-03

## 2023-10-03 RX ORDER — EPINEPHRINE 0.3 MG/.3ML
0.3 INJECTION SUBCUTANEOUS ONCE AS NEEDED
Status: CANCELLED | OUTPATIENT
Start: 2023-10-03

## 2023-10-03 RX ORDER — DIPHENHYDRAMINE HYDROCHLORIDE 50 MG/ML
50 INJECTION INTRAMUSCULAR; INTRAVENOUS ONCE AS NEEDED
Status: CANCELLED | OUTPATIENT
Start: 2023-10-03

## 2023-10-03 RX ORDER — SODIUM CHLORIDE 0.9 % (FLUSH) 0.9 %
10 SYRINGE (ML) INJECTION
Status: CANCELLED | OUTPATIENT
Start: 2023-10-05

## 2023-10-03 RX ORDER — EPINEPHRINE 0.3 MG/.3ML
0.3 INJECTION SUBCUTANEOUS ONCE AS NEEDED
Status: DISCONTINUED | OUTPATIENT
Start: 2023-10-03 | End: 2023-10-03 | Stop reason: HOSPADM

## 2023-10-03 RX ORDER — FLUOROURACIL 50 MG/ML
400 INJECTION, SOLUTION INTRAVENOUS
Status: COMPLETED | OUTPATIENT
Start: 2023-10-03 | End: 2023-10-03

## 2023-10-03 RX ORDER — HEPARIN 100 UNIT/ML
500 SYRINGE INTRAVENOUS
Status: CANCELLED | OUTPATIENT
Start: 2023-10-05

## 2023-10-03 RX ADMIN — DEXTROSE: 5 SOLUTION INTRAVENOUS at 01:10

## 2023-10-03 RX ADMIN — FLUOROURACIL 850 MG: 50 INJECTION, SOLUTION INTRAVENOUS at 04:10

## 2023-10-03 RX ADMIN — FLUOROURACIL 5090 MG: 50 INJECTION, SOLUTION INTRAVENOUS at 04:10

## 2023-10-03 RX ADMIN — DEXTROSE 850 MG: 5 SOLUTION INTRAVENOUS at 02:10

## 2023-10-03 RX ADMIN — PALONOSETRON HYDROCHLORIDE 0.25 MG: 0.25 INJECTION INTRAVENOUS at 01:10

## 2023-10-03 RX ADMIN — OXALIPLATIN 180 MG: 5 INJECTION, SOLUTION, CONCENTRATE INTRAVENOUS at 02:10

## 2023-10-03 NOTE — PROGRESS NOTES
HPI    47 years old male newly diagnosed colon cancer.  He was transferred from [facility] for evaluation possible developed a small-bowel obstruction.  He was complaining abdominal pain nausea and vomiting.  Workup shows leukocytosis, anemia and thrombocytosis.  CT scan from outside facility was concerning for developing small-bowel obstruction.  Patient was transferred admitted to [facility] underwent right partial colectomy on 05/29 with pathology consistent adenocarcinoma.  He was discharged then and return to hospital for another admission on 06/08/2023 where he was treated for ileus.  Medical history including type 2 diabetes anemia hypertension right eye blindness.         Past Medical History:   Diagnosis Date    Colon cancer 05/19/2023    Diabetes mellitus, type 2 05/2020    Patient denies    Hypertension      Past Surgical History:   Procedure Laterality Date    COLONOSCOPY N/A 5/29/2023    Procedure: COLONOSCOPY;  Surgeon: Sam Solano MD;  Location: [facility];  Service: Endoscopy;  Laterality: N/A;    EXCISION, SMALL INTESTINE N/A 6/15/2023    Procedure: EXCISION, SMALL INTESTINE;  Surgeon: Edin Michael MD;  Location: [facility] OR;  Service: General;  Laterality: N/A;    EYE SURGERY Right     INSERTION OF TUNNELED CENTRAL VENOUS CATHETER (CVC) WITH SUBCUTANEOUS PORT Left 7/24/2023    Procedure: [id number];  Surgeon: Garfield Hoyt Jr., MD;  Location: [facility] OR;  Service: General;  Laterality: Left;    LAPAROTOMY, EXPLORATORY N/A 6/15/2023    Procedure: LAPAROTOMY, EXPLORATORY;  Surgeon: Edin Michael MD;  Location: [facility] OR;  Service: General;  Laterality: N/A;    LAPAROTOMY, EXPLORATORY N/A 6/17/2023    Procedure: LAPAROTOMY, EXPLORATORY;  Surgeon: Garfield Hoyt Jr., MD;  Location: [facility] OR;  Service: General;  Laterality: N/A;    LUMBAR DISC SURGERY  2005    LYSIS OF ADHESIONS N/A 6/15/2023    Procedure: LYSIS, ADHESIONS;  Surgeon: Edin RUIZ  MD Alec;  Location: [facility] OR;  Service: General;  Laterality: N/A;    OMENTECTOMY N/A 6/15/2023    Procedure: OMENTECTOMY;  Surgeon: Edin Michael MD;  Location: [facility] OR;  Service: General;  Laterality: N/A;    SUBTOTAL COLECTOMY Right 5/29/2023    Procedure: COLECTOMY, PARTIAL;  Surgeon: Edin Michael MD;  Location: [facility] OR;  Service: General;  Laterality: Right;     Social History     Socioeconomic History    Marital status:     Number of children: 3    Highest education level: Associate degree: academic program   Occupational History    Occupation: [employer]   Tobacco Use    Smoking status: Every Day     Current packs/day: 0.50     Average packs/day: 0.5 packs/day for 23.0 years (11.5 ttl pk-yrs)     Types: Cigarettes    Smokeless tobacco: Never    Tobacco comments:     Do not smoke day of surgery   Substance and Sexual Activity    Alcohol use: Not Currently     Comment: Quit drinking 2 yrs ago    Drug use: Yes     Types: Marijuana     Comment: heavy marijuana use    Sexual activity: Yes     Social Determinants of Health     Financial Resource Strain: Low Risk  (5/28/2023)    Overall Financial Resource Strain (CARDIA)     Difficulty of Paying Living Expenses: Not hard at all   Food Insecurity: No Food Insecurity (5/28/2023)    Hunger Vital Sign     Worried About Running Out of Food in the Last Year: Never true     Ran Out of Food in the Last Year: Never true   Transportation Needs: No Transportation Needs (5/28/2023)    PRAPARE - Transportation     Lack of Transportation (Medical): No     Lack of Transportation (Non-Medical): No   Physical Activity: Sufficiently Active (5/28/2023)    Exercise Vital Sign     Days of Exercise per Week: 5 days     Minutes of Exercise per Session: 90 min   Stress: No Stress Concern Present (5/24/2023)    [place] of [employer]ational [employer] - Occupational Stress Questionnaire     Feeling of Stress : Not at all   Social Connections: Socially  Isolated (5/28/2023)    Social Connection and Isolation Panel [NHANES]     Frequency of Communication with Friends and Family: Never     Frequency of Social Gatherings with Friends and Family: More than three times a week     Attends Spiritism Services: Never     Active Member of Clubs or Organizations: No     Attends Club or Organization Meetings: Never     Marital Status:    Housing Stability: Unknown (5/28/2023)    Housing Stability Vital Sign     Unable to Pay for Housing in the Last Year: No     Unstable Housing in the Last Year: No     Social History     Socioeconomic History    Marital status:     Number of children: 3    Highest education level: Associate degree: academic program   Occupational History    Occupation: [employer]   Tobacco Use    Smoking status: Every Day     Current packs/day: 0.50     Average packs/day: 0.5 packs/day for 23.0 years (11.5 ttl pk-yrs)     Types: Cigarettes    Smokeless tobacco: Never    Tobacco comments:     Do not smoke day of surgery   Substance and Sexual Activity    Alcohol use: Not Currently     Comment: Quit drinking 2 yrs ago    Drug use: Yes     Types: Marijuana     Comment: heavy marijuana use    Sexual activity: Yes     Social Determinants of Health     Financial Resource Strain: Low Risk  (5/28/2023)    Overall Financial Resource Strain (CARDIA)     Difficulty of Paying Living Expenses: Not hard at all   Food Insecurity: No Food Insecurity (5/28/2023)    Hunger Vital Sign     Worried About Running Out of Food in the Last Year: Never true     Ran Out of Food in the Last Year: Never true   Transportation Needs: No Transportation Needs (5/28/2023)    PRAPARE - Transportation     Lack of Transportation (Medical): No     Lack of Transportation (Non-Medical): No   Physical Activity: Sufficiently Active (5/28/2023)    Exercise Vital Sign     Days of Exercise per Week: 5 days     Minutes of Exercise per Session: 90 min   Stress: No Stress Concern  Present (5/24/2023)    [place] of Occupational Health - Occupational Stress Questionnaire     Feeling of Stress : Not at all   Social Connections: Socially Isolated (5/28/2023)    Social Connection and Isolation Panel [NHANES]     Frequency of Communication with Friends and Family: Never     Frequency of Social Gatherings with Friends and Family: More than three times a week     Attends Restorationist Services: Never     Active Member of Clubs or Organizations: No     Attends Club or Organization Meetings: Never     Marital Status:    Housing Stability: Unknown (5/28/2023)    Housing Stability Vital Sign     Unable to Pay for Housing in the Last Year: No     Unstable Housing in the Last Year: No     Review of patient's allergies indicates:   Allergen Reactions    Fish containing products Other (See Comments)     Patient doesn't think any connection to iodine.       Physical exam  Vitals:    10/03/23 1327   BP: 129/72   Pulse: 65   Resp: 12   Temp: 97.7 °F (36.5 °C)     Mid abdomen tenderness.  No evidence of skin infection on examination.  Normal speech pattern  Normal respiratory effort  Tachycardic sinus  Move all 4 extremity      Lab Results   Component Value Date    WBC 8.17 10/02/2023    HGB 10.2 (L) 10/02/2023    HCT 33.1 (L) 10/02/2023    MCV 74 (L) 10/02/2023     (H) 10/02/2023       CMP  Sodium   Date Value Ref Range Status   10/02/2023 139 136 - 145 mmol/L Final     Potassium   Date Value Ref Range Status   10/02/2023 3.5 3.5 - 5.1 mmol/L Final     Chloride   Date Value Ref Range Status   10/02/2023 105 95 - 110 mmol/L Final     CO2   Date Value Ref Range Status   10/02/2023 28 23 - 29 mmol/L Final     Glucose   Date Value Ref Range Status   10/02/2023 129 (H) 70 - 110 mg/dL Final     BUN   Date Value Ref Range Status   10/02/2023 9 6 - 20 mg/dL Final     Creatinine   Date Value Ref Range Status   10/02/2023 1.0 0.5 - 1.4 mg/dL Final     Calcium   Date Value Ref Range Status    10/02/2023 9.4 8.7 - 10.5 mg/dL Final     Total Protein   Date Value Ref Range Status   10/02/2023 7.3 6.0 - 8.4 g/dL Final     Albumin   Date Value Ref Range Status   10/02/2023 4.0 3.5 - 5.2 g/dL Final     Total Bilirubin   Date Value Ref Range Status   10/02/2023 1.3 (H) 0.1 - 1.0 mg/dL Final     Comment:     For infants and newborns, interpretation of results should be based  on gestational age, weight and in agreement with clinical  observations.    Premature Infant recommended reference ranges:  Up to 24 hours.............<8.0 mg/dL  Up to 48 hours............<12.0 mg/dL  3-5 days..................<15.0 mg/dL  6-29 days.................<15.0 mg/dL       Alkaline Phosphatase   Date Value Ref Range Status   10/02/2023 64 55 - 135 U/L Final     AST   Date Value Ref Range Status   10/02/2023 12 10 - 40 U/L Final     ALT   Date Value Ref Range Status   10/02/2023 7 (L) 10 - 44 U/L Final     Anion Gap   Date Value Ref Range Status   10/02/2023 6 (L) 8 - 16 mmol/L Final     eGFR   Date Value Ref Range Status   10/02/2023 >60.0 >60 mL/min/1.73 m^2 Final     Assessment and plan    Colon adenocarcinoma recently colostomy small-bowel obstruction.      [id number] G2 - stage III    MSI intact    Iron deficiency anemia microcytosis likely blood loss     Thrombocytosis reactive resolved     Disproportionate demand of opiates medication relative to his disease.  I am not sure the his pain is due to surgery or others.  I would like to have a palliative care team or pain management specialist, for the pain evaluation.    Complaining of left-sided neck pain.  Where the port placement is.  No evidence of skin infection or pustular.  X-ray from end of July shows good position of the port.    Abdominal pain disproportionate as described as above.  Patient denies any history of chronic opiate use.  States the pain is really causing a problem.  Denies fever.  elevated leukocytosis and elevated platelets.  We will order CT scan chest  abdomen pelvis for evaluation.  Also advised that if pain worsening patient should go to ER for further evaluation.    Hypokalemia    Reactive thrombocytosis    > potassium chloride 40 mEq daily    > cycle #5 of treatment.     > request tempus     > CT chest abdomen pelvis with contrast (did not get the study)    > port studies - normal appearance  and function of left internal jugular port-A-Cath    > long-acting morphine Q 8 hours    > consult palliative Care for pain evaluation.    > compliance issue.  He has missed appointment blood work as well as images study.  I feel that he will be a difficult patient in compliance is issue.  On today's visit I have ming discussion with him that if he is not able to follow the regimen and clinic visit I will not able to treat.  I will give him 1 more opportunity to work with us with compliance.  If he fails in the future I will defer him to other treatment team, and discharge from my care.  He voiced understanding and agree with above

## 2023-10-03 NOTE — PLAN OF CARE
Problem: Activity Intolerance  Goal: Enhanced Capacity and Energy  Outcome: Met     Problem: Nausea and Vomiting  Goal: Fluid and Electrolyte Balance  Outcome: Met

## 2023-10-03 NOTE — Clinical Note
Go for the treatment.  RTC 2 weeks.  I have explained to him that if he can not follow up the schedule I will discharge him from my service he agrees

## 2023-10-04 ENCOUNTER — PATIENT MESSAGE (OUTPATIENT)
Dept: HEMATOLOGY/ONCOLOGY | Facility: CLINIC | Age: 48
End: 2023-10-04
Payer: MEDICAID

## 2023-10-05 ENCOUNTER — INFUSION (OUTPATIENT)
Dept: INFUSION THERAPY | Facility: HOSPITAL | Age: 48
End: 2023-10-05
Attending: INTERNAL MEDICINE
Payer: MEDICAID

## 2023-10-05 VITALS
TEMPERATURE: 99 F | RESPIRATION RATE: 17 BRPM | HEART RATE: 83 BPM | HEIGHT: 75 IN | DIASTOLIC BLOOD PRESSURE: 75 MMHG | WEIGHT: 201.63 LBS | SYSTOLIC BLOOD PRESSURE: 119 MMHG | BODY MASS INDEX: 25.07 KG/M2

## 2023-10-05 DIAGNOSIS — K56.609 SMALL BOWEL OBSTRUCTION: Primary | ICD-10-CM

## 2023-10-05 DIAGNOSIS — C18.9 COLON ADENOCARCINOMA: ICD-10-CM

## 2023-10-05 PROCEDURE — 63600175 PHARM REV CODE 636 W HCPCS: Performed by: INTERNAL MEDICINE

## 2023-10-05 PROCEDURE — 25000003 PHARM REV CODE 250: Performed by: INTERNAL MEDICINE

## 2023-10-05 PROCEDURE — 96523 IRRIG DRUG DELIVERY DEVICE: CPT

## 2023-10-05 PROCEDURE — A4216 STERILE WATER/SALINE, 10 ML: HCPCS | Performed by: INTERNAL MEDICINE

## 2023-10-05 RX ORDER — HEPARIN 100 UNIT/ML
500 SYRINGE INTRAVENOUS
Status: DISCONTINUED | OUTPATIENT
Start: 2023-10-05 | End: 2023-10-05 | Stop reason: HOSPADM

## 2023-10-05 RX ORDER — SODIUM CHLORIDE 0.9 % (FLUSH) 0.9 %
10 SYRINGE (ML) INJECTION
Status: DISCONTINUED | OUTPATIENT
Start: 2023-10-05 | End: 2023-10-05 | Stop reason: HOSPADM

## 2023-10-05 RX ADMIN — SODIUM CHLORIDE, PRESERVATIVE FREE 10 ML: 5 INJECTION INTRAVENOUS at 02:10

## 2023-10-05 RX ADMIN — HEPARIN 500 UNITS: 100 SYRINGE at 02:10

## 2023-10-05 NOTE — PLAN OF CARE
Problem: Fatigue  Goal: Improved Activity Tolerance  Intervention: Promote Improved Energy  Flowsheets (Taken 10/5/2023 1292)  Fatigue Management: fatigue-related activity identified  Activity Management: Ambulated -L4

## 2023-10-16 ENCOUNTER — OFFICE VISIT (OUTPATIENT)
Dept: HEMATOLOGY/ONCOLOGY | Facility: CLINIC | Age: 48
End: 2023-10-16
Payer: MEDICAID

## 2023-10-16 ENCOUNTER — LAB VISIT (OUTPATIENT)
Dept: LAB | Facility: HOSPITAL | Age: 48
End: 2023-10-16
Attending: INTERNAL MEDICINE
Payer: MEDICAID

## 2023-10-16 VITALS
OXYGEN SATURATION: 100 % | SYSTOLIC BLOOD PRESSURE: 130 MMHG | WEIGHT: 202.81 LBS | HEART RATE: 63 BPM | DIASTOLIC BLOOD PRESSURE: 77 MMHG | HEIGHT: 75 IN | TEMPERATURE: 98 F | BODY MASS INDEX: 25.22 KG/M2 | RESPIRATION RATE: 12 BRPM

## 2023-10-16 DIAGNOSIS — E87.6 HYPOKALEMIA: ICD-10-CM

## 2023-10-16 DIAGNOSIS — C18.9 COLON ADENOCARCINOMA: ICD-10-CM

## 2023-10-16 DIAGNOSIS — C18.9 COLON ADENOCARCINOMA: Primary | ICD-10-CM

## 2023-10-16 DIAGNOSIS — K56.609 SMALL BOWEL OBSTRUCTION: ICD-10-CM

## 2023-10-16 LAB
ALBUMIN SERPL BCP-MCNC: 4.1 G/DL (ref 3.5–5.2)
ALP SERPL-CCNC: 50 U/L (ref 55–135)
ALT SERPL W/O P-5'-P-CCNC: 11 U/L (ref 10–44)
ANION GAP SERPL CALC-SCNC: 5 MMOL/L (ref 8–16)
AST SERPL-CCNC: 16 U/L (ref 10–40)
BASOPHILS # BLD AUTO: 0.05 K/UL (ref 0–0.2)
BASOPHILS NFR BLD: 0.6 % (ref 0–1.9)
BILIRUB SERPL-MCNC: 1.1 MG/DL (ref 0.1–1)
BUN SERPL-MCNC: 14 MG/DL (ref 6–20)
CALCIUM SERPL-MCNC: 9.5 MG/DL (ref 8.7–10.5)
CHLORIDE SERPL-SCNC: 108 MMOL/L (ref 95–110)
CO2 SERPL-SCNC: 28 MMOL/L (ref 23–29)
CREAT SERPL-MCNC: 1 MG/DL (ref 0.5–1.4)
DIFFERENTIAL METHOD: ABNORMAL
EOSINOPHIL # BLD AUTO: 0.1 K/UL (ref 0–0.5)
EOSINOPHIL NFR BLD: 1.4 % (ref 0–8)
ERYTHROCYTE [DISTWIDTH] IN BLOOD BY AUTOMATED COUNT: 22.1 % (ref 11.5–14.5)
EST. GFR  (NO RACE VARIABLE): >60 ML/MIN/1.73 M^2
GLUCOSE SERPL-MCNC: 115 MG/DL (ref 70–110)
HCT VFR BLD AUTO: 33.5 % (ref 40–54)
HGB BLD-MCNC: 10.3 G/DL (ref 14–18)
IMM GRANULOCYTES # BLD AUTO: 0.02 K/UL (ref 0–0.04)
IMM GRANULOCYTES NFR BLD AUTO: 0.2 % (ref 0–0.5)
LYMPHOCYTES # BLD AUTO: 2.7 K/UL (ref 1–4.8)
LYMPHOCYTES NFR BLD: 31.2 % (ref 18–48)
MAGNESIUM SERPL-MCNC: 1.7 MG/DL (ref 1.6–2.6)
MCH RBC QN AUTO: 23.1 PG (ref 27–31)
MCHC RBC AUTO-ENTMCNC: 30.7 G/DL (ref 32–36)
MCV RBC AUTO: 75 FL (ref 82–98)
MONOCYTES # BLD AUTO: 0.7 K/UL (ref 0.3–1)
MONOCYTES NFR BLD: 8.2 % (ref 4–15)
NEUTROPHILS # BLD AUTO: 5 K/UL (ref 1.8–7.7)
NEUTROPHILS NFR BLD: 58.4 % (ref 38–73)
NRBC BLD-RTO: 0 /100 WBC
PLATELET # BLD AUTO: 370 K/UL (ref 150–450)
PMV BLD AUTO: 8.8 FL (ref 9.2–12.9)
POTASSIUM SERPL-SCNC: 3.7 MMOL/L (ref 3.5–5.1)
PROT SERPL-MCNC: 7.4 G/DL (ref 6–8.4)
RBC # BLD AUTO: 4.45 M/UL (ref 4.6–6.2)
SODIUM SERPL-SCNC: 141 MMOL/L (ref 136–145)
WBC # BLD AUTO: 8.5 K/UL (ref 3.9–12.7)

## 2023-10-16 PROCEDURE — 99215 PR OFFICE/OUTPT VISIT, EST, LEVL V, 40-54 MIN: ICD-10-PCS | Mod: S$PBB,,, | Performed by: INTERNAL MEDICINE

## 2023-10-16 PROCEDURE — 3008F BODY MASS INDEX DOCD: CPT | Mod: CPTII,,, | Performed by: INTERNAL MEDICINE

## 2023-10-16 PROCEDURE — 3008F PR BODY MASS INDEX (BMI) DOCUMENTED: ICD-10-PCS | Mod: CPTII,,, | Performed by: INTERNAL MEDICINE

## 2023-10-16 PROCEDURE — 3044F PR MOST RECENT HEMOGLOBIN A1C LEVEL <7.0%: ICD-10-PCS | Mod: CPTII,,, | Performed by: INTERNAL MEDICINE

## 2023-10-16 PROCEDURE — 85025 COMPLETE CBC W/AUTO DIFF WBC: CPT | Performed by: INTERNAL MEDICINE

## 2023-10-16 PROCEDURE — 1159F MED LIST DOCD IN RCRD: CPT | Mod: CPTII,,, | Performed by: INTERNAL MEDICINE

## 2023-10-16 PROCEDURE — 80053 COMPREHEN METABOLIC PANEL: CPT | Performed by: INTERNAL MEDICINE

## 2023-10-16 PROCEDURE — 36415 COLL VENOUS BLD VENIPUNCTURE: CPT | Performed by: INTERNAL MEDICINE

## 2023-10-16 PROCEDURE — 1159F PR MEDICATION LIST DOCUMENTED IN MEDICAL RECORD: ICD-10-PCS | Mod: CPTII,,, | Performed by: INTERNAL MEDICINE

## 2023-10-16 PROCEDURE — 3075F PR MOST RECENT SYSTOLIC BLOOD PRESS GE 130-139MM HG: ICD-10-PCS | Mod: CPTII,,, | Performed by: INTERNAL MEDICINE

## 2023-10-16 PROCEDURE — 3078F DIAST BP <80 MM HG: CPT | Mod: CPTII,,, | Performed by: INTERNAL MEDICINE

## 2023-10-16 PROCEDURE — 3078F PR MOST RECENT DIASTOLIC BLOOD PRESSURE < 80 MM HG: ICD-10-PCS | Mod: CPTII,,, | Performed by: INTERNAL MEDICINE

## 2023-10-16 PROCEDURE — 99999 PR PBB SHADOW E&M-EST. PATIENT-LVL III: ICD-10-PCS | Mod: PBBFAC,,, | Performed by: INTERNAL MEDICINE

## 2023-10-16 PROCEDURE — 3075F SYST BP GE 130 - 139MM HG: CPT | Mod: CPTII,,, | Performed by: INTERNAL MEDICINE

## 2023-10-16 PROCEDURE — 83735 ASSAY OF MAGNESIUM: CPT | Performed by: INTERNAL MEDICINE

## 2023-10-16 PROCEDURE — 99999 PR PBB SHADOW E&M-EST. PATIENT-LVL III: CPT | Mod: PBBFAC,,, | Performed by: INTERNAL MEDICINE

## 2023-10-16 PROCEDURE — 3044F HG A1C LEVEL LT 7.0%: CPT | Mod: CPTII,,, | Performed by: INTERNAL MEDICINE

## 2023-10-16 PROCEDURE — 99215 OFFICE O/P EST HI 40 MIN: CPT | Mod: S$PBB,,, | Performed by: INTERNAL MEDICINE

## 2023-10-16 PROCEDURE — 99213 OFFICE O/P EST LOW 20 MIN: CPT | Mod: PBBFAC,PN | Performed by: INTERNAL MEDICINE

## 2023-10-16 RX ORDER — HEPARIN 100 UNIT/ML
500 SYRINGE INTRAVENOUS
Status: CANCELLED | OUTPATIENT
Start: 2023-10-17

## 2023-10-16 RX ORDER — SODIUM CHLORIDE 0.9 % (FLUSH) 0.9 %
10 SYRINGE (ML) INJECTION
Status: CANCELLED | OUTPATIENT
Start: 2023-10-19

## 2023-10-16 RX ORDER — FLUOROURACIL 50 MG/ML
2400 INJECTION, SOLUTION INTRAVENOUS
Status: CANCELLED | OUTPATIENT
Start: 2023-10-17

## 2023-10-16 RX ORDER — SODIUM CHLORIDE 0.9 % (FLUSH) 0.9 %
10 SYRINGE (ML) INJECTION
Status: CANCELLED | OUTPATIENT
Start: 2023-10-17

## 2023-10-16 RX ORDER — EPINEPHRINE 0.3 MG/.3ML
0.3 INJECTION SUBCUTANEOUS ONCE AS NEEDED
Status: CANCELLED | OUTPATIENT
Start: 2023-10-17

## 2023-10-16 RX ORDER — FLUCONAZOLE 100 MG/1
100 TABLET ORAL DAILY
COMMUNITY

## 2023-10-16 RX ORDER — FLUOROURACIL 50 MG/ML
400 INJECTION, SOLUTION INTRAVENOUS
Status: CANCELLED | OUTPATIENT
Start: 2023-10-17

## 2023-10-16 RX ORDER — HEPARIN 100 UNIT/ML
500 SYRINGE INTRAVENOUS
Status: CANCELLED | OUTPATIENT
Start: 2023-10-19

## 2023-10-16 RX ORDER — DIPHENHYDRAMINE HYDROCHLORIDE 50 MG/ML
50 INJECTION INTRAMUSCULAR; INTRAVENOUS ONCE AS NEEDED
Status: CANCELLED | OUTPATIENT
Start: 2023-10-17

## 2023-10-16 NOTE — PROGRESS NOTES
HPI    48 years old male newly diagnosed colon cancer.  He was transferred from [facility] for evaluation possible developed a small-bowel obstruction.  He was complaining abdominal pain nausea and vomiting.  Workup shows leukocytosis, anemia and thrombocytosis.  CT scan from outside facility was concerning for developing small-bowel obstruction.  Patient was transferred admitted to [facility] underwent right partial colectomy on 05/29 with pathology consistent adenocarcinoma.  He was discharged then and return to hospital for another admission on 06/08/2023 where he was treated for ileus.  Medical history including type 2 diabetes anemia hypertension right eye blindness.         Past Medical History:   Diagnosis Date    Colon cancer 05/19/2023    Diabetes mellitus, type 2 05/2020    Patient denies    Hypertension      Past Surgical History:   Procedure Laterality Date    COLONOSCOPY N/A 5/29/2023    Procedure: COLONOSCOPY;  Surgeon: Sam Solano MD;  Location: [facility];  Service: Endoscopy;  Laterality: N/A;    EXCISION, SMALL INTESTINE N/A 6/15/2023    Procedure: EXCISION, SMALL INTESTINE;  Surgeon: Edin Michael MD;  Location: [facility] OR;  Service: General;  Laterality: N/A;    EYE SURGERY Right     INSERTION OF TUNNELED CENTRAL VENOUS CATHETER (CVC) WITH SUBCUTANEOUS PORT Left 7/24/2023    Procedure: [id number];  Surgeon: Garfield Hoyt Jr., MD;  Location: [facility] OR;  Service: General;  Laterality: Left;    LAPAROTOMY, EXPLORATORY N/A 6/15/2023    Procedure: LAPAROTOMY, EXPLORATORY;  Surgeon: Edin Michael MD;  Location: [facility] OR;  Service: General;  Laterality: N/A;    LAPAROTOMY, EXPLORATORY N/A 6/17/2023    Procedure: LAPAROTOMY, EXPLORATORY;  Surgeon: Garfield Hoyt Jr., MD;  Location: [facility] OR;  Service: General;  Laterality: N/A;    LUMBAR DISC SURGERY  2005    LYSIS OF ADHESIONS N/A 6/15/2023    Procedure: LYSIS, ADHESIONS;  Surgeon: Edin RUIZ  MD Alec;  Location: [facility] OR;  Service: General;  Laterality: N/A;    OMENTECTOMY N/A 6/15/2023    Procedure: OMENTECTOMY;  Surgeon: Edin Michael MD;  Location: [facility] OR;  Service: General;  Laterality: N/A;    SUBTOTAL COLECTOMY Right 5/29/2023    Procedure: COLECTOMY, PARTIAL;  Surgeon: Edin Michael MD;  Location: [facility] OR;  Service: General;  Laterality: Right;     Social History     Socioeconomic History    Marital status:     Number of children: 3    Highest education level: Associate degree: academic program   Occupational History    Occupation: [employer]   Tobacco Use    Smoking status: Every Day     Current packs/day: 0.50     Average packs/day: 0.5 packs/day for 23.0 years (11.5 ttl pk-yrs)     Types: Cigarettes    Smokeless tobacco: Never    Tobacco comments:     Do not smoke day of surgery   Substance and Sexual Activity    Alcohol use: Not Currently     Comment: Quit drinking 2 yrs ago    Drug use: Yes     Types: Marijuana     Comment: heavy marijuana use    Sexual activity: Yes     Social Determinants of Health     Financial Resource Strain: Low Risk  (5/28/2023)    Overall Financial Resource Strain (CARDIA)     Difficulty of Paying Living Expenses: Not hard at all   Food Insecurity: No Food Insecurity (5/28/2023)    Hunger Vital Sign     Worried About Running Out of Food in the Last Year: Never true     Ran Out of Food in the Last Year: Never true   Transportation Needs: No Transportation Needs (5/28/2023)    PRAPARE - Transportation     Lack of Transportation (Medical): No     Lack of Transportation (Non-Medical): No   Physical Activity: Sufficiently Active (5/28/2023)    Exercise Vital Sign     Days of Exercise per Week: 5 days     Minutes of Exercise per Session: 90 min   Stress: No Stress Concern Present (5/24/2023)    [place] of Occupational Health - Occupational Stress Questionnaire     Feeling of Stress : Not at all   Social Connections: Socially  Isolated (5/28/2023)    Social Connection and Isolation Panel [NHANES]     Frequency of Communication with Friends and Family: Never     Frequency of Social Gatherings with Friends and Family: More than three times a week     Attends Mu-ism Services: Never     Active Member of Clubs or Organizations: No     Attends Club or Organization Meetings: Never     Marital Status:    Housing Stability: Unknown (5/28/2023)    Housing Stability Vital Sign     Unable to Pay for Housing in the Last Year: No     Unstable Housing in the Last Year: No     Social History     Socioeconomic History    Marital status:     Number of children: 3    Highest education level: Associate degree: academic program   Occupational History    Occupation: [employer]   Tobacco Use    Smoking status: Every Day     Current packs/day: 0.50     Average packs/day: 0.5 packs/day for 23.0 years (11.5 ttl pk-yrs)     Types: Cigarettes    Smokeless tobacco: Never    Tobacco comments:     Do not smoke day of surgery   Substance and Sexual Activity    Alcohol use: Not Currently     Comment: Quit drinking 2 yrs ago    Drug use: Yes     Types: Marijuana     Comment: heavy marijuana use    Sexual activity: Yes     Social Determinants of Health     Financial Resource Strain: Low Risk  (5/28/2023)    Overall Financial Resource Strain (CARDIA)     Difficulty of Paying Living Expenses: Not hard at all   Food Insecurity: No Food Insecurity (5/28/2023)    Hunger Vital Sign     Worried About Running Out of Food in the Last Year: Never true     Ran Out of Food in the Last Year: Never true   Transportation Needs: No Transportation Needs (5/28/2023)    PRAPARE - Transportation     Lack of Transportation (Medical): No     Lack of Transportation (Non-Medical): No   Physical Activity: Sufficiently Active (5/28/2023)    Exercise Vital Sign     Days of Exercise per Week: 5 days     Minutes of Exercise per Session: 90 min   Stress: No Stress Concern  Present (5/24/2023)    [place] of Occupational Health - Occupational Stress Questionnaire     Feeling of Stress : Not at all   Social Connections: Socially Isolated (5/28/2023)    Social Connection and Isolation Panel [NHANES]     Frequency of Communication with Friends and Family: Never     Frequency of Social Gatherings with Friends and Family: More than three times a week     Attends Pentecostal Services: Never     Active Member of Clubs or Organizations: No     Attends Club or Organization Meetings: Never     Marital Status:    Housing Stability: Unknown (5/28/2023)    Housing Stability Vital Sign     Unable to Pay for Housing in the Last Year: No     Unstable Housing in the Last Year: No     Review of patient's allergies indicates:   Allergen Reactions    Fish containing products Other (See Comments)     Patient doesn't think any connection to iodine.       Physical exam    Mid abdomen tenderness.  No evidence of skin infection on examination.  Normal speech pattern  Normal respiratory effort  Tachycardic sinus  Move all 4 extremity      Lab Results   Component Value Date    WBC 8.50 10/16/2023    HGB 10.3 (L) 10/16/2023    HCT 33.5 (L) 10/16/2023    MCV 75 (L) 10/16/2023     10/16/2023       CMP  Sodium   Date Value Ref Range Status   10/02/2023 139 136 - 145 mmol/L Final     Potassium   Date Value Ref Range Status   10/02/2023 3.5 3.5 - 5.1 mmol/L Final     Chloride   Date Value Ref Range Status   10/02/2023 105 95 - 110 mmol/L Final     CO2   Date Value Ref Range Status   10/02/2023 28 23 - 29 mmol/L Final     Glucose   Date Value Ref Range Status   10/02/2023 129 (H) 70 - 110 mg/dL Final     BUN   Date Value Ref Range Status   10/02/2023 9 6 - 20 mg/dL Final     Creatinine   Date Value Ref Range Status   10/02/2023 1.0 0.5 - 1.4 mg/dL Final     Calcium   Date Value Ref Range Status   10/02/2023 9.4 8.7 - 10.5 mg/dL Final     Total Protein   Date Value Ref Range Status   10/02/2023 7.3  6.0 - 8.4 g/dL Final     Albumin   Date Value Ref Range Status   10/02/2023 4.0 3.5 - 5.2 g/dL Final     Total Bilirubin   Date Value Ref Range Status   10/02/2023 1.3 (H) 0.1 - 1.0 mg/dL Final     Comment:     For infants and newborns, interpretation of results should be based  on gestational age, weight and in agreement with clinical  observations.    Premature Infant recommended reference ranges:  Up to 24 hours.............<8.0 mg/dL  Up to 48 hours............<12.0 mg/dL  3-5 days..................<15.0 mg/dL  6-29 days.................<15.0 mg/dL       Alkaline Phosphatase   Date Value Ref Range Status   10/02/2023 64 55 - 135 U/L Final     AST   Date Value Ref Range Status   10/02/2023 12 10 - 40 U/L Final     ALT   Date Value Ref Range Status   10/02/2023 7 (L) 10 - 44 U/L Final     Anion Gap   Date Value Ref Range Status   10/02/2023 6 (L) 8 - 16 mmol/L Final     eGFR   Date Value Ref Range Status   10/02/2023 >60.0 >60 mL/min/1.73 m^2 Final     Assessment and plan    Colon adenocarcinoma recently colostomy small-bowel obstruction.      [id number] G2 - stage III    MSI intact    Iron deficiency anemia microcytosis likely blood loss     Thrombocytosis reactive resolved     Disproportionate demand of opiates medication relative to his disease.  I am not sure the his pain is due to surgery or others.  I would like to have a palliative care team or pain management specialist, for the pain evaluation.    Complaining of left-sided neck pain.  Where the port placement is.  No evidence of skin infection or pustular.  X-ray from end of July shows good position of the port.    Abdominal pain disproportionate as described as above.  Patient denies any history of chronic opiate use.  States the pain is really causing a problem.  Denies fever.  elevated leukocytosis and elevated platelets.  We will order CT scan chest abdomen pelvis for evaluation.  Also advised that if pain worsening patient should go to ER for further  evaluation.    Hypokalemia    Reactive thrombocytosis    > potassium chloride 40 mEq daily    > cycle #5 of treatment.     > request tempus     > CT chest abdomen pelvis with contrast (did not get the study)    > port studies - normal appearance  and function of left internal jugular port-A-Cath    > long-acting morphine Q 8 hours    > consult palliative Care for pain evaluation.    > compliance issue.  He has missed appointment blood work as well as images study.  I feel that he will be a difficult patient in compliance is issue.  On today's visit I have ming discussion with him that if he is not able to follow the regimen and clinic visit I will not able to treat.  I will give him 1 more opportunity to work with us with compliance.  If he fails in the future I will defer him to other treatment team, and discharge from my care.  He voiced understanding and agree with above    > patient is here for cycle 6 of chemotherapy.  CBC return but CMP is still pending.  Patient should be able to proceed for with therapy.  We will see patient in 2 weeks with repeat blood work.

## 2023-10-17 ENCOUNTER — INFUSION (OUTPATIENT)
Dept: INFUSION THERAPY | Facility: HOSPITAL | Age: 48
End: 2023-10-17
Attending: INTERNAL MEDICINE
Payer: MEDICAID

## 2023-10-17 VITALS
WEIGHT: 206.38 LBS | TEMPERATURE: 98 F | HEART RATE: 59 BPM | SYSTOLIC BLOOD PRESSURE: 149 MMHG | BODY MASS INDEX: 25.66 KG/M2 | OXYGEN SATURATION: 100 % | DIASTOLIC BLOOD PRESSURE: 90 MMHG | HEIGHT: 75 IN | RESPIRATION RATE: 16 BRPM

## 2023-10-17 DIAGNOSIS — C18.9 COLON ADENOCARCINOMA: Primary | ICD-10-CM

## 2023-10-17 DIAGNOSIS — C18.9 COLON ADENOCARCINOMA: ICD-10-CM

## 2023-10-17 DIAGNOSIS — K56.609 SMALL BOWEL OBSTRUCTION: Primary | ICD-10-CM

## 2023-10-17 PROCEDURE — 96368 THER/DIAG CONCURRENT INF: CPT

## 2023-10-17 PROCEDURE — 96367 TX/PROPH/DG ADDL SEQ IV INF: CPT

## 2023-10-17 PROCEDURE — 25000003 PHARM REV CODE 250: Performed by: INTERNAL MEDICINE

## 2023-10-17 PROCEDURE — 63600175 PHARM REV CODE 636 W HCPCS: Performed by: INTERNAL MEDICINE

## 2023-10-17 PROCEDURE — 96415 CHEMO IV INFUSION ADDL HR: CPT

## 2023-10-17 PROCEDURE — 96416 CHEMO PROLONG INFUSE W/PUMP: CPT

## 2023-10-17 PROCEDURE — 96413 CHEMO IV INFUSION 1 HR: CPT

## 2023-10-17 RX ORDER — EPINEPHRINE 0.3 MG/.3ML
0.3 INJECTION SUBCUTANEOUS ONCE AS NEEDED
Status: DISCONTINUED | OUTPATIENT
Start: 2023-10-17 | End: 2023-10-17 | Stop reason: HOSPADM

## 2023-10-17 RX ORDER — DIPHENHYDRAMINE HYDROCHLORIDE 50 MG/ML
50 INJECTION INTRAMUSCULAR; INTRAVENOUS ONCE AS NEEDED
Status: DISCONTINUED | OUTPATIENT
Start: 2023-10-17 | End: 2023-10-17 | Stop reason: HOSPADM

## 2023-10-17 RX ADMIN — PROMETHAZINE HYDROCHLORIDE 12.5 MG: 25 INJECTION INTRAMUSCULAR; INTRAVENOUS at 04:10

## 2023-10-17 RX ADMIN — DEXAMETHASONE SODIUM PHOSPHATE 0.25 MG: 4 INJECTION, SOLUTION INTRA-ARTICULAR; INTRALESIONAL; INTRAMUSCULAR; INTRAVENOUS; SOFT TISSUE at 02:10

## 2023-10-17 RX ADMIN — DEXTROSE: 5 SOLUTION INTRAVENOUS at 02:10

## 2023-10-17 RX ADMIN — DEXTROSE 850 MG: 5 SOLUTION INTRAVENOUS at 02:10

## 2023-10-17 RX ADMIN — OXALIPLATIN 180 MG: 5 INJECTION, SOLUTION INTRAVENOUS at 02:10

## 2023-10-17 RX ADMIN — FLUOROURACIL 5090 MG: 50 INJECTION, SOLUTION INTRAVENOUS at 04:10

## 2023-10-17 NOTE — NURSING
Notified Dr. Baird of patient complaints. Patient stated he has had sever stomach pain, vomiting and diarrhea of the past week. He says he has not experienced symptoms like this up until this point. Dr. Baird held patient 5fu bolus this treatment and ordered CT of abdomen and pelvis. Educated the patient on the importance of being compliant as well as  management of symptoms of diarrhea. Verbalized understanding.

## 2023-10-17 NOTE — PLAN OF CARE
Problem: Fatigue  Goal: Improved Activity Tolerance  Outcome: Ongoing, Progressing  Intervention: Promote Improved Energy  Flowsheets (Taken 10/17/2023 1527)  Fatigue Management:   activity schedule adjusted   fatigue-related activity identified   frequent rest breaks encouraged  Activity Management: Ambulated -L4

## 2023-10-19 ENCOUNTER — INFUSION (OUTPATIENT)
Dept: INFUSION THERAPY | Facility: HOSPITAL | Age: 48
End: 2023-10-19
Attending: INTERNAL MEDICINE
Payer: MEDICAID

## 2023-10-19 VITALS
RESPIRATION RATE: 18 BRPM | HEART RATE: 66 BPM | DIASTOLIC BLOOD PRESSURE: 86 MMHG | OXYGEN SATURATION: 98 % | WEIGHT: 203.88 LBS | BODY MASS INDEX: 25.35 KG/M2 | TEMPERATURE: 98 F | SYSTOLIC BLOOD PRESSURE: 128 MMHG | HEIGHT: 75 IN

## 2023-10-19 DIAGNOSIS — C18.9 COLON ADENOCARCINOMA: ICD-10-CM

## 2023-10-19 DIAGNOSIS — K56.609 SMALL BOWEL OBSTRUCTION: Primary | ICD-10-CM

## 2023-10-19 PROCEDURE — 25000003 PHARM REV CODE 250: Performed by: INTERNAL MEDICINE

## 2023-10-19 PROCEDURE — 96523 IRRIG DRUG DELIVERY DEVICE: CPT

## 2023-10-19 PROCEDURE — A4216 STERILE WATER/SALINE, 10 ML: HCPCS | Performed by: INTERNAL MEDICINE

## 2023-10-19 PROCEDURE — 63600175 PHARM REV CODE 636 W HCPCS: Performed by: INTERNAL MEDICINE

## 2023-10-19 RX ORDER — SODIUM CHLORIDE 0.9 % (FLUSH) 0.9 %
10 SYRINGE (ML) INJECTION
Status: DISCONTINUED | OUTPATIENT
Start: 2023-10-19 | End: 2023-10-19 | Stop reason: HOSPADM

## 2023-10-19 RX ORDER — HEPARIN 100 UNIT/ML
500 SYRINGE INTRAVENOUS
Status: DISCONTINUED | OUTPATIENT
Start: 2023-10-19 | End: 2023-10-19 | Stop reason: HOSPADM

## 2023-10-19 RX ADMIN — SODIUM CHLORIDE, PRESERVATIVE FREE 10 ML: 5 INJECTION INTRAVENOUS at 02:10

## 2023-10-19 RX ADMIN — HEPARIN 500 UNITS: 100 SYRINGE at 02:10

## 2023-10-19 NOTE — PLAN OF CARE
Problem: Fall Injury Risk  Goal: Absence of Fall and Fall-Related Injury  Outcome: Ongoing, Progressing  Intervention: Identify and Manage Contributors  Flowsheets (Taken 10/19/2023 1631)  Self-Care Promotion: safe use of adaptive equipment encouraged  Intervention: Promote Injury-Free Environment  Flowsheets (Taken 10/19/2023 1631)  Safety Promotion/Fall Prevention:   assistive device/personal item within reach   Fall Risk signage in place

## 2023-10-30 ENCOUNTER — LAB VISIT (OUTPATIENT)
Dept: LAB | Facility: HOSPITAL | Age: 48
End: 2023-10-30
Attending: INTERNAL MEDICINE
Payer: MEDICAID

## 2023-10-30 ENCOUNTER — TELEPHONE (OUTPATIENT)
Dept: INFUSION THERAPY | Facility: HOSPITAL | Age: 48
End: 2023-10-30

## 2023-10-30 DIAGNOSIS — K56.609 SMALL BOWEL OBSTRUCTION: ICD-10-CM

## 2023-10-30 DIAGNOSIS — C18.9 COLON ADENOCARCINOMA: ICD-10-CM

## 2023-10-30 DIAGNOSIS — E87.6 HYPOKALEMIA: ICD-10-CM

## 2023-10-30 LAB
ALBUMIN SERPL BCP-MCNC: 4 G/DL (ref 3.5–5.2)
ALP SERPL-CCNC: 51 U/L (ref 55–135)
ALT SERPL W/O P-5'-P-CCNC: 33 U/L (ref 10–44)
ANION GAP SERPL CALC-SCNC: 3 MMOL/L (ref 8–16)
AST SERPL-CCNC: 27 U/L (ref 10–40)
BASOPHILS # BLD AUTO: 0.09 K/UL (ref 0–0.2)
BASOPHILS NFR BLD: 1.2 % (ref 0–1.9)
BILIRUB SERPL-MCNC: 1.1 MG/DL (ref 0.1–1)
BUN SERPL-MCNC: 9 MG/DL (ref 6–20)
CALCIUM SERPL-MCNC: 9.4 MG/DL (ref 8.7–10.5)
CHLORIDE SERPL-SCNC: 105 MMOL/L (ref 95–110)
CO2 SERPL-SCNC: 32 MMOL/L (ref 23–29)
CREAT SERPL-MCNC: 1 MG/DL (ref 0.5–1.4)
DIFFERENTIAL METHOD: ABNORMAL
EOSINOPHIL # BLD AUTO: 0.1 K/UL (ref 0–0.5)
EOSINOPHIL NFR BLD: 1.5 % (ref 0–8)
ERYTHROCYTE [DISTWIDTH] IN BLOOD BY AUTOMATED COUNT: 22.5 % (ref 11.5–14.5)
EST. GFR  (NO RACE VARIABLE): >60 ML/MIN/1.73 M^2
GLUCOSE SERPL-MCNC: 94 MG/DL (ref 70–110)
HCT VFR BLD AUTO: 34.6 % (ref 40–54)
HGB BLD-MCNC: 10.6 G/DL (ref 14–18)
IMM GRANULOCYTES # BLD AUTO: 0.01 K/UL (ref 0–0.04)
IMM GRANULOCYTES NFR BLD AUTO: 0.1 % (ref 0–0.5)
LYMPHOCYTES # BLD AUTO: 3 K/UL (ref 1–4.8)
LYMPHOCYTES NFR BLD: 40.8 % (ref 18–48)
MAGNESIUM SERPL-MCNC: 1.6 MG/DL (ref 1.6–2.6)
MCH RBC QN AUTO: 23.2 PG (ref 27–31)
MCHC RBC AUTO-ENTMCNC: 30.6 G/DL (ref 32–36)
MCV RBC AUTO: 76 FL (ref 82–98)
MONOCYTES # BLD AUTO: 0.9 K/UL (ref 0.3–1)
MONOCYTES NFR BLD: 12.1 % (ref 4–15)
NEUTROPHILS # BLD AUTO: 3.2 K/UL (ref 1.8–7.7)
NEUTROPHILS NFR BLD: 44.3 % (ref 38–73)
NRBC BLD-RTO: 0 /100 WBC
PLATELET # BLD AUTO: 295 K/UL (ref 150–450)
PMV BLD AUTO: 8.5 FL (ref 9.2–12.9)
POTASSIUM SERPL-SCNC: 3.5 MMOL/L (ref 3.5–5.1)
PROT SERPL-MCNC: 7.4 G/DL (ref 6–8.4)
RBC # BLD AUTO: 4.56 M/UL (ref 4.6–6.2)
SODIUM SERPL-SCNC: 140 MMOL/L (ref 136–145)
WBC # BLD AUTO: 7.3 K/UL (ref 3.9–12.7)

## 2023-10-30 PROCEDURE — 36415 COLL VENOUS BLD VENIPUNCTURE: CPT | Performed by: INTERNAL MEDICINE

## 2023-10-30 PROCEDURE — 85025 COMPLETE CBC W/AUTO DIFF WBC: CPT | Performed by: INTERNAL MEDICINE

## 2023-10-30 PROCEDURE — 83735 ASSAY OF MAGNESIUM: CPT | Performed by: INTERNAL MEDICINE

## 2023-10-30 PROCEDURE — 80053 COMPREHEN METABOLIC PANEL: CPT | Performed by: INTERNAL MEDICINE

## 2023-10-30 NOTE — TELEPHONE ENCOUNTER
Patient's sister returned the call and stated she will inform Ruslan to come down to chemo immediately after his doctor's appointment at 1020 tomorrow.

## 2023-10-31 ENCOUNTER — OFFICE VISIT (OUTPATIENT)
Dept: HEMATOLOGY/ONCOLOGY | Facility: CLINIC | Age: 48
End: 2023-10-31
Payer: MEDICAID

## 2023-10-31 ENCOUNTER — PATIENT MESSAGE (OUTPATIENT)
Dept: HEMATOLOGY/ONCOLOGY | Facility: CLINIC | Age: 48
End: 2023-10-31

## 2023-10-31 ENCOUNTER — INFUSION (OUTPATIENT)
Dept: INFUSION THERAPY | Facility: HOSPITAL | Age: 48
End: 2023-10-31
Attending: INTERNAL MEDICINE
Payer: MEDICAID

## 2023-10-31 VITALS
HEART RATE: 78 BPM | WEIGHT: 204.38 LBS | BODY MASS INDEX: 25.41 KG/M2 | OXYGEN SATURATION: 100 % | RESPIRATION RATE: 12 BRPM | HEIGHT: 75 IN | TEMPERATURE: 98 F | DIASTOLIC BLOOD PRESSURE: 84 MMHG | SYSTOLIC BLOOD PRESSURE: 121 MMHG

## 2023-10-31 VITALS
OXYGEN SATURATION: 100 % | DIASTOLIC BLOOD PRESSURE: 83 MMHG | HEART RATE: 62 BPM | BODY MASS INDEX: 25.39 KG/M2 | WEIGHT: 204.19 LBS | RESPIRATION RATE: 18 BRPM | HEIGHT: 75 IN | SYSTOLIC BLOOD PRESSURE: 130 MMHG | TEMPERATURE: 98 F

## 2023-10-31 DIAGNOSIS — C18.9 COLON ADENOCARCINOMA: ICD-10-CM

## 2023-10-31 DIAGNOSIS — K56.609 SMALL BOWEL OBSTRUCTION: Primary | ICD-10-CM

## 2023-10-31 DIAGNOSIS — R10.9 ABDOMINAL PAIN, UNSPECIFIED ABDOMINAL LOCATION: ICD-10-CM

## 2023-10-31 DIAGNOSIS — C18.9 COLON ADENOCARCINOMA: Primary | ICD-10-CM

## 2023-10-31 DIAGNOSIS — Z01.818 EXAMINATION PRIOR TO CHEMOTHERAPY: ICD-10-CM

## 2023-10-31 PROCEDURE — 25000003 PHARM REV CODE 250: Performed by: INTERNAL MEDICINE

## 2023-10-31 PROCEDURE — 96413 CHEMO IV INFUSION 1 HR: CPT

## 2023-10-31 PROCEDURE — 99999 PR PBB SHADOW E&M-EST. PATIENT-LVL III: ICD-10-PCS | Mod: PBBFAC,,, | Performed by: INTERNAL MEDICINE

## 2023-10-31 PROCEDURE — 96416 CHEMO PROLONG INFUSE W/PUMP: CPT

## 2023-10-31 PROCEDURE — 3044F HG A1C LEVEL LT 7.0%: CPT | Mod: CPTII,,, | Performed by: INTERNAL MEDICINE

## 2023-10-31 PROCEDURE — 96366 THER/PROPH/DIAG IV INF ADDON: CPT

## 2023-10-31 PROCEDURE — 96415 CHEMO IV INFUSION ADDL HR: CPT

## 2023-10-31 PROCEDURE — 96368 THER/DIAG CONCURRENT INF: CPT

## 2023-10-31 PROCEDURE — 3008F BODY MASS INDEX DOCD: CPT | Mod: CPTII,,, | Performed by: INTERNAL MEDICINE

## 2023-10-31 PROCEDURE — 3044F PR MOST RECENT HEMOGLOBIN A1C LEVEL <7.0%: ICD-10-PCS | Mod: CPTII,,, | Performed by: INTERNAL MEDICINE

## 2023-10-31 PROCEDURE — 96411 CHEMO IV PUSH ADDL DRUG: CPT

## 2023-10-31 PROCEDURE — 1159F MED LIST DOCD IN RCRD: CPT | Mod: CPTII,,, | Performed by: INTERNAL MEDICINE

## 2023-10-31 PROCEDURE — 3079F PR MOST RECENT DIASTOLIC BLOOD PRESSURE 80-89 MM HG: ICD-10-PCS | Mod: CPTII,,, | Performed by: INTERNAL MEDICINE

## 2023-10-31 PROCEDURE — 63600175 PHARM REV CODE 636 W HCPCS: Performed by: INTERNAL MEDICINE

## 2023-10-31 PROCEDURE — 3079F DIAST BP 80-89 MM HG: CPT | Mod: CPTII,,, | Performed by: INTERNAL MEDICINE

## 2023-10-31 PROCEDURE — 99215 PR OFFICE/OUTPT VISIT, EST, LEVL V, 40-54 MIN: ICD-10-PCS | Mod: S$PBB,,, | Performed by: INTERNAL MEDICINE

## 2023-10-31 PROCEDURE — 1159F PR MEDICATION LIST DOCUMENTED IN MEDICAL RECORD: ICD-10-PCS | Mod: CPTII,,, | Performed by: INTERNAL MEDICINE

## 2023-10-31 PROCEDURE — 3074F SYST BP LT 130 MM HG: CPT | Mod: CPTII,,, | Performed by: INTERNAL MEDICINE

## 2023-10-31 PROCEDURE — 99213 OFFICE O/P EST LOW 20 MIN: CPT | Mod: PBBFAC,PN | Performed by: INTERNAL MEDICINE

## 2023-10-31 PROCEDURE — 99999 PR PBB SHADOW E&M-EST. PATIENT-LVL III: CPT | Mod: PBBFAC,,, | Performed by: INTERNAL MEDICINE

## 2023-10-31 PROCEDURE — 99215 OFFICE O/P EST HI 40 MIN: CPT | Mod: S$PBB,,, | Performed by: INTERNAL MEDICINE

## 2023-10-31 PROCEDURE — 3074F PR MOST RECENT SYSTOLIC BLOOD PRESSURE < 130 MM HG: ICD-10-PCS | Mod: CPTII,,, | Performed by: INTERNAL MEDICINE

## 2023-10-31 PROCEDURE — 3008F PR BODY MASS INDEX (BMI) DOCUMENTED: ICD-10-PCS | Mod: CPTII,,, | Performed by: INTERNAL MEDICINE

## 2023-10-31 PROCEDURE — 96367 TX/PROPH/DG ADDL SEQ IV INF: CPT

## 2023-10-31 RX ORDER — SODIUM CHLORIDE 0.9 % (FLUSH) 0.9 %
10 SYRINGE (ML) INJECTION
Status: CANCELLED | OUTPATIENT
Start: 2023-10-31

## 2023-10-31 RX ORDER — FLUOROURACIL 50 MG/ML
400 INJECTION, SOLUTION INTRAVENOUS
Status: CANCELLED | OUTPATIENT
Start: 2023-10-31

## 2023-10-31 RX ORDER — HEPARIN 100 UNIT/ML
500 SYRINGE INTRAVENOUS
Status: CANCELLED | OUTPATIENT
Start: 2023-11-02

## 2023-10-31 RX ORDER — SODIUM CHLORIDE 0.9 % (FLUSH) 0.9 %
10 SYRINGE (ML) INJECTION
Status: DISCONTINUED | OUTPATIENT
Start: 2023-10-31 | End: 2023-10-31 | Stop reason: HOSPADM

## 2023-10-31 RX ORDER — EPINEPHRINE 0.3 MG/.3ML
0.3 INJECTION SUBCUTANEOUS ONCE AS NEEDED
Status: CANCELLED | OUTPATIENT
Start: 2023-10-31

## 2023-10-31 RX ORDER — DIPHENHYDRAMINE HYDROCHLORIDE 50 MG/ML
50 INJECTION INTRAMUSCULAR; INTRAVENOUS ONCE AS NEEDED
Status: DISCONTINUED | OUTPATIENT
Start: 2023-10-31 | End: 2023-10-31 | Stop reason: HOSPADM

## 2023-10-31 RX ORDER — DIPHENHYDRAMINE HYDROCHLORIDE 50 MG/ML
50 INJECTION INTRAMUSCULAR; INTRAVENOUS ONCE AS NEEDED
Status: CANCELLED | OUTPATIENT
Start: 2023-10-31

## 2023-10-31 RX ORDER — SODIUM CHLORIDE 0.9 % (FLUSH) 0.9 %
10 SYRINGE (ML) INJECTION
Status: CANCELLED | OUTPATIENT
Start: 2023-11-02

## 2023-10-31 RX ORDER — FLUOROURACIL 50 MG/ML
2400 INJECTION, SOLUTION INTRAVENOUS
Status: CANCELLED | OUTPATIENT
Start: 2023-10-31

## 2023-10-31 RX ORDER — FLUOROURACIL 50 MG/ML
400 INJECTION, SOLUTION INTRAVENOUS
Status: COMPLETED | OUTPATIENT
Start: 2023-10-31 | End: 2023-10-31

## 2023-10-31 RX ORDER — EPINEPHRINE 0.3 MG/.3ML
0.3 INJECTION SUBCUTANEOUS ONCE AS NEEDED
Status: DISCONTINUED | OUTPATIENT
Start: 2023-10-31 | End: 2023-10-31 | Stop reason: HOSPADM

## 2023-10-31 RX ORDER — HEPARIN 100 UNIT/ML
500 SYRINGE INTRAVENOUS
Status: CANCELLED | OUTPATIENT
Start: 2023-10-31

## 2023-10-31 RX ADMIN — FLUOROURACIL 5090 MG: 50 INJECTION, SOLUTION INTRAVENOUS at 02:10

## 2023-10-31 RX ADMIN — PALONOSETRON HYDROCHLORIDE 0.25 MG: 0.25 INJECTION INTRAVENOUS at 11:10

## 2023-10-31 RX ADMIN — OXALIPLATIN 180 MG: 100 INJECTION, SOLUTION, CONCENTRATE INTRAVENOUS at 12:10

## 2023-10-31 RX ADMIN — DEXTROSE 850 MG: 5 SOLUTION INTRAVENOUS at 12:10

## 2023-10-31 RX ADMIN — DEXTROSE: 5 SOLUTION INTRAVENOUS at 11:10

## 2023-10-31 RX ADMIN — FLUOROURACIL 850 MG: 50 INJECTION, SOLUTION INTRAVENOUS at 02:10

## 2023-10-31 NOTE — PROGRESS NOTES
HPI    48 years old male newly diagnosed colon cancer.  He was transferred from [facility] for evaluation possible developed a small-bowel obstruction.  He was complaining abdominal pain nausea and vomiting.  Workup shows leukocytosis, anemia and thrombocytosis.  CT scan from outside facility was concerning for developing small-bowel obstruction.  Patient was transferred admitted to [facility] underwent right partial colectomy on 05/29 with pathology consistent adenocarcinoma.  He was discharged then and return to hospital for another admission on 06/08/2023 where he was treated for ileus.  Medical history including type 2 diabetes anemia hypertension right eye blindness.         Past Medical History:   Diagnosis Date    Colon cancer 05/19/2023    Diabetes mellitus, type 2 05/2020    Patient denies    Hypertension      Past Surgical History:   Procedure Laterality Date    COLONOSCOPY N/A 5/29/2023    Procedure: COLONOSCOPY;  Surgeon: Sam Solano MD;  Location: [facility];  Service: Endoscopy;  Laterality: N/A;    EXCISION, SMALL INTESTINE N/A 6/15/2023    Procedure: EXCISION, SMALL INTESTINE;  Surgeon: Edin Michael MD;  Location: [facility] OR;  Service: General;  Laterality: N/A;    EYE SURGERY Right     INSERTION OF TUNNELED CENTRAL VENOUS CATHETER (CVC) WITH SUBCUTANEOUS PORT Left 7/24/2023    Procedure: [id number];  Surgeon: Garfield Hoyt Jr., MD;  Location: [facility] OR;  Service: General;  Laterality: Left;    LAPAROTOMY, EXPLORATORY N/A 6/15/2023    Procedure: LAPAROTOMY, EXPLORATORY;  Surgeon: Edin iMchael MD;  Location: [facility] OR;  Service: General;  Laterality: N/A;    LAPAROTOMY, EXPLORATORY N/A 6/17/2023    Procedure: LAPAROTOMY, EXPLORATORY;  Surgeon: Garfield Hoyt Jr., MD;  Location: [facility] OR;  Service: General;  Laterality: N/A;    LUMBAR DISC SURGERY  2005    LYSIS OF ADHESIONS N/A 6/15/2023    Procedure: LYSIS, ADHESIONS;  Surgeon: Edin RUIZ  MD Alec;  Location: [facility] OR;  Service: General;  Laterality: N/A;    OMENTECTOMY N/A 6/15/2023    Procedure: OMENTECTOMY;  Surgeon: Edin Michael MD;  Location: [facility] OR;  Service: General;  Laterality: N/A;    SUBTOTAL COLECTOMY Right 5/29/2023    Procedure: COLECTOMY, PARTIAL;  Surgeon: Edin Michael MD;  Location: [facility] OR;  Service: General;  Laterality: Right;     Social History     Socioeconomic History    Marital status:     Number of children: 3    Highest education level: Associate degree: academic program   Occupational History    Occupation: [employer]   Tobacco Use    Smoking status: Every Day     Current packs/day: 0.50     Average packs/day: 0.5 packs/day for 23.0 years (11.5 ttl pk-yrs)     Types: Cigarettes    Smokeless tobacco: Never    Tobacco comments:     Do not smoke day of surgery   Substance and Sexual Activity    Alcohol use: Not Currently     Comment: Quit drinking 2 yrs ago    Drug use: Yes     Types: Marijuana     Comment: heavy marijuana use    Sexual activity: Yes     Social Determinants of Health     Financial Resource Strain: Low Risk  (5/28/2023)    Overall Financial Resource Strain (CARDIA)     Difficulty of Paying Living Expenses: Not hard at all   Food Insecurity: No Food Insecurity (5/28/2023)    Hunger Vital Sign     Worried About Running Out of Food in the Last Year: Never true     Ran Out of Food in the Last Year: Never true   Transportation Needs: No Transportation Needs (5/28/2023)    PRAPARE - Transportation     Lack of Transportation (Medical): No     Lack of Transportation (Non-Medical): No   Physical Activity: Sufficiently Active (5/28/2023)    Exercise Vital Sign     Days of Exercise per Week: 5 days     Minutes of Exercise per Session: 90 min   Stress: No Stress Concern Present (5/24/2023)    [employer] [place] [employer] - Occupational Stress Questionnaire     Feeling of Stress : Not at all   Social Connections: Socially  Isolated (5/28/2023)    Social Connection and Isolation Panel [NHANES]     Frequency of Communication with Friends and Family: Never     Frequency of Social Gatherings with Friends and Family: More than three times a week     Attends Lutheran Services: Never     Active Member of Clubs or Organizations: No     Attends Club or Organization Meetings: Never     Marital Status:    Housing Stability: Unknown (5/28/2023)    Housing Stability Vital Sign     Unable to Pay for Housing in the Last Year: No     Unstable Housing in the Last Year: No     Social History     Socioeconomic History    Marital status:     Number of children: 3    Highest education level: Associate degree: academic program   Occupational History    Occupation: [employer]   Tobacco Use    Smoking status: Every Day     Current packs/day: 0.50     Average packs/day: 0.5 packs/day for 23.0 years (11.5 ttl pk-yrs)     Types: Cigarettes    Smokeless tobacco: Never    Tobacco comments:     Do not smoke day of surgery   Substance and Sexual Activity    Alcohol use: Not Currently     Comment: Quit drinking 2 yrs ago    Drug use: Yes     Types: Marijuana     Comment: heavy marijuana use    Sexual activity: Yes     Social Determinants of Health     Financial Resource Strain: Low Risk  (5/28/2023)    Overall Financial Resource Strain (CARDIA)     Difficulty of Paying Living Expenses: Not hard at all   Food Insecurity: No Food Insecurity (5/28/2023)    Hunger Vital Sign     Worried About Running Out of Food in the Last Year: Never true     Ran Out of Food in the Last Year: Never true   Transportation Needs: No Transportation Needs (5/28/2023)    PRAPARE - Transportation     Lack of Transportation (Medical): No     Lack of Transportation (Non-Medical): No   Physical Activity: Sufficiently Active (5/28/2023)    Exercise Vital Sign     Days of Exercise per Week: 5 days     Minutes of Exercise per Session: 90 min   Stress: No Stress Concern  Present (5/24/2023)    [employer] [place] [employer] - Occupational Stress Questionnaire     Feeling of Stress : Not at all   Social Connections: Socially Isolated (5/28/2023)    Social Connection and Isolation Panel [NHANES]     Frequency of Communication with Friends and Family: Never     Frequency of Social Gatherings with Friends and Family: More than three times a week     Attends Spiritism Services: Never     Active Member of Clubs or Organizations: No     Attends Club or Organization Meetings: Never     Marital Status:    Housing Stability: Unknown (5/28/2023)    Housing Stability Vital Sign     Unable to Pay for Housing in the Last Year: No     Unstable Housing in the Last Year: No     Review of patient's allergies indicates:   Allergen Reactions    Fish containing products Other (See Comments)     Patient doesn't think any connection to iodine.       Physical exam    Mid abdomen tenderness.  No evidence of skin infection on examination.  Normal speech pattern  Normal respiratory effort  Tachycardic sinus  Move all 4 extremity      Lab Results   Component Value Date    WBC 7.30 10/30/2023    HGB 10.6 (L) 10/30/2023    HCT 34.6 (L) 10/30/2023    MCV 76 (L) 10/30/2023     10/30/2023       CMP  Sodium   Date Value Ref Range Status   10/30/2023 140 136 - 145 mmol/L Final     Potassium   Date Value Ref Range Status   10/30/2023 3.5 3.5 - 5.1 mmol/L Final     Chloride   Date Value Ref Range Status   10/30/2023 105 95 - 110 mmol/L Final     CO2   Date Value Ref Range Status   10/30/2023 32 (H) 23 - 29 mmol/L Final     Glucose   Date Value Ref Range Status   10/30/2023 94 70 - 110 mg/dL Final     BUN   Date Value Ref Range Status   10/30/2023 9 6 - 20 mg/dL Final     Creatinine   Date Value Ref Range Status   10/30/2023 1.0 0.5 - 1.4 mg/dL Final     Calcium   Date Value Ref Range Status   10/30/2023 9.4 8.7 - 10.5 mg/dL Final     Total Protein   Date Value Ref Range Status   10/30/2023 7.4  6.0 - 8.4 g/dL Final     Albumin   Date Value Ref Range Status   10/30/2023 4.0 3.5 - 5.2 g/dL Final     Total Bilirubin   Date Value Ref Range Status   10/30/2023 1.1 (H) 0.1 - 1.0 mg/dL Final     Comment:     For infants and newborns, interpretation of results should be based  on gestational age, weight and in agreement with clinical  observations.    Premature Infant recommended reference ranges:  Up to 24 hours.............<8.0 mg/dL  Up to 48 hours............<12.0 mg/dL  3-5 days..................<15.0 mg/dL  6-29 days.................<15.0 mg/dL       Alkaline Phosphatase   Date Value Ref Range Status   10/30/2023 51 (L) 55 - 135 U/L Final     AST   Date Value Ref Range Status   10/30/2023 27 10 - 40 U/L Final     ALT   Date Value Ref Range Status   10/30/2023 33 10 - 44 U/L Final     Anion Gap   Date Value Ref Range Status   10/30/2023 3 (L) 8 - 16 mmol/L Final     eGFR   Date Value Ref Range Status   10/30/2023 >60.0 >60 mL/min/1.73 m^2 Final     Assessment and plan    Colon adenocarcinoma recently colostomy small-bowel obstruction.      [id number] G2 - stage III    MSI intact    Iron deficiency anemia microcytosis likely blood loss     Thrombocytosis reactive resolved     Disproportionate demand of opiates medication relative to his disease.  I am not sure the his pain is due to surgery or others.  I would like to have a palliative care team or pain management specialist, for the pain evaluation.    Complaining of left-sided neck pain.  Where the port placement is.  No evidence of skin infection or pustular.  X-ray from end of July shows good position of the port.    Abdominal pain disproportionate as described as above.  Patient denies any history of chronic opiate use.  States the pain is really causing a problem.  Denies fever.  elevated leukocytosis and elevated platelets.  We will order CT scan chest abdomen pelvis for evaluation.  Also advised that if pain worsening patient should go to ER for further  evaluation.    Hypokalemia    Reactive thrombocytosis    > potassium chloride 40 mEq daily    > cycle #7 of treatment.     > request tempus     > CT chest abdomen pelvis with contrast (did not get the study)    > port studies - normal appearance  and function of left internal jugular port-A-Cath    > long-acting morphine Q 8 hours (    > consult palliative Care for pain evaluation.    > compliance issue.  He has missed appointment blood work as well as images study.  I feel that he will be a difficult patient in compliance is issue.  On today's visit I have ming discussion with him that if he is not able to follow the regimen and clinic visit I will not able to treat.  I will give him 1 more opportunity to work with us with compliance.  If he fails in the future I will defer him to other treatment team, and discharge from my care.  He voiced understanding and agree with above    > patient is here for cycle 7 of chemotherapy.  CBC return but CMP is still pending.  Patient should be able to proceed for with therapy.  We will see patient in 2 weeks with repeat blood work.  Get CT abdomen and pelvis with contrast

## 2023-10-31 NOTE — NURSING
Patient states that he has had blood in his stool for 2 weeks, vomited 4-5 times a day for 5 days, states that he is out of pain medicine and his nausea meds aren't working. Dr. Baird notified of all complaints and stated to proceed with treatment today.

## 2023-10-31 NOTE — PLAN OF CARE
Problem: Fatigue  Goal: Improved Activity Tolerance  Intervention: Promote Improved Energy  Flowsheets (Taken 10/31/2023 2528)  Fatigue Management: fatigue-related activity identified  Activity Management: Ambulated -L4

## 2023-11-02 ENCOUNTER — INFUSION (OUTPATIENT)
Dept: INFUSION THERAPY | Facility: HOSPITAL | Age: 48
End: 2023-11-02
Attending: INTERNAL MEDICINE
Payer: MEDICAID

## 2023-11-02 VITALS
BODY MASS INDEX: 25.09 KG/M2 | DIASTOLIC BLOOD PRESSURE: 79 MMHG | HEART RATE: 101 BPM | OXYGEN SATURATION: 98 % | HEIGHT: 75 IN | TEMPERATURE: 98 F | WEIGHT: 201.81 LBS | RESPIRATION RATE: 16 BRPM | SYSTOLIC BLOOD PRESSURE: 113 MMHG

## 2023-11-02 DIAGNOSIS — C18.9 COLON ADENOCARCINOMA: ICD-10-CM

## 2023-11-02 DIAGNOSIS — K56.609 SMALL BOWEL OBSTRUCTION: Primary | ICD-10-CM

## 2023-11-02 PROCEDURE — 25000003 PHARM REV CODE 250: Performed by: INTERNAL MEDICINE

## 2023-11-02 PROCEDURE — A4216 STERILE WATER/SALINE, 10 ML: HCPCS | Performed by: INTERNAL MEDICINE

## 2023-11-02 PROCEDURE — 63600175 PHARM REV CODE 636 W HCPCS: Performed by: INTERNAL MEDICINE

## 2023-11-02 PROCEDURE — 96523 IRRIG DRUG DELIVERY DEVICE: CPT

## 2023-11-02 RX ORDER — HEPARIN 100 UNIT/ML
500 SYRINGE INTRAVENOUS
Status: DISCONTINUED | OUTPATIENT
Start: 2023-11-02 | End: 2023-11-02 | Stop reason: HOSPADM

## 2023-11-02 RX ORDER — SODIUM CHLORIDE 0.9 % (FLUSH) 0.9 %
10 SYRINGE (ML) INJECTION
Status: DISCONTINUED | OUTPATIENT
Start: 2023-11-02 | End: 2023-11-02 | Stop reason: HOSPADM

## 2023-11-02 RX ADMIN — SODIUM CHLORIDE, PRESERVATIVE FREE 10 ML: 5 INJECTION INTRAVENOUS at 01:11

## 2023-11-02 RX ADMIN — HEPARIN 500 UNITS: 100 SYRINGE at 01:11

## 2023-11-02 NOTE — PLAN OF CARE
Problem: Fall Injury Risk  Goal: Absence of Fall and Fall-Related Injury  Outcome: Ongoing, Progressing  Intervention: Identify and Manage Contributors  Flowsheets (Taken 11/2/2023 1426)  Self-Care Promotion: safe use of adaptive equipment encouraged  Medication Review/Management: medications reviewed  Intervention: Promote Injury-Free Environment  Flowsheets (Taken 11/2/2023 1426)  Safety Promotion/Fall Prevention: assistive device/personal item within reach

## 2023-11-13 ENCOUNTER — TELEPHONE (OUTPATIENT)
Dept: HEMATOLOGY/ONCOLOGY | Facility: CLINIC | Age: 48
End: 2023-11-13
Payer: MEDICAID

## 2023-11-13 ENCOUNTER — LAB VISIT (OUTPATIENT)
Dept: LAB | Facility: HOSPITAL | Age: 48
End: 2023-11-13
Attending: INTERNAL MEDICINE
Payer: MEDICAID

## 2023-11-13 DIAGNOSIS — R10.9 ABDOMINAL PAIN, UNSPECIFIED ABDOMINAL LOCATION: ICD-10-CM

## 2023-11-13 DIAGNOSIS — C18.9 COLON ADENOCARCINOMA: ICD-10-CM

## 2023-11-13 DIAGNOSIS — Z01.818 EXAMINATION PRIOR TO CHEMOTHERAPY: ICD-10-CM

## 2023-11-13 LAB
ALBUMIN SERPL BCP-MCNC: 4.1 G/DL (ref 3.5–5.2)
ALP SERPL-CCNC: 50 U/L (ref 55–135)
ALT SERPL W/O P-5'-P-CCNC: 20 U/L (ref 10–44)
ANION GAP SERPL CALC-SCNC: 7 MMOL/L (ref 8–16)
AST SERPL-CCNC: 19 U/L (ref 10–40)
BASOPHILS # BLD AUTO: 0.07 K/UL (ref 0–0.2)
BASOPHILS NFR BLD: 0.9 % (ref 0–1.9)
BILIRUB SERPL-MCNC: 1.2 MG/DL (ref 0.1–1)
BUN SERPL-MCNC: 9 MG/DL (ref 6–20)
CALCIUM SERPL-MCNC: 9.5 MG/DL (ref 8.7–10.5)
CHLORIDE SERPL-SCNC: 106 MMOL/L (ref 95–110)
CO2 SERPL-SCNC: 28 MMOL/L (ref 23–29)
CREAT SERPL-MCNC: 1 MG/DL (ref 0.5–1.4)
DIFFERENTIAL METHOD: ABNORMAL
EOSINOPHIL # BLD AUTO: 0.1 K/UL (ref 0–0.5)
EOSINOPHIL NFR BLD: 1.3 % (ref 0–8)
ERYTHROCYTE [DISTWIDTH] IN BLOOD BY AUTOMATED COUNT: 23.1 % (ref 11.5–14.5)
EST. GFR  (NO RACE VARIABLE): >60 ML/MIN/1.73 M^2
GLUCOSE SERPL-MCNC: 124 MG/DL (ref 70–110)
HCT VFR BLD AUTO: 33.8 % (ref 40–54)
HGB BLD-MCNC: 10.3 G/DL (ref 14–18)
IMM GRANULOCYTES # BLD AUTO: 0.03 K/UL (ref 0–0.04)
IMM GRANULOCYTES NFR BLD AUTO: 0.4 % (ref 0–0.5)
LYMPHOCYTES # BLD AUTO: 3.4 K/UL (ref 1–4.8)
LYMPHOCYTES NFR BLD: 42 % (ref 18–48)
MAGNESIUM SERPL-MCNC: 1.6 MG/DL (ref 1.6–2.6)
MCH RBC QN AUTO: 23.4 PG (ref 27–31)
MCHC RBC AUTO-ENTMCNC: 30.5 G/DL (ref 32–36)
MCV RBC AUTO: 77 FL (ref 82–98)
MONOCYTES # BLD AUTO: 0.8 K/UL (ref 0.3–1)
MONOCYTES NFR BLD: 10.3 % (ref 4–15)
NEUTROPHILS # BLD AUTO: 3.6 K/UL (ref 1.8–7.7)
NEUTROPHILS NFR BLD: 45.1 % (ref 38–73)
NRBC BLD-RTO: 0 /100 WBC
PLATELET # BLD AUTO: 338 K/UL (ref 150–450)
PMV BLD AUTO: 8.5 FL (ref 9.2–12.9)
POTASSIUM SERPL-SCNC: 3.4 MMOL/L (ref 3.5–5.1)
PROT SERPL-MCNC: 7.1 G/DL (ref 6–8.4)
RBC # BLD AUTO: 4.41 M/UL (ref 4.6–6.2)
SODIUM SERPL-SCNC: 141 MMOL/L (ref 136–145)
WBC # BLD AUTO: 7.98 K/UL (ref 3.9–12.7)

## 2023-11-13 PROCEDURE — 36415 COLL VENOUS BLD VENIPUNCTURE: CPT | Performed by: INTERNAL MEDICINE

## 2023-11-13 PROCEDURE — 80053 COMPREHEN METABOLIC PANEL: CPT | Performed by: INTERNAL MEDICINE

## 2023-11-13 PROCEDURE — 85025 COMPLETE CBC W/AUTO DIFF WBC: CPT | Performed by: INTERNAL MEDICINE

## 2023-11-13 PROCEDURE — 83735 ASSAY OF MAGNESIUM: CPT | Performed by: INTERNAL MEDICINE

## 2023-11-13 RX ORDER — MORPHINE SULFATE 30 MG/1
30 TABLET, FILM COATED, EXTENDED RELEASE ORAL EVERY 8 HOURS PRN
Qty: 90 TABLET | Refills: 0 | Status: SHIPPED | OUTPATIENT
Start: 2023-11-13 | End: 2023-11-14 | Stop reason: SDDI

## 2023-11-13 NOTE — TELEPHONE ENCOUNTER
Spoke to pt to inquire about appt today w/Dr Baird pt states he forgot and is in pain and has been out of medication for 4 days, notified will send msg to nurse to advise, appt will be rescheduled 11/14/23 @11:20a w/Dr Baird pending override approval, pt agrees.

## 2023-11-14 ENCOUNTER — OFFICE VISIT (OUTPATIENT)
Dept: HEMATOLOGY/ONCOLOGY | Facility: CLINIC | Age: 48
End: 2023-11-14
Payer: MEDICAID

## 2023-11-14 ENCOUNTER — INFUSION (OUTPATIENT)
Dept: INFUSION THERAPY | Facility: HOSPITAL | Age: 48
End: 2023-11-14
Attending: INTERNAL MEDICINE
Payer: MEDICAID

## 2023-11-14 VITALS
TEMPERATURE: 98 F | WEIGHT: 203.5 LBS | BODY MASS INDEX: 25.3 KG/M2 | DIASTOLIC BLOOD PRESSURE: 76 MMHG | HEART RATE: 72 BPM | SYSTOLIC BLOOD PRESSURE: 125 MMHG | RESPIRATION RATE: 12 BRPM | HEIGHT: 75 IN | OXYGEN SATURATION: 99 %

## 2023-11-14 VITALS
HEIGHT: 75 IN | OXYGEN SATURATION: 100 % | SYSTOLIC BLOOD PRESSURE: 143 MMHG | TEMPERATURE: 99 F | DIASTOLIC BLOOD PRESSURE: 93 MMHG | WEIGHT: 203.5 LBS | HEART RATE: 71 BPM | RESPIRATION RATE: 18 BRPM | BODY MASS INDEX: 25.3 KG/M2

## 2023-11-14 DIAGNOSIS — K56.609 SMALL BOWEL OBSTRUCTION: ICD-10-CM

## 2023-11-14 DIAGNOSIS — C18.9 COLON ADENOCARCINOMA: Primary | ICD-10-CM

## 2023-11-14 PROCEDURE — 3078F PR MOST RECENT DIASTOLIC BLOOD PRESSURE < 80 MM HG: ICD-10-PCS | Mod: CPTII,,, | Performed by: INTERNAL MEDICINE

## 2023-11-14 PROCEDURE — 96416 CHEMO PROLONG INFUSE W/PUMP: CPT

## 2023-11-14 PROCEDURE — 96368 THER/DIAG CONCURRENT INF: CPT

## 2023-11-14 PROCEDURE — 1159F PR MEDICATION LIST DOCUMENTED IN MEDICAL RECORD: ICD-10-PCS | Mod: CPTII,,, | Performed by: INTERNAL MEDICINE

## 2023-11-14 PROCEDURE — 99215 OFFICE O/P EST HI 40 MIN: CPT | Mod: S$PBB,,, | Performed by: INTERNAL MEDICINE

## 2023-11-14 PROCEDURE — 3078F DIAST BP <80 MM HG: CPT | Mod: CPTII,,, | Performed by: INTERNAL MEDICINE

## 2023-11-14 PROCEDURE — 3074F SYST BP LT 130 MM HG: CPT | Mod: CPTII,,, | Performed by: INTERNAL MEDICINE

## 2023-11-14 PROCEDURE — 96415 CHEMO IV INFUSION ADDL HR: CPT

## 2023-11-14 PROCEDURE — 96367 TX/PROPH/DG ADDL SEQ IV INF: CPT

## 2023-11-14 PROCEDURE — 3008F BODY MASS INDEX DOCD: CPT | Mod: CPTII,,, | Performed by: INTERNAL MEDICINE

## 2023-11-14 PROCEDURE — 96413 CHEMO IV INFUSION 1 HR: CPT

## 2023-11-14 PROCEDURE — 99999 PR PBB SHADOW E&M-EST. PATIENT-LVL III: ICD-10-PCS | Mod: PBBFAC,,, | Performed by: INTERNAL MEDICINE

## 2023-11-14 PROCEDURE — 1159F MED LIST DOCD IN RCRD: CPT | Mod: CPTII,,, | Performed by: INTERNAL MEDICINE

## 2023-11-14 PROCEDURE — 99213 OFFICE O/P EST LOW 20 MIN: CPT | Mod: PBBFAC,PN | Performed by: INTERNAL MEDICINE

## 2023-11-14 PROCEDURE — 3074F PR MOST RECENT SYSTOLIC BLOOD PRESSURE < 130 MM HG: ICD-10-PCS | Mod: CPTII,,, | Performed by: INTERNAL MEDICINE

## 2023-11-14 PROCEDURE — 25000003 PHARM REV CODE 250: Performed by: INTERNAL MEDICINE

## 2023-11-14 PROCEDURE — 63600175 PHARM REV CODE 636 W HCPCS: Performed by: INTERNAL MEDICINE

## 2023-11-14 PROCEDURE — 99215 PR OFFICE/OUTPT VISIT, EST, LEVL V, 40-54 MIN: ICD-10-PCS | Mod: S$PBB,,, | Performed by: INTERNAL MEDICINE

## 2023-11-14 PROCEDURE — 3008F PR BODY MASS INDEX (BMI) DOCUMENTED: ICD-10-PCS | Mod: CPTII,,, | Performed by: INTERNAL MEDICINE

## 2023-11-14 PROCEDURE — 99999 PR PBB SHADOW E&M-EST. PATIENT-LVL III: CPT | Mod: PBBFAC,,, | Performed by: INTERNAL MEDICINE

## 2023-11-14 PROCEDURE — 3044F HG A1C LEVEL LT 7.0%: CPT | Mod: CPTII,,, | Performed by: INTERNAL MEDICINE

## 2023-11-14 PROCEDURE — 96411 CHEMO IV PUSH ADDL DRUG: CPT

## 2023-11-14 PROCEDURE — 3044F PR MOST RECENT HEMOGLOBIN A1C LEVEL <7.0%: ICD-10-PCS | Mod: CPTII,,, | Performed by: INTERNAL MEDICINE

## 2023-11-14 RX ORDER — ONDANSETRON 4 MG/1
4 TABLET, ORALLY DISINTEGRATING ORAL EVERY 6 HOURS PRN
Qty: 30 TABLET | Refills: 0 | Status: SHIPPED | OUTPATIENT
Start: 2023-11-14

## 2023-11-14 RX ORDER — FLUOROURACIL 50 MG/ML
400 INJECTION, SOLUTION INTRAVENOUS
Status: CANCELLED | OUTPATIENT
Start: 2023-11-14

## 2023-11-14 RX ORDER — FLUOROURACIL 50 MG/ML
2400 INJECTION, SOLUTION INTRAVENOUS
Status: CANCELLED | OUTPATIENT
Start: 2023-11-14

## 2023-11-14 RX ORDER — SODIUM CHLORIDE 0.9 % (FLUSH) 0.9 %
10 SYRINGE (ML) INJECTION
Status: CANCELLED | OUTPATIENT
Start: 2023-11-14

## 2023-11-14 RX ORDER — HEPARIN 100 UNIT/ML
500 SYRINGE INTRAVENOUS
Status: CANCELLED | OUTPATIENT
Start: 2023-11-14

## 2023-11-14 RX ORDER — HEPARIN 100 UNIT/ML
500 SYRINGE INTRAVENOUS
Status: CANCELLED | OUTPATIENT
Start: 2023-11-16

## 2023-11-14 RX ORDER — SODIUM CHLORIDE 0.9 % (FLUSH) 0.9 %
10 SYRINGE (ML) INJECTION
Status: CANCELLED | OUTPATIENT
Start: 2023-11-16

## 2023-11-14 RX ORDER — FLUOROURACIL 50 MG/ML
400 INJECTION, SOLUTION INTRAVENOUS
Status: COMPLETED | OUTPATIENT
Start: 2023-11-14 | End: 2023-11-14

## 2023-11-14 RX ORDER — EPINEPHRINE 0.3 MG/.3ML
0.3 INJECTION SUBCUTANEOUS ONCE AS NEEDED
Status: CANCELLED | OUTPATIENT
Start: 2023-11-14

## 2023-11-14 RX ORDER — SODIUM CHLORIDE 0.9 % (FLUSH) 0.9 %
10 SYRINGE (ML) INJECTION
Status: DISCONTINUED | OUTPATIENT
Start: 2023-11-14 | End: 2023-11-14 | Stop reason: HOSPADM

## 2023-11-14 RX ORDER — DIPHENHYDRAMINE HYDROCHLORIDE 50 MG/ML
50 INJECTION INTRAMUSCULAR; INTRAVENOUS ONCE AS NEEDED
Status: CANCELLED | OUTPATIENT
Start: 2023-11-14

## 2023-11-14 RX ADMIN — PALONOSETRON HYDROCHLORIDE 0.25 MG: 0.25 INJECTION INTRAVENOUS at 11:11

## 2023-11-14 RX ADMIN — FLUOROURACIL 850 MG: 50 INJECTION, SOLUTION INTRAVENOUS at 02:11

## 2023-11-14 RX ADMIN — FLUOROURACIL 5090 MG: 50 INJECTION, SOLUTION INTRAVENOUS at 02:11

## 2023-11-14 RX ADMIN — OXALIPLATIN 180 MG: 5 INJECTION, SOLUTION, CONCENTRATE INTRAVENOUS at 12:11

## 2023-11-14 RX ADMIN — DEXTROSE: 5 SOLUTION INTRAVENOUS at 11:11

## 2023-11-14 RX ADMIN — LEUCOVORIN CALCIUM 850 MG: 350 INJECTION, POWDER, LYOPHILIZED, FOR SUSPENSION INTRAMUSCULAR; INTRAVENOUS at 12:11

## 2023-11-14 NOTE — PLAN OF CARE
Problem: Fatigue  Goal: Improved Activity Tolerance  Outcome: Ongoing, Progressing  Intervention: Promote Improved Energy  Flowsheets (Taken 11/14/2023 1140)  Fatigue Management:   fatigue-related activity identified   paced activity encouraged   frequent rest breaks encouraged  Sleep/Rest Enhancement:   noise level reduced   relaxation techniques promoted   regular sleep/rest pattern promoted  Activity Management:   Ambulated -L4   Up in chair - L3

## 2023-11-14 NOTE — PROGRESS NOTES
HPI    48 years old male newly diagnosed colon cancer.  He was transferred from [facility] for evaluation possible developed a small-bowel obstruction.  He was complaining abdominal pain nausea and vomiting.  Workup shows leukocytosis, anemia and thrombocytosis.  CT scan from outside facility was concerning for developing small-bowel obstruction.  Patient was transferred admitted to [facility] underwent right partial colectomy on 05/29 with pathology consistent adenocarcinoma.  He was discharged then and return to hospital for another admission on 06/08/2023 where he was treated for ileus.  Medical history including type 2 diabetes anemia hypertension right eye blindness.         Past Medical History:   Diagnosis Date    Colon cancer 05/19/2023    Diabetes mellitus, type 2 05/2020    Patient denies    Hypertension      Past Surgical History:   Procedure Laterality Date    COLONOSCOPY N/A 5/29/2023    Procedure: COLONOSCOPY;  Surgeon: Sam Solano MD;  Location: [facility];  Service: Endoscopy;  Laterality: N/A;    EXCISION, SMALL INTESTINE N/A 6/15/2023    Procedure: EXCISION, SMALL INTESTINE;  Surgeon: Edin Michael MD;  Location: [facility] OR;  Service: General;  Laterality: N/A;    EYE SURGERY Right     INSERTION OF TUNNELED CENTRAL VENOUS CATHETER (CVC) WITH SUBCUTANEOUS PORT Left 7/24/2023    Procedure: [id number];  Surgeon: Garfield Hoty Jr., MD;  Location: [facility] OR;  Service: General;  Laterality: Left;    LAPAROTOMY, EXPLORATORY N/A 6/15/2023    Procedure: LAPAROTOMY, EXPLORATORY;  Surgeon: Edin Michael MD;  Location: [facility] OR;  Service: General;  Laterality: N/A;    LAPAROTOMY, EXPLORATORY N/A 6/17/2023    Procedure: LAPAROTOMY, EXPLORATORY;  Surgeon: Garfield Hoyt Jr., MD;  Location: [facility] OR;  Service: General;  Laterality: N/A;    LUMBAR DISC SURGERY  2005    LYSIS OF ADHESIONS N/A 6/15/2023    Procedure: LYSIS, ADHESIONS;  Surgeon: Edin RUIZ  MD Alec;  Location: [facility] OR;  Service: General;  Laterality: N/A;    OMENTECTOMY N/A 6/15/2023    Procedure: OMENTECTOMY;  Surgeon: Edin Michael MD;  Location: [facility] OR;  Service: General;  Laterality: N/A;    SUBTOTAL COLECTOMY Right 5/29/2023    Procedure: COLECTOMY, PARTIAL;  Surgeon: Edin Michael MD;  Location: [facility] OR;  Service: General;  Laterality: Right;     Social History     Socioeconomic History    Marital status:     Number of children: 3    Highest education level: Associate degree: academic program   Occupational History    Occupation: [employer]   Tobacco Use    Smoking status: Every Day     Current packs/day: 0.50     Average packs/day: 0.5 packs/day for 23.0 years (11.5 ttl pk-yrs)     Types: Cigarettes    Smokeless tobacco: Never    Tobacco comments:     Do not smoke day of surgery   Substance and Sexual Activity    Alcohol use: Not Currently     Comment: Quit drinking 2 yrs ago    Drug use: Yes     Types: Marijuana     Comment: heavy marijuana use    Sexual activity: Yes     Social Determinants of Health     Financial Resource Strain: Low Risk  (5/28/2023)    Overall Financial Resource Strain (CARDIA)     Difficulty of Paying Living Expenses: Not hard at all   Food Insecurity: No Food Insecurity (5/28/2023)    Hunger Vital Sign     Worried About Running Out of Food in the Last Year: Never true     Ran Out of Food in the Last Year: Never true   Transportation Needs: No Transportation Needs (5/28/2023)    PRAPARE - Transportation     Lack of Transportation (Medical): No     Lack of Transportation (Non-Medical): No   Physical Activity: Sufficiently Active (5/28/2023)    Exercise Vital Sign     Days of Exercise per Week: 5 days     Minutes of Exercise per Session: 90 min   Stress: No Stress Concern Present (5/24/2023)    [place] [employer]ational [employer] - Occupational Stress Questionnaire     Feeling of Stress : Not at all   Social Connections: Socially  Isolated (5/28/2023)    Social Connection and Isolation Panel [NHANES]     Frequency of Communication with Friends and Family: Never     Frequency of Social Gatherings with Friends and Family: More than three times a week     Attends Adventist Services: Never     Active Member of Clubs or Organizations: No     Attends Club or Organization Meetings: Never     Marital Status:    Housing Stability: Unknown (5/28/2023)    Housing Stability Vital Sign     Unable to Pay for Housing in the Last Year: No     Unstable Housing in the Last Year: No     Social History     Socioeconomic History    Marital status:     Number of children: 3    Highest education level: Associate degree: academic program   Occupational History    Occupation: [employer]   Tobacco Use    Smoking status: Every Day     Current packs/day: 0.50     Average packs/day: 0.5 packs/day for 23.0 years (11.5 ttl pk-yrs)     Types: Cigarettes    Smokeless tobacco: Never    Tobacco comments:     Do not smoke day of surgery   Substance and Sexual Activity    Alcohol use: Not Currently     Comment: Quit drinking 2 yrs ago    Drug use: Yes     Types: Marijuana     Comment: heavy marijuana use    Sexual activity: Yes     Social Determinants of Health     Financial Resource Strain: Low Risk  (5/28/2023)    Overall Financial Resource Strain (CARDIA)     Difficulty of Paying Living Expenses: Not hard at all   Food Insecurity: No Food Insecurity (5/28/2023)    Hunger Vital Sign     Worried About Running Out of Food in the Last Year: Never true     Ran Out of Food in the Last Year: Never true   Transportation Needs: No Transportation Needs (5/28/2023)    PRAPARE - Transportation     Lack of Transportation (Medical): No     Lack of Transportation (Non-Medical): No   Physical Activity: Sufficiently Active (5/28/2023)    Exercise Vital Sign     Days of Exercise per Week: 5 days     Minutes of Exercise per Session: 90 min   Stress: No Stress Concern  Present (5/24/2023)    [place] of Occupational Health - Occupational Stress Questionnaire     Feeling of Stress : Not at all   Social Connections: Socially Isolated (5/28/2023)    Social Connection and Isolation Panel [NHANES]     Frequency of Communication with Friends and Family: Never     Frequency of Social Gatherings with Friends and Family: More than three times a week     Attends Quaker Services: Never     Active Member of Clubs or Organizations: No     Attends Club or Organization Meetings: Never     Marital Status:    Housing Stability: Unknown (5/28/2023)    Housing Stability Vital Sign     Unable to Pay for Housing in the Last Year: No     Unstable Housing in the Last Year: No     Review of patient's allergies indicates:   Allergen Reactions    Fish containing products Other (See Comments)     Patient doesn't think any connection to iodine.       Physical exam    Mid abdomen tenderness.  No evidence of skin infection on examination.  Normal speech pattern  Normal respiratory effort  Tachycardic sinus  Move all 4 extremity      Lab Results   Component Value Date    WBC 7.98 11/13/2023    HGB 10.3 (L) 11/13/2023    HCT 33.8 (L) 11/13/2023    MCV 77 (L) 11/13/2023     11/13/2023       CMP  Sodium   Date Value Ref Range Status   11/13/2023 141 136 - 145 mmol/L Final     Potassium   Date Value Ref Range Status   11/13/2023 3.4 (L) 3.5 - 5.1 mmol/L Final     Chloride   Date Value Ref Range Status   11/13/2023 106 95 - 110 mmol/L Final     CO2   Date Value Ref Range Status   11/13/2023 28 23 - 29 mmol/L Final     Glucose   Date Value Ref Range Status   11/13/2023 124 (H) 70 - 110 mg/dL Final     BUN   Date Value Ref Range Status   11/13/2023 9 6 - 20 mg/dL Final     Creatinine   Date Value Ref Range Status   11/13/2023 1.0 0.5 - 1.4 mg/dL Final     Calcium   Date Value Ref Range Status   11/13/2023 9.5 8.7 - 10.5 mg/dL Final     Total Protein   Date Value Ref Range Status   11/13/2023  7.1 6.0 - 8.4 g/dL Final     Albumin   Date Value Ref Range Status   11/13/2023 4.1 3.5 - 5.2 g/dL Final     Total Bilirubin   Date Value Ref Range Status   11/13/2023 1.2 (H) 0.1 - 1.0 mg/dL Final     Comment:     For infants and newborns, interpretation of results should be based  on gestational age, weight and in agreement with clinical  observations.    Premature Infant recommended reference ranges:  Up to 24 hours.............<8.0 mg/dL  Up to 48 hours............<12.0 mg/dL  3-5 days..................<15.0 mg/dL  6-29 days.................<15.0 mg/dL       Alkaline Phosphatase   Date Value Ref Range Status   11/13/2023 50 (L) 55 - 135 U/L Final     AST   Date Value Ref Range Status   11/13/2023 19 10 - 40 U/L Final     ALT   Date Value Ref Range Status   11/13/2023 20 10 - 44 U/L Final     Anion Gap   Date Value Ref Range Status   11/13/2023 7 (L) 8 - 16 mmol/L Final     eGFR   Date Value Ref Range Status   11/13/2023 >60.0 >60 mL/min/1.73 m^2 Final     Assessment and plan    Colon adenocarcinoma recently colostomy small-bowel obstruction.      [id number] G2 - stage III    MSI intact    Iron deficiency anemia microcytosis likely blood loss     Thrombocytosis reactive resolved     Disproportionate demand of opiates medication relative to his disease.  I am not sure the his pain is due to surgery or others.  I would like to have a palliative care team or pain management specialist, for the pain evaluation.    Complaining of left-sided neck pain.  Where the port placement is.  No evidence of skin infection or pustular.  X-ray from end of July shows good position of the port.    Abdominal pain disproportionate as described as above.  Patient denies any history of chronic opiate use.  States the pain is really causing a problem.  Denies fever.  elevated leukocytosis and elevated platelets.  We will order CT scan chest abdomen pelvis for evaluation.  Also advised that if pain worsening patient should go to ER for  further evaluation.    Hypokalemia    Reactive thrombocytosis    > potassium chloride 40 mEq daily    > cycle #8 of treatment.     > request tempus     > CT chest abdomen pelvis with contrast (did not get the study)    > port studies - normal appearance  and function of left internal jugular port-A-Cath    > long-acting morphine Q 8 hours temporarily.  Will start weaning patient off the medication.  I do not see the evidence patient will require permanent pain medication.    > consult palliative Care for pain evaluation.    > compliance issue.  He has missed appointment blood work as well as images study.  I feel that he will be a difficult patient in compliance is issue.  On today's visit I have ming discussion with him that if he is not able to follow the regimen and clinic visit I will not able to treat.  I will give him 1 more opportunity to work with us with compliance.  If he fails in the future I will defer him to other treatment team, and discharge from my care.  He voiced understanding and agree with above    > patient is here for cycle 8 of chemotherapy.  Again he missed chemo clearance office visit.  I have reviewed the blood work patient showed up just prior to start chemotherapy.  I will go ahead have him to do cycle 8.  In today's clinic again I have reinforced his compliance issue.    > review Mets nausea medication.

## 2023-11-16 ENCOUNTER — INFUSION (OUTPATIENT)
Dept: INFUSION THERAPY | Facility: HOSPITAL | Age: 48
End: 2023-11-16
Attending: INTERNAL MEDICINE
Payer: MEDICAID

## 2023-11-16 VITALS
HEIGHT: 75 IN | TEMPERATURE: 98 F | WEIGHT: 207.56 LBS | DIASTOLIC BLOOD PRESSURE: 85 MMHG | HEART RATE: 79 BPM | RESPIRATION RATE: 18 BRPM | SYSTOLIC BLOOD PRESSURE: 125 MMHG | BODY MASS INDEX: 25.81 KG/M2

## 2023-11-16 DIAGNOSIS — K56.609 SMALL BOWEL OBSTRUCTION: ICD-10-CM

## 2023-11-16 DIAGNOSIS — C18.9 COLON ADENOCARCINOMA: Primary | ICD-10-CM

## 2023-11-16 PROCEDURE — A4216 STERILE WATER/SALINE, 10 ML: HCPCS | Performed by: INTERNAL MEDICINE

## 2023-11-16 PROCEDURE — 96523 IRRIG DRUG DELIVERY DEVICE: CPT

## 2023-11-16 PROCEDURE — 63600175 PHARM REV CODE 636 W HCPCS: Performed by: INTERNAL MEDICINE

## 2023-11-16 PROCEDURE — 25000003 PHARM REV CODE 250: Performed by: INTERNAL MEDICINE

## 2023-11-16 RX ORDER — HEPARIN 100 UNIT/ML
500 SYRINGE INTRAVENOUS
Status: DISCONTINUED | OUTPATIENT
Start: 2023-11-16 | End: 2023-11-16 | Stop reason: HOSPADM

## 2023-11-16 RX ORDER — SODIUM CHLORIDE 0.9 % (FLUSH) 0.9 %
10 SYRINGE (ML) INJECTION
Status: DISCONTINUED | OUTPATIENT
Start: 2023-11-16 | End: 2023-11-16 | Stop reason: HOSPADM

## 2023-11-16 RX ADMIN — HEPARIN 500 UNITS: 100 SYRINGE at 01:11

## 2023-11-16 RX ADMIN — SODIUM CHLORIDE, PRESERVATIVE FREE 10 ML: 5 INJECTION INTRAVENOUS at 01:11

## 2023-11-16 NOTE — PLAN OF CARE
Problem: Fatigue  Goal: Improved Activity Tolerance  11/16/2023 1310 by Erin Heller, RN  Outcome: Met  11/16/2023 1309 by Erin Heller, RN  Outcome: Ongoing, Progressing

## 2023-11-27 ENCOUNTER — TELEPHONE (OUTPATIENT)
Dept: HEMATOLOGY/ONCOLOGY | Facility: CLINIC | Age: 48
End: 2023-11-27
Payer: MEDICAID

## 2023-11-27 NOTE — TELEPHONE ENCOUNTER
Tried to call pt states unable to accept call  to notify appt missed today can be rescheduled, portal msg sent.

## 2023-12-08 ENCOUNTER — TELEPHONE (OUTPATIENT)
Dept: HEMATOLOGY/ONCOLOGY | Facility: CLINIC | Age: 48
End: 2023-12-08
Payer: MEDICAID

## 2023-12-08 NOTE — TELEPHONE ENCOUNTER
"Outgoing call to pt regarding his next appts. Call ended with recording stating "this person cannot accept calls at this time". Outgoing call to sister. [facility] with patient's next appt dates/times. Encouraged pt to come to these appts.   "

## 2023-12-11 ENCOUNTER — LAB VISIT (OUTPATIENT)
Dept: LAB | Facility: HOSPITAL | Age: 48
End: 2023-12-11
Attending: INTERNAL MEDICINE
Payer: MEDICAID

## 2023-12-11 ENCOUNTER — OFFICE VISIT (OUTPATIENT)
Dept: HEMATOLOGY/ONCOLOGY | Facility: CLINIC | Age: 48
End: 2023-12-11
Payer: MEDICAID

## 2023-12-11 VITALS
OXYGEN SATURATION: 100 % | RESPIRATION RATE: 12 BRPM | BODY MASS INDEX: 25.93 KG/M2 | HEART RATE: 64 BPM | HEIGHT: 75 IN | WEIGHT: 208.56 LBS | TEMPERATURE: 98 F | SYSTOLIC BLOOD PRESSURE: 140 MMHG | DIASTOLIC BLOOD PRESSURE: 90 MMHG

## 2023-12-11 DIAGNOSIS — C18.9 COLON ADENOCARCINOMA: ICD-10-CM

## 2023-12-11 DIAGNOSIS — C18.9 COLON ADENOCARCINOMA: Primary | ICD-10-CM

## 2023-12-11 LAB
ALBUMIN SERPL BCP-MCNC: 4.3 G/DL (ref 3.5–5.2)
ALP SERPL-CCNC: 72 U/L (ref 55–135)
ALT SERPL W/O P-5'-P-CCNC: 35 U/L (ref 10–44)
ANION GAP SERPL CALC-SCNC: 4 MMOL/L (ref 8–16)
AST SERPL-CCNC: 32 U/L (ref 10–40)
BASOPHILS # BLD AUTO: 0.1 K/UL (ref 0–0.2)
BASOPHILS NFR BLD: 1.1 % (ref 0–1.9)
BILIRUB SERPL-MCNC: 1.4 MG/DL (ref 0.1–1)
BUN SERPL-MCNC: 9 MG/DL (ref 6–20)
CALCIUM SERPL-MCNC: 9.3 MG/DL (ref 8.7–10.5)
CHLORIDE SERPL-SCNC: 108 MMOL/L (ref 95–110)
CO2 SERPL-SCNC: 27 MMOL/L (ref 23–29)
CREAT SERPL-MCNC: 1 MG/DL (ref 0.5–1.4)
DIFFERENTIAL METHOD: ABNORMAL
EOSINOPHIL # BLD AUTO: 0.1 K/UL (ref 0–0.5)
EOSINOPHIL NFR BLD: 1 % (ref 0–8)
ERYTHROCYTE [DISTWIDTH] IN BLOOD BY AUTOMATED COUNT: 23.2 % (ref 11.5–14.5)
EST. GFR  (NO RACE VARIABLE): >60 ML/MIN/1.73 M^2
GLUCOSE SERPL-MCNC: 85 MG/DL (ref 70–110)
HCT VFR BLD AUTO: 34.5 % (ref 40–54)
HGB BLD-MCNC: 10.7 G/DL (ref 14–18)
IMM GRANULOCYTES # BLD AUTO: 0.04 K/UL (ref 0–0.04)
IMM GRANULOCYTES NFR BLD AUTO: 0.4 % (ref 0–0.5)
LYMPHOCYTES # BLD AUTO: 3.6 K/UL (ref 1–4.8)
LYMPHOCYTES NFR BLD: 38.5 % (ref 18–48)
MAGNESIUM SERPL-MCNC: 1.8 MG/DL (ref 1.6–2.6)
MCH RBC QN AUTO: 23.7 PG (ref 27–31)
MCHC RBC AUTO-ENTMCNC: 31 G/DL (ref 32–36)
MCV RBC AUTO: 76 FL (ref 82–98)
MONOCYTES # BLD AUTO: 1 K/UL (ref 0.3–1)
MONOCYTES NFR BLD: 10.6 % (ref 4–15)
NEUTROPHILS # BLD AUTO: 4.5 K/UL (ref 1.8–7.7)
NEUTROPHILS NFR BLD: 48.4 % (ref 38–73)
NRBC BLD-RTO: 0 /100 WBC
PLATELET # BLD AUTO: 387 K/UL (ref 150–450)
PMV BLD AUTO: 9.7 FL (ref 9.2–12.9)
POTASSIUM SERPL-SCNC: 3.5 MMOL/L (ref 3.5–5.1)
PROT SERPL-MCNC: 7.5 G/DL (ref 6–8.4)
RBC # BLD AUTO: 4.52 M/UL (ref 4.6–6.2)
SODIUM SERPL-SCNC: 139 MMOL/L (ref 136–145)
WBC # BLD AUTO: 9.32 K/UL (ref 3.9–12.7)

## 2023-12-11 PROCEDURE — 3044F PR MOST RECENT HEMOGLOBIN A1C LEVEL <7.0%: ICD-10-PCS | Mod: CPTII,,, | Performed by: INTERNAL MEDICINE

## 2023-12-11 PROCEDURE — 3008F BODY MASS INDEX DOCD: CPT | Mod: CPTII,,, | Performed by: INTERNAL MEDICINE

## 2023-12-11 PROCEDURE — 1159F PR MEDICATION LIST DOCUMENTED IN MEDICAL RECORD: ICD-10-PCS | Mod: CPTII,,, | Performed by: INTERNAL MEDICINE

## 2023-12-11 PROCEDURE — 3080F DIAST BP >= 90 MM HG: CPT | Mod: CPTII,,, | Performed by: INTERNAL MEDICINE

## 2023-12-11 PROCEDURE — 3080F PR MOST RECENT DIASTOLIC BLOOD PRESSURE >= 90 MM HG: ICD-10-PCS | Mod: CPTII,,, | Performed by: INTERNAL MEDICINE

## 2023-12-11 PROCEDURE — 83735 ASSAY OF MAGNESIUM: CPT | Performed by: INTERNAL MEDICINE

## 2023-12-11 PROCEDURE — 99999 PR PBB SHADOW E&M-EST. PATIENT-LVL III: CPT | Mod: PBBFAC,,, | Performed by: INTERNAL MEDICINE

## 2023-12-11 PROCEDURE — 3008F PR BODY MASS INDEX (BMI) DOCUMENTED: ICD-10-PCS | Mod: CPTII,,, | Performed by: INTERNAL MEDICINE

## 2023-12-11 PROCEDURE — 99999 PR PBB SHADOW E&M-EST. PATIENT-LVL III: ICD-10-PCS | Mod: PBBFAC,,, | Performed by: INTERNAL MEDICINE

## 2023-12-11 PROCEDURE — 36415 COLL VENOUS BLD VENIPUNCTURE: CPT | Performed by: INTERNAL MEDICINE

## 2023-12-11 PROCEDURE — 1159F MED LIST DOCD IN RCRD: CPT | Mod: CPTII,,, | Performed by: INTERNAL MEDICINE

## 2023-12-11 PROCEDURE — 99215 OFFICE O/P EST HI 40 MIN: CPT | Mod: S$PBB,,, | Performed by: INTERNAL MEDICINE

## 2023-12-11 PROCEDURE — 99215 PR OFFICE/OUTPT VISIT, EST, LEVL V, 40-54 MIN: ICD-10-PCS | Mod: S$PBB,,, | Performed by: INTERNAL MEDICINE

## 2023-12-11 PROCEDURE — 85025 COMPLETE CBC W/AUTO DIFF WBC: CPT | Performed by: INTERNAL MEDICINE

## 2023-12-11 PROCEDURE — 3044F HG A1C LEVEL LT 7.0%: CPT | Mod: CPTII,,, | Performed by: INTERNAL MEDICINE

## 2023-12-11 PROCEDURE — 3077F SYST BP >= 140 MM HG: CPT | Mod: CPTII,,, | Performed by: INTERNAL MEDICINE

## 2023-12-11 PROCEDURE — 3077F PR MOST RECENT SYSTOLIC BLOOD PRESSURE >= 140 MM HG: ICD-10-PCS | Mod: CPTII,,, | Performed by: INTERNAL MEDICINE

## 2023-12-11 PROCEDURE — 99213 OFFICE O/P EST LOW 20 MIN: CPT | Mod: PBBFAC,PN | Performed by: INTERNAL MEDICINE

## 2023-12-11 PROCEDURE — 80053 COMPREHEN METABOLIC PANEL: CPT | Performed by: INTERNAL MEDICINE

## 2023-12-11 RX ORDER — HEPARIN 100 UNIT/ML
500 SYRINGE INTRAVENOUS
Status: CANCELLED | OUTPATIENT
Start: 2023-12-11

## 2023-12-11 RX ORDER — EPINEPHRINE 0.3 MG/.3ML
0.3 INJECTION SUBCUTANEOUS ONCE AS NEEDED
Status: CANCELLED | OUTPATIENT
Start: 2023-12-11

## 2023-12-11 RX ORDER — DIPHENHYDRAMINE HYDROCHLORIDE 50 MG/ML
50 INJECTION INTRAMUSCULAR; INTRAVENOUS ONCE AS NEEDED
Status: CANCELLED | OUTPATIENT
Start: 2023-12-11

## 2023-12-11 RX ORDER — FLUOROURACIL 50 MG/ML
400 INJECTION, SOLUTION INTRAVENOUS
Status: CANCELLED | OUTPATIENT
Start: 2023-12-11

## 2023-12-11 RX ORDER — HEPARIN 100 UNIT/ML
500 SYRINGE INTRAVENOUS
Status: CANCELLED | OUTPATIENT
Start: 2023-12-13

## 2023-12-11 RX ORDER — SODIUM CHLORIDE 0.9 % (FLUSH) 0.9 %
10 SYRINGE (ML) INJECTION
Status: CANCELLED | OUTPATIENT
Start: 2023-12-13

## 2023-12-11 RX ORDER — SODIUM CHLORIDE 0.9 % (FLUSH) 0.9 %
10 SYRINGE (ML) INJECTION
Status: CANCELLED | OUTPATIENT
Start: 2023-12-11

## 2023-12-11 NOTE — Clinical Note
Still need to review chemistry.  However judging by the history of blood work he should able to move forward with the treatment.  RTC 2 weeks with repeat blood work treatment clearance.

## 2023-12-11 NOTE — PROGRESS NOTES
HPI    48 years old male newly diagnosed colon cancer.  He was transferred from [facility] for evaluation possible developed a small-bowel obstruction.  He was complaining abdominal pain nausea and vomiting.  Workup shows leukocytosis, anemia and thrombocytosis.  CT scan from outside facility was concerning for developing small-bowel obstruction.  Patient was transferred admitted to [facility] underwent right partial colectomy on 05/29 with pathology consistent adenocarcinoma.  He was discharged then and return to hospital for another admission on 06/08/2023 where he was treated for ileus.  Medical history including type 2 diabetes anemia hypertension right eye blindness.         Past Medical History:   Diagnosis Date    Colon cancer 05/19/2023    Diabetes mellitus, type 2 05/2020    Patient denies    Hypertension      Past Surgical History:   Procedure Laterality Date    COLONOSCOPY N/A 5/29/2023    Procedure: COLONOSCOPY;  Surgeon: Sam Solano MD;  Location: [facility];  Service: Endoscopy;  Laterality: N/A;    EXCISION, SMALL INTESTINE N/A 6/15/2023    Procedure: EXCISION, SMALL INTESTINE;  Surgeon: Edin Michael MD;  Location: [facility] OR;  Service: General;  Laterality: N/A;    EYE SURGERY Right     INSERTION OF TUNNELED CENTRAL VENOUS CATHETER (CVC) WITH SUBCUTANEOUS PORT Left 7/24/2023    Procedure: [id number];  Surgeon: Garfield Hoyt Jr., MD;  Location: [facility] OR;  Service: General;  Laterality: Left;    LAPAROTOMY, EXPLORATORY N/A 6/15/2023    Procedure: LAPAROTOMY, EXPLORATORY;  Surgeon: Edin Michael MD;  Location: [facility] OR;  Service: General;  Laterality: N/A;    LAPAROTOMY, EXPLORATORY N/A 6/17/2023    Procedure: LAPAROTOMY, EXPLORATORY;  Surgeon: Garfield Hoyt Jr., MD;  Location: [facility] OR;  Service: General;  Laterality: N/A;    LUMBAR DISC SURGERY  2005    LYSIS OF ADHESIONS N/A 6/15/2023    Procedure: LYSIS, ADHESIONS;  Surgeon: Edin RUIZ  MD Alec;  Location: [facility] OR;  Service: General;  Laterality: N/A;    OMENTECTOMY N/A 6/15/2023    Procedure: OMENTECTOMY;  Surgeon: Edin Michael MD;  Location: [facility] OR;  Service: General;  Laterality: N/A;    SUBTOTAL COLECTOMY Right 5/29/2023    Procedure: COLECTOMY, PARTIAL;  Surgeon: Edin Michael MD;  Location: [facility] OR;  Service: General;  Laterality: Right;     Social History     Socioeconomic History    Marital status:     Number of children: 3    Highest education level: Associate degree: academic program   Occupational History    Occupation: [employer]   Tobacco Use    Smoking status: Every Day     Current packs/day: 0.50     Average packs/day: 0.5 packs/day for 23.0 years (11.5 ttl pk-yrs)     Types: Cigarettes    Smokeless tobacco: Never    Tobacco comments:     Do not smoke day of surgery   Substance and Sexual Activity    Alcohol use: Not Currently     Comment: Quit drinking 2 yrs ago    Drug use: Yes     Types: Marijuana     Comment: heavy marijuana use    Sexual activity: Yes     Social Determinants of Health     Financial Resource Strain: Low Risk  (5/28/2023)    Overall Financial Resource Strain (CARDIA)     Difficulty of Paying Living Expenses: Not hard at all   Food Insecurity: No Food Insecurity (5/28/2023)    Hunger Vital Sign     Worried About Running Out of Food in the Last Year: Never true     Ran Out of Food in the Last Year: Never true   Transportation Needs: No Transportation Needs (5/28/2023)    PRAPARE - Transportation     Lack of Transportation (Medical): No     Lack of Transportation (Non-Medical): No   Physical Activity: Sufficiently Active (5/28/2023)    Exercise Vital Sign     Days of Exercise per Week: 5 days     Minutes of Exercise per Session: 90 min   Stress: No Stress Concern Present (5/24/2023)    [place] of Occupational Health - Occupational Stress Questionnaire     Feeling of Stress : Not at all   Social Connections: Socially  Isolated (5/28/2023)    Social Connection and Isolation Panel [NHANES]     Frequency of Communication with Friends and Family: Never     Frequency of Social Gatherings with Friends and Family: More than three times a week     Attends Religion Services: Never     Active Member of Clubs or Organizations: No     Attends Club or Organization Meetings: Never     Marital Status:    Housing Stability: Unknown (5/28/2023)    Housing Stability Vital Sign     Unable to Pay for Housing in the Last Year: No     Unstable Housing in the Last Year: No     Social History     Socioeconomic History    Marital status:     Number of children: 3    Highest education level: Associate degree: academic program   Occupational History    Occupation: [employer]   Tobacco Use    Smoking status: Every Day     Current packs/day: 0.50     Average packs/day: 0.5 packs/day for 23.0 years (11.5 ttl pk-yrs)     Types: Cigarettes    Smokeless tobacco: Never    Tobacco comments:     Do not smoke day of surgery   Substance and Sexual Activity    Alcohol use: Not Currently     Comment: Quit drinking 2 yrs ago    Drug use: Yes     Types: Marijuana     Comment: heavy marijuana use    Sexual activity: Yes     Social Determinants of Health     Financial Resource Strain: Low Risk  (5/28/2023)    Overall Financial Resource Strain (CARDIA)     Difficulty of Paying Living Expenses: Not hard at all   Food Insecurity: No Food Insecurity (5/28/2023)    Hunger Vital Sign     Worried About Running Out of Food in the Last Year: Never true     Ran Out of Food in the Last Year: Never true   Transportation Needs: No Transportation Needs (5/28/2023)    PRAPARE - Transportation     Lack of Transportation (Medical): No     Lack of Transportation (Non-Medical): No   Physical Activity: Sufficiently Active (5/28/2023)    Exercise Vital Sign     Days of Exercise per Week: 5 days     Minutes of Exercise per Session: 90 min   Stress: No Stress Concern  Present (5/24/2023)    [place] of Occupational Health - Occupational Stress Questionnaire     Feeling of Stress : Not at all   Social Connections: Socially Isolated (5/28/2023)    Social Connection and Isolation Panel [NHANES]     Frequency of Communication with Friends and Family: Never     Frequency of Social Gatherings with Friends and Family: More than three times a week     Attends Jew Services: Never     Active Member of Clubs or Organizations: No     Attends Club or Organization Meetings: Never     Marital Status:    Housing Stability: Unknown (5/28/2023)    Housing Stability Vital Sign     Unable to Pay for Housing in the Last Year: No     Unstable Housing in the Last Year: No     Review of patient's allergies indicates:   Allergen Reactions    Fish containing products Other (See Comments)     Patient doesn't think any connection to iodine.       Physical exam    Mid abdomen tenderness.  No evidence of skin infection on examination.  Normal speech pattern  Normal respiratory effort  Tachycardic sinus  Move all 4 extremity      Lab Results   Component Value Date    WBC 9.32 12/11/2023    HGB 10.7 (L) 12/11/2023    HCT 34.5 (L) 12/11/2023    MCV 76 (L) 12/11/2023     12/11/2023       CMP  Sodium   Date Value Ref Range Status   11/13/2023 141 136 - 145 mmol/L Final     Potassium   Date Value Ref Range Status   11/13/2023 3.4 (L) 3.5 - 5.1 mmol/L Final     Chloride   Date Value Ref Range Status   11/13/2023 106 95 - 110 mmol/L Final     CO2   Date Value Ref Range Status   11/13/2023 28 23 - 29 mmol/L Final     Glucose   Date Value Ref Range Status   11/13/2023 124 (H) 70 - 110 mg/dL Final     BUN   Date Value Ref Range Status   11/13/2023 9 6 - 20 mg/dL Final     Creatinine   Date Value Ref Range Status   11/13/2023 1.0 0.5 - 1.4 mg/dL Final     Calcium   Date Value Ref Range Status   11/13/2023 9.5 8.7 - 10.5 mg/dL Final     Total Protein   Date Value Ref Range Status   11/13/2023  7.1 6.0 - 8.4 g/dL Final     Albumin   Date Value Ref Range Status   11/13/2023 4.1 3.5 - 5.2 g/dL Final     Total Bilirubin   Date Value Ref Range Status   11/13/2023 1.2 (H) 0.1 - 1.0 mg/dL Final     Comment:     For infants and newborns, interpretation of results should be based  on gestational age, weight and in agreement with clinical  observations.    Premature Infant recommended reference ranges:  Up to 24 hours.............<8.0 mg/dL  Up to 48 hours............<12.0 mg/dL  3-5 days..................<15.0 mg/dL  6-29 days.................<15.0 mg/dL       Alkaline Phosphatase   Date Value Ref Range Status   11/13/2023 50 (L) 55 - 135 U/L Final     AST   Date Value Ref Range Status   11/13/2023 19 10 - 40 U/L Final     ALT   Date Value Ref Range Status   11/13/2023 20 10 - 44 U/L Final     Anion Gap   Date Value Ref Range Status   11/13/2023 7 (L) 8 - 16 mmol/L Final     eGFR   Date Value Ref Range Status   11/13/2023 >60.0 >60 mL/min/1.73 m^2 Final     Assessment and plan    Colon adenocarcinoma recently colostomy small-bowel obstruction.      [id number] G2 - stage III    MSI intact    Iron deficiency anemia microcytosis likely blood loss     Thrombocytosis reactive resolved     Disproportionate demand of opiates medication relative to his disease.  I am not sure the his pain is due to surgery or others.  I would like to have a palliative care team or pain management specialist, for the pain evaluation.    Complaining of left-sided neck pain.  Where the port placement is.  No evidence of skin infection or pustular.  X-ray from end of July shows good position of the port.    Abdominal pain disproportionate as described as above.  Patient denies any history of chronic opiate use.  States the pain is really causing a problem.  Denies fever.  elevated leukocytosis and elevated platelets.  We will order CT scan chest abdomen pelvis for evaluation.  Also advised that if pain worsening patient should go to ER for  further evaluation.    Hypokalemia    Reactive thrombocytosis    > potassium chloride 40 mEq daily    > cycle #8 of treatment.     > request tempus     > CT chest abdomen pelvis with contrast (did not get the study)    > port studies - normal appearance  and function of left internal jugular port-A-Cath    > long-acting morphine Q 8 hours temporarily.  Will start weaning patient off the medication.  I do not see the evidence patient will require permanent pain medication.    > consult palliative Care for pain evaluation.    > compliance issue.  He has missed appointment blood work as well as images study.  I feel that he will be a difficult patient in compliance is issue.  On today's visit I have ming discussion with him that if he is not able to follow the regimen and clinic visit I will not able to treat.  I will give him 1 more opportunity to work with us with compliance.  If he fails in the future I will defer him to other treatment team, and discharge from my care.  He voiced understanding and agree with above    > previously missing cycle 8 of clearance I was able to forward with the treatment by reviewing the blood emphasize about compliance and likelihood dismissed him from future participation of treatment due to his compliance issues.      > missing clinic and treatment clearance.  Former letter signed out to patient regarding potentially dismissed from my practice given multiple missing treatment follow-up clinic visit deemed noncompliance.  In the interest of patient care we will continue to make attempts to help him and moving forward with treatment.   Blood work has been collected today.  We will review the blood work and cleared him for cycle 9.

## 2023-12-12 ENCOUNTER — INFUSION (OUTPATIENT)
Dept: INFUSION THERAPY | Facility: HOSPITAL | Age: 48
End: 2023-12-12
Attending: INTERNAL MEDICINE
Payer: MEDICAID

## 2023-12-12 VITALS
WEIGHT: 212.19 LBS | SYSTOLIC BLOOD PRESSURE: 129 MMHG | HEIGHT: 75 IN | OXYGEN SATURATION: 97 % | BODY MASS INDEX: 26.38 KG/M2 | DIASTOLIC BLOOD PRESSURE: 77 MMHG | RESPIRATION RATE: 16 BRPM | HEART RATE: 70 BPM | TEMPERATURE: 98 F

## 2023-12-12 DIAGNOSIS — C18.9 COLON ADENOCARCINOMA: Primary | ICD-10-CM

## 2023-12-12 DIAGNOSIS — K56.609 SMALL BOWEL OBSTRUCTION: ICD-10-CM

## 2023-12-12 PROCEDURE — 63600175 PHARM REV CODE 636 W HCPCS: Performed by: INTERNAL MEDICINE

## 2023-12-12 PROCEDURE — 25000003 PHARM REV CODE 250: Performed by: INTERNAL MEDICINE

## 2023-12-12 PROCEDURE — 96411 CHEMO IV PUSH ADDL DRUG: CPT

## 2023-12-12 PROCEDURE — 96367 TX/PROPH/DG ADDL SEQ IV INF: CPT

## 2023-12-12 PROCEDURE — 96413 CHEMO IV INFUSION 1 HR: CPT

## 2023-12-12 PROCEDURE — A4216 STERILE WATER/SALINE, 10 ML: HCPCS | Performed by: INTERNAL MEDICINE

## 2023-12-12 PROCEDURE — 96375 TX/PRO/DX INJ NEW DRUG ADDON: CPT

## 2023-12-12 PROCEDURE — 96416 CHEMO PROLONG INFUSE W/PUMP: CPT

## 2023-12-12 PROCEDURE — 96368 THER/DIAG CONCURRENT INF: CPT

## 2023-12-12 RX ORDER — FAMOTIDINE 10 MG/ML
20 INJECTION INTRAVENOUS
Status: CANCELLED
Start: 2023-12-12

## 2023-12-12 RX ORDER — FLUOROURACIL 50 MG/ML
400 INJECTION, SOLUTION INTRAVENOUS
Status: COMPLETED | OUTPATIENT
Start: 2023-12-12 | End: 2023-12-12

## 2023-12-12 RX ORDER — SODIUM CHLORIDE 0.9 % (FLUSH) 0.9 %
10 SYRINGE (ML) INJECTION
Status: DISCONTINUED | OUTPATIENT
Start: 2023-12-12 | End: 2023-12-12 | Stop reason: HOSPADM

## 2023-12-12 RX ORDER — HYDROXYZINE HYDROCHLORIDE 25 MG/1
25 TABLET, FILM COATED ORAL 3 TIMES DAILY
Qty: 90 TABLET | Refills: 1 | Status: SHIPPED | OUTPATIENT
Start: 2023-12-12

## 2023-12-12 RX ORDER — EPINEPHRINE 0.3 MG/.3ML
0.3 INJECTION SUBCUTANEOUS ONCE AS NEEDED
Status: DISCONTINUED | OUTPATIENT
Start: 2023-12-12 | End: 2023-12-12 | Stop reason: HOSPADM

## 2023-12-12 RX ORDER — FAMOTIDINE 10 MG/ML
20 INJECTION INTRAVENOUS
Status: COMPLETED | OUTPATIENT
Start: 2023-12-12 | End: 2023-12-12

## 2023-12-12 RX ORDER — DIPHENHYDRAMINE HYDROCHLORIDE 50 MG/ML
50 INJECTION INTRAMUSCULAR; INTRAVENOUS ONCE AS NEEDED
Status: DISCONTINUED | OUTPATIENT
Start: 2023-12-12 | End: 2023-12-12

## 2023-12-12 RX ADMIN — DIPHENHYDRAMINE HYDROCHLORIDE 50 MG: 50 INJECTION, SOLUTION INTRAMUSCULAR; INTRAVENOUS at 02:12

## 2023-12-12 RX ADMIN — FAMOTIDINE 20 MG: 10 INJECTION INTRAVENOUS at 03:12

## 2023-12-12 RX ADMIN — OXALIPLATIN 180 MG: 5 INJECTION, SOLUTION INTRAVENOUS at 02:12

## 2023-12-12 RX ADMIN — DEXTROSE: 5 SOLUTION INTRAVENOUS at 01:12

## 2023-12-12 RX ADMIN — SODIUM CHLORIDE, PRESERVATIVE FREE 10 ML: 5 INJECTION INTRAVENOUS at 02:12

## 2023-12-12 RX ADMIN — FLUOROURACIL 850 MG: 50 INJECTION, SOLUTION INTRAVENOUS at 04:12

## 2023-12-12 RX ADMIN — FLUOROURACIL 5090 MG: 50 INJECTION, SOLUTION INTRAVENOUS at 04:12

## 2023-12-12 RX ADMIN — HYDROCORTISONE SODIUM SUCCINATE 100 MG: 100 INJECTION, POWDER, FOR SOLUTION INTRAMUSCULAR; INTRAVENOUS at 02:12

## 2023-12-12 RX ADMIN — PALONOSETRON HYDROCHLORIDE 0.25 MG: 0.25 INJECTION INTRAVENOUS at 01:12

## 2023-12-12 NOTE — PLAN OF CARE
Problem: Fatigue  Goal: Improved Activity Tolerance  Outcome: Ongoing, Progressing  Intervention: Promote Improved Energy  Flowsheets (Taken 12/12/2023 9520)  Fatigue Management: activity schedule adjusted  Activity Management: Ambulated -L4

## 2023-12-12 NOTE — PLAN OF CARE
"   12/12/23 1440   Measurements   BP (!) 141/70   Pulse 96   Resp 16   Temp 100.2 °F (37.9 °C)   SpO2 99 %     Pt hot, flushed, hands red, and itching all over. Notified Clarita MEREDITH with Dr. Baird of reaction. Nurse to notify MD Baird and nurse practioner Kam.  Rescue medications of 100 mg solucortef and 50 mg benadryl given.     1452  Per Clarita baird responded back stating:  "he said he should be fine. just keep doing what yall are doing. if pt complains of itching after infusion is done to let us know and he will order something. he said they didn't need to come down "    1458  134 /84, HR 94,16, T 97.6 oral,v99 O2 Sat,  patient still complaining of severe itching and appears restless. Pepcid ordered and given.     1508  147/82 B/P, 78 HR, 16 R, 97.5 T, and 99% O2 sat, still itching, flushed, has not resolved after 30 minutes of infusion being stopped.     1515 MD Baird not to chairside. Verbally instructed to hold oxaliplatin, finish leucovorin and put patient on 5fu pump once itching has resolved. Nurse practioner kam to send in a prescription for atarax.     1530 symptoms appear to have resolved. Patient is asleep in chair.   "

## 2023-12-14 ENCOUNTER — INFUSION (OUTPATIENT)
Dept: INFUSION THERAPY | Facility: HOSPITAL | Age: 48
End: 2023-12-14
Attending: INTERNAL MEDICINE
Payer: MEDICAID

## 2023-12-14 VITALS
SYSTOLIC BLOOD PRESSURE: 127 MMHG | DIASTOLIC BLOOD PRESSURE: 77 MMHG | RESPIRATION RATE: 18 BRPM | HEART RATE: 72 BPM | WEIGHT: 217.69 LBS | BODY MASS INDEX: 27.07 KG/M2 | OXYGEN SATURATION: 99 % | HEIGHT: 75 IN | TEMPERATURE: 98 F

## 2023-12-14 DIAGNOSIS — K56.609 SMALL BOWEL OBSTRUCTION: ICD-10-CM

## 2023-12-14 DIAGNOSIS — C18.9 COLON ADENOCARCINOMA: ICD-10-CM

## 2023-12-14 DIAGNOSIS — C18.9 COLON ADENOCARCINOMA: Primary | ICD-10-CM

## 2023-12-14 PROCEDURE — 63600175 PHARM REV CODE 636 W HCPCS: Performed by: INTERNAL MEDICINE

## 2023-12-14 PROCEDURE — A4216 STERILE WATER/SALINE, 10 ML: HCPCS | Performed by: INTERNAL MEDICINE

## 2023-12-14 PROCEDURE — 96523 IRRIG DRUG DELIVERY DEVICE: CPT

## 2023-12-14 PROCEDURE — 25000003 PHARM REV CODE 250: Performed by: INTERNAL MEDICINE

## 2023-12-14 RX ORDER — SODIUM CHLORIDE 0.9 % (FLUSH) 0.9 %
10 SYRINGE (ML) INJECTION
Status: DISCONTINUED | OUTPATIENT
Start: 2023-12-14 | End: 2023-12-14 | Stop reason: HOSPADM

## 2023-12-14 RX ORDER — PROCHLORPERAZINE MALEATE 10 MG
10 TABLET ORAL EVERY 6 HOURS PRN
Qty: 30 TABLET | Refills: 1 | Status: SHIPPED | OUTPATIENT
Start: 2023-12-14 | End: 2024-12-13

## 2023-12-14 RX ORDER — HEPARIN 100 UNIT/ML
500 SYRINGE INTRAVENOUS
Status: DISCONTINUED | OUTPATIENT
Start: 2023-12-14 | End: 2023-12-14 | Stop reason: HOSPADM

## 2023-12-14 RX ADMIN — SODIUM CHLORIDE, PRESERVATIVE FREE 10 ML: 5 INJECTION INTRAVENOUS at 02:12

## 2023-12-14 RX ADMIN — HEPARIN 500 UNITS: 100 SYRINGE at 02:12

## 2023-12-14 NOTE — PLAN OF CARE
Problem: Fatigue  Goal: Improved Activity Tolerance  Outcome: Ongoing, Progressing  Intervention: Promote Improved Energy  Flowsheets (Taken 12/14/2023 1854)  Fatigue Management: fatigue-related activity identified  Sleep/Rest Enhancement: noise level reduced  Activity Management: Up in chair - L3

## 2023-12-15 ENCOUNTER — PATIENT MESSAGE (OUTPATIENT)
Dept: HEMATOLOGY/ONCOLOGY | Facility: CLINIC | Age: 48
End: 2023-12-15
Payer: MEDICAID

## 2023-12-20 ENCOUNTER — HOSPITAL ENCOUNTER (OUTPATIENT)
Dept: RADIOLOGY | Facility: HOSPITAL | Age: 48
Discharge: HOME | End: 2023-12-20
Attending: INTERNAL MEDICINE
Payer: MEDICAID

## 2023-12-20 DIAGNOSIS — C18.9 COLON ADENOCARCINOMA: ICD-10-CM

## 2023-12-20 PROCEDURE — 74177 CT ABD & PELVIS W/CONTRAST: CPT | Mod: TC

## 2023-12-20 PROCEDURE — 25500020 PHARM REV CODE 255: Performed by: INTERNAL MEDICINE

## 2023-12-20 RX ADMIN — IOHEXOL 100 ML: 350 INJECTION, SOLUTION INTRAVENOUS at 11:12

## 2023-12-26 ENCOUNTER — TELEPHONE (OUTPATIENT)
Dept: INFUSION THERAPY | Facility: HOSPITAL | Age: 48
End: 2023-12-26

## 2024-09-08 ENCOUNTER — PATIENT MESSAGE (OUTPATIENT)
Dept: SURGERY | Facility: CLINIC | Age: 49
End: 2024-09-08
Payer: MEDICAID

## (undated) DEVICE — SUT PDS II 0 CT-1 VIL MONO

## (undated) DEVICE — GLOVE SURGEONS ULTRA TOUCH 6.5

## (undated) DEVICE — GOWN POLY REINF BRTH SLV LG

## (undated) DEVICE — SLEEVE SCD EXPRESS KNEE MEDIUM

## (undated) DEVICE — TRAY MINOR SLIDELL MEMORIAL HOSPITAL

## (undated) DEVICE — SOL NACL IRR 1000ML BTL

## (undated) DEVICE — STRAP OR TABLE 5IN X 72IN

## (undated) DEVICE — GLOVE SURG ULTRA TOUCH 7.5

## (undated) DEVICE — BAG DECANTER 10-102

## (undated) DEVICE — SUT SILK 0 SH 30IN BLK BR

## (undated) DEVICE — SUT 1 48IN PDS II VIO MONO

## (undated) DEVICE — SET IRR URLGY 2LINE UNIV SPIKE

## (undated) DEVICE — Device

## (undated) DEVICE — DRAPE CORETEMP FLD WRM 56X62IN

## (undated) DEVICE — STAPLER INT PROX TX 60X3.5MM

## (undated) DEVICE — SPONGE LAP 4X18 PREWASHED

## (undated) DEVICE — TOWEL OR DISP STRL BLUE 4/PK

## (undated) DEVICE — PACK SIRUS BASIC V SURG STRL

## (undated) DEVICE — DRESSING ABSRBNT ISLAND 3.6X8

## (undated) DEVICE — CUTTER PROXIMATE BLUE 75MM

## (undated) DEVICE — SPONGE LAP 18X18 PREWASHED

## (undated) DEVICE — SYRINGE 5ML 309646

## (undated) DEVICE — DRAPE ABDOMINAL TIBURON 14X11

## (undated) DEVICE — NDL SURE-SNAP HYPO SAF 21GX1.5

## (undated) DEVICE — SUT J286G 2-0 VICRYL

## (undated) DEVICE — SUT SILK 2-0 SH 18IN BLACK

## (undated) DEVICE — ELECTRODE BLD EXT 6.50 ST DISP

## (undated) DEVICE — SUT SILK 0 STRANDS 30IN BLK

## (undated) DEVICE — GAUZE SPONGE PEANUT STRL

## (undated) DEVICE — DRAPE T THYROID W/ARMBOARD COVER

## (undated) DEVICE — ELECTRODE REM PLYHSV RETURN 9

## (undated) DEVICE — GOWN POLY REINF BRTH SLV XL

## (undated) DEVICE — APPLICATOR CHLORAPREP ORN 26ML

## (undated) DEVICE — LINER SUCTION 3000CC

## (undated) DEVICE — GLOVE BIOGEL PIMICRO INDIC 6.5

## (undated) DEVICE — CLIPPER BLADE MOD 4406 (CAREF)

## (undated) DEVICE — POWDER ARISTA AH 3G

## (undated) DEVICE — PACK CUSTOM UNIV BASIN SLI

## (undated) DEVICE — SUT CTD VICRYL BR CR/SH VIL

## (undated) DEVICE — SUT SA85H SILK 2-0

## (undated) DEVICE — BLADE SCALPEL #11 371111

## (undated) DEVICE — SUT 1 36IN PDS II

## (undated) DEVICE — DRAPE C-ARM (FITS NEW C-ARM) 07-CA108

## (undated) DEVICE — RELOAD PROXIMATE CUT BLUE 75MM

## (undated) DEVICE — SUTURE VICRYL 3-0 SH 27 VCP416H

## (undated) DEVICE — PAD BOVIE ADULT

## (undated) DEVICE — ADHESIVE DERMABOND SKIN DNX12

## (undated) DEVICE — SOL 9P NACL IRR PIC IL

## (undated) DEVICE — SUTURE MONOCRYL 4-0 PS-2 27 MCP426H

## (undated) DEVICE — GLOVE BIOGEL PI MICRO INDIC 7

## (undated) DEVICE — EVACUATOR WOUND BULB 100CC

## (undated) DEVICE — GLOVE BIOGEL PI  GOLD SZ 7

## (undated) DEVICE — SYR ONLY LUER LOCK 20CC

## (undated) DEVICE — SUT MONOCRYL 4-0 SH UND MON

## (undated) DEVICE — SEALER LIGASURE IMPACT 18CM

## (undated) DEVICE — NEEDLE SAFETY ECLIPSE 25G 1-1/2IN 305767

## (undated) DEVICE — STAPLER SKIN ROTATING HEAD

## (undated) DEVICE — CART STAPLE RELD PROXTX 60X3.5

## (undated) DEVICE — DRAIN WOUND 19FR TROCAR

## (undated) DEVICE — TRAY CATH FOL SIL URIMTR 16FR

## (undated) DEVICE — TIP BOVIE TEFLON E1450X

## (undated) DEVICE — PREP CHLORA 10.5ML ORANGE

## (undated) DEVICE — GLOVE SURG ULTRA TOUCH 7

## (undated) DEVICE — SOLUTION IRRI NS BOTTLE 1000ML R5200-01

## (undated) DEVICE — SUT SILK 3-0 SH 18IN BLACK

## (undated) DEVICE — PAD K-THERMIA 24IN X 60IN

## (undated) DEVICE — SYR 30CC LUER LOCK

## (undated) DEVICE — SUT CTD VICRYL VIL BR SH 27